# Patient Record
Sex: FEMALE | Race: WHITE | NOT HISPANIC OR LATINO | Employment: UNEMPLOYED | URBAN - METROPOLITAN AREA
[De-identification: names, ages, dates, MRNs, and addresses within clinical notes are randomized per-mention and may not be internally consistent; named-entity substitution may affect disease eponyms.]

---

## 2019-06-05 ENCOUNTER — OFFICE VISIT (OUTPATIENT)
Dept: FAMILY MEDICINE | Facility: CLINIC | Age: 49
End: 2019-06-05
Payer: COMMERCIAL

## 2019-06-05 VITALS
BODY MASS INDEX: 50.02 KG/M2 | DIASTOLIC BLOOD PRESSURE: 86 MMHG | TEMPERATURE: 99.7 F | SYSTOLIC BLOOD PRESSURE: 140 MMHG | HEART RATE: 92 BPM | HEIGHT: 64 IN | WEIGHT: 293 LBS

## 2019-06-05 DIAGNOSIS — F43.22 ADJUSTMENT DISORDER WITH ANXIOUS MOOD: ICD-10-CM

## 2019-06-05 DIAGNOSIS — E78.5 HYPERLIPIDEMIA LDL GOAL <100: ICD-10-CM

## 2019-06-05 DIAGNOSIS — J45.40 MODERATE PERSISTENT ASTHMA WITHOUT COMPLICATION: ICD-10-CM

## 2019-06-05 DIAGNOSIS — I10 BENIGN ESSENTIAL HYPERTENSION: ICD-10-CM

## 2019-06-05 DIAGNOSIS — E11.9 TYPE 2 DIABETES MELLITUS WITHOUT COMPLICATION, WITH LONG-TERM CURRENT USE OF INSULIN (H): Primary | ICD-10-CM

## 2019-06-05 DIAGNOSIS — Z79.4 TYPE 2 DIABETES MELLITUS WITHOUT COMPLICATION, WITH LONG-TERM CURRENT USE OF INSULIN (H): Primary | ICD-10-CM

## 2019-06-05 PROBLEM — E66.01 MORBID OBESITY (H): Status: ACTIVE | Noted: 2019-06-05

## 2019-06-05 LAB
ALBUMIN SERPL-MCNC: 3.6 G/DL (ref 3.4–5)
ALP SERPL-CCNC: 130 U/L (ref 40–150)
ALT SERPL W P-5'-P-CCNC: 20 U/L (ref 0–50)
ANION GAP SERPL CALCULATED.3IONS-SCNC: 5 MMOL/L (ref 3–14)
AST SERPL W P-5'-P-CCNC: 15 U/L (ref 0–45)
BILIRUB SERPL-MCNC: 0.5 MG/DL (ref 0.2–1.3)
BUN SERPL-MCNC: 13 MG/DL (ref 7–30)
CALCIUM SERPL-MCNC: 8.9 MG/DL (ref 8.5–10.1)
CHLORIDE SERPL-SCNC: 102 MMOL/L (ref 94–109)
CO2 SERPL-SCNC: 28 MMOL/L (ref 20–32)
CREAT SERPL-MCNC: 0.51 MG/DL (ref 0.52–1.04)
GFR SERPL CREATININE-BSD FRML MDRD: >90 ML/MIN/{1.73_M2}
GLUCOSE SERPL-MCNC: 373 MG/DL (ref 70–99)
HBA1C MFR BLD: 12.9 % (ref 0–5.6)
POTASSIUM SERPL-SCNC: 4.3 MMOL/L (ref 3.4–5.3)
PROT SERPL-MCNC: 7.5 G/DL (ref 6.8–8.8)
SODIUM SERPL-SCNC: 135 MMOL/L (ref 133–144)

## 2019-06-05 PROCEDURE — 36415 COLL VENOUS BLD VENIPUNCTURE: CPT | Performed by: NURSE PRACTITIONER

## 2019-06-05 PROCEDURE — 80053 COMPREHEN METABOLIC PANEL: CPT | Performed by: NURSE PRACTITIONER

## 2019-06-05 PROCEDURE — 83036 HEMOGLOBIN GLYCOSYLATED A1C: CPT | Performed by: NURSE PRACTITIONER

## 2019-06-05 PROCEDURE — 99214 OFFICE O/P EST MOD 30 MIN: CPT | Performed by: NURSE PRACTITIONER

## 2019-06-05 RX ORDER — FLUTICASONE PROPIONATE 110 UG/1
1 AEROSOL, METERED RESPIRATORY (INHALATION) 2 TIMES DAILY
COMMUNITY
End: 2019-08-12

## 2019-06-05 RX ORDER — ALBUTEROL SULFATE 90 UG/1
2 AEROSOL, METERED RESPIRATORY (INHALATION) 4 TIMES DAILY
COMMUNITY
End: 2019-08-12

## 2019-06-05 RX ORDER — LIRAGLUTIDE 6 MG/ML
1.8 INJECTION SUBCUTANEOUS DAILY
COMMUNITY
End: 2019-06-05

## 2019-06-05 RX ORDER — SERTRALINE HYDROCHLORIDE 100 MG/1
150 TABLET, FILM COATED ORAL DAILY
COMMUNITY
End: 2019-06-05

## 2019-06-05 RX ORDER — SERTRALINE HYDROCHLORIDE 100 MG/1
150 TABLET, FILM COATED ORAL DAILY
Qty: 90 TABLET | Refills: 3 | Status: SHIPPED | OUTPATIENT
Start: 2019-06-05 | End: 2020-09-11

## 2019-06-05 RX ORDER — FLUTICASONE PROPIONATE 110 UG/1
1 AEROSOL, METERED RESPIRATORY (INHALATION) 2 TIMES DAILY
Qty: 12 G | Refills: 3 | Status: SHIPPED | OUTPATIENT
Start: 2019-06-05 | End: 2020-09-11

## 2019-06-05 RX ORDER — AMLODIPINE BESYLATE 10 MG/1
10 TABLET ORAL DAILY
Qty: 90 TABLET | Refills: 3 | Status: SHIPPED | OUTPATIENT
Start: 2019-06-05 | End: 2019-11-15

## 2019-06-05 RX ORDER — ATORVASTATIN CALCIUM 80 MG/1
80 TABLET, FILM COATED ORAL DAILY
COMMUNITY
End: 2019-06-05

## 2019-06-05 RX ORDER — CETIRIZINE HYDROCHLORIDE 10 MG/1
10 TABLET ORAL DAILY
COMMUNITY
End: 2019-06-05

## 2019-06-05 RX ORDER — IRBESARTAN 300 MG/1
300 TABLET ORAL AT BEDTIME
Qty: 90 TABLET | Refills: 0 | Status: SHIPPED | OUTPATIENT
Start: 2019-06-05 | End: 2019-08-12

## 2019-06-05 RX ORDER — ALBUTEROL SULFATE 90 UG/1
2 AEROSOL, METERED RESPIRATORY (INHALATION) EVERY 6 HOURS PRN
Qty: 6.7 G | Refills: 3 | Status: SHIPPED | OUTPATIENT
Start: 2019-06-05 | End: 2022-06-07

## 2019-06-05 RX ORDER — TRIAMCINOLONE ACETONIDE 1 MG/G
CREAM TOPICAL PRN
COMMUNITY
End: 2019-08-12

## 2019-06-05 RX ORDER — AMLODIPINE BESYLATE 10 MG/1
10 TABLET ORAL DAILY
COMMUNITY
End: 2019-06-05

## 2019-06-05 RX ORDER — IRBESARTAN 300 MG/1
300 TABLET ORAL AT BEDTIME
COMMUNITY
End: 2019-06-05

## 2019-06-05 RX ORDER — CETIRIZINE HYDROCHLORIDE 10 MG/1
10 TABLET ORAL DAILY
Qty: 90 TABLET | Refills: 1 | Status: SHIPPED | OUTPATIENT
Start: 2019-06-05 | End: 2019-11-25

## 2019-06-05 RX ORDER — LIRAGLUTIDE 6 MG/ML
1.8 INJECTION SUBCUTANEOUS DAILY
Qty: 3 ML | Refills: 1 | Status: SHIPPED | OUTPATIENT
Start: 2019-06-05 | End: 2019-09-24

## 2019-06-05 RX ORDER — ATORVASTATIN CALCIUM 80 MG/1
80 TABLET, FILM COATED ORAL DAILY
Qty: 90 TABLET | Refills: 3 | Status: SHIPPED | OUTPATIENT
Start: 2019-06-05 | End: 2020-11-25

## 2019-06-05 ASSESSMENT — MIFFLIN-ST. JEOR: SCORE: 2133.62

## 2019-06-05 NOTE — PROGRESS NOTES
Subjective     Brissa Mott is a 48 year old female who presents to clinic today for the following health issues:    HPI     Diabetes Follow-up      How often are you checking your blood sugar? One time daily    What time of day are you checking your blood sugars (select all that apply)?  Before meals    Have you had any blood sugars above 200?  Yes     Have you had any blood sugars below 70?  No    What symptoms do you notice when your blood sugar is low?  Shaky    What concerns do you have today about your diabetes? None     Do you have any of these symptoms? (Select all that apply)  Excessive thirst     Have you had a diabetic eye exam in the last 12 months? No    Diabetes Management Resources    Hyperlipidemia Follow-Up      Are you having any of the following symptoms? (Select all that apply)  No complaints of shortness of breath, chest pain or pressure.  No increased sweating or nausea with activity.  No left-sided neck or arm pain.  No complaints of pain in calves when walking 1-2 blocks.    Are you regularly taking any medication or supplement to lower your cholesterol?   Yes- atorvastatin    Are you having muscle aches or other side effects that you think could be caused by your cholesterol lowering medication?  No    Hypertension Follow-up      Do you check your blood pressure regularly outside of the clinic? No     Are you following a low salt diet? No    Are your blood pressures ever more than 140 on the top number (systolic) OR more   than 90 on the bottom number (diastolic), for example 140/90? No    BP Readings from Last 2 Encounters:   06/05/19 140/86     No results found for: A1C, LDL    Amount of exercise or physical activity: a couple days a week    Problems taking medications regularly: No    Medication side effects: none    Diet: regular (no restrictions)        Patient Active Problem List   Diagnosis     Morbid obesity (H)     History reviewed. No pertinent surgical history.    Social History      Tobacco Use     Smoking status: Former Smoker     Smokeless tobacco: Never Used   Substance Use Topics     Alcohol use: Not Currently     History reviewed. No pertinent family history.      Current Outpatient Medications   Medication Sig Dispense Refill     albuterol (PROAIR HFA/PROVENTIL HFA/VENTOLIN HFA) 108 (90 Base) MCG/ACT inhaler Inhale 2 puffs into the lungs 4 times daily       amLODIPine (NORVASC) 10 MG tablet Take 10 mg by mouth daily       aspirin (ASPIRIN) 81 MG EC tablet Take 81 mg by mouth daily       atorvastatin (LIPITOR) 80 MG tablet Take 80 mg by mouth daily       cetirizine (ZYRTEC ALLERGY) 10 MG tablet Take 10 mg by mouth daily       cholecalciferol (VITAMIN D3) 5000 units (125 mcg) capsule Take by mouth daily       fluticasone (FLOVENT HFA) 110 MCG/ACT inhaler Inhale 1 puff into the lungs 2 times daily       insulin isophane & regular (HUMULIN MIX 70/30 PEN , NOVOLIN MIX 70/30 PEN) susp Inject 60-64 Units Subcutaneous 2 times daily (with meals)       insulin pen needle (BD TWYLA U/F) 32G X 4 MM miscellaneous Use two pen needles daily or as directed.       irbesartan (AVAPRO) 300 MG tablet Take 300 mg by mouth At Bedtime       liraglutide (VICTOZA) 18 MG/3ML solution Inject 1.8 mg Subcutaneous daily       metFORMIN (GLUCOPHAGE) 1000 MG tablet Take 1,000 mg by mouth 2 times daily (with meals)       sertraline (ZOLOFT) 100 MG tablet Take 150 mg by mouth daily       triamcinolone (KENALOG) 0.1 % external cream Apply topically as needed for irritation       Allergies   Allergen Reactions     Penicillins      Sulfa Drugs      No lab results found.   BP Readings from Last 3 Encounters:   06/05/19 140/86    Wt Readings from Last 3 Encounters:   06/05/19 (!) 152.9 kg (337 lb)               Reviewed and updated as needed this visit by Provider         Review of Systems   ROS COMP: Constitutional, HEENT, cardiovascular, pulmonary, GI, , musculoskeletal, neuro, skin, endocrine and psych systems are  "negative, except as otherwise noted.      Objective    /86 (BP Location: Right arm, Cuff Size: Adult Large)   Pulse 92   Temp 99.7  F (37.6  C) (Tympanic)   Ht 1.626 m (5' 4\")   Wt (!) 152.9 kg (337 lb)   BMI 57.85 kg/m    Body mass index is 57.85 kg/m .  Physical Exam   GENERAL: healthy, alert and no distress  EYES: Eyes grossly normal to inspection, PERRL and conjunctivae and sclerae normal  HENT: ear canals and TM's normal, nose and mouth without ulcers or lesions  NECK: no adenopathy, no asymmetry, masses, or scars and thyroid normal to palpation  RESP: lungs clear to auscultation - no rales, rhonchi or wheezes  BREAST: normal without masses, tenderness or nipple discharge and no palpable axillary masses or adenopathy  CV: regular rate and rhythm, normal S1 S2, no S3 or S4, no murmur, click or rub, no peripheral edema and peripheral pulses strong  ABDOMEN: soft, nontender, no hepatosplenomegaly, no masses and bowel sounds normal  MS: no gross musculoskeletal defects noted, no edema  SKIN: no suspicious lesions or rashes  NEURO: Normal strength and tone, mentation intact and speech normal  PSYCH: mentation appears normal, affect normal/bright    Results for orders placed or performed in visit on 06/05/19   Hemoglobin A1c   Result Value Ref Range    Hemoglobin A1C 12.9 (H) 0 - 5.6 %   Comprehensive metabolic panel   Result Value Ref Range    Sodium 135 133 - 144 mmol/L    Potassium 4.3 3.4 - 5.3 mmol/L    Chloride 102 94 - 109 mmol/L    Carbon Dioxide 28 20 - 32 mmol/L    Anion Gap 5 3 - 14 mmol/L    Glucose 373 (H) 70 - 99 mg/dL    Urea Nitrogen 13 7 - 30 mg/dL    Creatinine 0.51 (L) 0.52 - 1.04 mg/dL    GFR Estimate >90 >60 mL/min/[1.73_m2]    GFR Estimate If Black >90 >60 mL/min/[1.73_m2]    Calcium 8.9 8.5 - 10.1 mg/dL    Bilirubin Total 0.5 0.2 - 1.3 mg/dL    Albumin 3.6 3.4 - 5.0 g/dL    Protein Total 7.5 6.8 - 8.8 g/dL    Alkaline Phosphatase 130 40 - 150 U/L    ALT 20 0 - 50 U/L    AST 15 0 - 45 " "U/L             Assessment & Plan     (E11.9,  Z79.4) Type 2 diabetes mellitus without complication, with long-term current use of insulin (H)  (primary encounter diagnosis)  Comment: Patient has heme globin A1c is 12 patient has been off of her insulin.  Patient to restart her insulin and follow-up in 6 weeks  Plan: Hemoglobin A1c, cetirizine (ZYRTEC ALLERGY) 10         MG tablet, insulin isophane & regular (HUMULIN         MIX 70/30 PEN , NOVOLIN MIX 70/30 PEN) susp,         irbesartan (AVAPRO) 300 MG tablet, liraglutide         (VICTOZA) 18 MG/3ML solution, metFORMIN         (GLUCOPHAGE) 1000 MG tablet, Comprehensive         metabolic panel, insulin pen needle (BD TWYLA         U/F) 32G X 4 MM miscellaneous      (E78.5) Hyperlipidemia LDL goal <100  Comment: Renewed today   plan: atorvastatin (LIPITOR) 80 MG tablet      (F43.22) Adjustment disorder with anxious mood  Comment: Controlled with current dose of Zoloft renewed today   plan: sertraline (ZOLOFT) 100 MG tablet            (I10) Benign essential hypertension  Comment: Controlled with Norvasc reordered  Plan: amLODIPine (NORVASC) 10 MG tablet           (J45.40) Moderate persistent asthma without complication  Comment: Controlled with current inhalers renewed today  Plan: albuterol (PROAIR HFA/PROVENTIL HFA/VENTOLIN         HFA) 108 (90 Base) MCG/ACT inhaler, fluticasone        (FLOVENT HFA) 110 MCG/ACT inhaler         BMI:   Estimated body mass index is 57.85 kg/m  as calculated from the following:    Height as of this encounter: 1.626 m (5' 4\").    Weight as of this encounter: 152.9 kg (337 lb).   Weight management plan: Discussed healthy diet and exercise guidelines        See Patient Instructions    Return in about 6 weeks (around 7/17/2019) for Diabetes.    RONNELL Bianchi [facility]      "

## 2019-06-07 NOTE — RESULT ENCOUNTER NOTE
Please Notify Brissa  of test results current hemoglobin A1c is 12.  Restart your diabetic medications as discussed in the clinic we will recheck hemoglobin A1c in 6 weeks follow-up.  Comprehensive panel within normal limits creatinine normal at 0.5.  Follow-up appointment in 6 weeks for diabetes      Juli Ramsey CNP

## 2019-06-26 NOTE — PATIENT INSTRUCTIONS
Need to make a preventative appointment       Patient Education     Adjustment Disorder  Life changes--work, family, parents, children--each can cause a great deal of stress in life. An adjustment disorder means you have trouble dealing with this change and stress. This problem can have serious results. You may feel helpless, depressed, make bad decisions, or even feel like you want to hurt yourself.  Adjustment disorder can cause anxiety or depression. It is triggered by daily stresses such as:    Death of a loved one    Divorce    Marriage    General life changes such as changing or leaving a job    Moving    Illness or other health issue for you or a family member    Sex    Money     Symptoms may include:    Sadness or crying    Anxiety    Insomnia    Poor concentration    Trouble doing simple things    New problems at work or with family or friends    Loss of self-esteem    Sense of hopelessness    Feeling trapped or cut off from others  With this condition, it is common to feel sad, guilty, hopeless, and restless. These feelings may continue for weeks or months. It can be helpful to identify what is causing the additional stress and take steps to get extra support. If new stressful events do not happen, it is likely that you will gradually start feeling better.  Home care    If you have been given a prescription for medicine, take it as directed.    It helps to talk about your feelings and thoughts with family or friends who understand and support you.  Follow-up care  Follow up with your healthcare provider, or therapist as advised. Let them know if this condition does not improve or gets worse.  When to seek medical advice  Call your healthcare provider right away if any of these happen:    Worsening depression or anxiety    Feeling out of control    Thoughts of harming yourself or another    Being unable to care for yourself  Date Last Reviewed: 10/1/2017    [id number] [employer]. [address] [place] [address]  [place] [address]. All rights reserved. This information is not intended as a substitute for professional medical care. Always follow your healthcare professional's instructions.            0 = independent

## 2019-07-22 ENCOUNTER — TELEPHONE (OUTPATIENT)
Dept: FAMILY MEDICINE | Facility: CLINIC | Age: 49
End: 2019-07-22

## 2019-07-22 NOTE — TELEPHONE ENCOUNTER
Panel Management Review      Patient has the following on her problem list:     Diabetes    ASA: Passed    Last A1C  Lab Results   Component Value Date    A1C 12.9 06/05/2019     A1C tested: FAILED    Last LDL:    No results found for: CHOL  No results found for: HDL  No results found for: LDL  No results found for: TRIG  No results found for: CHOLHDLRATIO  No results found for: NHDL    Is the patient on a Statin? YES             Is the patient on Aspirin? YES    Medications     HMG CoA Reductase Inhibitors     atorvastatin (LIPITOR) 80 MG tablet       Salicylates     aspirin (ASPIRIN) 81 MG EC tablet             Last three blood pressure readings:  BP Readings from Last 3 Encounters:   06/05/19 140/86       Date of last diabetes office visit: 6/5/19     Tobacco History:     History   Smoking Status     Former Smoker   Smokeless Tobacco     Never Used           Composite cancer screening  Chart review shows that this patient is due/due soon for the following Pap Smear, mammogram, and Colonoscopy  Summary:    Patient is due/failing the following:   Microalbumin, ACT, AAP, MAMMOGRAM, colonoscopy and PAP    Action needed:   Patient needs office visit for a diabetes follow up and labs.    Type of outreach:    Phone, left message for patient to call back.     Questions for provider review:    None                                                                                                                                    Echo Spangler MA      Chart routed to Care Team .

## 2019-08-12 ENCOUNTER — OFFICE VISIT (OUTPATIENT)
Dept: FAMILY MEDICINE | Facility: CLINIC | Age: 49
End: 2019-08-12
Payer: COMMERCIAL

## 2019-08-12 VITALS
SYSTOLIC BLOOD PRESSURE: 147 MMHG | WEIGHT: 293 LBS | BODY MASS INDEX: 50.02 KG/M2 | DIASTOLIC BLOOD PRESSURE: 87 MMHG | HEART RATE: 90 BPM | HEIGHT: 64 IN | TEMPERATURE: 98 F

## 2019-08-12 DIAGNOSIS — Z12.11 SPECIAL SCREENING FOR MALIGNANT NEOPLASMS, COLON: ICD-10-CM

## 2019-08-12 DIAGNOSIS — I15.9 SECONDARY HYPERTENSION: ICD-10-CM

## 2019-08-12 DIAGNOSIS — E66.01 MORBID OBESITY (H): ICD-10-CM

## 2019-08-12 DIAGNOSIS — Z79.4 TYPE 2 DIABETES MELLITUS WITH HYPERGLYCEMIA, WITH LONG-TERM CURRENT USE OF INSULIN (H): ICD-10-CM

## 2019-08-12 DIAGNOSIS — E11.65 TYPE 2 DIABETES MELLITUS WITH HYPERGLYCEMIA, WITH LONG-TERM CURRENT USE OF INSULIN (H): ICD-10-CM

## 2019-08-12 DIAGNOSIS — Z00.00 ROUTINE GENERAL MEDICAL EXAMINATION AT A HEALTH CARE FACILITY: Primary | ICD-10-CM

## 2019-08-12 DIAGNOSIS — G47.33 OSA (OBSTRUCTIVE SLEEP APNEA): ICD-10-CM

## 2019-08-12 DIAGNOSIS — L30.9 DERMATITIS: ICD-10-CM

## 2019-08-12 DIAGNOSIS — M25.562 ACUTE PAIN OF LEFT KNEE: ICD-10-CM

## 2019-08-12 PROBLEM — E11.9 DIABETES MELLITUS, TYPE 2 (H): Status: ACTIVE | Noted: 2019-08-12

## 2019-08-12 PROCEDURE — 99214 OFFICE O/P EST MOD 30 MIN: CPT | Mod: 25 | Performed by: PHYSICIAN ASSISTANT

## 2019-08-12 PROCEDURE — G0145 SCR C/V CYTO,THINLAYER,RESCR: HCPCS | Performed by: PHYSICIAN ASSISTANT

## 2019-08-12 PROCEDURE — 87624 HPV HI-RISK TYP POOLED RSLT: CPT | Performed by: PHYSICIAN ASSISTANT

## 2019-08-12 PROCEDURE — 99396 PREV VISIT EST AGE 40-64: CPT | Performed by: PHYSICIAN ASSISTANT

## 2019-08-12 RX ORDER — IRBESARTAN 300 MG/1
300 TABLET ORAL AT BEDTIME
Qty: 90 TABLET | Refills: 0 | Status: SHIPPED | OUTPATIENT
Start: 2019-08-12 | End: 2020-03-16

## 2019-08-12 RX ORDER — ALPRAZOLAM 1 MG
TABLET ORAL
Qty: 1 TABLET | Refills: 0 | Status: SHIPPED | OUTPATIENT
Start: 2019-08-12 | End: 2019-11-15

## 2019-08-12 RX ORDER — TRIAMCINOLONE ACETONIDE 1 MG/G
CREAM TOPICAL PRN
Qty: 15 G | Refills: 0 | Status: SHIPPED | OUTPATIENT
Start: 2019-08-12 | End: 2021-04-29

## 2019-08-12 ASSESSMENT — ENCOUNTER SYMPTOMS
NERVOUS/ANXIOUS: 1
EYE PAIN: 0
JOINT SWELLING: 1
FREQUENCY: 1
ARTHRALGIAS: 1
HEMATURIA: 0
CONSTIPATION: 0
COUGH: 1
HEARTBURN: 0
CHILLS: 0
HEMATOCHEZIA: 0
HEADACHES: 0
ABDOMINAL PAIN: 0
FEVER: 0
DIZZINESS: 0
DIARRHEA: 0

## 2019-08-12 ASSESSMENT — MIFFLIN-ST. JEOR: SCORE: 2160.84

## 2019-08-12 NOTE — PATIENT INSTRUCTIONS
Let me know if change mind and want to see sleep doctor for sleep apnea.  Big health risks with untreated sleep apnea  OR consider seeing ENT surgeon to see if they could help - will get you referral    BP -start checking  Goal under 130/80    Diabetes -  Set up with diabetic ed.  Bring sugar readings if able.  [phone]     Knee pain -  Suspect tear of cartilage  Set up MRI.  Call [phone] to set up your imaging testing.  Use xanax tablet before MRI to help claustrophobia.  Need .  Can try OTC lidocaine gels or patches on knee, or diclofenac prescription cream given today    Set up colonoscopy.  Set up mammogram  [facility] Mammo Schedule  [facility] ~ [phone]  One Day Weekly- Alternating Days    [place] ~ [phone]  Every other Monday or Wednesday   & one Saturday morning a month    [place] ~ [phone]  Every Other Monday Afternoon    [place] ~ [phone]  Every Other Monday Morning    [place] ~ [phone]  Every Monday morning  Every Tuesday afternoon  Wed, Thurs, Friday morning & afternoon        Preventive Health Recommendations  Female Ages 50 - 64    Yearly exam: See your health care provider every year in order to  o Review health changes.   o Discuss preventive care.    o Review your medicines if your doctor has prescribed any.      Get a Pap test every three years (unless you have an abnormal result and your provider advises testing more often).    If you get Pap tests with HPV test, you only need to test every 5 years, unless you have an abnormal result.     You do not need a Pap test if your uterus was removed (hysterectomy) and you have not had cancer.    You should be tested each year for STDs (sexually transmitted diseases) if you're at risk.     Have a mammogram every 1 to 2 years.    Have a colonoscopy at age 50, or have a yearly FIT test (stool test). These exams screen for colon cancer.      Have a cholesterol test every 5 years, or more often if  advised.    Have a diabetes test (fasting glucose) every three years. If you are at risk for diabetes, you should have this test more often.     If you are at risk for osteoporosis (brittle bone disease), think about having a bone density scan (DEXA).    Shots: Get a flu shot each year. Get a tetanus shot every 10 years.    Nutrition:     Eat at least 5 servings of fruits and vegetables each day.    Eat whole-grain bread, whole-wheat pasta and brown rice instead of white grains and rice.    Get adequate Calcium and Vitamin D.     Lifestyle    Exercise at least 150 minutes a week (30 minutes a day, 5 days a week). This will help you control your weight and prevent disease.    Limit alcohol to one drink per day.    No smoking.     Wear sunscreen to prevent skin cancer.     See your dentist every six months for an exam and cleaning.    See your eye doctor every 1 to 2 years.

## 2019-08-12 NOTE — NURSING NOTE
"Chief Complaint   Patient presents with     Physical       Initial BP (!) 150/90 (BP Location: Right arm, Patient Position: Chair, Cuff Size: Adult Large)   Pulse 90   Temp 98  F (36.7  C) (Tympanic)   Ht 1.626 m (5' 4\")   Wt (!) 155.6 kg (343 lb)   BMI 58.88 kg/m   Estimated body mass index is 58.88 kg/m  as calculated from the following:    Height as of this encounter: 1.626 m (5' 4\").    Weight as of this encounter: 155.6 kg (343 lb).    Patient presents to the clinic using No DME    Health Maintenance that is potentially due pending provider review:  NONE        Is there anyone who you would like to be able to receive your results? No  If yes have patient fill out YESSENIA      "

## 2019-08-12 NOTE — LETTER
August 18, 2019    Brissa Mott  [address] [facility] [address]    Dear ,  This letter is regarding your recent Pap smear (cervical cancer screening) and Human Papillomavirus (HPV) test.  We are happy to inform you that your Pap smear result is normal. Cervical cancer is closely linked with certain types of HPV. Your results showed no evidence of high-risk HPV.  We recommend you have your next PAP smear and HPV test in 5 years.  You will still need to return to the clinic every year for an annual exam and other preventive tests.  If you have additional questions regarding this result, please call our registered nurse, Jayne at [phone].  Sincerely,    Khloe Strong PA-C/mari

## 2019-08-12 NOTE — LETTER
[facility]  [address] [place] [id number]  Phone: [phone]  Fax: [phone]    08/27/19    Brissa [address] [address]      Khloe Brumfield has placed a referral for ENT if your interested. I've enclosed the information for you. Please call the office if any questions.    Sincerely,      Abby Lobato CMA for Khloe Strong PA-C

## 2019-08-12 NOTE — PROGRESS NOTES
"ref   SUBJECTIVE:   CC: Brissa Mott is an 48 year old woman who presents for preventive health visit.     Healthy Habits:     Getting at least 3 servings of Calcium per day:  Yes    Bi-annual eye exam:  Yes    Dental care twice a year:  Yes    Sleep apnea or symptoms of sleep apnea:  Excessive snoring    Diet:  Diabetic    Frequency of exercise:  None    Taking medications regularly:  Yes    PHQ-2 Total Score: 2    Additional concerns today:  Yes    No pap in many years    * Left knee-  Ongoing.  Years ago she injured it and must have twisted it few weeks ago.  Pain with walking and also \"pops\".  Kicked in knee many years ago.  Family history of \"bad knees\".  Painful if on it much.      HTN - usually good in clinic but does not monitor.    Severe KATHRINE - has bipap but can't sleep with it, does not use, hates it.    DM2 - on metformin 1000 bid, victoza 1.8, novolin mix.  Sugars running 110-210, only tests in morning.     Morbid obesity.  Not interested in surgery.    Refill steroid cream.  Feels helps with healing.     Today's PHQ-2 Score:   PHQ-2 ( 1999 Pfizer) 8/12/2019   Q1: Little interest or pleasure in doing things 1   Q2: Feeling down, depressed or hopeless 1   PHQ-2 Score 2   Q1: Little interest or pleasure in doing things Several days   Q2: Feeling down, depressed or hopeless Several days   PHQ-2 Score 2       Abuse: Current or Past(Physical, Sexual or Emotional)- No  Do you feel safe in your environment? Yes    Social History     Tobacco Use     Smoking status: Former Smoker     Smokeless tobacco: Never Used   Substance Use Topics     Alcohol use: Not Currently         Alcohol Use 8/12/2019   Prescreen: >3 drinks/day or >7 drinks/week? Not Applicable       Reviewed orders with patient.  Reviewed health maintenance and updated orders accordingly - Yes  Labs reviewed in EPIC  BP Readings from Last 3 Encounters:   08/12/19 (!) 147/87   06/05/19 140/86    Wt Readings from Last 3 Encounters:   08/12/19 (!) 155.6 kg " "(343 lb)   06/05/19 (!) 152.9 kg (337 lb)            Mammogram Screening: Patient over age 50, mutual decision to screen reflected in health maintenance.    Pertinent mammograms are reviewed under the imaging tab.  History of abnormal Pap smear: NO - age 30-65 PAP every 5 years with negative HPV co-testing recommended  PAP / HPV Latest Ref Rng & Units 8/12/2019   PAP - NIL   HPV 16 DNA NEG:Negative Negative   HPV 18 DNA NEG:Negative Negative   OTHER HR HPV NEG:Negative Negative     Reviewed and updated as needed this visit by clinical staff  Tobacco  Allergies  Meds  Problems  Med Hx  Surg Hx  Fam Hx  Soc Hx          Reviewed and updated as needed this visit by Provider  Tobacco  Allergies  Meds  Problems  Med Hx  Surg Hx  Fam Hx            Review of Systems   Constitutional: Negative for chills and fever.   HENT: Positive for congestion. Negative for ear pain and hearing loss.    Eyes: Negative for pain.   Respiratory: Positive for cough.    Cardiovascular: Negative for chest pain and peripheral edema.   Gastrointestinal: Negative for abdominal pain, constipation, diarrhea, heartburn and hematochezia.   Genitourinary: Positive for frequency. Negative for genital sores and hematuria.   Musculoskeletal: Positive for arthralgias and joint swelling.   Neurological: Negative for dizziness and headaches.   Psychiatric/Behavioral: Negative for mood changes. The patient is nervous/anxious.    All other systems reviewed and are negative.    OBJECTIVE:   BP (!) 147/87   Pulse 90   Temp 98  F (36.7  C) (Tympanic)   Ht 1.626 m (5' 4\")   Wt (!) 155.6 kg (343 lb)   BMI 58.88 kg/m    Physical Exam  GENERAL: healthy, alert and no distress  EYES: Eyes grossly normal to inspection, PERRL and conjunctivae and sclerae normal  HENT: ear canals and TM's normal, nose and mouth without ulcers or lesions.  Narrow posterior pharynx architecture.  NECK: no adenopathy, no asymmetry, masses, or scars and thyroid normal to " palpation  RESP: lungs clear to auscultation - no rales, rhonchi or wheezes  BREAST: normal without masses, tenderness or nipple discharge and no palpable axillary masses or adenopathy  CV: regular rate and rhythm, normal S1 S2, no S3 or S4, no murmur, click or rub, no peripheral edema   ABDOMEN: soft, nontender, no hepatosplenomegaly, no masses and bowel sounds normal   (female): normal female external genitalia, normal urethral meatus, vaginal mucosa pink, moist, well rugated, and normal cervix/adnexa/uterus without masses or discharge  MS: no gross musculoskeletal defects noted, no edema  Knee has normal ROM.  There is no crepitus but joint space tenderness.  Patella tracks well and has no tenderness with pressure and when displaced from condylar groove has no tenderness underneath.  Anterior and posterior draws negative.  Varus and valgus negative.    SKIN: no suspicious lesions or rashes  NEURO: Normal strength and tone, mentation intact and speech normal  PSYCH: mentation appears normal, affect normal/bright    ASSESSMENT/PLAN:       ICD-10-CM    1. Routine general medical examination at a health care facility Z00.00 Pap imaged thin layer screen with HPV - recommended age 30 - 65 years (select HPV order below)     HPV High Risk Types DNA Cervical   2. Type 2 diabetes mellitus with hyperglycemia, with long-term current use of insulin (H) E11.65 OFFICE/OUTPT VISIT,EST,LEVL IV    Z79.4    3. Morbid obesity (H) E66.01 OFFICE/OUTPT VISIT,EST,LEVL IV   4. KATHRINE (obstructive sleep apnea) G47.33 SLEEP ENT REFERRAL     OFFICE/OUTPT VISIT,EST,LEVL IV   5. Acute pain of left knee M25.562 MR Knee Left w/o Contrast     ALPRAZolam (XANAX) 1 MG tablet     diclofenac (VOLTAREN) 1 % topical gel     OFFICE/OUTPT VISIT,EST,LEVL IV   6. Secondary hypertension I15.9 irbesartan (AVAPRO) 300 MG tablet     SLEEP ENT REFERRAL   7. Dermatitis L30.9 triamcinolone (KENALOG) 0.1 % external cream   8. Special screening for malignant  "neoplasms, colon Z12.11 GASTROENTEROLOGY ADULT REF PROCEDURE ONLY       COUNSELING:  Reviewed preventive health counseling, as reflected in patient instructions       Regular exercise       Healthy diet/nutrition    Estimated body mass index is 58.88 kg/m  as calculated from the following:    Height as of this encounter: 1.626 m (5' 4\").    Weight as of this encounter: 155.6 kg (343 lb).    Weight management plan: Patient referred to endocrine and/or weight management specialty     reports that she has quit smoking. She has never used smokeless tobacco.      Counseling Resources:  ATP IV Guidelines  Pooled Cohorts Equation Calculator  Breast Cancer Risk Calculator  FRAX Risk Assessment  ICSI Preventive Guidelines  Dietary Guidelines for Americans, 2010  [employer]'s MyPlate  ASA Prophylaxis  Lung CA Screening    Khloe Strong PA-C  [facility]      Patient Instructions   Let me know if change mind and want to see sleep doctor for sleep apnea.  Big health risks with untreated sleep apnea  OR consider seeing ENT surgeon to see if they could help - will get you referral    BP -start checking  Goal under 130/80    Diabetes -  Set up with diabetic ed.  Bring sugar readings if able.  [phone]     Knee pain -  Suspect tear of cartilage  Set up MRI.  Call [phone] to set up your imaging testing.  Use xanax tablet before MRI to help claustrophobia.  Need .  Can try OTC lidocaine gels or patches on knee, or diclofenac prescription cream given today    Set up colonoscopy.  Set up mammogram  [facility] Mammo Schedule  [facility] ~ [phone]  One Day Weekly- Alternating Days    [place] ~ [phone]  Every other Monday or Wednesday   & one Saturday morning a month    [place] ~ [phone]  Every Other Monday Afternoon    [place] ~ [phone]  Every Other Monday Morning    [place] ~ [phone]  Every Monday morning  Every Tuesday afternoon  Wed, Thurs, Friday morning & " afternoon        Preventive Health Recommendations  Female Ages 50 - 64    Yearly exam: See your health care provider every year in order to  o Review health changes.   o Discuss preventive care.    o Review your medicines if your doctor has prescribed any.      Get a Pap test every three years (unless you have an abnormal result and your provider advises testing more often).    If you get Pap tests with HPV test, you only need to test every 5 years, unless you have an abnormal result.     You do not need a Pap test if your uterus was removed (hysterectomy) and you have not had cancer.    You should be tested each year for STDs (sexually transmitted diseases) if you're at risk.     Have a mammogram every 1 to 2 years.    Have a colonoscopy at age 50, or have a yearly FIT test (stool test). These exams screen for colon cancer.      Have a cholesterol test every 5 years, or more often if advised.    Have a diabetes test (fasting glucose) every three years. If you are at risk for diabetes, you should have this test more often.     If you are at risk for osteoporosis (brittle bone disease), think about having a bone density scan (DEXA).    Shots: Get a flu shot each year. Get a tetanus shot every 10 years.    Nutrition:     Eat at least 5 servings of fruits and vegetables each day.    Eat whole-grain bread, whole-wheat pasta and brown rice instead of white grains and rice.    Get adequate Calcium and Vitamin D.     Lifestyle    Exercise at least 150 minutes a week (30 minutes a day, 5 days a week). This will help you control your weight and prevent disease.    Limit alcohol to one drink per day.    No smoking.     Wear sunscreen to prevent skin cancer.     See your dentist every six months for an exam and cleaning.    See your eye doctor every 1 to 2 years.

## 2019-08-14 LAB
COPATH REPORT: NORMAL
PAP: NORMAL

## 2019-08-15 LAB
FINAL DIAGNOSIS: NORMAL
HPV HR 12 DNA CVX QL NAA+PROBE: NEGATIVE
HPV16 DNA SPEC QL NAA+PROBE: NEGATIVE
HPV18 DNA SPEC QL NAA+PROBE: NEGATIVE
SPECIMEN DESCRIPTION: NORMAL
SPECIMEN SOURCE CVX/VAG CYTO: NORMAL

## 2019-08-22 PROBLEM — I15.9 SECONDARY HYPERTENSION: Status: ACTIVE | Noted: 2019-08-22

## 2019-08-23 NOTE — PROGRESS NOTES
Attempted to call- message says not excepting calls at this time.  [facility]

## 2019-09-20 ENCOUNTER — TELEPHONE (OUTPATIENT)
Dept: FAMILY MEDICINE | Facility: CLINIC | Age: 49
End: 2019-09-20

## 2019-09-20 DIAGNOSIS — Z79.4 TYPE 2 DIABETES MELLITUS WITH HYPERGLYCEMIA, WITH LONG-TERM CURRENT USE OF INSULIN (H): Primary | ICD-10-CM

## 2019-09-20 DIAGNOSIS — E11.65 TYPE 2 DIABETES MELLITUS WITH HYPERGLYCEMIA, WITH LONG-TERM CURRENT USE OF INSULIN (H): Primary | ICD-10-CM

## 2019-09-20 NOTE — LETTER
[facility]  [address] [place] [id number] [phone]      September 27, 2019    Brissa Mott                                                                                                                     [address] [place] [id number]            Dear Brissa Mott,    At [facility] we care about your health and well-being. A review of your chart has indicated that you are due for a mammogram and a colon cancer screening. Please contact us at [phone] to schedule your appointment.  -Mammogram- All women age 40 to 70 years old, who are in good health, should be screened for breast cancer with mammography every 1 to 2 years. Mammograms help find breast cancer at an early stage. The smaller and the more localized a cancer is at the time of diagnosis and treatment, the greater the chance of a cure. The mammogram allows the detection of some types of breast cancer 1 to 2 years before it could be felt. There is a better chance of curing the cancer if it is found at an early stage. Please call to schedule your mammogram at any of the locations below:    [facility] ~ [phone]  One Day weekly- Alternating Days    [place] ~ [phone]  Every other Monday or Wednesday   & one Saturday morning a month    [place] ~ [phone]  Every Other Monday Afternoon    [place]  Every other Monday Morning    [place] ~ [phone]  Every Monday morning  Every Tuesday afternoon  Wed, Thurs, Friday morning & afternoon    Colon Cancer Screening- Recommended every 5-10 years, depending on your history, in order to prevent and detect colon cancer at its earliest stages.  Colon cancer is now the second leading cause of death in the [place] for both men and women and there are over 130,000 new cases and 50,000 deaths per year from colon cancer.  Colonoscopies can prevent 90-95% of these deaths.  Problem lesions can be removed before they ever become  cancer.  This test is not only looking for cancer, but also getting rid of precancerious lesions.  You are usually given some sedation which makes the test very comfortable for most people.      If you do not wish to do a colonoscopy or cannot afford to do one, at this time, there is another option. It is called a FIT test or Fecal Immunochemical Occult Blood Test (take home stool sample kit).  It does not replace the colonoscopy for colorectal cancer screening, but it can detect hidden bleeding in the lower colon.  It does need to be repeated every year and if a positive result is obtained, you would be referred for a colonoscopy. The FIT test is really easy to do and does not require any  diet or medication restrictions and involves only one collection sample.      If you have completed either one of these tests or had a flexible sigmoidoscopy in the past five years at another facility, please have the records sent to our clinic so that we can best coordinate your care.  Please call us ([phone]) if you have questions or would like arrange either to do a colonoscopy or obtain the necessary test kit for the Fecal Occult Test.     If you have already had one or all of the above screening tests at another facility, please call us to update your chart.     You may contact the clinic at [phone] if you have any questions or concerns about this request.    Sincerely,    [facility] Staff/ ss

## 2019-09-20 NOTE — TELEPHONE ENCOUNTER
Panel Management Review      Patient has the following on her problem list:     Diabetes    ASA: Passed    Last A1C  Lab Results   Component Value Date    A1C 12.9 06/05/2019     A1C tested: FAILED    Last LDL:    No results found for: CHOL  No results found for: HDL  No results found for: LDL  No results found for: TRIG  No results found for: CHOLHDLRATIO  No results found for: NHDL    Is the patient on a Statin? YES             Is the patient on Aspirin? YES    Medications     HMG CoA Reductase Inhibitors     atorvastatin (LIPITOR) 80 MG tablet       Salicylates     aspirin (ASPIRIN) 81 MG EC tablet             Last three blood pressure readings:  BP Readings from Last 3 Encounters:   08/12/19 (!) 147/87   06/05/19 140/86       Date of last diabetes office visit: 8/12/19     Tobacco History:     History   Smoking Status     Former Smoker   Smokeless Tobacco     Never Used           Composite cancer screening  Chart review shows that this patient is due/due soon for the following Mammogram and Colonoscopy  Summary:    Patient is due/failing the following:   AAP, ACT, COLONOSCOPY and MAMMOGRAM    Action needed:   Patient needs a mammogram and a colonoscopy    Type of outreach:    Phone, left message for patient to call back.     Questions for provider review:    None                                                                                                                                    Echo Spangler MA      Chart routed to Care Team .

## 2019-09-20 NOTE — LETTER
My Asthma Action Plan    Name: Brissa Mott   YOB: 1970  Date: 9/23/2019   My doctor: RONNELL Bianchi CNP   My clinic: [facility]        My Rescue Medicine:   Albuterol inhaler (Proair/Ventolin/Proventil HFA)  2-4 puffs EVERY 4 HOURS as needed. Use a spacer if recommended by your provider.   My Asthma Severity:   Intermittent / Exercise Induced  Know your asthma triggers: Patient is unaware of triggers             GREEN ZONE   Good Control    I feel good    No cough or wheeze    Can work, sleep and play without asthma symptoms       Take your asthma control medicine every day.     1. If exercise triggers your asthma, take your rescue medication    15 minutes before exercise or sports, and    During exercise if you have asthma symptoms  2. Spacer to use with inhaler: If you have a spacer, make sure to use it with your inhaler             YELLOW ZONE Getting Worse  I have ANY of these:    I do not feel good    Cough or wheeze    Chest feels tight    Wake up at night   1. Keep taking your Green Zone medications  2. Start taking your rescue medicine:    every 20 minutes for up to 1 hour. Then every 4 hours for 24-48 hours.  3. If you stay in the Yellow Zone for more than 12-24 hours, contact your doctor.  4. If you do not return to the Green Zone in 12-24 hours or you get worse, start taking your oral steroid medicine if prescribed by your provider.           RED ZONE Medical Alert - Get Help  I have ANY of these:    I feel awful    Medicine is not helping    Breathing getting harder    Trouble walking or talking    Nose opens wide to breathe       1. Take your rescue medicine NOW  2. If your provider has prescribed an oral steroid medicine, start taking it NOW  3. Call your doctor NOW  4. If you are still in the Red Zone after 20 minutes and you have not reached your doctor:    Take your rescue medicine again and    Call 911 or go to the emergency room right away    See your  regular doctor within 2 weeks of an Emergency Room or Urgent Care visit for follow-up treatment.          Annual Reminders:  Meet with Asthma Educator,  Flu Shot in the Fall, consider Pneumonia Vaccination for patients with asthma (aged 19 and older).    Pharmacy: [id number] - [place] [address] [facility] AT [facility] & [address]                        Asthma Triggers  How To Control Things That Make Your Asthma Worse    Triggers are things that make your asthma worse.  Look at the list below to help you find your triggers and   what you can do about them. You can help prevent asthma flare-ups by staying away from your triggers.      Trigger                                                          What you can do   Cigarette Smoke  Tobacco smoke can make asthma worse. Do not allow smoking in your home, car or around you.  Be sure no one smokes at a child s day care or school.  If you smoke, ask your health care provider for ways to help you quit.  Ask family members to quit too.  Ask your health care provider for a referral to Quit Plan to help you quit smoking, or call 1-933-104-PLAN.     Colds, Flu, Bronchitis  These are common triggers of asthma. Wash your hands often.  Don t touch your eyes, nose or mouth.  Get a flu shot every year.     Dust Mites  These are tiny bugs that live in cloth or carpet. They are too small to see. Wash sheets and blankets in hot water every week.   Encase pillows and mattress in dust mite proof covers.  Avoid having carpet if you can. If you have carpet, vacuum weekly.   Use a dust mask and HEPA vacuum.   Pollen and Outdoor Mold  Some people are allergic to trees, grass, or weed pollen, or molds. Try to keep your windows closed.  Limit time out doors when pollen count is high.   Ask you health care provider about taking medicine during allergy season.     Animal Dander  Some people are allergic to skin flakes, urine or saliva from pets with fur or feathers.  Keep pets with fur or feathers out of your home.    If you can t keep the pet outdoors, then keep the pet out of your bedroom.  Keep the bedroom door closed.  Keep pets off cloth furniture and away from stuffed toys.     Mice, Rats, and Cockroaches  Some people are allergic to the waste from these pests.   Cover food and garbage.  Clean up spills and food crumbs.  Store grease in the refrigerator.   Keep food out of the bedroom.   Indoor Mold  This can be a trigger if your home has high moisture. Fix leaking faucets, pipes, or other sources of water.   Clean moldy surfaces.  Dehumidify basement if it is damp and smelly.   Smoke, Strong Odors, and Sprays  These can reduce air quality. Stay away from strong odors and sprays, such as perfume, powder, hair spray, paints, smoke incense, paint, cleaning products, candles and new carpet.   Exercise or Sports  Some people with asthma have this trigger. Be active!  Ask your doctor about taking medicine before sports or exercise to prevent symptoms.    Warm up for 5-10 minutes before and after sports or exercise.     Other Triggers of Asthma  Cold air:  Cover your nose and mouth with a scarf.  Sometimes laughing or crying can be a trigger.  Some medicines and food can trigger asthma.

## 2019-09-23 NOTE — TELEPHONE ENCOUNTER
Called and left message for patient to call clinic back. She needs to schedule a mammogram and a colon cancer screening.    Echo Spangler MA

## 2019-09-24 DIAGNOSIS — E11.9 TYPE 2 DIABETES MELLITUS WITHOUT COMPLICATION, WITH LONG-TERM CURRENT USE OF INSULIN (H): ICD-10-CM

## 2019-09-24 DIAGNOSIS — Z79.4 TYPE 2 DIABETES MELLITUS WITHOUT COMPLICATION, WITH LONG-TERM CURRENT USE OF INSULIN (H): ICD-10-CM

## 2019-09-24 RX ORDER — LIRAGLUTIDE 6 MG/ML
1.8 INJECTION SUBCUTANEOUS DAILY
Qty: 9 ML | Refills: 0 | Status: SHIPPED | OUTPATIENT
Start: 2019-09-24 | End: 2020-01-06

## 2019-09-24 NOTE — TELEPHONE ENCOUNTER
"Requested Prescriptions   Pending Prescriptions Disp Refills     liraglutide (VICTOZA) 18 MG/3ML solution 3 mL 1     Sig: Inject 1.8 mg Subcutaneous daily       GLP-1 Agonists Protocol Failed - 9/24/2019 10:17 AM        Failed - Blood pressure less than 140/90 in past 6 months     BP Readings from Last 3 Encounters:   08/12/19 (!) 147/87   06/05/19 140/86                 Failed - LDL on file in past 12 months     No lab results found.          Failed - Microalbumin on file in past 12 months     No lab results found.          Failed - HgbA1C in past 3 or 6 months     If HgbA1C is 8 or greater, it needs to be on file within the past 3 months.  If less than 8, must be on file within the past 6 months.     Recent Labs   Lab Test 06/05/19  1634   A1C 12.9*             Failed - Normal serum creatinine on file in past 12 months     Recent Labs   Lab Test 06/05/19  1634   CR 0.51*             Passed - Medication is active on med list        Passed - Patient is age 18 or older        Passed - No active pregnancy on record        Passed - No positive pregnancy test in past 12 months        Passed - Recent (6 mo) or future (30 days) visit within the authorizing provider's specialty     Patient had office visit in the last 6 months or has a visit in the next 30 days with authorizing provider.  See \"Patient Info\" tab in inbasket, or \"Choose Columns\" in Meds & Orders section of the refill encounter.            Last Written Prescription Date:  6/5/19  Last Fill Quantity: 3 ml,  # refills: 1   Last office visit: 8/12/2019 with prescribing provider:  Tae   Future Office Visit:      "

## 2019-11-07 ENCOUNTER — TRANSFERRED RECORDS (OUTPATIENT)
Dept: HEALTH INFORMATION MANAGEMENT | Facility: CLINIC | Age: 49
End: 2019-11-07

## 2019-11-14 ENCOUNTER — TELEPHONE (OUTPATIENT)
Dept: FAMILY MEDICINE | Facility: CLINIC | Age: 49
End: 2019-11-14

## 2019-11-14 NOTE — TELEPHONE ENCOUNTER
Latricia Johnson is calling to get Home care orders okayed for Brissa beginning Monday November 18.  She was discharged from [facility] November 12.  She is feeling fine.  If Juli okays this she needs Dr. Lockett to co-sign.  Latricia's number is [phone].    [facility]

## 2019-11-15 ENCOUNTER — OFFICE VISIT (OUTPATIENT)
Dept: FAMILY MEDICINE | Facility: CLINIC | Age: 49
End: 2019-11-15
Payer: COMMERCIAL

## 2019-11-15 VITALS
WEIGHT: 293 LBS | BODY MASS INDEX: 55.79 KG/M2 | HEART RATE: 84 BPM | SYSTOLIC BLOOD PRESSURE: 145 MMHG | TEMPERATURE: 98.9 F | DIASTOLIC BLOOD PRESSURE: 93 MMHG | OXYGEN SATURATION: 96 % | RESPIRATION RATE: 30 BRPM

## 2019-11-15 DIAGNOSIS — E78.5 HYPERLIPIDEMIA LDL GOAL <100: ICD-10-CM

## 2019-11-15 DIAGNOSIS — E11.9 TYPE 2 DIABETES MELLITUS WITHOUT COMPLICATION, WITH LONG-TERM CURRENT USE OF INSULIN (H): Primary | ICD-10-CM

## 2019-11-15 DIAGNOSIS — I63.9 CEREBROVASCULAR ACCIDENT (CVA), UNSPECIFIED MECHANISM (H): ICD-10-CM

## 2019-11-15 DIAGNOSIS — Z79.4 TYPE 2 DIABETES MELLITUS WITHOUT COMPLICATION, WITH LONG-TERM CURRENT USE OF INSULIN (H): Primary | ICD-10-CM

## 2019-11-15 DIAGNOSIS — G47.33 OSA (OBSTRUCTIVE SLEEP APNEA): ICD-10-CM

## 2019-11-15 LAB
ALBUMIN SERPL-MCNC: 3.1 G/DL (ref 3.4–5)
ALP SERPL-CCNC: 98 U/L (ref 40–150)
ALT SERPL W P-5'-P-CCNC: 21 U/L (ref 0–50)
ANION GAP SERPL CALCULATED.3IONS-SCNC: 5 MMOL/L (ref 3–14)
AST SERPL W P-5'-P-CCNC: 10 U/L (ref 0–45)
BILIRUB SERPL-MCNC: 0.7 MG/DL (ref 0.2–1.3)
BUN SERPL-MCNC: 5 MG/DL (ref 7–30)
CALCIUM SERPL-MCNC: 8.7 MG/DL (ref 8.5–10.1)
CHLORIDE SERPL-SCNC: 99 MMOL/L (ref 94–109)
CO2 SERPL-SCNC: 33 MMOL/L (ref 20–32)
CREAT SERPL-MCNC: 0.46 MG/DL (ref 0.52–1.04)
ERYTHROCYTE [DISTWIDTH] IN BLOOD BY AUTOMATED COUNT: 14 % (ref 10–15)
GFR SERPL CREATININE-BSD FRML MDRD: >90 ML/MIN/{1.73_M2}
GLUCOSE SERPL-MCNC: 292 MG/DL (ref 70–99)
HBA1C MFR BLD: 11 % (ref 0–5.6)
HCT VFR BLD AUTO: 48.3 % (ref 35–47)
HGB BLD-MCNC: 15 G/DL (ref 11.7–15.7)
MCH RBC QN AUTO: 26.7 PG (ref 26.5–33)
MCHC RBC AUTO-ENTMCNC: 31.1 G/DL (ref 31.5–36.5)
MCV RBC AUTO: 86 FL (ref 78–100)
PLATELET # BLD AUTO: 220 10E9/L (ref 150–450)
POTASSIUM SERPL-SCNC: 3.5 MMOL/L (ref 3.4–5.3)
PROT SERPL-MCNC: 7.2 G/DL (ref 6.8–8.8)
RBC # BLD AUTO: 5.62 10E12/L (ref 3.8–5.2)
SODIUM SERPL-SCNC: 137 MMOL/L (ref 133–144)
WBC # BLD AUTO: 7.9 10E9/L (ref 4–11)

## 2019-11-15 PROCEDURE — 36415 COLL VENOUS BLD VENIPUNCTURE: CPT | Performed by: NURSE PRACTITIONER

## 2019-11-15 PROCEDURE — 80053 COMPREHEN METABOLIC PANEL: CPT | Performed by: NURSE PRACTITIONER

## 2019-11-15 PROCEDURE — 85027 COMPLETE CBC AUTOMATED: CPT | Performed by: NURSE PRACTITIONER

## 2019-11-15 PROCEDURE — 83036 HEMOGLOBIN GLYCOSYLATED A1C: CPT | Performed by: NURSE PRACTITIONER

## 2019-11-15 PROCEDURE — 99214 OFFICE O/P EST MOD 30 MIN: CPT | Performed by: NURSE PRACTITIONER

## 2019-11-15 RX ORDER — CLOPIDOGREL BISULFATE 75 MG/1
75 TABLET ORAL DAILY
COMMUNITY
Start: 2019-11-11

## 2019-11-15 RX ORDER — CALCIUM CARBONATE 160(400)MG
TABLET,CHEWABLE ORAL
COMMUNITY
Start: 2019-11-11

## 2019-11-15 RX ORDER — NYSTATIN 100000 [USP'U]/G
POWDER TOPICAL
COMMUNITY
Start: 2019-11-11 | End: 2021-04-29

## 2019-11-15 RX ORDER — ACETAMINOPHEN 325 MG/1
325-650 TABLET ORAL
COMMUNITY
Start: 2019-11-11 | End: 2019-11-25

## 2019-11-15 RX ORDER — GABAPENTIN 100 MG/1
100 CAPSULE ORAL AT BEDTIME
COMMUNITY
Start: 2019-11-11 | End: 2021-04-29

## 2019-11-15 RX ORDER — PANTOPRAZOLE SODIUM 40 MG/1
40 TABLET, DELAYED RELEASE ORAL DAILY
COMMUNITY
Start: 2019-11-11 | End: 2019-11-18

## 2019-11-15 NOTE — NURSING NOTE
"Chief Complaint   Patient presents with     Hospital F/U     Medication Reconciliation     has not picked up Plavix, Gabapentin and Protonix yet       Initial /86   Pulse 84   Temp 98.9  F (37.2  C)   Resp 30   Wt 147.4 kg (325 lb)   SpO2 96%   Breastfeeding No   BMI 55.79 kg/m   Estimated body mass index is 55.79 kg/m  as calculated from the following:    Height as of 8/12/19: 1.626 m (5' 4\").    Weight as of this encounter: 147.4 kg (325 lb).    Patient presents to the clinic using BIOeCON that is potentially due pending provider review:  Mammogram and Colonoscopy/FIT    Pt will schedule      Is there anyone who you would like to be able to receive your results? not asked  If yes have patient fill out YESSENIA    "

## 2019-11-15 NOTE — PROGRESS NOTES
Subjective     Brissa Mott is a 49 year old female who presents to clinic today for the following health issues:    HPI     Chief Complaint   Patient presents with     Hospital F/U     Medication Reconciliation     has not picked up Plavix, Gabapentin and Protonix yet       Hospital Follow-up Visit:    Hospital/Nursing Home/IP Rehab Facility: [facility]  11/6/2019-11/12/2019  Reason(s) for Admission:  [facility]=Acute Hypercarbic Respiratory Failure  KATHRINE  Hypoventilation syndrome  Reactive airway disease  Hemiplegia and hemiparesis following cerebral infarction affecting left non-dominant side (HC);             Problems taking medications regularly: yes has not made it to the pharmacy yet but will go after visit       Medication changes since discharge: None       Problems adhering to non-medication therapy:  Will start on Monday, coming into home    Summary of hospitalization:  CareEverywhere information obtained and reviewed  Diagnostic Tests/Treatments reviewed.  Follow up needed: will have repeat CT scan in 12/2019  Other Healthcare Providers Involved in Patient s Care:         Homecare, Specialist appointment - 12/19/2019 and Physical Therapy  Update since discharge: stable.     Post Discharge Medication Reconciliation: discharge medications reconciled, continue medications without change.  Plan of care communicated with patient and family     Coding guidelines for this visit:  Type of Medical   Decision Making Face-to-Face Visit       within 7 Days of discharge Face-to-Face Visit        within 14 days of discharge   Moderate Complexity [id number]   High Complexity [id number]          Patient Active Problem List   Diagnosis     Morbid obesity (H)     Diabetes mellitus, type 2 (H)     KATHRINE (obstructive sleep apnea)     Secondary hypertension     History reviewed. No pertinent surgical history.    Social History     Tobacco Use     Smoking status: Former Smoker     Smokeless  "tobacco: Never Used   Substance Use Topics     Alcohol use: Not Currently     History reviewed. No pertinent family history.      Current Outpatient Medications   Medication Sig Dispense Refill     acetaminophen (TYLENOL) 325 MG tablet Take 325-650 mg by mouth       albuterol (PROAIR HFA/PROVENTIL HFA/VENTOLIN HFA) 108 (90 Base) MCG/ACT inhaler Inhale 2 puffs into the lungs every 6 hours as needed for wheezing 6.7 g 3     aspirin (ASA) 81 MG tablet Take 1 tablet (81 mg) by mouth daily 90 tablet 1     atorvastatin (LIPITOR) 80 MG tablet Take 1 tablet (80 mg) by mouth daily 90 tablet 3     cetirizine (ZYRTEC ALLERGY) 10 MG tablet Take 1 tablet (10 mg) by mouth daily 90 tablet 1     cholecalciferol (VITAMIN D3) 5000 units (125 mcg) capsule Take by mouth daily       diclofenac (VOLTAREN) 1 % topical gel Place 4 g onto the skin 4 times daily Knee as needed 100 g 0     fluticasone (FLOVENT HFA) 110 MCG/ACT inhaler Inhale 1 puff into the lungs 2 times daily 12 g 3     insulin isophane & regular (HUMULIN MIX 70/30 KWIKPEN) susp Inject 24 Units Subcutaneous 2 times daily       insulin pen needle (BD TWYLA U/F) 32G X 4 MM miscellaneous 2 times daily Use two pen needles daily or as directed. 100 each 1     irbesartan (AVAPRO) 300 MG tablet Take 1 tablet (300 mg) by mouth At Bedtime 90 tablet 0     liraglutide (VICTOZA) 18 MG/3ML solution Inject 1.8 mg Subcutaneous daily 9 mL 0     metFORMIN (GLUCOPHAGE) 1000 MG tablet Take 1 tablet (1,000 mg) by mouth 2 times daily (with meals) (Patient taking differently: Take 500 mg by mouth 2 times daily (with meals) ) 90 tablet 3     Misc. Devices (ROLLATOR ULTRA-LIGHT) MISC Extra side walker with front wheels for home use.  Purchase, lifetime need. Extra wide rolling walker (\"Walker 4\", item #:  [id number]) needed for safe transfers and ambulation.  Pt weight is 335 lbs.       nystatin (MYCOSTATIN) 312874 UNIT/GM external powder Apply topically twice daily to intact skin in groin/abdominal " skin folds for 14 days.Call your primary care doctor if skin not improving.       pantoprazole (PROTONIX) 40 MG EC tablet Take 40 mg by mouth daily       sertraline (ZOLOFT) 100 MG tablet Take 1.5 tablets (150 mg) by mouth daily 90 tablet 3     triamcinolone (KENALOG) 0.1 % external cream Apply topically as needed for irritation 15 g 0     clopidogrel (PLAVIX) 75 MG tablet daily       gabapentin (NEURONTIN) 100 MG capsule Take 100 mg by mouth At Bedtime       Allergies   Allergen Reactions     Penicillins      Sulfa Drugs      Recent Labs   Lab Test 06/05/19  1634   A1C 12.9*   ALT 20   CR 0.51*   GFRESTIMATED >90   GFRESTBLACK >90   POTASSIUM 4.3      BP Readings from Last 3 Encounters:   11/15/19 138/86   08/12/19 (!) 147/87   06/05/19 140/86    Wt Readings from Last 3 Encounters:   11/15/19 147.4 kg (325 lb)   08/12/19 (!) 155.6 kg (343 lb)   06/05/19 (!) 152.9 kg (337 lb)                   Reviewed and updated as needed this visit by Provider         Review of Systems seemingly  ROS COMP: Constitutional, HEENT, cardiovascular, pulmonary, GI, , musculoskeletal, neuro, skin, endocrine and psych systems are negative, except as otherwise noted.      Objective    /86   Pulse 84   Temp 98.9  F (37.2  C)   Resp 30   Wt 147.4 kg (325 lb)   SpO2 96%   Breastfeeding No   BMI 55.79 kg/m    Body mass index is 55.79 kg/m .  Physical Exam   GENERAL: healthy, alert and no distress  EYES: Eyes grossly normal to inspection, PERRL and conjunctivae and sclerae normal  HENT: ear canals and TM's normal, nose and mouth without ulcers or lesions  NECK: no adenopathy, no asymmetry, masses, or scars and thyroid normal to palpation  RESP: lungs clear to auscultation - no rales, rhonchi or wheezes  CV: regular rate and rhythm, normal S1 S2, no S3 or S4, no murmur, click or rub, no peripheral edema and peripheral pulses strong  ABDOMEN: soft, nontender, no hepatosplenomegaly, no masses and bowel sounds normal  MS: no gross  musculoskeletal defects noted, no edema  SKIN: no suspicious lesions or rashes  NEURO: Normal strength and tone, mentation intact and speech normal  PSYCH: mentation appears normal, affect normal/bright    Results for orders placed or performed in visit on 11/15/19   Hemoglobin A1c     Status: Abnormal   Result Value Ref Range    Hemoglobin A1C 11.0 (H) 0 - 5.6 %   CBC with platelets     Status: Abnormal   Result Value Ref Range    WBC 7.9 4.0 - 11.0 10e9/L    RBC Count 5.62 (H) 3.8 - 5.2 10e12/L    Hemoglobin 15.0 11.7 - 15.7 g/dL    Hematocrit 48.3 (H) 35.0 - 47.0 %    MCV 86 78 - 100 fl    MCH 26.7 26.5 - 33.0 pg    MCHC 31.1 (L) 31.5 - 36.5 g/dL    RDW 14.0 10.0 - 15.0 %    Platelet Count 220 150 - 450 10e9/L   Comprehensive metabolic panel     Status: Abnormal   Result Value Ref Range    Sodium 137 133 - 144 mmol/L    Potassium 3.5 3.4 - 5.3 mmol/L    Chloride 99 94 - 109 mmol/L    Carbon Dioxide 33 (H) 20 - 32 mmol/L    Anion Gap 5 3 - 14 mmol/L    Glucose 292 (H) 70 - 99 mg/dL    Urea Nitrogen 5 (L) 7 - 30 mg/dL    Creatinine 0.46 (L) 0.52 - 1.04 mg/dL    GFR Estimate >90 >60 mL/min/[1.73_m2]    GFR Estimate If Black >90 >60 mL/min/[1.73_m2]    Calcium 8.7 8.5 - 10.1 mg/dL    Bilirubin Total 0.7 0.2 - 1.3 mg/dL    Albumin 3.1 (L) 3.4 - 5.0 g/dL    Protein Total 7.2 6.8 - 8.8 g/dL    Alkaline Phosphatase 98 40 - 150 U/L    ALT 21 0 - 50 U/L    AST 10 0 - 45 U/L             Assessment & Plan     (E11.9,  Z79.4) Type 2 diabetes mellitus without complication, with long-term current use of insulin (H)  (primary encounter diagnosis)  Comment: Uncontrolled we will have patient follow-up with diabetic educator  Plan: aspirin (ASA) 81 MG tablet, Hemoglobin A1c, CBC        with platelets, Comprehensive metabolic panel,         AMBULATORY ADULT DIABETES EDUCATOR REFERRAL,         metFORMIN (GLUCOPHAGE) 1000 MG tablet      (G47.33) KATHRINE (obstructive sleep apnea)  Comment:   Plan: Patient needs new sleep apnea mask  "referral was made    (E78.5) Hyperlipidemia LDL goal <100  Comment: Labs pending  Plan:     (I63.9) Cerebrovascular accident (CVA), unspecified mechanism (H)  Comment: Patient discharged with recent stroke patient will have follow-up with neurology and neuro-ophthalmology stroke and home care  Plan: NEUROLOGY ADULT REFERRAL, HOME CARE NURSING         REFERRAL, NEUROLOGY ADULT REFERRAL       BMI:   Estimated body mass index is 55.79 kg/m  as calculated from the following:    Height as of 8/12/19: 1.626 m (5' 4\").    Weight as of this encounter: 147.4 kg (325 lb).   Weight management plan: Discussed healthy diet and exercise guidelines        See Patient Instructions    Return in about 1 week (around 11/22/2019) for MTF.    RONNELL Bianchi [facility]      "

## 2019-11-15 NOTE — PATIENT INSTRUCTIONS
"Follow-up with your Neurologist at [facility].  Determine if you would like to be follow-ed up with Neurology in the [place] system or [facility].      If you would like to follow-up with [place] below are the number for both Neurology in [place] and Neuro stroke at the [facility].     Patient Education   Diabetes ABCs  Work with Your Health Care Team to Manage Diabetes!     A1c (hemoglobin A1c test):  Check at least every 6 months.  Goal without diabetes: Less than 6%  Goal with diabetes: Less than 7%, but your doctor may have a different goal for you.  My Goal: ___________________   BG (blood glucose):  Goals without diabetes:  Before meals: 60 to 99 mg/dL  After meals (2 hours): under 140 mg/dL  Goals with diabetes:   Before meals: 80 to 130 mg/dL  After meals: (2 hours): under 180 mg/dL  My Goals:  Before meals: __________  After meals: ___________   Blood pressure:  Check at every doctor visit.   Goal: Less than 140/90    Cholesterol:   Check at least 1 time a year.  Goals for LDL (\"bad\" cholesterol):  With heart disease: Less than 70 mg/dL  Without heart disease: Less than 100 mg/dL   Eyes:   Have a dilated eye exam every year.   Teeth:   See your dentist twice a year. People with diabetes have a higher risk of gum disease.  Smoking:   If you smoke, it's important to quit. Make a plan with help from your health care team.  Weight:   Work towards a healthy weight with help from your diabetes educator or dietitian.  Kidneys:     Check your urine protein 1 time each year.    Protect your kidneys by keeping your blood pressure normal.  Feet:    Check your feet every day for signs of injury, dry skin, cracks, cuts, swelling, or blisters.    Ask your doctor to check your feet at every visit.    Wear shoes and socks that fit well.    Don't go barefoot.  Sex:   Talk about erectile dysfunction (ED) and female sexual dysfunction (FSD) with your doctor.  For informational purposes only. Not to replace the advice of your " health care provider. Copyright   2018 [facility]. All rights reserved. Illustration ID [id number]   [id number]  joiz. Clinically reviewed by [facility]. [employer] [id number] - 10/18.       Patient Education     Discharge Instructions for Stroke  You have been diagnosed with or have a high risk for a stroke, or a TIA (transient ischemic attack). During a stroke, blood stops flowing to part of your brain. This can damage areas in the brain that control other parts of the body. Symptoms after a stroke depend on which part of the brain has been affected.  Stroke risk factors  Once you ve had a stroke, you re at greater risk for another one. Listed below are some other factors that can increase your risk for a stroke:    High blood pressure    High cholesterol    Cigarette or cigar smoking    Diabetes    Carotid or other artery disease    Atrial fibrillation, atrial flutter, or other heart disease    Not being physically active    Obesity    Certain blood disorders  such as sickle cell anemia    Drinking too much alcohol    Abusing street drugs    Race    Gender    Family history of stroke    Diet high in salty, fried, or greasy foods  Changes in daily living  Doing your regular tasks may be difficult after you ve had a stroke, but you can learn new ways to manage your daily activities. In fact, doing daily activities may help you to regain muscle strength. This can also help your affected arm or leg work more normally. Be patient, give yourself time to adjust, and appreciate the progress you make.  Daily activities  You may be at risk of falling. Make changes to your home to help you walk more easily. A therapist will decide if you need an assistive device to walk safely.  You may need to see an occupational therapist or physical therapist to learn new ways of doing things. For example, you may need to make adjustments when bathing or dressing:  Tips for showering or bathing    Test the  water temperature with a hand or foot that was not affected by the stroke.    Use grab bars, a shower seat, a hand-held showerhead, and a long-handled brush.  Tips for getting dressed    Dress while sitting, starting with the affected side or limb.    Wear shirts that pull easily over your head. Wear pants or skirts with elastic waistbands.    Use zippers with loops attached to the pull tabs.  Lifestyle changes    Take your medicines exactly as directed. Don t skip doses.    Begin an exercise program. Ask your provider how to get started. Also ask how much activity you should try to get on a daily or weekly basis. You can benefit from simple activities such as walking or gardening.    Limit how much alcohol you drink. Men should have no more than 2 alcoholic drinks a day. Women should limit themselves to 1 alcoholic drink per day.    Know your cholesterol level. Follow your provider s recommendations about how to keep cholesterol under control.    If you are a smoker, quit now. Join a stop-smoking program to improve your chances of success. Ask your provider about medicines or other methods to help you quit.    Learn stress management techniques to help you deal with stress in your home and work life.  Diet  Your healthcare provider will give you information on changes you may need to make to your diet, based on your situation. Your provider may recommend that you see a registered dietitian for help with diet changes. Changes may include:    Reducing the amount of fat and cholesterol you eat    Reducing the amount of salt (sodium) in your diet, especially if you have high blood pressure    Eating more fresh vegetables and fruits    Eating more lean proteins, such as fish, poultry, and beans and peas (legumes)    Eating less red meat and processed meats    Using low-fat dairy products    Limiting vegetable oils and nut oils    Limiting sweets and processed foods such as chips, cookies, and baked goods    Not eating  trans fats. These are often found in processed foods. Don't eat any food that has hydrogenated listed in its ingredients.  Follow-up care    Keep your medical appointments. Close follow-up is important to stroke rehabilitation and recovery.    Some medicines require blood tests to check for progress or problems. Keep follow-up appointments for any blood tests ordered by your providers.  Call 911  Call 911 right away if you have any of the following symptoms of stroke:    Weakness, tingling, or loss of feeling on one side of your face or body    Sudden double vision or trouble seeing in one or both eyes    Sudden trouble talking or slurred speech    Trouble understanding others    Sudden, severe headache    Dizziness, loss of balance, or a sense of falling    Blackouts or seizures     F.A.S.T. is an easy way to remember the signs of stroke. When you see these signs, you know that you need to call 911 fast.  F.A.S.T. stands for:    F is for face drooping. One side of the face is drooping or numb. When the person smiles, the smile is uneven.    A is for arm weakness. One arm is weak or numb. When the person lifts both arms at the same time, one arm may drift downward.    S is for speech difficulty. You may notice slurred speech or trouble speaking. The person can't repeat a simple sentence correctly when asked.    T is for time to call 911. If someone shows any of these symptoms, even if they go away, call 911 right away. Make note of the time the symptoms first appeared.   Date Last Reviewed: 11/1/2017 2000-2018 [employer]. [address] [place] [id number]. All rights reserved. This information is not intended as a substitute for professional medical care. Always follow your healthcare professional's instructions.           Patient Education     For Caregivers: Preventing Another Stroke    Having had a stroke, your loved one is at higher risk of having another. Medicine, exercise, and diet  are keys to reducing this risk. Your loved one needs to be active each day now. Walking is a good way to get daily exercise. You might also ask the healthcare provider to refer you to a dietitian. This specialist in nutrition can help you reduce many common risk factors for stroke. Quitting smoking is also key critical to reduce risk of stroke. Talk with your loved one about quitting smoking. Ask his or her healthcare provider for help.   Taking medicine  Your loved one may take more than 1 type of medicine. Each must be used as directed. If medicines include a blood thinner, your loved one may need regular blood tests.  Tips for safe medicine use    Make sure medicines are taken on schedule. If timing is vital, set an alarm.    Keep pill doses in a divided tray. A pill box can help.     Know which foods or liquids the person should avoid while taking prescribed medicines.  Reducing the risk of stroke  Many factors that increase the risk of stroke can be reduced. Your loved one s healthcare provider and a dietitian can advise ways to:    Lower high blood pressure    Improve cholesterol    Control heart disease    Manage diabetes    Lose excess weight    Reduce risk of blood clots by taking blood thinners as advised by the healthcare provider    Stay active with aerobic and muscle-strengthening exercises  If your loved one suddenly has any of the problems below, call 911 right away for emergency medical help:    Numbness or weakness of the face, arms, or legs, especially on one side    Confusion or trouble speaking or understanding    Trouble seeing in one or both eyes    Trouble walking, dizziness, loss of balance or coordination    Severe headache with no known cause    Loss of consciousness or a seizure   F.A.S.T. is an easy way to remember the signs of a stroke. When you see these signs, you will know that you need to call 911 fast. F.A.S.T. stands for:    F is for face drooping - One side of the face is drooping  or numb. When the person smiles, the smile is uneven.    A is for arm weakness - One arm is weak or numb. When the person lifts both arms at the same time, one arm may drift downward.    S is for speech difficulty -  You may notice slurred speech or trouble speaking. The person can't repeat a simple sentence correctly when asked.    T is for time to call 911 - If someone shows any of these symptoms, even if they go away, call 911 right away. Make note of the time the symptoms first appeared.  Date Last Reviewed: 2/1/2018 2000-2018 [employer]. [address] [place] [address], [place] [address]. All rights reserved. This information is not intended as a substitute for professional medical care. Always follow your healthcare professional's instructions.           Patient Education     Healthy Lifestyle to Prevent Another Stroke  Breaking old habits can be hard. But when your health is at stake, it s never too late to make changes for the better. Some lifestyle changes might be easy for you. Others might be tough. So if you need help, talk with your doctor, family, and friends.  Make healthy changes    Diet. Your healthcare provider will give you information on dietary changes that you may need to make, based on your situation. Your provider may recommend that you see a registered dietitian for help with diet changes.      Weight management. If you are overweight, your healthcare provider will work with you to lose weight and lower your BMI (body mass index) to a normal or near-normal level. Making diet changes and increasing physical activity can help.      Stop smoking. If you smoke, the time to quit is now. There s no more time for excuses. Smoking raises blood pressure and damages arteries--both of which can lead to a stroke. To stop smoking, ask your doctor for help. Join a stop-smoking program. Make a list of reasons to quit, including that you'll lower your risk of lung cancer, and read it  daily.    Limit alcohol. Drinking too much alcohol can raise blood pressure and increase the risk for stroke. Alcohol can also react with certain medicines. Ask your doctor if it s safe for you to drink alcohol.    Get support.  A stroke can leave you feeling frustrated or depressed. Don t ignore your feelings, but don t dwell on them either. Focus on what you can do. Talking to family, friends, your doctor, or clergy can also help.    Reduce stress. Stress can make your heart work harder and raise blood pressure. To reduce stress, try to let go of daily annoyances. Ask yourself if problems will still matter a week from now. Getting proper rest can also help. Finally, don t be embarrassed to ask for help when you need it.    Exercise. Strength and aerobic training improves your ability to function and do activities of daily living. It also reduces your risk for another stroke. Develop a custom plan with your physical therapist to meet activity goals.  If you have high blood pressure    One of the most important things you can do to prevent another stroke is to keep your blood pressure under control. If you have high blood pressure:    Take all your medicines as directed.    Get regular exercise. Try to work up to getting at least 40 minutes of moderate to high intensity physical activity at least 3 to 4 days each week.     Talk with your doctor about limiting fat and salt in your diet. You most likely will be told to limit your salt intake to 2,400 milligrams (mg) or less each day.     Check your blood pressure regularly. Write down your numbers and bring them to checkups with your doctor.  Manage other health problems  Strokes are often closely related to certain health problems. These include high blood pressure, heart disease, and diabetes. If you have any of these conditions, it s more important than ever to keep them under control. Do this by taking any prescribed medicines and having regular checkups. Keep in  mind, too, that the same healthy lifestyle choices that prevent stroke will also help control these health problems.  For family and friends  It s much harder for your loved one to make lifestyle changes if he or she is feeling low. So be on the lookout for sadness, depression, or hopelessness. These feelings are common after a stroke. Talk with the doctor if you have concerns.   Date Last Reviewed: 5/1/2017 2000-2018 [employer]. [address] [place] [address], [place] [address]. All rights reserved. This information is not intended as a substitute for professional medical care. Always follow your healthcare professional's instructions.

## 2019-11-18 ENCOUNTER — DOCUMENTATION ONLY (OUTPATIENT)
Dept: CARE COORDINATION | Facility: CLINIC | Age: 49
End: 2019-11-18

## 2019-11-18 DIAGNOSIS — G47.30 SLEEP APNEA, UNSPECIFIED TYPE: Primary | ICD-10-CM

## 2019-11-18 NOTE — PROGRESS NOTES
Pt states every time since been home from the hospital, she has a little bit of fresh red blood after wiping vagina. She is on ASA and Plavix. She started Plavix Friday. More bleeding over the weekend. Please advise.     Also, patient has a Bipap machine through [facility]. She is requesting a new mask, as the current one is uncomfortable over her nose. She does not want to go through [facility] again. Can you write a script for the Bipap mask through [facility]?     Drug warnings popped up: any changes, let me know:  clopidogrel interacts with Voltaren (diclofenac sodium)      Thank you  Olga Severino RN  [phone]

## 2019-11-19 ENCOUNTER — DOCUMENTATION ONLY (OUTPATIENT)
Dept: CARE COORDINATION | Facility: CLINIC | Age: 49
End: 2019-11-19

## 2019-11-19 NOTE — PROGRESS NOTES
[facility] now requests orders and shares plan of care/discharge summaries for some patients through [employer].  Please REPLY TO THIS MESSAGE OR ROUTE BACK TO THE AUTHOR in order to give authorization for orders when needed.  This is considered a verbal order, you will still receive a faxed copy of orders for signature.  Thank you for your assistance in improving collaboration for our patients.    ORDER  Home care PT 1x1, 2x2 for functional LE strengthening, gait trng, transfer trng, stair trng, balance activity as needed.    Emelina Upton, PT  [phone]

## 2019-11-19 NOTE — PROGRESS NOTES
Patient is to stop the Voltaren.    Will need to be seen for bleeding for in office evaluation.     I have placed an order for her to be seen at  sleep center for bipap fitting.    Juli Ramsey CNP

## 2019-11-20 ENCOUNTER — TELEPHONE (OUTPATIENT)
Dept: FAMILY MEDICINE | Facility: CLINIC | Age: 49
End: 2019-11-20

## 2019-11-20 NOTE — TELEPHONE ENCOUNTER
Debora,  from [facility] called to inform Juli that she has been unable to reach patient and wanted to let her know that she is available to help her coordinate things following her hospitalization.     Kimberly [place]

## 2019-11-25 ENCOUNTER — TELEPHONE (OUTPATIENT)
Dept: FAMILY MEDICINE | Facility: CLINIC | Age: 49
End: 2019-11-25

## 2019-11-25 ENCOUNTER — OFFICE VISIT (OUTPATIENT)
Dept: PHARMACY | Facility: CLINIC | Age: 49
End: 2019-11-25
Payer: COMMERCIAL

## 2019-11-25 ENCOUNTER — OFFICE VISIT (OUTPATIENT)
Dept: FAMILY MEDICINE | Facility: CLINIC | Age: 49
End: 2019-11-25
Payer: COMMERCIAL

## 2019-11-25 VITALS
WEIGHT: 293 LBS | TEMPERATURE: 99.4 F | DIASTOLIC BLOOD PRESSURE: 98 MMHG | HEART RATE: 74 BPM | HEIGHT: 64 IN | OXYGEN SATURATION: 96 % | SYSTOLIC BLOOD PRESSURE: 148 MMHG | BODY MASS INDEX: 50.02 KG/M2 | RESPIRATION RATE: 20 BRPM

## 2019-11-25 DIAGNOSIS — Z12.31 ENCOUNTER FOR SCREENING MAMMOGRAM FOR BREAST CANCER: ICD-10-CM

## 2019-11-25 DIAGNOSIS — Z79.4 TYPE 2 DIABETES MELLITUS WITHOUT COMPLICATION, WITH LONG-TERM CURRENT USE OF INSULIN (H): ICD-10-CM

## 2019-11-25 DIAGNOSIS — E78.5 HYPERLIPIDEMIA LDL GOAL <100: ICD-10-CM

## 2019-11-25 DIAGNOSIS — Z86.73 HISTORY OF STROKE: ICD-10-CM

## 2019-11-25 DIAGNOSIS — G89.29 CHRONIC PAIN OF LEFT KNEE: ICD-10-CM

## 2019-11-25 DIAGNOSIS — E03.9 HYPOTHYROIDISM, UNSPECIFIED TYPE: ICD-10-CM

## 2019-11-25 DIAGNOSIS — F43.21 ADJUSTMENT DISORDER WITH DEPRESSED MOOD: ICD-10-CM

## 2019-11-25 DIAGNOSIS — E11.65 TYPE 2 DIABETES MELLITUS WITH HYPERGLYCEMIA, WITH LONG-TERM CURRENT USE OF INSULIN (H): Primary | ICD-10-CM

## 2019-11-25 DIAGNOSIS — N93.8 DUB (DYSFUNCTIONAL UTERINE BLEEDING): Primary | ICD-10-CM

## 2019-11-25 DIAGNOSIS — J45.20 MILD INTERMITTENT REACTIVE AIRWAY DISEASE: ICD-10-CM

## 2019-11-25 DIAGNOSIS — J30.2 SEASONAL ALLERGIC RHINITIS, UNSPECIFIED TRIGGER: ICD-10-CM

## 2019-11-25 DIAGNOSIS — E11.9 TYPE 2 DIABETES MELLITUS WITHOUT COMPLICATION, WITH LONG-TERM CURRENT USE OF INSULIN (H): ICD-10-CM

## 2019-11-25 DIAGNOSIS — Z79.4 TYPE 2 DIABETES MELLITUS WITH HYPERGLYCEMIA, WITH LONG-TERM CURRENT USE OF INSULIN (H): Primary | ICD-10-CM

## 2019-11-25 DIAGNOSIS — Z23 NEED FOR PROPHYLACTIC VACCINATION AND INOCULATION AGAINST INFLUENZA: ICD-10-CM

## 2019-11-25 DIAGNOSIS — M25.562 CHRONIC PAIN OF LEFT KNEE: ICD-10-CM

## 2019-11-25 DIAGNOSIS — I15.9 SECONDARY HYPERTENSION: ICD-10-CM

## 2019-11-25 LAB
CHOLEST SERPL-MCNC: 127 MG/DL
CREAT UR-MCNC: 216 MG/DL
ERYTHROCYTE [DISTWIDTH] IN BLOOD BY AUTOMATED COUNT: 13.5 % (ref 10–15)
ESTRADIOL SERPL-MCNC: 24 PG/ML
FSH SERPL-ACNC: 38 IU/L
HCT VFR BLD AUTO: 48.2 % (ref 35–47)
HDLC SERPL-MCNC: 44 MG/DL
HGB BLD-MCNC: 15.1 G/DL (ref 11.7–15.7)
LDLC SERPL CALC-MCNC: 59 MG/DL
MCH RBC QN AUTO: 26.6 PG (ref 26.5–33)
MCHC RBC AUTO-ENTMCNC: 31.3 G/DL (ref 31.5–36.5)
MCV RBC AUTO: 85 FL (ref 78–100)
MICROALBUMIN UR-MCNC: 75 MG/L
MICROALBUMIN/CREAT UR: 34.63 MG/G CR (ref 0–25)
NONHDLC SERPL-MCNC: 83 MG/DL
PLATELET # BLD AUTO: 238 10E9/L (ref 150–450)
RBC # BLD AUTO: 5.67 10E12/L (ref 3.8–5.2)
SPECIMEN SOURCE: NORMAL
TRIGL SERPL-MCNC: 120 MG/DL
TSH SERPL DL<=0.005 MIU/L-ACNC: 1.35 MU/L (ref 0.4–4)
WBC # BLD AUTO: 9.1 10E9/L (ref 4–11)
WET PREP SPEC: NORMAL

## 2019-11-25 PROCEDURE — 99605 MTMS BY PHARM NP 15 MIN: CPT | Performed by: PHARMACIST

## 2019-11-25 PROCEDURE — 83001 ASSAY OF GONADOTROPIN (FSH): CPT | Performed by: NURSE PRACTITIONER

## 2019-11-25 PROCEDURE — 82670 ASSAY OF TOTAL ESTRADIOL: CPT | Performed by: NURSE PRACTITIONER

## 2019-11-25 PROCEDURE — 90471 IMMUNIZATION ADMIN: CPT | Performed by: NURSE PRACTITIONER

## 2019-11-25 PROCEDURE — 85027 COMPLETE CBC AUTOMATED: CPT | Performed by: NURSE PRACTITIONER

## 2019-11-25 PROCEDURE — 90682 RIV4 VACC RECOMBINANT DNA IM: CPT | Performed by: NURSE PRACTITIONER

## 2019-11-25 PROCEDURE — 84443 ASSAY THYROID STIM HORMONE: CPT | Performed by: NURSE PRACTITIONER

## 2019-11-25 PROCEDURE — 80061 LIPID PANEL: CPT | Performed by: NURSE PRACTITIONER

## 2019-11-25 PROCEDURE — 99607 MTMS BY PHARM ADDL 15 MIN: CPT | Performed by: PHARMACIST

## 2019-11-25 PROCEDURE — 36415 COLL VENOUS BLD VENIPUNCTURE: CPT | Performed by: NURSE PRACTITIONER

## 2019-11-25 PROCEDURE — 99214 OFFICE O/P EST MOD 30 MIN: CPT | Mod: 25 | Performed by: NURSE PRACTITIONER

## 2019-11-25 PROCEDURE — 82043 UR ALBUMIN QUANTITATIVE: CPT | Performed by: NURSE PRACTITIONER

## 2019-11-25 PROCEDURE — 87210 SMEAR WET MOUNT SALINE/INK: CPT | Performed by: NURSE PRACTITIONER

## 2019-11-25 RX ORDER — LANCETS
EACH MISCELLANEOUS
Qty: 100 EACH | Refills: 11 | Status: SHIPPED | OUTPATIENT
Start: 2019-11-25

## 2019-11-25 RX ORDER — ACETAMINOPHEN 500 MG
1000 TABLET ORAL 3 TIMES DAILY
COMMUNITY

## 2019-11-25 RX ORDER — BLOOD-GLUCOSE METER
EACH MISCELLANEOUS
Qty: 1 KIT | Refills: 0 | Status: SHIPPED | OUTPATIENT
Start: 2019-11-25

## 2019-11-25 RX ORDER — CETIRIZINE HYDROCHLORIDE 10 MG/1
10 TABLET ORAL 2 TIMES DAILY
COMMUNITY
End: 2020-03-04

## 2019-11-25 ASSESSMENT — MIFFLIN-ST. JEOR: SCORE: 2037.9

## 2019-11-25 NOTE — PATIENT INSTRUCTIONS
Recommendations from today's MTM visit:                                                    MTM (medication therapy management) is a service provided by a clinical pharmacist designed to help you get the most of out of your medicines.   Today we reviewed what your medicines are for, how to know if they are working, that your medicines are safe and how to make your medicine regimen as easy as possible.     1. Remember to  the IRBESARTAN from your pharmacy today.  This is for your blood pressure and also can help protect your kidneys.     2. I sent a prescription over to your pharmacy for a new BLOOD SUGAR meter - this may or not be covered but if not the ERROR message was for a low battery.  You can ask the pharmacy to help you find out which is needed.  I will plan on contacting you next week to check in on your blood sugars. TRY TO TAKE YOUR INSULIN BEFORE MEALS.    3. Juli wants you to stop Diclofenac 1% GEL - this can interfere with the beneficial effects of your aspirin (putting you at higher risk of stroke) and can increase your risk of bleeding with aspirin and clopidogrel (Plavix).    4. Start taking ACETAMINOPHEN 500 mg - TWO TABLETS (1,000 MG) TWO TO THREE TIMES DAILY SCHEDULED to get ahead of your knee pain.     5. Diabetes Goals:    Home Monitoring of Blood Sugars:Fasting  mg/dL and 2 hours after a meal less than 180 mg/dL.    Hemoglobin A1C: Less than 7%. Yours is   Lab Results   Component Value Date    A1C 11.0 11/15/2019   .    Blood Pressure: Less than 140/90mmHg.     Cholesterol: You are taking atorvastatin to help decrease the risk of heart disease.    Things you can do to help lower your blood sugars:    Diet: 3 meals and 1-2 snacks per day with 45-60 grams of carbohydrates per meal and 15-30 grams of carbohydrates per snack. Try to fill your plate at least half-full with vegetables, fill one-quarter full with lean meats or protein, and also make sure you get at least some carbohydrate  with every meal.    Exercise: 30 minutes per day of anything that will increase your heart rate and make you break a sweat! Gardening, walking, cleaning the house, changing the oil in your car, etc. If you feel like 30 minutes per day is too much, start small. Even lifting canned foods or working your arms with a resistance band in front of the TV can help.  It was great to speak with you today.  I value your experience and would be very thankful for your time with providing feedback on our clinic survey. You may receive a survey via email or text message in the next few days.     Next MTM visit: I will call you next Monday, December 2nd at 1 PM    To schedule another MTM appointment, please call the clinic directly or you may call the MTM scheduling line at [phone] or toll-free at [phone].     My Clinical Pharmacist's contact information:                                                      It was a pleasure talking with you today!  Please feel free to contact me with any questions or concerns you have.      Vince Andersen, PharmD, BCACP  Medication Therapy Management Pharmacist  Pager: [phone]

## 2019-11-25 NOTE — PATIENT INSTRUCTIONS
Start your irbesartan as prescribed continue to monitor our blood pressure if remains elevated above 140/80 contact me and we will need to make medications adjustments     Will await lab results.  Continue to monitor the spot bleeding if continues over the next month will obtain an ultra sound.      Continue to follow-up with the diabetic educator     Can take Tylenol  For knee pain           Patient Education     Diet: Diabetes  Food is an important tool that you can use to control diabetes and stay healthy. Eating well-balanced meals in the correct amounts will help you control your blood glucose levels and prevent low blood sugar reactions. It will also help you reduce the health risks of diabetes. There is no one specific diet that is right for everyone with diabetes. But there are general guidelines to follow. A registered dietitian (RD) will create a tailored diet approach that s just right for you. He or she will also help you plan healthy meals and snacks. If you have any questions, call your dietitian for advice.     Guidelines for success  Talk with your healthcare provider before starting a diabetes diet or weight loss program. If you haven't talked with a dietitian yet, ask your provider for a referral. The following guidelines can help you succeed:    Select foods from the 6 food groups below. Your dietitian will help you find food choices within each group. He or she will also show you serving sizes and how many servings you can have at each meal.  ? Grains, beans, and starchy vegetables  ? Vegetables  ? Fruit  ? Milk or yogurt  ? Meat, poultry, fish, or tofu  ? Healthy fats    Check your blood sugar levels as directed by your provider. Take any medicine as prescribed by your provider.    Learn to read food labels and pick the right portion sizes.    Eat only the amount of food in your meal plan. Eat about the same amount of food at regular times each day. Don t skip meals. Eat meals 4 to 5 hours  apart, with snacks in between.    Limit alcohol. It raises blood sugar levels. Drink water or calorie-free diet drinks that use safe sweeteners.    Eat less fat to help lower your risk of heart disease. Use nonfat or low-fat dairy products and lean meats. Avoid fried foods. Use cooking oils that are unsaturated, such as olive, canola, or peanut oil.    Talk with your dietitian about safe sugar substitutes.    Avoid added salt. It can contribute to high blood pressure, which can cause heart disease. People with diabetes already have a risk of high blood pressure and heart disease.    Stay at a healthy weight. If you need to lose weight, cut down on your portion sizes. But don t skip meals. Exercise is an important part of any weight management program. Talk with your provider about an exercise program that s right for you.    For more information about the best diet plan for you, talk with a registered dietitian (RD). To find an RD in your area, contact:  ? Academy of Nutrition and Dietetics www.eatright.org  ? The American Diabetes Association 004-253-0547 www.diabetes.org  Date Last Reviewed: 8/1/2016 2000-2018 [employer] [address] [place] [address]. All rights reserved. This information is not intended as a substitute for professional medical care. Always follow your healthcare professional's instructions.           Patient Education     Understanding Type 2 Diabetes  When your body is working normally, the food you eat is digested and used as fuel. This fuel supplies energy to the body s cells. When you have diabetes, the fuel can t enter the cells. Without treatment, diabetes can cause serious long-term health problems.     Your body breaks down the food you eat into glucose.   How the body gets energy  The digestive system breaks down food, resulting in a sugar called glucose. Some of this glucose is stored in the liver. But most of it enters the bloodstream and travels to the  cells to be used as fuel. Glucose needs the help of a hormone called insulin to enter the cells. Insulin is made in the pancreas. It is released into the bloodstream in response to the presence of glucose in the blood. Think of insulin as a key. When insulin reaches a cell, it attaches to the cell wall. This signals the cell to create an opening that allows glucose to enter the cell.      When you have type 2 diabetes  Early in type 2 diabetes, your cells don t respond properly to insulin. Because of this, less glucose than normal moves into cells. This is called insulin resistance. In response, the pancreas makes more insulin. But eventually, the pancreas can t produce enough insulin to overcome insulin resistance. As less and less glucose enters cells, it builds up to a harmful level in the bloodstream. This is known as high blood sugar or hyperglycemia. The result is type 2 diabetes. The cells become starved for energy, which can leave you feeling tired and rundown.  Why high blood sugar is a problem  If high blood sugar is not controlled, blood vessels throughout the body become damaged. Prolonged high blood sugar affects organs, blood vessels, and nerves. As a result, the risks of damage to the heart, kidneys, eyes, and limbs increase. Diabetes also makes other problems, such as high blood pressure and high cholesterol, more dangerous. Over time, people with uncontrolled high blood sugar have an increase in risk of dying of, or being disabled by, heart attack, heart failure,  or stroke.  Date Last Reviewed: 7/1/2016 2000-2018 [employer] [address] [place] [address], [place] [address]. All rights reserved. This information is not intended as a substitute for professional medical care. Always follow your healthcare professional's instructions.

## 2019-11-25 NOTE — TELEPHONE ENCOUNTER
Reason for Call:   prescription    Detailed comments: [facility] in [place] called stating that they received blood glucose monitoring (ACCU-CHEK CINDY PLUS) meter device kit for patient.  There is no prescription for test strips or needles.  Pharmacy is asking if patient needs those supplies also?    Phone Number Patient can be reached at: Other phone number:  [phone]    Best Time: Any    Can we leave a detailed message on this number? YES    Call taken on 11/25/2019 at 2:25 PM by Anna Guerrero

## 2019-11-25 NOTE — PROGRESS NOTES
Subjective     Brissa Mott is a 49 year old female who presents to clinic today for the following health issues:    HPI   Vaginal Symptoms      Duration: Right after Admission in Hospital around 11/12/19     Description  Vaginal bleeding     Intensity:  moderate    Accompanying signs and symptoms (fever/dysuria/abdominal or back pain): None    History  Sexually active: not at present  Possibility of pregnancy: No  Recent antibiotic use: no   Hx of menopause last year     Precipitating or alleviating factors: less now since stopping the knee cream     Therapies tried and outcome: none         Patient Active Problem List   Diagnosis     Morbid obesity (H)     Diabetes mellitus, type 2 (H)     KATHRINE (obstructive sleep apnea)     Secondary hypertension     History reviewed. No pertinent surgical history.    Social History     Tobacco Use     Smoking status: Former Smoker     Smokeless tobacco: Never Used   Substance Use Topics     Alcohol use: Not Currently     History reviewed. No pertinent family history.      Current Outpatient Medications   Medication Sig Dispense Refill     acetaminophen (TYLENOL) 500 MG tablet Take 1,000 mg by mouth 3 times daily       albuterol (PROAIR HFA/PROVENTIL HFA/VENTOLIN HFA) 108 (90 Base) MCG/ACT inhaler Inhale 2 puffs into the lungs every 6 hours as needed for wheezing 6.7 g 3     aspirin (ASA) 81 MG tablet Take 1 tablet (81 mg) by mouth daily 90 tablet 1     atorvastatin (LIPITOR) 80 MG tablet Take 1 tablet (80 mg) by mouth daily 90 tablet 3     blood glucose monitoring (ACCU-CHEK CINDY PLUS) meter device kit Use to test blood sugar 2 times daily or as directed. 1 kit 0     cetirizine (ZYRTEC) 10 MG tablet Take 10 mg by mouth 2 times daily       cholecalciferol (VITAMIN D3) 5000 units (125 mcg) capsule Take 5,000 Units by mouth daily        clopidogrel (PLAVIX) 75 MG tablet Take 75 mg by mouth daily        fluticasone (FLOVENT HFA) 110 MCG/ACT inhaler Inhale 1 puff into the lungs 2 times  "daily 12 g 3     gabapentin (NEURONTIN) 100 MG capsule Take 100 mg by mouth At Bedtime       insulin isophane & regular (HUMULIN MIX 70/30 KWIKPEN) susp Inject 24 Units Subcutaneous 2 times daily       insulin pen needle (BD TWYLA U/F) 32G X 4 MM miscellaneous 2 times daily Use two pen needles daily or as directed. 100 each 1     irbesartan (AVAPRO) 300 MG tablet Take 1 tablet (300 mg) by mouth At Bedtime 90 tablet 0     liraglutide (VICTOZA) 18 MG/3ML solution Inject 1.8 mg Subcutaneous daily 9 mL 0     metFORMIN (GLUCOPHAGE) 1000 MG tablet Take 1 tablet (1,000 mg) by mouth 2 times daily (with meals) 90 tablet 3     Misc. Devices (ROLLATOR ULTRA-LIGHT) MISC Extra side walker with front wheels for home use.  Purchase, lifetime need. Extra wide rolling walker (\"Walker 4\", item #:  [id number]) needed for safe transfers and ambulation.  Pt weight is 335 lbs.       nystatin (MYCOSTATIN) 032946 UNIT/GM external powder Apply topically twice daily to intact skin in groin/abdominal skin folds for 14 days.Call your primary care doctor if skin not improving.       pantoprazole (PROTONIX) 40 MG EC tablet Take 1 tablet (40 mg) by mouth daily 90 tablet 1     sertraline (ZOLOFT) 100 MG tablet Take 1.5 tablets (150 mg) by mouth daily 90 tablet 3     triamcinolone (KENALOG) 0.1 % external cream Apply topically as needed for irritation 15 g 0     Allergies   Allergen Reactions     Penicillins Rash     Sulfa Drugs Rash     Recent Labs   Lab Test 11/15/19  1401 06/05/19  1634   A1C 11.0* 12.9*   ALT 21 20   CR 0.46* 0.51*   GFRESTIMATED >90 >90   GFRESTBLACK >90 >90   POTASSIUM 3.5 4.3      BP Readings from Last 3 Encounters:   11/25/19 (!) 148/98   11/15/19 (!) 145/93   08/12/19 (!) 147/87    Wt Readings from Last 3 Encounters:   11/25/19 143.8 kg (317 lb)   11/15/19 147.4 kg (325 lb)   08/12/19 (!) 155.6 kg (343 lb)                 Reviewed and updated as needed this visit by Provider         Review of Systems   ROS COMP: " "Constitutional, HEENT, cardiovascular, pulmonary, GI, , musculoskeletal, neuro, skin, endocrine and psych systems are negative, except as otherwise noted.      Objective    BP (!) 148/98   Pulse 74   Temp 99.4  F (37.4  C) (Tympanic)   Resp 20   Ht 1.626 m (5' 4\")   Wt 143.8 kg (317 lb)   LMP  (LMP Unknown)   SpO2 96%   BMI 54.41 kg/m    Body mass index is 54.41 kg/m .  Physical Exam   GENERAL: healthy, alert and no distress  EYES: Eyes grossly normal to inspection, PERRL and conjunctivae and sclerae normal  HENT: ear canals and TM's normal, nose and mouth without ulcers or lesions  NECK: no adenopathy, no asymmetry, masses, or scars and thyroid normal to palpation  RESP: lungs clear to auscultation - no rales, rhonchi or wheezes  CV: regular rate and rhythm, normal S1 S2, no S3 or S4, no murmur, click or rub, no peripheral edema and peripheral pulses strong  ABDOMEN: soft, nontender, no hepatosplenomegaly, no masses and bowel sounds normal  MS: no gross musculoskeletal defects noted, no edema  SKIN: no suspicious lesions or rashes  NEURO: Normal strength and tone, mentation intact and speech normal  PSYCH: mentation appears normal, affect normal/bright    Results for orders placed or performed in visit on 11/25/19   CBC with platelets     Status: Abnormal   Result Value Ref Range    WBC 9.1 4.0 - 11.0 10e9/L    RBC Count 5.67 (H) 3.8 - 5.2 10e12/L    Hemoglobin 15.1 11.7 - 15.7 g/dL    Hematocrit 48.2 (H) 35.0 - 47.0 %    MCV 85 78 - 100 fl    MCH 26.6 26.5 - 33.0 pg    MCHC 31.3 (L) 31.5 - 36.5 g/dL    RDW 13.5 10.0 - 15.0 %    Platelet Count 238 150 - 450 10e9/L   Wet prep     Status: None   Result Value Ref Range    Specimen Description Vagina     Wet Prep No Trichomonas seen     Wet Prep No yeast seen     Wet Prep No clue cells seen     Wet Prep Rare  WBC'S seen                Assessment & Plan     (N93.8) DUB (dysfunctional uterine bleeding)  (primary encounter diagnosis)  Comment: Patient recently " "started Plavix did have a large.  And continues to have spotting over the last week States  Is very light is been present for 2 weeks does not require a pad.  Concern it could be from Plavix versus menopausal symptoms.  At this point will obtain labs hemoglobin was stable we will waiting for F[facility] labs we will continue to monitor if spotting continues for the next month we will do ultrasound and have her see OB/GYN if it subsides no further intervention  Plan: CBC with platelets, Follicle stimulating         hormone, Estradiol, Wet prep      (E78.5) Hyperlipidemia LDL goal <100  Comment: Labs pending  Plan: Lipid panel reflex to direct LDL Fasting      (E11.9,  Z79.4) Type 2 diabetes mellitus without complication, with long-term current use of insulin (H)  Comment: *Uncontrolled diabetes patient is working with diabetic educator and MTM pharmacist  Plan: Albumin Random Urine Quantitative with Creat         Ratio      (E03.9) Hypothyroidism, unspecified type  Comment: Labs pending  Plan: TSH WITH FREE T4 REFLEX      (Z23) Need for prophylactic vaccination and inoculation against influenza  Comment: Given today  Plan: INFLUENZA QUAD, RECOMBINANT, P-FREE (RIV4)         (FLUBLOCK) [52835], Vaccine Administration,         Initial [78180]      (Z12.31) Encounter for screening mammogram for breast cancer  Comment: Ordered today  Plan: MA SCREENING DIGITAL BILAT - Future  (s+30)       BMI:   Estimated body mass index is 54.41 kg/m  as calculated from the following:    Height as of this encounter: 1.626 m (5' 4\").    Weight as of this encounter: 143.8 kg (317 lb).   Weight management plan: Discussed healthy diet and exercise guidelines        See Patient Instructions patient noted to have elevated blood pressure patient has not started her blood pressure medication irbestartan has not picked it up since being discharged.  Did review with the importance of starting medications with patient's past history patient will start " medications and monitor her blood pressure closely over the past 1 to 2 weeks if it remains above 140/80 she will contact me and we will review medication adjustment    Return in about 1 week (around 12/2/2019), or if symptoms worsen or fail to improve.    RONNELL Bianchi [facility]

## 2019-11-25 NOTE — PROGRESS NOTES
"SUBJECTIVE/OBJECTIVE:                Brissa Mott is a 49 year old female coming in for a transitions of care visit.  She was discharged from [facility] on 11/12/19 for stroke. Pt was referred by PCP, RONNELL Zepeda, CNP.     Chief Complaint: Hospital Follow-up/Med Review/Diabetes    Allergies/ADRs: Reviewed in Epic  Tobacco:  reports that she has quit smoking. She has never used smokeless tobacco.  Alcohol: none  Caffeine: 1-2 cups/day of coffee  Activity: using a walker - has had therapy at her home twice a week - house is multi-level so has to go slowly  PMH: Reviewed in Epic    Medication Adherence/Access:  Patient uses pill box(es).  Patient takes medications 2 time(s) per day.   Per patient, misses medication 0 times per week. Cannot recall what medication she has not been able to  from pharmacy  Medication barriers: none.   The patient fills medications at [place]: NO, fills medications at [facility] in [place], [place].    Diabetes:  Pt currently taking metformin IR 1000 mg twice daily, Victoza 1.8 mg daily, Humulin 70/30 mix 24 units twice daily. Pt states that this insulin dose is almost half of what she was previously taking. She does not always take her suppertime insulin before supper. Pt is not experiencing side effects.  SMBG: two times daily. Pt states that she has been getting a \"E-9\" code on her meter and has not been able to check the last few days. We determined during the visit this is likely a low-battery code.   Ranges (patient reported): 120s mg/dL - up to 200 mg/dL when she ate too many carbohydrates.  Sometimes checks 1 hour after eating.    Patient is not experiencing hypoglycemia  Recent symptoms of high blood sugar? none  Eye exam: due  Foot exam: due  ACEi/ARB: Yes: Irbesartan - patient has to  from pharmacy..   Urine Albumin: No results found for: UMALCR   Aspirin: Taking 81mg daily and denies side effects  Diet/Exercise: Pt is trying to limit her " carbohydrates but is not quite counting her carbohydrates and does not know how many she can have during the day.  She is scheduled to see the CDE on 12/17/19.     Hx of stroke/Hypertension: Current medications include aspirin 81 mg daily, clopidogrel 75 mg daily, and irbesartan 300 mg at bedtime.  Holding amlodipine. On pantoprazole 40 mg daily for GI prophylaxis. Patient does self-monitor BP. Home BP monitoring in range of 120-130's systolic over 80's diastolic.  Patient reports no current medication side effects.    Hyperlipidemia: Current therapy includes atorvastatin 80 mg once daily.  Pt reports no significant myalgias or other side effects.    Depression:  Current medications include: Sertraline 150 mg once daily and gabapentin 100 mg at bedtime. Feels that the sertraline is helping and her mood is fairly good.     Asthma/Allergic rhinitis: Current medications include cetirizine 10 mg twice daily, Flovent  mcg - twice daily, and Ventolin HFA PRN ( has not lately). Primary symptoms are controlled. Pt feels that current therapy is effective. She does have a machine for BiPAP, but has had some trouble getting mask from hospital attached to hose.  She is trying to figure out when her daughter can take her to [facility].     Left knee pain: Currently taking APAP 500 mg every 8 hours PRN (not finding effective PRN). She was using Voltaren gel, but has stopped this due to potential interactions with antiplatelet medications.  Lidocaine patches were used in the hospital, but she did not find to be effective. Pt is still dealing with a decent amount of pain. Pt reports no known side effects.     Today's Vitals: There were no vitals taken for this visit. - vitals taken after today's visit during PCP visit.    BP Readings from Last 3 Encounters:   11/25/19 (!) 148/98   11/15/19 (!) 145/93   08/12/19 (!) 147/87     Creatinine   Date Value Ref Range Status   11/15/2019 0.46 (L) 0.52 - 1.04 mg/dL Final     ASSESSMENT:                      Medication Adherence: fair, needs improvement - see below    Diabetes: Needs Improvement. Patient is not meeting A1c goal of < 7%.  Pt would benefit from obtaining new glucometer to monitor blood sugars and from taking her insulin before suppertime.     Hx of stroke/Hypertension: Needs improvement. Pt is not meeting BP goal of <140/90 mmHg. Pt has been off her ARB due to transportation issues to pharmacy.  Pt is able to get today and would benefit from restarting.     Hyperlipidemia: Stable. Pt is on high intensity statin which is indicated based on 2019 ACC/AHA guidelines for lipid management.      Depression:  Stable per patient.     Asthma/Allergic rhinitis: Stable per patient.     Left knee pain: Needs Improvement. Pt would likely benefit from using scheduled APAP to see if she can get ahead of her pain.       PLAN:                  Post Discharge Medication Reconciliation Status: discharge medications reconciled, continue medications without change.    1. Pt to restart irbesartan/ from pharmacy.  2. Start acetaminophen 1,000 mg three times daily.  3. Pt to work on taking Humulin 70/30 mixed insulin before suppertime.   4. Reviewed carbohydrate counting guidelines with patient  5. Glucometer ordered for patient.    I spent 40 minutes with this patient today. A copy of the visit note was provided to the patient's primary care provider.    Will follow up in 1 week.    The patient was given a summary of these recommendations as an after visit summary.    Vince Andersen, PharmD, BCACP  Medication Therapy Management Pharmacist  Pager: [phone]

## 2019-11-25 NOTE — NURSING NOTE
"Chief Complaint   Patient presents with     Vaginal Bleeding       Initial BP (!) 148/98   Pulse 74   Temp 99.4  F (37.4  C) (Tympanic)   Resp 20   Ht 1.626 m (5' 4\")   Wt 143.8 kg (317 lb)   LMP  (LMP Unknown)   SpO2 96%   BMI 54.41 kg/m   Estimated body mass index is 54.41 kg/m  as calculated from the following:    Height as of this encounter: 1.626 m (5' 4\").    Weight as of this encounter: 143.8 kg (317 lb).    Patient presents to the clinic using XbyMe that is potentially due pending provider review:  Mammogram, Colonoscopy/FIT, labs, flu shot     Possibly completing today per provider review.    Is there anyone who you would like to be able to receive your results? No  If yes have patient fill out YESSENIA      "

## 2019-11-26 NOTE — RESULT ENCOUNTER NOTE
Please Notify Brissa  of test results lab results do show that you are in menopause.  Irregular bleeding is most likely related to menopausal symptoms if not improving as we discussed over the next month contact me and we will do further work-up.  Mildly elevated microalbumin continue with your blood pressure medications and we will recheck in 1 year your thyroid was within normal limits.  And your cholesterol numbers are within normal limits.  CBC is also within normal limits    Juli Ramsey CNP

## 2019-12-02 ENCOUNTER — ALLIED HEALTH/NURSE VISIT (OUTPATIENT)
Dept: PHARMACY | Facility: CLINIC | Age: 49
End: 2019-12-02
Payer: COMMERCIAL

## 2019-12-02 DIAGNOSIS — E11.65 TYPE 2 DIABETES MELLITUS WITH HYPERGLYCEMIA, WITH LONG-TERM CURRENT USE OF INSULIN (H): Primary | ICD-10-CM

## 2019-12-02 DIAGNOSIS — I15.9 SECONDARY HYPERTENSION: ICD-10-CM

## 2019-12-02 DIAGNOSIS — Z86.73 HISTORY OF STROKE: ICD-10-CM

## 2019-12-02 DIAGNOSIS — Z79.4 TYPE 2 DIABETES MELLITUS WITH HYPERGLYCEMIA, WITH LONG-TERM CURRENT USE OF INSULIN (H): Primary | ICD-10-CM

## 2019-12-02 PROCEDURE — 99606 MTMS BY PHARM EST 15 MIN: CPT | Performed by: PHARMACIST

## 2019-12-02 NOTE — Clinical Note
FYI - just got meter - unable to review blood sugars yet.Vince Andersen, PharmD, BCACPMedication Therapy Management PharmacistPager: [phone] Pt resting comfortably, 02 sat WNL (97%), no obvious signs of respiratory distress, and lungs sound clear

## 2019-12-02 NOTE — PROGRESS NOTES
SUBJECTIVE/OBJECTIVE:                Brissa Mott is a 49 year old female called for a follow-up visit for Medication Therapy Management.  She was referred to me from RONNELL Zepeda CNP.     Chief Complaint: Follow up from MTM visit on 11/25/19.  Diabetes Follow-Up  Tobacco:  reports that she has quit smoking. She has never used smokeless tobacco.  Alcohol: none    Medication Adherence/Access:  Patient uses pill box(es).  Patient takes medications 2 time(s) per day.   Per patient, misses medication 0 times per week. Cannot recall what medication she has not been able to  from pharmacy  Medication barriers: none.   The patient fills medications at [place]: NO, fills medications at [facility] in [place], [place].    Diabetes:  Pt currently taking metformin IR 1000 mg twice daily, Victoza 1.8 mg daily, Humulin 70/30 mix 24 units twice daily. She has been a little better with taking her insulin dose prior to suppertime. Pt is not experiencing side effects.  SMBG: Pt just was finally able to get her blood sugar meter today from the pharmacy  Patient is not experiencing hypoglycemia  Recent symptoms of high blood sugar? none  Eye exam: due  Foot exam: due  ACEi/ARB: Yes: Irbesartan - patient has to  from pharmacy..   Urine Albumin: No results found for: UMALCR   Aspirin: Taking 81mg daily and denies side effects  Diet/Exercise: Pt has continued to watch her carbohydrates. She states that they are going on a trip next week and then out of the country before seeing CDE.     Hx of stroke/Hypertension: Current medications include aspirin 81 mg daily, clopidogrel 75 mg daily, and irbesartan 300 mg at bedtime (just restarting today). On pantoprazole 40 mg daily for GI prophylaxis. Patient does self-monitor BP. Home BP monitoring in range of 130's systolic over 80's diastolic.  Patient reports no current medication side effects.    Today's Vitals: LMP  (LMP Unknown)  - telephone encounter, no vitals    BP Readings  from Last 3 Encounters:   11/25/19 (!) 148/98   11/15/19 (!) 145/93   08/12/19 (!) 147/87       ASSESSMENT:                  Medication Adherence: good, no issues identified    Diabetes: Unimproved. Patient is not meeting A1c goal of < 7%. Encouraged patient to start monitoring blood sugars and to continue to take insulin prior to suppertime.      Hx of stroke/Hypertension: Improved. Pt has all of her medications. Home BP readings are at goal of <140/90 mmHg.     PLAN:                Post Discharge Medication Reconciliation Status: discharge medications reconciled, continue medications without change.    1. Continue current therapy.    I spent 15 minutes with this patient today. A copy of the visit note was provided to the patient's primary care provider.     Will follow up in 2-4 weeks as needed.    The patient was given a summary of these recommendations as an after visit summary.    Vince Andersen, NeginD, BCACP  Medication Therapy Management Pharmacist  Pager: [phone]

## 2019-12-03 ENCOUNTER — MEDICAL CORRESPONDENCE (OUTPATIENT)
Dept: HEALTH INFORMATION MANAGEMENT | Facility: CLINIC | Age: 49
End: 2019-12-03

## 2019-12-03 DIAGNOSIS — Z53.9 DIAGNOSIS NOT YET DEFINED: Primary | ICD-10-CM

## 2019-12-03 PROCEDURE — G0180 MD CERTIFICATION HHA PATIENT: HCPCS | Performed by: FAMILY MEDICINE

## 2019-12-19 ENCOUNTER — TRANSFERRED RECORDS (OUTPATIENT)
Dept: HEALTH INFORMATION MANAGEMENT | Facility: CLINIC | Age: 49
End: 2019-12-19

## 2020-01-06 DIAGNOSIS — E11.9 TYPE 2 DIABETES MELLITUS WITHOUT COMPLICATION, WITH LONG-TERM CURRENT USE OF INSULIN (H): ICD-10-CM

## 2020-01-06 DIAGNOSIS — Z79.4 TYPE 2 DIABETES MELLITUS WITHOUT COMPLICATION, WITH LONG-TERM CURRENT USE OF INSULIN (H): ICD-10-CM

## 2020-01-06 RX ORDER — LIRAGLUTIDE 6 MG/ML
1.8 INJECTION SUBCUTANEOUS DAILY
Qty: 9 ML | Refills: 0 | Status: SHIPPED | OUTPATIENT
Start: 2020-01-06 | End: 2020-03-02

## 2020-01-06 NOTE — TELEPHONE ENCOUNTER
"Requested Prescriptions   Pending Prescriptions Disp Refills     liraglutide (VICTOZA) 18 MG/3ML solution 9 mL 0     Sig: Inject 1.8 mg Subcutaneous daily       GLP-1 Agonists Protocol Failed - 1/6/2020 10:30 AM        Failed - Blood pressure less than 140/90 in past 6 months     BP Readings from Last 3 Encounters:   11/25/19 (!) 148/98   11/15/19 (!) 145/93   08/12/19 (!) 147/87                 Failed - Normal serum creatinine on file in past 12 months     Recent Labs   Lab Test 11/15/19  1401   CR 0.46*             Passed - LDL on file in past 12 months     Recent Labs   Lab Test 11/25/19  1106   LDL 59             Passed - Microalbumin on file in past 12 months     Recent Labs   Lab Test 11/25/19  1112   MICROL 75   UMALCR 34.63*             Passed - HgbA1C in past 3 or 6 months     If HgbA1C is 8 or greater, it needs to be on file within the past 3 months.  If less than 8, must be on file within the past 6 months.     Recent Labs   Lab Test 11/15/19  1401   A1C 11.0*             Passed - Medication is active on med list        Passed - Patient is age 18 or older        Passed - No active pregnancy on record        Passed - No positive pregnancy test in past 12 months        Passed - Recent (6 mo) or future (30 days) visit within the authorizing provider's specialty     Patient had office visit in the last 6 months or has a visit in the next 30 days with authorizing provider.  See \"Patient Info\" tab in inbasket, or \"Choose Columns\" in Meds & Orders section of the refill encounter.            Last Written Prescription Date:  9/24/19  Last Fill Quantity: 9 ml,  # refills: 0   Last office visit: 11/25/2019 with prescribing provider:  Juli Ramsey   Future Office Visit:      "

## 2020-01-06 NOTE — TELEPHONE ENCOUNTER
Prescription approved per [facility] Refill Protocol.

## 2020-02-03 ENCOUNTER — TELEPHONE (OUTPATIENT)
Dept: FAMILY MEDICINE | Facility: CLINIC | Age: 50
End: 2020-02-03

## 2020-02-03 DIAGNOSIS — Z79.4 TYPE 2 DIABETES MELLITUS WITH HYPERGLYCEMIA, WITH LONG-TERM CURRENT USE OF INSULIN (H): Primary | ICD-10-CM

## 2020-02-03 DIAGNOSIS — E11.65 TYPE 2 DIABETES MELLITUS WITH HYPERGLYCEMIA, WITH LONG-TERM CURRENT USE OF INSULIN (H): Primary | ICD-10-CM

## 2020-02-03 NOTE — TELEPHONE ENCOUNTER
Panel Management Review      Patient has the following on her problem list:     Diabetes    ASA: Passed    Last A1C  Lab Results   Component Value Date    A1C 11.0 11/15/2019    A1C 12.9 06/05/2019     A1C tested: FAILED    Last LDL:    Lab Results   Component Value Date    CHOL 127 11/25/2019     Lab Results   Component Value Date    HDL 44 11/25/2019     Lab Results   Component Value Date    LDL 59 11/25/2019     Lab Results   Component Value Date    TRIG 120 11/25/2019     No results found for: CHOLHDLRATIO  Lab Results   Component Value Date    NHDL 83 11/25/2019       Is the patient on a Statin? YES             Is the patient on Aspirin? YES    Medications     HMG CoA Reductase Inhibitors     atorvastatin (LIPITOR) 80 MG tablet       Salicylates     aspirin (ASA) 81 MG tablet             Last three blood pressure readings:  BP Readings from Last 3 Encounters:   11/25/19 (!) 148/98   11/15/19 (!) 145/93   08/12/19 (!) 147/87       Date of last diabetes office visit: 11/15/19     Tobacco History:     History   Smoking Status     Former Smoker   Smokeless Tobacco     Never Used           Composite cancer screening  Chart review shows that this patient is due/due soon for the following Mammogram and Colonoscopy  Summary:    Patient is due/failing the following:   A1C, COLONOSCOPY and MAMMOGRAM    Action needed:       Type of outreach:    Phone, spoke to patient.  She has an appointment with Diamond Lucero tomorrow and will have her A1C checked then.    Questions for provider review:    None                                                                                                                                    Echo Spangler MA      Chart routed to Care Team .

## 2020-02-04 ENCOUNTER — ALLIED HEALTH/NURSE VISIT (OUTPATIENT)
Dept: EDUCATION SERVICES | Facility: CLINIC | Age: 50
End: 2020-02-04
Attending: NURSE PRACTITIONER
Payer: COMMERCIAL

## 2020-02-04 ENCOUNTER — TELEPHONE (OUTPATIENT)
Dept: FAMILY MEDICINE | Facility: CLINIC | Age: 50
End: 2020-02-04

## 2020-02-04 VITALS — DIASTOLIC BLOOD PRESSURE: 88 MMHG | SYSTOLIC BLOOD PRESSURE: 136 MMHG

## 2020-02-04 DIAGNOSIS — Z79.4 TYPE 2 DIABETES MELLITUS WITH HYPERGLYCEMIA, WITH LONG-TERM CURRENT USE OF INSULIN (H): Primary | ICD-10-CM

## 2020-02-04 DIAGNOSIS — E11.65 TYPE 2 DIABETES MELLITUS WITH HYPERGLYCEMIA, WITH LONG-TERM CURRENT USE OF INSULIN (H): ICD-10-CM

## 2020-02-04 DIAGNOSIS — E11.65 TYPE 2 DIABETES MELLITUS WITH HYPERGLYCEMIA, WITH LONG-TERM CURRENT USE OF INSULIN (H): Primary | ICD-10-CM

## 2020-02-04 DIAGNOSIS — Z79.4 TYPE 2 DIABETES MELLITUS WITH HYPERGLYCEMIA, WITH LONG-TERM CURRENT USE OF INSULIN (H): ICD-10-CM

## 2020-02-04 LAB — HBA1C MFR BLD: 11.7 % (ref 0–5.6)

## 2020-02-04 PROCEDURE — 36415 COLL VENOUS BLD VENIPUNCTURE: CPT | Performed by: NURSE PRACTITIONER

## 2020-02-04 PROCEDURE — 83036 HEMOGLOBIN GLYCOSYLATED A1C: CPT | Performed by: NURSE PRACTITIONER

## 2020-02-04 PROCEDURE — G0108 DIAB MANAGE TRN  PER INDIV: HCPCS | Performed by: DIETITIAN, REGISTERED

## 2020-02-04 RX ORDER — FLASH GLUCOSE SENSOR
1 KIT MISCELLANEOUS
Qty: 7 EACH | Refills: 4 | Status: SHIPPED | OUTPATIENT
Start: 2020-02-04 | End: 2021-02-10

## 2020-02-04 NOTE — PROGRESS NOTES
"Diabetes Self-Management Education & Support    Diabetes Education Self Management & Training    SUBJECTIVE/OBJECTIVE:  Presents for: Individual review  Accompanied by: Daughter  Diabetes education in the past 24mo: No  Focus of Visit: Taking Medication, Problem Solving, Healthy Eating  Diabetes type: Type 2  Disease course: Worsening  How confident are you filling out medical forms by yourself:: Not Assessed  Transportation concerns: Yes  Other concerns:: None  Cultural Influences/Ethnic Background:  American      Diabetes Symptoms & Complications          Patient Problem List and Family Medical History reviewed for relevant medical history, current medical status, and diabetes risk factors.    Vitals:  LMP  (LMP Unknown)   Estimated body mass index is 54.41 kg/m  as calculated from the following:    Height as of 11/25/19: 1.626 m (5' 4\").    Weight as of 11/25/19: 143.8 kg (317 lb).   Last 3 BP:   BP Readings from Last 3 Encounters:   11/25/19 (!) 148/98   11/15/19 (!) 145/93   08/12/19 (!) 147/87       History   Smoking Status     Former Smoker   Smokeless Tobacco     Never Used       Labs:  Lab Results   Component Value Date    A1C 11.0 11/15/2019     Lab Results   Component Value Date     11/15/2019     Lab Results   Component Value Date    LDL 59 11/25/2019     HDL Cholesterol   Date Value Ref Range Status   11/25/2019 44 (L) >49 mg/dL Final   ]  GFR Estimate   Date Value Ref Range Status   11/15/2019 >90 >60 mL/min/[1.73_m2] Final     Comment:     Non  GFR Calc  Starting 12/18/2018, serum creatinine based estimated GFR (eGFR) will be   calculated using the Chronic Kidney Disease Epidemiology Collaboration   (CKD-EPI) equation.       GFR Estimate If Black   Date Value Ref Range Status   11/15/2019 >90 >60 mL/min/[1.73_m2] Final     Comment:      GFR Calc  Starting 12/18/2018, serum creatinine based estimated GFR (eGFR) will be   calculated using the Chronic Kidney Disease " Epidemiology Collaboration   (CKD-EPI) equation.       Lab Results   Component Value Date    CR 0.46 11/15/2019     No results found for: MICROALBUMIN    Healthy Eating  Healthy Eating Assessed Today: Yes  Cultural/Baptist diet restrictions?: No  Breakfast: eggs, toast or oatmeal, coffee or milk  Lunch: sandwich or soup or leftovers  Dinner: cabbage soup, water or spaghetti or hot dog/mac and cheese or pizza  Snacks: ice cream 1 cup or so, nuts, fruit (banana or orange), raw veggies  Beverages: Water, Coffee, Milk    Being Active   does what she can, walking as able    Monitoring     BG have been running in the 300's since she got out of hospital.      Taking Medications  Diabetes Medication(s)     Biguanides       metFORMIN (GLUCOPHAGE) 1000 MG tablet    Take 1 tablet (1,000 mg) by mouth 2 times daily (with meals)    Insulin       insulin isophane & regular (HUMULIN MIX 70/30 KWIKPEN) susp    Inject 34 before breakfast and supper.  ( Increase by 2 units every 3 days until readings are under 120)    Incretin Mimetic Agents (GLP-1 Receptor Agonists)       liraglutide (VICTOZA) 18 MG/3ML solution    Inject 1.8 mg Subcutaneous daily               Problem Solving   not assessed              Reducing Risks   not assessed    Healthy Coping     Patient Activation Measure Survey Score:  No flowsheet data found.    ASSESSMENT:  Will work on increasing the insulin dose so we can get some better numbers.  We will work on getting a sensor that will make adjustment easier.  We did discuss switching to a basal/bolus plan but pt stated she used to be on that and her numbers were worse.  We will work on adjustments and view them via the sensor.  PT struggles with portion control which makes it difficult to manage on a set dose of insulin.        Patient's most recent   Lab Results   Component Value Date    A1C 11.0 11/15/2019    is not meeting goal of <7.0    INTERVENTION:   Diabetes knowledge and skills assessment:     Patient  is knowledgeable in diabetes management concepts related to: Healthy Eating, Being Active and Monitoring    Patient needs further education on the following diabetes management concepts: Healthy Eating, Being Active, Monitoring, Taking Medication, Problem Solving, Reducing Risks and Healthy Coping    Based on learning assessment above, most appropriate setting for further diabetes education would be: Individual setting.    Education provided today on:  AADE Self-Care Behaviors:  Healthy Eating: carbohydrate counting, consistency in amount, composition, and timing of food intake and label reading  Being Active: relationship to blood glucose  Monitoring: individual blood glucose targets, frequency of monitoring and sensor discussion  Taking Medication: action of prescribed medication, proper site selection and rotation for injections and when to take medications    Opportunities for ongoing education and support in diabetes-self management were discussed.    Pt verbalized understanding of concepts discussed and recommendations provided today.       Education Materials Provided:  No new materials provided today    PLAN:  See Patient Instructions for co-developed, patient-stated behavior change goals.  AVS printed and provided to patient today. See Follow-Up section for recommended follow-up.      Time Spent: 60 minutes  Encounter Type: Individual    Any diabetes medication dose changes were made via the CDE Protocol and Collaborative Practice Agreement with the patient's primary care provider. A copy of this encounter was shared with the provider.

## 2020-02-04 NOTE — PATIENT INSTRUCTIONS
1. Increase your Humulin 70/30  Mix to 34 units before breakfast and 34 units before supper.  Increase your supper dose every 3 days by 2 units if am readings are running over 120.  Increase your breakfast dose every 3 days by 2 units if your before supper blood sugars are running over 120.    2.  Call or email me if you got the sensor.  Go into email I sent you for [employer] and get hooked up to the cloud    3.  Try to keep your carbohydrates at 3-4 at meals and 1-2 at snacks.

## 2020-02-04 NOTE — TELEPHONE ENCOUNTER
Reason for Call:  Form, our goal is to have forms completed with 72 hours, however, some forms may require a visit or additional information.    Type of letter, form or note:  disability    Who is the form from?: Patient    Where did the form come from: Patient or family brought in       What clinic location was the form placed at?: [place]    Where the form was placed: Given to physician    What number is listed as a contact on the form?: [phone]       Additional comments: Patient would like form mailed to her and to the address on the form.      Call taken on 2/4/2020 at 2:45 PM by Anna Guerrero

## 2020-02-04 NOTE — LETTER
"    2/4/2020         RE: Brissa Mott  [address] [facility] [address]        Dear Colleague,    Thank you for referring your patient, Brissa Mott, to the [facility]. Please see a copy of my visit note below.    Diabetes Self-Management Education & Support    Diabetes Education Self Management & Training    SUBJECTIVE/OBJECTIVE:  Presents for: Individual review  Accompanied by: Daughter  Diabetes education in the past 24mo: No  Focus of Visit: Taking Medication, Problem Solving, Healthy Eating  Diabetes type: Type 2  Disease course: Worsening  How confident are you filling out medical forms by yourself:: Not Assessed  Transportation concerns: Yes  Other concerns:: None  Cultural Influences/Ethnic Background:  American      Diabetes Symptoms & Complications          Patient Problem List and Family Medical History reviewed for relevant medical history, current medical status, and diabetes risk factors.    Vitals:  LMP  (LMP Unknown)   Estimated body mass index is 54.41 kg/m  as calculated from the following:    Height as of 11/25/19: 1.626 m (5' 4\").    Weight as of 11/25/19: 143.8 kg (317 lb).   Last 3 BP:   BP Readings from Last 3 Encounters:   11/25/19 (!) 148/98   11/15/19 (!) 145/93   08/12/19 (!) 147/87       History   Smoking Status     Former Smoker   Smokeless Tobacco     Never Used       Labs:  Lab Results   Component Value Date    A1C 11.0 11/15/2019     Lab Results   Component Value Date     11/15/2019     Lab Results   Component Value Date    LDL 59 11/25/2019     HDL Cholesterol   Date Value Ref Range Status   11/25/2019 44 (L) >49 mg/dL Final   ]  GFR Estimate   Date Value Ref Range Status   11/15/2019 >90 >60 mL/min/[1.73_m2] Final     Comment:     Non  GFR Calc  Starting 12/18/2018, serum creatinine based estimated GFR (eGFR) will be   calculated using the Chronic Kidney Disease Epidemiology Collaboration   (CKD-EPI) equation.       GFR " Estimate If Black   Date Value Ref Range Status   11/15/2019 >90 >60 mL/min/[1.73_m2] Final     Comment:      GFR Calc  Starting 12/18/2018, serum creatinine based estimated GFR (eGFR) will be   calculated using the Chronic Kidney Disease Epidemiology Collaboration   (CKD-EPI) equation.       Lab Results   Component Value Date    CR 0.46 11/15/2019     No results found for: MICROALBUMIN    Healthy Eating  Healthy Eating Assessed Today: Yes  Cultural/Buddhist diet restrictions?: No  Breakfast: eggs, toast or oatmeal, coffee or milk  Lunch: sandwich or soup or leftovers  Dinner: cabbage soup, water or spaghetti or hot dog/mac and cheese or pizza  Snacks: ice cream 1 cup or so, nuts, fruit (banana or orange), raw veggies  Beverages: Water, Coffee, Milk    Being Active   does what she can, walking as able    Monitoring     BG have been running in the 300's since she got out of hospital.      Taking Medications  Diabetes Medication(s)     Biguanides       metFORMIN (GLUCOPHAGE) 1000 MG tablet    Take 1 tablet (1,000 mg) by mouth 2 times daily (with meals)    Insulin       insulin isophane & regular (HUMULIN MIX 70/30 KWIKPEN) susp    Inject 34 before breakfast and supper.  ( Increase by 2 units every 3 days until readings are under 120)    Incretin Mimetic Agents (GLP-1 Receptor Agonists)       liraglutide (VICTOZA) 18 MG/3ML solution    Inject 1.8 mg Subcutaneous daily               Problem Solving   not assessed              Reducing Risks   not assessed    Healthy Coping     Patient Activation Measure Survey Score:  No flowsheet data found.    ASSESSMENT:  Will work on increasing the insulin dose so we can get some better numbers.  We will work on getting a sensor that will make adjustment easier.  We did discuss switching to a basal/bolus plan but pt stated she used to be on that and her numbers were worse.  We will work on adjustments and view them via the sensor.  PT struggles with portion control  which makes it difficult to manage on a set dose of insulin.        Patient's most recent   Lab Results   Component Value Date    A1C 11.0 11/15/2019    is not meeting goal of <7.0    INTERVENTION:   Diabetes knowledge and skills assessment:     Patient is knowledgeable in diabetes management concepts related to: Healthy Eating, Being Active and Monitoring    Patient needs further education on the following diabetes management concepts: Healthy Eating, Being Active, Monitoring, Taking Medication, Problem Solving, Reducing Risks and Healthy Coping    Based on learning assessment above, most appropriate setting for further diabetes education would be: Individual setting.    Education provided today on:  AADE Self-Care Behaviors:  Healthy Eating: carbohydrate counting, consistency in amount, composition, and timing of food intake and label reading  Being Active: relationship to blood glucose  Monitoring: individual blood glucose targets, frequency of monitoring and sensor discussion  Taking Medication: action of prescribed medication, proper site selection and rotation for injections and when to take medications    Opportunities for ongoing education and support in diabetes-self management were discussed.    Pt verbalized understanding of concepts discussed and recommendations provided today.       Education Materials Provided:  No new materials provided today    PLAN:  See Patient Instructions for co-developed, patient-stated behavior change goals.  AVS printed and provided to patient today. See Follow-Up section for recommended follow-up.      Time Spent: 60 minutes  Encounter Type: Individual    Any diabetes medication dose changes were made via the CDE Protocol and Collaborative Practice Agreement with the patient's primary care provider. A copy of this encounter was shared with the provider.

## 2020-02-24 ENCOUNTER — TELEPHONE (OUTPATIENT)
Dept: FAMILY MEDICINE | Facility: CLINIC | Age: 50
End: 2020-02-24

## 2020-02-24 NOTE — TELEPHONE ENCOUNTER
Reason for Call:  Other     Detailed comments: Patient has questions regarding her medication Pantoprazole - should she still be taking this?    Phone Number Patient can be reached at: Home number on file [phone] (home)    Best Time:     Can we leave a detailed message on this number? YES    Call taken on 2/24/2020 at 11:08 AM by Guerita Colvin

## 2020-02-24 NOTE — TELEPHONE ENCOUNTER
States she has has not been taking it. Advised if she is not having issues with reflux she can not take it. Pt also states her left knee is not getting better. Advised needs to be seen for knee pain. Offered to set up an appointment but states she needs to talk to her daughter to set up. She will call back.  Ruby Montero RN

## 2020-03-02 ENCOUNTER — OFFICE VISIT (OUTPATIENT)
Dept: FAMILY MEDICINE | Facility: CLINIC | Age: 50
End: 2020-03-02
Payer: COMMERCIAL

## 2020-03-02 VITALS
DIASTOLIC BLOOD PRESSURE: 80 MMHG | HEART RATE: 80 BPM | TEMPERATURE: 98.2 F | OXYGEN SATURATION: 92 % | HEIGHT: 64 IN | BODY MASS INDEX: 50.02 KG/M2 | WEIGHT: 293 LBS | SYSTOLIC BLOOD PRESSURE: 132 MMHG

## 2020-03-02 DIAGNOSIS — M71.22 POPLITEAL CYST, LEFT: ICD-10-CM

## 2020-03-02 DIAGNOSIS — Z79.4 TYPE 2 DIABETES MELLITUS WITH HYPERGLYCEMIA, WITH LONG-TERM CURRENT USE OF INSULIN (H): Primary | ICD-10-CM

## 2020-03-02 DIAGNOSIS — E11.9 TYPE 2 DIABETES MELLITUS WITHOUT COMPLICATION, WITH LONG-TERM CURRENT USE OF INSULIN (H): ICD-10-CM

## 2020-03-02 DIAGNOSIS — Z79.4 TYPE 2 DIABETES MELLITUS WITHOUT COMPLICATION, WITH LONG-TERM CURRENT USE OF INSULIN (H): ICD-10-CM

## 2020-03-02 DIAGNOSIS — M17.12 PRIMARY OSTEOARTHRITIS OF LEFT KNEE: ICD-10-CM

## 2020-03-02 DIAGNOSIS — E11.65 TYPE 2 DIABETES MELLITUS WITH HYPERGLYCEMIA, WITH LONG-TERM CURRENT USE OF INSULIN (H): Primary | ICD-10-CM

## 2020-03-02 DIAGNOSIS — E66.01 MORBID OBESITY (H): ICD-10-CM

## 2020-03-02 DIAGNOSIS — S86.912D STRAIN OF LEFT KNEE, SUBSEQUENT ENCOUNTER: ICD-10-CM

## 2020-03-02 LAB
ANION GAP SERPL CALCULATED.3IONS-SCNC: 5 MMOL/L (ref 3–14)
BUN SERPL-MCNC: 11 MG/DL (ref 7–30)
CALCIUM SERPL-MCNC: 9.1 MG/DL (ref 8.5–10.1)
CHLORIDE SERPL-SCNC: 101 MMOL/L (ref 94–109)
CO2 SERPL-SCNC: 32 MMOL/L (ref 20–32)
CREAT SERPL-MCNC: 0.54 MG/DL (ref 0.52–1.04)
GFR SERPL CREATININE-BSD FRML MDRD: >90 ML/MIN/{1.73_M2}
GLUCOSE SERPL-MCNC: 273 MG/DL (ref 70–99)
POTASSIUM SERPL-SCNC: 4.3 MMOL/L (ref 3.4–5.3)
SODIUM SERPL-SCNC: 138 MMOL/L (ref 133–144)

## 2020-03-02 PROCEDURE — 99214 OFFICE O/P EST MOD 30 MIN: CPT | Performed by: NURSE PRACTITIONER

## 2020-03-02 PROCEDURE — 36415 COLL VENOUS BLD VENIPUNCTURE: CPT | Performed by: NURSE PRACTITIONER

## 2020-03-02 PROCEDURE — 80048 BASIC METABOLIC PNL TOTAL CA: CPT | Performed by: NURSE PRACTITIONER

## 2020-03-02 RX ORDER — LIRAGLUTIDE 6 MG/ML
1.8 INJECTION SUBCUTANEOUS DAILY
Qty: 9 ML | Refills: 3 | Status: SHIPPED | OUTPATIENT
Start: 2020-03-02 | End: 2020-08-26

## 2020-03-02 ASSESSMENT — MIFFLIN-ST. JEOR: SCORE: 2001.61

## 2020-03-02 NOTE — TELEPHONE ENCOUNTER
"Requested Prescriptions   Pending Prescriptions Disp Refills     liraglutide (VICTOZA) 18 MG/3ML solution 9 mL 0     Sig: Inject 1.8 mg Subcutaneous daily       GLP-1 Agonists Protocol Failed - 3/2/2020  9:57 AM        Failed - Normal serum creatinine on file in past 12 months     Recent Labs   Lab Test 11/15/19  1401   CR 0.46*             Passed - Blood pressure less than 140/90 in past 6 months     BP Readings from Last 3 Encounters:   02/04/20 136/88   11/25/19 (!) 148/98   11/15/19 (!) 145/93                 Passed - LDL on file in past 12 months     Recent Labs   Lab Test 11/25/19  1106   LDL 59             Passed - Microalbumin on file in past 12 months     Recent Labs   Lab Test 11/25/19  1112   MICROL 75   UMALCR 34.63*             Passed - HgbA1C in past 3 or 6 months     If HgbA1C is 8 or greater, it needs to be on file within the past 3 months.  If less than 8, must be on file within the past 6 months.     Recent Labs   Lab Test 02/04/20  1326   A1C 11.7*             Passed - Medication is active on med list        Passed - Patient is age 18 or older        Passed - No active pregnancy on record        Passed - No positive pregnancy test in past 12 months        Passed - Recent (6 mo) or future (30 days) visit within the authorizing provider's specialty     Patient had office visit in the last 6 months or has a visit in the next 30 days with authorizing provider.  See \"Patient Info\" tab in inbasket, or \"Choose Columns\" in Meds & Orders section of the refill encounter.            Last Written Prescription Date:  1/6/20  Last Fill Quantity: 9 ml,  # refills: 0   Last office visit: 11/25/2019 with prescribing provider:  Roxy   Future Office Visit:   Next 5 appointments (look out 90 days)    Mar 02, 2020 11:00 AM CST  SHORT with RONNELL Bianchi CNP  [facility] ([facility]) [address] [place] [id number] [phone]             "

## 2020-03-02 NOTE — NURSING NOTE
"Chief Complaint   Patient presents with     Diabetes     Knee Pain       Initial /80 (BP Location: Right arm, Cuff Size: Adult Large)   Pulse 80   Temp 98.2  F (36.8  C) (Tympanic)   Ht 1.626 m (5' 4\")   Wt 140.2 kg (309 lb)   LMP  (LMP Unknown)   SpO2 92%   BMI 53.04 kg/m   Estimated body mass index is 53.04 kg/m  as calculated from the following:    Height as of this encounter: 1.626 m (5' 4\").    Weight as of this encounter: 140.2 kg (309 lb).    Health Maintenance that is potentially due pending provider review:  Mammogram and Colonoscopy/FIT    Gave patient phone numbers to schedule mammogram and colonoscopy.        "

## 2020-03-02 NOTE — PROGRESS NOTES
Subjective     Brissa Mott is a 49 year old female who presents to clinic today for the following health issues:    HPI     Patient would like to follow up with the arthritis and bakers cyst in her left knee. It has been worse lately.  Diabetes Follow-up    How often are you checking your blood sugar? Continuous glucose monitor  What time of day are you checking your blood sugars (select all that apply)?  Not applicable  Have you had any blood sugars above 200?  Yes   Have you had any blood sugars below 70?  Yes- doesn't happen often    What symptoms do you notice when your blood sugar is low?  None    What concerns do you have today about your diabetes? None     Do you have any of these symptoms? (Select all that apply)  No numbness or tingling in feet.  No redness, sores or blisters on feet.  No complaints of excessive thirst.  No reports of blurry vision.  No significant changes to weight.    Have you had a diabetic eye exam in the last 12 months? No        BP Readings from Last 2 Encounters:   02/04/20 136/88   11/25/19 (!) 148/98     Hemoglobin A1C (%)   Date Value   02/04/2020 11.7 (H)   11/15/2019 11.0 (H)     LDL Cholesterol Calculated (mg/dL)   Date Value   11/25/2019 59           How many servings of fruits and vegetables do you eat daily?  0-1    On average, how many sweetened beverages do you drink each day (Examples: soda, juice, sweet tea, etc.  Do NOT count diet or artificially sweetened beverages)?   2    How many days per week do you exercise enough to make your heart beat faster? 3 or less    How many minutes a day do you exercise enough to make your heart beat faster? 9 or less    How many days per week do you miss taking your medication? 0        Patient Active Problem List   Diagnosis     Morbid obesity (H)     Diabetes mellitus, type 2 (H)     KATHRINE (obstructive sleep apnea)     Secondary hypertension     History reviewed. No pertinent surgical history.    Social History     Tobacco Use      Smoking status: Former Smoker     Smokeless tobacco: Never Used   Substance Use Topics     Alcohol use: Not Currently     History reviewed. No pertinent family history.      Current Outpatient Medications   Medication Sig Dispense Refill     acetaminophen (TYLENOL) 500 MG tablet Take 1,000 mg by mouth 3 times daily       albuterol (PROAIR HFA/PROVENTIL HFA/VENTOLIN HFA) 108 (90 Base) MCG/ACT inhaler Inhale 2 puffs into the lungs every 6 hours as needed for wheezing 6.7 g 3     aspirin (ASA) 81 MG tablet Take 1 tablet (81 mg) by mouth daily 90 tablet 1     atorvastatin (LIPITOR) 80 MG tablet Take 1 tablet (80 mg) by mouth daily 90 tablet 3     blood glucose (ACCU-CHEK CINDY PLUS) test strip Use to test blood sugar two times daily or as directed. 100 strip 11     blood glucose monitoring (ACCU-CHEK CINDY PLUS) meter device kit Use to test blood sugar 2 times daily or as directed. 1 kit 0     blood glucose monitoring (SOFTCLIX) lancets Use to test blood sugar two times daily or as directed. 100 each 11     cetirizine (ZYRTEC) 10 MG tablet Take 10 mg by mouth 2 times daily       cholecalciferol (VITAMIN D3) 5000 units (125 mcg) capsule Take 5,000 Units by mouth daily        clopidogrel (PLAVIX) 75 MG tablet Take 75 mg by mouth daily        Continuous Blood Gluc Sensor (FREESTYLE SANDRA 14 DAY SENSOR) MISC 1 each every 14 days 7 each 4     fluticasone (FLOVENT HFA) 110 MCG/ACT inhaler Inhale 1 puff into the lungs 2 times daily 12 g 3     gabapentin (NEURONTIN) 100 MG capsule Take 100 mg by mouth At Bedtime       insulin isophane & regular (HUMULIN MIX 70/30 KWIKPEN) susp Inject 34 before breakfast and supper.  ( Increase by 2 units every 3 days until readings are under 120) 30 mL 3     insulin pen needle (BD TWYLA U/F) 32G X 4 MM miscellaneous 2 times daily Use two pen needles daily or as directed. 100 each 1     irbesartan (AVAPRO) 300 MG tablet Take 1 tablet (300 mg) by mouth At Bedtime 90 tablet 0     liraglutide  "(VICTOZA) 18 MG/3ML solution Inject 1.8 mg Subcutaneous daily 9 mL 3     metFORMIN (GLUCOPHAGE) 1000 MG tablet Take 1 tablet (1,000 mg) by mouth 2 times daily (with meals) 90 tablet 3     Misc. Devices (ROLLATOR ULTRA-LIGHT) MISC Extra side walker with front wheels for home use.  Purchase, lifetime need. Extra wide rolling walker (\"Walker 4\", item #:  [id number]) needed for safe transfers and ambulation.  Pt weight is 335 lbs.       nystatin (MYCOSTATIN) 709113 UNIT/GM external powder Apply topically twice daily to intact skin in groin/abdominal skin folds for 14 days.Call your primary care doctor if skin not improving.       sertraline (ZOLOFT) 100 MG tablet Take 1.5 tablets (150 mg) by mouth daily 90 tablet 3     triamcinolone (KENALOG) 0.1 % external cream Apply topically as needed for irritation 15 g 0     Allergies   Allergen Reactions     Penicillins Rash     Sulfa Drugs Rash     Recent Labs   Lab Test 02/04/20  1326 11/25/19  1106 11/15/19  1401 06/05/19  1634   A1C 11.7*  --  11.0* 12.9*   LDL  --  59  --   --    HDL  --  44*  --   --    TRIG  --  120  --   --    ALT  --   --  21 20   CR  --   --  0.46* 0.51*   GFRESTIMATED  --   --  >90 >90   GFRESTBLACK  --   --  >90 >90   POTASSIUM  --   --  3.5 4.3   TSH  --  1.35  --   --       BP Readings from Last 3 Encounters:   03/02/20 132/80   02/04/20 136/88   11/25/19 (!) 148/98    Wt Readings from Last 3 Encounters:   03/02/20 140.2 kg (309 lb)   11/25/19 143.8 kg (317 lb)   11/15/19 147.4 kg (325 lb)            Up to 42 units blood sugars in the am 140-160   Reviewed and updated as needed this visit by Provider         Review of Systems   ROS COMP: Constitutional, HEENT, cardiovascular, pulmonary, GI, , musculoskeletal, neuro, skin, endocrine and psych systems are negative, except as otherwise noted.      Objective    LMP  (LMP Unknown)   Body mass index is 53.04 kg/m .   /80 (BP Location: Right arm, Cuff Size: Adult Large)   Pulse 80   Temp 98.2  F " "(36.8  C) (Tympanic)   Ht 1.626 m (5' 4\")   Wt 140.2 kg (309 lb)   LMP  (LMP Unknown)   SpO2 92%   BMI 53.04 kg/m      Physical Exam   GENERAL: healthy, alert and no distress  EYES: Eyes grossly normal to inspection, PERRL and conjunctivae and sclerae normal  HENT: ear canals and TM's normal, nose and mouth without ulcers or lesions  NECK: no adenopathy, no asymmetry, masses, or scars and thyroid normal to palpation  RESP: lungs clear to auscultation - no rales, rhonchi or wheezes  CV: regular rate and rhythm, normal S1 S2, no S3 or S4, no murmur, click or rub, no peripheral edema and peripheral pulses strong  MS: no gross musculoskeletal defects noted, no edema  SKIN: no suspicious lesions or rashes  NEURO: Normal strength and tone, mentation intact and speech normal  PSYCH: mentation appears normal, affect normal/bright  Left Knee  Inspection: No effusion, No quad atrophy  Tender: patella tendon, MCL, lateral joint line, medial joint line, prepatellar bursa  Non-tender: lateral patellar facet, prepatellar bursa, popliteal region, pes anserine bursa  Active Range of Motion: pain with flexion, pain with extension  Strength: quad  4/5, Hamstrings  4/5, Gastroc  4/5, Tibialis anterior  4/5 and Peroneals  4-/5  Special tests: normal Valgus stress test    Also examined: hip full range of motion  Results for orders placed or performed in visit on 03/02/20   Basic metabolic panel     Status: Abnormal   Result Value Ref Range    Sodium 138 133 - 144 mmol/L    Potassium 4.3 3.4 - 5.3 mmol/L    Chloride 101 94 - 109 mmol/L    Carbon Dioxide 32 20 - 32 mmol/L    Anion Gap 5 3 - 14 mmol/L    Glucose 273 (H) 70 - 99 mg/dL    Urea Nitrogen 11 7 - 30 mg/dL    Creatinine 0.54 0.52 - 1.04 mg/dL    GFR Estimate >90 >60 mL/min/[1.73_m2]    GFR Estimate If Black >90 >60 mL/min/[1.73_m2]    Calcium 9.1 8.5 - 10.1 mg/dL           Assessment & Plan     (E11.65,  Z79.4) Type 2 diabetes mellitus with hyperglycemia, with long-term " current use of insulin (H)  (primary encounter diagnosis)  Comment: Uncontrolled last hemoglobin A1c in February was 11.4.  Patient is working with [facility] recently seen at the beginning of the month and has been working on insulin dose increases.  Will have follow-up with [facility] in 1 month  Plan: Basic metabolic panel            (E66.01) Morbid obesity (H)  Comment: Reviewed diet and exercise  Plan: Reviewed diet and exercise    (M17.12) Primary osteoarthritis of left knee  Comment: Patient noted to have osteoarthritis on x-ray and a popliteal cyst cyst via ultrasound.  Patient continues to have chronic knee pain recommend having an MRI done and will have patient follow-up with Ortho consider after MRI  Plan: Orthopedic & Spine  Referral         (M71.22) Popliteal cyst, left  Comment:   Plan: Orthopedic & Spine  Referral    (S86.912D) Strain of left knee, subsequent encounter  Comment: *Patient x-ray shows osteoarthritis patient not improving will have MRI done due to popliteal cyst and concern for ligament involvement  Plan: MR Knee Left w/o Contrast         See Patient Instructions    No follow-ups on file.    RONNELL Bianchi [facility]    Need to review ct follow-up and recommendations     Review if MRI needed

## 2020-03-02 NOTE — PATIENT INSTRUCTIONS
Colonoscopy [phone]    [facility] Mammo Schedule  [facility] ~ [phone]  One Day Weekly- Alternating Days    [place] ~ [phone]  Every other Monday or Wednesday   & one Saturday morning a month    [place] ~ [phone]  Every Other Monday Afternoon    [place]   Every Other Monday Morning    [place] ~ [phone]  Every Monday morning  Every Tuesday afternoon  Wed, Thurs, Friday morning & afternoon

## 2020-03-03 ASSESSMENT — ASTHMA QUESTIONNAIRES: ACT_TOTALSCORE: 21

## 2020-03-03 NOTE — RESULT ENCOUNTER NOTE
Please Notify Brissa  of test results basic metabolic panel does show mildly elevated glucose other than that kidney function is within normal limits.    Juli Ramsey CNP

## 2020-03-10 ENCOUNTER — TELEPHONE (OUTPATIENT)
Dept: FAMILY MEDICINE | Facility: CLINIC | Age: 50
End: 2020-03-10

## 2020-03-10 NOTE — TELEPHONE ENCOUNTER
FMLA forms completed by Juli Roxy for  to be able to take her to appointments completed.   Message left on her voice mail that this will be at  for .  Copy sent to scanning.

## 2020-03-16 ENCOUNTER — TELEPHONE (OUTPATIENT)
Dept: EDUCATION SERVICES | Facility: CLINIC | Age: 50
End: 2020-03-16

## 2020-03-16 ENCOUNTER — ALLIED HEALTH/NURSE VISIT (OUTPATIENT)
Dept: EDUCATION SERVICES | Facility: CLINIC | Age: 50
End: 2020-03-16
Payer: COMMERCIAL

## 2020-03-16 DIAGNOSIS — E11.65 TYPE 2 DIABETES MELLITUS WITH HYPERGLYCEMIA, WITH LONG-TERM CURRENT USE OF INSULIN (H): Primary | ICD-10-CM

## 2020-03-16 DIAGNOSIS — Z79.4 TYPE 2 DIABETES MELLITUS WITH HYPERGLYCEMIA, WITH LONG-TERM CURRENT USE OF INSULIN (H): Primary | ICD-10-CM

## 2020-03-16 DIAGNOSIS — I15.9 SECONDARY HYPERTENSION: ICD-10-CM

## 2020-03-16 PROCEDURE — G0108 DIAB MANAGE TRN  PER INDIV: HCPCS | Performed by: DIETITIAN, REGISTERED

## 2020-03-16 RX ORDER — IRBESARTAN 300 MG/1
TABLET ORAL
Qty: 90 TABLET | Refills: 0 | Status: SHIPPED | OUTPATIENT
Start: 2020-03-16 | End: 2020-10-19

## 2020-03-16 NOTE — PATIENT INSTRUCTIONS
1.  When you start the victoza again then drop the supper dose of Humulin 70/30 mix down to 38 units.    -if you are having lows in the night or in the am then you need to drop the supper dose    -if you are having lows at noon or before supper then you need to drip the breakfast dose.    2.  Work hard on scanning arm before meals and before bedtime.    3.  Email or call in two weeks to go and check your numbers.

## 2020-03-16 NOTE — TELEPHONE ENCOUNTER
Juli Ramsey NP,    I want to start Brissa on Jardiance to help with her blood sugars.  Suggest Jardiance 10 mg daily.  Are you ok with this plan?    Thanks! Dimaond Lucero RD, CDE

## 2020-03-16 NOTE — PROGRESS NOTES
"Diabetes Self-Management Education & Support    Presents for: Individual review    SUBJECTIVE/OBJECTIVE:  Presents for: Individual review  Accompanied by: Daughter  Diabetes education in the past 24mo: No  Focus of Visit: Taking Medication, Problem Solving, Healthy Eating  Diabetes type: Type 2  Disease course: Worsening  How confident are you filling out medical forms by yourself:: Not Assessed  Transportation concerns: Yes  Other concerns:: None  Cultural Influences/Ethnic Background:  American      Diabetes Symptoms & Complications:          Patient Problem List and Family Medical History reviewed for relevant medical history, current medical status, and diabetes risk factors.    Vitals:  LMP  (LMP Unknown)   Estimated body mass index is 53.04 kg/m  as calculated from the following:    Height as of 3/2/20: 1.626 m (5' 4\").    Weight as of 3/2/20: 140.2 kg (309 lb).   Last 3 BP:   BP Readings from Last 3 Encounters:   03/02/20 132/80   02/04/20 136/88   11/25/19 (!) 148/98       History   Smoking Status     Former Smoker   Smokeless Tobacco     Never Used       Labs:  Lab Results   Component Value Date    A1C 11.7 02/04/2020     Lab Results   Component Value Date     03/02/2020     Lab Results   Component Value Date    LDL 59 11/25/2019     HDL Cholesterol   Date Value Ref Range Status   11/25/2019 44 (L) >49 mg/dL Final   ]  GFR Estimate   Date Value Ref Range Status   03/02/2020 >90 >60 mL/min/[1.73_m2] Final     Comment:     Non  GFR Calc  Starting 12/18/2018, serum creatinine based estimated GFR (eGFR) will be   calculated using the Chronic Kidney Disease Epidemiology Collaboration   (CKD-EPI) equation.       GFR Estimate If Black   Date Value Ref Range Status   03/02/2020 >90 >60 mL/min/[1.73_m2] Final     Comment:      GFR Calc  Starting 12/18/2018, serum creatinine based estimated GFR (eGFR) will be   calculated using the Chronic Kidney Disease Epidemiology " Collaboration   (CKD-EPI) equation.       Lab Results   Component Value Date    CR 0.54 03/02/2020     No results found for: MICROALBUMIN    Healthy Eating:  Healthy Eating Assessed Today: Yes  Cultural/Moravian diet restrictions?: No  Meal planning/habits: Carb counting  Breakfast: eggs, toast or oatmeal, coffee or milk,FIT  Lunch: sandwich or soup or leftovers  Dinner: leftover chicken or beff  Snacks: has been trying to stay away from ice cream  Beverages: Water, Coffee, Milk    Being Active:   does what she can    Monitoring:       Taking Medications:  Diabetes Medication(s)     Biguanides       metFORMIN (GLUCOPHAGE) 1000 MG tablet    Take 1 tablet (1,000 mg) by mouth 2 times daily (with meals)    Insulin       insulin isophane & regular (HUMULIN MIX 70/30 KWIKPEN) susp    Inject 34 before breakfast and supper.  ( Increase by 2 units every 3 days until readings are under 120)    Incretin Mimetic Agents (GLP-1 Receptor Agonists)       liraglutide (VICTOZA) 18 MG/3ML solution    Inject 1.8 mg Subcutaneous daily               Problem Solving:   out of victoza last two weeks              Reducing Risks:   not assessed    Healthy Coping:     Patient Activation Measure Survey Score:  No flowsheet data found.    Diabetes knowledge and skills assessment:   Patient is knowledgeable in diabetes management concepts related to: Healthy Eating, Being Active and Monitoring    Patient needs further education on the following diabetes management concepts: Healthy Eating, Being Active, Monitoring, Taking Medication, Problem Solving, Reducing Risks and Healthy Coping    Based on learning assessment above, most appropriate setting for further diabetes education would be: Individual setting.      INTERVENTIONS:    Education provided today on:  AADE Self-Care Behaviors:  Healthy Eating: consistency in amount, composition, and timing of food intake, portion control and plate planning method  Monitoring: individual blood glucose  targets and scanning arm every 8 hours  Taking Medication: action of prescribed medication and when to take medications    Opportunities for ongoing education and support in diabetes-self management were discussed.    Pt verbalized understanding of concepts discussed and recommendations provided today.       Education Materials Provided:  No new materials provided today      ASSESSMENT:  Numbers still in 200's a lot, was out of victoza the last two weeks so that did not help.  Will have her restart victoza. Note sent to pcp regarding starting another oral medication : SGLT2: Jardiance.  Has been losing wt along the last year but refused the scale today.    Patient's most recent   Lab Results   Component Value Date    A1C 11.7 02/04/2020    is not meeting goal of <8.0    PLAN  See Patient Instructions for co-developed, patient-stated behavior change goals.  AVS printed and provided to patient today. See Follow-Up section for recommended follow-up.      Time Spent: 60 minutes  Encounter Type: Individual    Any diabetes medication dose changes were made via the CDE Protocol and Collaborative Practice Agreement with the patient's primary care provider. A copy of this encounter was shared with the provider.

## 2020-03-19 ENCOUNTER — TELEPHONE (OUTPATIENT)
Dept: ORTHOPEDICS | Facility: CLINIC | Age: 50
End: 2020-03-19

## 2020-03-19 NOTE — TELEPHONE ENCOUNTER
LVM for patient to discuss deferring her 3/25/20 appointment with Dr Soriano out past 4/20/20 due to virus pandemic   Deep Garrison, BATSHEVA, LAT

## 2020-03-23 ENCOUNTER — TELEPHONE (OUTPATIENT)
Dept: FAMILY MEDICINE | Facility: CLINIC | Age: 50
End: 2020-03-23

## 2020-03-23 NOTE — TELEPHONE ENCOUNTER
Spoke with patient. She is awaiting a knee MRI. She is unsure when she will be able to schedule the MRI (due to COVID-19, scheduling a ride for appointment, etc.). She will call our office when she has her MRI scheduled for an evaluation.     She was in agreement with this plan.     Appointment for 3/25 cancelled at this time.     Allie Govea M.Ed., LAT, ATC  Clinic Coordinator for Dr. Katrin Ayala

## 2020-03-23 NOTE — TELEPHONE ENCOUNTER
Prior Authorization Approval    Authorization Effective Date: 2/22/2020  Authorization Expiration Date: 3/23/2023  Medication: Continuous Blood Gluc Sensor (FREESTYLE SANDRA 14 DAY SENSOR[place] [facility]   [place]ved Dose/Quantity:   Reference #: [id number]   Insurance Company: [employer] - Phone [phone] Fax [phone]  Expected CoPay:       CoPay Card Available:      Foundation Assistance Needed:    Which Pharmacy is filling the prescription (Not needed for infusion/clinic administered): [id number] - [address] AT [facility] & [address] [facility] [address]  Pharmacy Notified: Yes  Patient Notified: Yes, **Instructed pharmacy to notify patient when script is ready to /ship.**

## 2020-03-23 NOTE — TELEPHONE ENCOUNTER
PA Initiation    Medication: Continuous Blood Gluc Sensor (FREESTYLE SANDRA 14 DAY SENSOR) [facility] [place]   Insurance Company: [employer] - Phone [phone] Fax [phone]  Pharmacy Filling the Rx: [facility][id number] - [place], [place] - [address] [place] [address] AT [facility] & [address]  Filling Pharmacy Phone: [phone]  Filling Pharmacy Fax: [phone]  Start Date: 3/23/2020

## 2020-03-23 NOTE — TELEPHONE ENCOUNTER
Pt says Diamond Lucero ordered  Continuous Blood Gluc Sensor (FREESTYLE SANDRA 14 DAY SENSOR) [facility]  and it was sent to [facility] in [place]. Pharmacy is billing her directly and not [facility]. Id [id number]  She can't afford to buy them and she is now out.   Dx: Type 2 diabetes mellitus with hyperglycemia, with long-term current use of insulin (H) [E11.65, Z79.4]  - Primary   Pt talked to [employer] and they said we need to do a Prior Auth on their Website or a form. (they didn't say where to get the form.

## 2020-03-23 NOTE — TELEPHONE ENCOUNTER
2nd LVM for patient to discuss deferring her 3/25/20 appointment with Dr Soriano out past 4/20/20 due to virus pandemic   Deep Garrison, ATC, LAT

## 2020-04-17 ENCOUNTER — TELEPHONE (OUTPATIENT)
Dept: FAMILY MEDICINE | Facility: CLINIC | Age: 50
End: 2020-04-17

## 2020-04-17 NOTE — TELEPHONE ENCOUNTER
Denise Caban filled out form for patient. I talked to Brissa Mott and she will have to come in and fill out the top part of the form before we can fax in for her. Form will be at the .     She will try to get a ride today to come do this.

## 2020-04-17 NOTE — TELEPHONE ENCOUNTER
Reason for call:  Patient reporting a symptom    Symptom or request: Pt says Juli had filled out the form for DMV for diabetes for her in February and the papers were mailed to her. She says she remembers that she returned it to the DMV by mail but she has gotten a letter from them that says they have not received it and it if is not submitted by the end of this month, she will lose her license. (There is no copy of this form in her chart).  Pt is asking if another provider could fill this out for her since Juli will not be in the office.     Pt will have to come in the office and sign and fill out the top of the form before it can be faxed in for her.     Phone Number patient can be reached at:  Home number on file [phone] (home)    Best Time:  anytime    Can we leave a detailed message on this number:  YES    Call taken on 4/17/2020 at 8:20 AM by Shirley Lozano

## 2020-04-20 NOTE — TELEPHONE ENCOUNTER
Fax number busy so mailed the copy to [employer].    Emelina Borges  [facility]

## 2020-04-20 NOTE — TELEPHONE ENCOUNTER
Pt called and faxed to: [phone] mail pt a copy and sent original to scanning.    Emelina Borges  [facility]

## 2020-04-22 ENCOUNTER — TELEPHONE (OUTPATIENT)
Dept: FAMILY MEDICINE | Facility: CLINIC | Age: 50
End: 2020-04-22

## 2020-04-22 NOTE — TELEPHONE ENCOUNTER
[place] Dept of Public Safety-  and Vehicle services request for a statement expressing an opinion regarding present condition as it pertains to safe operation of a motor vehicle on public streets and highways. (No form, this is request for a letter)    Request put in Juli's folder. She will be back in the office the week of April 27th.

## 2020-04-23 NOTE — TELEPHONE ENCOUNTER
Juli wants to talk to her about this.  We can have her call Juli on her cell phone today [phone]  (per Juli)

## 2020-04-30 ENCOUNTER — TELEPHONE (OUTPATIENT)
Dept: FAMILY MEDICINE | Facility: CLINIC | Age: 50
End: 2020-04-30

## 2020-04-30 NOTE — LETTER
[facility]  [address] [place] [address]  Phone: [phone]  Fax: [phone]    April 30, 2020        Brissa Mott  [address] [facility] [address]          To whom it may concern:    RE: Brissa Mott is under my care and she is competent to operate a motor vehicle.     Please contact me for questions or concerns.      Sincerely,        RONNELL Bianchi CNP

## 2020-05-06 ENCOUNTER — ALLIED HEALTH/NURSE VISIT (OUTPATIENT)
Dept: EDUCATION SERVICES | Facility: CLINIC | Age: 50
End: 2020-05-06
Payer: COMMERCIAL

## 2020-05-06 DIAGNOSIS — Z79.4 TYPE 2 DIABETES MELLITUS WITH HYPERGLYCEMIA, WITH LONG-TERM CURRENT USE OF INSULIN (H): ICD-10-CM

## 2020-05-06 DIAGNOSIS — E11.65 TYPE 2 DIABETES MELLITUS WITH HYPERGLYCEMIA, WITH LONG-TERM CURRENT USE OF INSULIN (H): ICD-10-CM

## 2020-05-06 PROCEDURE — 98968 PH1 ASSMT&MGMT NQHP 21-30: CPT | Mod: TEL | Performed by: DIETITIAN, REGISTERED

## 2020-05-06 NOTE — PROGRESS NOTES
"Diabetes Self-Management Education & Support    Presents for: Individual review    SUBJECTIVE/OBJECTIVE:  Presents for: Individual review  Accompanied by: Daughter  Diabetes education in the past 24mo: No  Focus of Visit: Taking Medication, Problem Solving, Healthy Eating  Diabetes type: Type 2  Disease course: Worsening  How confident are you filling out medical forms by yourself:: Not Assessed  Transportation concerns: Yes  Other concerns:: None  Cultural Influences/Ethnic Background:  American      Diabetes Symptoms & Complications:          Patient Problem List and Family Medical History reviewed for relevant medical history, current medical status, and diabetes risk factors.    Vitals:  LMP  (LMP Unknown)   Estimated body mass index is 53.04 kg/m  as calculated from the following:    Height as of 3/2/20: 1.626 m (5' 4\").    Weight as of 3/2/20: 140.2 kg (309 lb).   Last 3 BP:   BP Readings from Last 3 Encounters:   03/02/20 132/80   02/04/20 136/88   11/25/19 (!) 148/98       History   Smoking Status     Former Smoker   Smokeless Tobacco     Never Used       Labs:  Lab Results   Component Value Date    A1C 11.7 02/04/2020     Lab Results   Component Value Date     03/02/2020     Lab Results   Component Value Date    LDL 59 11/25/2019     HDL Cholesterol   Date Value Ref Range Status   11/25/2019 44 (L) >49 mg/dL Final   ]  GFR Estimate   Date Value Ref Range Status   03/02/2020 >90 >60 mL/min/[1.73_m2] Final     Comment:     Non  GFR Calc  Starting 12/18/2018, serum creatinine based estimated GFR (eGFR) will be   calculated using the Chronic Kidney Disease Epidemiology Collaboration   (CKD-EPI) equation.       GFR Estimate If Black   Date Value Ref Range Status   03/02/2020 >90 >60 mL/min/[1.73_m2] Final     Comment:      GFR Calc  Starting 12/18/2018, serum creatinine based estimated GFR (eGFR) will be   calculated using the Chronic Kidney Disease Epidemiology " Collaboration   (CKD-EPI) equation.       Lab Results   Component Value Date    CR 0.54 03/02/2020     No results found for: MICROALBUMIN    Healthy Eating:  Healthy Eating Assessed Today: Yes  Cultural/Moravian diet restrictions?: No  Meal planning/habits: Carb counting  Breakfast: eggs, toast or oatmeal, coffee or milk,FIT, mini bagel and cream cheese  Lunch: sandwich or soup or leftovers, has not been always eating lunch  Dinner: tacos, potato oles  Snacks: has been trying to stay away from ice cream  Beverages: Water, Coffee, Milk    Being Active:   not much, a little bit of walking    Monitoring:         Taking Medications:  Diabetes Medication(s)     Biguanides       metFORMIN (GLUCOPHAGE) 1000 MG tablet    Take 1 tablet (1,000 mg) by mouth 2 times daily (with meals)    Insulin       insulin isophane & regular (HUMULIN MIX 70/30 KWIKPEN) susp    Inject 34 before breakfast and supper.  ( Increase by 2 units every 3 days until readings are under 120)    Sodium-Glucose Co-Transporter 2 (SGLT2) Inhibitors       empagliflozin (JARDIANCE) 10 MG TABS tablet    Take 1 tablet (10 mg) by mouth daily    Incretin Mimetic Agents (GLP-1 Receptor Agonists)       liraglutide (VICTOZA) 18 MG/3ML solution    Inject 1.8 mg Subcutaneous daily               Problem Solving:   not assessed              Reducing Risks:   not assessed    Healthy Coping:     Patient Activation Measure Survey Score:  No flowsheet data found.    Diabetes knowledge and skills assessment:   Patient is knowledgeable in diabetes management concepts related to: Healthy Eating, Being Active and Monitoring    Patient needs further education on the following diabetes management concepts: Healthy Eating, Being Active, Monitoring, Taking Medication, Problem Solving, Reducing Risks and Healthy Coping    Based on learning assessment above, most appropriate setting for further diabetes education would be: Individual setting.      INTERVENTIONS:    Education  provided today on:  AADE Self-Care Behaviors:  Healthy Eating: consistency in amount, composition, and timing of food intake and portion control  Being Active: relationship to blood glucose and describe appropriate activity program  Monitoring: individual blood glucose targets and frequency of monitoring  Taking Medication: increased insulin and will recheck in one week    Opportunities for ongoing education and support in diabetes-self management were discussed.    Pt verbalized understanding of concepts discussed and recommendations provided today.       Education Materials Provided:  No new materials provided today      ASSESSMENT:  jardiance did not work she had stomach cramps and lethargy and stopped the medication herself.  We will increase the insulin and continue to do that until numbers are in a better range. Average BG is 200 but some days she is only testing once a day.  She is not always taking the evening shot before her supper meal.    Patient's most recent   Lab Results   Component Value Date    A1C 11.7 02/04/2020    is not meeting goal of <8.0    PLAN  See Patient Instructions for co-developed, patient-stated behavior change goals.  AVS printed and provided to patient today. See Follow-Up section for recommended follow-up.  1-she will work on taking insulin before breakfast and supper (not after the meal).  46 in am and 44 in the pm.  2-she will work on scanning her arm every 8 hours so we can see the whole graph    Time Spent: 22 minutes  Encounter Type: Individual    Any diabetes medication dose changes were made via the CDE Protocol and Collaborative Practice Agreement with the patient's primary care provider. A copy of this encounter was shared with the provider.

## 2020-05-14 ENCOUNTER — TELEPHONE (OUTPATIENT)
Dept: EDUCATION SERVICES | Facility: CLINIC | Age: 50
End: 2020-05-14

## 2020-05-14 NOTE — TELEPHONE ENCOUNTER
Diabetes education contact:    Called pt to see how things are going.  Left message to call back. PT called back and left message that she needs to work on taking her insulin before her supper meal instead of after.  I agree with her and I observed that on her odalys.    Diamond Lucero RD, CDE

## 2020-05-18 ENCOUNTER — TELEPHONE (OUTPATIENT)
Dept: ORTHOPEDICS | Facility: CLINIC | Age: 50
End: 2020-05-18

## 2020-05-18 NOTE — TELEPHONE ENCOUNTER
Called and spoke with patient.  She would like to be seen to discuss injection and aspiration of left knee.   She states that her PCP ordered and updated MRI but has been unable to obtain due to the virus.  She denies any new injury or accident since imaging was performed at [facility] 11/2019.  I explained that she can have an evaluation before to discuss MRI.  Patient agreed and was scheduled with Dr. Mejia in [place].    Stephanie Young ATC

## 2020-05-18 NOTE — TELEPHONE ENCOUNTER
Reason for Call:  Other call back    Detailed comments: pt calling stating she was schedule back in March and had to cancel due to COVID. Would like to know when she can r/s her appt. She is being ostearthritis and baker cyst     Phone Number Patient can be reached at: Home number on file [phone] (home)    Best Time: any     Can we leave a detailed message on this number? YES    Call taken on 5/18/2020 at 2:48 PM by Claudia Daley

## 2020-05-26 ENCOUNTER — OFFICE VISIT (OUTPATIENT)
Dept: ORTHOPEDICS | Facility: CLINIC | Age: 50
End: 2020-05-26
Payer: COMMERCIAL

## 2020-05-26 VITALS
SYSTOLIC BLOOD PRESSURE: 122 MMHG | DIASTOLIC BLOOD PRESSURE: 86 MMHG | HEIGHT: 64 IN | WEIGHT: 293 LBS | BODY MASS INDEX: 50.02 KG/M2

## 2020-05-26 DIAGNOSIS — M25.562 ACUTE PAIN OF LEFT KNEE: ICD-10-CM

## 2020-05-26 DIAGNOSIS — Z86.73 HISTORY OF CVA (CEREBROVASCULAR ACCIDENT): ICD-10-CM

## 2020-05-26 DIAGNOSIS — M17.12 OSTEOARTHRITIS OF LEFT KNEE, UNSPECIFIED OSTEOARTHRITIS TYPE: Primary | ICD-10-CM

## 2020-05-26 DIAGNOSIS — R29.898 LEFT LEG WEAKNESS: ICD-10-CM

## 2020-05-26 PROCEDURE — 99203 OFFICE O/P NEW LOW 30 MIN: CPT | Performed by: FAMILY MEDICINE

## 2020-05-26 ASSESSMENT — MIFFLIN-ST. JEOR: SCORE: 2006.15

## 2020-05-26 NOTE — PROGRESS NOTES
"Brissa Mott  :  1970  DOS: 2020  MRN: [id number]    Sports Medicine Clinic Visit    PCP: Juli Ramsey    Brissa Mott is a 49 year old female who is seen in consultation at the request of  Juli Ramsey C.N.P. presenting with chronic left knee pain.    Injury: Chronic left knee pain and weakness over the past ~ 6 months following a stroke in 10/2019, landed on left side getting out bed.  Pain located over left deep anterior, medial and posterior knee, nonradiating.  Additional Features:  Positive: swelling, grinding, using walker, and weakness.  Symptoms are better with Aleve and Rest.  Symptoms are worse with: going from sit to stand, walking, navigating stairs.  Other evaluation and/or treatments so far consists of: Aleve, Rest and walker, physical therapy (following CVA).  Recent imaging completed: X-rays and ultrasound completed 2019 @ [facility].  Prior History of related problems: history of chronic intermittent knee pain over the last one year that she has not treated.    Social History: works from home     Review of Systems  Musculoskeletal: as above  Remainder of review of systems is negative including constitutional, CV, pulmonary, GI, Skin and Neurologic except as noted in HPI or medical history.    No past medical history on file.  No past surgical history on file.  No family history on file.    Objective  /86   Ht 1.626 m (5' 4\")   Wt 140.6 kg (310 lb)   LMP  (LMP Unknown)   BMI 53.21 kg/m        General: healthy, alert and in no distress, obese      HEENT: no scleral icterus or conjunctival erythema     Skin: no suspicious lesions or rash. No jaundice.     CV: regular rhythm by palpation, 2+ distal pulses, no pedal edema      Resp: normal respiratory effort without conversational dyspnea     Psych: normal mood and affect      Gait: antalgic, appropriate coordination and balance     Neuro: normal light touch sensory exam of the extremities. Motor strength as noted " below     Left Knee exam    ROM:        Mildly limited terminal active and passive ROM with flexion and extension    Inspection:       no visible ecchymosis       no visible edema or effusion    Skin:       no visible deformities       well perfused       capillary refill brisk    Patellar Motion:        Normal patellar tracking noted through range of motion    Tender:        lateral patellar border       medial joint line       lateral joint line      Posterior knee    Special Tests:        neg (-) varus at 0 deg and 30 deg       neg (-) valgus at 0 deg and 30 deg    Evaluation of ipsilateral kinetic chain       Baseline decreased left sided strength s/p CVA      Radiology  US VENOUS LOWER EXTREMITY LEFT11/7/2019  [facility]  Result Narrative   INDICATION:  Pain/swelling.    COMPARISON:  None.    TECHNIQUE:  A compression venous ultrasound exam was performed of the left lower extremity using gray-scale imaging, color Doppler and spectral Doppler analysis.    FINDINGS:  Sonographic imaging of the left lower extremity demonstrates normal compressibility and color Doppler venous blood flow within the common femoral vein, deep femoral vein, and the proximal greater saphenous vein. Within the thigh, the femoral vein is patent and compressible. At a lower level, the popliteal, peroneal, and posterior tibial veins also show normal compressibility and color Doppler venous blood flow.    There is a 5.9 x 2.9 x 3.6 cm anechoic popliteal fossa cyst with no internal vascularity. Mild subcutaneous edema at the level of the ankle.     Limited imaging of the contralateral groin demonstrates a normal spectral waveform and color Doppler venous blood flow within the right common femoral vein.      IMPRESSION:  1. No evidence of deep vein thrombosis within the left lower extremity.  2. There is a 5.9 cm left popliteal fossa cyst.   3. Mild subcutaneous edema at the level of the ankle.   XR knee LEFT  3 views TODAY11/1/2019  [facility]  Result Narrative   3 VIEWS left knee.    INDICATION:  Pain.      IMPRESSION:    Osseous structures: No fractures.  Alignments anatomic.    Joint spaces: There is severe degeneration and narrowing in the medial compartment. Osteophytes are present about all 3 compartments. Narrowing of the patellofemoral space. The axial patellar alignment is normal.       Assessment:  1. Osteoarthritis of left knee, unspecified osteoarthritis type    2. Acute pain of left knee    3. Left leg weakness    4. History of CVA (cerebrovascular accident)        Plan:  Discussed the assessment with the patient.  Follow up: 1 week for possible viscosupplementation  Encouraged ongoing wt loss and adherence with HEP  Low impact activity strategies reviewed, she must continue to work on strength  Prior XR report reviewed, suspect TKA is her only surgical option, reviewed in detail  Reviewed wt loss, activity modification and progressive increase in activity as tolerated and guided by pain  Reviewed options for potential steroid vs viscosupplementation injections and the possibility for future orthopedic referral prn  Reviewed safe and appropriate OTC medication choices, try tylenol first  Up to 3000mg daily of tylenol is generally safe, NSAID dosing and duration limitations reviewed  Discussed nature of degenerative arthrosis of the knee.   Discussed symptom treatment with ice or heat, topical treatments, and rest if needed.   Home handouts provided and supportive care reviewed  All questions were answered today  Contact us with additional questions or concerns  Signs and sx of concern reviewed    Thanks very much for sending this nice lady to us, I will keep you updated with her progress      Abelardo Mejia DO, GAYLE  Primary Care Sports Medicine  [facility]               Disclaimer: This note consists of symbols derived from keyboarding, dictation and/or  voice recognition software. As a result, there may be errors in the script that have gone undetected. Please consider this when interpreting information found in this chart.

## 2020-05-26 NOTE — LETTER
"    2020         RE: Brissa Mott  [address] [facility] [address]        Dear Colleague,    Thank you for referring your patient, Brissa Mott, to the [facility]. Please see a copy of my visit note below.    Brissa Mott  :  1970  DOS: 2020  MRN: [id number]    Sports Medicine Clinic Visit    PCP: Juli Ramsey    Brissa Mott is a 49 year old female who is seen in consultation at the request of  Juli Ramsey C.N.P. presenting with chronic left knee pain.    Injury: Chronic left knee pain and weakness over the past ~ 6 months following a stroke in 10/2019, landed on left side getting out bed.  Pain located over left deep anterior, medial and posterior knee, nonradiating.  Additional Features:  Positive: swelling, grinding, using walker, and weakness.  Symptoms are better with Aleve and Rest.  Symptoms are worse with: going from sit to stand, walking, navigating stairs.  Other evaluation and/or treatments so far consists of: Aleve, Rest and walker, physical therapy (following CVA).  Recent imaging completed: X-rays and ultrasound completed 2019 @ [facility].  Prior History of related problems: history of chronic intermittent knee pain over the last one year that she has not treated.    Social History: works from home     Review of Systems  Musculoskeletal: as above  Remainder of review of systems is negative including constitutional, CV, pulmonary, GI, Skin and Neurologic except as noted in HPI or medical history.    No past medical history on file.  No past surgical history on file.  No family history on file.    Objective  /86   Ht 1.626 m (5' 4\")   Wt 140.6 kg (310 lb)   LMP  (LMP Unknown)   BMI 53.21 kg/m        General: healthy, alert and in no distress, obese      HEENT: no scleral icterus or conjunctival erythema     Skin: no suspicious lesions or rash. No jaundice.     CV: regular rhythm by palpation, 2+ distal pulses, no " pedal edema      Resp: normal respiratory effort without conversational dyspnea     Psych: normal mood and affect      Gait: antalgic, appropriate coordination and balance     Neuro: normal light touch sensory exam of the extremities. Motor strength as noted below     Left Knee exam    ROM:        Mildly limited terminal active and passive ROM with flexion and extension    Inspection:       no visible ecchymosis       no visible edema or effusion    Skin:       no visible deformities       well perfused       capillary refill brisk    Patellar Motion:        Normal patellar tracking noted through range of motion    Tender:        lateral patellar border       medial joint line       lateral joint line      Posterior knee    Special Tests:        neg (-) varus at 0 deg and 30 deg       neg (-) valgus at 0 deg and 30 deg    Evaluation of ipsilateral kinetic chain       Baseline decreased left sided strength s/p CVA      Radiology  US VENOUS LOWER EXTREMITY LEFT11/7/2019  [facility]  Result Narrative   INDICATION:  Pain/swelling.    COMPARISON:  None.    TECHNIQUE:  A compression venous ultrasound exam was performed of the left lower extremity using gray-scale imaging, color Doppler and spectral Doppler analysis.    FINDINGS:  Sonographic imaging of the left lower extremity demonstrates normal compressibility and color Doppler venous blood flow within the common femoral vein, deep femoral vein, and the proximal greater saphenous vein. Within the thigh, the femoral vein is patent and compressible. At a lower level, the popliteal, peroneal, and posterior tibial veins also show normal compressibility and color Doppler venous blood flow.    There is a 5.9 x 2.9 x 3.6 cm anechoic popliteal fossa cyst with no internal vascularity. Mild subcutaneous edema at the level of the ankle.     Limited imaging of the contralateral groin demonstrates a normal spectral waveform and color Doppler venous  blood flow within the right common femoral vein.      IMPRESSION:  1. No evidence of deep vein thrombosis within the left lower extremity.  2. There is a 5.9 cm left popliteal fossa cyst.   3. Mild subcutaneous edema at the level of the ankle.   XR knee LEFT 3 views TODAY11/1/2019  [facility]  Result Narrative   3 VIEWS left knee.    INDICATION:  Pain.      IMPRESSION:    Osseous structures: No fractures.  Alignments anatomic.    Joint spaces: There is severe degeneration and narrowing in the medial compartment. Osteophytes are present about all 3 compartments. Narrowing of the patellofemoral space. The axial patellar alignment is normal.       Assessment:  1. Osteoarthritis of left knee, unspecified osteoarthritis type    2. Acute pain of left knee    3. Left leg weakness    4. History of CVA (cerebrovascular accident)        Plan:  Discussed the assessment with the patient.  Follow up: 1 week for possible viscosupplementation  Encouraged ongoing wt loss and adherence with HEP  Low impact activity strategies reviewed, she must continue to work on strength  Prior XR report reviewed, suspect TKA is her only surgical option, reviewed in detail  Reviewed wt loss, activity modification and progressive increase in activity as tolerated and guided by pain  Reviewed options for potential steroid vs viscosupplementation injections and the possibility for future orthopedic referral prn  Reviewed safe and appropriate OTC medication choices, try tylenol first  Up to 3000mg daily of tylenol is generally safe, NSAID dosing and duration limitations reviewed  Discussed nature of degenerative arthrosis of the knee.   Discussed symptom treatment with ice or heat, topical treatments, and rest if needed.   Home handouts provided and supportive care reviewed  All questions were answered today  Contact us with additional questions or concerns  Signs and sx of concern reviewed    Thanks very much for  sending this nice lady to us, I will keep you updated with her progress      Abelardo Mejia DO, CAQ  Primary Care Sports Medicine  [facility]               Disclaimer: This note consists of symbols derived from keyboarding, dictation and/or voice recognition software. As a result, there may be errors in the script that have gone undetected. Please consider this when interpreting information found in this chart.    Again, thank you for allowing me to participate in the care of your patient.        Sincerely,        Abelardo Mejia DO

## 2020-05-27 ENCOUNTER — TELEPHONE (OUTPATIENT)
Dept: FAMILY MEDICINE | Facility: CLINIC | Age: 50
End: 2020-05-27

## 2020-05-27 NOTE — TELEPHONE ENCOUNTER
I was able to find the letter from [employer] that had been mailed to Brissa that was scanned so I have re-faxed the letter from Juli Ramsey and the Diabetic form that Denise Caban had filled out to .[phone]    I left message for Brissa that she didn't need to call back.

## 2020-05-27 NOTE — TELEPHONE ENCOUNTER
Reason for Call:  Other   Detailed comments: Juli Ramsey wrote letter on Brissa's behalf and it was mailed to the [employer] on April 30th. She just got another letter from them saying she would lose her drivers license if they did not hear back.  She asked if we could fax the letter. She was not home and didn't have the letter with her so she will call when she gets home with the fax number.   Letter is on Shirley's desk.     Phone Number Patient can be reached at: Home number on file [phone] (home)    Best Time: anytime    Can we leave a detailed message on this number? Not Applicable    Call taken on 5/27/2020 at 11:26 AM by Shilrey Lozano

## 2020-06-01 ENCOUNTER — TELEPHONE (OUTPATIENT)
Dept: PHARMACY | Facility: CLINIC | Age: 50
End: 2020-06-01

## 2020-06-01 NOTE — TELEPHONE ENCOUNTER
This patient is due for MTM follow-up. I called the patient to schedule an appointment and left a message with the clinic phone number for the patient to call to schedule.    Vince Andersen, NeginD, [facility]  Medication Therapy Management Pharmacist  Pager: [phone]

## 2020-06-02 ENCOUNTER — OFFICE VISIT (OUTPATIENT)
Dept: ORTHOPEDICS | Facility: CLINIC | Age: 50
End: 2020-06-02
Payer: COMMERCIAL

## 2020-06-02 VITALS
HEIGHT: 64 IN | DIASTOLIC BLOOD PRESSURE: 84 MMHG | WEIGHT: 293 LBS | BODY MASS INDEX: 50.02 KG/M2 | SYSTOLIC BLOOD PRESSURE: 126 MMHG

## 2020-06-02 DIAGNOSIS — M17.12 OSTEOARTHRITIS OF LEFT KNEE, UNSPECIFIED OSTEOARTHRITIS TYPE: Primary | ICD-10-CM

## 2020-06-02 DIAGNOSIS — M25.562 ACUTE PAIN OF LEFT KNEE: ICD-10-CM

## 2020-06-02 PROCEDURE — 20611 DRAIN/INJ JOINT/BURSA W/US: CPT | Mod: LT | Performed by: FAMILY MEDICINE

## 2020-06-02 ASSESSMENT — MIFFLIN-ST. JEOR: SCORE: 2006.15

## 2020-06-02 NOTE — LETTER
2020         RE: Brissa Mott  [address] [facility] [address]        Dear Colleague,    Thank you for referring your patient, Brissa Mott, to the [facility]. Please see a copy of my visit note below.    Brissa Mott  :  1970  DOS: 2020  MRN: [id number]    Sports Medicine Clinic Procedure    Ultrasound Guided Left Intra-Articular Knee SynviscOne Injection, +/- Aspiration    Clinical History: Patient presents for left knee SynviscOne injection.  She noted minimal improvement in left knee pain over the past one week.    Diagnosis:   1. Osteoarthritis of left knee, unspecified osteoarthritis type    2. Acute pain of left knee        Large Joint Injection/Arthocentesis: L knee joint    Date/Time: 2020 3:15 PM  Performed by: Abelardo Mejia DO  Authorized by: Abelardo Mejia DO     Indications:  Osteoarthritis  Needle Size:  21 G  Guidance: ultrasound    Approach:  Superolateral  Location:  Knee      Medications:  48 mg hylan 48 MG/6ML  Procedure discussed: discussed risks, benefits, and alternatives    Consent Given by:  Patient  Timeout: timeout called immediately prior to procedure    Prep: patient was prepped and draped in usual sterile fashion          Impression:  Successful left intra-articular knee SynviscOne injection.    Plan:  Follow up prn based on clinical progress  Expectations and limitations of synvisc were reviewed in detail  Often 4-6 weeks before full effect may be noticed  Usually covered up to every 6 months by insurance, but does not need to be repeated unless pain returns, at which point we would re-evaluate  Potential use of CSI in future for flares of pain reviewed in detail  Encouraged modified progressive pain-free activity as tolerated  HEP and Supportive care reviewed  All questions were answered today  Contact us with additional questions or concerns  Signs and sx of concern reviewed    Abelardo Mejia DO,  CA  Primary Care Sports Medicine  [facility]     Again, thank you for allowing me to participate in the care of your patient.        Sincerely,        Abelardo Mejia, DO

## 2020-06-02 NOTE — PROGRESS NOTES
Brissa Mott  :  1970  DOS: 2020  MRN: [id number]    Sports Medicine Clinic Procedure    Ultrasound Guided Left Intra-Articular Knee SynviscOne Injection, +/- Aspiration    Clinical History: Patient presents for left knee SynviscOne injection.  She noted minimal improvement in left knee pain over the past one week.    Diagnosis:   1. Osteoarthritis of left knee, unspecified osteoarthritis type    2. Acute pain of left knee        Large Joint Injection/Arthocentesis: L knee joint    Date/Time: 2020 3:15 PM  Performed by: Abelardo Mejia DO  Authorized by: Abelardo Mejia DO     Indications:  Osteoarthritis  Needle Size:  21 G  Guidance: ultrasound    Approach:  Superolateral  Location:  Knee      Medications:  48 mg hylan 48 MG/6ML  Procedure discussed: discussed risks, benefits, and alternatives    Consent Given by:  Patient  Timeout: timeout called immediately prior to procedure    Prep: patient was prepped and draped in usual sterile fashion          Impression:  Successful left intra-articular knee SynviscOne injection.    Plan:  Follow up prn based on clinical progress  Expectations and limitations of synvisc were reviewed in detail  Often 4-6 weeks before full effect may be noticed  Usually covered up to every 6 months by insurance, but does not need to be repeated unless pain returns, at which point we would re-evaluate  Potential use of CSI in future for flares of pain reviewed in detail  Encouraged modified progressive pain-free activity as tolerated  HEP and Supportive care reviewed  All questions were answered today  Contact us with additional questions or concerns  Signs and sx of concern reviewed    Abelardo Mejia DO, CACECELIA  Primary Care Sports Medicine  [facility]

## 2020-06-29 ENCOUNTER — TELEPHONE (OUTPATIENT)
Dept: FAMILY MEDICINE | Facility: CLINIC | Age: 50
End: 2020-06-29

## 2020-06-30 ENCOUNTER — VIRTUAL VISIT (OUTPATIENT)
Dept: FAMILY MEDICINE | Facility: CLINIC | Age: 50
End: 2020-06-30
Payer: COMMERCIAL

## 2020-06-30 DIAGNOSIS — R26.81 UNSTEADY GAIT: ICD-10-CM

## 2020-06-30 DIAGNOSIS — M17.12 PRIMARY OSTEOARTHRITIS OF LEFT KNEE: ICD-10-CM

## 2020-06-30 DIAGNOSIS — Z79.4 TYPE 2 DIABETES MELLITUS WITH OTHER CIRCULATORY COMPLICATION, WITH LONG-TERM CURRENT USE OF INSULIN (H): ICD-10-CM

## 2020-06-30 DIAGNOSIS — E11.59 TYPE 2 DIABETES MELLITUS WITH OTHER CIRCULATORY COMPLICATION, WITH LONG-TERM CURRENT USE OF INSULIN (H): ICD-10-CM

## 2020-06-30 DIAGNOSIS — R32 URINARY INCONTINENCE, UNSPECIFIED TYPE: Primary | ICD-10-CM

## 2020-06-30 PROCEDURE — 99214 OFFICE O/P EST MOD 30 MIN: CPT | Mod: GT | Performed by: NURSE PRACTITIONER

## 2020-06-30 ASSESSMENT — ANXIETY QUESTIONNAIRES
3. WORRYING TOO MUCH ABOUT DIFFERENT THINGS: NEARLY EVERY DAY
GAD7 TOTAL SCORE: 12
7. FEELING AFRAID AS IF SOMETHING AWFUL MIGHT HAPPEN: SEVERAL DAYS
2. NOT BEING ABLE TO STOP OR CONTROL WORRYING: MORE THAN HALF THE DAYS
6. BECOMING EASILY ANNOYED OR IRRITABLE: NEARLY EVERY DAY
5. BEING SO RESTLESS THAT IT IS HARD TO SIT STILL: NOT AT ALL
1. FEELING NERVOUS, ANXIOUS, OR ON EDGE: SEVERAL DAYS

## 2020-06-30 ASSESSMENT — PATIENT HEALTH QUESTIONNAIRE - PHQ9
SUM OF ALL RESPONSES TO PHQ QUESTIONS 1-9: 16
5. POOR APPETITE OR OVEREATING: MORE THAN HALF THE DAYS

## 2020-06-30 NOTE — PATIENT INSTRUCTIONS
Based on incontinence recommend follow-up with urology please see number below to schedule   our provider has referred you to: [facility]: [facility] [phone]    Due to leg weakness and unsteady gait recommend physical therapy for gait assessment and training and falls assessment.  They will contact you for an appointment.    At your next physical therapy appointment make a lab only appointment so we can recheck your hemoglobin A1c.    Continue to monitor anxiety and depression as things progress if not getting better over the next 2 to 3 weeks please contact me and we will consider adding another medication such as Cymbalta.        Patient Education     Treating Incontinence in Women with Medicine    Urinary incontinence is the leaking of urine from the bladder. In some cases, medicine can reduce or stop the leaking. It is mainly given for urge incontinence. Your healthcare provider will talk with you about your options. Make sure to ask what side effects to expect.  Below are some types of medicines that may help with urge incontinence.  Types of medicine    Anticholinergics. These may increase how much urine the bladder can hold. They may also help relax bladder muscles.    Estrogen. This may help improve muscle tone in the urethra and bladder.    Antibiotics. These are used to treat urinary tract infections.    Botulinum toxin. Injection of botulinum toxin into the bladder muscle is an option when other medicines are not effective.  Tips for taking medicine    Take your medicine on time and as your healthcare provider tell you to.    Tell your healthcare provider if you have any side effects, your dosage may be adjusted if necessary.    Be patient. It may take time to find the right dose for you.    Keep a list of the medicines you take. Show it to your healthcare provider and pharmacist before you buy over-the-counter medicines.   Date Last Reviewed: 1/1/2017 2000-2019 The  [employer]. [address] [place] [address], [place] [address]. All rights reserved. This information is not intended as a substitute for professional medical care. Always follow your healthcare professional's instructions.

## 2020-06-30 NOTE — PROGRESS NOTES
"Brissa Mott is a 49 year old female who is being evaluated via a billable video visit.      The patient has been notified of following:     \"This video visit will be conducted via a call between you and your physician/provider. We have found that certain health care needs can be provided without the need for an in-person physical exam.  This service lets us provide the care you need with a video conversation.  If a prescription is necessary we can send it directly to your pharmacy.  If lab work is needed we can place an order for that and you can then stop by our lab to have the test done at a later time.    Video visits are billed at different rates depending on your insurance coverage.  Please reach out to your insurance provider with any questions.    If during the course of the call the physician/provider feels a video visit is not appropriate, you will not be charged for this service.\"    Patient has given verbal consent for Video visit? Yes  How would you like to obtain your AVS? Mail a copy  Patient would like the video invitation sent by: Text to cell phone: [phone]  Will anyone else be joining your video visit? No    Subjective     Brissa Mott is a 49 year old female who presents today via video visit for the following health issues:    HPI  Depression Followup    How are you doing with your depression since your last visit? Worsened unsure if it is due to health issues or covid - doesn't feel med is working right now    Are you having other symptoms that might be associated with depression? Yes:  sleep is inconsistant    Have you had a significant life event?  Health Concerns Knee issue, incontinence, Covid    Are you feeling anxious or having panic attacks?   No    Do you have any concerns with your use of alcohol or other drugs? No    Social History     Tobacco Use     Smoking status: Former Smoker     Smokeless tobacco: Never Used   Substance Use Topics     Alcohol use: Not Currently     Drug use: " Never     PHQ 6/30/2020   PHQ-9 Total Score 16   Q9: Thoughts of better off dead/self-harm past 2 weeks Several days     ALLEGRA-7 SCORE 6/30/2020   Total Score 12     ALLEGRA-7  6/30/2020   1. Feeling nervous, anxious, or on edge 1   2. Not being able to stop or control worrying 2   3. Worrying too much about different things 3   4. Trouble relaxing 2   5. Being so restless that it is hard to sit still 0   6. Becoming easily annoyed or irritable 3   7. Feeling afraid, as if something awful might happen 1   ALLEGRA-7 Total Score 12     In the past two weeks have you had thoughts of suicide or self-harm?  No.    Do you have concerns about your personal safety or the safety of others?   No    Suicide Assessment Five-step Evaluation and Treatment (SAFE-T)    Musculoskeletal problem/pain      Duration: issues after having stroke    Description  Location: left leg/knee    Intensity:  moderate, severe    Accompanying signs and symptoms: can't get strength back in knee    History  Previous similar problem: YES- after stroke  Previous evaluation:  none    Precipitating or alleviating factors:  Trauma or overuse: YES- stroke - had rehab after stroke  Aggravating factors include: none    Therapies tried and outcome: physical therapy    URINARY TRACT SYMPTOMS      Duration: Last year prior to stroke    Description  incontinence    Intensity:  moderate  Accompanying signs and symptoms:    History  Has tried Kegal exercises before - never been tested for UTI  Doesn't get the sensation to use the bathroom  Has leakage    Precipitating or alleviating factors: None    Therapies tried and outcome: none            Video Start Time: 1:22 PM        Patient Active Problem List   Diagnosis     Morbid obesity (H)     Type 2 diabetes mellitus with hyperglycemia, with long-term current use of insulin (H)     KATHRINE (obstructive sleep apnea)     Secondary hypertension     No past surgical history on file.    Social History     Tobacco Use     Smoking  status: Former Smoker     Smokeless tobacco: Never Used   Substance Use Topics     Alcohol use: Not Currently     No family history on file.      Current Outpatient Medications   Medication Sig Dispense Refill     acetaminophen (TYLENOL) 500 MG tablet Take 1,000 mg by mouth 3 times daily       albuterol (PROAIR HFA/PROVENTIL HFA/VENTOLIN HFA) 108 (90 Base) MCG/ACT inhaler Inhale 2 puffs into the lungs every 6 hours as needed for wheezing 6.7 g 3     aspirin (ASA) 81 MG tablet Take 1 tablet (81 mg) by mouth daily 90 tablet 1     blood glucose (ACCU-CHEK CINDY PLUS) test strip Use to test blood sugar two times daily or as directed. 100 strip 11     blood glucose monitoring (ACCU-CHEK CINDY PLUS) meter device kit Use to test blood sugar 2 times daily or as directed. 1 kit 0     blood glucose monitoring (SOFTCLIX) lancets Use to test blood sugar two times daily or as directed. 100 each 11     cetirizine (ZYRTEC) 10 MG tablet Take 1 tablet (10 mg) by mouth daily 180 tablet 1     cholecalciferol (VITAMIN D3) 5000 units (125 mcg) capsule Take 5,000 Units by mouth daily        Continuous Blood Gluc Sensor (FREESTYLE SANDRA 14 DAY SENSOR) MISC 1 each every 14 days 7 each 4     fluticasone (FLOVENT HFA) 110 MCG/ACT inhaler Inhale 1 puff into the lungs 2 times daily 12 g 3     gabapentin (NEURONTIN) 100 MG capsule Take 100 mg by mouth At Bedtime       insulin NPH-Regular 70/30 (HUMULIN MIX 70/30 KWIKPEN) susp Inject 46 before breakfast and 44 before supper.  ( Increase by 2 units every 3 days until readings are under 120) (Patient taking differently: Inject 42 before breakfast and 42 before supper.  ( Increase by 2 units every 3 days until readings are under 120)) 30 mL 3     insulin pen needle (BD TWYLA U/F) 32G X 4 MM miscellaneous 2 times daily Use two pen needles daily or as directed. 100 each 6     irbesartan (AVAPRO) 300 MG tablet TAKE 1 TABLET(300 MG) BY MOUTH AT BEDTIME 90 tablet 0     liraglutide (VICTOZA) 18 MG/3ML  "solution Inject 1.8 mg Subcutaneous daily 9 mL 3     metFORMIN (GLUCOPHAGE) 1000 MG tablet Take 1 tablet (1,000 mg) by mouth 2 times daily (with meals) 90 tablet 3     Misc. Devices (ROLLATOR ULTRA-LIGHT) MISC Extra side walker with front wheels for home use.  Purchase, lifetime need. Extra wide rolling walker (\"Walker 4\", item #:  [id number]) needed for safe transfers and ambulation.  Pt weight is 335 lbs.       nystatin (MYCOSTATIN) 211561 UNIT/GM external powder Apply topically twice daily to intact skin in groin/abdominal skin folds for 14 days.Call your primary care doctor if skin not improving.       sertraline (ZOLOFT) 100 MG tablet Take 1.5 tablets (150 mg) by mouth daily 90 tablet 3     triamcinolone (KENALOG) 0.1 % external cream Apply topically as needed for irritation 15 g 0     atorvastatin (LIPITOR) 80 MG tablet Take 1 tablet (80 mg) by mouth daily (Patient not taking: Reported on 6/30/2020) 90 tablet 3     clopidogrel (PLAVIX) 75 MG tablet Take 75 mg by mouth daily        Allergies   Allergen Reactions     Penicillins Rash     Sulfa Drugs Rash     Recent Labs   Lab Test 03/02/20  1143 02/04/20  1326 11/25/19  1106 11/15/19  1401 06/05/19  1634   A1C  --  11.7*  --  11.0* 12.9*   LDL  --   --  59  --   --    HDL  --   --  44*  --   --    TRIG  --   --  120  --   --    ALT  --   --   --  21 20   CR 0.54  --   --  0.46* 0.51*   GFRESTIMATED >90  --   --  >90 >90   GFRESTBLACK >90  --   --  >90 >90   POTASSIUM 4.3  --   --  3.5 4.3   TSH  --   --  1.35  --   --       BP Readings from Last 3 Encounters:   06/02/20 126/84   05/26/20 122/86   03/02/20 132/80    Wt Readings from Last 3 Encounters:   06/02/20 140.6 kg (310 lb)   05/26/20 140.6 kg (310 lb)   03/02/20 140.2 kg (309 lb)                    Reviewed and updated as needed this visit by Provider         Review of Systems   Constitutional, HEENT, cardiovascular, pulmonary, GI, , musculoskeletal, neuro, skin, endocrine and psych systems are negative, " "except as otherwise noted.      Objective             Physical Exam     GENERAL: Healthy, alert and no distress  EYES: Eyes grossly normal to inspection.  No discharge or erythema, or obvious scleral/conjunctival abnormalities.  RESP: No audible wheeze, cough, or visible cyanosis.  No visible retractions or increased work of breathing.    SKIN: Visible skin clear. No significant rash, abnormal pigmentation or lesions.  NEURO: Cranial nerves grossly intact.  Mentation and speech appropriate for age.  PSYCH: Mentation appears normal, affect normal/bright, judgement and insight intact, normal speech and appearance well-groomed.            Assessment & Plan     (R32) Urinary incontinence, unspecified type  (primary encounter diagnosis)  Comment: Patient continues to have incontinence issues post CVA we will have her follow-up with urology  Plan: UROLOGY ADULT REFERRAL          (E11.59,  Z79.4) Type 2 diabetes mellitus with other circulatory complication, with long-term current use of insulin (H)  Comment: Patient due for current hemoglobin A1c to make a lab only appointment  Plan: **A1C FUTURE anytime            (R26.81) Unsteady gait  Comment: *Patient is gait remains unsteady will have her start physical therapy for gait training  Plan: PHYSICAL THERAPY REFERRAL           (M17.12) Primary osteoarthritis of left knee  Comment: Patient to continue with physical therapy for knee pain if not improving next test of the orthopedics  Plan: PHYSICAL THERAPY REFERRAL         BMI:   Estimated body mass index is 53.21 kg/m  as calculated from the following:    Height as of 6/2/20: 1.626 m (5' 4\").    Weight as of 6/2/20: 140.6 kg (310 lb).   Weight management plan: Discussed healthy diet and exercise guidelines        See Patient Instructions    Return in about 2 weeks (around 7/14/2020).    RONNELL Bianchi [facility]      Video-Visit Details    Type of service:  Video Visit    Video End Time: 2:05 " pm    Originating Location (pt. Location): Home    Distant Location (provider location):  [facility]     Platform used for Video Visit: Doxpierce    No follow-ups on file.       RONNELL Bianchi CNP

## 2020-07-01 ASSESSMENT — ANXIETY QUESTIONNAIRES: GAD7 TOTAL SCORE: 12

## 2020-08-03 ENCOUNTER — VIRTUAL VISIT (OUTPATIENT)
Dept: UROLOGY | Facility: CLINIC | Age: 50
End: 2020-08-03
Attending: NURSE PRACTITIONER
Payer: COMMERCIAL

## 2020-08-03 DIAGNOSIS — N39.41 URGE INCONTINENCE OF URINE: Primary | ICD-10-CM

## 2020-08-03 PROCEDURE — 99201 ZZC OFFICE/OUTPT VISIT, NEW, LEVEL I: CPT | Mod: TEL | Performed by: UROLOGY

## 2020-08-03 RX ORDER — OXYBUTYNIN CHLORIDE 5 MG/1
5 TABLET, EXTENDED RELEASE ORAL EVERY EVENING
Qty: 60 TABLET | Refills: 3 | Status: SHIPPED | OUTPATIENT
Start: 2020-08-03 | End: 2020-11-24

## 2020-08-03 NOTE — PROGRESS NOTES
Telephone visit    49-year-old female complaining of urinary incontinence.    Suffered a stroke in October 2019.    Recently has had increasing difficulty with urinary incontinence.  She wears a pad day and night.  Generally during the day urine incontinence is minimal but at night she has significant loss of urine.    She describes minimal urgency.    She was enuretic as a child resolution around 4 years of age.    Impression: Neurogenic bladder dysfunction secondary to CVA.    Plan: Oxybutynin 5 mg extended release in an effort to control neurogenic bladder dysfunction secondary to a CVA.  Additionally, her history of enuresis suggests an underlying neurogenic dysfunction of the bladder that may have been exacerbated by the CVA.    Initial therapy will consist of just the anticholinergic medication and I will get back to her in about 3 to 4 weeks for follow-up of her response to the oxybutynin.    Total time spent discussing urinary incontinence 10 minutes

## 2020-08-05 ENCOUNTER — HOSPITAL ENCOUNTER (OUTPATIENT)
Dept: PHYSICAL THERAPY | Facility: CLINIC | Age: 50
Setting detail: THERAPIES SERIES
End: 2020-08-05
Attending: NURSE PRACTITIONER
Payer: COMMERCIAL

## 2020-08-05 PROCEDURE — 97110 THERAPEUTIC EXERCISES: CPT | Mod: GP | Performed by: PHYSICAL THERAPIST

## 2020-08-05 PROCEDURE — 97161 PT EVAL LOW COMPLEX 20 MIN: CPT | Mod: GP | Performed by: PHYSICAL THERAPIST

## 2020-08-06 NOTE — PROGRESS NOTES
08/05/20 1300   General Information   Type of Visit Initial OP Ortho PT Evaluation   Start of Care Date 08/05/20   Referring Physician Juli Ramsey NP   Patient/Family Goals Statement walk without walker   Orders Evaluate and Treat   Date of Order 06/30/20   Medical Diagnosis Primary osteoarthritis of left knee, unsteady gait   Surgical/Medical history reviewed Yes   Precautions/Limitations no known precautions/limitations   Body Part(s)   Body Part(s) Knee   Presentation and Etiology   Pertinent history of current problem (include personal factors and/or comorbidities that impact the POC) Pt notes that had stroke last October and since then has been using a walker. Has been limited by left knee pain. Has seen Dr Mejia for an injection with some relief. But still feels that the strength of the knee limits ability to get around. Had some rehab at [facility] after stroke for a few weeks, then had a few home sessions of PT as well.    Impairments A. Pain;B. Decreased WB tolerance;C. Swelling;D. Decreased ROM;E. Decreased flexibility;F. Decreased strength and endurance;H. Impaired gait;M. Locking or catching   Functional Limitations perform activities of daily living;perform desired leisure / sports activities   Symptom Location Left leg   How/Where did it occur Other  (stroke/OA knee)   Onset date of current episode/exacerbation 10/28/19   Chronicity New   Pain rating (0-10 point scale) Best (/10);Worst (/10)   Best (/10) 0   Worst (/10) 5   Pain quality A. Sharp;C. Aching;F. Stabbing   Frequency of pain/symptoms C. With activity   Pain/symptoms exacerbated by A. Sitting;B. Walking   Pain/symptoms eased by C. Rest;E. Changing positions;G. Heat;H. 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 No   Have you fallen and had an injury in the past year? No   Timed Up and Go score (seconds) 29   Is patient a fall risk? Yes;Department fall risk interventions implemented   Fall screen comments Fell out of bed when had stroke   Abuse Screen (yes response referral indicated)   Feels Unsafe at Home or Work/School no   Feels Threatened by Someone no   Does Anyone Try to Keep You From Having Contact with Others or Doing Things Outside Your Home? no   Physical Signs of Abuse Present no   Knee Objective Findings   Side (if bilateral, select both right and left) Left   Observation Noted edema LLE   Gait/Locomotion utilze 2WW, heavy UE reliance, decreased step length and gait speed   Balance/Proprioception (Single Leg Stance) unable   Knee/Hip Strength Comments Dorsiflexion 4+/5. 30 second sit to stand: 4 reps from standard chair, relies more on R LE   Step Test Height Control Comment Ascend/desecnd stairs with step to pattern, ascends wtih LLE leading, descends with RLE leading, B railings    Knee Special Test Comments mCTSIB 30, 30, 24, 0   Left Knee Extension AROM Lacking 8*   Left Knee Flexion AROM 100   Left Knee Flexion Strength 4+/5   Left Knee Extension Strength 4/5   Planned Therapy Interventions   Planned Therapy Interventions balance training;gait training;motor coordination training;neuromuscular re-education;ROM;strengthening;stretching   Clinical Impression   Criteria for Skilled Therapeutic Interventions Met yes, treatment indicated   PT Diagnosis impaired gait and mobility   Influenced by the following impairments impaired mobility, decreased strength and endurance, L knee pain/arthritis, history of stroke   Functional limitations due to  impairments decreased participation walking, stairs, standing, household and community mobility   Clinical Presentation Stable/Uncomplicated   Clinical Presentation Rationale comorbidities: L knee pain, deconditioning, history of stroke   Clinical Decision Making (Complexity) Low complexity   Therapy Frequency 1 time/week   Predicted Duration of Therapy Intervention (days/wks) 8 weeks   Risk & Benefits of therapy have been explained Yes   Patient, Family & other staff in agreement with plan of care Yes   Education Assessment   Preferred Learning Style Demonstration;Pictures/video   Barriers to Learning No barriers   ORTHO GOALS   PT Ortho Eval Goals 1;2;3   Ortho Goal 1   Goal Identifier 1   Goal Description Pt will demonstrate 8 times sit to  30 seconds for improved LE strength   Target Date 10/01/20   Ortho Goal 2   Goal Identifier 2   Goal Description Pt will demonstrate ambulation with SEC x 100' for household mobility   Target Date 10/01/20   Ortho Goal 3   Goal Identifier 3   Goal Description Pt will be independant with HEP for long term self management   Target Date 10/01/20   Total Evaluation Time   PT Eval, Low Complexity Minutes (25248) 25

## 2020-08-12 ENCOUNTER — HOSPITAL ENCOUNTER (OUTPATIENT)
Dept: PHYSICAL THERAPY | Facility: CLINIC | Age: 50
Setting detail: THERAPIES SERIES
End: 2020-08-12
Attending: NURSE PRACTITIONER
Payer: COMMERCIAL

## 2020-08-12 PROCEDURE — 97110 THERAPEUTIC EXERCISES: CPT | Mod: GP | Performed by: PHYSICAL THERAPIST

## 2020-08-19 DIAGNOSIS — Z79.4 TYPE 2 DIABETES MELLITUS WITHOUT COMPLICATION, WITH LONG-TERM CURRENT USE OF INSULIN (H): ICD-10-CM

## 2020-08-19 DIAGNOSIS — E11.9 TYPE 2 DIABETES MELLITUS WITHOUT COMPLICATION, WITH LONG-TERM CURRENT USE OF INSULIN (H): ICD-10-CM

## 2020-08-19 NOTE — TELEPHONE ENCOUNTER
"Requested Prescriptions   Pending Prescriptions Disp Refills     metFORMIN (GLUCOPHAGE) 1000 MG tablet [Pharmacy Med Name: METFORMIN 1000MG TABLETS] 90 tablet 3     Sig: TAKE 1 TABLET(1000 MG) BY MOUTH TWICE DAILY WITH MEALS       Biguanide Agents Failed - 8/19/2020  2:02 AM        Failed - Patient has documented A1c within the specified period of time.     If HgbA1C is 8 or greater, it needs to be on file within the past 3 months.  If less than 8, must be on file within the past 6 months.     Recent Labs   Lab Test 02/04/20  1326   A1C 11.7*             Passed - Patient is age 10 or older        Passed - Patient's CR is NOT>1.4 OR Patient's EGFR is NOT<45 within past 12 mos.     Recent Labs   Lab Test 03/02/20  1143   GFRESTIMATED >90   GFRESTBLACK >90       Recent Labs   Lab Test 03/02/20  1143   CR 0.54             Passed - Patient does NOT have a diagnosis of CHF.        Passed - Medication is active on med list        Passed - Patient is not pregnant        Passed - Patient has not had a positive pregnancy test within the past 12 mos.         Passed - Recent (6 mo) or future (30 days) visit within the authorizing provider's specialty     Patient had office visit in the last 6 months or has a visit in the next 30 days with authorizing provider or within the authorizing provider's specialty.  See \"Patient Info\" tab in inbasket, or \"Choose Columns\" in Meds & Orders section of the refill encounter.                 "

## 2020-08-25 DIAGNOSIS — I10 BENIGN ESSENTIAL HYPERTENSION: ICD-10-CM

## 2020-08-25 DIAGNOSIS — E11.9 TYPE 2 DIABETES MELLITUS WITHOUT COMPLICATION, WITH LONG-TERM CURRENT USE OF INSULIN (H): ICD-10-CM

## 2020-08-25 DIAGNOSIS — Z79.4 TYPE 2 DIABETES MELLITUS WITHOUT COMPLICATION, WITH LONG-TERM CURRENT USE OF INSULIN (H): ICD-10-CM

## 2020-08-26 RX ORDER — LIRAGLUTIDE 6 MG/ML
INJECTION SUBCUTANEOUS
Qty: 9 ML | Refills: 3 | Status: SHIPPED | OUTPATIENT
Start: 2020-08-26 | End: 2021-04-29

## 2020-08-26 RX ORDER — AMLODIPINE BESYLATE 10 MG/1
TABLET ORAL
Qty: 90 TABLET | Refills: 3 | Status: SHIPPED | OUTPATIENT
Start: 2020-08-26 | End: 2021-09-20

## 2020-08-26 NOTE — TELEPHONE ENCOUNTER
"Requested Prescriptions   Pending Prescriptions Disp Refills     VICTOZA PEN 18 MG/3ML soln [Pharmacy Med Name: VICTOZA 18MG/3ML INJ PEN 3ML(3PACK)] 9 mL 3     Sig: ADMINISTER 1.8 MG UNDER THE SKIN DAILY       GLP-1 Agonists Protocol Failed - 8/25/2020  9:19 PM        Failed - HgbA1C in past 3 or 6 months     If HgbA1C is 8 or greater, it needs to be on file within the past 3 months.  If less than 8, must be on file within the past 6 months.     Recent Labs   Lab Test 02/04/20  1326   A1C 11.7*             Passed - Medication is active on med list        Passed - Patient is age 18 or older        Passed - No active pregnancy on record        Passed - Normal serum creatinine on file in past 12 months     Recent Labs   Lab Test 03/02/20  1143   CR 0.54       Ok to refill medication if creatinine is low          Passed - No positive pregnancy test in past 12 months        Passed - Recent (6 mo) or future (30 days) visit within the authorizing provider's specialty     Patient had office visit in the last 6 months or has a visit in the next 30 days with authorizing provider.  See \"Patient Info\" tab in inbasket, or \"Choose Columns\" in Meds & Orders section of the refill encounter.               amLODIPine (NORVASC) 10 MG tablet [Pharmacy Med Name: AMLODIPINE BESYLATE 10MG TABLETS] 90 tablet 3     Sig: TAKE 1 TABLET(10 MG) BY MOUTH DAILY       Calcium Channel Blockers Protocol  Failed - 8/25/2020  9:19 PM        Failed - Medication is active on med list        Passed - Blood pressure under 140/90 in past 12 months     BP Readings from Last 3 Encounters:   06/02/20 126/84   05/26/20 122/86   03/02/20 132/80                 Passed - Recent (12 mo) or future (30 days) visit within the authorizing provider's specialty     Patient has had an office visit with the authorizing provider or a provider within the authorizing providers department within the previous 12 mos or has a future within next 30 days. See \"Patient Info\" tab " "in inbasket, or \"Choose Columns\" in Meds & Orders section of the refill encounter.              Passed - Patient is age 18 or older        Passed - No active pregnancy on record        Passed - Normal serum creatinine on file in past 12 months     Recent Labs   Lab Test 03/02/20  1143   CR 0.54       Ok to refill medication if creatinine is low          Passed - No positive pregnancy test in past 12 months             "

## 2020-09-09 ENCOUNTER — VIRTUAL VISIT (OUTPATIENT)
Dept: UROLOGY | Facility: CLINIC | Age: 50
End: 2020-09-09
Payer: COMMERCIAL

## 2020-09-09 DIAGNOSIS — N39.41 URGE INCONTINENCE OF URINE: Primary | ICD-10-CM

## 2020-09-09 PROCEDURE — 99212 OFFICE O/P EST SF 10 MIN: CPT | Mod: TEL | Performed by: UROLOGY

## 2020-09-09 RX ORDER — OXYBUTYNIN CHLORIDE 10 MG/1
10 TABLET, EXTENDED RELEASE ORAL DAILY
Qty: 90 TABLET | Refills: 3 | Status: SHIPPED | OUTPATIENT
Start: 2020-09-09 | End: 2021-04-29

## 2020-09-09 NOTE — PROGRESS NOTES
Appointment source: Established Patient  Patient name: Brissa Mott  Urology Staff: Jose Meade MD    Subjective: This is a 50 year old year old female returning for follow up of urinary incontinence.    Since starting the 5 mg extended release oxybutynin she no longer needs to wear pads during the day which previously were necessary due to her urinary urge incontinence.  She still has some incontinence of urine at night and still wears a pad at night but when she increased the dose of her oxybutynin to 10 mg by taking 2 of the 5 mg tablets at bedtime she was completely dry through the night.    Assessment: Promising response to oxybutynin for control of urinary urgency most likely secondary to her cerebral vascular accident.    Plan: Will increase the dose of oxybutynin to 10 mg extended release.    I will call her back in about a month and see how she is doing.    Total time 10 minutes

## 2020-09-10 DIAGNOSIS — J45.40 MODERATE PERSISTENT ASTHMA WITHOUT COMPLICATION: ICD-10-CM

## 2020-09-10 DIAGNOSIS — F43.22 ADJUSTMENT DISORDER WITH ANXIOUS MOOD: ICD-10-CM

## 2020-09-11 RX ORDER — DEXAMETHASONE 4 MG/1
TABLET ORAL
Qty: 12 G | Refills: 3 | Status: SHIPPED | OUTPATIENT
Start: 2020-09-11 | End: 2021-09-23

## 2020-09-11 RX ORDER — SERTRALINE HYDROCHLORIDE 100 MG/1
TABLET, FILM COATED ORAL
Qty: 90 TABLET | Refills: 3 | Status: SHIPPED | OUTPATIENT
Start: 2020-09-11 | End: 2021-04-29

## 2020-09-11 NOTE — TELEPHONE ENCOUNTER
"Routing refill request to provider for review/approval because:  Last PHQ-9 score > 5. Was to follow up around 7/14/2020  ACT just became oudated    Requested Prescriptions   Pending Prescriptions Disp Refills     FLOVENT  MCG/ACT inhaler [Pharmacy Med Name: FLOVENT  MCG ORAL INH 120INH] 12 g 3     Sig: INHALE 1 PUFF INTO THE LUNGS TWICE DAILY       Inhaled Steroids Protocol Failed - 9/10/2020  1:09 PM        Failed - Asthma control assessment score within normal limits in last 6 months     Please review ACT score.           Passed - Patient is age 12 or older        Passed - Medication is active on med list        Passed - Recent (6 mo) or future (30 days) visit within the authorizing provider's specialty     Patient had office visit in the last 6 months or has a visit in the next 30 days with authorizing provider or within the authorizing provider's specialty.  See \"Patient Info\" tab in inbasket, or \"Choose Columns\" in Meds & Orders section of the refill encounter.               sertraline (ZOLOFT) 100 MG tablet [Pharmacy Med Name: SERTRALINE 100MG TABLETS] 90 tablet 3     Sig: TAKE 1 AND 1/2 TABLETS(150 MG) BY MOUTH DAILY       SSRIs Protocol Passed - 9/10/2020  1:09 PM        Passed - Recent (12 mo) or future (30 days) visit within the authorizing provider's specialty     Patient has had an office visit with the authorizing provider or a provider within the authorizing providers department within the previous 12 mos or has a future within next 30 days. See \"Patient Info\" tab in inbasket, or \"Choose Columns\" in Meds & Orders section of the refill encounter.              Passed - Medication is active on med list        Passed - Patient is age 18 or older        Passed - No active pregnancy on record        Passed - No positive pregnancy test in last 12 months           PHQ 6/30/2020   PHQ-9 Total Score 16   Q9: Thoughts of better off dead/self-harm past 2 weeks Several days     ACT Total Scores " 3/2/2020   ACT TOTAL SCORE (Goal Greater than or Equal to 20) 21   In the past 12 months, how many times did you visit the emergency room for your asthma without being admitted to the hospital? 0   In the past 12 months, how many times were you hospitalized overnight because of your asthma? 0     Betty FINNEGAN RN, BSN

## 2020-09-23 ENCOUNTER — TELEPHONE (OUTPATIENT)
Dept: EDUCATION SERVICES | Facility: CLINIC | Age: 50
End: 2020-09-23

## 2020-09-23 NOTE — TELEPHONE ENCOUNTER
Diabetes education contact:    Pt has been having low blood sugars.  She has not taken the last two doses of insulin.    I want her to do a fingerstick to verify the lows on the sensor.  She is feeling low.  Will have her stop the insulin for a day or two and watch her blood sugars.  She has been trying to eat healthier and is working on wt loss some.    IF they go up above goals of  mg/dL I want her to cut back dose to 30 units in am and pm of the 70/30 insulin (was doing 42 units twice daily).  Set up a phone visit for October 7th to reassess.    Diamond Lucero RD, DAVEE    Any diabetes medication dose changes were made via the CDE Protocol and Collaborative Practice Agreement with the patient's primary care provider. A copy of this encounter was shared with the provider.

## 2020-09-24 NOTE — PROGRESS NOTES
Outpatient Physical Therapy Discharge Note     Patient: Brissa Mott  : 1970    Beginning/End Dates of Reporting Period:  20 to 2020    Referring Provider: Juli Ramsey NP    Therapy Diagnosis: impaired gait and mobility     Client Self Report: Sit to stands going well at home, hard to do the ones in bed as the bed is so soft. Using walking stick more instead of walker    Objective Measurements:  Objective Measure: Ambulation  Details: w/ walking stick, decreased toe clearance L       Goals:  Goal Identifier 1   Goal Description Pt will demonstrate 8 times sit to  30 seconds for improved LE strength   Target Date 10/01/20   Date Met      Progress:     Goal Identifier 2   Goal Description Pt will demonstrate ambulation with SEC x 100' for household mobility   Target Date 10/01/20   Date Met      Progress:     Goal Identifier 3   Goal Description Pt will be independant with HEP for long term self management   Target Date 10/01/20   Date Met      Progress:       Progress Toward Goals:   Progress limited due to Pt completed 2 PT sessions, was give treatment recommendations and initiated HEP. Patient did not return for additional follow up treatments as directed.  Goal status and current objective information is therefore unknown.  Discharge from PT services at this time for this episode of treatment. Please see attached documentation under this episode of care for further information including dates of service, start of care date, referring physician, Dx, treatment plan, treatments, etc.    Plan:  Discharge from therapy.    Discharge:    Reason for Discharge: Patient has failed to schedule further appointments.    Equipment Issued: na    Discharge Plan: Patient to continue home program.    Please contact me with any questions or concerns.    Thank you for your referral.    Shwetha Marin, PT, DPT  Physical Therapist   [facility]  [phone]

## 2020-10-05 DIAGNOSIS — J30.2 SEASONAL ALLERGIC RHINITIS, UNSPECIFIED TRIGGER: ICD-10-CM

## 2020-10-06 RX ORDER — CETIRIZINE HYDROCHLORIDE 10 MG/1
TABLET ORAL
Qty: 90 TABLET | Refills: 1 | Status: SHIPPED | OUTPATIENT
Start: 2020-10-06

## 2020-10-07 ENCOUNTER — PATIENT OUTREACH (OUTPATIENT)
Dept: EDUCATION SERVICES | Facility: CLINIC | Age: 50
End: 2020-10-07
Payer: COMMERCIAL

## 2020-10-07 DIAGNOSIS — E11.65 TYPE 2 DIABETES MELLITUS WITH HYPERGLYCEMIA, WITH LONG-TERM CURRENT USE OF INSULIN (H): Primary | ICD-10-CM

## 2020-10-07 DIAGNOSIS — Z79.4 TYPE 2 DIABETES MELLITUS WITH HYPERGLYCEMIA, WITH LONG-TERM CURRENT USE OF INSULIN (H): Primary | ICD-10-CM

## 2020-10-07 PROCEDURE — 99207 PR NO CHARGE LOS: CPT | Performed by: DIETITIAN, REGISTERED

## 2020-10-07 NOTE — PROGRESS NOTES
Attempted to reach pt X2, left message to call me back.    KARLEE Elizondo RD    Pt called back and left message stating numbers were not low anymore and doing better, called her back and left another message.  KARLEE Elizondo RD

## 2020-10-12 ENCOUNTER — TELEPHONE (OUTPATIENT)
Dept: PHARMACY | Facility: CLINIC | Age: 50
End: 2020-10-12

## 2020-10-12 ENCOUNTER — VIRTUAL VISIT (OUTPATIENT)
Dept: UROLOGY | Facility: CLINIC | Age: 50
End: 2020-10-12
Payer: COMMERCIAL

## 2020-10-12 DIAGNOSIS — R39.15 URINARY URGENCY: Primary | ICD-10-CM

## 2020-10-12 PROCEDURE — 99212 OFFICE O/P EST SF 10 MIN: CPT | Mod: TEL | Performed by: UROLOGY

## 2020-10-12 RX ORDER — OXYBUTYNIN CHLORIDE 10 MG/1
10 TABLET, EXTENDED RELEASE ORAL DAILY
Qty: 90 TABLET | Refills: 3 | Status: SHIPPED | OUTPATIENT
Start: 2020-10-12 | End: 2020-11-24

## 2020-10-12 NOTE — TELEPHONE ENCOUNTER
This patient is due for MTM follow-up. I called the patient to schedule an appointment and left a message with the clinic phone number for the patient to call to schedule.    Vince Andersen, NeginD, [facility]  Medication Therapy Management Pharmacist  Pager: [phone]

## 2020-10-12 NOTE — PROGRESS NOTES
Telephone visit    50-year-old female with a history of enuresis.    Has a history of CVA which is presumably the cause of her incontinence of urine.    She has had near complete resolution of her incontinence after starting oxybutynin.  The 10 mg extended release appears to be effective although she is having some headaches at night and some dryness of the mouth.  The dryness of the mouth is an expected complication of this medication and the headaches are not neatly consistent with side effects that this drug produces.    She does have a past history of migraines.    She has been taking 2 of the 5 mg extended release tablets in the evening.    I will now switch her to a 10 mg extended release tablet and suggested she try taking it in the morning rather than the evening as this might reduce the probability of the headaches.    I will call her back in about 6 weeks for follow-up.    Total time 10 minutes

## 2020-10-14 ENCOUNTER — TELEPHONE (OUTPATIENT)
Dept: UROLOGY | Facility: CLINIC | Age: 50
End: 2020-10-14

## 2020-10-14 NOTE — TELEPHONE ENCOUNTER
Left message for the pt requesting a return call to schedule appt, number given.    Valentina RICE CMA

## 2020-10-14 NOTE — TELEPHONE ENCOUNTER
----- Message from KYLEIGH Meade MD sent at 10/12/2020  1:37 PM CDT -----  Regarding: Enuresis  Please schedule a telephone visit in about 6 weeks for follow-up of her enuresis.

## 2020-10-18 DIAGNOSIS — I15.9 SECONDARY HYPERTENSION: ICD-10-CM

## 2020-10-19 RX ORDER — IRBESARTAN 300 MG/1
TABLET ORAL
Qty: 90 TABLET | Refills: 0 | Status: SHIPPED | OUTPATIENT
Start: 2020-10-19 | End: 2021-03-17

## 2020-11-16 ENCOUNTER — TELEPHONE (OUTPATIENT)
Dept: FAMILY MEDICINE | Facility: CLINIC | Age: 50
End: 2020-11-16

## 2020-11-16 NOTE — TELEPHONE ENCOUNTER
Panel Management Review      Patient has the following on her problem list:     Diabetes    ASA: Passed    Last A1C  Lab Results   Component Value Date    A1C 11.7 02/04/2020    A1C 11.0 11/15/2019    A1C 12.9 06/05/2019     A1C tested: FAILED    Last LDL:    Lab Results   Component Value Date    CHOL 127 11/25/2019     Lab Results   Component Value Date    HDL 44 11/25/2019     Lab Results   Component Value Date    LDL 59 11/25/2019     Lab Results   Component Value Date    TRIG 120 11/25/2019     No results found for: CHOLHDLRATIO  Lab Results   Component Value Date    NHDL 83 11/25/2019       Is the patient on a Statin? YES             Is the patient on Aspirin? YES    Medications     HMG CoA Reductase Inhibitors     atorvastatin (LIPITOR) 80 MG tablet       Salicylates     aspirin (ASA) 81 MG tablet             Last three blood pressure readings:  BP Readings from Last 3 Encounters:   06/02/20 126/84   05/26/20 122/86   03/02/20 132/80       Date of last diabetes office visit: 3/2/20     Tobacco History:     History   Smoking Status     Former Smoker   Smokeless Tobacco     Never Used           Composite cancer screening  Chart review shows that this patient is due/due soon for the following None  Summary:    Patient is due/failing the following:   A1C    Action needed:   Patient needs office visit for a diabetes check and labs.    Type of outreach:    Phone, spoke to patient.  Scheduled her an appointment on 11/24/20/    Questions for provider review:    None                                                                                                                                    Echo Spangler MA      Chart routed to Care Team .

## 2020-11-24 ENCOUNTER — OFFICE VISIT (OUTPATIENT)
Dept: FAMILY MEDICINE | Facility: CLINIC | Age: 50
End: 2020-11-24
Payer: COMMERCIAL

## 2020-11-24 VITALS
BODY MASS INDEX: 50.02 KG/M2 | SYSTOLIC BLOOD PRESSURE: 126 MMHG | HEART RATE: 78 BPM | HEIGHT: 64 IN | WEIGHT: 293 LBS | TEMPERATURE: 97.4 F | OXYGEN SATURATION: 94 % | DIASTOLIC BLOOD PRESSURE: 70 MMHG

## 2020-11-24 DIAGNOSIS — E78.5 HYPERLIPIDEMIA LDL GOAL <100: ICD-10-CM

## 2020-11-24 DIAGNOSIS — E11.65 TYPE 2 DIABETES MELLITUS WITH HYPERGLYCEMIA, WITH LONG-TERM CURRENT USE OF INSULIN (H): Primary | ICD-10-CM

## 2020-11-24 DIAGNOSIS — Z79.4 TYPE 2 DIABETES MELLITUS WITH HYPERGLYCEMIA, WITH LONG-TERM CURRENT USE OF INSULIN (H): Primary | ICD-10-CM

## 2020-11-24 DIAGNOSIS — Z23 NEED FOR PROPHYLACTIC VACCINATION AND INOCULATION AGAINST INFLUENZA: ICD-10-CM

## 2020-11-24 LAB
ANION GAP SERPL CALCULATED.3IONS-SCNC: 7 MMOL/L (ref 3–14)
BUN SERPL-MCNC: 14 MG/DL (ref 7–30)
CALCIUM SERPL-MCNC: 9.4 MG/DL (ref 8.5–10.1)
CHLORIDE SERPL-SCNC: 100 MMOL/L (ref 94–109)
CHOLEST SERPL-MCNC: 212 MG/DL
CO2 SERPL-SCNC: 29 MMOL/L (ref 20–32)
CREAT SERPL-MCNC: 0.65 MG/DL (ref 0.52–1.04)
CREAT UR-MCNC: 194 MG/DL
GFR SERPL CREATININE-BSD FRML MDRD: >90 ML/MIN/{1.73_M2}
GLUCOSE SERPL-MCNC: 133 MG/DL (ref 70–99)
HBA1C MFR BLD: 7.3 % (ref 0–5.6)
HDLC SERPL-MCNC: 64 MG/DL
LDLC SERPL CALC-MCNC: 125 MG/DL
MICROALBUMIN UR-MCNC: 35 MG/L
MICROALBUMIN/CREAT UR: 18.25 MG/G CR (ref 0–25)
NONHDLC SERPL-MCNC: 148 MG/DL
POTASSIUM SERPL-SCNC: 4.1 MMOL/L (ref 3.4–5.3)
SODIUM SERPL-SCNC: 136 MMOL/L (ref 133–144)
TRIGL SERPL-MCNC: 114 MG/DL

## 2020-11-24 PROCEDURE — 99214 OFFICE O/P EST MOD 30 MIN: CPT | Mod: 25 | Performed by: NURSE PRACTITIONER

## 2020-11-24 PROCEDURE — 82043 UR ALBUMIN QUANTITATIVE: CPT | Performed by: NURSE PRACTITIONER

## 2020-11-24 PROCEDURE — 90682 RIV4 VACC RECOMBINANT DNA IM: CPT | Performed by: NURSE PRACTITIONER

## 2020-11-24 PROCEDURE — 80061 LIPID PANEL: CPT | Performed by: NURSE PRACTITIONER

## 2020-11-24 PROCEDURE — 36415 COLL VENOUS BLD VENIPUNCTURE: CPT | Performed by: NURSE PRACTITIONER

## 2020-11-24 PROCEDURE — 90471 IMMUNIZATION ADMIN: CPT | Performed by: NURSE PRACTITIONER

## 2020-11-24 PROCEDURE — 80048 BASIC METABOLIC PNL TOTAL CA: CPT | Performed by: NURSE PRACTITIONER

## 2020-11-24 PROCEDURE — 83036 HEMOGLOBIN GLYCOSYLATED A1C: CPT | Performed by: NURSE PRACTITIONER

## 2020-11-24 ASSESSMENT — MIFFLIN-ST. JEOR: SCORE: 2014.76

## 2020-11-24 NOTE — LETTER
November 27, 2020      Brissa Mott  [address] [id number]        Dear ,    We are writing to inform you of your test results.    Your test results fall within the expected range(s) or remain unchanged from previous results.  Please continue with current treatment plan.    Resulted Orders   Albumin Random Urine Quantitative with Creat Ratio   Result Value Ref Range    Creatinine Urine 194 mg/dL    Albumin Urine mg/L 35 mg/L    Albumin Urine mg/g Cr 18.25 0 - 25 mg/g Cr   Hemoglobin A1c   Result Value Ref Range    Hemoglobin A1C 7.3 (H) 0 - 5.6 %      Comment:      Normal <5.7% Prediabetes 5.7-6.4%  Diabetes 6.5% or higher - adopted from ADA   consensus guidelines.     Lipid panel reflex to direct LDL Fasting   Result Value Ref Range    Cholesterol 212 (H) <200 mg/dL      Comment:      Desirable:       <200 mg/dl    Triglycerides 114 <150 mg/dL    HDL Cholesterol 64 >49 mg/dL    LDL Cholesterol Calculated 125 (H) <100 mg/dL      Comment:      Above desirable:  100-129 mg/dl  Borderline High:  130-159 mg/dL  High:             160-189 mg/dL  Very high:       >189 mg/dl      Non HDL Cholesterol 148 (H) <130 mg/dL      Comment:      Above Desirable:  130-159 mg/dl  Borderline high:  160-189 mg/dl  High:             190-219 mg/dl  Very high:       >219 mg/dl     Basic metabolic panel   Result Value Ref Range    Sodium 136 133 - 144 mmol/L    Potassium 4.1 3.4 - 5.3 mmol/L    Chloride 100 94 - 109 mmol/L    Carbon Dioxide 29 20 - 32 mmol/L    Anion Gap 7 3 - 14 mmol/L    Glucose 133 (H) 70 - 99 mg/dL    Urea Nitrogen 14 7 - 30 mg/dL    Creatinine 0.65 0.52 - 1.04 mg/dL    GFR Estimate >90 >60 mL/min/[1.73_m2]      Comment:      Non  GFR Calc  Starting 12/18/2018, serum creatinine based estimated GFR (eGFR) will be   calculated using the Chronic Kidney Disease Epidemiology Collaboration   (CKD-EPI) equation.      GFR Estimate If Black >90 >60 mL/min/[1.73_m2]      Comment:        GFR Calc  Starting 12/18/2018, serum creatinine based estimated GFR (eGFR) will be   calculated using the Chronic Kidney Disease Epidemiology Collaboration   (CKD-EPI) equation.      Calcium 9.4 8.5 - 10.1 mg/dL     basic metabolic panel was within normal limits.  Your microalbumin was within normal limits.  Your cholesterol numbers remain mildly elevated with a total cholesterol at 212 we like to see that below 200.  Continue with your cholesterol medication Lipitor and low-cholesterol diet and will recheck in 1 year.   If you have any questions or concerns, please call the clinic at the number listed above.       Sincerely,        RONNELL Bianchi CNP/

## 2020-11-24 NOTE — PROGRESS NOTES
Subjective     Brissa Mott is a 50 year old female who presents to clinic today for the following health issues:    HPI         Diabetes Follow-up    How often are you checking your blood sugar? Continuous glucose monitor  What time of day are you checking your blood sugars (select all that apply)?  Before and after meals  Have you had any blood sugars above 200?  No  Have you had any blood sugars below 70?  No    What symptoms do you notice when your blood sugar is low?  None    What concerns do you have today about your diabetes? None     Do you have any of these symptoms? (Select all that apply)  No numbness or tingling in feet.  No redness, sores or blisters on feet.  No complaints of excessive thirst.  No reports of blurry vision.  No significant changes to weight.    Have you had a diabetic eye exam in the last 12 months? No    BP Readings from Last 2 Encounters:   06/02/20 126/84   05/26/20 122/86     Hemoglobin A1C (%)   Date Value   02/04/2020 11.7 (H)   11/15/2019 11.0 (H)     LDL Cholesterol Calculated (mg/dL)   Date Value   11/25/2019 59                     Review of Systems   Constitutional, HEENT, cardiovascular, pulmonary, GI, , musculoskeletal, neuro, skin, endocrine and psych systems are negative, except as otherwise noted.      Objective    LMP  (LMP Unknown)   Body mass index is 53.73 kg/m .           Physical Exam   GENERAL: healthy, alert and no distress  EYES: Eyes grossly normal to inspection, PERRL and conjunctivae and sclerae normal  HENT: ear canals and TM's normal, nose and mouth without ulcers or lesions  NECK: no adenopathy, no asymmetry, masses, or scars and thyroid normal to palpation  RESP: lungs clear to auscultation - no rales, rhonchi or wheezes  BREAST: normal without masses, tenderness or nipple discharge and no palpable axillary masses or adenopathy  CV: regular rate and rhythm, normal S1 S2, no S3 or S4, no murmur, click or rub, no peripheral edema and peripheral pulses  strong  ABDOMEN: soft, nontender, no hepatosplenomegaly, no masses and bowel sounds normal  MS: no gross musculoskeletal defects noted, no edema  SKIN: no suspicious lesions or rashes  NEURO: Normal strength and tone, mentation intact and speech normal  PSYCH: mentation appears normal, affect normal/bright    Results for orders placed or performed in visit on 11/24/20   Hemoglobin A1c     Status: Abnormal   Result Value Ref Range    Hemoglobin A1C 7.3 (H) 0 - 5.6 %           Assessment & Plan     Type 2 diabetes mellitus with hyperglycemia, with long-term current use of insulin (H)   Controlled no change in treatment plan    - Albumin Random Urine Quantitative with Creat Ratio  - Hemoglobin A1c    Hyperlipidemia LDL goal <100  We will restart Lipitor patient has been off of it for the last couple months cholesterol numbers have mildly improved we will start back on 40 versus the 80  - Lipid panel reflex to direct LDL Fasting          No follow-ups on file.    RONNELL Bianchi CNP  [facility]

## 2020-11-24 NOTE — LETTER
My Asthma Action Plan    Name: Brissa Mott   YOB: 1970  Date: 11/24/2020   My doctor: RONNELL Bianchi CNP   My clinic: [facility]        My Rescue Medicine:   Albuterol inhaler (Proair/Ventolin/Proventil HFA)  2-4 puffs EVERY 4 HOURS as needed. Use a spacer if recommended by your provider.   My Asthma Severity:   Intermittent / Exercise Induced  Know your asthma triggers: Patient is unaware of triggers             GREEN ZONE   Good Control    I feel good    No cough or wheeze    Can work, sleep and play without asthma symptoms       Take your asthma control medicine every day.     1. If exercise triggers your asthma, take your rescue medication    15 minutes before exercise or sports, and    During exercise if you have asthma symptoms  2. Spacer to use with inhaler: If you have a spacer, make sure to use it with your inhaler             YELLOW ZONE Getting Worse  I have ANY of these:    I do not feel good    Cough or wheeze    Chest feels tight    Wake up at night   1. Keep taking your Green Zone medications  2. Start taking your rescue medicine:    every 20 minutes for up to 1 hour. Then every 4 hours for 24-48 hours.  3. If you stay in the Yellow Zone for more than 12-24 hours, contact your doctor.  4. If you do not return to the Green Zone in 12-24 hours or you get worse, start taking your oral steroid medicine if prescribed by your provider.           RED ZONE Medical Alert - Get Help  I have ANY of these:    I feel awful    Medicine is not helping    Breathing getting harder    Trouble walking or talking    Nose opens wide to breathe       1. Take your rescue medicine NOW  2. If your provider has prescribed an oral steroid medicine, start taking it NOW  3. Call your doctor NOW  4. If you are still in the Red Zone after 20 minutes and you have not reached your doctor:    Take your rescue medicine again and    Call 911 or go to the emergency room right away    See  your regular doctor within 2 weeks of an Emergency Room or Urgent Care visit for follow-up treatment.          Annual Reminders:  Meet with Asthma Educator,  Flu Shot in the Fall, consider Pneumonia Vaccination for patients with asthma (aged 19 and older).    Pharmacy: [id number] - [facility] [address] [facility] AT [facility] [address]    Electronically signed by RONNELL Bianchi CNP   Date: 11/24/20                    Asthma Triggers  How To Control Things That Make Your Asthma Worse    Triggers are things that make your asthma worse.  Look at the list below to help you find your triggers and   what you can do about them. You can help prevent asthma flare-ups by staying away from your triggers.      Trigger                                                          What you can do   Cigarette Smoke  Tobacco smoke can make asthma worse. Do not allow smoking in your home, car or around you.  Be sure no one smokes at a child s day care or school.  If you smoke, ask your health care provider for ways to help you quit.  Ask family members to quit too.  Ask your health care provider for a referral to Quit Plan to help you quit smoking, or call 6-385-557-PLAN.     Colds, Flu, Bronchitis  These are common triggers of asthma. Wash your hands often.  Don t touch your eyes, nose or mouth.  Get a flu shot every year.     Dust Mites  These are tiny bugs that live in cloth or carpet. They are too small to see. Wash sheets and blankets in hot water every week.   Encase pillows and mattress in dust mite proof covers.  Avoid having carpet if you can. If you have carpet, vacuum weekly.   Use a dust mask and HEPA vacuum.   Pollen and Outdoor Mold  Some people are allergic to trees, grass, or weed pollen, or molds. Try to keep your windows closed.  Limit time out doors when pollen count is high.   Ask you health care provider about taking medicine during allergy season.     Animal Dander  Some people are  allergic to skin flakes, urine or saliva from pets with fur or feathers. Keep pets with fur or feathers out of your home.    If you can t keep the pet outdoors, then keep the pet out of your bedroom.  Keep the bedroom door closed.  Keep pets off cloth furniture and away from stuffed toys.     Mice, Rats, and Cockroaches  Some people are allergic to the waste from these pests.   Cover food and garbage.  Clean up spills and food crumbs.  Store grease in the refrigerator.   Keep food out of the bedroom.   Indoor Mold  This can be a trigger if your home has high moisture. Fix leaking faucets, pipes, or other sources of water.   Clean moldy surfaces.  Dehumidify basement if it is damp and smelly.   Smoke, Strong Odors, and Sprays  These can reduce air quality. Stay away from strong odors and sprays, such as perfume, powder, hair spray, paints, smoke incense, paint, cleaning products, candles and new carpet.   Exercise or Sports  Some people with asthma have this trigger. Be active!  Ask your doctor about taking medicine before sports or exercise to prevent symptoms.    Warm up for 5-10 minutes before and after sports or exercise.     Other Triggers of Asthma  Cold air:  Cover your nose and mouth with a scarf.  Sometimes laughing or crying can be a trigger.  Some medicines and food can trigger asthma.

## 2020-11-25 RX ORDER — ATORVASTATIN CALCIUM 80 MG/1
80 TABLET, FILM COATED ORAL DAILY
Qty: 90 TABLET | Refills: 0 | Status: SHIPPED | OUTPATIENT
Start: 2020-11-25 | End: 2020-11-25

## 2020-11-25 RX ORDER — ATORVASTATIN CALCIUM 40 MG/1
40 TABLET, FILM COATED ORAL DAILY
Qty: 90 TABLET | Refills: 0 | Status: SHIPPED | OUTPATIENT
Start: 2020-11-25 | End: 2021-04-29

## 2020-11-25 NOTE — PROGRESS NOTES
Says she is not willing to take Lipitor, she says she stopped it for a reason but she can't remember why. She said she is will ing to try a different medication to see if it works for her

## 2020-11-25 NOTE — PROGRESS NOTES
In review patient is on Lipitor 80 mg looks like she did not refill it the last time.    Patient should be on Lipitor however since she has not been taking the Lipitor probably for the last month or 2 and her cholesterol numbers however stable would most likely decrease that down to 40 mg .  So please inform patient I have sent in a prescription for Lipitor 40 mg daily.    Juli Ramsey CNP

## 2020-11-25 NOTE — RESULT ENCOUNTER NOTE
Please Notify Brissa  of test results basic metabolic panel was within normal limits.  Your microalbumin was within normal limits.  Your cholesterol numbers remain mildly elevated with a total cholesterol at 212 we like to see that below 200.  Continue with your cholesterol medication Lipitor and low-cholesterol diet and will recheck in 1 year.    Have a happy Thanksgiving.    Juli Ramsey CNP

## 2021-01-27 DIAGNOSIS — E11.9 TYPE 2 DIABETES MELLITUS WITHOUT COMPLICATION, WITH LONG-TERM CURRENT USE OF INSULIN (H): ICD-10-CM

## 2021-01-27 DIAGNOSIS — Z79.4 TYPE 2 DIABETES MELLITUS WITHOUT COMPLICATION, WITH LONG-TERM CURRENT USE OF INSULIN (H): ICD-10-CM

## 2021-02-09 DIAGNOSIS — Z79.4 TYPE 2 DIABETES MELLITUS WITH HYPERGLYCEMIA, WITH LONG-TERM CURRENT USE OF INSULIN (H): ICD-10-CM

## 2021-02-09 DIAGNOSIS — E11.65 TYPE 2 DIABETES MELLITUS WITH HYPERGLYCEMIA, WITH LONG-TERM CURRENT USE OF INSULIN (H): ICD-10-CM

## 2021-02-10 RX ORDER — FLASH GLUCOSE SENSOR
1 KIT MISCELLANEOUS
Qty: 2 EACH | Refills: 3 | Status: SHIPPED | OUTPATIENT
Start: 2021-02-10 | End: 2021-06-09

## 2021-03-17 DIAGNOSIS — I15.9 SECONDARY HYPERTENSION: ICD-10-CM

## 2021-03-17 RX ORDER — IRBESARTAN 300 MG/1
TABLET ORAL
Qty: 90 TABLET | Refills: 1 | Status: SHIPPED | OUTPATIENT
Start: 2021-03-17 | End: 2022-01-10

## 2021-03-22 ENCOUNTER — OFFICE VISIT (OUTPATIENT)
Dept: URGENT CARE | Facility: URGENT CARE | Age: 51
End: 2021-03-22
Payer: COMMERCIAL

## 2021-03-22 VITALS
OXYGEN SATURATION: 96 % | HEART RATE: 78 BPM | RESPIRATION RATE: 18 BRPM | TEMPERATURE: 98.6 F | DIASTOLIC BLOOD PRESSURE: 80 MMHG | SYSTOLIC BLOOD PRESSURE: 126 MMHG

## 2021-03-22 DIAGNOSIS — E11.65 TYPE 2 DIABETES MELLITUS WITH HYPERGLYCEMIA, WITH LONG-TERM CURRENT USE OF INSULIN (H): ICD-10-CM

## 2021-03-22 DIAGNOSIS — Z79.4 TYPE 2 DIABETES MELLITUS WITH HYPERGLYCEMIA, WITH LONG-TERM CURRENT USE OF INSULIN (H): ICD-10-CM

## 2021-03-22 DIAGNOSIS — R60.0 PERIPHERAL EDEMA: ICD-10-CM

## 2021-03-22 DIAGNOSIS — L03.115 CELLULITIS OF RIGHT LOWER EXTREMITY: Primary | ICD-10-CM

## 2021-03-22 PROCEDURE — 99214 OFFICE O/P EST MOD 30 MIN: CPT | Performed by: PHYSICIAN ASSISTANT

## 2021-03-22 RX ORDER — IBUPROFEN 200 MG
200 TABLET ORAL EVERY 4 HOURS PRN
COMMUNITY

## 2021-03-22 RX ORDER — DOXYCYCLINE 100 MG/1
100 CAPSULE ORAL 2 TIMES DAILY
Qty: 20 CAPSULE | Refills: 0 | Status: SHIPPED | OUTPATIENT
Start: 2021-03-22 | End: 2021-04-01

## 2021-03-22 RX ORDER — MUPIROCIN 20 MG/G
OINTMENT TOPICAL
Qty: 22 G | Refills: 0 | Status: SHIPPED | OUTPATIENT
Start: 2021-03-22

## 2021-03-22 ASSESSMENT — ENCOUNTER SYMPTOMS
DIARRHEA: 0
EYE PAIN: 0
BACK PAIN: 0
SINUS PRESSURE: 0
HEMATOCHEZIA: 0
SINUS PAIN: 0
RHINORRHEA: 0
PALPITATIONS: 0
EYE DISCHARGE: 0
NAUSEA: 0
HEARTBURN: 0
WHEEZING: 0
ARTHRALGIAS: 0
MYALGIAS: 0
COUGH: 0
EYE REDNESS: 0
ABDOMINAL PAIN: 0
ROS SKIN COMMENTS: SKIN INFECTION
CONSTIPATION: 0
FATIGUE: 0
CHILLS: 0
SORE THROAT: 0
SHORTNESS OF BREATH: 0
FEVER: 0
VOMITING: 0

## 2021-03-22 NOTE — PROGRESS NOTES
"    Assessment & Plan     Cellulitis of right lower extremity  Will treat with doxycyline two times daily x 10 days and bactroban to the dry/cracked areas. Monitor closely. Would recommend follow up with primary care provider in 2 days to check progress. Discussed in detail symptoms that would warrant emergent evaluation in the ED. Patient agrees with plan.      - doxycycline monohydrate (MONODOX) 100 MG capsule; Take 1 capsule (100 mg) by mouth 2 times daily for 10 days  - mupirocin (BACTROBAN) 2 % external ointment; Apply to affected area 2-3 times daily x 7-14 days    Peripheral edema  Patient does not tolerate compression socks. Aim for <2,000mg of sodium/day. Can try wrapping legs with ace bandage as high as tolerated. Encouraged elevating legs. Follow up with primary care provider this week to check progress.     Type 2 diabetes mellitus with hyperglycemia, with long-term current use of insulin (H)  Monitor blood sugar and carbohydrate intake closely with skin infection. Last A1C was 7.3 and patient reports similar control at this time.                BMI:   Estimated body mass index is 53.73 kg/m  as calculated from the following:    Height as of 11/24/20: 1.626 m (5' 4\").    Weight as of 11/24/20: 142 kg (313 lb).           Return in about 2 days (around 3/24/2021) for Follow up.    EFREN Mcconnell [facility]    Subjective   Chief Complaint   Patient presents with     Derm Problem     1.5 weeks has sores on the top of her right foot it was very dry put lotion on then the sores happened, retaining a lot of fluid in both feet. Sores have a puss pockets, also the calf has some sores also.       HPI     Derm problem     Onset of symptoms was 1.5 weeks  Course of illness is worsening.    Severity moderate  Current and Associated symptoms: skin infection on right foot   Treatment measures tried include None tried.  Predisposing factors include has had more peripheral " .              Review of Systems   Constitutional: Negative for chills, fatigue and fever.   HENT: Negative for congestion, ear pain, rhinorrhea, sinus pressure, sinus pain and sore throat.    Eyes: Negative for pain, discharge and redness.   Respiratory: Negative for cough, shortness of breath and wheezing.    Cardiovascular: Negative for palpitations.   Gastrointestinal: Negative for abdominal pain, constipation, diarrhea, heartburn, hematochezia, nausea and vomiting.   Musculoskeletal: Negative for arthralgias, back pain and myalgias.   Skin: Negative for rash.        Skin infection            Objective    /80 (BP Location: Right arm, Patient Position: Sitting, Cuff Size: Adult Large)   Pulse 78   Temp 98.6  F (37  C) (Tympanic)   Resp 18   LMP  (LMP Unknown)   SpO2 96%   There is no height or weight on file to calculate BMI.  Physical Exam  Constitutional:       General: She is not in acute distress.  Musculoskeletal:        Feet:       Comments: There is bilateral pitting edema in lower legs and feet, R > L     No calf tenderness with palpation.    Skin:            Comments: Top of right foot has a few dried up blisters with surrounding erythema and tenderness. Erythema marked with surgical pen.     Distal right leg has some excoriations from itching leg but now drainage/discharge or signs of infection.    Neurological:      Mental Status: She is alert.   Psychiatric:         Speech: Speech normal.         Behavior: Behavior normal.            No results found for this or any previous visit (from the past 24 hour(s)).

## 2021-03-23 ENCOUNTER — TELEPHONE (OUTPATIENT)
Dept: FAMILY MEDICINE | Facility: CLINIC | Age: 51
End: 2021-03-23

## 2021-03-23 NOTE — TELEPHONE ENCOUNTER
Reason for call:  Patient reporting a symptom    Symptom or request: Rt Foot very Painful. Pt was in UC 3/22/21 for this issue. Pt has sores on the top of her foot  Please Advise.    Phone Number patient can be reached at:  Home number on file [phone] (home)    Best Time:  Any Time      Can we leave a detailed message on this number:  YES    Call taken on 3/23/2021 at 3:26 PM by Johana Baker

## 2021-03-23 NOTE — TELEPHONE ENCOUNTER
03-22-21 UC visit reviewed. Dx cellulitis rt LE- top of foot and lower leg per note.  C/O persistent  rt foot pain, swelling after up walking and on feet without ace wrap.   States taking doxycycline and applying bactroban as directed. Has taken ibu for pain which has helped.  Denies increased redness, warmth. No fevers.   Fluid filled areas intact.   Advised F/U with provider tomorrow as noted per UC note. States unable to come in this week due to transportation. Scheduled appt with Juli 03/29.  Advised to elevate L/E as much as possible, ace wrap during day to reduce swelling. May have less pain if less swelling. Continue Rx's as directed, keep areas clean, dry.   Highly advise F/U with provider or UC if any worsening sx, pain sooner than 03/29 F/U appt.  Pt voices understanding/ agreement.   CARMEN Barrientos RN

## 2021-04-01 DIAGNOSIS — Z79.4 TYPE 2 DIABETES MELLITUS WITHOUT COMPLICATION, WITH LONG-TERM CURRENT USE OF INSULIN (H): ICD-10-CM

## 2021-04-01 DIAGNOSIS — E11.9 TYPE 2 DIABETES MELLITUS WITHOUT COMPLICATION, WITH LONG-TERM CURRENT USE OF INSULIN (H): ICD-10-CM

## 2021-04-05 NOTE — TELEPHONE ENCOUNTER
Routing refill request to provider for review/approval because:  Last ordered by EFREN Hammonds RN, BSN

## 2021-04-08 RX ORDER — PEN NEEDLE, DIABETIC 32GX 5/32"
NEEDLE, DISPOSABLE MISCELLANEOUS
Qty: 100 EACH | Refills: 0 | Status: SHIPPED | OUTPATIENT
Start: 2021-04-08 | End: 2021-05-13

## 2021-04-13 DIAGNOSIS — E11.65 TYPE 2 DIABETES MELLITUS WITH HYPERGLYCEMIA, WITH LONG-TERM CURRENT USE OF INSULIN (H): ICD-10-CM

## 2021-04-13 DIAGNOSIS — Z79.4 TYPE 2 DIABETES MELLITUS WITH HYPERGLYCEMIA, WITH LONG-TERM CURRENT USE OF INSULIN (H): ICD-10-CM

## 2021-04-29 ENCOUNTER — OFFICE VISIT (OUTPATIENT)
Dept: FAMILY MEDICINE | Facility: CLINIC | Age: 51
End: 2021-04-29
Payer: COMMERCIAL

## 2021-04-29 VITALS
SYSTOLIC BLOOD PRESSURE: 128 MMHG | HEART RATE: 90 BPM | WEIGHT: 293 LBS | RESPIRATION RATE: 18 BRPM | TEMPERATURE: 98.2 F | BODY MASS INDEX: 50.02 KG/M2 | DIASTOLIC BLOOD PRESSURE: 64 MMHG | OXYGEN SATURATION: 95 % | HEIGHT: 64 IN

## 2021-04-29 DIAGNOSIS — Z79.4 TYPE 2 DIABETES MELLITUS WITHOUT COMPLICATION, WITH LONG-TERM CURRENT USE OF INSULIN (H): ICD-10-CM

## 2021-04-29 DIAGNOSIS — Z12.11 COLON CANCER SCREENING: ICD-10-CM

## 2021-04-29 DIAGNOSIS — N39.41 URGE INCONTINENCE OF URINE: ICD-10-CM

## 2021-04-29 DIAGNOSIS — Z12.31 ENCOUNTER FOR SCREENING MAMMOGRAM FOR BREAST CANCER: ICD-10-CM

## 2021-04-29 DIAGNOSIS — R60.0 BILATERAL LEG EDEMA: ICD-10-CM

## 2021-04-29 DIAGNOSIS — F43.22 ADJUSTMENT DISORDER WITH ANXIOUS MOOD: ICD-10-CM

## 2021-04-29 DIAGNOSIS — Z79.4 TYPE 2 DIABETES MELLITUS WITH HYPERGLYCEMIA, WITH LONG-TERM CURRENT USE OF INSULIN (H): Primary | ICD-10-CM

## 2021-04-29 DIAGNOSIS — L30.9 ECZEMA, UNSPECIFIED TYPE: ICD-10-CM

## 2021-04-29 DIAGNOSIS — E11.9 TYPE 2 DIABETES MELLITUS WITHOUT COMPLICATION, WITH LONG-TERM CURRENT USE OF INSULIN (H): ICD-10-CM

## 2021-04-29 DIAGNOSIS — I10 BENIGN ESSENTIAL HYPERTENSION: ICD-10-CM

## 2021-04-29 DIAGNOSIS — L30.9 DERMATITIS: ICD-10-CM

## 2021-04-29 DIAGNOSIS — E78.5 HYPERLIPIDEMIA LDL GOAL <100: ICD-10-CM

## 2021-04-29 DIAGNOSIS — E11.65 TYPE 2 DIABETES MELLITUS WITH HYPERGLYCEMIA, WITH LONG-TERM CURRENT USE OF INSULIN (H): Primary | ICD-10-CM

## 2021-04-29 LAB
ALBUMIN SERPL-MCNC: 3.6 G/DL (ref 3.4–5)
ALP SERPL-CCNC: 96 U/L (ref 40–150)
ALT SERPL W P-5'-P-CCNC: 19 U/L (ref 0–50)
ANION GAP SERPL CALCULATED.3IONS-SCNC: 4 MMOL/L (ref 3–14)
AST SERPL W P-5'-P-CCNC: 8 U/L (ref 0–45)
BILIRUB SERPL-MCNC: 0.9 MG/DL (ref 0.2–1.3)
BUN SERPL-MCNC: 22 MG/DL (ref 7–30)
CALCIUM SERPL-MCNC: 8.7 MG/DL (ref 8.5–10.1)
CHLORIDE SERPL-SCNC: 104 MMOL/L (ref 94–109)
CO2 SERPL-SCNC: 29 MMOL/L (ref 20–32)
CREAT SERPL-MCNC: 0.75 MG/DL (ref 0.52–1.04)
GFR SERPL CREATININE-BSD FRML MDRD: >90 ML/MIN/{1.73_M2}
GLUCOSE SERPL-MCNC: 71 MG/DL (ref 70–99)
HBA1C MFR BLD: 8.3 % (ref 0–5.6)
POTASSIUM SERPL-SCNC: 3.9 MMOL/L (ref 3.4–5.3)
PROT SERPL-MCNC: 7.7 G/DL (ref 6.8–8.8)
SODIUM SERPL-SCNC: 137 MMOL/L (ref 133–144)

## 2021-04-29 PROCEDURE — 83036 HEMOGLOBIN GLYCOSYLATED A1C: CPT | Performed by: NURSE PRACTITIONER

## 2021-04-29 PROCEDURE — 99214 OFFICE O/P EST MOD 30 MIN: CPT | Performed by: NURSE PRACTITIONER

## 2021-04-29 PROCEDURE — 99207 PR FOOT EXAM NO CHARGE: CPT | Performed by: NURSE PRACTITIONER

## 2021-04-29 PROCEDURE — 36415 COLL VENOUS BLD VENIPUNCTURE: CPT | Performed by: NURSE PRACTITIONER

## 2021-04-29 PROCEDURE — 80053 COMPREHEN METABOLIC PANEL: CPT | Performed by: NURSE PRACTITIONER

## 2021-04-29 RX ORDER — SERTRALINE HYDROCHLORIDE 100 MG/1
TABLET, FILM COATED ORAL
Qty: 90 TABLET | Refills: 3 | Status: SHIPPED | OUTPATIENT
Start: 2021-04-29 | End: 2022-05-24

## 2021-04-29 RX ORDER — TRIAMCINOLONE ACETONIDE 1 MG/G
CREAM TOPICAL 2 TIMES DAILY
Qty: 45 G | Refills: 1 | Status: SHIPPED | OUTPATIENT
Start: 2021-04-29 | End: 2023-06-08

## 2021-04-29 RX ORDER — OXYBUTYNIN CHLORIDE 10 MG/1
10 TABLET, EXTENDED RELEASE ORAL DAILY
Qty: 90 TABLET | Refills: 3 | Status: SHIPPED | OUTPATIENT
Start: 2021-04-29 | End: 2022-05-16

## 2021-04-29 RX ORDER — LIRAGLUTIDE 6 MG/ML
INJECTION SUBCUTANEOUS
Qty: 9 ML | Refills: 3 | Status: SHIPPED | OUTPATIENT
Start: 2021-04-29 | End: 2021-08-31 | Stop reason: ALTCHOICE

## 2021-04-29 ASSESSMENT — ANXIETY QUESTIONNAIRES
6. BECOMING EASILY ANNOYED OR IRRITABLE: SEVERAL DAYS
5. BEING SO RESTLESS THAT IT IS HARD TO SIT STILL: NOT AT ALL
GAD7 TOTAL SCORE: 5
7. FEELING AFRAID AS IF SOMETHING AWFUL MIGHT HAPPEN: NOT AT ALL
GAD7 TOTAL SCORE: 5
2. NOT BEING ABLE TO STOP OR CONTROL WORRYING: SEVERAL DAYS
3. WORRYING TOO MUCH ABOUT DIFFERENT THINGS: SEVERAL DAYS
4. TROUBLE RELAXING: SEVERAL DAYS
7. FEELING AFRAID AS IF SOMETHING AWFUL MIGHT HAPPEN: NOT AT ALL
1. FEELING NERVOUS, ANXIOUS, OR ON EDGE: SEVERAL DAYS
GAD7 TOTAL SCORE: 5

## 2021-04-29 ASSESSMENT — PATIENT HEALTH QUESTIONNAIRE - PHQ9
SUM OF ALL RESPONSES TO PHQ QUESTIONS 1-9: 5
10. IF YOU CHECKED OFF ANY PROBLEMS, HOW DIFFICULT HAVE THESE PROBLEMS MADE IT FOR YOU TO DO YOUR WORK, TAKE CARE OF THINGS AT HOME, OR GET ALONG WITH OTHER PEOPLE: SOMEWHAT DIFFICULT
SUM OF ALL RESPONSES TO PHQ QUESTIONS 1-9: 5

## 2021-04-29 ASSESSMENT — MIFFLIN-ST. JEOR: SCORE: 1992.08

## 2021-04-29 NOTE — PROGRESS NOTES
"    Assessment & Plan     (E11.65,  Z79.4) Type 2 diabetes mellitus with hyperglycemia, with long-term current use of insulin (H)  (primary encounter diagnosis)  Comment: uncontrolled will have patient follow-up with diabetic educator for review of start meal time insulin   Plan: Hemoglobin A1c, FOOT EXAM, insulin NPH-Regular         (HUMULIN MIX 70/30 KWIKPEN) susp         (Z12.31) Encounter for screening mammogram for breast cancer  Comment:   Plan: MA Screening Digital Bilateral          (Z12.11) Colon cancer screening  Comment:   Plan: Fecal colorectal cancer screen FIT            (L30.9) Eczema, unspecified type  Comment:   Plan: triamcinolone (KENALOG) 0.1 % external cream            (E11.9,  Z79.4) Type 2 diabetes mellitus without complication, with long-term current use of insulin (H)  Comment: uncontrolled will have patient follow-up with diabetic educator for review of start meal time insulin   Plan: liraglutide (VICTOZA PEN) 18 MG/3ML solution,         metFORMIN (GLUCOPHAGE) 1000 MG tablet      (L30.9) Dermatitis  Comment:   Plan:     (F43.22) Adjustment disorder with anxious mood  Comment:  Controlled no change in treatment plan  Plan: sertraline (ZOLOFT) 100 MG tablet          (N39.41) Urge incontinence of urine  Comment:  Controlled no change in treatment plan  Plan: oxybutynin ER (DITROPAN-XL) 10 MG 24 hr tablet      (I10) Benign essential hypertension  Comment:  Controlled no change in treatment plan  Plan:     (R60.0) Bilateral leg edema  Comment: recommend use of compression socks and   Plan: Comprehensive metabolic panel        :[id number]}     BMI:   Estimated body mass index is 52.87 kg/m  as calculated from the following:    Height as of this encounter: 1.626 m (5' 4\").    Weight as of this encounter: 139.7 kg (308 lb).   Weight management plan: Discussed healthy diet and exercise guidelines    See Patient Instructions    No follow-ups on file.    RONNELL Bianchi [facility] " "[facility]        Kimberly Brumfield is a 50 year old who presents for the following health issues     HPI     Diabetes Follow-up    How often are you checking your blood sugar? Continuous glucose monitor  What time of day are you checking your blood sugars (select all that apply)?  Before and after meals  Have you had any blood sugars above 200?  Yes after eating  Have you had any blood sugars below 70?  No    What symptoms do you notice when your blood sugar is low?  None    What concerns do you have today about your diabetes? None     Do you have any of these symptoms? (Select all that apply)  No numbness or tingling in feet.  No redness, sores or blisters on feet.  No complaints of excessive thirst.  No reports of blurry vision.  No significant changes to weight.    Have you had a diabetic eye exam in the last 12 months? No    BP Readings from Last 2 Encounters:   03/22/21 126/80   11/24/20 126/70     Hemoglobin A1C (%)   Date Value   04/29/2021 8.3 (H)   11/24/2020 7.3 (H)     LDL Cholesterol Calculated (mg/dL)   Date Value   11/24/2020 125 (H)   11/25/2019 59       Review of Systems   Constitutional, HEENT, cardiovascular, pulmonary, gi and gu systems are negative, except as otherwise noted.      Objective    LMP  (LMP Unknown)   Body mass index is 52.87 kg/m . /64 (BP Location: Right arm, Cuff Size: Adult Large)   Pulse 90   Temp 98.2  F (36.8  C) (Tympanic)   Resp 18   Ht 1.626 m (5' 4\")   Wt 139.7 kg (308 lb)   LMP  (LMP Unknown)   SpO2 95%   BMI 52.87 kg/m      Physical Exam   GENERAL: healthy, alert and no distress  EYES: Eyes grossly normal to inspection, PERRL and conjunctivae and sclerae normal  HENT: ear canals and TM's normal, nose and mouth without ulcers or lesions  NECK: no adenopathy, no asymmetry, masses, or scars and thyroid normal to palpation  RESP: lungs clear to auscultation - no rales, rhonchi or wheezes  CV: regular rate and rhythm, normal S1 S2, no S3 or S4, no murmur, " click or rub, no peripheral edema and peripheral pulses strong  ABDOMEN: soft, nontender, no hepatosplenomegaly, no masses and bowel sounds normal  MS: no gross musculoskeletal defects noted, no edema  SKIN: no suspicious lesions or rashes  SKIN: Examination of the rash to the hand reveals: Eczema: dry, slightly raised, red patches with mild flaking.    NEURO: Normal strength and tone, mentation intact and speech normal  PSYCH: mentation appears normal, affect normal/bright    Results for orders placed or performed in visit on 04/29/21   Hemoglobin A1c     Status: Abnormal   Result Value Ref Range    Hemoglobin A1C 8.3 (H) 0 - 5.6 %

## 2021-04-30 ASSESSMENT — ASTHMA QUESTIONNAIRES: ACT_TOTALSCORE: 24

## 2021-04-30 ASSESSMENT — ANXIETY QUESTIONNAIRES: GAD7 TOTAL SCORE: 5

## 2021-05-06 ENCOUNTER — IMMUNIZATION (OUTPATIENT)
Dept: FAMILY MEDICINE | Facility: CLINIC | Age: 51
End: 2021-05-06
Payer: COMMERCIAL

## 2021-05-06 PROCEDURE — 0011A PR COVID VAC MODERNA 100 MCG/0.5 ML IM: CPT

## 2021-05-06 PROCEDURE — 91301 PR COVID VAC MODERNA 100 MCG/0.5 ML IM: CPT

## 2021-05-06 RX ORDER — ATORVASTATIN CALCIUM 40 MG/1
40 TABLET, FILM COATED ORAL DAILY
Qty: 90 TABLET | Refills: 0 | Status: SHIPPED | OUTPATIENT
Start: 2021-05-06 | End: 2021-08-18

## 2021-05-13 DIAGNOSIS — E11.9 TYPE 2 DIABETES MELLITUS WITHOUT COMPLICATION, WITH LONG-TERM CURRENT USE OF INSULIN (H): ICD-10-CM

## 2021-05-13 DIAGNOSIS — Z79.4 TYPE 2 DIABETES MELLITUS WITHOUT COMPLICATION, WITH LONG-TERM CURRENT USE OF INSULIN (H): ICD-10-CM

## 2021-05-13 RX ORDER — PEN NEEDLE, DIABETIC 32GX 5/32"
NEEDLE, DISPOSABLE MISCELLANEOUS
Qty: 200 EACH | Refills: 0 | Status: SHIPPED | OUTPATIENT
Start: 2021-05-13 | End: 2021-11-03

## 2021-05-24 ENCOUNTER — TELEPHONE (OUTPATIENT)
Dept: EDUCATION SERVICES | Facility: CLINIC | Age: 51
End: 2021-05-24

## 2021-05-24 ENCOUNTER — PATIENT OUTREACH (OUTPATIENT)
Dept: EDUCATION SERVICES | Facility: CLINIC | Age: 51
End: 2021-05-24
Payer: COMMERCIAL

## 2021-05-24 DIAGNOSIS — Z79.4 TYPE 2 DIABETES MELLITUS WITH HYPERGLYCEMIA, WITH LONG-TERM CURRENT USE OF INSULIN (H): Primary | ICD-10-CM

## 2021-05-24 DIAGNOSIS — E11.65 TYPE 2 DIABETES MELLITUS WITH HYPERGLYCEMIA, WITH LONG-TERM CURRENT USE OF INSULIN (H): ICD-10-CM

## 2021-05-24 DIAGNOSIS — Z79.4 TYPE 2 DIABETES MELLITUS WITH HYPERGLYCEMIA, WITH LONG-TERM CURRENT USE OF INSULIN (H): ICD-10-CM

## 2021-05-24 DIAGNOSIS — E11.65 TYPE 2 DIABETES MELLITUS WITH HYPERGLYCEMIA, WITH LONG-TERM CURRENT USE OF INSULIN (H): Primary | ICD-10-CM

## 2021-05-24 PROCEDURE — G0108 DIAB MANAGE TRN  PER INDIV: HCPCS | Performed by: DIETITIAN, REGISTERED

## 2021-05-24 RX ORDER — SEMAGLUTIDE 1.34 MG/ML
INJECTION, SOLUTION SUBCUTANEOUS
Qty: 3 ML | Refills: 2 | Status: SHIPPED | OUTPATIENT
Start: 2021-05-24 | End: 2022-06-07

## 2021-05-24 NOTE — TELEPHONE ENCOUNTER
Diabetes education contact:    Juli Ramsey NP,  I want to switch Brissa from Victoza to Ozempic and see if we can get better blood sugars and assist with wt loss.    Would start on 0.25 mg once a week for two weeks, then go up to 0.5 mg weekly.    Are you ok with this change?    Thanks! Diamond Lucero RD, CDE

## 2021-05-24 NOTE — PROGRESS NOTES
"Diabetes Self-Management Education & Support    Presents for: Individual review, via telephone    SUBJECTIVE/OBJECTIVE:  Presents for: Individual review  Diabetes type: Type 2  Cultural Influences/Ethnic Background:  American      Diabetes Symptoms & Complications:          Patient Problem List and Family Medical History reviewed for relevant medical history, current medical status, and diabetes risk factors.    Vitals:  LMP  (LMP Unknown)   Estimated body mass index is 52.87 kg/m  as calculated from the following:    Height as of 4/29/21: 1.626 m (5' 4\").    Weight as of 4/29/21: 139.7 kg (308 lb).   Last 3 BP:   BP Readings from Last 3 Encounters:   04/29/21 128/64   03/22/21 126/80   11/24/20 126/70       History   Smoking Status     Former Smoker   Smokeless Tobacco     Never Used       Labs:  Lab Results   Component Value Date    A1C 8.3 04/29/2021     Lab Results   Component Value Date    GLC 71 04/29/2021     Lab Results   Component Value Date     11/24/2020     HDL Cholesterol   Date Value Ref Range Status   11/24/2020 64 >49 mg/dL Final   ]  GFR Estimate   Date Value Ref Range Status   04/29/2021 >90 >60 mL/min/[1.73_m2] Final     Comment:     Non  GFR Calc  Starting 12/18/2018, serum creatinine based estimated GFR (eGFR) will be   calculated using the Chronic Kidney Disease Epidemiology Collaboration   (CKD-EPI) equation.       GFR Estimate If Black   Date Value Ref Range Status   04/29/2021 >90 >60 mL/min/[1.73_m2] Final     Comment:      GFR Calc  Starting 12/18/2018, serum creatinine based estimated GFR (eGFR) will be   calculated using the Chronic Kidney Disease Epidemiology Collaboration   (CKD-EPI) equation.       Lab Results   Component Value Date    CR 0.75 04/29/2021     No results found for: MICROALBUMIN    Healthy Eating:  Healthy Eating Assessed Today: Yes  Breakfast- eggs and toast or sometimes cereal  Lunch: leftovers  Supper: tacos or hotdish 1.5 " cups  Snacks: apple or nuts or cheese, fresh veggie/dip  Being Active:   not able to do much, because of her stroke she struggles to walk still    Monitoring:         She tested her BG on fingersticks when it was telling her it was low and it was actually higher: 100 points off: sensor read 45 and meter read 145.  Instructed her to call company when that is occurring.    Taking Medications:  Diabetes Medication(s)     Biguanides       metFORMIN (GLUCOPHAGE) 1000 MG tablet    TAKE 1 TABLET BY MOUTH TWICE DAILY WITH MEALS    Insulin       insulin NPH-Regular (HUMULIN MIX 70/30 KWIKPEN) susp    INJECT 46U UNDER THE SKIN BEFORE BREAKFAST AND 44U BEFORE SUPPEr    Incretin Mimetic Agents (GLP-1 Receptor Agonists)       liraglutide (VICTOZA PEN) 18 MG/3ML solution    ADMINISTER 1.8 MG UNDER THE SKIN DAILY               Problem Solving:   not assessed              Reducing Risks:   not assessed    Healthy Coping:     Patient Activation Measure Survey Score:  No flowsheet data found.    Diabetes knowledge and skills assessment:   Patient is knowledgeable in diabetes management concepts related to: Healthy Eating, Being Active and Monitoring    Patient needs further education on the following diabetes management concepts: Healthy Eating, Being Active, Monitoring, Taking Medication, Problem Solving, Reducing Risks and Healthy Coping    Based on learning assessment above, most appropriate setting for further diabetes education would be: Individual setting.      INTERVENTIONS:    Education provided today on:  AADE Self-Care Behaviors:  Healthy Eating: consistency in amount, composition, and timing of food intake and label reading  Being Active: relationship to blood glucose and describe appropriate activity program  Monitoring: individual blood glucose targets and frequency of monitoring  Taking Medication: action of prescribed medication and when to take medications    Opportunities for ongoing education and support in  diabetes-self management were discussed.    Pt verbalized understanding of concepts discussed and recommendations provided today.       Education Materials Provided:  No new materials provided today      ASSESSMENT:  Her sensor before this last one has been way off, stating low BG levels and when tested with finger stick it was 100 pt diference 45 per sensor and fingerstick was 145.  Discussed compression readings (lying on sensor).  She will be taking ozempic now in place of victoza. She will watch her readings if they are going low she will drop her insulin dose by 4 units.  She will try to be as active as able.  She is losing wt right along..which is why she is still going down on dose of insulin she needs. She is still working on her meal plan and trying to continue to lose wt.        Patient's most recent   Lab Results   Component Value Date    A1C 8.3 04/29/2021    is not meeting goal of <8.0    PLAN  See Patient Instructions for co-developed, patient-stated behavior change goals.  AVS printed and provided to patient today. See Follow-Up section for recommended follow-up.      Time Spent: 30 minutes  Encounter Type: Individual    Any diabetes medication dose changes were made via the CDE Protocol and Collaborative Practice Agreement with the patient's primary care provider. A copy of this encounter was shared with the provider.

## 2021-06-03 ENCOUNTER — IMMUNIZATION (OUTPATIENT)
Dept: FAMILY MEDICINE | Facility: CLINIC | Age: 51
End: 2021-06-03
Attending: FAMILY MEDICINE
Payer: COMMERCIAL

## 2021-06-03 PROCEDURE — 91301 PR COVID VAC MODERNA 100 MCG/0.5 ML IM: CPT

## 2021-06-03 PROCEDURE — 0012A PR COVID VAC MODERNA 100 MCG/0.5 ML IM: CPT

## 2021-06-04 ENCOUNTER — TELEPHONE (OUTPATIENT)
Dept: FAMILY MEDICINE | Facility: CLINIC | Age: 51
End: 2021-06-04

## 2021-06-04 NOTE — TELEPHONE ENCOUNTER
Reason for Call:  Other prescription    Detailed comments: Pt is calling saying her Insurance will not cover Semaglutide. This is new to Pt. Pt is wondering what is the next step?    Phone Number Patient can be reached at: Home number on file [phone] (home)  Best Time: Any Time  Can we leave a detailed message on this number? YES     Spoke with Tyler Browning [phone]  The Insurance does cover the medication. It is ready for pt to  Rx. Pt notified      Call taken on 6/4/2021 at 7:52 AM by Johana Baker

## 2021-06-09 DIAGNOSIS — Z79.4 TYPE 2 DIABETES MELLITUS WITH HYPERGLYCEMIA, WITH LONG-TERM CURRENT USE OF INSULIN (H): ICD-10-CM

## 2021-06-09 DIAGNOSIS — E11.65 TYPE 2 DIABETES MELLITUS WITH HYPERGLYCEMIA, WITH LONG-TERM CURRENT USE OF INSULIN (H): ICD-10-CM

## 2021-06-09 RX ORDER — FLASH GLUCOSE SENSOR
KIT MISCELLANEOUS
Qty: 6 EACH | Refills: 1 | Status: SHIPPED | OUTPATIENT
Start: 2021-06-09 | End: 2021-11-04

## 2021-06-25 DIAGNOSIS — E11.65 TYPE 2 DIABETES MELLITUS WITH HYPERGLYCEMIA, WITH LONG-TERM CURRENT USE OF INSULIN (H): ICD-10-CM

## 2021-06-25 DIAGNOSIS — Z79.4 TYPE 2 DIABETES MELLITUS WITH HYPERGLYCEMIA, WITH LONG-TERM CURRENT USE OF INSULIN (H): ICD-10-CM

## 2021-07-01 ENCOUNTER — PATIENT OUTREACH (OUTPATIENT)
Dept: EDUCATION SERVICES | Facility: CLINIC | Age: 51
End: 2021-07-01
Payer: COMMERCIAL

## 2021-07-01 DIAGNOSIS — E11.65 TYPE 2 DIABETES MELLITUS WITH HYPERGLYCEMIA, WITH LONG-TERM CURRENT USE OF INSULIN (H): Primary | ICD-10-CM

## 2021-07-01 DIAGNOSIS — Z79.4 TYPE 2 DIABETES MELLITUS WITH HYPERGLYCEMIA, WITH LONG-TERM CURRENT USE OF INSULIN (H): Primary | ICD-10-CM

## 2021-07-01 PROCEDURE — G0108 DIAB MANAGE TRN  PER INDIV: HCPCS | Performed by: DIETITIAN, REGISTERED

## 2021-07-01 RX ORDER — SEMAGLUTIDE 1.34 MG/ML
1 INJECTION, SOLUTION SUBCUTANEOUS
Qty: 3 ML | Refills: 5 | Status: SHIPPED | OUTPATIENT
Start: 2021-07-01 | End: 2022-02-21

## 2021-07-01 NOTE — PROGRESS NOTES
"Diabetes Self-Management Education & Support    Presents for: Individual review, via phone    SUBJECTIVE/OBJECTIVE:  Presents for: Individual review  Diabetes type: Type 2  Cultural Influences/Ethnic Background:  American      Diabetes Symptoms & Complications:          Patient Problem List and Family Medical History reviewed for relevant medical history, current medical status, and diabetes risk factors.    Vitals:  LMP  (LMP Unknown)   Estimated body mass index is 52.87 kg/m  as calculated from the following:    Height as of 4/29/21: 1.626 m (5' 4\").    Weight as of 4/29/21: 139.7 kg (308 lb).   Last 3 BP:   BP Readings from Last 3 Encounters:   04/29/21 128/64   03/22/21 126/80   11/24/20 126/70       History   Smoking Status     Former Smoker   Smokeless Tobacco     Never Used       Labs:  Lab Results   Component Value Date    A1C 8.3 04/29/2021     Lab Results   Component Value Date    GLC 71 04/29/2021     Lab Results   Component Value Date     11/24/2020     HDL Cholesterol   Date Value Ref Range Status   11/24/2020 64 >49 mg/dL Final   ]  GFR Estimate   Date Value Ref Range Status   04/29/2021 >90 >60 mL/min/[1.73_m2] Final     Comment:     Non  GFR Calc  Starting 12/18/2018, serum creatinine based estimated GFR (eGFR) will be   calculated using the Chronic Kidney Disease Epidemiology Collaboration   (CKD-EPI) equation.       GFR Estimate If Black   Date Value Ref Range Status   04/29/2021 >90 >60 mL/min/[1.73_m2] Final     Comment:      GFR Calc  Starting 12/18/2018, serum creatinine based estimated GFR (eGFR) will be   calculated using the Chronic Kidney Disease Epidemiology Collaboration   (CKD-EPI) equation.       Lab Results   Component Value Date    CR 0.75 04/29/2021     No results found for: MICROALBUMIN    Healthy Eating:  Healthy Eating Assessed Today: Yes  Breakfast: usually eggs/toast, drinks her fit before breakfast  Lunch: varies: sometimes a sandwich and " fruit or leftovers: meat loaf or chicken  Dinner: party pizza or hotdogs  Snacks: strawberries and cheese  Other: has been on the now on the 0.5 mg dosing this week.  does not notice a difference between the two.  BG since going to 0.5 m-150 mg/dL  Beverages: Water    Being Active:  Being Active Assessed Today: Yes  Exercise:: (still not doing much still hard to get around)    Monitorin-150 per pt        Taking Medications:  Diabetes Medication(s)     Biguanides       metFORMIN (GLUCOPHAGE) 1000 MG tablet    TAKE 1 TABLET BY MOUTH TWICE DAILY WITH MEALS    Insulin       insulin NPH-Regular (HUMULIN MIX 70/30 KWIKPEN) susp    INJECT 46 UNITS UNDER THE SKIN BEFORE BREAKFAST AND 44 UNITS BEFORE SUPPER    Incretin Mimetic Agents (GLP-1 Receptor Agonists)       semaglutide (OZEMPIC, 1 MG/DOSE,) 2 MG/1.5ML pen    Inject 1 mg Subcutaneous every 7 days     liraglutide (VICTOZA PEN) 18 MG/3ML solution    ADMINISTER 1.8 MG UNDER THE SKIN DAILY     Semaglutide,0.25 or 0.5MG/DOS, (OZEMPIC, 0.25 OR 0.5 MG/DOSE,) 2 MG/1.5ML SOPN    Take 0.25 mg weekly for two weeks, then increase to 0.50 mg weekly               Problem Solving:   not assessed              Reducing Risks:       Healthy Coping:     Patient Activation Measure Survey Score:  No flowsheet data found.    Diabetes knowledge and skills assessment:   Patient is knowledgeable in diabetes management concepts related to: Healthy Eating, Being Active and Monitoring    Patient needs further education on the following diabetes management concepts: Healthy Eating, Being Active, Monitoring, Taking Medication, Problem Solving, Reducing Risks and Healthy Coping    Based on learning assessment above, most appropriate setting for further diabetes education would be: Individual setting.      INTERVENTIONS:    Education provided today on:  AADE Self-Care Behaviors:  Healthy Eating: consistency in amount, composition, and timing of food intake  Being Active: describe  appropriate activity program  Monitoring: log and interpret results and individual blood glucose targets  Taking Medication: action of prescribed medication, side effects of prescribed medications and when to take medications    Opportunities for ongoing education and support in diabetes-self management were discussed.    Pt verbalized understanding of concepts discussed and recommendations provided today.       Education Materials Provided:  No new materials provided today      ASSESSMENT:  -she was not able to get ozempic right away so this is her first dose of ozempic 0.5 mg dose this week and she is feeling fine, she will finish the supply she has at home at the 0.50 mg dose.  Then she will start the 1.0 mg dose every week.  -she will work on testing more sugars, did not have meter with her, at her daughters.  -staying as active as she can, cant do much.        Patient's most recent   Lab Results   Component Value Date    A1C 8.3 04/29/2021    is not meeting goal of <7.0    PLAN  See Patient Instructions for co-developed, patient-stated behavior change goals.  AVS printed and provided to patient today. See Follow-Up section for recommended follow-up.      Time Spent: 30 minutes  Encounter Type: Individual    Any diabetes medication dose changes were made via the CDE Protocol and Collaborative Practice Agreement with the patient's primary care provider. A copy of this encounter was shared with the provider.

## 2021-07-26 ENCOUNTER — TELEPHONE (OUTPATIENT)
Dept: FAMILY MEDICINE | Facility: CLINIC | Age: 51
End: 2021-07-26

## 2021-07-27 NOTE — TELEPHONE ENCOUNTER
Form completed and faxed to [place] [employer].  Copy to scan and copy mailed to patient at her request.

## 2021-08-17 DIAGNOSIS — E78.5 HYPERLIPIDEMIA LDL GOAL <100: ICD-10-CM

## 2021-08-18 RX ORDER — ATORVASTATIN CALCIUM 40 MG/1
TABLET, FILM COATED ORAL
Qty: 90 TABLET | Refills: 0 | Status: SHIPPED | OUTPATIENT
Start: 2021-08-18 | End: 2021-11-03

## 2021-08-31 ENCOUNTER — VIRTUAL VISIT (OUTPATIENT)
Dept: EDUCATION SERVICES | Facility: CLINIC | Age: 51
End: 2021-08-31
Payer: COMMERCIAL

## 2021-08-31 DIAGNOSIS — Z79.4 TYPE 2 DIABETES MELLITUS WITH HYPERGLYCEMIA, WITH LONG-TERM CURRENT USE OF INSULIN (H): ICD-10-CM

## 2021-08-31 DIAGNOSIS — E11.65 TYPE 2 DIABETES MELLITUS WITH HYPERGLYCEMIA, WITH LONG-TERM CURRENT USE OF INSULIN (H): ICD-10-CM

## 2021-08-31 PROCEDURE — G0108 DIAB MANAGE TRN  PER INDIV: HCPCS | Performed by: DIETITIAN, REGISTERED

## 2021-08-31 NOTE — PROGRESS NOTES
"Diabetes Self-Management Education & Support    Presents for: Individual review, via phone    SUBJECTIVE/OBJECTIVE:  Presents for: Individual review  Diabetes type: Type 2  Cultural Influences/Ethnic Background:  American      Diabetes Symptoms & Complications:          Patient Problem List and Family Medical History reviewed for relevant medical history, current medical status, and diabetes risk factors.    Vitals:  LMP  (LMP Unknown)   Estimated body mass index is 52.87 kg/m  as calculated from the following:    Height as of 4/29/21: 1.626 m (5' 4\").    Weight as of 4/29/21: 139.7 kg (308 lb).   Last 3 BP:   BP Readings from Last 3 Encounters:   04/29/21 128/64   03/22/21 126/80   11/24/20 126/70       History   Smoking Status     Former Smoker   Smokeless Tobacco     Never Used       Labs:  Lab Results   Component Value Date    A1C 8.3 04/29/2021     Lab Results   Component Value Date    GLC 71 04/29/2021     Lab Results   Component Value Date     11/24/2020     HDL Cholesterol   Date Value Ref Range Status   11/24/2020 64 >49 mg/dL Final   ]  GFR Estimate   Date Value Ref Range Status   04/29/2021 >90 >60 mL/min/[1.73_m2] Final     Comment:     Non  GFR Calc  Starting 12/18/2018, serum creatinine based estimated GFR (eGFR) will be   calculated using the Chronic Kidney Disease Epidemiology Collaboration   (CKD-EPI) equation.       GFR Estimate If Black   Date Value Ref Range Status   04/29/2021 >90 >60 mL/min/[1.73_m2] Final     Comment:      GFR Calc  Starting 12/18/2018, serum creatinine based estimated GFR (eGFR) will be   calculated using the Chronic Kidney Disease Epidemiology Collaboration   (CKD-EPI) equation.       Lab Results   Component Value Date    CR 0.75 04/29/2021     No results found for: MICROALBUMIN    Healthy Eating:  Healthy Eating Assessed Today: Yes  Breakfast: usually scrambled eggs, toast  Lunch: sometimes sandwich or leftovers  Dinner: pork " tenderloin last night.  hotdogs on grill, lasagna one night, burger on grill  Snacks: not much snacking  Other: appetite way down with adding the ozempic.  Beverages: Water    Being Active:  Being Active Assessed Today: Yes (not much for activity, cant get around well)  Exercise::  (leg issue)    Monitoring:             Taking Medications:  Diabetes Medication(s)     Biguanides       metFORMIN (GLUCOPHAGE) 1000 MG tablet    TAKE 1 TABLET BY MOUTH TWICE DAILY WITH MEALS    Insulin       insulin NPH-Regular (HUMULIN MIX 70/30 KWIKPEN) susp    INJECT 46 UNITS UNDER THE SKIN BEFORE BREAKFAST AND 44 UNITS BEFORE SUPPER    Incretin Mimetic Agents (GLP-1 Receptor Agonists)       liraglutide (VICTOZA PEN) 18 MG/3ML solution    ADMINISTER 1.8 MG UNDER THE SKIN DAILY     semaglutide (OZEMPIC, 1 MG/DOSE,) 2 MG/1.5ML pen    Inject 1 mg Subcutaneous every 7 days     Semaglutide,0.25 or 0.5MG/DOS, (OZEMPIC, 0.25 OR 0.5 MG/DOSE,) 2 MG/1.5ML SOPN    Take 0.25 mg weekly for two weeks, then increase to 0.50 mg weekly               Problem Solving:   not assessed              Reducing Risks:   not assessed    Healthy Coping:     Patient Activation Measure Survey Score:  No flowsheet data found.    Diabetes knowledge and skills assessment:   Patient is knowledgeable in diabetes management concepts related to: Healthy Eating, Being Active and Monitoring    Patient needs further education on the following diabetes management concepts: Healthy Eating, Being Active, Monitoring, Taking Medication, Problem Solving, Reducing Risks and Healthy Coping    Based on learning assessment above, most appropriate setting for further diabetes education would be: Individual setting.      INTERVENTIONS:    Education provided today on:  AADE Self-Care Behaviors:  Healthy Eating: consistency in amount, composition, and timing of food intake  Being Active: relationship to blood glucose and describe appropriate activity program  Monitoring: log and interpret  results and individual blood glucose targets  Taking Medication: action of prescribed medication and when to take medications  Problem Solving: low blood glucose - causes, signs/symptoms, treatment and prevention and carrying a carbohydrate source at all times    Opportunities for ongoing education and support in diabetes-self management were discussed.    Pt verbalized understanding of concepts discussed and recommendations provided today.       Education Materials Provided:  No new materials provided today      ASSESSMENT:  -having lots of lows, dropped her insulin to 24 units in the am and the pm.  From 32-34 units, if still having BG under 80 in two days she will go to 20 units and 20 units.  -she will call me if she is still having lows  -her sensor stopped working she will call on it today, she will do the fingerstick and make sure she is not low.  She can  her sensor at [facility].  -the ozempic has really taken the appetite away, she is very pleased with the wt loss.        Patient's most recent   Lab Results   Component Value Date    A1C 8.3 04/29/2021    is not meeting goal of <7.0    PLAN  See Patient Instructions for co-developed, patient-stated behavior change goals.  AVS printed and provided to patient today. See Follow-Up section for recommended follow-up.      Time Spent: 30 minutes  Encounter Type: Individual    Any diabetes medication dose changes were made via the CDE Protocol and Collaborative Practice Agreement with the patient's primary care provider. A copy of this encounter was shared with the provider.

## 2021-08-31 NOTE — PATIENT INSTRUCTIONS
1.  Drop the Humulin 70/30 to 24 units before breakfast and 24 units before supper.    Call me if you are having low blood sugars, Diamond [phone]

## 2021-09-13 ENCOUNTER — TELEPHONE (OUTPATIENT)
Dept: EDUCATION SERVICES | Facility: CLINIC | Age: 51
End: 2021-09-13

## 2021-09-13 DIAGNOSIS — Z79.4 TYPE 2 DIABETES MELLITUS WITH HYPERGLYCEMIA, WITH LONG-TERM CURRENT USE OF INSULIN (H): ICD-10-CM

## 2021-09-13 DIAGNOSIS — E11.65 TYPE 2 DIABETES MELLITUS WITH HYPERGLYCEMIA, WITH LONG-TERM CURRENT USE OF INSULIN (H): ICD-10-CM

## 2021-09-13 NOTE — TELEPHONE ENCOUNTER
Diabetes education contact:        Needs to drop insulin again, will have her go to 18 units before breakfast and supper of the Humulin Mid 70/30 mix.    Diamond Lucero RD, [facility] [place]    Any diabetes medication dose changes were made via the CDE Protocol and Collaborative Practice Agreement with the patient's primary care provider. A copy of this encounter was shared with the provider.

## 2021-09-19 DIAGNOSIS — I10 BENIGN ESSENTIAL HYPERTENSION: ICD-10-CM

## 2021-09-20 RX ORDER — AMLODIPINE BESYLATE 10 MG/1
TABLET ORAL
Qty: 90 TABLET | Refills: 3 | Status: SHIPPED | OUTPATIENT
Start: 2021-09-20 | End: 2022-10-14

## 2021-09-21 DIAGNOSIS — J45.40 MODERATE PERSISTENT ASTHMA WITHOUT COMPLICATION: ICD-10-CM

## 2021-09-23 RX ORDER — DEXAMETHASONE 4 MG/1
TABLET ORAL
Qty: 12 G | Refills: 3 | Status: SHIPPED | OUTPATIENT
Start: 2021-09-23 | End: 2022-06-07

## 2021-09-27 ENCOUNTER — TELEPHONE (OUTPATIENT)
Dept: FAMILY MEDICINE | Facility: CLINIC | Age: 51
End: 2021-09-27

## 2021-09-27 NOTE — TELEPHONE ENCOUNTER
Patient Quality Outreach      Summary:    Patient has the following on her problem list/HM:   Diabetes    Last A1C:  Lab Results   Component Value Date    A1C 8.3 04/29/2021    A1C 7.3 11/24/2020       Last LDL:    Lab Results   Component Value Date     11/24/2020       Is the patient on a Statin? Yes          Is the patient on Aspirin? Yes    Medications     HMG CoA Reductase Inhibitors     atorvastatin (LIPITOR) 40 MG tablet       Salicylates     aspirin (ASA) 81 MG tablet             Last three blood pressure readings:  BP Readings from Last 3 Encounters:   04/29/21 128/64   03/22/21 126/80   11/24/20 126/70            Tobacco Use      Smoking status: Former Smoker      Smokeless tobacco: Never Used          Patient is due/failing the following:   A1C    Type of outreach:    Phone, left message for patient/parent to call back.    Questions for provider review:    None                                                                                                                                     Echo Spangler MA      Chart routed to Care Team.

## 2021-10-30 DIAGNOSIS — E11.9 TYPE 2 DIABETES MELLITUS WITHOUT COMPLICATION, WITH LONG-TERM CURRENT USE OF INSULIN (H): ICD-10-CM

## 2021-10-30 DIAGNOSIS — E78.5 HYPERLIPIDEMIA LDL GOAL <100: ICD-10-CM

## 2021-10-30 DIAGNOSIS — Z79.4 TYPE 2 DIABETES MELLITUS WITHOUT COMPLICATION, WITH LONG-TERM CURRENT USE OF INSULIN (H): ICD-10-CM

## 2021-11-01 DIAGNOSIS — Z79.4 TYPE 2 DIABETES MELLITUS WITH HYPERGLYCEMIA, WITH LONG-TERM CURRENT USE OF INSULIN (H): ICD-10-CM

## 2021-11-01 DIAGNOSIS — E11.65 TYPE 2 DIABETES MELLITUS WITH HYPERGLYCEMIA, WITH LONG-TERM CURRENT USE OF INSULIN (H): ICD-10-CM

## 2021-11-02 ENCOUNTER — VIRTUAL VISIT (OUTPATIENT)
Dept: EDUCATION SERVICES | Facility: CLINIC | Age: 51
End: 2021-11-02
Payer: COMMERCIAL

## 2021-11-02 DIAGNOSIS — E11.65 TYPE 2 DIABETES MELLITUS WITH HYPERGLYCEMIA, WITH LONG-TERM CURRENT USE OF INSULIN (H): Primary | ICD-10-CM

## 2021-11-02 DIAGNOSIS — Z79.4 TYPE 2 DIABETES MELLITUS WITH HYPERGLYCEMIA, WITH LONG-TERM CURRENT USE OF INSULIN (H): Primary | ICD-10-CM

## 2021-11-02 PROCEDURE — G0108 DIAB MANAGE TRN  PER INDIV: HCPCS | Performed by: DIETITIAN, REGISTERED

## 2021-11-02 NOTE — PROGRESS NOTES
"Diabetes Self-Management Education & Support    Presents for: Individual review, via phone    SUBJECTIVE/OBJECTIVE:  Presents for: Individual review  Diabetes type: Type 2  Cultural Influences/Ethnic Background:  American      Diabetes Symptoms & Complications:          Patient Problem List and Family Medical History reviewed for relevant medical history, current medical status, and diabetes risk factors.    Vitals:  LMP  (LMP Unknown)   Estimated body mass index is 52.87 kg/m  as calculated from the following:    Height as of 4/29/21: 1.626 m (5' 4\").    Weight as of 4/29/21: 139.7 kg (308 lb).   Last 3 BP:   BP Readings from Last 3 Encounters:   04/29/21 128/64   03/22/21 126/80   11/24/20 126/70       History   Smoking Status     Former Smoker   Smokeless Tobacco     Never Used       Labs:  Lab Results   Component Value Date    A1C 8.3 04/29/2021     Lab Results   Component Value Date    GLC 71 04/29/2021     Lab Results   Component Value Date     11/24/2020     HDL Cholesterol   Date Value Ref Range Status   11/24/2020 64 >49 mg/dL Final   ]  GFR Estimate   Date Value Ref Range Status   04/29/2021 >90 >60 mL/min/[1.73_m2] Final     Comment:     Non  GFR Calc  Starting 12/18/2018, serum creatinine based estimated GFR (eGFR) will be   calculated using the Chronic Kidney Disease Epidemiology Collaboration   (CKD-EPI) equation.       GFR Estimate If Black   Date Value Ref Range Status   04/29/2021 >90 >60 mL/min/[1.73_m2] Final     Comment:      GFR Calc  Starting 12/18/2018, serum creatinine based estimated GFR (eGFR) will be   calculated using the Chronic Kidney Disease Epidemiology Collaboration   (CKD-EPI) equation.       Lab Results   Component Value Date    CR 0.75 04/29/2021     No results found for: MICROALBUMIN    Healthy Eating:  Healthy Eating Assessed Today: Yes  Breakfast- drank fit  Lunch- leftover tator tot hotdish, keeping lunch time less  Supper- gyros  Snacks- " banana or apple, cheese  Being Active:   still doing what she can.      Monitoring:           Taking Medications:  Diabetes Medication(s)     Biguanides       metFORMIN (GLUCOPHAGE) 1000 MG tablet    TAKE 1 TABLET BY MOUTH TWICE DAILY WITH MEALS    Insulin       insulin NPH-Regular (HUMULIN MIX 70/30 KWIKPEN) susp    INJECT 20 UNITS UNDER THE SKIN BEFORE BREAKFAST AND 20 UNITS BEFORE SUPPER    Incretin Mimetic Agents (GLP-1 Receptor Agonists)       semaglutide (OZEMPIC, 1 MG/DOSE,) 2 MG/1.5ML pen    Inject 1 mg Subcutaneous every 7 days     Semaglutide,0.25 or 0.5MG/DOS, (OZEMPIC, 0.25 OR 0.5 MG/DOSE,) 2 MG/1.5ML SOPN    Take 0.25 mg weekly for two weeks, then increase to 0.50 mg weekly               Problem Solving:     Not assessed            Reducing Risks:   not assessed    Healthy Coping:     Patient Activation Measure Survey Score:  No flowsheet data found.    Diabetes knowledge and skills assessment:   Patient is knowledgeable in diabetes management concepts related to: Healthy Eating, Being Active and Monitoring    Patient needs further education on the following diabetes management concepts: Healthy Eating, Being Active, Monitoring, Taking Medication, Problem Solving, Reducing Risks and Healthy Coping    Based on learning assessment above, most appropriate setting for further diabetes education would be: Individual setting.      INTERVENTIONS:    Education provided today on:  AADE Self-Care Behaviors:  Healthy Eating: consistency in amount, composition, and timing of food intake and portion control  Being Active: relationship to blood glucose and describe appropriate activity program  Monitoring: individual blood glucose targets and frequency of monitoring  Taking Medication: action of prescribed medication and when to take medications    Opportunities for ongoing education and support in diabetes-self management were discussed.    Pt verbalized understanding of concepts discussed and recommendations  provided today.       Education Materials Provided:  No new materials provided today      ASSESSMENT:  wt 299.4# down some  She is having less low blood sugars and still is over 70% in goal, she did go back up to 20 units bid on insulin but that seems to work well  -had a faulty sensor that only read 500, she called company and started a new one. Those are the high light blue spots on the sensor download in note from the last two weeks.        Patient's most recent   Lab Results   Component Value Date    A1C 8.3 04/29/2021    is not meeting goal of <7.0    PLAN  See Patient Instructions for co-developed, patient-stated behavior change goals.  AVS printed and provided to patient today. See Follow-Up section for recommended follow-up.      Time Spent: 30 minutes  Encounter Type: Individual    Any diabetes medication dose changes were made via the CDE Protocol and Collaborative Practice Agreement with the patient's primary care provider. A copy of this encounter was shared with the provider.     English

## 2021-11-03 RX ORDER — PEN NEEDLE, DIABETIC 32GX 5/32"
NEEDLE, DISPOSABLE MISCELLANEOUS
Qty: 200 EACH | Refills: 1 | Status: SHIPPED | OUTPATIENT
Start: 2021-11-03 | End: 2022-06-08

## 2021-11-03 RX ORDER — ATORVASTATIN CALCIUM 40 MG/1
TABLET, FILM COATED ORAL
Qty: 90 TABLET | Refills: 0 | Status: SHIPPED | OUTPATIENT
Start: 2021-11-03 | End: 2022-03-03

## 2021-11-04 RX ORDER — FLASH GLUCOSE SENSOR
KIT MISCELLANEOUS
Qty: 2 EACH | Refills: 5 | Status: SHIPPED | OUTPATIENT
Start: 2021-11-04 | End: 2024-07-19

## 2021-12-30 ENCOUNTER — LAB (OUTPATIENT)
Dept: LAB | Facility: CLINIC | Age: 51
End: 2021-12-30
Payer: COMMERCIAL

## 2021-12-30 DIAGNOSIS — Z79.4 TYPE 2 DIABETES MELLITUS WITH HYPERGLYCEMIA, WITH LONG-TERM CURRENT USE OF INSULIN (H): ICD-10-CM

## 2021-12-30 DIAGNOSIS — E11.65 TYPE 2 DIABETES MELLITUS WITH HYPERGLYCEMIA, WITH LONG-TERM CURRENT USE OF INSULIN (H): ICD-10-CM

## 2021-12-30 LAB — HBA1C MFR BLD: 7 % (ref 0–5.6)

## 2021-12-30 PROCEDURE — 36415 COLL VENOUS BLD VENIPUNCTURE: CPT

## 2021-12-30 PROCEDURE — 83036 HEMOGLOBIN GLYCOSYLATED A1C: CPT

## 2022-01-03 NOTE — RESULT ENCOUNTER NOTE
Please Notify Brissa  of test results A1c is at goal at 7.0 continue with current medication regiment    Juli Ramsey CNP

## 2022-01-10 DIAGNOSIS — I15.9 SECONDARY HYPERTENSION: ICD-10-CM

## 2022-01-10 RX ORDER — IRBESARTAN 300 MG/1
TABLET ORAL
Qty: 90 TABLET | Refills: 1 | Status: SHIPPED | OUTPATIENT
Start: 2022-01-10 | End: 2022-06-08

## 2022-02-01 ENCOUNTER — VIRTUAL VISIT (OUTPATIENT)
Dept: EDUCATION SERVICES | Facility: CLINIC | Age: 52
End: 2022-02-01
Payer: COMMERCIAL

## 2022-02-01 DIAGNOSIS — Z79.4 TYPE 2 DIABETES MELLITUS WITH HYPERGLYCEMIA, WITH LONG-TERM CURRENT USE OF INSULIN (H): Primary | ICD-10-CM

## 2022-02-01 DIAGNOSIS — E11.65 TYPE 2 DIABETES MELLITUS WITH HYPERGLYCEMIA, WITH LONG-TERM CURRENT USE OF INSULIN (H): Primary | ICD-10-CM

## 2022-02-01 PROCEDURE — 98968 PH1 ASSMT&MGMT NQHP 21-30: CPT | Mod: 95 | Performed by: DIETITIAN, REGISTERED

## 2022-02-10 ENCOUNTER — TELEPHONE (OUTPATIENT)
Dept: EDUCATION SERVICES | Facility: CLINIC | Age: 52
End: 2022-02-10
Payer: COMMERCIAL

## 2022-02-10 DIAGNOSIS — Z79.4 TYPE 2 DIABETES MELLITUS WITH HYPERGLYCEMIA, WITH LONG-TERM CURRENT USE OF INSULIN (H): Primary | ICD-10-CM

## 2022-02-10 DIAGNOSIS — E11.65 TYPE 2 DIABETES MELLITUS WITH HYPERGLYCEMIA, WITH LONG-TERM CURRENT USE OF INSULIN (H): Primary | ICD-10-CM

## 2022-02-10 NOTE — TELEPHONE ENCOUNTER
Diabetes education contact:    Lona 2 sensors ordered so she get the alerts for low blood sugars.  She is currently on the lona 14 day sensor.    Diamond Lucero RD, [facility]

## 2022-02-20 DIAGNOSIS — Z79.4 TYPE 2 DIABETES MELLITUS WITH HYPERGLYCEMIA, WITH LONG-TERM CURRENT USE OF INSULIN (H): ICD-10-CM

## 2022-02-20 DIAGNOSIS — E11.65 TYPE 2 DIABETES MELLITUS WITH HYPERGLYCEMIA, WITH LONG-TERM CURRENT USE OF INSULIN (H): ICD-10-CM

## 2022-02-21 RX ORDER — SEMAGLUTIDE 1.34 MG/ML
INJECTION, SOLUTION SUBCUTANEOUS
Qty: 3 ML | Refills: 2 | Status: SHIPPED | OUTPATIENT
Start: 2022-02-21 | End: 2022-05-10

## 2022-02-21 NOTE — TELEPHONE ENCOUNTER
Diabetes education:    Pt called out of ozempic..  Sent to pharmacy. [address], [facility]

## 2022-02-23 ENCOUNTER — TRANSFERRED RECORDS (OUTPATIENT)
Dept: HEALTH INFORMATION MANAGEMENT | Facility: CLINIC | Age: 52
End: 2022-02-23
Payer: COMMERCIAL

## 2022-02-23 LAB
RETINOPATHY: NORMAL
RETINOPATHY: POSITIVE

## 2022-03-01 DIAGNOSIS — E78.5 HYPERLIPIDEMIA LDL GOAL <100: ICD-10-CM

## 2022-03-01 DIAGNOSIS — E11.65 TYPE 2 DIABETES MELLITUS WITH HYPERGLYCEMIA, WITH LONG-TERM CURRENT USE OF INSULIN (H): Primary | ICD-10-CM

## 2022-03-01 DIAGNOSIS — Z79.4 TYPE 2 DIABETES MELLITUS WITH HYPERGLYCEMIA, WITH LONG-TERM CURRENT USE OF INSULIN (H): Primary | ICD-10-CM

## 2022-03-02 NOTE — TELEPHONE ENCOUNTER
"Requested Prescriptions   Pending Prescriptions Disp Refills     atorvastatin (LIPITOR) 40 MG tablet [Pharmacy Med Name: ATORVASTATIN 40MG TABLETS] 90 tablet 0     Sig: TAKE 1 TABLET(40 MG) BY MOUTH DAILY       Statins Protocol Failed - 3/1/2022  3:50 AM        Failed - LDL on file in past 12 months     Recent Labs   Lab Test 11/24/20  1013   *             Passed - No abnormal creatine kinase in past 12 months     No lab results found.             Passed - Recent (12 mo) or future (30 days) visit within the authorizing provider's specialty     Patient has had an office visit with the authorizing provider or a provider within the authorizing providers department within the previous 12 mos or has a future within next 30 days. See \"Patient Info\" tab in inbasket, or \"Choose Columns\" in Meds & Orders section of the refill encounter.              Passed - Medication is active on med list        Passed - Patient is age 18 or older        Passed - No active pregnancy on record        Passed - No positive pregnancy test in past 12 months             "

## 2022-03-03 RX ORDER — ATORVASTATIN CALCIUM 40 MG/1
TABLET, FILM COATED ORAL
Qty: 90 TABLET | Refills: 0 | Status: SHIPPED | OUTPATIENT
Start: 2022-03-03 | End: 2022-05-24

## 2022-04-13 ENCOUNTER — TRANSFERRED RECORDS (OUTPATIENT)
Dept: HEALTH INFORMATION MANAGEMENT | Facility: CLINIC | Age: 52
End: 2022-04-13
Payer: COMMERCIAL

## 2022-04-13 LAB
RETINOPATHY: NEGATIVE
RETINOPATHY: POSITIVE

## 2022-04-18 DIAGNOSIS — Z79.4 TYPE 2 DIABETES MELLITUS WITH HYPERGLYCEMIA, WITH LONG-TERM CURRENT USE OF INSULIN (H): Primary | ICD-10-CM

## 2022-04-18 DIAGNOSIS — E11.65 TYPE 2 DIABETES MELLITUS WITH HYPERGLYCEMIA, WITH LONG-TERM CURRENT USE OF INSULIN (H): Primary | ICD-10-CM

## 2022-05-09 DIAGNOSIS — E11.65 TYPE 2 DIABETES MELLITUS WITH HYPERGLYCEMIA, WITH LONG-TERM CURRENT USE OF INSULIN (H): ICD-10-CM

## 2022-05-09 DIAGNOSIS — Z79.4 TYPE 2 DIABETES MELLITUS WITH HYPERGLYCEMIA, WITH LONG-TERM CURRENT USE OF INSULIN (H): ICD-10-CM

## 2022-05-10 RX ORDER — SEMAGLUTIDE 1.34 MG/ML
INJECTION, SOLUTION SUBCUTANEOUS
Qty: 3 ML | Refills: 2 | Status: SHIPPED | OUTPATIENT
Start: 2022-05-10 | End: 2022-08-15

## 2022-05-13 DIAGNOSIS — E11.9 TYPE 2 DIABETES MELLITUS WITHOUT COMPLICATION, WITH LONG-TERM CURRENT USE OF INSULIN (H): ICD-10-CM

## 2022-05-13 DIAGNOSIS — Z79.4 TYPE 2 DIABETES MELLITUS WITHOUT COMPLICATION, WITH LONG-TERM CURRENT USE OF INSULIN (H): ICD-10-CM

## 2022-05-13 DIAGNOSIS — N39.41 URGE INCONTINENCE OF URINE: ICD-10-CM

## 2022-05-16 RX ORDER — OXYBUTYNIN CHLORIDE 10 MG/1
TABLET, EXTENDED RELEASE ORAL
Qty: 30 TABLET | Refills: 0 | Status: SHIPPED | OUTPATIENT
Start: 2022-05-16 | End: 2022-06-08

## 2022-05-23 DIAGNOSIS — F43.22 ADJUSTMENT DISORDER WITH ANXIOUS MOOD: ICD-10-CM

## 2022-05-23 DIAGNOSIS — E78.5 HYPERLIPIDEMIA LDL GOAL <100: ICD-10-CM

## 2022-05-24 RX ORDER — SERTRALINE HYDROCHLORIDE 100 MG/1
TABLET, FILM COATED ORAL
Qty: 90 TABLET | Refills: 0 | Status: SHIPPED | OUTPATIENT
Start: 2022-05-24 | End: 2022-07-12

## 2022-05-24 RX ORDER — ATORVASTATIN CALCIUM 40 MG/1
TABLET, FILM COATED ORAL
Qty: 90 TABLET | Refills: 0 | Status: SHIPPED | OUTPATIENT
Start: 2022-05-24 | End: 2022-07-12

## 2022-05-24 NOTE — TELEPHONE ENCOUNTER
LM on identified VM to call care team- due for appt.  Vijaya refills given.  CARMEN Barrientos RN

## 2022-06-07 ENCOUNTER — OFFICE VISIT (OUTPATIENT)
Dept: FAMILY MEDICINE | Facility: CLINIC | Age: 52
End: 2022-06-07
Payer: COMMERCIAL

## 2022-06-07 VITALS
OXYGEN SATURATION: 95 % | DIASTOLIC BLOOD PRESSURE: 84 MMHG | HEART RATE: 88 BPM | HEIGHT: 64 IN | RESPIRATION RATE: 18 BRPM | BODY MASS INDEX: 50.02 KG/M2 | WEIGHT: 293 LBS | TEMPERATURE: 98.6 F | SYSTOLIC BLOOD PRESSURE: 134 MMHG

## 2022-06-07 DIAGNOSIS — Z13.220 SCREENING FOR HYPERLIPIDEMIA: ICD-10-CM

## 2022-06-07 DIAGNOSIS — Z79.4 TYPE 2 DIABETES MELLITUS WITH HYPERGLYCEMIA, WITH LONG-TERM CURRENT USE OF INSULIN (H): ICD-10-CM

## 2022-06-07 DIAGNOSIS — E11.65 TYPE 2 DIABETES MELLITUS WITH HYPERGLYCEMIA, WITH LONG-TERM CURRENT USE OF INSULIN (H): ICD-10-CM

## 2022-06-07 DIAGNOSIS — E66.01 MORBID OBESITY (H): ICD-10-CM

## 2022-06-07 DIAGNOSIS — Z11.4 SCREENING FOR HIV (HUMAN IMMUNODEFICIENCY VIRUS): ICD-10-CM

## 2022-06-07 DIAGNOSIS — Z11.59 NEED FOR HEPATITIS C SCREENING TEST: ICD-10-CM

## 2022-06-07 DIAGNOSIS — Z12.31 VISIT FOR SCREENING MAMMOGRAM: ICD-10-CM

## 2022-06-07 DIAGNOSIS — Z12.11 SCREEN FOR COLON CANCER: ICD-10-CM

## 2022-06-07 DIAGNOSIS — Z86.73 HISTORY OF STROKE: ICD-10-CM

## 2022-06-07 DIAGNOSIS — I15.9 SECONDARY HYPERTENSION: ICD-10-CM

## 2022-06-07 DIAGNOSIS — R60.9 EDEMA, UNSPECIFIED TYPE: ICD-10-CM

## 2022-06-07 DIAGNOSIS — Z12.31 ENCOUNTER FOR SCREENING MAMMOGRAM FOR BREAST CANCER: ICD-10-CM

## 2022-06-07 DIAGNOSIS — I69.354 HEMIPLEGIA AND HEMIPARESIS FOLLOWING CEREBRAL INFARCTION AFFECTING LEFT NON-DOMINANT SIDE (H): ICD-10-CM

## 2022-06-07 DIAGNOSIS — J45.40 MODERATE PERSISTENT ASTHMA WITHOUT COMPLICATION: Primary | ICD-10-CM

## 2022-06-07 DIAGNOSIS — I10 BENIGN ESSENTIAL HYPERTENSION: ICD-10-CM

## 2022-06-07 LAB
ANION GAP SERPL CALCULATED.3IONS-SCNC: 7 MMOL/L (ref 3–14)
BUN SERPL-MCNC: 18 MG/DL (ref 7–30)
CALCIUM SERPL-MCNC: 9.2 MG/DL (ref 8.5–10.1)
CHLORIDE BLD-SCNC: 105 MMOL/L (ref 94–109)
CHOLEST SERPL-MCNC: 163 MG/DL
CO2 SERPL-SCNC: 26 MMOL/L (ref 20–32)
CREAT SERPL-MCNC: 0.6 MG/DL (ref 0.52–1.04)
CREAT UR-MCNC: 188 MG/DL
FASTING STATUS PATIENT QL REPORTED: NO
GFR SERPL CREATININE-BSD FRML MDRD: >90 ML/MIN/1.73M2
GLUCOSE BLD-MCNC: 156 MG/DL (ref 70–99)
HBA1C MFR BLD: 7.1 % (ref 0–5.6)
HDLC SERPL-MCNC: 54 MG/DL
LDLC SERPL CALC-MCNC: 86 MG/DL
MICROALBUMIN UR-MCNC: 20 MG/L
MICROALBUMIN/CREAT UR: 10.64 MG/G CR (ref 0–25)
NONHDLC SERPL-MCNC: 109 MG/DL
POTASSIUM BLD-SCNC: 4.1 MMOL/L (ref 3.4–5.3)
SODIUM SERPL-SCNC: 138 MMOL/L (ref 133–144)
TRIGL SERPL-MCNC: 117 MG/DL

## 2022-06-07 PROCEDURE — 80048 BASIC METABOLIC PNL TOTAL CA: CPT | Performed by: NURSE PRACTITIONER

## 2022-06-07 PROCEDURE — 82043 UR ALBUMIN QUANTITATIVE: CPT | Performed by: NURSE PRACTITIONER

## 2022-06-07 PROCEDURE — 36415 COLL VENOUS BLD VENIPUNCTURE: CPT | Performed by: NURSE PRACTITIONER

## 2022-06-07 PROCEDURE — 99214 OFFICE O/P EST MOD 30 MIN: CPT | Performed by: NURSE PRACTITIONER

## 2022-06-07 PROCEDURE — 99207 PR FOOT EXAM NO CHARGE: CPT | Performed by: NURSE PRACTITIONER

## 2022-06-07 PROCEDURE — 80061 LIPID PANEL: CPT | Performed by: NURSE PRACTITIONER

## 2022-06-07 PROCEDURE — 83036 HEMOGLOBIN GLYCOSYLATED A1C: CPT | Performed by: NURSE PRACTITIONER

## 2022-06-07 RX ORDER — MONTELUKAST SODIUM 10 MG/1
10 TABLET ORAL AT BEDTIME
Qty: 90 TABLET | Refills: 3 | Status: SHIPPED | OUTPATIENT
Start: 2022-06-07 | End: 2023-08-03

## 2022-06-07 RX ORDER — ALBUTEROL SULFATE 90 UG/1
2 AEROSOL, METERED RESPIRATORY (INHALATION) EVERY 6 HOURS PRN
Qty: 6.7 G | Refills: 3 | Status: SHIPPED | OUTPATIENT
Start: 2022-06-07 | End: 2024-07-19

## 2022-06-07 RX ORDER — DEXAMETHASONE 4 MG/1
TABLET ORAL
Qty: 12 G | Refills: 3 | Status: SHIPPED | OUTPATIENT
Start: 2022-06-07 | End: 2023-08-03

## 2022-06-07 ASSESSMENT — PAIN SCALES - GENERAL: PAINLEVEL: NO PAIN (0)

## 2022-06-07 ASSESSMENT — ASTHMA QUESTIONNAIRES: ACT_TOTALSCORE: 20

## 2022-06-07 NOTE — PATIENT INSTRUCTIONS
Please schedule an appointment neurology regarding your follow-up form 1/2020  Vishal Rubi MD    [address]    [address]    [place] [address]    [phone] (Fax)      [facility] Schedule  [place] ~ [phone]  Every other Monday or Wednesday   & one Saturday morning a month    [place] ~ [phone]  Every Monday morning  Every Tuesday afternoon  Wed, Thurs, Friday morning & afternoon

## 2022-06-07 NOTE — PROGRESS NOTES
"  Assessment & Plan     (J45.40) Moderate persistent asthma without complication  (primary encounter diagnosis)  Comment:  Controlled no change in treatment plan  Plan: montelukast (SINGULAIR) 10 MG tablet,         fluticasone (FLOVENT HFA) 110 MCG/ACT inhaler,         albuterol (PROAIR HFA/PROVENTIL HFA/VENTOLIN         HFA) 108 (90 Base) MCG/ACT inhaler            (E11.65,  Z79.4) Type 2 diabetes mellitus with hyperglycemia, with long-term current use of insulin (H)  Comment: uncontrolled will increase insulin   Plan: Albumin Random Urine Quantitative with Creat         Ratio, HEMOGLOBIN A1C, BASIC METABOLIC PANEL,         FOOT EXAM      (Z12.31) Visit for screening mammogram  Comment:   Plan: MA SCREENING DIGITAL BILAT - Future  (s+30)            (Z12.31) Encounter for screening mammogram for breast cancer  Comment:   Plan:     (Z12.11) Screen for colon cancer  Comment:   Plan: Adult Gastro Ref - Procedure Only            (Z11.4) Screening for HIV (human immunodeficiency virus)  Comment: Reviewed with patient declined removed from chart  Plan:    (Z11.59) Need for hepatitis C screening test  Comment: Reviewed with patient declined removed from chart  Plan:     (Z13.220) Screening for hyperlipidemia  Comment:   Plan: Lipid panel reflex to direct LDL Fasting            (I15.9) Secondary hypertension  Comment:   Plan:     (I10) Benign essential hypertension  Comment:  Controlled no change in treatment plan  Plan:     (Z86.73) History of stroke  Comment:   Plan:     (R60.9) Edema, unspecified type  Comment:   Plan: Lymphedema Therapy Referral            (I69.354) Hemiplegia and hemiparesis following cerebral infarction affecting left non-dominant side (H)  Comment:   Plan:     (E66.01) Morbid obesity (H)  Comment: Reviewed diet and exercis  Plan:      BMI:   Estimated body mass index is 50.29 kg/m  as calculated from the following:    Height as of this encounter: 1.626 m (5' 4\").    Weight as of this encounter: 132.9 " "kg (293 lb).       See Patient Instructions    No follow-ups on file.    Juli Ramsey, RONNELL CNP  [facility]    Kimberly Brumfield is a 51 year old who presents for the following health issues     History of Present Illness       Diabetes:   She presents for follow up of diabetes.  She is checking home blood glucose two times daily. She checks blood glucose before and after meals.  Blood glucose is sometimes over 200 and sometimes under 70. She is aware of hypoglycemia symptoms including shakiness, dizziness and weakness. She has no concerns regarding her diabetes at this time.  She is having numbness in feet.         She eats 0-1 servings of fruits and vegetables daily.She consumes 0 sweetened beverage(s) daily.She exercises with enough effort to increase her heart rate 9 or less minutes per day.  She exercises with enough effort to increase her heart rate 3 or less days per week.   She is taking medications regularly.             Review of Systems   Constitutional, HEENT, cardiovascular, pulmonary, gi and gu systems are negative, except as otherwise noted.      Objective    LMP  (LMP Unknown)   Body mass index is 50.29 kg/m . /84 (BP Location: Right arm, Cuff Size: Adult Large)   Pulse 88   Temp 98.6  F (37  C) (Tympanic)   Resp 18   Ht 1.626 m (5' 4\")   Wt 132.9 kg (293 lb)   LMP  (LMP Unknown)   SpO2 95%   BMI 50.29 kg/m        Physical Exam   GENERAL: healthy, alert and no distress  EYES: Eyes grossly normal to inspection, PERRL and conjunctivae and sclerae normal  HENT: ear canals and TM's normal, nose and mouth without ulcers or lesions  NECK: no adenopathy, no asymmetry, masses, or scars and thyroid normal to palpation  RESP: lungs clear to auscultation - no rales, rhonchi or wheezes  CV: regular rate and rhythm, normal S1 S2, no S3 or S4, no murmur, click or rub, no peripheral edema and peripheral pulses strong  ABDOMEN: soft, nontender, no hepatosplenomegaly, no " masses and bowel sounds normal  MS: no gross musculoskeletal defects noted, no edema  SKIN: no suspicious lesions or rashes  NEURO: Normal strength and tone, mentation intact and speech normal  PSYCH: mentation appears normal, affect normal/bright    No results found for any visits on 06/07/22.          Follow up Repeat CT angiogram head and neck in 3 months. This can be done remotely and no clinic visit is needed.     Anti-platelet therapy:  Tolerating without adverse effects.  Per Dr. Rubi; to continue clopidogrel for another 3 months at steady state and following review of CT angiogram determine if this medication should be continued or tapered off.

## 2022-06-07 NOTE — LETTER
My Asthma Action Plan    Name: Brissa Mott   YOB: 1970  Date: 6/7/2022   My doctor: RONNELL Bianchi CNP   My clinic: [facility]        My Rescue Medicine:   Albuterol inhaler (Proair/Ventolin/Proventil HFA)  2-4 puffs EVERY 4 HOURS as needed. Use a spacer if recommended by your provider.   My Asthma Severity:   Intermittent / Exercise Induced  Know your asthma triggers: Patient is unaware of triggers             GREEN ZONE   Good Control    I feel good    No cough or wheeze    Can work, sleep and play without asthma symptoms       Take your asthma control medicine every day.     1. If exercise triggers your asthma, take your rescue medication    15 minutes before exercise or sports, and    During exercise if you have asthma symptoms  2. Spacer to use with inhaler: If you have a spacer, make sure to use it with your inhaler             YELLOW ZONE Getting Worse  I have ANY of these:    I do not feel good    Cough or wheeze    Chest feels tight    Wake up at night   1. Keep taking your Green Zone medications  2. Start taking your rescue medicine:    every 20 minutes for up to 1 hour. Then every 4 hours for 24-48 hours.  3. If you stay in the Yellow Zone for more than 12-24 hours, contact your doctor.  4. If you do not return to the Green Zone in 12-24 hours or you get worse, start taking your oral steroid medicine if prescribed by your provider.           RED ZONE Medical Alert - Get Help  I have ANY of these:    I feel awful    Medicine is not helping    Breathing getting harder    Trouble walking or talking    Nose opens wide to breathe       1. Take your rescue medicine NOW  2. If your provider has prescribed an oral steroid medicine, start taking it NOW  3. Call your doctor NOW  4. If you are still in the Red Zone after 20 minutes and you have not reached your doctor:    Take your rescue medicine again and    Call 911 or go to the emergency room right away    See  your regular doctor within 2 weeks of an Emergency Room or Urgent Care visit for follow-up treatment.          Annual Reminders:  Meet with Asthma Educator,  Flu Shot in the Fall, consider Pneumonia Vaccination for patients with asthma (aged 19 and older).    Pharmacy: [id number] - [facility] [address] AT [facility] [address]    Electronically signed by RONNELL Bianchi CNP   Date: 06/07/22                    Asthma Triggers  How To Control Things That Make Your Asthma Worse    Triggers are things that make your asthma worse.  Look at the list below to help you find your triggers and   what you can do about them. You can help prevent asthma flare-ups by staying away from your triggers.      Trigger                                                          What you can do   Cigarette Smoke  Tobacco smoke can make asthma worse. Do not allow smoking in your home, car or around you.  Be sure no one smokes at a child s day care or school.  If you smoke, ask your health care provider for ways to help you quit.  Ask family members to quit too.  Ask your health care provider for a referral to Quit Plan to help you quit smoking, or call 1-220-516-PLAN.     Colds, Flu, Bronchitis  These are common triggers of asthma. Wash your hands often.  Don t touch your eyes, nose or mouth.  Get a flu shot every year.     Dust Mites  These are tiny bugs that live in cloth or carpet. They are too small to see. Wash sheets and blankets in hot water every week.   Encase pillows and mattress in dust mite proof covers.  Avoid having carpet if you can. If you have carpet, vacuum weekly.   Use a dust mask and HEPA vacuum.   Pollen and Outdoor Mold  Some people are allergic to trees, grass, or weed pollen, or molds. Try to keep your windows closed.  Limit time out doors when pollen count is high.   Ask you health care provider about taking medicine during allergy season.     Animal Dander  Some people are  allergic to skin flakes, urine or saliva from pets with fur or feathers. Keep pets with fur or feathers out of your home.    If you can t keep the pet outdoors, then keep the pet out of your bedroom.  Keep the bedroom door closed.  Keep pets off cloth furniture and away from stuffed toys.     Mice, Rats, and Cockroaches  Some people are allergic to the waste from these pests.   Cover food and garbage.  Clean up spills and food crumbs.  Store grease in the refrigerator.   Keep food out of the bedroom.   Indoor Mold  This can be a trigger if your home has high moisture. Fix leaking faucets, pipes, or other sources of water.   Clean moldy surfaces.  Dehumidify basement if it is damp and smelly.   Smoke, Strong Odors, and Sprays  These can reduce air quality. Stay away from strong odors and sprays, such as perfume, powder, hair spray, paints, smoke incense, paint, cleaning products, candles and new carpet.   Exercise or Sports  Some people with asthma have this trigger. Be active!  Ask your doctor about taking medicine before sports or exercise to prevent symptoms.    Warm up for 5-10 minutes before and after sports or exercise.     Other Triggers of Asthma  Cold air:  Cover your nose and mouth with a scarf.  Sometimes laughing or crying can be a trigger.  Some medicines and food can trigger asthma.

## 2022-06-08 DIAGNOSIS — N39.41 URGE INCONTINENCE OF URINE: ICD-10-CM

## 2022-06-08 DIAGNOSIS — E11.9 TYPE 2 DIABETES MELLITUS WITHOUT COMPLICATION, WITH LONG-TERM CURRENT USE OF INSULIN (H): ICD-10-CM

## 2022-06-08 DIAGNOSIS — I15.9 SECONDARY HYPERTENSION: ICD-10-CM

## 2022-06-08 DIAGNOSIS — Z79.4 TYPE 2 DIABETES MELLITUS WITHOUT COMPLICATION, WITH LONG-TERM CURRENT USE OF INSULIN (H): ICD-10-CM

## 2022-06-08 RX ORDER — OXYBUTYNIN CHLORIDE 10 MG/1
TABLET, EXTENDED RELEASE ORAL
Qty: 30 TABLET | Refills: 5 | Status: SHIPPED | OUTPATIENT
Start: 2022-06-08 | End: 2022-11-28

## 2022-06-08 RX ORDER — IRBESARTAN 300 MG/1
TABLET ORAL
Qty: 90 TABLET | Refills: 3 | Status: SHIPPED | OUTPATIENT
Start: 2022-06-08 | End: 2023-08-03

## 2022-06-08 RX ORDER — PEN NEEDLE, DIABETIC 32GX 5/32"
NEEDLE, DISPOSABLE MISCELLANEOUS
Qty: 200 EACH | Refills: 1 | Status: SHIPPED | OUTPATIENT
Start: 2022-06-08 | End: 2023-04-21

## 2022-06-10 NOTE — RESULT ENCOUNTER NOTE
Please Notify Brissa  of test results Hemoglobin A1c is at 7.1 slightly increased would recommend just coming up on 1 unit of insulin.  21 units and continue to check her blood sugars we will recheck a hemoglobin A1c in 3 months.  Cholesterol numbers are within normal limits microalbumin is within normal limits  RONNELL Bianchi CNP

## 2022-07-07 ENCOUNTER — REFERRAL (OUTPATIENT)
Dept: TRANSPLANT | Facility: CLINIC | Age: 52
End: 2022-07-07

## 2022-07-11 DIAGNOSIS — E78.5 HYPERLIPIDEMIA LDL GOAL <100: ICD-10-CM

## 2022-07-11 DIAGNOSIS — F43.22 ADJUSTMENT DISORDER WITH ANXIOUS MOOD: ICD-10-CM

## 2022-07-12 ENCOUNTER — REFERRAL (OUTPATIENT)
Dept: TRANSPLANT | Facility: CLINIC | Age: 52
End: 2022-07-12

## 2022-07-12 VITALS — HEIGHT: 72 IN | BODY MASS INDEX: 39.68 KG/M2 | WEIGHT: 293 LBS

## 2022-07-12 DIAGNOSIS — N18.4 CHRONIC KIDNEY DISEASE, STAGE IV (SEVERE) (H): ICD-10-CM

## 2022-07-12 DIAGNOSIS — N18.4 CHRONIC KIDNEY DISEASE, STAGE 4, SEVERELY DECREASED GFR (H): Primary | ICD-10-CM

## 2022-07-12 DIAGNOSIS — Q61.3 PKD (POLYCYSTIC KIDNEY DISEASE): ICD-10-CM

## 2022-07-12 DIAGNOSIS — I10 ESSENTIAL HYPERTENSION: ICD-10-CM

## 2022-07-12 DIAGNOSIS — Q61.3 POLYCYSTIC KIDNEY DISEASE: ICD-10-CM

## 2022-07-12 DIAGNOSIS — Z01.818 PRE-TRANSPLANT EVALUATION FOR KIDNEY TRANSPLANT: ICD-10-CM

## 2022-07-12 RX ORDER — SERTRALINE HYDROCHLORIDE 100 MG/1
TABLET, FILM COATED ORAL
Qty: 90 TABLET | Refills: 1 | Status: SHIPPED | OUTPATIENT
Start: 2022-07-12 | End: 2023-01-24

## 2022-07-12 RX ORDER — ATORVASTATIN CALCIUM 40 MG/1
TABLET, FILM COATED ORAL
Qty: 90 TABLET | Refills: 3 | Status: SHIPPED | OUTPATIENT
Start: 2022-07-12 | End: 2023-08-03

## 2022-07-12 NOTE — LETTER
July 18, 2022    Matilda Graf  [address]  [place] [address]    Dear Matilda,    Thank you for your interest in the Transplant Center at [facility]. We look forward to being a part of your care team and assisting you through the transplant process.    As we discussed, your transplant coordinator is Jackie Richardson (Kidney).  You may call your coordinator at any time with questions or concerns.  Your first scheduled call will be on July 25, 2022.  If this needs to change, call [phone].    Please complete the following.    1. Fill out and return the enclosed forms    Authorization for Electronic Communication    Authorization to Discuss Protected Health Information    Authorization for Release of Protected Health Information    2. Sign up for:    [employer], access to your electronic medical record (see enclosed pamphlet)    [employer], a transplant education website    You can use these tools to learn more about your transplant, communicate with your care team, and track your medical details      Sincerely,      Solid Organ Transplant  [facility]    cc: PCP,Referring      none

## 2022-07-12 NOTE — TELEPHONE ENCOUNTER
PCP: Debra Kelly   Referring Provider: Olga MILLER  Referring Diagnosis: CKD Stage 4  PKD    Is patient under the age of 65? yes  Is patient diabetic? no  Is patient on insulin? no  Was patient offered a pancreas transplant referral? no    Is patient in a group home/assisted living? no  Does patient have a guardian? no    NOTES: BMI 40    Referral intake process completed.  Patient is aware that after financial approval is received, medical records will be requested.   Patient confirmed for a callback from transplant coordinator on July 25, 2022. (within 2 weeks)  Tentative evaluation date August 10, 2022 (within 4 weeks) if appointment is virtual, does patient have capabilities of setting this up?     Confirmed coordinator will discuss evaluation process in more detail at the time of their call.   Patient is aware of the need to arrange age appropriate cancer screening, vaccinations, and dental care.  Reminded patient to complete questionnaire, complete medical records release, and review packet prior to evaluation visit .  Assessed patient for special needs (ie-wheelchair, assistance, guardian, and ):  none   Patient instructed to call [phone] with questions.     Patient gave verbal consent during intake call to obtain medical records and documents outside of [facility]:  yes

## 2022-07-12 NOTE — Clinical Note
Due to BMI, patient's PKE date should be cancelled (8/10) and rescheduled for mini-eval. 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 Thanks!

## 2022-07-24 ENCOUNTER — HEALTH MAINTENANCE LETTER (OUTPATIENT)
Age: 52
End: 2022-07-24

## 2022-07-25 ENCOUNTER — DOCUMENTATION ONLY (OUTPATIENT)
Dept: TRANSPLANT | Facility: CLINIC | Age: 52
End: 2022-07-25

## 2022-07-25 NOTE — TELEPHONE ENCOUNTER
Reviewed pt's chart for pre-kidney transplant evaluation planning. Pt lives in [place], [place]. Referred to our center by patient's nephrology NP Olga Freeman. Pt has CKD stage 4 due to polycystic kidney disease. GFR 16 on 6/29/22.  Pt is not on dialysis. Pt is not diabetic. No heart, lung, surgical history. Mother was factor V positive but patient is not.  BMI 40 on 6/29/22.  Discussed BMI requirement for transplant with patient: yes, mini-eval discussed. Completed Cologuard in 2020- negative.  Dental: encouraged to keep up to date.  Last Pap: up to date.  Mammogram: 1/14/21.  Pt is not a smoker, does not consume alcohol, and does not use recreational drugs. Pt is independent w/ ADLs.  Pt has support following transplant.    I also introduced [employer] and asked pt to create an account and view pre-kidney transplant videos for review with me following evaluation.

## 2022-08-02 ENCOUNTER — VIRTUAL VISIT (OUTPATIENT)
Dept: EDUCATION SERVICES | Facility: CLINIC | Age: 52
End: 2022-08-02
Payer: COMMERCIAL

## 2022-08-02 DIAGNOSIS — E11.65 TYPE 2 DIABETES MELLITUS WITH HYPERGLYCEMIA, WITH LONG-TERM CURRENT USE OF INSULIN (H): Primary | ICD-10-CM

## 2022-08-02 DIAGNOSIS — Z79.4 TYPE 2 DIABETES MELLITUS WITH HYPERGLYCEMIA, WITH LONG-TERM CURRENT USE OF INSULIN (H): Primary | ICD-10-CM

## 2022-08-02 PROCEDURE — 98968 PH1 ASSMT&MGMT NQHP 21-30: CPT | Mod: 95 | Performed by: DIETITIAN, REGISTERED

## 2022-08-02 RX ORDER — SEMAGLUTIDE 2.68 MG/ML
2 INJECTION, SOLUTION SUBCUTANEOUS
Qty: 3 ML | Refills: 5 | Status: SHIPPED | OUTPATIENT
Start: 2022-08-02 | End: 2022-11-08

## 2022-08-02 RX ORDER — SEMAGLUTIDE 2.68 MG/ML
2 INJECTION, SOLUTION SUBCUTANEOUS
Qty: 3 ML | Refills: 5 | Status: CANCELLED | OUTPATIENT
Start: 2022-08-02

## 2022-08-02 NOTE — PROGRESS NOTES
"Diabetes Self-Management Education & Support    Presents for: Individual review, via phone    SUBJECTIVE/OBJECTIVE:  Presents for: Individual review  Diabetes type: Type 2  Cultural Influences/Ethnic Background:  Not  or       Diabetes Symptoms & Complications:          Patient Problem List and Family Medical History reviewed for relevant medical history, current medical status, and diabetes risk factors.    Vitals:  LMP  (LMP Unknown)   Estimated body mass index is 50.29 kg/m  as calculated from the following:    Height as of 6/7/22: 1.626 m (5' 4\").    Weight as of 6/7/22: 132.9 kg (293 lb).   Last 3 BP:   BP Readings from Last 3 Encounters:   06/07/22 134/84   04/29/21 128/64   03/22/21 126/80       History   Smoking Status     Former Smoker   Smokeless Tobacco     Never Used       Labs:  Lab Results   Component Value Date    A1C 7.1 06/07/2022    A1C 8.3 04/29/2021     Lab Results   Component Value Date     06/07/2022    GLC 71 04/29/2021     Lab Results   Component Value Date    LDL 86 06/07/2022     11/24/2020     HDL Cholesterol   Date Value Ref Range Status   11/24/2020 64 >49 mg/dL Final     Direct Measure HDL   Date Value Ref Range Status   06/07/2022 54 >=50 mg/dL Final   ]  GFR Estimate   Date Value Ref Range Status   06/07/2022 >90 >60 mL/min/1.73m2 Final     Comment:     Effective December 21, 2021 eGFRcr in adults is calculated using the 2021 CKD-EPI creatinine equation which includes age and gender (Sergio sparks al., NEJM, DOI: 10.1056/PGCDun6616909)   04/29/2021 >90 >60 mL/min/[1.73_m2] Final     Comment:     Non  GFR Calc  Starting 12/18/2018, serum creatinine based estimated GFR (eGFR) will be   calculated using the Chronic Kidney Disease Epidemiology Collaboration   (CKD-EPI) equation.       GFR Estimate If Black   Date Value Ref Range Status   04/29/2021 >90 >60 mL/min/[1.73_m2] Final     Comment:      GFR Calc  Starting 12/18/2018, serum " creatinine based estimated GFR (eGFR) will be   calculated using the Chronic Kidney Disease Epidemiology Collaboration   (CKD-EPI) equation.       Lab Results   Component Value Date    CR 0.60 06/07/2022    CR 0.75 04/29/2021     No results found for: MICROALBUMIN    Healthy Eating:  Healthy Eating Assessed Today: Yes  Breakfast: eggs or cereal or toast  Lunch: leftovers or sandwich  Dinner: ribs, mashed potatoes, green beans or burgers, fries  Snacks: fruit or honeyoat bars or nuts    Being Active:  Being Active Assessed Today: Yes (walking some)    Monitoring:           Taking Medications:  Diabetes Medication(s)     Biguanides       metFORMIN (GLUCOPHAGE) 1000 MG tablet    TAKE 1 TABLET BY MOUTH TWICE DAILY WITH MEALS    Insulin       insulin NPH-Regular (HUMULIN MIX 70/30 KWIKPEN) susp    INJECT 20 UNITS UNDER THE SKIN BEFORE BREAKFAST AND 20 UNITS BEFORE SUPPER    Incretin Mimetic Agents (GLP-1 Receptor Agonists)       OZEMPIC, 1 MG/DOSE, 4 MG/3ML SOPN    INJECT 1MG UNDER THE SKIN EVERY 7 DAYS               Problem Solving:                 Reducing Risks:       Healthy Coping:     Patient Activation Measure Survey Score:  No flowsheet data found.    Diabetes knowledge and skills assessment:   Patient is knowledgeable in diabetes management concepts related to: Healthy Eating, Being Active and Monitoring    Patient needs further education on the following diabetes management concepts: Healthy Eating, Being Active, Monitoring, Taking Medication, Problem Solving, Reducing Risks and Healthy Coping    Based on learning assessment above, most appropriate setting for further diabetes education would be: Individual setting.      INTERVENTIONS:    Education provided today on:  AADE Self-Care Behaviors:  Healthy Eating: consistency in amount, composition, and timing of food intake and portion control  Being Active: relationship to blood glucose and describe appropriate activity program  Monitoring: individual blood  glucose targets and frequency of monitoring  Taking Medication: action of prescribed medication, side effects of prescribed medications and when to take medications    Opportunities for ongoing education and support in diabetes-self management were discussed.    Pt verbalized understanding of concepts discussed and recommendations provided today.       Education Materials Provided:  No new materials provided today      ASSESSMENT:  -doing great able to drop insulin to 20 units twice daily  -will increase ozempic to 2 mg dose and drop insulin to 10 twice daily  -BG levels are excellent, she is hoping next a1c is under 7%        Patient's most recent   Lab Results   Component Value Date    A1C 7.1 06/07/2022    A1C 8.3 04/29/2021    is meeting goal of <8.0    PLAN  See Patient Instructions for co-developed, patient-stated behavior change goals.  AVS printed and provided to patient today. See Follow-Up section for recommended follow-up.      Time Spent: 25 minutes  Encounter Type: Individual    Any diabetes medication dose changes were made via the CDE Protocol and Collaborative Practice Agreement with the patient's primary care provider. A copy of this encounter was shared with the provider.

## 2022-08-14 DIAGNOSIS — E11.65 TYPE 2 DIABETES MELLITUS WITH HYPERGLYCEMIA, WITH LONG-TERM CURRENT USE OF INSULIN (H): ICD-10-CM

## 2022-08-14 DIAGNOSIS — Z79.4 TYPE 2 DIABETES MELLITUS WITH HYPERGLYCEMIA, WITH LONG-TERM CURRENT USE OF INSULIN (H): ICD-10-CM

## 2022-08-15 RX ORDER — SEMAGLUTIDE 1.34 MG/ML
INJECTION, SOLUTION SUBCUTANEOUS
Qty: 3 ML | Refills: 2 | Status: SHIPPED | OUTPATIENT
Start: 2022-08-15 | End: 2023-08-03

## 2022-09-26 ENCOUNTER — OFFICE VISIT (OUTPATIENT)
Dept: TRANSPLANT | Facility: CLINIC | Age: 52
End: 2022-09-26
Attending: PHYSICIAN ASSISTANT
Payer: COMMERCIAL

## 2022-09-26 VITALS
OXYGEN SATURATION: 99 % | SYSTOLIC BLOOD PRESSURE: 127 MMHG | DIASTOLIC BLOOD PRESSURE: 83 MMHG | HEART RATE: 96 BPM | WEIGHT: 293 LBS | BODY MASS INDEX: 40.7 KG/M2

## 2022-09-26 VITALS
BODY MASS INDEX: 40.7 KG/M2 | DIASTOLIC BLOOD PRESSURE: 83 MMHG | HEART RATE: 96 BPM | SYSTOLIC BLOOD PRESSURE: 127 MMHG | OXYGEN SATURATION: 99 % | WEIGHT: 293 LBS

## 2022-09-26 DIAGNOSIS — Q61.3 PKD (POLYCYSTIC KIDNEY DISEASE): ICD-10-CM

## 2022-09-26 DIAGNOSIS — Z01.818 PRE-TRANSPLANT EVALUATION FOR KIDNEY TRANSPLANT: ICD-10-CM

## 2022-09-26 DIAGNOSIS — Q61.3 POLYCYSTIC KIDNEY DISEASE: ICD-10-CM

## 2022-09-26 DIAGNOSIS — N18.4 CHRONIC KIDNEY DISEASE, STAGE 4, SEVERELY DECREASED GFR (H): ICD-10-CM

## 2022-09-26 DIAGNOSIS — N18.4 CHRONIC KIDNEY DISEASE, STAGE IV (SEVERE) (H): ICD-10-CM

## 2022-09-26 DIAGNOSIS — I10 ESSENTIAL HYPERTENSION: ICD-10-CM

## 2022-09-26 PROBLEM — M19.90 ARTHRITIS: Status: ACTIVE | Noted: 2022-09-26

## 2022-09-26 PROCEDURE — 99204 OFFICE O/P NEW MOD 45 MIN: CPT | Performed by: SURGERY

## 2022-09-26 PROCEDURE — 99203 OFFICE O/P NEW LOW 30 MIN: CPT | Performed by: PHYSICIAN ASSISTANT

## 2022-09-26 PROCEDURE — 99211 OFF/OP EST MAY X REQ PHY/QHP: CPT | Performed by: SURGERY

## 2022-09-26 RX ORDER — SENNOSIDES 8.6 MG
650 CAPSULE ORAL
COMMUNITY

## 2022-09-26 RX ORDER — IRBESARTAN 300 MG/1
300 TABLET ORAL
COMMUNITY
Start: 2021-03-09 | End: 2023-12-27

## 2022-09-26 RX ORDER — DOXYCYCLINE 100 MG/1
CAPSULE ORAL
COMMUNITY
Start: 2022-09-17 | End: 2023-12-27

## 2022-09-26 RX ORDER — CHLORAL HYDRATE 500 MG
1200 CAPSULE ORAL DAILY
COMMUNITY
End: 2023-12-27

## 2022-09-26 RX ORDER — CHLORTHALIDONE 25 MG/1
25 TABLET ORAL
COMMUNITY
Start: 2022-06-29 | End: 2023-12-27

## 2022-09-26 RX ORDER — ROSUVASTATIN CALCIUM 5 MG/1
5 TABLET, COATED ORAL
COMMUNITY
Start: 2021-07-29

## 2022-09-26 NOTE — PROGRESS NOTES
Transplant Surgery Consult Note     Medical record number: [id number]  YOB: 1970,   Consult requested  for evaluation of kidney transplant candidacy.    Assessment and Recommendations:Ms. Graf appears to be a fair candidate for kidney transplantation and has a good understanding of the risks and benefits of this approach to the management of renal failure. The following issues should be addressed prior to finalizing her transplant candidacy:     BMI: 40.7 with truncal obesity. Needs to lose weight to BMI 37 to proceed with re-evaluation    PKD: needs updated imaging but seems to have space without need for nephrectomy    Graft selection: currently predialysis. Would list to accrue time. Waiting time and live donor options will determine KDPI choice    Transplant order: Ms. Graf has Chronic renal failure due to polycystic kidney disease (PKD) whose condition is not expected to resolve, is expected to progress, and is expected to continue to develop related comorbid conditions.  Recommend he be considered as a candidate for kidney alone.  Cardiology consult for cardiac risk stratification to be ordered: No  CT abdomen and pelvis without contrast to be ordered for assessment of vascular targets: Yes  Transplant listing labs ordered to include HLA, ABOx2, Cr, etc.  Dietician consult ordered: Yes  Social work consult ordered: Yes  Imaging reports reviewed:  n/a  Radiology images reviewed:n/a  Recipient suitable to move forward with work up of living donors:  Yes      The majority of our visit was spent in counselling, discussing the medical and surgical risks of kidney transplantation. We discussed approximate wait time and how that is influenced by issues such as blood type and sensitization (PRA) and access to a living donor. I contrasted potential waiting time for living vs  donor kidneys from  normal (0-85%) or higher (%) kidney donor profile index (KDPI) donors and their associated  outcomes. I would not recommend this individual to consider kidneys from high KDPI donors. The reason for this decision is best summarized as: not on dialysis yet. Potential surgical complications of kidney transplantation include bleeding, superficial or deep wound complications (infection, hernia, lymphocele), ureteral anastomotic failure (leak or stenosis), graft thrombosis, need for reoperation and other issues such as cardiac complications, pneumonia, deep venous thrombosis, pulmonary embolism, post transplant diabetes and death. The potential for recurrent disease or need for retransplantation was also addressed. We discussed the possible need for ureteral stent (and subsequent removal), and the utility of protocol biopsy and laboratory studies to evaluate for rejection or recurrent disease. We discussed the risk of graft rejection, our center's average graft and patient survival rates, immunosuppression protocols, as well as the potential opportunity to participate in clinical trials.  We also discussed the average length of stay, recovery process, and posttransplant lab and monitoring protocol.  I emphasized the need for strict immunosuppression medication adherence and the potential for complications of immunosuppression such as skin cancer or lymphoma, as well as a very low but not zero risk of donor-derived disease transmission risks (infection, cancer). Ms. Graf asked good questions and her candidacy will be reviewed at our Multidisciplinary Selection Committee. Thank you for the opportunity to participate in Ms. Graf's care.      Total time: 30 minutes  Counselling time: 25 minutes      Jean-Paul Mccain MD    ---------------------------------------------------------------------------------------------------    HPI: Ms. Graf has Chronic renal failure due to polycystic kidney disease (PKD). The patient is non-diabetic.       The patient is not on dialysis.    Has potential kidney donors:  Doesn't know  .  Interested in participation in paired exchange if a donor is willing: Doesn't know     The patient has the following pertinent history:       No    Yes  Dialysis:    []      [] via:       Blood Transfusion                  []      []  Number of units:   Most recently:  Pregnancy:    []      [] Number:       Previous Transplant:  []      [] Details:    Cancer    []      [] Comment:   Kidney stones   []      [] Comment:      Recurrent infections  []      []  Type:                  Bladder dysfunction  []      [] Cause:    Claudication   []      [] Distance:    Previous Amputation  []      [] Cause:     Chronic anticoagulation  []      [] Indication:   Muslim  []      []      Past Medical History:   Diagnosis Date     Hypertension      No past surgical history on file.  No family history on file.  Social History     Socioeconomic History     Marital status: Single     Spouse name: Not on file     Number of children: Not on file     Years of education: Not on file     Highest education level: Not on file   Occupational History     Not on file   Tobacco Use     Smoking status: Never Smoker     Smokeless tobacco: Never Used   Substance and Sexual Activity     Alcohol use: Yes     Comment: rare     Drug use: Never     Sexual activity: Not on file   Other Topics Concern     Not on file   Social History Narrative     Not on file     Social Determinants of Health     Financial Resource Strain: Not on file   Food Insecurity: Not on file   Transportation Needs: Not on file   Physical Activity: Not on file   Stress: Not on file   Social Connections: Not on file   Intimate Partner Violence: Not on file   Housing Stability: Not on file       ROS:   CONSTITUTIONAL:  No fevers or chills  EYES: negative for icterus  ENT:  negative for hearing loss, tinnitus and sore throat  RESPIRATORY:  negative for cough, sputum, dyspnea  CARDIOVASCULAR:  negative for chest pain negative for lower extremity  wounds/ulcers  GASTROINTESTINAL:  negative for nausea, vomiting, diarrhea or constipation  GENITOURINARY:  negative for incontinence, dysuria, bladder emptying problems  HEME:  No easy bruising  INTEGUMENT:  negative for rash and pruritus  NEURO:  Negative for headache, seizure disorder  Allergies:   No Known Allergies  Medications:  Prescription Medications as of 9/26/2022       Rx Number Disp Refills Start End Last Dispensed Date Next Fill Date Owning Pharmacy    acetaminophen (TYLENOL) 650 MG CR tablet        [facility] [place] [address]    Sig: Take 650 mg by mouth    Class: Historical    Route: Oral    chlorthalidone (HYGROTON) 25 MG tablet    6/29/2022    [facility] [place] [address]    Sig: Take 25 mg by mouth    Class: Historical    Route: Oral    doxycycline monohydrate (MONODOX) 100 MG capsule    9/17/2022    [facility] [place] [address]    Sig: TAKE 1 CAPSULE (100 MG) BY MOUTH TWICE A DAY FOR 10 DAYS.    Class: Historical    fish oil-omega-3 fatty acids 1000 MG capsule        [facility] [place] [address]    Sig: Take 1,200 mg by mouth daily    Class: Historical    Route: Oral    irbesartan (AVAPRO) 300 MG tablet    3/9/2021    [facility] [place] [address]    Sig: Take 300 mg by mouth    Class: Historical    Route: Oral    rosuvastatin (CRESTOR) 5 MG tablet    7/29/2021    [facility] [place] [address]    Sig: Take 5 mg by mouth    Class: Historical    Route: Oral        Exam:     /83   Pulse 96   Wt 136.1 kg (300 lb 1.6 oz)   SpO2 99%   Breastfeeding No   BMI 40.70 kg/m    Appearance: in no apparent distress.   Skin: normal, warm, dry  Eyes:  no redness or discharge.  Sclera anicteric  Head and Neck: Normal, no rashes or jaundice  Respiratory: easy respirations, no audible wheezing.  Abdomen: soft, rounded,  notable pannus. Nontender. Seems to have space in bilateral iliac fossae without nephrectomy  Extremeties: femoral 2+/2+, Edema, none  Neuro: without deficit, cranial nerves intact   Psychiatric: Normal mood and affect    Diagnostics:   No results found for this or any previous visit (from the past 672 hour(s)).  No results found for: CPRA

## 2022-09-26 NOTE — LETTER
"    2022         RE: Matilda Graf  [address]  [facility] [address]        Dear Colleague,    Thank you for referring your patient, Matilda Graf, to the [facility]. Please see a copy of my visit note below.    PATIENT:  Matilda Graf  :          1970  ALEN:          2022    The patient is seen at the consultation request of Dr. Wilkerson for obesity associated with CKD secondary to PKD.    Patient is pursuing transplant of kidney and needs to lose 43 pounds prior to surgery.    Transplant coordinator note:     \"Reviewed pt's chart for pre-kidney transplant evaluation planning. Pt lives in [place], [place]. Referred to our center by patient's nephrology NP Olga Freeman. Pt has CKD stage 4 due to polycystic kidney disease. GFR 16 on 22.  Pt is not on dialysis. Pt is not diabetic. No heart, lung, surgical history. Mother was factor V positive but patient is not.  BMI 40 on 22.  Discussed BMI requirement for transplant with patient: yes, mini-eval discussed. Completed Cologuard in - negative.  Dental: encouraged to keep up to date.  Last Pap: up to date.  Mammogram: 21.  Pt is not a smoker, does not consume alcohol, and does not use recreational drugs. Pt is independent w/ ADLs.  Pt has support following transplant.\"     Current BMI: 40.7 (HT 72 in,  lbs/136 kg)  BMI is outside recommendation of <35 for kidney transplant  Goal weight for kidney transplant <257 lbs (43 lb loss)    Very active in high school, college.  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    Work/Social History: Matilda employed as a teach. She describes her home life as stable. Resides in an " independent environment. Her marital status is single. She has no overweight/obese child or spouse. She reports that she has never smoked. She has never used smokeless tobacco. She reports current alcohol use. She reports that she does not use drugs.      Diet Recall from RD visit:  Breakfast Reyna pocket BF s/w with sausage and egg (frozen)   Lunch At school/school lunch- chicken nuggets, banana + salad    Dinner Packet of ramen (not full seasoning packet); fried eggs & bagel; homemade soup; pasta w/ spaghetti sauce + mushrooms; chicken breast/tilapia + minute maid rice cups (brown/wild/quinoa mix)    Snacks Occasional protein granola bar    Beverages [place] sparkling tea drink (3-4 days/week, has reduced size), water, a few soda/month, cranberry juice (8-16 oz/day), milk (8 oz/day); smoothie w/ fruit, almond milk (1x/week)   Alcohol None    Dining out 1-2x/week      Physical Activity  Has membership at [employer], but very tired after home from work  Uses pool at her residence when it is open in the summer   4 years ago- tailbone injury after falling on ice; walking up stairs has been impacted      Past Medical History:   Diagnosis Date     Hypertension        Patient Active Problem List   Diagnosis     PKD (polycystic kidney disease)     Essential hypertension     CKD (chronic kidney disease) stage 4, GFR 15-29 ml/min (H)     Arthritis     Obesity, unspecified       Social History     Tobacco Use     Smoking status: Never Smoker     Smokeless tobacco: Never Used   Substance Use Topics     Alcohol use: Yes     Comment: rare       ASSESSMENT/PLAN:    52 y.o. female with BMI 40 and CKD due to PKD who needs BMI <35 for kidney transplant. We discuss obesity as a disease and treatments including medical and surgical options.  Discussed anti obesity medications and sleeve gastrectomy.  She is not currently interested in medications or surgery.  Info was sent to her via [employer] message to watch online seminar and learn more  about weight loss surgery as well as medications if she is interested.  She has been stable at 280-330 lbs for the last 20 years despite attempts at weight loss and with BMI 40 surgical treatment should be considered.    Followup Wt mgmt RD 1 month  Follow up [place] 3 months return AIDEE Charles PA-C    30 minutes spent on the date of the encounter doing chart review, history and exam, documentation and further activities as noted above

## 2022-09-26 NOTE — LETTER
9/26/2022         RE: Matilda Graf  [address]  [facility] [address]        Dear Colleague,    Thank you for referring your patient, Matilda Graf, to the [facility]. Please see a copy of my visit note below.    Transplant Surgery Consult Note     Medical record number: [id number]  YOB: 1970,   Consult requested  for evaluation of kidney transplant candidacy.    Assessment and Recommendations:Ms. Graf appears to be a fair candidate for kidney transplantation and has a good understanding of the risks and benefits of this approach to the management of renal failure. The following issues should be addressed prior to finalizing her transplant candidacy:     BMI: 40.7 with truncal obesity. Needs to lose weight to BMI 37 to proceed with re-evaluation    PKD: needs updated imaging but seems to have space without need for nephrectomy    Graft selection: currently predialysis. Would list to accrue time. Waiting time and live donor options will determine KD choice    Transplant order: Ms. Graf has Chronic renal failure due to polycystic kidney disease (PKD) whose condition is not expected to resolve, is expected to progress, and is expected to continue to develop related comorbid conditions.  Recommend he be considered as a candidate for kidney alone.  Cardiology consult for cardiac risk stratification to be ordered: No  CT abdomen and pelvis without contrast to be ordered for assessment of vascular targets: Yes  Transplant listing labs ordered to include HLA, ABOx2, Cr, etc.  Dietician consult ordered: Yes  Social work consult ordered: Yes  Imaging reports reviewed:  n/a  Radiology images reviewed:n/a  Recipient suitable to move forward with work up of living donors:  Yes      The majority of our visit was spent in counselling, discussing the medical and surgical risks of kidney transplantation. We discussed approximate wait time and how that is influenced by issues  such as blood type and sensitization (PRA) and access to a living donor. I contrasted potential waiting time for living vs  donor kidneys from  normal (0-85%) or higher (%) kidney donor profile index (KDPI) donors and their associated outcomes. I would not recommend this individual to consider kidneys from high KDPI donors. The reason for this decision is best summarized as: not on dialysis yet. Potential surgical complications of kidney transplantation include bleeding, superficial or deep wound complications (infection, hernia, lymphocele), ureteral anastomotic failure (leak or stenosis), graft thrombosis, need for reoperation and other issues such as cardiac complications, pneumonia, deep venous thrombosis, pulmonary embolism, post transplant diabetes and death. The potential for recurrent disease or need for retransplantation was also addressed. We discussed the possible need for ureteral stent (and subsequent removal), and the utility of protocol biopsy and laboratory studies to evaluate for rejection or recurrent disease. We discussed the risk of graft rejection, our center's average graft and patient survival rates, immunosuppression protocols, as well as the potential opportunity to participate in clinical trials.  We also discussed the average length of stay, recovery process, and posttransplant lab and monitoring protocol.  I emphasized the need for strict immunosuppression medication adherence and the potential for complications of immunosuppression such as skin cancer or lymphoma, as well as a very low but not zero risk of donor-derived disease transmission risks (infection, cancer). Ms. Graf asked good questions and her candidacy will be reviewed at our Multidisciplinary Selection Committee. Thank you for the opportunity to participate in Ms. Graf's care.      Total time: 30 minutes  Counselling time: 25 minutes      Jean-Paul Mccain  MD    ---------------------------------------------------------------------------------------------------    HPI: Ms. Graf has Chronic renal failure due to polycystic kidney disease (PKD). The patient is non-diabetic.       The patient is not on dialysis.    Has potential kidney donors:  Doesn't know .  Interested in participation in paired exchange if a donor is willing: Doesn't know     The patient has the following pertinent history:       No    Yes  Dialysis:    []      [] via:       Blood Transfusion                  []      []  Number of units:   Most recently:  Pregnancy:    []      [] Number:       Previous Transplant:  []      [] Details:    Cancer    []      [] Comment:   Kidney stones   []      [] Comment:      Recurrent infections  []      []  Type:                  Bladder dysfunction  []      [] Cause:    Claudication   []      [] Distance:    Previous Amputation  []      [] Cause:     Chronic anticoagulation  []      [] Indication:   Taoist  []      []      Past Medical History:   Diagnosis Date    Hypertension      No past surgical history on file.  No family history on file.  Social History     Socioeconomic History    Marital status: Single     Spouse name: Not on file    Number of children: Not on file    Years of education: Not on file    Highest education level: Not on file   Occupational History    Not on file   Tobacco Use    Smoking status: Never Smoker    Smokeless tobacco: Never Used   Substance and Sexual Activity    Alcohol use: Yes     Comment: rare    Drug use: Never    Sexual activity: Not on file   Other Topics Concern    Not on file   Social History Narrative    Not on file     Social Determinants of Health     Financial Resource Strain: Not on file   Food Insecurity: Not on file   Transportation Needs: Not on file   Physical Activity: Not on file   Stress: Not on file   Social Connections: Not on file   Intimate Partner Violence: Not on file   Housing Stability: Not on  file       ROS:   CONSTITUTIONAL:  No fevers or chills  EYES: negative for icterus  ENT:  negative for hearing loss, tinnitus and sore throat  RESPIRATORY:  negative for cough, sputum, dyspnea  CARDIOVASCULAR:  negative for chest pain negative for lower extremity wounds/ulcers  GASTROINTESTINAL:  negative for nausea, vomiting, diarrhea or constipation  GENITOURINARY:  negative for incontinence, dysuria, bladder emptying problems  HEME:  No easy bruising  INTEGUMENT:  negative for rash and pruritus  NEURO:  Negative for headache, seizure disorder  Allergies:   No Known Allergies  Medications:  Prescription Medications as of 9/26/2022         Rx Number Disp Refills Start End Last Dispensed Date Next Fill Date Owning Pharmacy    acetaminophen (TYLENOL) 650 MG CR tablet        [facility] [place] [address]    Sig: Take 650 mg by mouth    Class: Historical    Route: Oral    chlorthalidone (HYGROTON) 25 MG tablet    6/29/2022    [facility] [place] [address]    Sig: Take 25 mg by mouth    Class: Historical    Route: Oral    doxycycline monohydrate (MONODOX) 100 MG capsule    9/17/2022    [facility] [place] [address]    Sig: TAKE 1 CAPSULE (100 MG) BY MOUTH TWICE A DAY FOR 10 DAYS.    Class: Historical    fish oil-omega-3 fatty acids 1000 MG capsule        [facility] [place] [address]    Sig: Take 1,200 mg by mouth daily    Class: Historical    Route: Oral    irbesartan (AVAPRO) 300 MG tablet    3/9/2021    [facility] [place] [address]    Sig: Take 300 mg by mouth    Class: Historical    Route: Oral    rosuvastatin (CRESTOR) 5 MG tablet    7/29/2021    [facility] [place] [address]    Sig: Take 5 mg by mouth    Class: Historical    Route: Oral          Exam:     /83   Pulse 96   Wt 136.1 kg (300 lb 1.6 oz)   SpO2 99%   Breastfeeding  No   BMI 40.70 kg/m    Appearance: in no apparent distress.   Skin: normal, warm, dry  Eyes:  no redness or discharge.  Sclera anicteric  Head and Neck: Normal, no rashes or jaundice  Respiratory: easy respirations, no audible wheezing.  Abdomen: soft, rounded, notable pannus. Nontender. Seems to have space in bilateral iliac fossae without nephrectomy  Extremeties: femoral 2+/2+, Edema, none  Neuro: without deficit, cranial nerves intact   Psychiatric: Normal mood and affect    Diagnostics:   No results found for this or any previous visit (from the past 672 hour(s)).  No results found for: CPRA      Again, thank you for allowing me to participate in the care of your patient.        Sincerely,        Jean-Paul Mccain MD

## 2022-09-26 NOTE — PROGRESS NOTES
"PATIENT:  Matilda Graf  :          1970  ALEN:          2022    The patient is seen at the consultation request of Dr. Wilkerson for obesity associated with CKD secondary to PKD.    Patient is pursuing transplant of kidney and needs to lose 43 pounds prior to surgery.    Transplant coordinator note:     \"Reviewed pt's chart for pre-kidney transplant evaluation planning. Pt lives in [place], [place]. Referred to our center by patient's nephrology NP Olga Freeman. Pt has CKD stage 4 due to polycystic kidney disease. GFR 16 on 22.  Pt is not on dialysis. Pt is not diabetic. No heart, lung, surgical history. Mother was factor V positive but patient is not.  BMI 40 on 22.  Discussed BMI requirement for transplant with patient: yes, mini-eval discussed. Completed Cologuard in - negative.  Dental: encouraged to keep up to date.  Last Pap: up to date.  Mammogram: 21.  Pt is not a smoker, does not consume alcohol, and does not use recreational drugs. Pt is independent w/ ADLs.  Pt has support following transplant.\"     Current BMI: 40.7 (HT 72 in,  lbs/136 kg)  BMI is outside recommendation of <35 for kidney transplant  Goal weight for kidney transplant <257 lbs (43 lb loss)    Very active in high school, college.  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    Work/Social History: Matilda employed as a teach. She describes her home life as stable. Resides in an independent environment. Her marital status is single. She has no overweight/obese child or spouse. She reports that she has never smoked. She has never used smokeless tobacco. She reports current alcohol use. She reports that she does not use " drugs.      Diet Recall from RD visit:  Breakfast Reyna pocket BF s/w with sausage and egg (frozen)   Lunch At school/school lunch- chicken nuggets, banana + salad    Dinner Packet of ramen (not full seasoning packet); fried eggs & bagel; homemade soup; pasta w/ spaghetti sauce + mushrooms; chicken breast/tilapia + minute maid rice cups (brown/wild/quinoa mix)    Snacks Occasional protein granola bar    Beverages [place] sparkling tea drink (3-4 days/week, has reduced size), water, a few soda/month, cranberry juice (8-16 oz/day), milk (8 oz/day); smoothie w/ fruit, almond milk (1x/week)   Alcohol None    Dining out 1-2x/week      Physical Activity  Has membership at [employer], but very tired after home from work  Uses pool at her residence when it is open in the summer   4 years ago- tailbone injury after falling on ice; walking up stairs has been impacted      Past Medical History:   Diagnosis Date     Hypertension        Patient Active Problem List   Diagnosis     PKD (polycystic kidney disease)     Essential hypertension     CKD (chronic kidney disease) stage 4, GFR 15-29 ml/min (H)     Arthritis     Obesity, unspecified       Social History     Tobacco Use     Smoking status: Never Smoker     Smokeless tobacco: Never Used   Substance Use Topics     Alcohol use: Yes     Comment: rare       ASSESSMENT/PLAN:    52 y.o. female with BMI 40 and CKD due to PKD who needs BMI <35 for kidney transplant. We discuss obesity as a disease and treatments including medical and surgical options.  Discussed anti obesity medications and sleeve gastrectomy.  She is not currently interested in medications or surgery.  Info was sent to her via [email or web address] message to watch online seminar and learn more about weight loss surgery as well as medications if she is interested.  She has been stable at 280-330 lbs for the last 20 years despite attempts at weight loss and with BMI 40 surgical treatment should be considered.    Followup Wt mgmt RD 1  month  Follow up [place] 3 months return AIDEE Charles PA-C    30 minutes spent on the date of the encounter doing chart review, history and exam, documentation and further activities as noted above

## 2022-09-26 NOTE — PATIENT INSTRUCTIONS
Please view our Weight Loss Surgery Seminar is at the following website.   [email or web address]    Handouts for you to read are at the following links:    Making Your Decision, Understanding Weight Loss Surgery  [email or web address]    A Roadmap to Your Weight Loss Surgery  [email or web address]    Get Well Loop Information  [email or web address]    SAXENDA (liralutide)    We are considering starting a GLP-1 (Glucagon-like Peptide-1) medication called Saxenda. One of the ways it works is by slowing down the rate that food leaves your stomach. You feel mccarthy and will eat less. It also helps regulate hormones that can help improve your blood sugars.    If you are a patient that checks blood sugars, continue to check as instructed by your doctor. Low blood sugars are rare but can happen if patients are on insulin or other oral agents. If you notice consistent low sugars or high sugars, your medication may need to be adjusted after your appointment. If this is the case, please call RN and provide her your blood sugar record from the last 3-4 days. The RN will get in touch with the doctor and call you back/[employer] message with recommendations. We tolerate high sugars for a bit, so if sugars are running 180-200, this is ok. As weight starts dropping the blood sugars should too. If readings are consistently over 200 for 1-2 weeks, then you should call the doctor/nurse.    Dosing for this medication:   Week 1- Inject 0.6 mg daily  Week 2- Inject 1.2 mg daily  Week 3- Inject 1.8 mg daily  Week 4- Inject 2.4 mg daily  Week 5 and thereafter- Inject 3.0 mg daily    Side effects of GLP- Medications include: The most common side effects are all GI related and consist of: nausea, constipation, diarrhea, burping, or gassiness. Patients are advised to eat slowly and less, and nausea typically passes if people can stick it out.     The risk of pancreatitis (inflammation of the  pancreas) has been associated with this type of medication, but is very rare.  If you have had pancreatitis in the past, this medication may not be for you. Please let us know about any past history of pancreas problems.    Symptoms of pancreatitis include: Pain in your upper stomach area which may travel to your back and be worse after eating. Your stomach area may be tender to the touch.  You may have vomiting or nausea and/or have a fever. If you should develop any of these symptoms, stop the medication and contact your primary care doctor. They will do a blood test to check for pancreatitis.         There is a small chance you may have some low blood sugar after taking the medication.   The signs of low blood sugar are:  Weakness  Shaky   Hungry  Sweating  Confusion      See below for ways to treat low blood sugar without adding in lots of extra calories.      Treating Low Blood Sugar    If you have symptoms of low blood sugar (sweating, shaking, dizzy, confused) eat 15 grams of carbs and wait 15 minutes:    Glucose Tabs are best for sugars under 70 -  Dex4 or BD Glucose tablets are good, you will need to take 3-4 of these to equal 15 grams.     One small box of raisins  4 oz fruit juice box or   cup fruit juice  1 small apple  1 small banana    cup canned fruit in water    English muffin or a slice of bread with jelly   1 low fat frozen waffle with sugar-free syrup    cup cottage cheese with   cup frozen or fresh blueberries  1 cup skim or low-fat milk    cup whole grain cereal  4-6 crackers such as Triscuits      This medication is usually not covered by insurance and can be quite expensive. Sometimes a prior authorization is required, which may take up to 1-2 weeks for an insurance company to make a decision if they will cover the medication. Please be patient, you will be notified after a decision has been made.    For any questions or concerns please send a [employer] message to our team or call our weight  management call center at [phone] during regular business hours. For questions during evenings or weekends your messages will be addressed during the next business day.  For emergencies please call 911 or seek immediate medical care.      (Do not stop taking it if you don't think it's working. For some people it works without them knowing it.)     In order to get refills of this or any medication we prescribe you must be seen in the medical weight mgmt clinic every 2-4 months. Please have your pharmacy fax a refill request to [phone].    WEGOVY (semaglutide)    What is Wegovy?    Wegovy (semaglutide) injection 2.4 mg is an injectable prescription medicine used for adults with obesity (BMI ?30) or overweight (excess weight) (BMI ?27) who also have weight-related medical problems to help them lose weight and keep the weight off.    1.  Start Wegovy (semaglutide) 0.25 mg once weekly for 4 weeks, then if tolerating increase to 0.5 mg weekly for 4 weeks, then if tolerating increase to 1 mg weekly for 4 weeks, then if tolerating increase to 1.7 mg weekly for 4 weeks, then if tolerating increase to 2.4 mg weekly thereafter.    -Each Wegovy pen is a once weekly single-dose prefilled pen with a pen injector already built within the pen. Discard the Wegovy pen after use in sharps container.     2. Storage: make sure that when you get the prescription that you store the prescription in the refrigerator until it is time to use the Wegovy pen.  Once it is time to use the Wegovy pen, you can keep the pen at room temperature and it is good for up to 28 days at room temperature.     3.  Potential common side effects: nausea, headache, diarrhea, stomach upset.  If these become unmanageable or concerning symptoms, please make sure to call or mychart.      Go to site: Wegovy video to learn more and watch instruction videos.      For any questions or concerns please send a [employer] message to our team or call our weight  "management call center at [phone] during regular business hours. For questions during evenings or weekends your messages will be addressed during the next business day.  For emergencies please call 911 or seek immediate medical care.       OZEMPIC (semaglutide)    We are starting a GLP-1 (Glucagon-like Peptide-1) medication called semaglutide (aka Ozempic). One of the ways it works is by slowing down the rate that food leaves your stomach. You feel mccarthy and will eat less. It also helps regulate hormones that can help improve your blood sugars and weight.     Ozempic has been studied for safety and effect on weight loss in adults without diabetes. It is currently FDA approved for diabetes and is considered \"off label\" use for weight loss.    Dosing for this medication:   Month 1- Inject 0.25 mg weekly  Month 2- Inject 0.5mg weekly    Side effects of GLP- Medications include: The most common side effects are all GI related and consist of: nausea, constipation, diarrhea, burping, or gassiness. Patients are advised to eat slowly and less, and nausea typically passes if people can stick it out.     The risk of pancreatitis (inflammation of the pancreas) has been associated with this type of medication, but is very rare.  If you have had pancreatitis in the past, this medication may not be for you. Please let us know about any past history of pancreas problems.    Symptoms of pancreatitis include: Pain in your upper stomach area which may travel to your back and be worse after eating. Your stomach area may be tender to the touch.  You may have vomiting or nausea and/or have a fever. If you should develop any of these symptoms, stop the medication and contact your primary care doctor. They will do a blood test to check for pancreatitis.         There is a small chance you may have some low blood sugar after taking the medication.   The signs of low blood sugar are:  Weakness  Shaky   Hungry  Sweating  Confusion      " See below for ways to treat low blood sugar without adding in lots of extra calories.      Treating Low Blood Sugar    If you have symptoms of low blood sugar (sweating, shaking, dizzy, confused) eat 15 grams of carbs and wait 15 minutes:    Glucose Tabs are best for sugars under 70 -  Dex4 or BD Glucose tablets are good, you will need to take 3-4 of these to equal 15 grams.     One small box of raisins  4 oz fruit juice box or   cup fruit juice  1 small apple  1 small banana    cup canned fruit in water    English muffin or a slice of bread with jelly   1 low fat frozen waffle with sugar-free syrup    cup cottage cheese with   cup frozen or fresh blueberries  1 cup skim or low-fat milk    cup whole grain cereal  4-6 crackers such as Triscuits      This medication is usually not covered by insurance and can be quite expensive. Sometimes a prior authorization is required, which may take up to 1-2 weeks for an insurance company to make a decision if they will cover the medication. Please be patient, you will be notified after a decision has been made.    For any questions or concerns please send a [employer] message to our team or call our weight management call center at [phone] during regular business hours. For questions during evenings or weekends your messages will be addressed during the next business day.  For emergencies please call 911 or seek immediate medical care.      (Do not stop taking it if you don't think it's working. For some people it works without them knowing it.)     In order to get refills of this or any medication we prescribe you must be seen in the medical weight mgmt clinic every 2-4 months. Please have your pharmacy fax a refill request to [phone].

## 2022-10-03 ENCOUNTER — HEALTH MAINTENANCE LETTER (OUTPATIENT)
Age: 52
End: 2022-10-03

## 2022-10-04 ENCOUNTER — VIRTUAL VISIT (OUTPATIENT)
Dept: EDUCATION SERVICES | Facility: CLINIC | Age: 52
End: 2022-10-04
Payer: COMMERCIAL

## 2022-10-04 DIAGNOSIS — E11.65 TYPE 2 DIABETES MELLITUS WITH HYPERGLYCEMIA, WITH LONG-TERM CURRENT USE OF INSULIN (H): ICD-10-CM

## 2022-10-04 DIAGNOSIS — Z79.4 TYPE 2 DIABETES MELLITUS WITH HYPERGLYCEMIA, WITH LONG-TERM CURRENT USE OF INSULIN (H): ICD-10-CM

## 2022-10-04 PROCEDURE — G0108 DIAB MANAGE TRN  PER INDIV: HCPCS | Performed by: DIETITIAN, REGISTERED

## 2022-10-04 RX ORDER — INSULIN HUMAN 100 [IU]/ML
INJECTION, SUSPENSION SUBCUTANEOUS
Qty: 30 ML | Refills: 3 | Status: SHIPPED | OUTPATIENT
Start: 2022-10-04 | End: 2023-08-03

## 2022-10-04 NOTE — PROGRESS NOTES
Diabetes Self-Management Education & Support    Presents for: Individual review    Type of Service: Telephone Visit    Originating Location (Patient Location): Home  Distant Location (Provider Location): [facility]  Mode of Communication:  Telephone      How would patient like to obtain AVS? Sheila    Assessment Type:   ASSESSMENT:  -still having lower BG levels, drop dose to 16 units twice daily of the Humulin 70/30.  -she is watching her food choices.  -hopes to be able to start the ozempic 2.0, looks like pharmacy has it now.  Will have her drop the dose of insulin Humulin 70/30 to 10 units twice daily.    Patient's most recent   Lab Results   Component Value Date    A1C 7.1 06/07/2022    A1C 8.3 04/29/2021     is meeting goal of <8.0    Diabetes knowledge and skills assessment:   Patient is knowledgeable in diabetes management concepts related to: Healthy Eating, Being Active and Monitoring    Continue education with the following diabetes management concepts: Healthy Eating, Being Active, Monitoring, Taking Medication, Problem Solving, Reducing Risks and Healthy Coping    Based on learning assessment above, most appropriate setting for further diabetes education would be: Individual setting.      PLAN      Topics to cover at upcoming visits: Taking Medication, Problem Solving, Reducing Risks and Healthy Coping    Follow-up: as needed    See Care Plan for co-developed, patient-state behavior change goals.  AVS provided for patient today.    Education Materials Provided:  No new materials provided today      SUBJECTIVE/OBJECTIVE:  Presents for: Individual review  Diabetes type: Type 2  Cultural Influences/Ethnic Background:  Not  or       Diabetes Symptoms & Complications:          Patient Problem List and Family Medical History reviewed for relevant medical history, current medical status, and diabetes risk factors.    Vitals:  LMP  (LMP Unknown)   Estimated body mass  "index is 50.29 kg/m  as calculated from the following:    Height as of 6/7/22: 1.626 m (5' 4\").    Weight as of 6/7/22: 132.9 kg (293 lb).   Last 3 BP:   BP Readings from Last 3 Encounters:   06/07/22 134/84   04/29/21 128/64   03/22/21 126/80       History   Smoking Status     Former Smoker   Smokeless Tobacco     Never Used       Labs:  Lab Results   Component Value Date    A1C 7.1 06/07/2022    A1C 8.3 04/29/2021     Lab Results   Component Value Date     06/07/2022    GLC 71 04/29/2021     Lab Results   Component Value Date    LDL 86 06/07/2022     11/24/2020     HDL Cholesterol   Date Value Ref Range Status   11/24/2020 64 >49 mg/dL Final     Direct Measure HDL   Date Value Ref Range Status   06/07/2022 54 >=50 mg/dL Final   ]  GFR Estimate   Date Value Ref Range Status   06/07/2022 >90 >60 mL/min/1.73m2 Final     Comment:     Effective December 21, 2021 eGFRcr in adults is calculated using the 2021 CKD-EPI creatinine equation which includes age and gender (Sergio et al., NEJ, DOI: 10.1056/QLVSwk9043720)   04/29/2021 >90 >60 mL/min/[1.73_m2] Final     Comment:     Non  GFR Calc  Starting 12/18/2018, serum creatinine based estimated GFR (eGFR) will be   calculated using the Chronic Kidney Disease Epidemiology Collaboration   (CKD-EPI) equation.       GFR Estimate If Black   Date Value Ref Range Status   04/29/2021 >90 >60 mL/min/[1.73_m2] Final     Comment:      GFR Calc  Starting 12/18/2018, serum creatinine based estimated GFR (eGFR) will be   calculated using the Chronic Kidney Disease Epidemiology Collaboration   (CKD-EPI) equation.       Lab Results   Component Value Date    CR 0.60 06/07/2022    CR 0.75 04/29/2021     No results found for: MICROALBUMIN    Healthy Eating:  Healthy Eating Assessed Today: Yes  Breakfast: eggs  Lunch: usually leftover or sandwich  Dinner: spaghetti  Snacks: no snacking, wt staying stable pt feels  Other: will be picking up the " ozempic 2 mg dose, will have have her do Humulin 70/30 at 10 units in am and 10 units in pm    Being Active:  Being Active Assessed Today: Yes (watching the grandbaby, not a lot)  Exercise::  (needs to get back to sports doctor about her knees)    Monitoring:           Taking Medications:  Diabetes Medication(s)     Biguanides       metFORMIN (GLUCOPHAGE) 1000 MG tablet    TAKE 1 TABLET BY MOUTH TWICE DAILY WITH MEALS    Insulin       insulin NPH-Regular (HUMULIN MIX 70/30 KWIKPEN) susp    INJECT 16 UNITS UNDER THE SKIN BEFORE BREAKFAST AND 16 UNITS BEFORE SUPPER    Incretin Mimetic Agents (GLP-1 Receptor Agonists)       OZEMPIC, 1 MG/DOSE, 4 MG/3ML SOPN    INJECT 1MG UNDER THE SKIN EVERY 7 DAYS     Semaglutide, 2 MG/DOSE, (OZEMPIC, 2 MG/DOSE,) 8 MG/3ML SOPN    Inject 2 mg Subcutaneous every 7 days               Problem Solving:                 Reducing Risks:       Healthy Coping:     Patient Activation Measure Survey Score:  No flowsheet data found.      Care Plan and Education Provided:  There are no care plans that you recently modified to display for this patient.          Time Spent: 30 minutes  Encounter Type: Individual    Any diabetes medication dose changes were made via the CDE Protocol per the patient's primary care provider. A copy of this encounter was shared with the provider.

## 2022-10-04 NOTE — PATIENT INSTRUCTIONS
Drop your Humulin 70/30 to 16 units 30 minutes before breakfast and supper.  Once you go on the Ozempic 2.0 drop to 10 units twice daily.      2 . Keep up the good work, you are doing great!

## 2022-10-14 DIAGNOSIS — I10 BENIGN ESSENTIAL HYPERTENSION: ICD-10-CM

## 2022-10-14 RX ORDER — AMLODIPINE BESYLATE 10 MG/1
TABLET ORAL
Qty: 90 TABLET | Refills: 0 | Status: SHIPPED | OUTPATIENT
Start: 2022-10-14 | End: 2023-01-24

## 2022-11-08 ENCOUNTER — VIRTUAL VISIT (OUTPATIENT)
Dept: EDUCATION SERVICES | Facility: CLINIC | Age: 52
End: 2022-11-08
Payer: COMMERCIAL

## 2022-11-08 DIAGNOSIS — Z79.4 TYPE 2 DIABETES MELLITUS WITH HYPERGLYCEMIA, WITH LONG-TERM CURRENT USE OF INSULIN (H): ICD-10-CM

## 2022-11-08 DIAGNOSIS — E11.65 TYPE 2 DIABETES MELLITUS WITH HYPERGLYCEMIA, WITH LONG-TERM CURRENT USE OF INSULIN (H): ICD-10-CM

## 2022-11-08 RX ORDER — SEMAGLUTIDE 2.68 MG/ML
2 INJECTION, SOLUTION SUBCUTANEOUS
Qty: 6 ML | Refills: 3 | Status: SHIPPED | OUTPATIENT
Start: 2022-11-08 | End: 2023-07-13

## 2022-11-08 NOTE — PROGRESS NOTES
Diabetes education contact:      Have been on phone yesterday and today with pt trying to find her some ozempic.    She is unable to do 4 pm phone call today, left me a message.    Diamond Lucero RD on 11/8/2022 at 3:45 PM

## 2022-11-26 DIAGNOSIS — N39.41 URGE INCONTINENCE OF URINE: ICD-10-CM

## 2022-11-26 DIAGNOSIS — Z79.4 TYPE 2 DIABETES MELLITUS WITHOUT COMPLICATION, WITH LONG-TERM CURRENT USE OF INSULIN (H): ICD-10-CM

## 2022-11-26 DIAGNOSIS — E11.9 TYPE 2 DIABETES MELLITUS WITHOUT COMPLICATION, WITH LONG-TERM CURRENT USE OF INSULIN (H): ICD-10-CM

## 2022-11-28 DIAGNOSIS — E11.9 TYPE 2 DIABETES MELLITUS WITHOUT COMPLICATION, WITH LONG-TERM CURRENT USE OF INSULIN (H): ICD-10-CM

## 2022-11-28 DIAGNOSIS — Z79.4 TYPE 2 DIABETES MELLITUS WITHOUT COMPLICATION, WITH LONG-TERM CURRENT USE OF INSULIN (H): ICD-10-CM

## 2022-11-28 RX ORDER — OXYBUTYNIN CHLORIDE 10 MG/1
TABLET, EXTENDED RELEASE ORAL
Qty: 30 TABLET | Refills: 5 | Status: SHIPPED | OUTPATIENT
Start: 2022-11-28 | End: 2023-06-06

## 2023-01-20 DIAGNOSIS — I10 BENIGN ESSENTIAL HYPERTENSION: ICD-10-CM

## 2023-01-20 DIAGNOSIS — E11.9 TYPE 2 DIABETES MELLITUS WITHOUT COMPLICATION, WITH LONG-TERM CURRENT USE OF INSULIN (H): ICD-10-CM

## 2023-01-20 DIAGNOSIS — Z79.4 TYPE 2 DIABETES MELLITUS WITHOUT COMPLICATION, WITH LONG-TERM CURRENT USE OF INSULIN (H): ICD-10-CM

## 2023-01-20 DIAGNOSIS — E11.65 TYPE 2 DIABETES MELLITUS WITH HYPERGLYCEMIA, WITH LONG-TERM CURRENT USE OF INSULIN (H): ICD-10-CM

## 2023-01-20 DIAGNOSIS — F43.22 ADJUSTMENT DISORDER WITH ANXIOUS MOOD: ICD-10-CM

## 2023-01-20 DIAGNOSIS — Z79.4 TYPE 2 DIABETES MELLITUS WITH HYPERGLYCEMIA, WITH LONG-TERM CURRENT USE OF INSULIN (H): ICD-10-CM

## 2023-01-24 RX ORDER — SERTRALINE HYDROCHLORIDE 100 MG/1
TABLET, FILM COATED ORAL
Qty: 90 TABLET | Refills: 1 | Status: SHIPPED | OUTPATIENT
Start: 2023-01-24 | End: 2023-08-03

## 2023-01-24 RX ORDER — AMLODIPINE BESYLATE 10 MG/1
TABLET ORAL
Qty: 90 TABLET | Refills: 1 | Status: SHIPPED | OUTPATIENT
Start: 2023-01-24 | End: 2023-08-03

## 2023-02-12 ENCOUNTER — HEALTH MAINTENANCE LETTER (OUTPATIENT)
Age: 53
End: 2023-02-12

## 2023-04-15 DIAGNOSIS — Z79.4 TYPE 2 DIABETES MELLITUS WITH HYPERGLYCEMIA, WITH LONG-TERM CURRENT USE OF INSULIN (H): ICD-10-CM

## 2023-04-15 DIAGNOSIS — E11.65 TYPE 2 DIABETES MELLITUS WITH HYPERGLYCEMIA, WITH LONG-TERM CURRENT USE OF INSULIN (H): ICD-10-CM

## 2023-04-17 NOTE — TELEPHONE ENCOUNTER
Routing to ordering provider for consideration, not on refill protocol.           Blanca Ko     RN MSN

## 2023-04-21 DIAGNOSIS — E11.9 TYPE 2 DIABETES MELLITUS WITHOUT COMPLICATION, WITH LONG-TERM CURRENT USE OF INSULIN (H): ICD-10-CM

## 2023-04-21 DIAGNOSIS — Z79.4 TYPE 2 DIABETES MELLITUS WITHOUT COMPLICATION, WITH LONG-TERM CURRENT USE OF INSULIN (H): ICD-10-CM

## 2023-04-21 RX ORDER — PEN NEEDLE, DIABETIC 32GX 5/32"
NEEDLE, DISPOSABLE MISCELLANEOUS
Qty: 200 EACH | Refills: 0 | Status: SHIPPED | OUTPATIENT
Start: 2023-04-21 | End: 2023-08-03

## 2023-04-24 ENCOUNTER — TELEPHONE (OUTPATIENT)
Dept: FAMILY MEDICINE | Facility: CLINIC | Age: 53
End: 2023-04-24
Payer: COMMERCIAL

## 2023-04-24 DIAGNOSIS — Z79.4 TYPE 2 DIABETES MELLITUS WITH HYPERGLYCEMIA, WITH LONG-TERM CURRENT USE OF INSULIN (H): ICD-10-CM

## 2023-04-24 DIAGNOSIS — E11.65 TYPE 2 DIABETES MELLITUS WITH HYPERGLYCEMIA, WITH LONG-TERM CURRENT USE OF INSULIN (H): ICD-10-CM

## 2023-04-24 NOTE — TELEPHONE ENCOUNTER
Fax received from pharmacy.     Freestyle Lona 2 sensor- Please resend order for qty 6 for a 3 month supply       Preferred Pharmacy:  [id number] - [place], [place] - [address] [place] [address] AT [facility] & [facility] [address]  [address] [place] [id number]  Phone: [phone] Fax: [phone]

## 2023-06-05 DIAGNOSIS — N39.41 URGE INCONTINENCE OF URINE: ICD-10-CM

## 2023-06-06 DIAGNOSIS — N39.41 URGE INCONTINENCE OF URINE: ICD-10-CM

## 2023-06-06 RX ORDER — OXYBUTYNIN CHLORIDE 10 MG/1
TABLET, EXTENDED RELEASE ORAL
Qty: 30 TABLET | Refills: 0 | Status: SHIPPED | OUTPATIENT
Start: 2023-06-06 | End: 2023-08-03

## 2023-06-06 RX ORDER — OXYBUTYNIN CHLORIDE 10 MG/1
TABLET, EXTENDED RELEASE ORAL
Qty: 30 TABLET | Refills: 0 | Status: SHIPPED | OUTPATIENT
Start: 2023-06-06 | End: 2023-06-06

## 2023-06-07 RX ORDER — OXYBUTYNIN CHLORIDE 10 MG/1
TABLET, EXTENDED RELEASE ORAL
Qty: 90 TABLET | OUTPATIENT
Start: 2023-06-07

## 2023-06-08 DIAGNOSIS — L30.9 ECZEMA, UNSPECIFIED TYPE: ICD-10-CM

## 2023-06-08 RX ORDER — TRIAMCINOLONE ACETONIDE 1 MG/G
CREAM TOPICAL 2 TIMES DAILY
Qty: 15 G | Refills: 0 | Status: SHIPPED | OUTPATIENT
Start: 2023-06-08 | End: 2024-07-19

## 2023-06-08 NOTE — TELEPHONE ENCOUNTER
Pt would like a refill and last seen with Juli on 6/7//22 does she need a appt first?    Ronny is [place] in [place].    Emelina Borges  [facility]

## 2023-07-06 DIAGNOSIS — E11.65 TYPE 2 DIABETES MELLITUS WITH HYPERGLYCEMIA, WITH LONG-TERM CURRENT USE OF INSULIN (H): ICD-10-CM

## 2023-07-06 DIAGNOSIS — Z79.4 TYPE 2 DIABETES MELLITUS WITH HYPERGLYCEMIA, WITH LONG-TERM CURRENT USE OF INSULIN (H): ICD-10-CM

## 2023-07-06 RX ORDER — SEMAGLUTIDE 2.68 MG/ML
2 INJECTION, SOLUTION SUBCUTANEOUS
Qty: 3 ML | OUTPATIENT
Start: 2023-07-06

## 2023-07-06 NOTE — TELEPHONE ENCOUNTER
Vijaya refills with reminders given, date of last OV with Juli Ramsey NP 6/7/22. Patient needs in clinic appointment for further refills.     Julie Behrendt RN

## 2023-07-12 ENCOUNTER — TRANSFERRED RECORDS (OUTPATIENT)
Dept: MULTI SPECIALTY CLINIC | Facility: CLINIC | Age: 53
End: 2023-07-12
Payer: COMMERCIAL

## 2023-07-12 LAB — RETINOPATHY: NORMAL

## 2023-07-13 ENCOUNTER — TELEPHONE (OUTPATIENT)
Dept: EDUCATION SERVICES | Facility: CLINIC | Age: 53
End: 2023-07-13
Payer: COMMERCIAL

## 2023-07-13 DIAGNOSIS — Z79.4 TYPE 2 DIABETES MELLITUS WITH HYPERGLYCEMIA, WITH LONG-TERM CURRENT USE OF INSULIN (H): ICD-10-CM

## 2023-07-13 DIAGNOSIS — E11.65 TYPE 2 DIABETES MELLITUS WITH HYPERGLYCEMIA, WITH LONG-TERM CURRENT USE OF INSULIN (H): ICD-10-CM

## 2023-07-13 NOTE — TELEPHONE ENCOUNTER
Juil Roxy REYNAGA, CNP,    Pt needs a refill on her ozempic, she is due for an office visit.  She has a visit set up for august 3rd with you.  She is due to take her next shot on Monday.    I cued up her ozempic if you are ok can you sign off on it so she is able to get it.    Thanks! Diamond Lucero RD, [facility]

## 2023-07-14 RX ORDER — SEMAGLUTIDE 2.68 MG/ML
2 INJECTION, SOLUTION SUBCUTANEOUS
Qty: 3 ML | Refills: 0 | Status: SHIPPED | OUTPATIENT
Start: 2023-07-14 | End: 2023-07-17

## 2023-07-17 RX ORDER — SEMAGLUTIDE 2.68 MG/ML
2 INJECTION, SOLUTION SUBCUTANEOUS
Qty: 3 ML | Refills: 0 | Status: SHIPPED | OUTPATIENT
Start: 2023-07-17 | End: 2023-08-03

## 2023-07-17 NOTE — TELEPHONE ENCOUNTER
Diabetes education contact:    Pt wants the Ozempic 2 mg weekly dose sent to [facility] in [place].    Resent and alerted [facility] pharmacy.    Diamond Lucero RD, [facility]

## 2023-08-03 ENCOUNTER — LAB (OUTPATIENT)
Dept: FAMILY MEDICINE | Facility: CLINIC | Age: 53
End: 2023-08-03

## 2023-08-03 ENCOUNTER — OFFICE VISIT (OUTPATIENT)
Dept: FAMILY MEDICINE | Facility: CLINIC | Age: 53
End: 2023-08-03
Payer: COMMERCIAL

## 2023-08-03 VITALS
SYSTOLIC BLOOD PRESSURE: 138 MMHG | HEART RATE: 89 BPM | RESPIRATION RATE: 20 BRPM | WEIGHT: 289 LBS | OXYGEN SATURATION: 95 % | BODY MASS INDEX: 49.34 KG/M2 | HEIGHT: 64 IN | DIASTOLIC BLOOD PRESSURE: 76 MMHG | TEMPERATURE: 98.7 F

## 2023-08-03 DIAGNOSIS — N39.41 URGE INCONTINENCE OF URINE: ICD-10-CM

## 2023-08-03 DIAGNOSIS — Z12.31 VISIT FOR SCREENING MAMMOGRAM: Primary | ICD-10-CM

## 2023-08-03 DIAGNOSIS — E11.65 TYPE 2 DIABETES MELLITUS WITH HYPERGLYCEMIA, WITH LONG-TERM CURRENT USE OF INSULIN (H): ICD-10-CM

## 2023-08-03 DIAGNOSIS — Z12.11 SCREEN FOR COLON CANCER: ICD-10-CM

## 2023-08-03 DIAGNOSIS — J45.40 MODERATE PERSISTENT ASTHMA WITHOUT COMPLICATION: ICD-10-CM

## 2023-08-03 DIAGNOSIS — I69.354 HEMIPLEGIA AND HEMIPARESIS FOLLOWING CEREBRAL INFARCTION AFFECTING LEFT NON-DOMINANT SIDE (H): ICD-10-CM

## 2023-08-03 DIAGNOSIS — I10 BENIGN ESSENTIAL HYPERTENSION: ICD-10-CM

## 2023-08-03 DIAGNOSIS — F43.22 ADJUSTMENT DISORDER WITH ANXIOUS MOOD: ICD-10-CM

## 2023-08-03 DIAGNOSIS — E78.5 HYPERLIPIDEMIA LDL GOAL <100: ICD-10-CM

## 2023-08-03 DIAGNOSIS — E11.9 TYPE 2 DIABETES MELLITUS WITHOUT COMPLICATION, WITH LONG-TERM CURRENT USE OF INSULIN (H): ICD-10-CM

## 2023-08-03 DIAGNOSIS — Z79.4 TYPE 2 DIABETES MELLITUS WITH HYPERGLYCEMIA, WITH LONG-TERM CURRENT USE OF INSULIN (H): ICD-10-CM

## 2023-08-03 DIAGNOSIS — Z79.4 TYPE 2 DIABETES MELLITUS WITHOUT COMPLICATION, WITH LONG-TERM CURRENT USE OF INSULIN (H): ICD-10-CM

## 2023-08-03 DIAGNOSIS — I15.9 SECONDARY HYPERTENSION: ICD-10-CM

## 2023-08-03 DIAGNOSIS — E66.01 MORBID OBESITY (H): ICD-10-CM

## 2023-08-03 LAB
ANION GAP SERPL CALCULATED.3IONS-SCNC: 15 MMOL/L (ref 7–15)
BUN SERPL-MCNC: 11.3 MG/DL (ref 6–20)
CALCIUM SERPL-MCNC: 9.3 MG/DL (ref 8.6–10)
CHLORIDE SERPL-SCNC: 101 MMOL/L (ref 98–107)
CHOLEST SERPL-MCNC: 151 MG/DL
CREAT SERPL-MCNC: 0.58 MG/DL (ref 0.51–0.95)
CREAT UR-MCNC: 141 MG/DL
DEPRECATED HCO3 PLAS-SCNC: 25 MMOL/L (ref 22–29)
ERYTHROCYTE [DISTWIDTH] IN BLOOD BY AUTOMATED COUNT: 13.5 % (ref 10–15)
GFR SERPL CREATININE-BSD FRML MDRD: >90 ML/MIN/1.73M2
GLUCOSE SERPL-MCNC: 111 MG/DL (ref 70–99)
HBA1C MFR BLD: 6.7 % (ref 0–5.6)
HCT VFR BLD AUTO: 41.8 % (ref 35–47)
HDLC SERPL-MCNC: 55 MG/DL
HGB BLD-MCNC: 13.3 G/DL (ref 11.7–15.7)
LDLC SERPL CALC-MCNC: 78 MG/DL
MCH RBC QN AUTO: 26.9 PG (ref 26.5–33)
MCHC RBC AUTO-ENTMCNC: 31.8 G/DL (ref 31.5–36.5)
MCV RBC AUTO: 85 FL (ref 78–100)
MICROALBUMIN UR-MCNC: <12 MG/L
MICROALBUMIN/CREAT UR: NORMAL MG/G{CREAT}
NONHDLC SERPL-MCNC: 96 MG/DL
PLATELET # BLD AUTO: 258 10E3/UL (ref 150–450)
POTASSIUM SERPL-SCNC: 4 MMOL/L (ref 3.4–5.3)
RBC # BLD AUTO: 4.94 10E6/UL (ref 3.8–5.2)
SODIUM SERPL-SCNC: 141 MMOL/L (ref 136–145)
TRIGL SERPL-MCNC: 90 MG/DL
WBC # BLD AUTO: 7.3 10E3/UL (ref 4–11)

## 2023-08-03 PROCEDURE — 36415 COLL VENOUS BLD VENIPUNCTURE: CPT | Performed by: NURSE PRACTITIONER

## 2023-08-03 PROCEDURE — 82043 UR ALBUMIN QUANTITATIVE: CPT | Performed by: NURSE PRACTITIONER

## 2023-08-03 PROCEDURE — 99214 OFFICE O/P EST MOD 30 MIN: CPT | Performed by: NURSE PRACTITIONER

## 2023-08-03 PROCEDURE — 80048 BASIC METABOLIC PNL TOTAL CA: CPT | Performed by: NURSE PRACTITIONER

## 2023-08-03 PROCEDURE — 85027 COMPLETE CBC AUTOMATED: CPT | Performed by: NURSE PRACTITIONER

## 2023-08-03 PROCEDURE — 80061 LIPID PANEL: CPT | Performed by: NURSE PRACTITIONER

## 2023-08-03 PROCEDURE — 82570 ASSAY OF URINE CREATININE: CPT | Performed by: NURSE PRACTITIONER

## 2023-08-03 PROCEDURE — 83036 HEMOGLOBIN GLYCOSYLATED A1C: CPT | Performed by: NURSE PRACTITIONER

## 2023-08-03 RX ORDER — PEN NEEDLE, DIABETIC 32GX 5/32"
NEEDLE, DISPOSABLE MISCELLANEOUS
Qty: 200 EACH | Refills: 0 | Status: SHIPPED | OUTPATIENT
Start: 2023-08-03 | End: 2024-01-29

## 2023-08-03 RX ORDER — ATORVASTATIN CALCIUM 40 MG/1
40 TABLET, FILM COATED ORAL DAILY
Qty: 90 TABLET | Refills: 3 | Status: SHIPPED | OUTPATIENT
Start: 2023-08-03 | End: 2024-07-19

## 2023-08-03 RX ORDER — MONTELUKAST SODIUM 10 MG/1
10 TABLET ORAL AT BEDTIME
Qty: 90 TABLET | Refills: 3 | Status: SHIPPED | OUTPATIENT
Start: 2023-08-03 | End: 2024-07-19

## 2023-08-03 RX ORDER — FLUTICASONE PROPIONATE 110 UG/1
AEROSOL, METERED RESPIRATORY (INHALATION)
Qty: 12 G | Refills: 3 | Status: SHIPPED | OUTPATIENT
Start: 2023-08-03 | End: 2024-07-19

## 2023-08-03 RX ORDER — AMLODIPINE BESYLATE 10 MG/1
10 TABLET ORAL DAILY
Qty: 90 TABLET | Refills: 3 | Status: SHIPPED | OUTPATIENT
Start: 2023-08-03 | End: 2024-07-19

## 2023-08-03 RX ORDER — INSULIN HUMAN 100 [IU]/ML
INJECTION, SUSPENSION SUBCUTANEOUS
Qty: 30 ML | Refills: 3 | Status: SHIPPED | OUTPATIENT
Start: 2023-08-03 | End: 2023-11-17

## 2023-08-03 RX ORDER — SEMAGLUTIDE 2.68 MG/ML
2 INJECTION, SOLUTION SUBCUTANEOUS
Qty: 3 ML | Refills: 0 | Status: SHIPPED | OUTPATIENT
Start: 2023-08-03 | End: 2024-01-18

## 2023-08-03 RX ORDER — SERTRALINE HYDROCHLORIDE 100 MG/1
150 TABLET, FILM COATED ORAL DAILY
Qty: 90 TABLET | Refills: 1 | Status: SHIPPED | OUTPATIENT
Start: 2023-08-03 | End: 2024-02-19

## 2023-08-03 RX ORDER — OXYBUTYNIN CHLORIDE 10 MG/1
10 TABLET, EXTENDED RELEASE ORAL DAILY
Qty: 90 TABLET | Refills: 3 | Status: SHIPPED | OUTPATIENT
Start: 2023-08-03 | End: 2024-07-19

## 2023-08-03 RX ORDER — IRBESARTAN 300 MG/1
300 TABLET ORAL AT BEDTIME
Qty: 90 TABLET | Refills: 3 | Status: SHIPPED | OUTPATIENT
Start: 2023-08-03

## 2023-08-03 ASSESSMENT — ASTHMA QUESTIONNAIRES: ACT_TOTALSCORE: 25

## 2023-08-03 ASSESSMENT — PAIN SCALES - GENERAL: PAINLEVEL: NO PAIN (0)

## 2023-08-03 NOTE — PROGRESS NOTES
Assessment & Plan     Type 2 diabetes mellitus with hyperglycemia, with long-term current use of insulin (H)   Controlled no change in treatment plan   - HEMOGLOBIN A1C  - Lipid panel reflex to direct LDL Non-fasting  - Albumin Random Urine Quantitative with Creat Ratio  - REVIEW OF HEALTH MAINTENANCE PROTOCOL ORDERS  - Albumin Random Urine Quantitative with Creat Ratio  - Lipid panel reflex to direct LDL Non-fasting  - HEMOGLOBIN A1C  - insulin NPH-Regular (HUMULIN MIX 70/30 KWIKPEN) susp  Dispense: 30 mL; Refill: 3  - Semaglutide, 2 MG/DOSE, (OZEMPIC, 2 MG/DOSE,) 8 MG/3ML pen  Dispense: 3 mL; Refill: 0  - CBC with platelets  - CBC with platelets    Secondary hypertension   Controlled no change in treatment plan   - BASIC METABOLIC PANEL  - BASIC METABOLIC PANEL  - irbesartan (AVAPRO) 300 MG tablet  Dispense: 90 tablet; Refill: 3    Benign essential hypertension   Controlled no change in treatment plan   - amLODIPine (NORVASC) 10 MG tablet  Dispense: 90 tablet; Refill: 3    Hyperlipidemia LDL goal <100   Controlled no change in treatment plan   - atorvastatin (LIPITOR) 40 MG tablet  Dispense: 90 tablet; Refill: 3    Moderate persistent asthma without complication   Controlled no change in treatment plan   - fluticasone (FLOVENT HFA) 110 MCG/ACT inhaler  Dispense: 12 g; Refill: 3  - montelukast (SINGULAIR) 10 MG tablet  Dispense: 90 tablet; Refill: 3    Type 2 diabetes mellitus without complication, with long-term current use of insulin (H)   Controlled no change in treatment plan   - metFORMIN (GLUCOPHAGE) 1000 MG tablet  Dispense: 180 tablet; Refill: 3  - insulin pen needle (BD PEN NEEDLE TWYLA 2ND GEN) 32G X 4 MM miscellaneous  Dispense: 200 each; Refill: 0    Screen for colon cancer  - GLENYS(EXACT SCIENCES)    Visit for screening mammogram  - *MA Screening Digital Bilateral    Urge incontinence of urine   Controlled no change in treatment plan   - oxybutynin ER (DITROPAN XL) 10 MG 24 hr tablet  Dispense: 90  "tablet; Refill: 3    Adjustment disorder with anxious mood  - sertraline (ZOLOFT) 100 MG tablet  Dispense: 90 tablet; Refill: 1    Hemiplegia and hemiparesis following cerebral infarction affecting left non-dominant side (H)      Morbid obesity (H)  Reviewed diet and exercise     BMI:   Estimated body mass index is 49.58 kg/m  as calculated from the following:    Height as of this encounter: 1.626 m (5' 4.02\").    Weight as of this encounter: 131.1 kg (289 lb).   Weight management plan: Discussed healthy diet and exercise guidelines    See Patient Instructions    RONNELL Bianchi CNP  [facility]    Kimberly Brumfield is a 52 year old, presenting for the following health issues:  Diabetes      8/3/2023    10:11 AM   Additional Questions   Roomed by Echo MAURICE       History of Present Illness       Diabetes:   She presents for follow up of diabetes.   She is checking home blood glucose with a continuous glucose monitor.   She checks blood glucose before meals.  Blood glucose is never over 200 and never under 70. She is aware of hypoglycemia symptoms including weakness and lethargy.    She has no concerns regarding her diabetes at this time.   She is not experiencing numbness or burning in feet, excessive thirst, blurry vision, weight changes or redness, sores or blisters on feet. The patient has had a diabetic eye exam in the last 12 months. Eye exam performed on july 12. Location of last eye exam [facility].        She eats 2-3 servings of fruits and vegetables daily.She consumes 0 sweetened beverage(s) daily.She exercises with enough effort to increase her heart rate 9 or less minutes per day.  She exercises with enough effort to increase her heart rate 3 or less days per week.   She is taking medications regularly.               Review of Systems   Constitutional, HEENT, cardiovascular, pulmonary, gi and gu systems are negative, except as otherwise noted.      Objective    LMP  (LMP Unknown) "   There is no height or weight on file to calculate BMI.  Physical Exam   GENERAL: healthy, alert and no distress  EYES: Eyes grossly normal to inspection, PERRL and conjunctivae and sclerae normal  HENT: ear canals and TM's normal, nose and mouth without ulcers or lesions  NECK: no adenopathy, no asymmetry, masses, or scars and thyroid normal to palpation  RESP: lungs clear to auscultation - no rales, rhonchi or wheezes  BREAST: normal without masses, tenderness or nipple discharge and no palpable axillary masses or adenopathy  CV: regular rate and rhythm, normal S1 S2, no S3 or S4, no murmur, click or rub, no peripheral edema and peripheral pulses strong  ABDOMEN: soft, nontender, no hepatosplenomegaly, no masses and bowel sounds normal  MS: no gross musculoskeletal defects noted, no edema  SKIN: no suspicious lesions or rashes  NEURO: Normal strength and tone, mentation intact and speech normal  PSYCH: mentation appears normal, affect normal/bright    Results for orders placed or performed in visit on 08/03/23   HEMOGLOBIN A1C     Status: Abnormal   Result Value Ref Range    Hemoglobin A1C 6.7 (H) 0.0 - 5.6 %

## 2023-08-03 NOTE — LETTER
August 24, 2023      Brissa Mott  [address] [address]        Dear ,    We are writing to inform you of your test results.    Cologuard test was negative.     Resulted Orders   COLOGUARD(EXACT SCIENCES)   Result Value Ref Range    COLOGUARD-ABSTRACT Negative Negative      Comment:        NEGATIVE TEST RESULT. A negative Cologuard result indicates a low likelihood that a colorectal cancer (CRC) or advanced adenoma (adenomatous polyps with more advanced pre-malignant features)  is present. The chance that a person with a negative Cologuard test has a colorectal cancer is less than 1 in 1500 (negative predictive value >99.9%) or has an  advanced adenoma is less than  5.3% (negative predictive value 94.7%). These data are based on a prospective cross-sectional study of 10,000 individuals at average risk for colorectal cancer who were screened with both Cologuard and colonoscopy. (Marian Emery al, N Engl J Med 2014;370(14):5470-6371) The normal value (reference range) for this assay is negative.    COLOGUARD RE-SCREENING RECOMMENDATION: Periodic colorectal cancer screening is an important part of preventive healthcare for asymptomatic individuals at average risk for colorectal cancer.  Following a negative Cologuard result, the American Cancer Society and U.S.  Multi-Society Task Force screening guidelines recommend a Cologuard re-screening interval of 3 years.   References: American Cancer Society Guideline for Colorectal Cancer Screening: https://www.cancer.org/cancer/colon-rectal-cancer/syvevdpyl-brjqxhgai-tceqhxz/acs-recommendations.html.; Ruben DK, Leonides NAIDU, Maryanne LEACHK, Colorectal Cancer Screening: Recommendations for Physicians and Patients from the U.S. Multi-Society Task Force on Colorectal Cancer Screening , Am J Gastroenterology 2017; 112:4318-5705.    TEST DESCRIPTION: Composite algorithmic analysis of stool DNA-biomarkers with hemoglobin immunoassay.   Quantitative values of  individual biomarkers are not reportable and are not associated with individual biomarker result reference ranges. Cologuard is intended for colorectal cancer screening of adults of either sex, 45 years or older, who are at average-risk for colorectal cancer (CRC). Cologuard has been approved for use by the U.S. FDA. The performance of Cologuard was  established in a cross sectional study of average-risk adults aged 50-84. Cologuard performance in patients ages 45 to 49 years was estimated by sub-group analysis of near-age groups. Colonoscopies performed for a positive result may find as the most clinically significant lesion: colorectal cancer [4.0%], advanced adenoma (including sessile serrated polyps greater than or equal to 1cm diameter) [20%] or non- advanced adenoma [31%]; or no colorectal neoplasia [45%]. These estimates are derived from a prospective cross-sectional screening study of 10,000 individuals at average risk for colorectal cancer who were screened with both Cologuard and colonoscopy. (Marian JONES. et al, N Engl J Med 2014;370(14):5544-2301.) Cologuard may produce a false negative or false positive result (no colorectal cancer or precancerous polyp present at colonoscopy follow up). A negative Cologuard test result does not guarantee the absence of CRC or advanced adenoma (pre-cancer). The current Cologuard  screening interval is every 3 years. (American Cancer Society and U.S. Multi-Society Task Force). Cologuard performance data in a 10,000 patient pivotal study using colonoscopy as the reference method can be accessed at the following location: [email or web address] [email or web address]/results. Additional description of the Cologuard test process, warnings and precautions can be found at www.cologuard.com.         If you have any questions or concerns, please call the clinic at the number listed above.       Sincerely,      RONNELL Bianchi CNP/rosalia

## 2023-08-03 NOTE — LETTER
My Asthma Action Plan    Name: Brissa Mott   YOB: 1970  Date: 8/3/2023   My doctor: RONNELL Bianchi CNP   My clinic: [facility]        My Rescue Medicine:   Albuterol inhaler (Proair/Ventolin/Proventil HFA)  2-4 puffs EVERY 4 HOURS as needed. Use a spacer if recommended by your provider.   My Asthma Severity:   Intermittent / Exercise Induced  Know your asthma triggers: Patient is unaware of triggers             GREEN ZONE   Good Control  I feel good  No cough or wheeze  Can work, sleep and play without asthma symptoms       Take your asthma control medicine every day.     If exercise triggers your asthma, take your rescue medication  15 minutes before exercise or sports, and  During exercise if you have asthma symptoms  Spacer to use with inhaler: If you have a spacer, make sure to use it with your inhaler             YELLOW ZONE Getting Worse  I have ANY of these:  I do not feel good  Cough or wheeze  Chest feels tight  Wake up at night   Keep taking your Green Zone medications  Start taking your rescue medicine:  every 20 minutes for up to 1 hour. Then every 4 hours for 24-48 hours.  If you stay in the Yellow Zone for more than 12-24 hours, contact your doctor.  If you do not return to the Green Zone in 12-24 hours or you get worse, start taking your oral steroid medicine if prescribed by your provider.           RED ZONE Medical Alert - Get Help  I have ANY of these:  I feel awful  Medicine is not helping  Breathing getting harder  Trouble walking or talking  Nose opens wide to breathe       Take your rescue medicine NOW  If your provider has prescribed an oral steroid medicine, start taking it NOW  Call your doctor NOW  If you are still in the Red Zone after 20 minutes and you have not reached your doctor:  Take your rescue medicine again and  Call 911 or go to the emergency room right away    See your regular doctor within 2 weeks of an Emergency Room or Urgent  Care visit for follow-up treatment.          Annual Reminders:  Meet with Asthma Educator,  Flu Shot in the Fall, consider Pneumonia Vaccination for patients with asthma (aged 19 and older).    Pharmacy: [id number] [place] [address] [address] [facility] [address] [facility] [address]    Electronically signed by RONNELL Bianchi CNP   Date: 08/03/23                    Asthma Triggers  How To Control Things That Make Your Asthma Worse    Triggers are things that make your asthma worse.  Look at the list below to help you find your triggers and   what you can do about them. You can help prevent asthma flare-ups by staying away from your triggers.      Trigger                                                          What you can do   Cigarette Smoke  Tobacco smoke can make asthma worse. Do not allow smoking in your home, car or around you.  Be sure no one smokes at a child s day care or school.  If you smoke, ask your health care provider for ways to help you quit.  Ask family members to quit too.  Ask your health care provider for a referral to Quit Plan to help you quit smoking, or call 2-695-466-PLAN.     Colds, Flu, Bronchitis  These are common triggers of asthma. Wash your hands often.  Don t touch your eyes, nose or mouth.  Get a flu shot every year.     Dust Mites  These are tiny bugs that live in cloth or carpet. They are too small to see. Wash sheets and blankets in hot water every week.   Encase pillows and mattress in dust mite proof covers.  Avoid having carpet if you can. If you have carpet, vacuum weekly.   Use a dust mask and HEPA vacuum.   Pollen and Outdoor Mold  Some people are allergic to trees, grass, or weed pollen, or molds. Try to keep your windows closed.  Limit time out doors when pollen count is high.   Ask you health care provider about taking medicine during allergy season.     Animal Dander  Some people are allergic to skin flakes, urine or saliva from pets with fur or  feathers. Keep pets with fur or feathers out of your home.    If you can t keep the pet outdoors, then keep the pet out of your bedroom.  Keep the bedroom door closed.  Keep pets off cloth furniture and away from stuffed toys.     Mice, Rats, and Cockroaches  Some people are allergic to the waste from these pests.   Cover food and garbage.  Clean up spills and food crumbs.  Store grease in the refrigerator.   Keep food out of the bedroom.   Indoor Mold  This can be a trigger if your home has high moisture. Fix leaking faucets, pipes, or other sources of water.   Clean moldy surfaces.  Dehumidify basement if it is damp and smelly.   Smoke, Strong Odors, and Sprays  These can reduce air quality. Stay away from strong odors and sprays, such as perfume, powder, hair spray, paints, smoke incense, paint, cleaning products, candles and new carpet.   Exercise or Sports  Some people with asthma have this trigger. Be active!  Ask your doctor about taking medicine before sports or exercise to prevent symptoms.    Warm up for 5-10 minutes before and after sports or exercise.     Other Triggers of Asthma  Cold air:  Cover your nose and mouth with a scarf.  Sometimes laughing or crying can be a trigger.  Some medicines and food can trigger asthma.

## 2023-08-03 NOTE — LETTER
August 7, 2023      Brissajuanito Mott  [address] [facility] [facility] [address]        Dear ,    We are writing to inform you of your test results.    Cbc was within normal limits.  Lipids were with in normal limits.  Basic metabolic was within normal limits with a mildly elevated glucose at 111 but hemoglobin A1c is at goal at 6.7.  Microalbumin was within normal limits continue with current medication regiment and follow-up in 6 months.     Resulted Orders   Albumin Random Urine Quantitative with Creat Ratio   Result Value Ref Range    Creatinine Urine mg/dL 141.0 mg/dL      Comment:      The reference ranges have not been established in urine creatinine. The results should be integrated into the clinical context for interpretation.    Albumin Urine mg/L <12.0 mg/L      Comment:      The reference ranges have not been established in urine albumin. The results should be integrated into the clinical context for interpretation.    Albumin Urine mg/g Cr        Comment:      Unable to calculate, urine albumin and/or urine creatinine is outside detectable limits.  Microalbuminuria is defined as an albumin:creatinine ratio of 17 to 299 for males and 25 to 299 for females. A ratio of albumin:creatinine of 300 or higher is indicative of overt proteinuria.  Due to biologic variability, positive results should be confirmed by a second, first-morning random or 24-hour timed urine specimen. If there is discrepancy, a third specimen is recommended. When 2 out of 3 results are in the microalbuminuria range, this is evidence for incipient nephropathy and warrants increased efforts at glucose control, blood pressure control, and institution of therapy with an angiotensin-converting-enzyme (ACE) inhibitor (if the patient can tolerate it).     Lipid panel reflex to direct LDL Non-fasting   Result Value Ref Range    Cholesterol 151 <200 mg/dL    Triglycerides 90 <150 mg/dL    Direct Measure HDL 55 >=50 mg/dL    LDL Cholesterol  Calculated 78 <=100 mg/dL    Non HDL Cholesterol 96 <130 mg/dL    Narrative    Cholesterol  Desirable:  <200 mg/dL    Triglycerides  Normal:  Less than 150 mg/dL  Borderline High:  150-199 mg/dL  High:  200-499 mg/dL  Very High:  Greater than or equal to 500 mg/dL    Direct Measure HDL  Female:  Greater than or equal to 50 mg/dL   Male:  Greater than or equal to 40 mg/dL    LDL Cholesterol  Desirable:  <100mg/dL  Above Desirable:  100-129 mg/dL   Borderline High:  130-159 mg/dL   High:  160-189 mg/dL   Very High:  >= 190 mg/dL    Non HDL Cholesterol  Desirable:  130 mg/dL  Above Desirable:  130-159 mg/dL  Borderline High:  160-189 mg/dL  High:  190-219 mg/dL  Very High:  Greater than or equal to 220 mg/dL   BASIC METABOLIC PANEL   Result Value Ref Range    Sodium 141 136 - 145 mmol/L    Potassium 4.0 3.4 - 5.3 mmol/L    Chloride 101 98 - 107 mmol/L    Carbon Dioxide (CO2) 25 22 - 29 mmol/L    Anion Gap 15 7 - 15 mmol/L    Urea Nitrogen 11.3 6.0 - 20.0 mg/dL    Creatinine 0.58 0.51 - 0.95 mg/dL    Calcium 9.3 8.6 - 10.0 mg/dL    Glucose 111 (H) 70 - 99 mg/dL    GFR Estimate >90 >60 mL/min/1.73m2   HEMOGLOBIN A1C   Result Value Ref Range    Hemoglobin A1C 6.7 (H) 0.0 - 5.6 %      Comment:      Normal <5.7%   Prediabetes 5.7-6.4%    Diabetes 6.5% or higher     Note: Adopted from ADA consensus guidelines.   CBC with platelets   Result Value Ref Range    WBC Count 7.3 4.0 - 11.0 10e3/uL    RBC Count 4.94 3.80 - 5.20 10e6/uL    Hemoglobin 13.3 11.7 - 15.7 g/dL    Hematocrit 41.8 35.0 - 47.0 %    MCV 85 78 - 100 fL    MCH 26.9 26.5 - 33.0 pg    MCHC 31.8 31.5 - 36.5 g/dL    RDW 13.5 10.0 - 15.0 %    Platelet Count 258 150 - 450 10e3/uL       If you have any questions or concerns, please call the clinic at the number listed above.       Sincerely,      RONNELL Bianchi CNP

## 2023-08-13 ENCOUNTER — HEALTH MAINTENANCE LETTER (OUTPATIENT)
Age: 53
End: 2023-08-13

## 2023-08-22 LAB — NONINV COLON CA DNA+OCC BLD SCRN STL QL: NEGATIVE

## 2023-10-05 DIAGNOSIS — E11.9 TYPE 2 DIABETES MELLITUS WITHOUT COMPLICATION, WITH LONG-TERM CURRENT USE OF INSULIN (H): ICD-10-CM

## 2023-10-05 DIAGNOSIS — Z79.4 TYPE 2 DIABETES MELLITUS WITHOUT COMPLICATION, WITH LONG-TERM CURRENT USE OF INSULIN (H): ICD-10-CM

## 2023-10-08 DIAGNOSIS — E11.9 TYPE 2 DIABETES MELLITUS WITHOUT COMPLICATION, WITH LONG-TERM CURRENT USE OF INSULIN (H): ICD-10-CM

## 2023-10-08 DIAGNOSIS — Z79.4 TYPE 2 DIABETES MELLITUS WITHOUT COMPLICATION, WITH LONG-TERM CURRENT USE OF INSULIN (H): ICD-10-CM

## 2023-10-08 DIAGNOSIS — Z79.4 TYPE 2 DIABETES MELLITUS WITH HYPERGLYCEMIA, WITH LONG-TERM CURRENT USE OF INSULIN (H): ICD-10-CM

## 2023-10-08 DIAGNOSIS — E11.65 TYPE 2 DIABETES MELLITUS WITH HYPERGLYCEMIA, WITH LONG-TERM CURRENT USE OF INSULIN (H): ICD-10-CM

## 2023-10-09 ENCOUNTER — TELEPHONE (OUTPATIENT)
Dept: FAMILY MEDICINE | Facility: CLINIC | Age: 53
End: 2023-10-09
Payer: COMMERCIAL

## 2023-10-09 RX ORDER — SEMAGLUTIDE 1.34 MG/ML
INJECTION, SOLUTION SUBCUTANEOUS
Qty: 3 ML | Refills: 2 | Status: SHIPPED | OUTPATIENT
Start: 2023-10-09 | End: 2024-01-09

## 2023-10-11 ENCOUNTER — COMMITTEE REVIEW (OUTPATIENT)
Dept: TRANSPLANT | Facility: CLINIC | Age: 53
End: 2023-10-11
Payer: COMMERCIAL

## 2023-10-11 NOTE — COMMITTEE REVIEW
Abdominal Patient Discussion Note Transplant Coordinator: Alaina Ballard  Transplant Surgeon:       Referring Physician: Olga Freeman    Committee Review Members:  Nephrology Rick Diane MD, Marlene nEg PA-C, Broderick Carvalho, APRN CNP   Pharmacist Abena Abrams, [facility], Kylie Chapin, [facility]   Pharmacy Omid Lofton, [facility]    - Clinical Sarah Tawana Garces, [facility], Lucy Berman, [facility]   Transplant GINNA ORTIZ, RN, Jami Coffey, RN, Allison Easton, RN, Lucy Olivares, RN, Leona Louie, NP, Leona Crook, RN, Ingrid Ortiz, RN, Nay Corral, ESMER, Paulo Dugan MD   Transplant Surgery Paulo Dugan MD       Additional Discussion Notes and Findings:     Brought pt's evaluation status to committee to approval to put the patient on the inactive kidney transplant wait list to accrue wait time since she is not on dialysis.     Pt was seen by Dr. Mcacin who requested pt be put on the inactive list. She is following with weight management and now qualifies for full eval.    Committee approved for pt to be put on the kidney transplant wait list as inactive to accrue time.     Patient will need immunology listing labs and will be scheduled for a full evaluation for pre-kidney transplant.

## 2023-10-12 DIAGNOSIS — Q61.3 POLYCYSTIC KIDNEY DISEASE: ICD-10-CM

## 2023-10-12 DIAGNOSIS — N18.5 CHRONIC KIDNEY DISEASE, STAGE V (H): ICD-10-CM

## 2023-10-12 DIAGNOSIS — N18.4 CHRONIC KIDNEY DISEASE, STAGE IV (SEVERE) (H): Primary | ICD-10-CM

## 2023-10-12 DIAGNOSIS — Z76.82 ORGAN TRANSPLANT CANDIDATE: ICD-10-CM

## 2023-10-12 DIAGNOSIS — Z01.818 PRE-TRANSPLANT EVALUATION FOR KIDNEY TRANSPLANT: ICD-10-CM

## 2023-11-06 ENCOUNTER — VIRTUAL VISIT (OUTPATIENT)
Dept: FAMILY MEDICINE | Facility: CLINIC | Age: 53
End: 2023-11-06
Payer: COMMERCIAL

## 2023-11-06 DIAGNOSIS — Z79.4 TYPE 2 DIABETES MELLITUS WITH HYPERGLYCEMIA, WITH LONG-TERM CURRENT USE OF INSULIN (H): Primary | ICD-10-CM

## 2023-11-06 DIAGNOSIS — E11.65 TYPE 2 DIABETES MELLITUS WITH HYPERGLYCEMIA, WITH LONG-TERM CURRENT USE OF INSULIN (H): Primary | ICD-10-CM

## 2023-11-06 PROCEDURE — 99213 OFFICE O/P EST LOW 20 MIN: CPT | Mod: 93 | Performed by: NURSE PRACTITIONER

## 2023-11-06 NOTE — PROGRESS NOTES
Brissa is a 53 year old who is being evaluated via a billable telephone visit.      What phone number would you like to be contacted at? [phone]  How would you like to obtain your AVS? Mail a copy    Distant Location (provider location):  On-site    Assessment & Plan     Type 2 diabetes mellitus with hyperglycemia, with long-term current use of insulin (H) Stable Form completed and sent to [employer]     RONNELL Bianchi CNP  [facility]    Subjective   Brissa is a 53 year old, presenting for the following health issues:  Forms ([facility])      11/6/2023     4:18 PM   Additional Questions   Roomed by Echo MAURICE       [facility]     Patient needs to review and complete a form for the [employer] with Juli.          Review of Systems   Constitutional, HEENT, cardiovascular, pulmonary, gi and gu systems are negative, except as otherwise noted.      Objective           Vitals:  No vitals were obtained today due to virtual visit.    Physical Exam   healthy, alert, and no distress  PSYCH: Alert and oriented times 3; coherent speech, normal   rate and volume, able to articulate logical thoughts, able   to abstract reason, no tangential thoughts, no hallucinations   or delusions  Her affect is normal  RESP: No cough, no audible wheezing, able to talk in full sentences  Remainder of exam unable to be completed due to telephone visits    .    Phone call duration: 15 minutes

## 2023-11-16 DIAGNOSIS — Z79.4 TYPE 2 DIABETES MELLITUS WITH HYPERGLYCEMIA, WITH LONG-TERM CURRENT USE OF INSULIN (H): ICD-10-CM

## 2023-11-16 DIAGNOSIS — E11.65 TYPE 2 DIABETES MELLITUS WITH HYPERGLYCEMIA, WITH LONG-TERM CURRENT USE OF INSULIN (H): ICD-10-CM

## 2023-11-17 RX ORDER — INSULIN HUMAN 100 [IU]/ML
INJECTION, SUSPENSION SUBCUTANEOUS
Qty: 30 ML | Refills: 3 | Status: SHIPPED | OUTPATIENT
Start: 2023-11-17

## 2023-11-17 NOTE — TELEPHONE ENCOUNTER
"Requested Prescriptions   Pending Prescriptions Disp Refills    insulin NPH-Regular (HUMULIN MIX 70/30 KWIKPEN) susp [Pharmacy Med Name: HUMULIN 70/30 U-100 KWIKPEN INJ 3ML] 30 mL 3     Sig: ADMINISTER 16 UNITS UNDER THE SKIN BEFORE BREAKFAST AND BEFORE SUPPER       Short Acting Insulin Protocol Failed - 11/16/2023  5:38 PM        Failed - Recent (6 mo) or future (30 days) visit within the authorizing provider's specialty     Patient had office visit in the last 6 months or has a visit in the next 30 days with authorizing provider or within the authorizing provider's specialty.  See \"Patient Info\" tab in inbasket, or \"Choose Columns\" in Meds & Orders section of the refill encounter.            Passed - Serum creatinine on file in past 12 months     Recent Labs   Lab Test 08/03/23  0955   CR 0.58       Ok to refill medication if creatinine is low          Passed - HgbA1C in past 3 or 6 months     If HgbA1C is 8 or greater, it needs to be on file within the past 3 months.  If less than 8, must be on file within the past 6 months.     Recent Labs   Lab Test 08/03/23  0955   A1C 6.7*             Passed - Medication is active on med list        Passed - Patient is age 18 or older             "

## 2023-12-18 ENCOUNTER — TELEPHONE (OUTPATIENT)
Dept: TRANSPLANT | Facility: CLINIC | Age: 53
End: 2023-12-18
Payer: COMMERCIAL

## 2023-12-18 NOTE — TELEPHONE ENCOUNTER
Patient returned my call and confirmed that ok to schedule virtual PKE on Tuesday 1/2, and she will keep all other in person visits for next week.  She also shared she plans to start dialysis in the next week but has not confirmed location yet.

## 2023-12-18 NOTE — TELEPHONE ENCOUNTER
Left message requesting a call back. Transplant Nephrology unable to see patient in PKE next week, would like to see if she can schedule appt with Transplant Neph Tues 1/2 for virtual eval (and keep in person visits next week)

## 2023-12-26 LAB
ABO/RH(D): NORMAL
ANTIBODY SCREEN: NEGATIVE
SPECIMEN EXPIRATION DATE: NORMAL

## 2023-12-26 NOTE — PROGRESS NOTES
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 She had a fistula surgery in Oct and feels more fatigued since then, sometimes fatigued more easily w/ ADLs than in the past. She is trying to watch dietary sodium intake, avoiding canned/salty foods. She reports 'sugary drinks' are her downfall.     I met with pt 9/2022 and we discussed strategies for weight loss. She did WW for ~8-9 months with 30 lb weight loss. She is unsure is she should resume WW.     Appetite has been up/down the past month with bad cold, however this is slowly improving     Vitamins, Supplements, Pertinent Meds: magnesium   Herbal Medicines/Supplements: none   Protein Supplement: none     Edema: + AYSHA, on diuretic     Weight hx:   - down to 270 lbs over the summer  - 9/2022- 300 lbs/BMI 40.7    PHYSICAL ACTIVITY   Does ADLs self; lives alone  Was going to the gym prior to fistula and was working with a  on the treadmill, bike, light weights 1x/week for 60 min  Walks outside at school (weather dependent)- 1x/week- 20 min     DIET RECALL  Breakfast Hot pockets or eggs; cereal and milk if home from work/on break; sometimes will get grace/egg/cheese biscuit from [employer] (frequency varies)   Lunch School lunch- pasta + protein + fruit/veggie   Dinner Sliced ham, mashed potatoes, corn, green bean casserole; may grab salad from [employer] and eat 1/2 portion (no  cheese); pack of ramen with mixed veggies and half the seasoning + can of cooked chicken; or skips if not hungry   Snacks Some ritz crackers if stomach upset    Beverages 8 oz milk/day, water, iced tea (sweet)- 3-4x/week (20 oz), pop (1-2x/week- Coke or Sprite), juice (cranberry, orange, mixed fruit)- a few cups/day now that she is home on break    Alcohol None    Dining out 0-3x/week      LABS  12/8 K 4.2 Phos 4.6 (prev more highs)    NUTRITION DIAGNOSIS   No nutrition diagnosis identified at this time     NUTRITION INTERVENTION   Nutrition education provided:  Discussed sodium intake (low sodium foods and drinks, seasoning food without salt and tips for low sodium diet).  Reviewed wnl K/Phos levels, which do not currently warrant further diet restriction. Reviewed that this may change over the next month pending dialysis lab trends. Reviewed some renal friendly meals (in AVS).   Reviewed continuing to watch dietary Na along with monitoring sugary drinks, etc. Would consider revisiting WW to help with weight loss. Weight loss/goals TBD by surgeon.     Reviewed post txp diet guidelines in brief (will review in further detail post txp):  (1) Review of proper food safety measures d/t immunosuppressant therapy post-op and increased risk for food-borne illness    (2) Avoid the following post txp d/t risk for rejection, unknown effects on the organs, and/or potential interactions with immunosuppressants:  - Herbal, Chinese, holistic, chiropractic, natural, alternative medicines and supplements  - Detoxes and cleanses  - Weight loss pills  - Protein powders or other products with extracts or herbs (ie green tea extract)    (3) Med regimen and possible side effects    Patient Understanding: Pt verbalized understanding of education provided.  Expected Engagement: Good  Follow-Up Plans: PRN     NUTRITION GOALS   No nutrition goals identified at this time     Ruma Sarmiento, RD, LD, CCTD

## 2023-12-27 ENCOUNTER — ALLIED HEALTH/NURSE VISIT (OUTPATIENT)
Dept: TRANSPLANT | Facility: CLINIC | Age: 53
End: 2023-12-27
Attending: NURSE PRACTITIONER
Payer: COMMERCIAL

## 2023-12-27 ENCOUNTER — ANCILLARY PROCEDURE (OUTPATIENT)
Dept: GENERAL RADIOLOGY | Facility: CLINIC | Age: 53
End: 2023-12-27
Attending: NURSE PRACTITIONER
Payer: COMMERCIAL

## 2023-12-27 ENCOUNTER — LAB (OUTPATIENT)
Dept: LAB | Facility: CLINIC | Age: 53
End: 2023-12-27
Attending: NURSE PRACTITIONER
Payer: COMMERCIAL

## 2023-12-27 VITALS
BODY MASS INDEX: 37.61 KG/M2 | HEIGHT: 72 IN | OXYGEN SATURATION: 96 % | SYSTOLIC BLOOD PRESSURE: 161 MMHG | TEMPERATURE: 98.2 F | DIASTOLIC BLOOD PRESSURE: 104 MMHG | WEIGHT: 277.7 LBS | HEART RATE: 104 BPM

## 2023-12-27 DIAGNOSIS — Z76.82 ORGAN TRANSPLANT CANDIDATE: ICD-10-CM

## 2023-12-27 DIAGNOSIS — N18.5 CHRONIC KIDNEY DISEASE, STAGE V (H): ICD-10-CM

## 2023-12-27 DIAGNOSIS — N18.4 CHRONIC KIDNEY DISEASE, STAGE IV (SEVERE) (H): ICD-10-CM

## 2023-12-27 DIAGNOSIS — Z76.82 ORGAN TRANSPLANT CANDIDATE: Primary | ICD-10-CM

## 2023-12-27 DIAGNOSIS — Q61.3 POLYCYSTIC KIDNEY DISEASE: ICD-10-CM

## 2023-12-27 DIAGNOSIS — Z01.818 PRE-TRANSPLANT EVALUATION FOR KIDNEY TRANSPLANT: ICD-10-CM

## 2023-12-27 DIAGNOSIS — N18.4 CHRONIC KIDNEY DISEASE, STAGE 4, SEVERELY DECREASED GFR (H): ICD-10-CM

## 2023-12-27 DIAGNOSIS — I10 ESSENTIAL HYPERTENSION: ICD-10-CM

## 2023-12-27 DIAGNOSIS — Q61.3 PKD (POLYCYSTIC KIDNEY DISEASE): Primary | ICD-10-CM

## 2023-12-27 DIAGNOSIS — N18.6 ESRD (END STAGE RENAL DISEASE) (H): ICD-10-CM

## 2023-12-27 LAB
A1 AB TITR SERPL: 4 {TITER}
A1 AB TITR SERPL: 4 {TITER}
A2 AB TITR SERPL: 1 {TITER}
A2 AB TITR SERPL: 2 {TITER}
ANTIBODY TITER IGM SCREEN: NEGATIVE
SPECIMEN EXPIRATION DATE: NORMAL

## 2023-12-27 PROCEDURE — 86832 HLA CLASS I HIGH DEFIN QUAL: CPT | Performed by: NURSE PRACTITIONER

## 2023-12-27 PROCEDURE — 86886 COOMBS TEST INDIRECT TITER: CPT | Performed by: NURSE PRACTITIONER

## 2023-12-27 PROCEDURE — 36415 COLL VENOUS BLD VENIPUNCTURE: CPT | Performed by: PATHOLOGY

## 2023-12-27 PROCEDURE — 99213 OFFICE O/P EST LOW 20 MIN: CPT | Performed by: TRANSPLANT SURGERY

## 2023-12-27 PROCEDURE — 86900 BLOOD TYPING SEROLOGIC ABO: CPT | Performed by: NURSE PRACTITIONER

## 2023-12-27 PROCEDURE — 71046 X-RAY EXAM CHEST 2 VIEWS: CPT | Mod: GC | Performed by: RADIOLOGY

## 2023-12-27 PROCEDURE — 99214 OFFICE O/P EST MOD 30 MIN: CPT | Performed by: TRANSPLANT SURGERY

## 2023-12-27 PROCEDURE — 81378 HLA I & II TYPING HR: CPT | Performed by: NURSE PRACTITIONER

## 2023-12-27 PROCEDURE — 99000 SPECIMEN HANDLING OFFICE-LAB: CPT | Performed by: PATHOLOGY

## 2023-12-27 PROCEDURE — 86833 HLA CLASS II HIGH DEFIN QUAL: CPT | Performed by: NURSE PRACTITIONER

## 2023-12-27 PROCEDURE — 93000 ELECTROCARDIOGRAM COMPLETE: CPT | Performed by: INTERNAL MEDICINE

## 2023-12-27 RX ORDER — FUROSEMIDE 20 MG
40 TABLET ORAL DAILY
COMMUNITY

## 2023-12-27 ASSESSMENT — PAIN SCALES - GENERAL: PAINLEVEL: MILD PAIN (3)

## 2023-12-27 NOTE — LETTER
12/27/2023         RE: Matilda Graf  [address]   [address]  [facility] [address]        Dear Colleague,    Thank you for referring your patient, Matilda Graf, to the [facility] TRANSPLANT [facility]. Please see a copy of my visit note below.    Psychosocial Assessment  Patient Name/ Age: Matilda Graf 53 year old   Medical Record #: [id number]  Duration of Interview:     45  min  Process:   Face-to-Face Interview                (counseling < 50%)   Present at Appointment: Matilda        :KAIN Palacios, [facility] Date:  December 27, 2023        Type of transplant: Kidney    Donor type:   Matilda indicated she does not know of any potential donors at this time.   Cadaver   Prior Transplants:    No Status of Transplant:       Current Living Situation    Location:   [address]   [address]  [facility] [address]  With Whom: lives alone       Family/ Social Support:    Matilda indicated she has three sisters ([place], [place], [place], [place] and [place], [place]).   Available, helpful   Committed relationship:   Single   Other supports:   Friends Available, helpful       Activities/ Functional Ability    Current level:  Matilda wears corrective lenses. Ambulatory, visually impaired, and independent with ADL's     Transportation Drives self       Vocational/Employment/Financial     Employment  [employer]   Full time   Job Description  Teacher      Income   Salary/wages   Insurance  BCBS through employer.    At this time, patient can afford medication costs:  Yes  Private Insurance       Medical Status    Current mode of treatment for ESRD Dialysis   Complications - Non diabetic        Behavioral    Tobacco Use No Chemical Dependency No       Psychiatric Impairment No    Reading ability Good  Education Level: Masters Degree Recent Legal History No    Coping Style/Strategies: Matilda indicated when under stress she will sleep, go to the gym or talk to her sister or friends.   Ability to Adhere to  Complex Medical Regime: Yes     Adherence History:  Matilda indicated she will usually follow her physician's recommendations.        Education  _X_ Medicare  _X_ Rehabilitation  _X_ Donor issues  _X_ Community resources  _X_ Post discharge housing  _X_ Financial resources  _X_ Medical insurance options  _X_ Psych adjustment  _X_ Family adjustment  _X_ Health Care Directive - Yes, Will Provide Psychosocial Risks of Transplant Reviewed and Discussed:  _X_ Increased stress related to emotional,            family, social, employment or financial           situation  _X_ Affect on work and/or disability benefits  _X_ Affect on future life insurance  _X_ Transplant outcome expectations may           not be met  _X_ Mental Health Risks: anxiety,           depression, PTSD, guilt, grief and           chronic fatigue     Notable Items:   Matilda indicated her younger sister (if during the summer months) or her friends (in the winter months) will be able to assist her post transplant.      Final Evaluation/Assessment   Patient seemed to process information well. Appeared well informed, motivated and able to follow post transplant requirements. Behavior was appropriate during interview. Has adequate income and insurance coverage. Adequate social support. No major contraindications noted for transplant.  At this time patient appears to understand the risks and benefits of transplant.      Recommendation  Acceptable    Selection Criteria Met:  Plan for support Yes   Chemical Dependence Yes   Smoking Yes   Mental Health Yes   Adequate Finances Yes    Signature: KAIN Palacios, LICSW   Title: Clinical          Again, thank you for allowing me to participate in the care of your patient.        Sincerely,        WALESKA Mayfield

## 2023-12-27 NOTE — LETTER
2023         RE: Matilda Graf  [address]   [address]  [facility] [address]        Dear Colleague,    Thank you for referring your patient, Matilda Graf, to the [facility] TRANSPLANT CLINIC. Please see a copy of my visit note below.    Transplant Surgery Consult Note     Medical record number: [id number]  YOB: 1970,   Consult requested  for evaluation of kidney transplant candidacy.    Assessment and Recommendations:Ms. Graf appears to be a good candidate for kidney transplantation and has a good understanding of the risks and benefits of this approach to the management of renal failure. The following issues should be addressed prior to finalizing her transplant candidacy:     Transplant order: Ms. Graf has End stage renal failure due to polycystic kidney disease (PKD) whose condition is not expected to resolve, is expected to progress, and is expected to continue to develop related comorbid conditions.  Recommend he be considered as a candidate for kidney transplant.  Cardiology consult for cardiac risk stratification to be ordered: Yes  CT abdomen and pelvis without contrast to be ordered for assessment of vascular targets: Yes  Transplant listing labs ordered to include HLA, ABOx2, Cr, etc.  Dietician consult ordered: Yes  Social work consult ordered: Yes  Imaging reports reviewed:  no imaging available  Radiology images reviewed:no images available to review  Recipient suitable to move forward with work up of living donors:  Yes  Has people from work she will talk to, no one specifically identified    The majority of our visit was spent in counselling, discussing the medical and surgical risks of kidney transplantation. We discussed approximate wait time and how that is influenced by issues such as blood type and sensitization (PRA) and access to a living donor. I contrasted potential waiting time for living vs  donor kidneys from  normal (0-85%) or higher (%)  kidney donor profile index (KDPI) donors and their associated outcomes. I would recommend this individual to consider kidneys from high KDPI donors. The reason for this decision is best summarized as: decreased dialysis related morbidity/mortality, accepting lower kidney graft survival rates. Potential surgical complications of kidney transplantation include bleeding, superficial or deep wound complications (infection, hernia, lymphocele), ureteral anastomotic failure (leak or stenosis), graft thrombosis, need for reoperation and other issues such as cardiac complications, pneumonia, deep venous thrombosis, pulmonary embolism, post transplant diabetes and death. The potential for recurrent disease or need for retransplantation was also addressed. We discussed the possible need for ureteral stent (and subsequent removal), and the utility of protocol biopsy and laboratory studies to evaluate for rejection or recurrent disease. We discussed the risk of graft rejection, our center's average graft and patient survival rates, immunosuppression protocols, as well as the potential opportunity to participate in clinical trials.  We also discussed the average length of stay, recovery process, and posttransplant lab and monitoring protocol.  I emphasized the need for strict immunosuppression medication adherence and the potential for complications of immunosuppression such as skin cancer or lymphoma, as well as a very low but not zero risk of donor-derived disease transmission risks (infection, cancer). Ms. Graf asked good questions and her candidacy will be reviewed at our Multidisciplinary Selection Committee. Thank you for the opportunity to participate in Ms. Graf's care.        Needs non-contrast CT abd/pel to evaluate native kidney size and vessels    Needs cardiac work up as well as other health maintenance and PCKD specific testing completed    Still has BMI 38, had made some progress with weight until recent health  issues with initiating dialysis, still in relatively good shape, carries most of her obesity centrally, I would favor moving ahead to list and start work-up of any living donors as well as continuing to encourage her to work on losing weight          Otf Whitley MD/PhD    ---------------------------------------------------------------------------------------------------    HPI: Ms. Graf has End stage renal failure due to polycystic kidney disease (PKD). The patient is non-diabetic.       The patient is on dialysis.    Has potential kidney donors:  maybe, has some people from work.  Interested in participation in paired exchange if a donor is willing: Yes     54 yo female with ESRD secondary to PCKD- long family history.  She was ssen for kidney transplant evaluation 9/2022 where she was encouraged to lose weight.  She had been making progress towards that goal, down to 270 lbs but more recently over the last few month has had some challenges relating to complications from her fistula surgery and initiating dialysis in the last week that has prevented her from being as active.  She is still working as a  and is trying to arrange her schedule with dialysis near to her work.  She is scheduled to dialysis [facility] but only recently started and has had some trouble with her fistula and they have not drawn fluid off yet.  She continues to make urine daily and her only lingering health issue is a non-healing ulcer on her right foot that she recently has seen a podiatrist for and had new orthotic inserts made that are helping      PMH  ESRD- PCKD, just initiating dialysis with LUE fistula  Arthritis in ankles, receives periodic steroid injections  R foot, non-healing ulcer that is slowly improving with a new orthotic insert    PSH  L radial fistula, required angioplasty post procedure  No abd surgeries    Soc  Works as a [employer]   Has a younger sister available to help     Fam  Mother- PCKD, was on  HD,  at age 56  Father- recently passed  Aunt- had PCKD was on HD  2/3 Sisters have PCKD, one had kidney transplant last year  Grandmother- PCKD on HD,         The patient has the following pertinent history:       No    Yes  Dialysis:    []      [x] via:       Blood Transfusion                  []      []  Number of units:   Most recently:  Pregnancy:    []      [] Number:       Previous Transplant:  [x]      [] Details:    Cancer    []      [] Comment:   Kidney stones   []      [] Comment:      Recurrent infections  []      []  Type:                  Bladder dysfunction  []      [] Cause:    Claudication   []      [] Distance:    Previous Amputation  []      [] Cause:     Chronic anticoagulation  []      [] Indication:   Yazidi  []      []      Past Medical History:   Diagnosis Date     Hypertension      No past surgical history on file.  No family history on file.  Social History     Socioeconomic History     Marital status: Single     Spouse name: Not on file     Number of children: Not on file     Years of education: Not on file     Highest education level: Not on file   Occupational History     Not on file   Tobacco Use     Smoking status: Never     Smokeless tobacco: Never   Substance and Sexual Activity     Alcohol use: Not Currently     Comment: rare     Drug use: Never     Sexual activity: Not on file   Other Topics Concern     Not on file   Social History Narrative     Not on file     Social Determinants of Health     Financial Resource Strain: Not on file   Food Insecurity: Not on file   Transportation Needs: Not on file   Physical Activity: Not on file   Stress: Not on file   Social Connections: Not on file   Interpersonal Safety: Not on file   Housing Stability: Not on file       ROS:   CONSTITUTIONAL:  No fevers or chills  EYES: negative for icterus  ENT:  negative for hearing loss, tinnitus and sore throat  RESPIRATORY:  negative for cough, sputum, dyspnea  CARDIOVASCULAR:   "negative for chest pain Fatigue  Non-healing Wounds  GASTROINTESTINAL:  negative for nausea, vomiting, diarrhea or constipation  GENITOURINARY:  negative for incontinence, dysuria, bladder emptying problems  HEME:  No easy bruising  INTEGUMENT:  negative for rash and pruritus  NEURO:  Negative for headache, seizure disorder  Allergies:   No Known Allergies  Medications:  Prescription Medications as of 12/27/2023         Rx Number Disp Refills Start End Last Dispensed Date Next Fill Date Owning Pharmacy    acetaminophen (TYLENOL) 650 MG CR tablet  -- --  --   [facility] [address] [place] [address]    Sig: Take 650 mg by mouth    Class: Historical    Route: Oral    furosemide (LASIX) 20 MG tablet  -- --  --       Sig: Take 40 mg by mouth daily    Class: Historical    Route: Oral    rosuvastatin (CRESTOR) 5 MG tablet  -- -- 7/29/2021 --   [facility] [address] [place] [address]    Sig: Take 5 mg by mouth    Class: Historical    Route: Oral          Exam:     BP (!) 161/104   Pulse 104   Temp 98.2  F (36.8  C) (Oral)   Ht 1.82 m (5' 11.65\")   Wt 126 kg (277 lb 11.2 oz)   SpO2 96%   BMI 38.03 kg/m    Appearance: in no apparent distress.   Skin: normal  Eyes:  no redness or discharge.  Sclera anicteric  Head and Neck: Normal, no rashes or jaundice  Respiratory: easy respirations, no audible wheezing.  Abdomen: obese, No distention   Extremities: femoral 1+/1+, Edema, 2+  Neuro: without deficit   Psychiatric: Normal mood and affect    Diagnostics:   Recent Results (from the past 672 hour(s))   EKG 12-lead, tracing only [EKG1]    Collection Time: 12/27/23 10:21 AM   Result Value Ref Range    Systolic Blood Pressure  mmHg    Diastolic Blood Pressure  mmHg    Ventricular Rate 82 BPM    Atrial Rate 82 BPM    UT Interval 174 ms    QRS Duration 98 ms     ms    QTc 408 ms    P Axis 69 degrees    R AXIS 37 degrees    T Axis 51 degrees    Interpretation ECG       Sinus rhythm " "with occasional Premature ventricular complexes  Cannot rule out Anterior infarct , age undetermined  Abnormal ECG  No previous ECGs available     Adult Type and Screen    Collection Time: 12/27/23 10:49 AM   Result Value Ref Range    SPECIMEN EXPIRATION DATE [id number]      No results found for: \"CPRA\"       Again, thank you for allowing me to participate in the care of your patient.        Sincerely,        Otf Whitley MD  "

## 2023-12-27 NOTE — PATIENT INSTRUCTIONS
Renal Diet Resources  https://kitchen.kidneyfund.org/    National Kidney Foundation   https://www.kidney.org/nutrition    [facility]   https://www.davita.com/diet-nutrition    Renal Meal Plans  https://www.usenourish.com/blog/7-day-meal-plan-for-kidney-disease    EXAMPLE 1  Day 1  Breakfast - Oatmeal with cinnamon, milk, raspberries, and a tablespoon of sliced almonds.  Lunch - Tortilla wrap with tinned salmon (rinse fish thoroughly and mix with fresh lemon juice, black pepper, fresh dill, and green onion.)   Dinner - BBQ shrimp served over rice and a side of frozen mixed vegetables. Dress with olive oil for additional healthy fats.   Snacks - Fresh vegetable sticks with hummus; Apple slices with plain peanut butter.     Day 2  Breakfast - Billy seed pudding made with almond or soy milk, berries, cinnamon, and vanilla extract.    Lunch - Egg salad sandwich with a fresh green salad on the side. Dress with a simple olive oil and red wine vinegar mixture, and add fresh mint.   Dinner - Small portion of grilled steak served with white rice and baked vegetables (cauliflower, corn, carrots, and garlic.)   Snacks - Fresh vegetable sticks with hummus; 5 ounces of Greek yogurt with fresh berries and cinnamon.      Day 3  Breakfast - Oatmeal with milk, berries, and a drizzle of maple syrup. Add a boiled egg on the side for a rich protein.   Lunch - Leftover steak served in a wrap with lettuce, tomato, and one tablespoon of shredded cheese. Serve with fresh honeydew melon on the side.   Dinner - Kidney bean burgers served with a fresh green salad. Try to be conservative with burger condiments as they can be high in salt and potassium.  Snacks - Fresh vegetable sticks with hummus; Fresh plums with a handful of cashews.     Day 4  Breakfast - Egg omelet with bell peppers, broccoli, one tablespoon of shredded mozzarella, and parsley. Serve with a slice of rye roast.   Lunch -  Chickpea soup with celery, red cabbage, carrot, onion,  garlic, a drizzle of olive oil, and rosemary.   Dinner -  Pasta with extra lean ground beef, fresh tomatoes, garlic, and onion. Garnish with fresh parsley.   Snacks - Fresh vegetable sticks with hummus; Clementines with a handful of cashews.     Day 5  Breakfast - Sourdough toast,   avocado mashed. Lemon juice, boiled and sliced egg, fresh tomato, and basil leaves.   Lunch - Shredded hearty salad with kale, [place] sprouts, almonds, raspberries, and chicken breast. Dress with fresh mint, olive oil, and balsamic vinegar.   Dinner - Air-fryer salmon with a side of couscous. Serve with a fresh green salad, and make your dressing with olive oil, white wine vinegar, honey, mustard, and fresh garlic.   Snacks - Fresh vegetable sticks with hummus; Apple slices with plain peanut butter.     Day 6  Breakfast - Egg omelet with asparagus, bell peppers, and parsley. Serve with a slice of rye roast.   Lunch - Tuna salad with celery, olive oil, and onions. Serve with a whole wheat English muffin.   Dinner - Lentil stew with carrots, shredded cabbage, onion, fresh dill, and a handful of cherry tomatoes. Serve over rice if desired.   Snacks - Grapes and walnuts; 5 ounce container of plain Greek yogurt with blueberries.     Day 7  Breakfast - Smoothie with kale, plain almond butter (no sugar or salt added), strawberries, and soy milk.   Lunch - Curried chicken lettuce wraps with fresh cilantro and lemon. Serve with a side of fruit.   Dinner - Stir-fried shrimp with carrots, peas, and cauliflower. Serve over white rice and dress with sesame oil, grated johnathan and garlic, and chili.   Snacks - Fresh vegetable sticks with hummus; Fresh pear slices with plain peanut butter.   ____________________________________________________________________________________________________________________________________________  EXAMPLE 2  https://www.MyDeals.com.com/perspective/7-day-meal-plan-for-kidney-disease/    Breakfast Ideas  Day 1:  Low-Potassium Smoothie  Berries (strawberries, raspberries, blackberries, blueberries), a few fresh/raw spinach leaves, almond milk and a scoop of protein powder.     Day 2: Nutty Oatmeal  Oatmeal topped with sliced apples, a few walnut pieces and add cinnamon to flavor it up.    Day 3: Protein-Packed Greek Yogurt    cup Greek yogurt or cottage cheese paired with a handful of almonds and a drizzle of honey. Try adding vanilla extract or lemon zest for different flavors.      Day 4: Scrambled Eggs   Scrambled eggs with sautéed onions and/or peppers and white toast with butter. Sprinkle eggs with chopped up scallions and/or chives.    Day 5: Breakfast Burrito   Make with scrambled egg whites, low-sodium turkey sausage and bell peppers, wrapped in a white tortilla. You can add more flavor with jalepenos, cilantro, mushrooms, green peppers, or scallions.     Day 6: Avocado Toast  White toast topped with 1/4 avocado; add a sprinkle of onion powder, garlic powder, or Mrs. Dash, green onions.    Day 7: Fresh Fruit Bowl  Fruit salad featuring watermelon, grapes and berries (blackberries, blueberries, strawberries, seedless cherries, or raspberries). Drizzle with honey if desired or add non-dairy whipped topping. Pair with a scoop of Greek yogurt, a handful of nuts, or a boiled egg for protein.    Lunch Ideas  Day 1: Grilled Chicken Salad  Grilled chicken salad with lettuce leaves, cucumbers and a light vinaigrette dressing - a tasty, low-sodium choice.     Day 2: Turkey Wrap  Turkey wrap with lettuce, 1 tomato slice, and mayonnaise in a white tortilla.     Day 3: Whole Grain Pasta Salad  Pasta salad mixed with chopped veggies and a light Italian dressing.    Day 4: Black Bean Salad  Green bean and corn salad featuring diced onions, chili peppers, oregano, hard boiled eggs, and tangy lime dressing.     Day 5: Tuna Salad  Tuna salad with chopped celery, mixed greens, and a balsamic vinaigrette.     Dinner Ideas  Day 1: Baked  [place]  [place] fillet baked with herbs and accompanied by steamed broccoli and white rice. Season with garlic powder or onion powder & fresh dill.     Day 2: Black Bean Tacos  Fish tacos featuring cod filets, shredded cabbage or carlos slaw on white flour tortillas, salsa and a dollop of Greek yogurt (you ll never know it wasn t sour cream). Season Greek yogurt with chili powder & cumin.    Day 3: Protein-Packed Pork  Pork tenderloin with roasted spaghetti squash and green beans    Day 4: Baked Chicken  Baked chicken breast with asparagus and white rice    Day 5: Broiled White Fish  Broil white fish with roasted [place] sprouts and cauliflower mash.    Day 6: Chicken Stir-Spring  Chicken stir-spring with snow peas, carrots and white rice. Use coconut or liquid aminos in place of soy sauce for a lower sodium alternative     Day 7: Chili Con Carne  Chili loaded with onions, celery, bell peppers, lean ground beef, a few low-Sodium stewed tomatoes, seasoned with chili powder and a side of white rice    Day 6: Chicken & Veggie Soup  Homemade chicken & veggie soup with shredded chicken, rice, frozen mixed vegetables, & low-sodium chicken or vegetable broth (season with garlic powder and/or black pepper), paired with a side of no-salt tops Saltine crackers -    Day 7: Spinach And Grilled Chicken Wrap  Grilled chicken wrap with 1 tomato slice, spinach & mayonnaise, served with a side of carrot sticks with cream cheese.  __________________________________________________________________________________________________________________________________________

## 2023-12-27 NOTE — LETTER
12/27/2023         RE: Matilda Graf  [address]   [address]  [facility] [address]        Dear Colleague,    Thank you for referring your patient, Matilda Graf, to the [facility]. Please see a copy of my visit note below.    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 She had a fistula surgery in Oct and feels more fatigued since then, sometimes fatigued more easily w/ ADLs than in the past. She is trying to watch dietary sodium intake, avoiding canned/salty foods. She reports 'sugary drinks' are her downfall.     I met with pt 9/2022 and we discussed strategies for weight loss. She did WW for ~8-9 months with 30 lb weight loss. She is unsure is she should resume WW.     Appetite has been up/down the past month with bad cold, however this is slowly improving     Vitamins, Supplements, Pertinent Meds: magnesium   Herbal Medicines/Supplements: none   Protein Supplement: none     Edema: + AYSHA, on diuretic     Weight hx:   - down to 270 lbs over the summer  - 9/2022- 300 lbs/BMI 40.7    PHYSICAL ACTIVITY   Does ADLs self; lives alone  Was going to the gym prior to fistula and was working with a  on the treadmill, bike, light weights 1x/week for 60 min  Walks outside at school (weather dependent)- 1x/week- 20 min     DIET RECALL  Breakfast Hot pockets or eggs; cereal and milk if home from work/on break;  sometimes will get grace/egg/cheese biscuit from [employer] (frequency varies)   Lunch School lunch- pasta + protein + fruit/veggie   Dinner Sliced ham, mashed potatoes, corn, green bean casserole; may grab salad from [employer] and eat 1/2 portion (no cheese); pack of ramen with mixed veggies and half the seasoning + can of cooked chicken; or skips if not hungry   Snacks Some ritz crackers if stomach upset    Beverages 8 oz milk/day, water, iced tea (sweet)- 3-4x/week (20 oz), pop (1-2x/week- Coke or Sprite), juice (cranberry, orange, mixed fruit)- a few cups/day now that she is home on break    Alcohol None    Dining out 0-3x/week      LABS  12/8 K 4.2 Phos 4.6 (prev more highs)    NUTRITION DIAGNOSIS   No nutrition diagnosis identified at this time     NUTRITION INTERVENTION   Nutrition education provided:  Discussed sodium intake (low sodium foods and drinks, seasoning food without salt and tips for low sodium diet).  Reviewed wnl K/Phos levels, which do not currently warrant further diet restriction. Reviewed that this may change over the next month pending dialysis lab trends. Reviewed some renal friendly meals (in AVS).   Reviewed continuing to watch dietary Na along with monitoring sugary drinks, etc. Would consider revisiting WW to help with weight loss. Weight loss/goals TBD by surgeon.     Reviewed post txp diet guidelines in brief (will review in further detail post txp):  (1) Review of proper food safety measures d/t immunosuppressant therapy post-op and increased risk for food-borne illness    (2) Avoid the following post txp d/t risk for rejection, unknown effects on the organs, and/or potential interactions with immunosuppressants:  - Herbal, Chinese, holistic, chiropractic, natural, alternative medicines and supplements  - Detoxes and cleanses  - Weight loss pills  - Protein powders or other products with extracts or herbs (ie green tea extract)    (3) Med regimen and possible side effects    Patient  Understanding: Pt verbalized understanding of education provided.  Expected Engagement: Good  Follow-Up Plans: PRN     NUTRITION GOALS   No nutrition goals identified at this time     Ruma Sarmiento RD, LD, CCTD                                      Again, thank you for allowing me to participate in the care of your patient.        Sincerely,        Ruma Sarmiento RD

## 2023-12-27 NOTE — PROGRESS NOTES
Transplant Surgery Consult Note     Medical record number: [id number]  YOB: 1970,   Consult requested  for evaluation of kidney transplant candidacy.    Assessment and Recommendations:Ms. Graf appears to be a good candidate for kidney transplantation and has a good understanding of the risks and benefits of this approach to the management of renal failure. The following issues should be addressed prior to finalizing her transplant candidacy:     Transplant order: Ms. Graf has End stage renal failure due to polycystic kidney disease (PKD) whose condition is not expected to resolve, is expected to progress, and is expected to continue to develop related comorbid conditions.  Recommend he be considered as a candidate for kidney transplant.  Cardiology consult for cardiac risk stratification to be ordered: Yes  CT abdomen and pelvis without contrast to be ordered for assessment of vascular targets: Yes  Transplant listing labs ordered to include HLA, ABOx2, Cr, etc.  Dietician consult ordered: Yes  Social work consult ordered: Yes  Imaging reports reviewed:  no imaging available  Radiology images reviewed:no images available to review  Recipient suitable to move forward with work up of living donors:  Yes  Has people from work she will talk to, no one specifically identified    The majority of our visit was spent in counselling, discussing the medical and surgical risks of kidney transplantation. We discussed approximate wait time and how that is influenced by issues such as blood type and sensitization (PRA) and access to a living donor. I contrasted potential waiting time for living vs  donor kidneys from  normal (0-85%) or higher (%) kidney donor profile index (KDPI) donors and their associated outcomes. I would recommend this individual to consider kidneys from high KDPI donors. The reason for this decision is best summarized as: decreased dialysis related morbidity/mortality, accepting  lower kidney graft survival rates. Potential surgical complications of kidney transplantation include bleeding, superficial or deep wound complications (infection, hernia, lymphocele), ureteral anastomotic failure (leak or stenosis), graft thrombosis, need for reoperation and other issues such as cardiac complications, pneumonia, deep venous thrombosis, pulmonary embolism, post transplant diabetes and death. The potential for recurrent disease or need for retransplantation was also addressed. We discussed the possible need for ureteral stent (and subsequent removal), and the utility of protocol biopsy and laboratory studies to evaluate for rejection or recurrent disease. We discussed the risk of graft rejection, our center's average graft and patient survival rates, immunosuppression protocols, as well as the potential opportunity to participate in clinical trials.  We also discussed the average length of stay, recovery process, and posttransplant lab and monitoring protocol.  I emphasized the need for strict immunosuppression medication adherence and the potential for complications of immunosuppression such as skin cancer or lymphoma, as well as a very low but not zero risk of donor-derived disease transmission risks (infection, cancer). Ms. Graf asked good questions and her candidacy will be reviewed at our Multidisciplinary Selection Committee. Thank you for the opportunity to participate in Ms. Graf's care.        Needs non-contrast CT abd/pel to evaluate native kidney size and vessels    Needs cardiac work up as well as other health maintenance and PCKD specific testing completed    Still has BMI 38, had made some progress with weight until recent health issues with initiating dialysis, still in relatively good shape, carries most of her obesity centrally, I would favor moving ahead to list and start work-up of any living donors as well as continuing to encourage her to work on losing weight          Otf MOREAU  MD Gutierrez/PhD    ---------------------------------------------------------------------------------------------------    HPI: Ms. Graf has End stage renal failure due to polycystic kidney disease (PKD). The patient is non-diabetic.       The patient is on dialysis.    Has potential kidney donors:  maybe, has some people from work.  Interested in participation in paired exchange if a donor is willing: Yes     54 yo female with ESRD secondary to PCKD- long family history.  She was ssen for kidney transplant evaluation 2022 where she was encouraged to lose weight.  She had been making progress towards that goal, down to 270 lbs but more recently over the last few month has had some challenges relating to complications from her fistula surgery and initiating dialysis in the last week that has prevented her from being as active.  She is still working as a  and is trying to arrange her schedule with dialysis near to her work.  She is scheduled to dialysis [facility] but only recently started and has had some trouble with her fistula and they have not drawn fluid off yet.  She continues to make urine daily and her only lingering health issue is a non-healing ulcer on her right foot that she recently has seen a podiatrist for and had new orthotic inserts made that are helping      PMH  ESRD- PCKD, just initiating dialysis with LUE fistula  Arthritis in ankles, receives periodic steroid injections  R foot, non-healing ulcer that is slowly improving with a new orthotic insert    PSH  L radial fistula, required angioplasty post procedure  No abd surgeries    Soc  Works as a [employer]   Has a younger sister available to help     Fam  Mother- PCKD, was on HD,  at age 56  Father- recently passed  Aunt- had PCKD was on HD  2/3 Sisters have PCKD, one had kidney transplant last year  Grandmother- PCKD on HD,         The patient has the following pertinent history:       No    Yes  Dialysis:    []       [x] via:       Blood Transfusion                  []      []  Number of units:   Most recently:  Pregnancy:    []      [] Number:       Previous Transplant:  [x]      [] Details:    Cancer    []      [] Comment:   Kidney stones   []      [] Comment:      Recurrent infections  []      []  Type:                  Bladder dysfunction  []      [] Cause:    Claudication   []      [] Distance:    Previous Amputation  []      [] Cause:     Chronic anticoagulation  []      [] Indication:   Amish  []      []      Past Medical History:   Diagnosis Date    Hypertension      No past surgical history on file.  No family history on file.  Social History     Socioeconomic History    Marital status: Single     Spouse name: Not on file    Number of children: Not on file    Years of education: Not on file    Highest education level: Not on file   Occupational History    Not on file   Tobacco Use    Smoking status: Never    Smokeless tobacco: Never   Substance and Sexual Activity    Alcohol use: Not Currently     Comment: rare    Drug use: Never    Sexual activity: Not on file   Other Topics Concern    Not on file   Social History Narrative    Not on file     Social Determinants of Health     Financial Resource Strain: Not on file   Food Insecurity: Not on file   Transportation Needs: Not on file   Physical Activity: Not on file   Stress: Not on file   Social Connections: Not on file   Interpersonal Safety: Not on file   Housing Stability: Not on file       ROS:   CONSTITUTIONAL:  No fevers or chills  EYES: negative for icterus  ENT:  negative for hearing loss, tinnitus and sore throat  RESPIRATORY:  negative for cough, sputum, dyspnea  CARDIOVASCULAR:  negative for chest pain Fatigue  Non-healing Wounds  GASTROINTESTINAL:  negative for nausea, vomiting, diarrhea or constipation  GENITOURINARY:  negative for incontinence, dysuria, bladder emptying problems  HEME:  No easy bruising  INTEGUMENT:  negative for rash and  "pruritus  NEURO:  Negative for headache, seizure disorder  Allergies:   No Known Allergies  Medications:  Prescription Medications as of 12/27/2023         Rx Number Disp Refills Start End Last Dispensed Date Next Fill Date Owning Pharmacy    acetaminophen (TYLENOL) 650 MG CR tablet  -- --  --   [facility] [place] [address]    Sig: Take 650 mg by mouth    Class: Historical    Route: Oral    furosemide (LASIX) 20 MG tablet  -- --  --       Sig: Take 40 mg by mouth daily    Class: Historical    Route: Oral    rosuvastatin (CRESTOR) 5 MG tablet  -- -- 7/29/2021 --   [facility] [place] [address]    Sig: Take 5 mg by mouth    Class: Historical    Route: Oral          Exam:     BP (!) 161/104   Pulse 104   Temp 98.2  F (36.8  C) (Oral)   Ht 1.82 m (5' 11.65\")   Wt 126 kg (277 lb 11.2 oz)   SpO2 96%   BMI 38.03 kg/m    Appearance: in no apparent distress.   Skin: normal  Eyes:  no redness or discharge.  Sclera anicteric  Head and Neck: Normal, no rashes or jaundice  Respiratory: easy respirations, no audible wheezing.  Abdomen: obese, No distention   Extremities: femoral 1+/1+, Edema, 2+  Neuro: without deficit   Psychiatric: Normal mood and affect    Diagnostics:   Recent Results (from the past 672 hour(s))   EKG 12-lead, tracing only [EKG1]    Collection Time: 12/27/23 10:21 AM   Result Value Ref Range    Systolic Blood Pressure  mmHg    Diastolic Blood Pressure  mmHg    Ventricular Rate 82 BPM    Atrial Rate 82 BPM    NM Interval 174 ms    QRS Duration 98 ms     ms    QTc 408 ms    P Axis 69 degrees    R AXIS 37 degrees    T Axis 51 degrees    Interpretation ECG       Sinus rhythm with occasional Premature ventricular complexes  Cannot rule out Anterior infarct , age undetermined  Abnormal ECG  No previous ECGs available     Adult Type and Screen    Collection Time: 12/27/23 10:49 AM   Result Value Ref Range    SPECIMEN EXPIRATION DATE " "[id number]      No results found for: \"CPRA\"   "

## 2023-12-27 NOTE — PROGRESS NOTES
Psychosocial Assessment  Patient Name/ Age: Matilda Graf 53 year old   Medical Record #: [id number]  Duration of Interview:     45  min  Process:   Face-to-Face Interview                (counseling < 50%)   Present at Appointment: Matilda        :KAIN Palacios, [facility] Date:  December 27, 2023        Type of transplant: Kidney    Donor type:   Matilda indicated she does not know of any potential donors at this time.   Cadaver   Prior Transplants:    No Status of Transplant:       Current Living Situation    Location:   [address] [place] [address]   [address]  [facility] [address]  With Whom: lives alone       Family/ Social Support:    Matilda indicated she has three sisters ([place], [place], [place], [place] and [place]ambi [place], [place]).   Available, helpful   Committed relationship:   Single   Other supports:   Friends Available, helpful       Activities/ Functional Ability    Current level:  Matilda wears corrective lenses. Ambulatory, visually impaired, and independent with ADL's     Transportation Drives self       Vocational/Employment/Financial     Employment  [employer]   Full time   Job Description  Teacher      Income   Salary/wages   Insurance  BCBS through employer.    At this time, patient can afford medication costs:  Yes  Private Insurance       Medical Status    Current mode of treatment for ESRD Dialysis   Complications - Non diabetic        Behavioral    Tobacco Use No Chemical Dependency No       Psychiatric Impairment No    Reading ability Good  Education Level: Masters Degree Recent Legal History No    Coping Style/Strategies: Matilda indicated when under stress she will sleep, go to the gym or talk to her sister or friends.   Ability to Adhere to Complex Medical Regime: Yes     Adherence History:  Matilda indicated she will usually follow her physician's recommendations.        Education  _X_ Medicare  _X_ Rehabilitation  _X_ Donor issues  _X_ Community resources  _X_ Post discharge housing  _X_  Financial resources  _X_ Medical insurance options  _X_ Psych adjustment  _X_ Family adjustment  _X_ Health Care Directive - Yes, Will Provide Psychosocial Risks of Transplant Reviewed and Discussed:  _X_ Increased stress related to emotional,            family, social, employment or financial           situation  _X_ Affect on work and/or disability benefits  _X_ Affect on future life insurance  _X_ Transplant outcome expectations may           not be met  _X_ Mental Health Risks: anxiety,           depression, PTSD, guilt, grief and           chronic fatigue     Notable Items:   Matilda indicated her younger sister (if during the summer months) or her friends (in the winter months) will be able to assist her post transplant.      Final Evaluation/Assessment   Patient seemed to process information well. Appeared well informed, motivated and able to follow post transplant requirements. Behavior was appropriate during interview. Has adequate income and insurance coverage. Adequate social support. No major contraindications noted for transplant.  At this time patient appears to understand the risks and benefits of transplant.      Recommendation  Acceptable    Selection Criteria Met:  Plan for support Yes   Chemical Dependence Yes   Smoking Yes   Mental Health Yes   Adequate Finances Yes    Signature: KAIN Palacios, [facility]   Title: Clinical

## 2023-12-28 LAB
ATRIAL RATE - MUSE: 82 BPM
DIASTOLIC BLOOD PRESSURE - MUSE: NORMAL MMHG
INTERPRETATION ECG - MUSE: NORMAL
P AXIS - MUSE: 69 DEGREES
PR INTERVAL - MUSE: 174 MS
QRS DURATION - MUSE: 98 MS
QT - MUSE: 350 MS
QTC - MUSE: 408 MS
R AXIS - MUSE: 37 DEGREES
SYSTOLIC BLOOD PRESSURE - MUSE: NORMAL MMHG
T AXIS - MUSE: 51 DEGREES
VENTRICULAR RATE- MUSE: 82 BPM

## 2024-01-01 NOTE — PROGRESS NOTES
TRANSPLANT NEPHROLOGY RECIPIENT EVALUATION NOTE    Assessment and Plan:  # Kidney Transplant Evaluation: Patient is a good candidate overall. Benefits of a living donor transplant were discussed.    # ESKD from polycystic kidney disease (PKD): although doing OK on hemodialysis since 12/20/2023, she would likely benefit from a kidney transplant.     # PKD: denies pain and h/o cyst rupture/stones/UTI. Needs brain MRA with primary neph and  CT a/p to assess kidney size.     # Cardiac Risk: no known history of cardiac disease or events. Need cardiac risk assessment given long standing risk factors.     # BMI 38: followed by weight loss management.     # Health Maintenance: Colonoscopy: Not up to date, Mammogram: Not up to date, PAP: Not up to date, and Dental: Up to date    - Discussed the risks and benefits of a transplant, including the risk of surgery and immunosuppression medications.  Patient's overall evaluation will be discussed in the Transplant Program's regular meeting with a final recommendation on the patients suitability for transplant to be made at that time.    Pending completion of the full evaluation, patient presently appears to be enough of an acceptable kidney transplant recipient candidate to have any potential kidney donors start the evaluation process.      Evaluation:  Matilda Graf was seen in consultation at the request of Dr. Michael Hardwick for evaluation as a potential kidney transplant recipient.    Reason for Visit:  Matilda Graf is a 53 year old female with ESKD from polycystic kidney disease (PKD), who presents for kidney transplant evaluation.    History of Present Illness:  Matilda Graf is a 53-year-old female with history of ESKD secondary to polycystic kidney disease.  She was diagnosed with her kidney disease 20 years ago, which has been progressive.  Recently started hemodialysis on 12/20/2023.  Family history includes her mother, sisters, maternal grandmother/aunt with PKD.  No known  personal or family history of brain aneurysms.  She denies pain, cyst rupture, kidney stones, recurrent UTIs.  She has been working with weight loss management with a current BMI of 38.           Kidney Disease Hx:               Kidney Disease Dx: Polycystic kidney disease (PKD)       Biopsy Proven: No         On Dialysis: Yes, Date initiated: 12/20223 and Dialysis Type: [facility] HD;       Primary Nephrologist: Olga Freeman CNP       H/o Kidney Stones: No       H/o Recurrent/Frequent UTI: No         Diabetic Hx: None           Cardiac/Vascular Disease Risk Factors:        Cardiac Risk Factors: Hypertension and CKD       Known CAD: No       Known PAD/Caludication Symptoms: No       Known Heart Failure: No       Arrhythmia: No       Pulmonary Hypertension: No       Valvular Disease: No       Other: None         Viral Serology Status       CMV IgG Antibody: Unknown       EBV IgG Antibody: Unknown         Volume Status/Weight:        Volume status: Not assessed       Weight:  BMI within guidelines, but truncal obesity       BMI: There is no height or weight on file to calculate BMI.         Functional Capacity/Frailty:        Working as an . Has been exercising with a  at the gym once weekly for one hour without chest pains and shortness of breath.     Fatigue/Decreased Energy: [x] No [] Yes    Chest Pain or SOB with Exertion: [x] No [] Yes    Significant Weight Change: [x] No [] Yes    Nausea, Vomiting or Diarrhea: [] No [x] Yes New diarrhea since starting dialysis.    Fever, Sweats or Chills:  [x] No [] Yes    Leg Swelling [x] No [] Yes        History of Cancer: None        Allergy Testing Questions:   Medication that caused a reaction None   Antibiotics used that didn't give an allergic reaction?  None    COVID Vaccination Up To Date: Yes    Potential Living Kidney Donors: No    Review of Systems:  A comprehensive review of systems was obtained and negative, except as noted in the HPI  or PMH.    Past Medical History:   Medical record was reviewed and PMH was discussed with patient and noted below.  Past Medical History:   Diagnosis Date    Hypertension        Past Social History:   No past surgical history on file.  Personal history of bleeding or anesthesia problems: No    Family History:  No family history on file.    Personal History:   Social History     Socioeconomic History    Marital status: Single     Spouse name: Not on file    Number of children: Not on file    Years of education: Not on file    Highest education level: Not on file   Occupational History    Not on file   Tobacco Use    Smoking status: Never    Smokeless tobacco: Never   Substance and Sexual Activity    Alcohol use: Not Currently     Comment: rare    Drug use: Never    Sexual activity: Not on file   Other Topics Concern    Not on file   Social History Narrative    Not on file     Social Determinants of Health     Financial Resource Strain: Not on file   Food Insecurity: Not on file   Transportation Needs: Not on file   Physical Activity: Not on file   Stress: Not on file   Social Connections: Not on file   Interpersonal Safety: Not on file   Housing Stability: Not on file       Allergies:  No Known Allergies    Medications:  Current Outpatient Medications   Medication Sig    acetaminophen (TYLENOL) 650 MG CR tablet Take 650 mg by mouth    furosemide (LASIX) 20 MG tablet Take 40 mg by mouth daily    rosuvastatin (CRESTOR) 5 MG tablet Take 5 mg by mouth     No current facility-administered medications for this visit.       Vitals:  There were no vitals taken for this visit.    Exam:  GENERAL APPEARANCE: alert and no distress  HENT: mouth without ulcers or lesions  RESP: lungs clear to auscultation - no rales, rhonchi or wheezes  CV: regular rhythm, normal rate, no rub, no murmur  EDEMA: no LE edema bilaterally  ABDOMEN: soft, nondistended, nontender, bowel sounds normal  MS: extremities normal - no gross deformities noted,  no evidence of inflammation in joints, no muscle tenderness  SKIN: no rash    Results:   Recent Results (from the past 336 hour(s))   EKG 12-lead, tracing only [EKG1]    Collection Time: 12/27/23 10:21 AM   Result Value Ref Range    Systolic Blood Pressure  mmHg    Diastolic Blood Pressure  mmHg    Ventricular Rate 82 BPM    Atrial Rate 82 BPM    SD Interval 174 ms    QRS Duration 98 ms     ms    QTc 408 ms    P Axis 69 degrees    R AXIS 37 degrees    T Axis 51 degrees    Interpretation ECG       Sinus rhythm with occasional Premature ventricular complexes  Cannot rule out Anterior infarct , age undetermined  Abnormal ECG  No previous ECGs available  Confirmed by MD SALVADOR DAVID ([id number]) on 12/28/2023 11:19:54 AM     Antibody titer red cell [[id number]]    Collection Time: 12/27/23 10:49 AM   Result Value Ref Range    Anti-A1 IgG Titer 4     Anti-A1 IgM Titer 4     Anti-A2 IgG Titer 1     Anti-A2 IgM Titer 2     SPECIMEN EXPIRATION DATE [id number]     ANTIBODY TITER IGM SCREEN Negative    Adult Type and Screen    Collection Time: 12/27/23 10:49 AM   Result Value Ref Range    ABO/RH(D) B POS     Antibody Screen Negative Negative    SPECIMEN EXPIRATION DATE [id number]

## 2024-01-02 ENCOUNTER — TELEPHONE (OUTPATIENT)
Dept: TRANSPLANT | Facility: CLINIC | Age: 54
End: 2024-01-02

## 2024-01-02 ENCOUNTER — VIRTUAL VISIT (OUTPATIENT)
Dept: TRANSPLANT | Facility: CLINIC | Age: 54
End: 2024-01-02
Attending: NURSE PRACTITIONER
Payer: COMMERCIAL

## 2024-01-02 DIAGNOSIS — Q61.2 POLYCYSTIC KIDNEY DISEASE, AUTOSOMAL DOMINANT: ICD-10-CM

## 2024-01-02 DIAGNOSIS — Z76.82 ORGAN TRANSPLANT CANDIDATE: ICD-10-CM

## 2024-01-02 DIAGNOSIS — E78.2 MIXED HYPERLIPIDEMIA: ICD-10-CM

## 2024-01-02 DIAGNOSIS — N18.6 ESRD (END STAGE RENAL DISEASE) (H): Primary | ICD-10-CM

## 2024-01-02 LAB
A*: NORMAL
A*LOCUS NMDP: NORMAL
A*LOCUS SEROLOGIC EQUIVALENT: 23
A*LOCUS: NORMAL
A*NMDP: NORMAL
A*SEROLOGIC EQUIVALENT: 24
ABTEST METHOD: NORMAL
B*LOCUS SEROLOGIC EQUIVALENT: 44
B*LOCUS: NORMAL
BW-1: NORMAL
C*: NORMAL
C*LOCUS SEROLOGIC EQUIVALENT: 4
C*LOCUS: NORMAL
C*SEROLOGIC EQUIVALENT: 16
DPA1*: NORMAL
DPA1*LOCUS: NORMAL
DPB1*: NORMAL
DPB1*LOCUS NMDP: NORMAL
DPB1*LOCUS: NORMAL
DPB1*NMDP: NORMAL
DQA1*LOCUS: NORMAL
DQB1*LOCUS SEROLOGIC EQUIVALENT: 2
DQB1*LOCUS: NORMAL
DRB1*LOCUS SEROLOGIC EQUIVALENT: 7
DRB1*LOCUS: NORMAL
DRB4*LOCUS SEROLOGIC EQUIVALENT: 53
DRB4*LOCUS: NORMAL
DRSSO TEST METHOD: NORMAL
Lab: NORMAL

## 2024-01-02 PROCEDURE — 99205 OFFICE O/P NEW HI 60 MIN: CPT | Mod: 95 | Performed by: NURSE PRACTITIONER

## 2024-01-02 NOTE — TELEPHONE ENCOUNTER
Health Call Center    Phone Message    May a detailed message be left on voicemail: yes     Reason for Call: Other: patient was contacted as the echocardiogram was missed last week. Per patient she went to lab and was told by staff that she was done and had no more appointments. Patient stated she is very disappointed that she was given this information as she has a very limited schedule not that school is going to be back in session. Patient is wanting to know if she can get this done closer to home of if she needs to come all the way back down to the  for this exam. Please call patient back to discuss how to proceed with this order.     Action Taken: Message routed to:  Other: RNCC    Travel Screening: Not Applicable

## 2024-01-02 NOTE — LETTER
1/2/2024         RE: Matilda Graf  [address]   [address]  [facility] [address]        Dear Colleague,    Thank you for referring your patient, Matilda Graf, to the [facility]ANSPL[facility]. Please see a copy of my visit note below.    TRANSPLANT NEPHROLOGY RECIPIENT EVALUATION NOTE    Assessment and Plan:  # Kidney Transplant Evaluation: Patient is a good candidate overall. Benefits of a living donor transplant were discussed.    # ESKD from polycystic kidney disease (PKD): although doing OK on hemodialysis since 12/20/2023, she would likely benefit from a kidney transplant.     # PKD: denies pain and h/o cyst rupture/stones/UTI. Needs brain MRA with primary neph and  CT a/p to assess kidney size.     # Cardiac Risk: no known history of cardiac disease or events. Need cardiac risk assessment given long standing risk factors.     # BMI 38: followed by weight loss management.     # Health Maintenance: Colonoscopy: Not up to date, Mammogram: Not up to date, PAP: Not up to date, and Dental: Up to date    - Discussed the risks and benefits of a transplant, including the risk of surgery and immunosuppression medications.  Patient's overall evaluation will be discussed in the Transplant Program's regular meeting with a final recommendation on the patients suitability for transplant to be made at that time.    Pending completion of the full evaluation, patient presently appears to be enough of an acceptable kidney transplant recipient candidate to have any potential kidney donors start the evaluation process.      Evaluation:  Matilda Graf was seen in consultation at the request of Dr. Michael Hardwick for evaluation as a potential kidney transplant recipient.    Reason for Visit:  Matilda Graf is a 53 year old female with ESKD from polycystic kidney disease (PKD), who presents for kidney transplant evaluation.    History of Present Illness:  Matilda Graf is a 53-year-old female with history of ESKD secondary  to polycystic kidney disease.  She was diagnosed with her kidney disease 20 years ago, which has been progressive.  Recently started hemodialysis on 12/20/2023.  Family history includes her mother, sisters, maternal grandmother/aunt with PKD.  No known personal or family history of brain aneurysms.  She denies pain, cyst rupture, kidney stones, recurrent UTIs.  She has been working with weight loss management with a current BMI of 38.           Kidney Disease Hx:               Kidney Disease Dx: Polycystic kidney disease (PKD)       Biopsy Proven: No         On Dialysis: Yes, Date initiated: 12/20223 and Dialysis Type: [facility];       Primary Nephrologist: Olga Freeman CNP       H/o Kidney Stones: No       H/o Recurrent/Frequent UTI: No         Diabetic Hx: None           Cardiac/Vascular Disease Risk Factors:        Cardiac Risk Factors: Hypertension and CKD       Known CAD: No       Known PAD/Caludication Symptoms: No       Known Heart Failure: No       Arrhythmia: No       Pulmonary Hypertension: No       Valvular Disease: No       Other: None         Viral Serology Status       CMV IgG Antibody: Unknown       EBV IgG Antibody: Unknown         Volume Status/Weight:        Volume status: Not assessed       Weight:  BMI within guidelines, but truncal obesity       BMI: There is no height or weight on file to calculate BMI.         Functional Capacity/Frailty:        Working as an . Has been exercising with a  at the gym once weekly for one hour without chest pains and shortness of breath.     Fatigue/Decreased Energy: [x] No [] Yes    Chest Pain or SOB with Exertion: [x] No [] Yes    Significant Weight Change: [x] No [] Yes    Nausea, Vomiting or Diarrhea: [] No [x] Yes New diarrhea since starting dialysis.    Fever, Sweats or Chills:  [x] No [] Yes    Leg Swelling [x] No [] Yes        History of Cancer: None        Allergy Testing Questions:   Medication that caused a reaction  None   Antibiotics used that didn't give an allergic reaction?  None    COVID Vaccination Up To Date: Yes    Potential Living Kidney Donors: No    Review of Systems:  A comprehensive review of systems was obtained and negative, except as noted in the HPI or PMH.    Past Medical History:   Medical record was reviewed and PMH was discussed with patient and noted below.  Past Medical History:   Diagnosis Date     Hypertension        Past Social History:   No past surgical history on file.  Personal history of bleeding or anesthesia problems: No    Family History:  No family history on file.    Personal History:   Social History     Socioeconomic History     Marital status: Single     Spouse name: Not on file     Number of children: Not on file     Years of education: Not on file     Highest education level: Not on file   Occupational History     Not on file   Tobacco Use     Smoking status: Never     Smokeless tobacco: Never   Substance and Sexual Activity     Alcohol use: Not Currently     Comment: rare     Drug use: Never     Sexual activity: Not on file   Other Topics Concern     Not on file   Social History Narrative     Not on file     Social Determinants of Health     Financial Resource Strain: Not on file   Food Insecurity: Not on file   Transportation Needs: Not on file   Physical Activity: Not on file   Stress: Not on file   Social Connections: Not on file   Interpersonal Safety: Not on file   Housing Stability: Not on file       Allergies:  No Known Allergies    Medications:  Current Outpatient Medications   Medication Sig     acetaminophen (TYLENOL) 650 MG CR tablet Take 650 mg by mouth     furosemide (LASIX) 20 MG tablet Take 40 mg by mouth daily     rosuvastatin (CRESTOR) 5 MG tablet Take 5 mg by mouth     No current facility-administered medications for this visit.       Vitals:  There were no vitals taken for this visit.    Exam:  GENERAL APPEARANCE: alert and no distress  HENT: mouth without ulcers or  lesions  RESP: lungs clear to auscultation - no rales, rhonchi or wheezes  CV: regular rhythm, normal rate, no rub, no murmur  EDEMA: no LE edema bilaterally  ABDOMEN: soft, nondistended, nontender, bowel sounds normal  MS: extremities normal - no gross deformities noted, no evidence of inflammation in joints, no muscle tenderness  SKIN: no rash    Results:   Recent Results (from the past 336 hour(s))   EKG 12-lead, tracing only [EKG1]    Collection Time: 12/27/23 10:21 AM   Result Value Ref Range    Systolic Blood Pressure  mmHg    Diastolic Blood Pressure  mmHg    Ventricular Rate 82 BPM    Atrial Rate 82 BPM    SC Interval 174 ms    QRS Duration 98 ms     ms    QTc 408 ms    P Axis 69 degrees    R AXIS 37 degrees    T Axis 51 degrees    Interpretation ECG       Sinus rhythm with occasional Premature ventricular complexes  Cannot rule out Anterior infarct , age undetermined  Abnormal ECG  No previous ECGs available  Confirmed by MD MODESTO, GUSTAVO ([id number]) on 12/28/2023 11:19:54 AM     Antibody titer red cell [[id number]]    Collection Time: 12/27/23 10:49 AM   Result Value Ref Range    Anti-A1 IgG Titer 4     Anti-A1 IgM Titer 4     Anti-A2 IgG Titer 1     Anti-A2 IgM Titer 2     SPECIMEN EXPIRATION DATE 20231230235900     ANTIBODY TITER IGM SCREEN Negative    Adult Type and Screen    Collection Time: 12/27/23 10:49 AM   Result Value Ref Range    ABO/RH(D) B POS     Antibody Screen Negative Negative    SPECIMEN EXPIRATION DATE [id number]            Again, thank you for allowing me to participate in the care of your patient.        Sincerely,        KUMAR Goodrich CNP

## 2024-01-03 ENCOUNTER — COMMITTEE REVIEW (OUTPATIENT)
Dept: TRANSPLANT | Facility: CLINIC | Age: 54
End: 2024-01-03
Payer: COMMERCIAL

## 2024-01-03 PROBLEM — N18.6 END STAGE KIDNEY DISEASE (H): Status: ACTIVE | Noted: 2020-08-14

## 2024-01-03 PROBLEM — E78.2 MIXED HYPERLIPIDEMIA: Status: ACTIVE | Noted: 2024-01-03

## 2024-01-03 NOTE — COMMITTEE REVIEW
Abdominal Committee Review Note     Evaluation Date: 2022  Committee Review Date: 1/3/2024    Organ being evaluated for: Kidney    Transplant Phase: Evaluation  Transplant Status: Medical Management    Transplant Coordinator: Alaina Ballard  Transplant Surgeon:       Referring Physician: Olga Freeman    Primary Diagnosis:   Secondary Diagnosis:     Committee Review Members:  Nephrology Broderick Carvalho, KUMAR CNP, Bird Monaco MD   Nurse FANG ENAMORADO, ESMER   Nutrition Ruma Sarmiento, OLIVIA   Pharmacist Azael King, [facility]   Pharmacy Omid Lofton, [facility]    - Clinical Sarah Garces, [facility], Lucy Berman, [facility]   Transplant GINNA ORTIZ, RN, Elise Mckeon, RN, Jami Coffey, RN, Michael Hardwick MD, Janine Estrada, KUMAR MCKENZIE, Allison Easton, RN, Lucy Olivares, RN, Leona Louie, NP, Leona Crook, RN, Jailyn Alonso, RN, Ingrid Ortiz, ESMER, Nay Corral RN       Transplant Eligibility: Irreversible chronic kidney disease treated w/dialysis or expected need for dialysis    Committee Review Decision: Needs Re-presentation    Relative Contraindications:     Absolute Contraindications:     Committee Chair Michael Hardwick MD verbally attested to the committee's decision.    Committee Discussion Details: Reviewed patient's medical status and evaluation results to date with multidisciplinary committee. Committee determined that patient is a good candidate for kidney transplant, living or . Patient should have live donors register now to initiate donor evaluation.    The committee has recommended the following evaluation items be completed prior to being listed as active status on the kidney transplant wait list:    Follow up lab testing from transplant evaluation - will need second ABO titers since she has qualifying titers for A2B.  Additional testing was missed on original evaluation due to lack of nephrology provider.  Cardiology consult and risk assessment for kidney  transplant. An echo can be completed at that time.  Imaging - CT abd/pelvis and iliac ultrasound to assess kidney size and vascular targets for kidney transplant.  Pt needs to work with her primary nephrologist to have a brain MRI scheduled due to her PCKD.  Update health maintenance items - currently due are colonoscopy and mammogram.    Patient is a potential candidate for A2B. ABO B, will need a second set of titers    The plan is to list the patient inactive on the kidney wait list to accrue time since they were originally evaluated in 2022 prior to starting dialysis. The patient will be called and updated on the committee's decision, orders will be placed (if applicable), and summary letter will be sent.

## 2024-01-08 DIAGNOSIS — Z79.4 TYPE 2 DIABETES MELLITUS WITH HYPERGLYCEMIA, WITH LONG-TERM CURRENT USE OF INSULIN (H): ICD-10-CM

## 2024-01-08 DIAGNOSIS — E11.65 TYPE 2 DIABETES MELLITUS WITH HYPERGLYCEMIA, WITH LONG-TERM CURRENT USE OF INSULIN (H): ICD-10-CM

## 2024-01-09 RX ORDER — SEMAGLUTIDE 1.34 MG/ML
INJECTION, SOLUTION SUBCUTANEOUS
Qty: 3 ML | Refills: 0 | Status: SHIPPED | OUTPATIENT
Start: 2024-01-09 | End: 2024-07-19

## 2024-01-12 ENCOUNTER — TRANSFERRED RECORDS (OUTPATIENT)
Dept: HEALTH INFORMATION MANAGEMENT | Facility: CLINIC | Age: 54
End: 2024-01-12
Payer: COMMERCIAL

## 2024-01-12 DIAGNOSIS — Z79.4 TYPE 2 DIABETES MELLITUS WITH HYPERGLYCEMIA, WITH LONG-TERM CURRENT USE OF INSULIN (H): ICD-10-CM

## 2024-01-12 DIAGNOSIS — E11.65 TYPE 2 DIABETES MELLITUS WITH HYPERGLYCEMIA, WITH LONG-TERM CURRENT USE OF INSULIN (H): ICD-10-CM

## 2024-01-12 LAB — RETINOPATHY: POSITIVE

## 2024-01-12 RX ORDER — SEMAGLUTIDE 2.68 MG/ML
INJECTION, SOLUTION SUBCUTANEOUS
Qty: 3 ML | Refills: 0 | OUTPATIENT
Start: 2024-01-12

## 2024-01-12 NOTE — TELEPHONE ENCOUNTER
She is currently on 1mg   Once a week  Next dose is due Monday.     She wants this at [facility] and not [facility]

## 2024-01-12 NOTE — TELEPHONE ENCOUNTER
Left message on identified VM requesting she call care team back re: verify what current dose of Ozempic she is currently taking?    Julie Behrendt RN

## 2024-01-14 LAB
PROTOCOL CUTOFF: NORMAL
SA 1 CELL: NORMAL
SA 1 TEST METHOD: NORMAL
SA 2 CELL: NORMAL
SA 2 TEST METHOD: NORMAL
SA1 HI RISK ABY: NORMAL
SA1 MOD RISK ABY: NORMAL
SA2 HI RISK ABY: NORMAL
SA2 MOD RISK ABY: NORMAL
UNACCEPTABLE ANTIGENS: NORMAL
UNOS CPRA: 0
ZZZSA 1  COMMENTS: NORMAL
ZZZSA 2 COMMENTS: NORMAL

## 2024-01-18 ENCOUNTER — TELEPHONE (OUTPATIENT)
Dept: EDUCATION SERVICES | Facility: CLINIC | Age: 54
End: 2024-01-18
Payer: COMMERCIAL

## 2024-01-18 ENCOUNTER — TELEPHONE (OUTPATIENT)
Dept: TRANSPLANT | Facility: CLINIC | Age: 54
End: 2024-01-18
Payer: COMMERCIAL

## 2024-01-18 DIAGNOSIS — Z79.4 TYPE 2 DIABETES MELLITUS WITH HYPERGLYCEMIA, WITH LONG-TERM CURRENT USE OF INSULIN (H): ICD-10-CM

## 2024-01-18 DIAGNOSIS — Z76.82 ORGAN TRANSPLANT CANDIDATE: ICD-10-CM

## 2024-01-18 DIAGNOSIS — Z01.818 PRE-TRANSPLANT EVALUATION FOR KIDNEY TRANSPLANT: ICD-10-CM

## 2024-01-18 DIAGNOSIS — E11.65 TYPE 2 DIABETES MELLITUS WITH HYPERGLYCEMIA, WITH LONG-TERM CURRENT USE OF INSULIN (H): ICD-10-CM

## 2024-01-18 DIAGNOSIS — N18.6 END STAGE RENAL DISEASE (H): Primary | ICD-10-CM

## 2024-01-18 RX ORDER — SEMAGLUTIDE 2.68 MG/ML
2 INJECTION, SOLUTION SUBCUTANEOUS
Qty: 3 ML | Refills: 5 | Status: SHIPPED | OUTPATIENT
Start: 2024-01-18 | End: 2024-07-19

## 2024-01-18 NOTE — TELEPHONE ENCOUNTER
Diabetes education contact:    Brissa needs to increase back to the Ozempic 2 mg dose weekly.  She had needed to go down to the 1 due to shortage of med.  New prescription sent in for 2 mg dose.    Diamond Lucero RD, [facility]

## 2024-01-18 NOTE — TELEPHONE ENCOUNTER
Attempted to leave message for patient but something went wrong while recording. Will send a MyChart message about committee review.

## 2024-01-27 DIAGNOSIS — Z79.4 TYPE 2 DIABETES MELLITUS WITHOUT COMPLICATION, WITH LONG-TERM CURRENT USE OF INSULIN (H): ICD-10-CM

## 2024-01-27 DIAGNOSIS — E11.9 TYPE 2 DIABETES MELLITUS WITHOUT COMPLICATION, WITH LONG-TERM CURRENT USE OF INSULIN (H): ICD-10-CM

## 2024-01-29 RX ORDER — PEN NEEDLE, DIABETIC 32GX 5/32"
NEEDLE, DISPOSABLE MISCELLANEOUS
Qty: 200 EACH | Refills: 0 | Status: SHIPPED | OUTPATIENT
Start: 2024-01-29 | End: 2024-07-07

## 2024-02-13 ENCOUNTER — TELEPHONE (OUTPATIENT)
Dept: FAMILY MEDICINE | Facility: CLINIC | Age: 54
End: 2024-02-13
Payer: COMMERCIAL

## 2024-02-13 NOTE — TELEPHONE ENCOUNTER
Patient Quality Outreach    Patient is due for the following:   Breast Cancer Screening - Mammogram  Cervical Cancer Screening - PAP Needed    Next Steps:   Schedule a office visit for a physical and a mammogram    Type of outreach:    Phone, left message for patient/parent to call back.      Questions for provider review:    None           Echo Spangler, [facility]

## 2024-02-18 DIAGNOSIS — F43.22 ADJUSTMENT DISORDER WITH ANXIOUS MOOD: ICD-10-CM

## 2024-02-19 RX ORDER — SERTRALINE HYDROCHLORIDE 100 MG/1
150 TABLET, FILM COATED ORAL DAILY
Qty: 90 TABLET | Refills: 1 | Status: SHIPPED | OUTPATIENT
Start: 2024-02-19 | End: 2024-07-19

## 2024-02-28 NOTE — TELEPHONE ENCOUNTER
RECORDS RECEIVED FROM: Organ transplant candidate [Z76.82], ordering prov: Broderick Carvalho CNP, recs/referral in Epic, scheduled per patient    DATE RECEIVED: 4/1/24   NOTES STATUS DETAILS   OFFICE NOTE from referring provider  Internal 1/3/24 Broderick HEATH CNP    OFFICE NOTE from other cardiologists  Care Everywhere 11/24/23 hPoenix Lilly MD [facility]    RECORDS from hospital/ED N/A    MEDICATION LIST Internal    GENERAL CARDIO RECORDS   (ALL APPOINTMENT TYPES)     LABS (CBC,BMP,CMP, TSH) N/A    EKG (STRIPS & REPORTS) Internal 12/27/23    MONITORS (STRIPS & REPORTS) N/A    ECHOS (IMAGES AND REPORTS) In process 4/1/24 scheduled prior    STRESS TESTS (IMAGES AND REPORTS) N/A    cMRI (IMAGES AND REPORTS) N/A    Cardiac cath (IMAGES AND REPORTS) N/A    CT/CTA (IMAGES AND REPORTS) N/A

## 2024-03-10 ENCOUNTER — HEALTH MAINTENANCE LETTER (OUTPATIENT)
Age: 54
End: 2024-03-10

## 2024-03-25 ENCOUNTER — TELEPHONE (OUTPATIENT)
Dept: TRANSPLANT | Facility: CLINIC | Age: 54
End: 2024-03-25
Payer: COMMERCIAL

## 2024-03-25 DIAGNOSIS — E78.2 MIXED HYPERLIPIDEMIA: Primary | ICD-10-CM

## 2024-03-25 NOTE — TELEPHONE ENCOUNTER
Left VM letting patient know I will reach out to scheduling to cancel the upcoming appointments and let them know it should be scheduled at a later date since patient is still in the hospital and will be dc'ing to TCU. Will also send the patient a MyChart message since voicemail cut off at the end of the call.

## 2024-03-25 NOTE — TELEPHONE ENCOUNTER
Patient Call: General  Route to LPN    Reason for call: patient stated she's in the hospital been there since March 6th and will be going to Rehab soon, and has appointments set up on 4/1/24 and would like to talk with you about if she should cancel or what Coordinator, thinks.    Call back needed? Yes    Return Call Needed  Same as documented in contacts section  When to return call?: Same day: Route High Priority

## 2024-03-25 NOTE — TELEPHONE ENCOUNTER
Spoke with patient, she is discharging to TCU today and will have a ride for the appointments on 4/01/2024. Will plan on keeping appointments.    Patient will have to reschedule her colonoscopy.    Patient dialyzes at [facility] - will request [phone] form

## 2024-04-01 ENCOUNTER — PRE VISIT (OUTPATIENT)
Dept: CARDIOLOGY | Facility: CLINIC | Age: 54
End: 2024-04-01

## 2024-04-01 ENCOUNTER — OFFICE VISIT (OUTPATIENT)
Dept: CARDIOLOGY | Facility: CLINIC | Age: 54
End: 2024-04-01
Attending: NURSE PRACTITIONER
Payer: COMMERCIAL

## 2024-04-01 ENCOUNTER — LAB (OUTPATIENT)
Dept: LAB | Facility: CLINIC | Age: 54
End: 2024-04-01
Attending: NURSE PRACTITIONER
Payer: COMMERCIAL

## 2024-04-01 ENCOUNTER — ANCILLARY PROCEDURE (OUTPATIENT)
Dept: CT IMAGING | Facility: CLINIC | Age: 54
End: 2024-04-01
Attending: NURSE PRACTITIONER
Payer: COMMERCIAL

## 2024-04-01 VITALS
OXYGEN SATURATION: 97 % | SYSTOLIC BLOOD PRESSURE: 132 MMHG | DIASTOLIC BLOOD PRESSURE: 82 MMHG | BODY MASS INDEX: 36.17 KG/M2 | HEART RATE: 89 BPM | WEIGHT: 264.1 LBS

## 2024-04-01 DIAGNOSIS — N18.5 CHRONIC KIDNEY DISEASE, STAGE V (H): ICD-10-CM

## 2024-04-01 DIAGNOSIS — N18.4 CHRONIC KIDNEY DISEASE, STAGE 4, SEVERELY DECREASED GFR (H): ICD-10-CM

## 2024-04-01 DIAGNOSIS — N18.4 CHRONIC KIDNEY DISEASE, STAGE IV (SEVERE) (H): ICD-10-CM

## 2024-04-01 DIAGNOSIS — Z01.810 PRE-OPERATIVE CARDIOVASCULAR EXAMINATION: Primary | ICD-10-CM

## 2024-04-01 DIAGNOSIS — N18.6 ESRD (END STAGE RENAL DISEASE) (H): ICD-10-CM

## 2024-04-01 DIAGNOSIS — Z01.818 PRE-TRANSPLANT EVALUATION FOR KIDNEY TRANSPLANT: ICD-10-CM

## 2024-04-01 DIAGNOSIS — Q61.3 POLYCYSTIC KIDNEY DISEASE: ICD-10-CM

## 2024-04-01 DIAGNOSIS — N18.6 END STAGE RENAL DISEASE (H): ICD-10-CM

## 2024-04-01 DIAGNOSIS — E78.2 MIXED HYPERLIPIDEMIA: ICD-10-CM

## 2024-04-01 DIAGNOSIS — Z76.82 ORGAN TRANSPLANT CANDIDATE: ICD-10-CM

## 2024-04-01 DIAGNOSIS — Q61.2 POLYCYSTIC KIDNEY DISEASE, AUTOSOMAL DOMINANT: ICD-10-CM

## 2024-04-01 PROBLEM — E66.812 CLASS 2 SEVERE OBESITY DUE TO EXCESS CALORIES WITH SERIOUS COMORBIDITY IN ADULT (H): Status: ACTIVE | Noted: 2024-04-01

## 2024-04-01 PROBLEM — E66.01 CLASS 2 SEVERE OBESITY DUE TO EXCESS CALORIES WITH SERIOUS COMORBIDITY IN ADULT (H): Status: ACTIVE | Noted: 2024-04-01

## 2024-04-01 LAB
A1 AB TITR SERPL: 8 {TITER}
A1 AB TITR SERPL: 8 {TITER}
A2 AB TITR SERPL: 2 {TITER}
A2 AB TITR SERPL: 2 {TITER}
ABO/RH(D): NORMAL
ALBUMIN SERPL BCG-MCNC: 3.3 G/DL (ref 3.5–5.2)
ALBUMIN UR-MCNC: 100 MG/DL
ALP SERPL-CCNC: 137 U/L (ref 40–150)
ALT SERPL W P-5'-P-CCNC: 12 U/L (ref 0–50)
ANION GAP SERPL CALCULATED.3IONS-SCNC: 19 MMOL/L (ref 7–15)
ANTIBODY SCREEN: NEGATIVE
ANTIBODY TITER IGM SCREEN: NEGATIVE
APPEARANCE UR: CLEAR
APTT PPP: 43 SECONDS (ref 22–38)
AST SERPL W P-5'-P-CCNC: 12 U/L (ref 0–45)
BASOPHILS # BLD AUTO: 0 10E3/UL (ref 0–0.2)
BASOPHILS NFR BLD AUTO: 0 %
BILIRUB SERPL-MCNC: 0.2 MG/DL
BILIRUB UR QL STRIP: NEGATIVE
BUN SERPL-MCNC: 58.6 MG/DL (ref 6–20)
CALCIUM SERPL-MCNC: 10.5 MG/DL (ref 8.6–10)
CHLORIDE SERPL-SCNC: 101 MMOL/L (ref 98–107)
CHOLEST SERPL-MCNC: 185 MG/DL
CMV IGG SERPL IA-ACNC: 0.37 U/ML
CMV IGG SERPL IA-ACNC: NORMAL
COLOR UR AUTO: ABNORMAL
CREAT SERPL-MCNC: 10.4 MG/DL (ref 0.51–0.95)
DEPRECATED HCO3 PLAS-SCNC: 18 MMOL/L (ref 22–29)
EGFRCR SERPLBLD CKD-EPI 2021: 4 ML/MIN/1.73M2
EOSINOPHIL # BLD AUTO: 0.2 10E3/UL (ref 0–0.7)
EOSINOPHIL NFR BLD AUTO: 2 %
ERYTHROCYTE [DISTWIDTH] IN BLOOD BY AUTOMATED COUNT: 15.1 % (ref 10–15)
FACTOR 2 INTERPRETATION: NORMAL
FACTOR V INTERPRETATION: NORMAL
FASTING STATUS PATIENT QL REPORTED: NO
GLUCOSE SERPL-MCNC: 104 MG/DL (ref 70–99)
GLUCOSE UR STRIP-MCNC: 100 MG/DL
HBV CORE AB SERPL QL IA: NONREACTIVE
HBV SURFACE AB SERPL IA-ACNC: <3.5 M[IU]/ML
HBV SURFACE AB SERPL IA-ACNC: NONREACTIVE M[IU]/ML
HBV SURFACE AG SERPL QL IA: NONREACTIVE
HCT VFR BLD AUTO: 28.7 % (ref 35–47)
HCV AB SERPL QL IA: NONREACTIVE
HDLC SERPL-MCNC: 36 MG/DL
HGB BLD-MCNC: 8.8 G/DL (ref 11.7–15.7)
HGB UR QL STRIP: ABNORMAL
IMM GRANULOCYTES # BLD: 0 10E3/UL
IMM GRANULOCYTES NFR BLD: 0 %
INR PPP: 1.33 (ref 0.85–1.15)
KETONES UR STRIP-MCNC: NEGATIVE MG/DL
LAB DIRECTOR COMMENTS: NORMAL
LAB DIRECTOR DISCLAIMER: NORMAL
LAB DIRECTOR INTERPRETATION: NORMAL
LAB DIRECTOR METHODOLOGY: NORMAL
LAB DIRECTOR RESULTS: NORMAL
LDLC SERPL CALC-MCNC: 115 MG/DL
LEUKOCYTE ESTERASE UR QL STRIP: NEGATIVE
LVEF ECHO: NORMAL
LYMPHOCYTES # BLD AUTO: 1.1 10E3/UL (ref 0.8–5.3)
LYMPHOCYTES NFR BLD AUTO: 13 %
MCH RBC QN AUTO: 29.6 PG (ref 26.5–33)
MCHC RBC AUTO-ENTMCNC: 30.7 G/DL (ref 31.5–36.5)
MCV RBC AUTO: 97 FL (ref 78–100)
MONOCYTES # BLD AUTO: 0.6 10E3/UL (ref 0–1.3)
MONOCYTES NFR BLD AUTO: 6 %
MUCOUS THREADS #/AREA URNS LPF: PRESENT /LPF
NEUTROPHILS # BLD AUTO: 6.7 10E3/UL (ref 1.6–8.3)
NEUTROPHILS NFR BLD AUTO: 79 %
NITRATE UR QL: NEGATIVE
NONHDLC SERPL-MCNC: 149 MG/DL
NRBC # BLD AUTO: 0 10E3/UL
NRBC BLD AUTO-RTO: 0 /100
PH UR STRIP: 7 [PH] (ref 5–7)
PLATELET # BLD AUTO: 277 10E3/UL (ref 150–450)
POTASSIUM SERPL-SCNC: 4.6 MMOL/L (ref 3.4–5.3)
PROT SERPL-MCNC: 7.1 G/DL (ref 6.4–8.3)
RBC # BLD AUTO: 2.97 10E6/UL (ref 3.8–5.2)
RBC URINE: 2 /HPF
SODIUM SERPL-SCNC: 138 MMOL/L (ref 135–145)
SP GR UR STRIP: 1.01 (ref 1–1.03)
SPECIMEN DESCRIPTION: NORMAL
SPECIMEN EXPIRATION DATE: NORMAL
SQUAMOUS EPITHELIAL: 2 /HPF
T PALLIDUM AB SER QL: NONREACTIVE
TRIGL SERPL-MCNC: 171 MG/DL
UROBILINOGEN UR STRIP-MCNC: NORMAL MG/DL
VZV IGG SER QL IA: 1916 INDEX
VZV IGG SER QL IA: POSITIVE
WBC # BLD AUTO: 8.6 10E3/UL (ref 4–11)
WBC URINE: 8 /HPF

## 2024-04-01 PROCEDURE — 85613 RUSSELL VIPER VENOM DILUTED: CPT | Mod: 59 | Performed by: NURSE PRACTITIONER

## 2024-04-01 PROCEDURE — 93010 ELECTROCARDIOGRAM REPORT: CPT | Performed by: INTERNAL MEDICINE

## 2024-04-01 PROCEDURE — 86787 VARICELLA-ZOSTER ANTIBODY: CPT | Performed by: NURSE PRACTITIONER

## 2024-04-01 PROCEDURE — 80061 LIPID PANEL: CPT | Performed by: PATHOLOGY

## 2024-04-01 PROCEDURE — 93005 ELECTROCARDIOGRAM TRACING: CPT

## 2024-04-01 PROCEDURE — 85610 PROTHROMBIN TIME: CPT | Performed by: PATHOLOGY

## 2024-04-01 PROCEDURE — 99000 SPECIMEN HANDLING OFFICE-LAB: CPT | Performed by: PATHOLOGY

## 2024-04-01 PROCEDURE — 86780 TREPONEMA PALLIDUM: CPT | Performed by: NURSE PRACTITIONER

## 2024-04-01 PROCEDURE — 80053 COMPREHEN METABOLIC PANEL: CPT | Performed by: PATHOLOGY

## 2024-04-01 PROCEDURE — 87340 HEPATITIS B SURFACE AG IA: CPT | Performed by: NURSE PRACTITIONER

## 2024-04-01 PROCEDURE — 85390 FIBRINOLYSINS SCREEN I&R: CPT | Mod: 26 | Performed by: PATHOLOGY

## 2024-04-01 PROCEDURE — 86886 COOMBS TEST INDIRECT TITER: CPT | Performed by: NURSE PRACTITIONER

## 2024-04-01 PROCEDURE — 86706 HEP B SURFACE ANTIBODY: CPT | Performed by: NURSE PRACTITIONER

## 2024-04-01 PROCEDURE — 99204 OFFICE O/P NEW MOD 45 MIN: CPT | Mod: 25 | Performed by: INTERNAL MEDICINE

## 2024-04-01 PROCEDURE — 86644 CMV ANTIBODY: CPT | Performed by: NURSE PRACTITIONER

## 2024-04-01 PROCEDURE — 86850 RBC ANTIBODY SCREEN: CPT | Performed by: NURSE PRACTITIONER

## 2024-04-01 PROCEDURE — 93306 TTE W/DOPPLER COMPLETE: CPT | Performed by: STUDENT IN AN ORGANIZED HEALTH CARE EDUCATION/TRAINING PROGRAM

## 2024-04-01 PROCEDURE — 36415 COLL VENOUS BLD VENIPUNCTURE: CPT | Performed by: PATHOLOGY

## 2024-04-01 PROCEDURE — 86665 EPSTEIN-BARR CAPSID VCA: CPT | Performed by: NURSE PRACTITIONER

## 2024-04-01 PROCEDURE — 86803 HEPATITIS C AB TEST: CPT | Performed by: NURSE PRACTITIONER

## 2024-04-01 PROCEDURE — 81241 F5 GENE: CPT | Performed by: NURSE PRACTITIONER

## 2024-04-01 PROCEDURE — 74176 CT ABD & PELVIS W/O CONTRAST: CPT | Mod: GC | Performed by: STUDENT IN AN ORGANIZED HEALTH CARE EDUCATION/TRAINING PROGRAM

## 2024-04-01 PROCEDURE — 86900 BLOOD TYPING SEROLOGIC ABO: CPT | Performed by: NURSE PRACTITIONER

## 2024-04-01 PROCEDURE — 86147 CARDIOLIPIN ANTIBODY EA IG: CPT | Performed by: NURSE PRACTITIONER

## 2024-04-01 PROCEDURE — 99213 OFFICE O/P EST LOW 20 MIN: CPT | Performed by: INTERNAL MEDICINE

## 2024-04-01 PROCEDURE — 85025 COMPLETE CBC W/AUTO DIFF WBC: CPT | Performed by: PATHOLOGY

## 2024-04-01 PROCEDURE — 81001 URINALYSIS AUTO W/SCOPE: CPT | Performed by: PATHOLOGY

## 2024-04-01 PROCEDURE — 86481 TB AG RESPONSE T-CELL SUSP: CPT | Performed by: NURSE PRACTITIONER

## 2024-04-01 PROCEDURE — 85730 THROMBOPLASTIN TIME PARTIAL: CPT | Performed by: PATHOLOGY

## 2024-04-01 PROCEDURE — 85670 THROMBIN TIME PLASMA: CPT | Performed by: NURSE PRACTITIONER

## 2024-04-01 PROCEDURE — 86704 HEP B CORE ANTIBODY TOTAL: CPT | Performed by: NURSE PRACTITIONER

## 2024-04-01 PROCEDURE — G0452 MOLECULAR PATHOLOGY INTERPR: HCPCS | Mod: 26 | Performed by: PATHOLOGY

## 2024-04-01 RX ORDER — IRBESARTAN 150 MG/1
150 TABLET ORAL DAILY
COMMUNITY
Start: 2023-12-28 | End: 2024-12-27

## 2024-04-01 RX ORDER — HYDROMORPHONE HYDROCHLORIDE 2 MG/1
4 TABLET ORAL
COMMUNITY
Start: 2024-03-25

## 2024-04-01 RX ORDER — AMOXICILLIN 500 MG/1
500 CAPSULE ORAL EVERY 12 HOURS
COMMUNITY
Start: 2024-03-25 | End: 2024-04-04

## 2024-04-01 RX ORDER — HYDROXYZINE PAMOATE 25 MG/1
25 CAPSULE ORAL
COMMUNITY
Start: 2024-03-25

## 2024-04-01 ASSESSMENT — PAIN SCALES - GENERAL: PAINLEVEL: NO PAIN (0)

## 2024-04-01 NOTE — PROGRESS NOTES
"Optimal Vascular Metrics    Blood Pressure   {BP < 140/90:6000614::\"BP < 140/90 Yes\"}    On Aspirin  {On Aspirin Yes/No:8020306::\"Yes\"}    On Statin  {On Statin Yes/No:2490466::\"Yes\"}    Tobacco use  {Tobacco use Yes/No:0005167::\"No\"}    "

## 2024-04-01 NOTE — PROGRESS NOTES
History:    Matilda Graf is a very pleasant 53 year old woman with ESRD who is referred for evaluation of cardiac evaluation since she is being considered for a renal transplantation in the future.    Her medical history is notable for    No known CAD or CHF  ESRD for polycytic renal disease on the maternal side with many family members affected.   HD Dec 2023 - 3 x weekly   Hypertension  Hyperlipidemia    She was recently hospitalized for  CAP + R sided Empyema s/p R thoracotomy w/ decortication of R lower and middle lobes, debridement of empyema cavity (3/12/24)   Sepsis 2/2 Empyema  S/p TPA x3 and pulmozyme with ongoing evidence of effusion by CXR.   Chest tubes removed 3/17. Pleural fluid growing strep intermedius, strep constellatus and fusobacerium. Repeat CT stable/improved.  Gout R knee    Patient reports overall weakness and less energy from recent hospitalization. She has balance issues that is being addressed at the therapy sessions. Denies chest pain,  orthopnea, paroxysmal nocturnal dyspnea, palpitations or syncope. She needs a walker to ambulate.      Current Outpatient Medications   Medication Sig Dispense Refill    acetaminophen (TYLENOL) 650 MG CR tablet Take 650 mg by mouth      amoxicillin (AMOXIL) 500 MG capsule Take 500 mg by mouth every 12 hours      furosemide (LASIX) 20 MG tablet Take 40 mg by mouth daily      HYDROmorphone (DILAUDID) 2 MG tablet Take 4 mg by mouth      hydrOXYzine emily (VISTARIL) 25 MG capsule Take 25 mg by mouth      irbesartan (AVAPRO) 150 MG tablet Take 150 mg by mouth daily      rosuvastatin (CRESTOR) 5 MG tablet Take 5 mg by mouth         Allergies - reviewed     Allergies   Allergen Reactions    Animal Dander Other (See Comments)    Seasonal Allergies Cough, Headache and Itching       Past history -reviewed  Past Medical History:   Diagnosis Date    End stage renal disease (H)     Hypertension     Mixed hyperlipidemia     Obesity     Polycystic kidney disease          Social history - reviewed  Social History     Tobacco Use    Smoking status: Never    Smokeless tobacco: Never   Substance Use Topics    Alcohol use: Not Currently     Comment: rare    Drug use: Never       Family history -reviewed  Family History   Problem Relation Age of Onset    Polycystic Kidney Diease Mother     Heart Surgery Father 60    Polycystic Kidney Diease Sister     Polycystic Kidney Diease Sister     Polycystic Kidney Diease Maternal Grandmother     Polycystic Kidney Diease Maternal Aunt     Cancer No family hx of        ROS: non contributory on the 10-point review of system    Exam:     /82 (BP Location: Right arm, Patient Position: Sitting, Cuff Size: Adult Regular)   Pulse 89   Wt 119.8 kg (264 lb 1.6 oz)   SpO2 97%   BMI 36.17 kg/m    In general, the patient is in no apparent distress.      HEENT: Sclerae white, not injected.    Neck: No JVD. No thyromegaly  Heart: RRR. Normal S1, S2. No murmur, rub, click, or gallop.    Lungs: Clear on the L side and reduced BS at the R base.  No wheezes, rales.   Extremities: No edema.  LUE fistula      I have independently reviewed this patient's relevant laboratory and cardiac data :    Recent Labs   Lab Test 04/01/24 0939   CHOL 185   HDL 36*   *   TRIG 171*      Recent Labs   Lab Test 04/01/24 0939   AST 12     Recent Labs   Lab Test 04/01/24 0939   ALT 12     Recent Labs   Lab Test 04/01/24 0939      POTASSIUM 4.6   CHLORIDE 101   CO2 18*   ANIONGAP 19*   BUN 58.6*   CR 10.40*     Recent Labs   Lab Test 04/01/24 0939   WBC 8.6   RBC 2.97*   HGB 8.8*   MCV 97          ECG today- sinus rhythm with non specific T wave inversions (present before)      Echo 4.1.24- normal biventricular function, no significant valve dysfunction, mean RA pressure normal, no pericardial effusion    Assessment and Plan:  53 year old patient with    Essential hypertension  Dyslipidemia  Normal biventricular function   Chronic renal  disease  Recent hospitalization for bacterial pneumonia, sepsis and empyema    Matilda is recuperating from her recent hospitalization for pneumonia and empyema requiring thoracotomy w/ decortication of R lower and middle lobes, debridement of empyema cavity. Fortunately she did not have any cardiac issues during all this. She has no known CAD or CHF.     She is without symptoms concerning for angina or heart failure. Patient is euvolemic on exam today with a normal heart rate and blood pressure. Patient is in sinus rhythm today. Patient has normal biventricular function with no significant valvular abnormalities. Her lipids are controlled,  and trig 171.     I would recommend continuing her current medical therapy for hypertension and her lipids. She is on hemodialysis for fluid management.  I would recommend an outpatient and elective cardiac stress test to assess for ischemia after she is discharged from the rehab facility.     Recommendations:   Continue current medications.  Dobutamine stress echo after recovery from recent hospitalization.   Follow up in 2 years if active on the list.       Isaura Partida MD, MS  Professor of Medicine  Cardiovascular Medicine

## 2024-04-01 NOTE — LETTER
4/1/2024      RE: Matilda Graf  [address]   [address]  [facility] [address]       Dear Colleague,    Thank you for the opportunity to participate in the care of your patient, Matilda Graf, at the [facility] [place] at [facility]. Please see a copy of my visit note below.    History:    Matilda Graf is a very pleasant 53 year old woman with ESRD who is referred for evaluation of cardiac evaluation since she is being considered for a renal transplantation in the future.    Her medical history is notable for    No known CAD or CHF  ESRD for polycytic renal disease on the maternal side with many family members affected.   HD Dec 2023 - 3 x weekly   Hypertension  Hyperlipidemia    She was recently hospitalized for  CAP + R sided Empyema s/p R thoracotomy w/ decortication of R lower and middle lobes, debridement of empyema cavity (3/12/24)   Sepsis 2/2 Empyema  S/p TPA x3 and pulmozyme with ongoing evidence of effusion by CXR.   Chest tubes removed 3/17. Pleural fluid growing strep intermedius, strep constellatus and fusobacerium. Repeat CT stable/improved.  Gout R knee    Patient reports overall weakness and less energy from recent hospitalization. She has balance issues that is being addressed at the therapy sessions. Denies chest pain,  orthopnea, paroxysmal nocturnal dyspnea, palpitations or syncope. She needs a walker to ambulate.      Current Outpatient Medications   Medication Sig Dispense Refill     acetaminophen (TYLENOL) 650 MG CR tablet Take 650 mg by mouth       amoxicillin (AMOXIL) 500 MG capsule Take 500 mg by mouth every 12 hours       furosemide (LASIX) 20 MG tablet Take 40 mg by mouth daily       HYDROmorphone (DILAUDID) 2 MG tablet Take 4 mg by mouth       hydrOXYzine emily (VISTARIL) 25 MG capsule Take 25 mg by mouth       irbesartan (AVAPRO) 150 MG tablet Take 150 mg by mouth daily       rosuvastatin (CRESTOR) 5 MG tablet Take 5 mg by  mouth         Allergies - reviewed     Allergies   Allergen Reactions     Animal Dander Other (See Comments)     Seasonal Allergies Cough, Headache and Itching       Past history -reviewed  Past Medical History:   Diagnosis Date     End stage renal disease (H)      Hypertension      Mixed hyperlipidemia      Obesity      Polycystic kidney disease         Social history - reviewed  Social History     Tobacco Use     Smoking status: Never     Smokeless tobacco: Never   Substance Use Topics     Alcohol use: Not Currently     Comment: rare     Drug use: Never       Family history -reviewed  Family History   Problem Relation Age of Onset     Polycystic Kidney Diease Mother      Heart Surgery Father 60     Polycystic Kidney Diease Sister      Polycystic Kidney Diease Sister      Polycystic Kidney Diease Maternal Grandmother      Polycystic Kidney Diease Maternal Aunt      Cancer No family hx of        ROS: non contributory on the 10-point review of system    Exam:     /82 (BP Location: Right arm, Patient Position: Sitting, Cuff Size: Adult Regular)   Pulse 89   Wt 119.8 kg (264 lb 1.6 oz)   SpO2 97%   BMI 36.17 kg/m    In general, the patient is in no apparent distress.      HEENT: Sclerae white, not injected.    Neck: No JVD. No thyromegaly  Heart: RRR. Normal S1, S2. No murmur, rub, click, or gallop.    Lungs: Clear on the L side and reduced BS at the R base.  No wheezes, rales.   Extremities: No edema.  LUE fistula      I have independently reviewed this patient's relevant laboratory and cardiac data :    Recent Labs   Lab Test 04/01/24 0939   CHOL 185   HDL 36*   *   TRIG 171*      Recent Labs   Lab Test 04/01/24 0939   AST 12     Recent Labs   Lab Test 04/01/24 0939   ALT 12     Recent Labs   Lab Test 04/01/24 0939      POTASSIUM 4.6   CHLORIDE 101   CO2 18*   ANIONGAP 19*   BUN 58.6*   CR 10.40*     Recent Labs   Lab Test 04/01/24 0939   WBC 8.6   RBC 2.97*   HGB 8.8*   MCV 97           ECG today- sinus rhythm with non specific T wave inversions (present before)      Echo 4.1.24- normal biventricular function, no significant valve dysfunction, mean RA pressure normal, no pericardial effusion    Assessment and Plan:  53 year old patient with    Essential hypertension  Dyslipidemia  Normal biventricular function   Chronic renal disease  Recent hospitalization for bacterial pneumonia, sepsis and empyema    Matilda is recuperating from her recent hospitalization for pneumonia and empyema requiring thoracotomy w/ decortication of R lower and middle lobes, debridement of empyema cavity. Fortunately she did not have any cardiac issues during all this. She has no known CAD or CHF.     She is without symptoms concerning for angina or heart failure. Patient is euvolemic on exam today with a normal heart rate and blood pressure. Patient is in sinus rhythm today. Patient has normal biventricular function with no significant valvular abnormalities. Her lipids are controlled,  and trig 171.     I would recommend continuing her current medical therapy for hypertension and her lipids. She is on hemodialysis for fluid management.  I would recommend an outpatient and elective cardiac stress test to assess for ischemia after she is discharged from the rehab facility.     Recommendations:   Continue current medications.  Dobutamine stress echo after recovery from recent hospitalization.   Follow up in 2 years if active on the list.       Isaura Partida MD, MS  Professor of Medicine  Cardiovascular Medicine    Please do not hesitate to contact me if you have any questions/concerns.     Sincerely,     Isaura Partida MD

## 2024-04-01 NOTE — NURSING NOTE
Chief Complaint   Patient presents with    New Patient     NEW pre kidney transplant eval     Vitals were taken, medications reconciled, and EKG was performed.    Vickie Villegas CNA  11:13 AM

## 2024-04-02 ENCOUNTER — TELEPHONE (OUTPATIENT)
Dept: CARDIOLOGY | Facility: CLINIC | Age: 54
End: 2024-04-02
Payer: COMMERCIAL

## 2024-04-02 LAB
ATRIAL RATE - MUSE: 92 BPM
DIASTOLIC BLOOD PRESSURE - MUSE: NORMAL MMHG
DRVVT CONFIRM NORMALIZED RATIO: 0.95
DRVVT SCREEN MIX RATIO: 1.06
DRVVT SCREEN RATIO: 1.19
EBV VCA IGG SER IA-ACNC: 24.7 U/ML
EBV VCA IGG SER IA-ACNC: POSITIVE
GAMMA INTERFERON BACKGROUND BLD IA-ACNC: 0.02 IU/ML
HIV 1+2 AB+HIV1 P24 AG SERPL QL IA: NONREACTIVE
INTERPRETATION ECG - MUSE: NORMAL
LA PPP-IMP: NEGATIVE
LUPUS INTERPRETATION: ABNORMAL
M TB IFN-G BLD-IMP: NEGATIVE
M TB IFN-G CD4+ BCKGRND COR BLD-ACNC: 1.27 IU/ML
MITOGEN IGNF BCKGRD COR BLD-ACNC: 0.01 IU/ML
MITOGEN IGNF BCKGRD COR BLD-ACNC: 0.02 IU/ML
P AXIS - MUSE: 17 DEGREES
PLATELET NEUTRALIZATION: -1 SECONDS
PR INTERVAL - MUSE: 166 MS
PTT 1:2 MIX: 45 SECONDS (ref 31–45)
PTT RATIO: 1.32
QRS DURATION - MUSE: 88 MS
QT - MUSE: 328 MS
QTC - MUSE: 405 MS
QUANTIFERON MITOGEN: 1.29 IU/ML
QUANTIFERON NIL TUBE: 0.02 IU/ML
QUANTIFERON TB1 TUBE: 0.04 IU/ML
QUANTIFERON TB2 TUBE: 0.03
R AXIS - MUSE: 9 DEGREES
SYSTOLIC BLOOD PRESSURE - MUSE: NORMAL MMHG
T AXIS - MUSE: 24 DEGREES
THROMBIN TIME: 18.1 SECONDS (ref 13–19)
VENTRICULAR RATE- MUSE: 92 BPM

## 2024-04-02 NOTE — TELEPHONE ENCOUNTER
[facility] Call Center    Phone Message    May a detailed message be left on voicemail: yes     Reason for Call: Other: Shaista from [facility] most recent progress notes from cardiology, please call back for further discussion [phone]  fx [phone]     Action Taken: Other: cardiology    Travel Screening: Not Applicable    Thank you!  [facility]

## 2024-04-04 LAB
CARDIOLIPIN IGG SER IA-ACNC: <2 GPL-U/ML
CARDIOLIPIN IGG SER IA-ACNC: NEGATIVE
CARDIOLIPIN IGM SER IA-ACNC: <2 MPL-U/ML
CARDIOLIPIN IGM SER IA-ACNC: NEGATIVE

## 2024-04-28 DIAGNOSIS — E11.65 TYPE 2 DIABETES MELLITUS WITH HYPERGLYCEMIA, WITH LONG-TERM CURRENT USE OF INSULIN (H): ICD-10-CM

## 2024-04-28 DIAGNOSIS — Z79.4 TYPE 2 DIABETES MELLITUS WITH HYPERGLYCEMIA, WITH LONG-TERM CURRENT USE OF INSULIN (H): ICD-10-CM

## 2024-06-10 ENCOUNTER — TELEPHONE (OUTPATIENT)
Dept: TRANSPLANT | Facility: CLINIC | Age: 54
End: 2024-06-10
Payer: COMMERCIAL

## 2024-06-10 NOTE — TELEPHONE ENCOUNTER
Pt called to discuss what next steps are for transplant. Pt has been dealing with illness since the last time she spoke with RNCC. Pt needed to re-schedule appointments. Pt has mammogram scheduled for 6/11/24.  Needs to scheduled a stress test for clearance. RNCC gave cards scheduling line - per pt will schedule.  Pt needs to re-schedule colonoscopy and will get that done since she was sick when she was supposed to have it completed.

## 2024-06-13 ENCOUNTER — HOSPITAL ENCOUNTER (OUTPATIENT)
Dept: CARDIOLOGY | Facility: CLINIC | Age: 54
Discharge: HOME | End: 2024-06-13
Attending: INTERNAL MEDICINE | Admitting: INTERNAL MEDICINE
Payer: COMMERCIAL

## 2024-06-13 DIAGNOSIS — Z76.82 ORGAN TRANSPLANT CANDIDATE: ICD-10-CM

## 2024-06-13 DIAGNOSIS — E78.2 MIXED HYPERLIPIDEMIA: ICD-10-CM

## 2024-06-13 DIAGNOSIS — Q61.2 POLYCYSTIC KIDNEY DISEASE, AUTOSOMAL DOMINANT: ICD-10-CM

## 2024-06-13 DIAGNOSIS — N18.6 ESRD (END STAGE RENAL DISEASE) (H): ICD-10-CM

## 2024-06-13 PROCEDURE — 93016 CV STRESS TEST SUPVJ ONLY: CPT | Performed by: INTERNAL MEDICINE

## 2024-06-13 PROCEDURE — 93325 DOPPLER ECHO COLOR FLOW MAPG: CPT | Mod: 26 | Performed by: INTERNAL MEDICINE

## 2024-06-13 PROCEDURE — 250N000011 HC RX IP 250 OP 636: Mod: JZ | Performed by: INTERNAL MEDICINE

## 2024-06-13 PROCEDURE — 93018 CV STRESS TEST I&R ONLY: CPT | Performed by: INTERNAL MEDICINE

## 2024-06-13 PROCEDURE — 93350 STRESS TTE ONLY: CPT | Mod: 26 | Performed by: INTERNAL MEDICINE

## 2024-06-13 PROCEDURE — 93321 DOPPLER ECHO F-UP/LMTD STD: CPT | Mod: 26 | Performed by: INTERNAL MEDICINE

## 2024-06-13 PROCEDURE — 255N000002 HC RX 255 OP 636: Performed by: INTERNAL MEDICINE

## 2024-06-13 PROCEDURE — 250N000009 HC RX 250: Performed by: INTERNAL MEDICINE

## 2024-06-13 PROCEDURE — 258N000003 HC RX IP 258 OP 636: Mod: JZ | Performed by: INTERNAL MEDICINE

## 2024-06-13 RX ORDER — METOPROLOL TARTRATE 1 MG/ML
1-20 INJECTION, SOLUTION INTRAVENOUS
Status: ACTIVE | OUTPATIENT
Start: 2024-06-13 | End: 2024-06-13

## 2024-06-13 RX ORDER — ATROPINE SULFATE 0.4 MG/ML
.2-2 AMPUL (ML) INJECTION
Status: DISCONTINUED | OUTPATIENT
Start: 2024-06-13 | End: 2024-06-14 | Stop reason: HOSPADM

## 2024-06-13 RX ORDER — SODIUM CHLORIDE 9 MG/ML
INJECTION, SOLUTION INTRAVENOUS CONTINUOUS
Status: ACTIVE | OUTPATIENT
Start: 2024-06-13 | End: 2024-06-13

## 2024-06-13 RX ORDER — DOBUTAMINE HYDROCHLORIDE 200 MG/100ML
10-50 INJECTION INTRAVENOUS CONTINUOUS
Status: ACTIVE | OUTPATIENT
Start: 2024-06-13 | End: 2024-06-13

## 2024-06-13 RX ADMIN — METOPROLOL TARTRATE 2 MG: 5 INJECTION INTRAVENOUS at 10:30

## 2024-06-13 RX ADMIN — PERFLUTREN 5 ML: 6.52 INJECTION, SUSPENSION INTRAVENOUS at 10:28

## 2024-06-13 RX ADMIN — DEXTROSE MONOHYDRATE 10 MCG/KG/MIN: 50 INJECTION, SOLUTION INTRAVENOUS at 10:12

## 2024-06-13 NOTE — PROGRESS NOTES
Pt here for dobutamine stress test. Test, meds and side effects reviewed with patient. Achieved target HR at 50 mcg Dobutamine and a total of 0 mg IV atropine. Gave a total of 2 mg IV Metoprolol to bring HR back to baseline. Post monitoring complete and VSS. Pt escorted out to the gold waiting room.

## 2024-07-06 DIAGNOSIS — Z79.4 TYPE 2 DIABETES MELLITUS WITHOUT COMPLICATION, WITH LONG-TERM CURRENT USE OF INSULIN (H): ICD-10-CM

## 2024-07-06 DIAGNOSIS — E11.9 TYPE 2 DIABETES MELLITUS WITHOUT COMPLICATION, WITH LONG-TERM CURRENT USE OF INSULIN (H): ICD-10-CM

## 2024-07-07 RX ORDER — PEN NEEDLE, DIABETIC 32GX 5/32"
NEEDLE, DISPOSABLE MISCELLANEOUS
Qty: 200 EACH | Refills: 2 | Status: SHIPPED | OUTPATIENT
Start: 2024-07-07

## 2024-07-19 ENCOUNTER — OFFICE VISIT (OUTPATIENT)
Dept: FAMILY MEDICINE | Facility: CLINIC | Age: 54
End: 2024-07-19
Payer: COMMERCIAL

## 2024-07-19 VITALS
HEART RATE: 88 BPM | OXYGEN SATURATION: 95 % | DIASTOLIC BLOOD PRESSURE: 89 MMHG | HEIGHT: 64 IN | BODY MASS INDEX: 49.85 KG/M2 | SYSTOLIC BLOOD PRESSURE: 132 MMHG | TEMPERATURE: 98.1 F | WEIGHT: 292 LBS | RESPIRATION RATE: 20 BRPM

## 2024-07-19 DIAGNOSIS — E78.5 HYPERLIPIDEMIA LDL GOAL <100: Primary | ICD-10-CM

## 2024-07-19 DIAGNOSIS — Z79.4 TYPE 2 DIABETES MELLITUS WITH HYPERGLYCEMIA, WITH LONG-TERM CURRENT USE OF INSULIN (H): ICD-10-CM

## 2024-07-19 DIAGNOSIS — F43.22 ADJUSTMENT DISORDER WITH ANXIOUS MOOD: ICD-10-CM

## 2024-07-19 DIAGNOSIS — I15.9 SECONDARY HYPERTENSION: ICD-10-CM

## 2024-07-19 DIAGNOSIS — B37.2 YEAST INFECTION OF THE SKIN: ICD-10-CM

## 2024-07-19 DIAGNOSIS — J45.40 MODERATE PERSISTENT ASTHMA WITHOUT COMPLICATION: ICD-10-CM

## 2024-07-19 DIAGNOSIS — E11.65 TYPE 2 DIABETES MELLITUS WITH HYPERGLYCEMIA, WITH LONG-TERM CURRENT USE OF INSULIN (H): ICD-10-CM

## 2024-07-19 DIAGNOSIS — I10 BENIGN ESSENTIAL HYPERTENSION: ICD-10-CM

## 2024-07-19 DIAGNOSIS — L30.9 ECZEMA, UNSPECIFIED TYPE: ICD-10-CM

## 2024-07-19 DIAGNOSIS — N39.41 URGE INCONTINENCE OF URINE: ICD-10-CM

## 2024-07-19 DIAGNOSIS — Z12.4 CERVICAL CANCER SCREENING: ICD-10-CM

## 2024-07-19 LAB
ANION GAP SERPL CALCULATED.3IONS-SCNC: 12 MMOL/L (ref 7–15)
BUN SERPL-MCNC: 12.4 MG/DL (ref 6–20)
CALCIUM SERPL-MCNC: 9.1 MG/DL (ref 8.8–10.4)
CHLORIDE SERPL-SCNC: 98 MMOL/L (ref 98–107)
CHOLEST SERPL-MCNC: 164 MG/DL
CREAT SERPL-MCNC: 0.6 MG/DL (ref 0.51–0.95)
CREAT UR-MCNC: 143.2 MG/DL
EGFRCR SERPLBLD CKD-EPI 2021: >90 ML/MIN/1.73M2
FASTING STATUS PATIENT QL REPORTED: NO
FASTING STATUS PATIENT QL REPORTED: NO
GLUCOSE SERPL-MCNC: 124 MG/DL (ref 70–99)
HBA1C MFR BLD: 6.8 % (ref 0–5.6)
HCO3 SERPL-SCNC: 27 MMOL/L (ref 22–29)
HDLC SERPL-MCNC: 55 MG/DL
HOLD SPECIMEN: NORMAL
LDLC SERPL CALC-MCNC: 95 MG/DL
MICROALBUMIN UR-MCNC: <12 MG/L
MICROALBUMIN/CREAT UR: NORMAL MG/G{CREAT}
NONHDLC SERPL-MCNC: 109 MG/DL
POTASSIUM SERPL-SCNC: 4.1 MMOL/L (ref 3.4–5.3)
SODIUM SERPL-SCNC: 137 MMOL/L (ref 135–145)
TRIGL SERPL-MCNC: 71 MG/DL

## 2024-07-19 PROCEDURE — 82570 ASSAY OF URINE CREATININE: CPT | Performed by: NURSE PRACTITIONER

## 2024-07-19 PROCEDURE — 80061 LIPID PANEL: CPT | Performed by: NURSE PRACTITIONER

## 2024-07-19 PROCEDURE — 83036 HEMOGLOBIN GLYCOSYLATED A1C: CPT | Performed by: NURSE PRACTITIONER

## 2024-07-19 PROCEDURE — 36415 COLL VENOUS BLD VENIPUNCTURE: CPT | Performed by: NURSE PRACTITIONER

## 2024-07-19 PROCEDURE — 99214 OFFICE O/P EST MOD 30 MIN: CPT | Mod: 25 | Performed by: NURSE PRACTITIONER

## 2024-07-19 PROCEDURE — 90471 IMMUNIZATION ADMIN: CPT | Performed by: NURSE PRACTITIONER

## 2024-07-19 PROCEDURE — 96127 BRIEF EMOTIONAL/BEHAV ASSMT: CPT | Performed by: NURSE PRACTITIONER

## 2024-07-19 PROCEDURE — 80048 BASIC METABOLIC PNL TOTAL CA: CPT | Performed by: NURSE PRACTITIONER

## 2024-07-19 PROCEDURE — 82043 UR ALBUMIN QUANTITATIVE: CPT | Performed by: NURSE PRACTITIONER

## 2024-07-19 PROCEDURE — 90677 PCV20 VACCINE IM: CPT | Performed by: NURSE PRACTITIONER

## 2024-07-19 RX ORDER — AMLODIPINE BESYLATE 10 MG/1
10 TABLET ORAL DAILY
Qty: 90 TABLET | Refills: 3 | Status: SHIPPED | OUTPATIENT
Start: 2024-07-19

## 2024-07-19 RX ORDER — SERTRALINE HYDROCHLORIDE 100 MG/1
150 TABLET, FILM COATED ORAL DAILY
Qty: 135 TABLET | Refills: 3 | Status: SHIPPED | OUTPATIENT
Start: 2024-07-19

## 2024-07-19 RX ORDER — TRIAMCINOLONE ACETONIDE 1 MG/G
CREAM TOPICAL 2 TIMES DAILY
Qty: 15 G | Refills: 0 | Status: SHIPPED | OUTPATIENT
Start: 2024-07-19

## 2024-07-19 RX ORDER — BLOOD-GLUCOSE SENSOR
1 EACH MISCELLANEOUS
Qty: 6 EACH | Refills: 5 | Status: SHIPPED | OUTPATIENT
Start: 2024-07-19

## 2024-07-19 RX ORDER — ATORVASTATIN CALCIUM 40 MG/1
40 TABLET, FILM COATED ORAL DAILY
Qty: 90 TABLET | Refills: 3 | Status: SHIPPED | OUTPATIENT
Start: 2024-07-19

## 2024-07-19 RX ORDER — FLUTICASONE PROPIONATE 110 UG/1
AEROSOL, METERED RESPIRATORY (INHALATION)
Qty: 12 G | Refills: 3 | Status: SHIPPED | OUTPATIENT
Start: 2024-07-19

## 2024-07-19 RX ORDER — MONTELUKAST SODIUM 10 MG/1
10 TABLET ORAL AT BEDTIME
Qty: 90 TABLET | Refills: 3 | Status: SHIPPED | OUTPATIENT
Start: 2024-07-19

## 2024-07-19 RX ORDER — OXYBUTYNIN CHLORIDE 10 MG/1
10 TABLET, EXTENDED RELEASE ORAL DAILY
Qty: 90 TABLET | Refills: 3 | Status: SHIPPED | OUTPATIENT
Start: 2024-07-19

## 2024-07-19 RX ORDER — ALBUTEROL SULFATE 90 UG/1
2 AEROSOL, METERED RESPIRATORY (INHALATION) EVERY 6 HOURS PRN
Qty: 6.7 G | Refills: 3 | Status: SHIPPED | OUTPATIENT
Start: 2024-07-19

## 2024-07-19 RX ORDER — FLASH GLUCOSE SENSOR
KIT MISCELLANEOUS
Qty: 2 EACH | Refills: 5 | Status: SHIPPED | OUTPATIENT
Start: 2024-07-19

## 2024-07-19 RX ORDER — SEMAGLUTIDE 2.68 MG/ML
2 INJECTION, SOLUTION SUBCUTANEOUS
Qty: 9 ML | Refills: 2 | Status: SHIPPED | OUTPATIENT
Start: 2024-07-19

## 2024-07-19 RX ORDER — NYSTATIN 100000 [USP'U]/G
POWDER TOPICAL 2 TIMES DAILY
Qty: 60 G | Refills: 0 | Status: SHIPPED | OUTPATIENT
Start: 2024-07-19

## 2024-07-19 ASSESSMENT — ANXIETY QUESTIONNAIRES
GAD7 TOTAL SCORE: 4
5. BEING SO RESTLESS THAT IT IS HARD TO SIT STILL: NOT AT ALL
7. FEELING AFRAID AS IF SOMETHING AWFUL MIGHT HAPPEN: NOT AT ALL
6. BECOMING EASILY ANNOYED OR IRRITABLE: NOT AT ALL
4. TROUBLE RELAXING: SEVERAL DAYS
3. WORRYING TOO MUCH ABOUT DIFFERENT THINGS: SEVERAL DAYS
2. NOT BEING ABLE TO STOP OR CONTROL WORRYING: SEVERAL DAYS
8. IF YOU CHECKED OFF ANY PROBLEMS, HOW DIFFICULT HAVE THESE MADE IT FOR YOU TO DO YOUR WORK, TAKE CARE OF THINGS AT HOME, OR GET ALONG WITH OTHER PEOPLE?: SOMEWHAT DIFFICULT
GAD7 TOTAL SCORE: 4
1. FEELING NERVOUS, ANXIOUS, OR ON EDGE: SEVERAL DAYS
7. FEELING AFRAID AS IF SOMETHING AWFUL MIGHT HAPPEN: NOT AT ALL
GAD7 TOTAL SCORE: 4
IF YOU CHECKED OFF ANY PROBLEMS ON THIS QUESTIONNAIRE, HOW DIFFICULT HAVE THESE PROBLEMS MADE IT FOR YOU TO DO YOUR WORK, TAKE CARE OF THINGS AT HOME, OR GET ALONG WITH OTHER PEOPLE: SOMEWHAT DIFFICULT

## 2024-07-19 ASSESSMENT — PAIN SCALES - GENERAL: PAINLEVEL: NO PAIN (0)

## 2024-07-19 ASSESSMENT — ASTHMA QUESTIONNAIRES
ACT_TOTALSCORE: 22
QUESTION_2 LAST FOUR WEEKS HOW OFTEN HAVE YOU HAD SHORTNESS OF BREATH: ONCE OR TWICE A WEEK
QUESTION_4 LAST FOUR WEEKS HOW OFTEN HAVE YOU USED YOUR RESCUE INHALER OR NEBULIZER MEDICATION (SUCH AS ALBUTEROL): NOT AT ALL
ACT_TOTALSCORE: 22
QUESTION_3 LAST FOUR WEEKS HOW OFTEN DID YOUR ASTHMA SYMPTOMS (WHEEZING, COUGHING, SHORTNESS OF BREATH, CHEST TIGHTNESS OR PAIN) WAKE YOU UP AT NIGHT OR EARLIER THAN USUAL IN THE MORNING: ONCE OR TWICE
QUESTION_5 LAST FOUR WEEKS HOW WOULD YOU RATE YOUR ASTHMA CONTROL: WELL CONTROLLED
QUESTION_1 LAST FOUR WEEKS HOW MUCH OF THE TIME DID YOUR ASTHMA KEEP YOU FROM GETTING AS MUCH DONE AT WORK, SCHOOL OR AT HOME: NONE OF THE TIME

## 2024-07-19 ASSESSMENT — PATIENT HEALTH QUESTIONNAIRE - PHQ9
10. IF YOU CHECKED OFF ANY PROBLEMS, HOW DIFFICULT HAVE THESE PROBLEMS MADE IT FOR YOU TO DO YOUR WORK, TAKE CARE OF THINGS AT HOME, OR GET ALONG WITH OTHER PEOPLE: SOMEWHAT DIFFICULT
SUM OF ALL RESPONSES TO PHQ QUESTIONS 1-9: 10
SUM OF ALL RESPONSES TO PHQ QUESTIONS 1-9: 10

## 2024-07-19 NOTE — PROGRESS NOTES
Assessment & Plan     Type 2 diabetes mellitus with hyperglycemia, with long-term current use of insulin (H)   Controlled no change in treatment plan   - HEMOGLOBIN A1C; Future  - Lipid panel reflex to direct LDL Non-fasting; Future  - Albumin Random Urine Quantitative with Creat Ratio; Future  - HEMOGLOBIN A1C  - Lipid panel reflex to direct LDL Non-fasting  - Albumin Random Urine Quantitative with Creat Ratio  - Semaglutide, 2 MG/DOSE, (OZEMPIC, 2 MG/DOSE,) 8 MG/3ML pen; Inject 2 mg subcutaneously every 7 days  - Continuous Glucose Sensor (FREESTYLE SANDRA 3 SENSOR) MISC; 1 each every 14 days Use 1 sensor every 14 days. Use to read blood sugars per 's instructions.  - Continuous Glucose Sensor (FREESTYLE SANDRA 14 DAY SENSOR) MISC; Change every 14 days.    Hyperlipidemia LDL goal <100   Controlled no change in treatment plan   - atorvastatin (LIPITOR) 40 MG tablet; Take 1 tablet (40 mg) by mouth daily    Urge incontinence of urine   Controlled no change in treatment plan   - oxyBUTYnin ER (DITROPAN XL) 10 MG 24 hr tablet; Take 1 tablet (10 mg) by mouth daily    Secondary hypertension   Controlled no change in treatment plan   - BASIC METABOLIC PANEL; Future  - BASIC METABOLIC PANEL    Cervical cancer screening  Reviewed with patient declined exam today will make a follow-up appointment.    Eczema, unspecified type  tramcinolone (KENALOG) 0.1 % external cream; Apply topically 2 times daily Needs appointment for further refills    Adjustment disorder with anxious mood   Controlled no change in treatment plan   - sertraline (ZOLOFT) 100 MG tablet; Take 1.5 tablets (150 mg) by mouth daily    Yeast infection of the skin  - nystatin (MYCOSTATIN) 937546 UNIT/GM external powder; Apply topically 2 times daily    Moderate persistent asthma without complication  - albuterol (PROAIR HFA/PROVENTIL HFA/VENTOLIN HFA) 108 (90 Base) MCG/ACT inhaler; Inhale 2 puffs into the lungs every 6 hours as needed for wheezing  -  "fluticasone (FLOVENT HFA) 110 MCG/ACT inhaler; INHALE 1 PUFF INTO THE LUNGS TWICE DAILY  - montelukast (SINGULAIR) 10 MG tablet; Take 1 tablet (10 mg) by mouth at bedtime    Benign essential hypertension   Controlled no change in treatment plan   - amLODIPine (NORVASC) 10 MG tablet; Take 1 tablet (10 mg) by mouth daily    The longitudinal plan of care for the diagnosis(es)/condition(s) as documented were addressed during this visit. Due to the added complexity in care, I will continue to support Brissa in the subsequent management and with ongoing continuity of care.  However the wounds      BMI  Estimated body mass index is 50.1 kg/m  as calculated from the following:    Height as of this encounter: 1.626 m (5' 4.02\").    Weight as of this encounter: 132.5 kg (292 lb).   Weight management plan: Discussed healthy diet and exercise guidelines    Depression Screening Follow Up        7/19/2024    12:27 PM   PHQ   PHQ-9 Total Score 10   Q9: Thoughts of better off dead/self-harm past 2 weeks Not at all         7/19/2024    12:27 PM   Last PHQ-9   1.  Little interest or pleasure in doing things 1   2.  Feeling down, depressed, or hopeless 1   3.  Trouble falling or staying asleep, or sleeping too much 3   4.  Feeling tired or having little energy 1   5.  Poor appetite or overeating 2   6.  Feeling bad about yourself 0   7.  Trouble concentrating 1   8.  Moving slowly or restless 1   Q9: Thoughts of better off dead/self-harm past 2 weeks 0   PHQ-9 Total Score 10         Follow Up Actions Taken  Patient counseled, no additional follow up at this time.       See Patient Instructions    Subjective   Brissa is a 53 year old, presenting for the following health issues:  Diabetes        7/19/2024     1:17 PM   Additional Questions   Roomed by filiberto mojica cma     History of Present Illness       Mental Health Follow-up:  Patient presents to follow-up on Depression & Anxiety.Patient's depression since last visit has been:  Medium  The " "patient is not having other symptoms associated with depression.  Patient's anxiety since last visit has been:  Medium  The patient is not having other symptoms associated with anxiety.  Any significant life events: grief or loss  Patient is not feeling anxious or having panic attacks.  Patient has no concerns about alcohol or drug use.    Diabetes:   She presents for follow up of diabetes.  She is checking home blood glucose one time daily.   She checks blood glucose before meals.  Blood glucose is never over 200 and sometimes under 70. She is aware of hypoglycemia symptoms including weakness.    She has no concerns regarding her diabetes at this time.   She is not experiencing numbness or burning in feet, excessive thirst, blurry vision, weight changes or redness, sores or blisters on feet.           She eats 2-3 servings of fruits and vegetables daily.She consumes 2 sweetened beverage(s) daily.She exercises with enough effort to increase her heart rate 9 or less minutes per day.  She exercises with enough effort to increase her heart rate 3 or less days per week.   She is taking medications regularly.         Pt would like to discuss night time incontinence. She was put on a medication that is not working.            Review of Systems  So you doConstitutional, HEENT, cardiovascular, pulmonary, gi and gu systems are negative, except as otherwise noted.      Objective    LMP  (LMP Unknown)   Body mass index is 50.1 kg/m . Vital signs:  Temp: 98.1  F (36.7  C)   BP: 132/89 Pulse: 88   Resp: 20 SpO2: 95 %     Height: 162.6 cm (5' 4.02\") Weight: 132.5 kg (292 lb)  Estimated body mass index is 50.1 kg/m  as calculated from the following:    Height as of this encounter: 1.626 m (5' 4.02\").    Weight as of this encounter: 132.5 kg (292 lb).        Physical Exam   GENERAL: alert and no distress  EYES: Eyes grossly normal to inspection, PERRL and conjunctivae and sclerae normal  HENT: ear canals and TM's normal, nose and " mouth without ulcers or lesions  NECK: no adenopathy, no asymmetry, masses, or scars  RESP: lungs clear to auscultation - no rales, rhonchi or wheezes  CV: regular rate and rhythm, normal S1 S2, no S3 or S4, no murmur, click or rub, no peripheral edema  ABDOMEN: soft, nontender, no hepatosplenomegaly, no masses and bowel sounds normal  MS: no gross musculoskeletal defects noted, no edema  SKIN: no suspicious lesions or rashes  NEURO: Normal strength and tone, mentation intact and speech normal  PSYCH: mentation appears normal, affect normal/bright    Results for orders placed or performed in visit on 07/19/24   HEMOGLOBIN A1C     Status: Abnormal   Result Value Ref Range    Hemoglobin A1C 6.8 (H) 0.0 - 5.6 %   BASIC METABOLIC PANEL     Status: Abnormal   Result Value Ref Range    Sodium 137 135 - 145 mmol/L    Potassium 4.1 3.4 - 5.3 mmol/L    Chloride 98 98 - 107 mmol/L    Carbon Dioxide (CO2) 27 22 - 29 mmol/L    Anion Gap 12 7 - 15 mmol/L    Urea Nitrogen 12.4 6.0 - 20.0 mg/dL    Creatinine 0.60 0.51 - 0.95 mg/dL    GFR Estimate >90 >60 mL/min/1.73m2    Calcium 9.1 8.8 - 10.4 mg/dL    Glucose 124 (H) 70 - 99 mg/dL    Patient Fasting > 8hrs? No    Lipid panel reflex to direct LDL Non-fasting     Status: None   Result Value Ref Range    Cholesterol 164 <200 mg/dL    Triglycerides 71 <150 mg/dL    Direct Measure HDL 55 >=50 mg/dL    LDL Cholesterol Calculated 95 <=100 mg/dL    Non HDL Cholesterol 109 <130 mg/dL    Patient Fasting > 8hrs? No     Narrative    Cholesterol  Desirable:  <200 mg/dL    Triglycerides  Normal:  Less than 150 mg/dL  Borderline High:  150-199 mg/dL  High:  200-499 mg/dL  Very High:  Greater than or equal to 500 mg/dL    Direct Measure HDL  Female:  Greater than or equal to 50 mg/dL   Male:  Greater than or equal to 40 mg/dL    LDL Cholesterol  Desirable:  <100mg/dL  Above Desirable:  100-129 mg/dL   Borderline High:  130-159 mg/dL   High:  160-189 mg/dL   Very High:  >= 190 mg/dL    Non HDL  Cholesterol  Desirable:  130 mg/dL  Above Desirable:  130-159 mg/dL  Borderline High:  160-189 mg/dL  High:  190-219 mg/dL  Very High:  Greater than or equal to 220 mg/dL   Albumin Random Urine Quantitative with Creat Ratio     Status: None   Result Value Ref Range    Creatinine Urine mg/dL 143.2 mg/dL    Albumin Urine mg/L <12.0 mg/L    Albumin Urine mg/g Cr     Extra Tube     Status: None    Narrative    The following orders were created for panel order Extra Tube.  Procedure                               Abnormality         Status                     ---------                               -----------         ------                     Extra Red Top Tube[[id number]]                               Final result                 Please view results for these tests on the individual orders.   Extra Red Top Tube     Status: None   Result Value Ref Range    Hold Specimen JIC            Signed Electronically by: RONNELL Bianchi CNP

## 2024-07-19 NOTE — NURSING NOTE
"Chief Complaint   Patient presents with    Diabetes    Depression    Anxiety    Incontinence       Initial /89   Pulse 88   Resp 20   Ht 1.626 m (5' 4.02\")   Wt 132.5 kg (292 lb)   LMP  (LMP Unknown)   SpO2 95%   BMI 50.10 kg/m   Estimated body mass index is 50.1 kg/m  as calculated from the following:    Height as of this encounter: 1.626 m (5' 4.02\").    Weight as of this encounter: 132.5 kg (292 lb).    Patient presents to the clinic using No DME    Is there anyone who you would like to be able to receive your results? No  If yes have patient fill out YESSENIA      "

## 2024-08-02 ENCOUNTER — TELEPHONE (OUTPATIENT)
Dept: TRANSPLANT | Facility: CLINIC | Age: 54
End: 2024-08-02
Payer: COMMERCIAL

## 2024-08-02 NOTE — TELEPHONE ENCOUNTER
Pt just had colonoscopy, yesterday. They had 1 biopsy removed, will know next week. Pt has completed everything at this point. Plan to send educational videos, again, RACHNA, Kit and PRA order. Placed her on the agenda for committee review.

## 2024-08-02 NOTE — TELEPHONE ENCOUNTER
Patient Call: General  Route to LPN    Reason for call: Patient called to touch base and follow up with coordinator.     Call back needed? Yes    Return Call Needed  Same as documented in contacts section  When to return call?: Same day: Route High Priority

## 2024-08-06 ENCOUNTER — DOCUMENTATION ONLY (OUTPATIENT)
Dept: TRANSPLANT | Facility: CLINIC | Age: 54
End: 2024-08-06
Payer: COMMERCIAL

## 2024-08-07 ENCOUNTER — DOCUMENTATION ONLY (OUTPATIENT)
Dept: TRANSPLANT | Facility: CLINIC | Age: 54
End: 2024-08-07
Payer: COMMERCIAL

## 2024-08-07 ENCOUNTER — COMMITTEE REVIEW (OUTPATIENT)
Dept: TRANSPLANT | Facility: CLINIC | Age: 54
End: 2024-08-07
Payer: COMMERCIAL

## 2024-08-07 NOTE — COMMITTEE REVIEW
Kidney/Pancreas Committee Review Note     Evaluation Date: 11/4/2022  Committee Review Date: 8/7/2024    Organ being evaluated for: Kidney    Transplant Phase: Evaluation  Transplant Status: Active    Transplant Coordinator: Alaina Ballard  Transplant Surgeon:        Referring Physician: Olga Freeman    Primary Diagnosis:   Secondary Diagnosis:     Committee Review Members:  Cardiology Steffanie Boland, APRN CNP   Nephrology Lizet Livingston MD   Nutrition Ruma Sarmiento, OLIVIA   Pharmacist Kylie Chapin, [facility]    - Clinical Radha Quiñonez, [facility], Tremayne Varela, MSW, Ange Cruz, MSW   Transplant GINNA ORTIZ, RN, Suellen Ndiaye, RN, Shoshana Gonzalez, NP, Elise Mckeon, RN, Jami Coffey, ESMER, Janine Estrada, KUMAR CNP, Allison Easton, RN, Leona Crook, RN, Nay Corral, RN, Sindi Lawson, ESMER, Krys Godinez, RN   Transplant Surgery Jean-Paul Mccain MD       Transplant Eligibility: Irreversible chronic kidney disease treated w/dialysis or expected need for dialysis    Committee Review Decision: Needs Re-presentation    Relative Contraindications:     Absolute Contraindications:      Committee Chair Jean-Paul Mccain MD verbally attested to the committee's decision.    Committee Discussion Details:     Reviewed patient's medical status and evaluation results to date with multidisciplinary committee.     ESRD d/t PCKD on evaluation transplant committee asked for cardiology, imaging, brain MRA, and HMI for clearance for transplant. Since her initial evaluation she has suffered multiple hospitalizations. Patient was hospitalized for empeyma with thoracotomy and chest tube placement from March 6 - March 25, 2024. Discharged to TCU and was admitted for COVID 19 infection from April 12-15th, 2024. Admitted on May 23rd for sepsis/C. Diff colitis until 5/26/2024. Then in July was admitted for leg cellulitis, was discharged 7/16/2024.    Due to all of the recent hospitalizations, the  committee has recommended the following evaluation items be completed prior to being listed as active status on the kidney transplant wait list:    Follow up transplant surgeon visit  Follow up transplant nephrology visit  Repeat frailty exam    Patient is a candidate for A2B    The patient will be called and updated on the committee's decision, orders will be placed (if applicable), and summary letter will be sent.

## 2024-08-08 ENCOUNTER — TELEPHONE (OUTPATIENT)
Dept: TRANSPLANT | Facility: CLINIC | Age: 54
End: 2024-08-08
Payer: COMMERCIAL

## 2024-08-08 DIAGNOSIS — Z76.82 ORGAN TRANSPLANT CANDIDATE: ICD-10-CM

## 2024-08-08 DIAGNOSIS — N18.6 ESRD (END STAGE RENAL DISEASE) (H): ICD-10-CM

## 2024-08-08 DIAGNOSIS — N18.9 CHRONIC KIDNEY DISEASE (CKD): Primary | ICD-10-CM

## 2024-08-08 NOTE — TELEPHONE ENCOUNTER
Left VM - informing pt of the decision of the transplant committee - they would like her to complete some refresher visits with the transplant surgeon, transplant nephrologist, and frailty exam due to her hospitalizations after evaluation. Will place the orders for visits and a  will reach out to her.

## 2024-08-08 NOTE — TELEPHONE ENCOUNTER
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 Informed her that with everything that has happened to her since her initial eval we would like a refresher visit. She has also lost weight and want to determine if she is at an acceptable BMI.  Pt verbalized understanding will wait for scheduling to contact her to set up appointments.

## 2024-09-25 DIAGNOSIS — Z79.4 TYPE 2 DIABETES MELLITUS WITHOUT COMPLICATION, WITH LONG-TERM CURRENT USE OF INSULIN (H): ICD-10-CM

## 2024-09-25 DIAGNOSIS — E11.9 TYPE 2 DIABETES MELLITUS WITHOUT COMPLICATION, WITH LONG-TERM CURRENT USE OF INSULIN (H): ICD-10-CM

## 2024-09-25 NOTE — TELEPHONE ENCOUNTER
Patient was seen in clinic by Juli Ramsey NP on 7/19/24.  Prescription approved per [facility] Refill Protocol.  Julie Behrendt RN

## 2024-10-07 ENCOUNTER — TELEPHONE (OUTPATIENT)
Dept: TRANSPLANT | Facility: CLINIC | Age: 54
End: 2024-10-07
Payer: COMMERCIAL

## 2024-10-07 NOTE — TELEPHONE ENCOUNTER
Left Voicemail (1st Attempt) and Sent Mychart (1st Attempt) for the patient to call back and reschedule the following:    Appointment type: K/P Waitlist Surg  Provider: Dr. Mccain  Return date: 10/17  Specialty phone number: [phone]  Additional appointment(s) needed: Nurse Only & K/P Waitlist Neph  Additonal Notes: okay to schedule with any available surgeon, Dr. Hardwick has availability on 10/14

## 2024-10-14 ENCOUNTER — TELEPHONE (OUTPATIENT)
Dept: TRANSPLANT | Facility: CLINIC | Age: 54
End: 2024-10-14
Payer: COMMERCIAL

## 2024-10-14 NOTE — TELEPHONE ENCOUNTER
LVM asking to see if pt is still available for appointments on 10/17 - asked for her to give [facility] a call. Also sent [facility] message

## 2024-10-17 ENCOUNTER — OFFICE VISIT (OUTPATIENT)
Dept: TRANSPLANT | Facility: CLINIC | Age: 54
End: 2024-10-17
Attending: NURSE PRACTITIONER
Payer: COMMERCIAL

## 2024-10-17 VITALS
WEIGHT: 261.2 LBS | OXYGEN SATURATION: 96 % | HEART RATE: 95 BPM | BODY MASS INDEX: 35.38 KG/M2 | DIASTOLIC BLOOD PRESSURE: 74 MMHG | RESPIRATION RATE: 16 BRPM | SYSTOLIC BLOOD PRESSURE: 110 MMHG | HEIGHT: 72 IN

## 2024-10-17 VITALS — BODY MASS INDEX: 35.38 KG/M2 | WEIGHT: 261.2 LBS | HEIGHT: 72 IN

## 2024-10-17 DIAGNOSIS — L97.519 ULCER OF RIGHT FOOT, UNSPECIFIED ULCER STAGE (H): ICD-10-CM

## 2024-10-17 DIAGNOSIS — Z76.82 ORGAN TRANSPLANT CANDIDATE: ICD-10-CM

## 2024-10-17 DIAGNOSIS — N18.6 ESRD (END STAGE RENAL DISEASE) (H): ICD-10-CM

## 2024-10-17 DIAGNOSIS — N18.9 CHRONIC KIDNEY DISEASE (CKD): ICD-10-CM

## 2024-10-17 DIAGNOSIS — Q61.3 PKD (POLYCYSTIC KIDNEY DISEASE): Primary | ICD-10-CM

## 2024-10-17 DIAGNOSIS — N18.5 STAGE 5 CHRONIC KIDNEY DISEASE NOT ON CHRONIC DIALYSIS (H): ICD-10-CM

## 2024-10-17 PROCEDURE — 99214 OFFICE O/P EST MOD 30 MIN: CPT | Performed by: NURSE PRACTITIONER

## 2024-10-17 PROCEDURE — 99213 OFFICE O/P EST LOW 20 MIN: CPT | Performed by: NURSE PRACTITIONER

## 2024-10-17 RX ORDER — CALCIUM CARBONATE/VITAMIN D3 500-10/5ML
400 LIQUID (ML) ORAL DAILY
COMMUNITY

## 2024-10-17 RX ORDER — SEVELAMER CARBONATE 800 MG/1
800 TABLET, FILM COATED ORAL
COMMUNITY
Start: 2024-04-10 | End: 2025-04-10

## 2024-10-17 NOTE — LETTER
10/17/2024      Matilda Graf  [address]  [place] [address]      Dear Colleague,    Thank you for referring your patient, Matilda Graf, to the [facility] TRANSPL[facility]. Please see a copy of my visit note below.    TRANSPLANT NEPHROLOGY WAITLIST VISIT    Assessment and Plan:  # Kidney Transplant Wait List Evaluation: Patient is a good candidate overall. Patient should remain active on the eval list.    Recommendations:  -continue Wound Care for right plantar ulcer  -Orthopedics ([facility]) to discuss alternative treatment options for ankle pain in the setting of recurrent cellulitis   -MRA of the brain with history of PCKD    # ESKD from polycystic kidney disease (PKD): doing ok on dialysis since 12/23. Occasional abdominal pain. No cyst rupture/stones/UTI. Needs a brain MRA to evaluate for aneurysm.    # Cardiac Risk: No Known CAD, Last risk assessment 4/24. Follow up in 4/26. 6/2024: stress test no ischemia.     #HTN: Occasionally monitors at home, -150 at home.      # Obesity:  BMI 35.4. Followed with weight management in the past. Weight acceptable per surgery.    # Hx of Empyema with chest tube placement x3  with thoracotomy and due to Pneumonia: likely due to aspiration from emesis in March 2024 :updated CT chest 10/24: clear,  PFT 10/7/24, normal spirometry.  Last pulm visit 10/8/24. Follow up PRN.    # Nausea/vomiting: reports related to dialysis. Last episode in September.    # Hx of Cdiff 5/2024   Resolved.     #Recurrent infections in the right lower extremity, due to ulcer from significant deformity to the right foot. Follows with orthopedics. Receives Rayus injections Q3 months standing order to the subtalar joint due to pain. She follows with Dr. Hayes of [facility] and surgery has been discussed but delayed due to her renal function.    # PAD screening: CT A/P 4/2024-for surgery to review    # Health Maintenance: Colonoscopy: Up to date 8/2024 , Mammogram: Up to date 6/2024, PAP:  Up to date 2026, and Dental: Up to date     Discussed the risks and benefits of a transplant, including the risk of surgery and immunosuppression medications.    KDPI: We discussed approximate remaining wait time and how that is influenced by issues such as blood type and sensitization (PRA) and access to a living donor. I contrasted potential waiting time for living vs  donor kidneys from  normal (0-85%) or higher (%) kidney donor profile index (KDPI) donors and their associated outcomes. I would recommend Ms. Graf to consider kidneys from high KDPI donors. The reason for this decision is best summarized as: decreased dialysis related morbidity/mortality, accepting lower kidney graft survival rates.    Patient presently appears to be enough of an acceptable kidney transplant recipient candidate to have any potential kidney donors start the evaluation process.  Patient s overall re-evaluation may require further discussion in the Transplant Program s multidisciplinary selection committee for a final recommendation on the patient s suitability for transplant.     Reason for Visit:  Matilda Graf is a 54 year old female with ESKD from polycystic kidney disease (PKD), who presents for kidney transplant wait list evaluation.     Date of Initial Transplant Evaluation:  22        Current Transplant Phase: Evaluation: Active  Official UNOS Listing Date:    Blood Type: B+        cPRA: 0       Date of cPRA: 23  Transplant Coordinator: Alaina Ballard Transplant Office phone number [phone]     Previous Medical Issues:  None     History of Present Illness:    Matilda Graf is a 54-year-old female with history of ESKD secondary to polycystic kidney disease.  She was diagnosed with her kidney disease 20 years ago, which has been progressive.  On hemodialysis since 2023.  Family history includes her mother, sisters, maternal grandmother/aunt with PKD.  No known personal or family history of  brain aneurysms.  She reports occassional pain but denies cyst rupture, kidney stones, recurrent UTIs.           Interim Events:    Patient was hospitalized for empeyma with thoracotomy and chest tube placement from March 6 - March 25, 2024. Discharged to TCU and was admitted for COVID 19 infection from April 12-15th, 2024. Admitted on May 23rd for sepsis/C. Diff colitis until 5/26/2024. Then in July was admitted for leg cellulitis, was discharged 7/16/2024.          Kidney Disease:        Kidney Disease Dx: Polycystic kidney disease (PKD)        On Dialysis: Yes, Date initiated: 12/23 and Dialysis Type: [facility] HD;       Primary Nephrologist: Elisabeth         Cardiac/Vascular Disease History:       Known CAD: No       Known PAD/Claudication Symptoms: No       Known Heart Failure: No       Arrhythmia:  No       Pulmonary Hypertension: No        Valvular Disease: No       Other: None       New Cardiac/Vascular Events: No         Functional Capacity/Frailty:         Matilda employed as a teach. She describes her home life as stable. Resides in an independent environment. Her marital status is single. She has no overweight/obese child or spouse. She reports that she has never smoked. She has never used smokeless tobacco. She reports current alcohol use. She reports that she does not use drugs.     # COVID Vaccination Up To Date: Not sure    Other Pertinent Transplant Surgical Issues:  Recent Blood Transfusion: Yes; 3/2024  Previous Abdominal Transplant: No  Bladder Dysfunction: No  Chronic/Recurrent Infections: Yes; cellulitis. Developed R leg cellulitis with right plantar wound. Usually treated successfully with oral antibiotics except for July 2024 hospitalization requiring IV antibiotics.   Chronic Anticoagulation: No  Jehovah s Witness: No       Active Problem List:   Patient Active Problem List   Diagnosis     PKD (polycystic kidney disease)     Hypertension     End stage renal disease (H)     Arthritis      Obesity, unspecified     Mixed hyperlipidemia     Class 2 severe obesity due to excess calories with serious comorbidity in adult (H)       Personal History:  Never smoker     Allergies:  Allergies   Allergen Reactions     Animal Dander Other (See Comments)     Seasonal Allergies Cough, Headache and Itching        Medications:  Current Outpatient Medications   Medication Sig Dispense Refill     acetaminophen (TYLENOL) 650 MG CR tablet Take 650 mg by mouth       furosemide (LASIX) 20 MG tablet Take 40 mg by mouth daily       irbesartan (AVAPRO) 150 MG tablet Take 150 mg by mouth daily       magnesium oxide 400 MG CAPS Take 400 mg by mouth daily.       rosuvastatin (CRESTOR) 5 MG tablet Take 5 mg by mouth       sevelamer carbonate (RENVELA) 800 MG tablet Take 800 mg by mouth 3 times daily (with meals).       No current facility-administered medications for this visit.        Vitals:    Last reading 10/17/24  1:25 PM 10/17/24  1:17 PM   BP -- 110/74   Pulse -- 95   Resp -- 16   SpO2 -- 96 %   Weight 118.5 kg (261 lb 3.2 oz) 118.5 kg (261 lb 3.2 oz)   Height 1.829 m (6') 1.829 m (6')   Pain Score -- --   BMI (Calculated) 35.42            Exam:  GENERAL APPEARANCE: alert and no distress  HENT: mouth without ulcers or lesions  LYMPHATICS: no cervical or supraclavicular nodes  RESP: lungs clear to auscultation - no rales, rhonchi or wheezes  CV: regular rhythm, normal rate, no rub, no murmur  EDEMA: no LE edema bilaterally  ABDOMEN: soft, nondistended, nontender, bowel sounds normal  MS: extremities normal - no gross deformities noted, no evidence of inflammation in joints, no muscle tenderness  No effusion to right knee. Left knee in ACE wrap. Right foot with Dressing. [facility] note image 10/15 reviewed with patient.  SKIN: no rash  DIALYSIS ACCESS:  LUE AV fistula +bruit/thrill          Again, thank you for allowing me to participate in the care of your patient.        Sincerely,        Shoshana Gonzalez NP

## 2024-10-17 NOTE — PROGRESS NOTES
Transplant Surgery Consult Note     Medical record number: [id number]  YOB: 1970,   Evaluation of kidney transplant candidacy.    Assessment and Recommendations:Ms. Graf appears to be a good candidate for kidney transplantation and has a good understanding of the risks and benefits of this approach to the management of renal failure. The following issues should be addressed prior to finalizing her transplant candidacy:     BMI 35, down from 40. Continue to work on weight loss and increase activity but not required for surgery.   Agree with wound care for non-healing ulcer.     Ms. Graf has End stage renal failure due to PKD  whose condition is not expected to resolve, is expected to progress, and is expected to continue to develop related comorbid conditions.  Cardiology consult for cardiac risk stratification to be ordered: Yes- done 24. Dobutamine stress echo wnl 2024.   CT abdomen and pelvis without contrast to be ordered for assessment of vascular targets: Yes 2024.   Dietician consult ordered: Yes  Social work consult ordered: Yes  Transplant listing labs ordered to include HLA, ABOx2, Cr, etc.  Imaging reports reviewed:  CT abd/pelvis from 2024 previously reviewed   Recipient suitable to move forward with work up of living donors:  Yes      The majority of our visit was spent in counselling, discussing the medical and surgical risks of kidney transplantation. We discussed approximate wait time and how that is influenced by issues such as blood type and sensitization (PRA) and access to a living donor. I contrasted potential waiting time for living vs  donor kidneys from  normal (0-85%) or higher (%) kidney donor profile index (KDPI) donors and their associated outcomes. I would recommend this individual to consider kidneys from high KDPI donors. The reason for this decision is best summarized as: decreased dialysis related morbidity/mortality, accepting lower kidney  graft survival rates. Potential surgical complications of kidney transplantation include bleeding, superficial or deep wound complications (infection, hernia, lymphocele), ureteral anastomotic failure (leak or stenosis), graft thrombosis, need for reoperation and other issues such as cardiac complications, pneumonia, deep venous thrombosis, pulmonary embolism, post transplant diabetes and death. The potential for recurrent disease or need for retransplantation was also addressed. We discussed the possible need for ureteral stent (and subsequent removal), and the utility of protocol biopsy and laboratory studies to evaluate for rejection or recurrent disease. We discussed the risk of graft rejection, our center's average graft and patient survival rates, immunosuppression protocols, as well as the potential opportunity to participate in clinical trials.  We also discussed the average length of stay, recovery process, and posttransplant lab and monitoring protocol.  I emphasized the need for strict immunosuppression medication adherence and the potential for complications of immunosuppression such as skin cancer or lymphoma, as well as a very low but not zero risk of donor-derived disease transmission risks (infection, cancer). Ms. Graf asked good questions and her candidacy will be reviewed at our Multidisciplinary Selection Committee. Thank you for the opportunity to participate in Ms. Graf's care.      Total time: 45 minutes      Janine Estrada NP   ---------------------------------------------------------------------------------------------------    HPI: Ms. Graf has End stage renal failure due to polycystic kidney disease (PKD). The patient is non-diabetic.       The patient is on dialysis.    Has potential kidney donors:  Doesn't know .  Interested in participation in paired exchange if a donor is willing: Yes     The patient has the following pertinent history:   54 yo female with ESRD secondary to PCKD- long  "family history.  She was seen for kidney transplant evaluation 9/2022 where she was encouraged to lose weight.    Since her initial evaluation she has suffered multiple hospitalizations:  Hospitalized for empeyma with thoracotomy and chest tube placement from March 6 - March 25, 2024. Discharged to TCU and was admitted for COVID 19 infection from April 12-15th, 2024.     Admitted on 05/23/24 for sepsis/C. Diff colitis until 5/26/2024.   Then in July was admitted for leg cellulitis, was discharged 7/16/2024.       Saw pulm this month 10/824 in [facility]:   \"At this point she has no pulmonary diagnosis. Airflow is normal, no evidence of asthma. No indication for inhalers. CT is negative, no persistent or concerning findings, and complete resolution of her prior pneumonia and empyema. She has no limitations to transplant from a pulmonary point of view.\"     Hx of recurrent R leg cellulitis: Every year since 2021, has received 4 cortisone shots a year. Developed R leg cellulitis each time. Usually treated successfully with oral antibiotics except for July 2024 hospitalization requiring IV antibiotics.     Had cortisone shots in both ankles last month. No cellulitis symptoms since July.   Has PRN course of Keflex to use.     Non healing ulcer on plantar R foot for years. Described as shallow ulcer - refused to removed dressing today. Current picture from wound care in [facility] 10/15/24 physical therapy note. Wears orthotic shoe. Sees wound care weekly.               No    Yes  Dialysis:    []      [x] via:     L lower arm fistula. Started 12/20/23.  Blood Transfusion                  []      [x]  Number of units: 1  Most recently:March 2024  Pregnancy:    [x]      [] Number:       Previous Transplant:  [x]      [] Details:    Cancer    [x]      [] Comment:   Kidney stones   [x]      [] Comment:      Recurrent infections  []      [x]  Type:   cellulitis in R leg about 4 times per year post cortisone injections in ankles.           "   Bladder dysfunction  [x]      [] Cause:    Claudication   [x]      [] Distance:    Previous Amputation  [x]      [] Cause:     Chronic anticoagulation [x]      [] Indication:   [facility]  [x]      []        Has been losing weight each visit: today BMI 35 and at 261lbs.   Dry weight: 116kg   Previous visit: weight 270 BMI 38 last visit.     No DM hx.   Non smoker.   No abdominal surgeries.   Health maintenance UTD.     Hard to exercise with orthotic on R foot and walking with walker for balance.      Social:  Works as a [employer]   Has a younger sister and nephew available to help      Family Hx:   Mother- PCKD, was on HD,  at age 56  Father- recently passed of MI at 78.   Aunt- had PCKD was on HD  2/3 Sisters have PCKD, one had kidney transplant last year  Grandmother- PCKD on HD,         Past Medical History:   Diagnosis Date    End stage renal disease (H)     Hypertension     Mixed hyperlipidemia     Obesity     Polycystic kidney disease      Past Surgical History:   Procedure Laterality Date    IR DIALYSIS FISTULOGRAM RIGHT  2023    IR THORACENTESIS  3/6/2024   L radial fistula with angioplasty         Family History   Problem Relation Age of Onset    Polycystic Kidney Diease Mother     Heart Surgery Father 60    Polycystic Kidney Diease Sister     Polycystic Kidney Diease Sister     Polycystic Kidney Diease Maternal Grandmother     Polycystic Kidney Diease Maternal Aunt     Cancer No family hx of      Social History     Socioeconomic History    Marital status: Single     Spouse name: Not on file    Number of children: Not on file    Years of education: Not on file    Highest education level: Not on file   Occupational History    Not on file   Tobacco Use    Smoking status: Never    Smokeless tobacco: Never   Substance and Sexual Activity    Alcohol use: Not Currently     Comment: rare    Drug use: Never    Sexual activity: Not on file   Other Topics Concern    Not on file    Social History Narrative    Not on file     Social Determinants of Health     Financial Resource Strain: Not on file   Food Insecurity: No Food Insecurity (5/23/2024)    Received from [employer]    Hunger Vital Sign     Worried About Running Out of Food in the Last Year: Never true     Ran Out of Food in the Last Year: Never true   Transportation Needs: No Transportation Needs (5/23/2024)    Received from [employer]    PRAPARE - Transportation     Lack of Transportation (Medical): No     Lack of Transportation (Non-Medical): No   Physical Activity: Not on file   Stress: Not on file   Social Connections: Not on file   Interpersonal Safety: Unknown (7/13/2024)    Received from [employer]    Humiliation, Afraid, Rape, and Kick questionnaire     Fear of Current or Ex-Partner: Not on file     Emotionally Abused: No     Physically Abused: No     Sexually Abused: No   Housing Stability: Unknown (5/23/2024)    Received from [employer]    Housing Stability Vital Sign     Unable to Pay for Housing in the Last Year: No     Number of Places Lived in the Last Year: Not on file     Unstable Housing in the Last Year: No       ROS:   CONSTITUTIONAL:  No fevers or chills  EYES: negative for icterus  ENT:  negative for hearing loss, tinnitus and sore throat  RESPIRATORY:  negative for cough, dyspnea, +sputum from seasonal allergies.   CARDIOVASCULAR:  negative for chest pain None  GASTROINTESTINAL:  negative for nausea, vomiting, diarrhea or constipation  GENITOURINARY:  negative for incontinence, dysuria, bladder emptying problems  HEME:  No easy bruising  INTEGUMENT:  negative for rash and pruritus  NEURO:  Negative for headache, seizure disorder  Allergies:   Allergies   Allergen Reactions    Animal Dander Other (See Comments)    Seasonal Allergies Cough, Headache and Itching     Medications:  Prescription Medications as of 10/17/2024         Rx Number Disp Refills Start End Last Dispensed Date Next Fill Date  Owning Pharmacy    acetaminophen (TYLENOL) 650 MG CR tablet  -- --  --   [facility] [id number] [place] [address]    Sig: Take 650 mg by mouth    Class: Historical    Route: Oral    furosemide (LASIX) 20 MG tablet  -- --  --       Sig: Take 40 mg by mouth daily    Class: Historical    Route: Oral    HYDROmorphone (DILAUDID) 2 MG tablet  -- -- 3/25/2024 --       Sig: Take 4 mg by mouth    Class: Historical    Route: Oral    hydrOXYzine emily (VISTARIL) 25 MG capsule  -- -- 3/25/2024 --       Sig: Take 25 mg by mouth    Class: Historical    Route: Oral    irbesartan (AVAPRO) 150 MG tablet  -- -- 12/28/2023 12/27/2024       Sig: Take 150 mg by mouth daily    Class: Historical    Route: Oral    magnesium oxide 400 MG CAPS  -- --  --       Sig: Take 400 mg by mouth daily.    Class: Historical    Route: Oral    rosuvastatin (CRESTOR) 5 MG tablet  -- -- 7/29/2021 --   [facility] [id number] [place] [address]    Sig: Take 5 mg by mouth    Class: Historical    Route: Oral    sevelamer carbonate (RENVELA) 800 MG tablet  -- -- 4/10/2024 4/10/2025       Sig: Take 800 mg by mouth 3 times daily (with meals).    Class: Historical    Route: Oral          Exam:   Constitutional - A&O in NAD.   Eyes - no redness or discharge.  Sclera anicteric  Respiratory - no cough, no labored breathing, CTA throughout  Cardio: RRR, no murmur.   Abd: central obesity but minimal pannus/protuberance when supine.  Soft, non-tender.   Musculoskeletal - range of motion normal. R foot in orthotic.   Skin - no discoloration, no jaundice. Bilat lower legs with normal appearance and no edema.   R plantar foot with foam dressing. Healed R lateral chest scars.   Neurological - no tremors.  No facial droop or dysarthria  Psychiatric - normal mood and affect      Diagnostics:   No results found for this or any previous visit (from the past 672 hour(s)).  UNOS CPRA   Date Value Ref Range Status   12/27/2023 0  Final

## 2024-10-17 NOTE — PROGRESS NOTES
Frailty Assessment Note: Not frail    Overall Score 0/5    Weight:   Wt Readings from Last 3 Encounters:   04/01/24 119.8 kg (264 lb 1.6 oz)   12/27/23 126 kg (277 lb 11.2 oz)   09/26/22 136.1 kg (300 lb 1.6 oz)     Height: 182.9 cm    Weight lost over 10lbs in last year: No    Reported Exhaustion/Energy Levels: Mild    Slowness: 5.7 seconds / 15 feet walking (average of 2 attempts)    Low Activity Level Score: 0      Strength: 24 (average of 3 trials on dominant hand)    **patient wears a walking boot due to a foot injury that impairs her ability to walk. Uses a walker for stability as needed.     FANG ENAMORADO RN on 10/17/2024 at 1:22 PM

## 2024-10-17 NOTE — NURSING NOTE
Chief Complaint   Patient presents with    Transplant Waitlist Maintenance     Kidney eval waitlist       /74 (BP Location: Right arm, Patient Position: Sitting, Cuff Size: Adult Large)   Pulse 95   Resp 16   Ht 1.829 m (6')   Wt 118.5 kg (261 lb 3.2 oz)   SpO2 96%   BMI 35.43 kg/m      FANG EANMORADO RN on 10/17/2024 at 1:19 PM

## 2024-10-17 NOTE — PROGRESS NOTES
TRANSPLANT NEPHROLOGY WAITLIST VISIT    Assessment and Plan:  # Kidney Transplant Wait List Evaluation: Patient is a good candidate overall. Patient should remain active on the eval list.    Recommendations:  -continue Wound Care for right plantar ulcer  -Orthopedics ([facility]) to discuss alternative treatment options for ankle pain in the setting of recurrent cellulitis   -MRA of the brain with history of PCKD    # ESKD from polycystic kidney disease (PKD): doing ok on dialysis since 12/23. Occasional abdominal pain. No cyst rupture/stones/UTI. Needs a brain MRA to evaluate for aneurysm.    # Cardiac Risk: No Known CAD, Last risk assessment 4/24. Follow up in 4/26. 6/2024: stress test no ischemia.     #HTN: Occasionally monitors at home, -150 at home.      # Obesity:  BMI 35.4. Followed with weight management in the past. Weight acceptable per surgery.    # Hx of Empyema with chest tube placement x3  with thoracotomy and due to Pneumonia: likely due to aspiration from emesis in March 2024 :updated CT chest 10/24: clear,  PFT 10/7/24, normal spirometry.  Last pulm visit 10/8/24. Follow up PRN.    # Nausea/vomiting: reports related to dialysis. Last episode in September.    # Hx of Cdiff 5/2024   Resolved.     #Recurrent infections in the right lower extremity, due to ulcer from significant deformity to the right foot. Follows with orthopedics. Receives Rayus injections Q3 months standing order to the subtalar joint due to pain. She follows with Dr. Hayes of [facility] and surgery has been discussed but delayed due to her renal function.    # PAD screening: CT A/P 4/2024-for surgery to review    # Health Maintenance: Colonoscopy: Up to date 8/2024 , Mammogram: Up to date 6/2024, PAP: Up to date 7/29/2026, and Dental: Up to date     Discussed the risks and benefits of a transplant, including the risk of surgery and immunosuppression medications.    KDPI: We discussed approximate remaining wait time and how that is  influenced by issues such as blood type and sensitization (PRA) and access to a living donor. I contrasted potential waiting time for living vs  donor kidneys from  normal (0-85%) or higher (%) kidney donor profile index (KDPI) donors and their associated outcomes. I would recommend Ms. Graf to consider kidneys from high KDPI donors. The reason for this decision is best summarized as: decreased dialysis related morbidity/mortality, accepting lower kidney graft survival rates.    Patient presently appears to be enough of an acceptable kidney transplant recipient candidate to have any potential kidney donors start the evaluation process.  Patient s overall re-evaluation may require further discussion in the Transplant Program s multidisciplinary selection committee for a final recommendation on the patient s suitability for transplant.     Reason for Visit:  Matilda Graf is a 54 year old female with ESKD from polycystic kidney disease (PKD), who presents for kidney transplant wait list evaluation.     Date of Initial Transplant Evaluation:  22        Current Transplant Phase: Evaluation: Active  Official UNOS Listing Date:    Blood Type: B+        cPRA: 0       Date of cPRA: 23  Transplant Coordinator: Alaina Ballard Transplant Office phone number [phone]     Previous Medical Issues:  None     History of Present Illness:    Matilda Graf is a 54-year-old female with history of ESKD secondary to polycystic kidney disease.  She was diagnosed with her kidney disease 20 years ago, which has been progressive.  On hemodialysis since 2023.  Family history includes her mother, sisters, maternal grandmother/aunt with PKD.  No known personal or family history of brain aneurysms.  She reports occassional pain but denies cyst rupture, kidney stones, recurrent UTIs.           Interim Events:    Patient was hospitalized for empeyma with thoracotomy and chest tube placement from  - ,  2024. Discharged to TCU and was admitted for COVID 19 infection from April 12-15th, 2024. Admitted on May 23rd for sepsis/C. Diff colitis until 5/26/2024. Then in July was admitted for leg cellulitis, was discharged 7/16/2024.          Kidney Disease:        Kidney Disease Dx: Polycystic kidney disease (PKD)        On Dialysis: Yes, Date initiated: 12/23 and Dialysis Type: [facility];       Primary Nephrologist: Elisabeth         Cardiac/Vascular Disease History:       Known CAD: No       Known PAD/Claudication Symptoms: No       Known Heart Failure: No       Arrhythmia:  No       Pulmonary Hypertension: No        Valvular Disease: No       Other: None       New Cardiac/Vascular Events: No         Functional Capacity/Frailty:         Matilda employed as a teach. She describes her home life as stable. Resides in an independent environment. Her marital status is single. She has no overweight/obese child or spouse. She reports that she has never smoked. She has never used smokeless tobacco. She reports current alcohol use. She reports that she does not use drugs.     # COVID Vaccination Up To Date: Not sure    Other Pertinent Transplant Surgical Issues:  Recent Blood Transfusion: Yes; 3/2024  Previous Abdominal Transplant: No  Bladder Dysfunction: No  Chronic/Recurrent Infections: Yes; cellulitis. Developed R leg cellulitis with right plantar wound. Usually treated successfully with oral antibiotics except for July 2024 hospitalization requiring IV antibiotics.   Chronic Anticoagulation: No  Jehovah s Witness: No       Active Problem List:   Patient Active Problem List   Diagnosis    PKD (polycystic kidney disease)    Hypertension    End stage renal disease (H)    Arthritis    Obesity, unspecified    Mixed hyperlipidemia    Class 2 severe obesity due to excess calories with serious comorbidity in adult (H)       Personal History:  Never smoker     Allergies:  Allergies   Allergen Reactions    Animal Dander Other  (See Comments)    Seasonal Allergies Cough, Headache and Itching        Medications:  Current Outpatient Medications   Medication Sig Dispense Refill    acetaminophen (TYLENOL) 650 MG CR tablet Take 650 mg by mouth      furosemide (LASIX) 20 MG tablet Take 40 mg by mouth daily      irbesartan (AVAPRO) 150 MG tablet Take 150 mg by mouth daily      magnesium oxide 400 MG CAPS Take 400 mg by mouth daily.      rosuvastatin (CRESTOR) 5 MG tablet Take 5 mg by mouth      sevelamer carbonate (RENVELA) 800 MG tablet Take 800 mg by mouth 3 times daily (with meals).       No current facility-administered medications for this visit.        Vitals:    Last reading 10/17/24  1:25 PM 10/17/24  1:17 PM   BP -- 110/74   Pulse -- 95   Resp -- 16   SpO2 -- 96 %   Weight 118.5 kg (261 lb 3.2 oz) 118.5 kg (261 lb 3.2 oz)   Height 1.829 m (6') 1.829 m (6')   Pain Score -- --   BMI (Calculated) 35.42            Exam:  GENERAL APPEARANCE: alert and no distress  HENT: mouth without ulcers or lesions  LYMPHATICS: no cervical or supraclavicular nodes  RESP: lungs clear to auscultation - no rales, rhonchi or wheezes  CV: regular rhythm, normal rate, no rub, no murmur  EDEMA: no LE edema bilaterally  ABDOMEN: soft, nondistended, nontender, bowel sounds normal  MS: extremities normal - no gross deformities noted, no evidence of inflammation in joints, no muscle tenderness  No effusion to right knee. Left knee in ACE wrap. Right foot with Dressing. [facility] note image 10/15 reviewed with patient.  SKIN: no rash  DIALYSIS ACCESS:  LUE AV fistula +bruit/thrill

## 2024-10-17 NOTE — Clinical Note
10/17/2024      Matilda Graf  [address]  [place] [place] [address]      Dear Colleague,    Thank you for referring your patient, Matilda Graf, to the [facility]. Please see a copy of my visit note below.    Transplant Surgery Consult Note     Medical record number: [id number]  YOB: 1970,   Evaluation of kidney transplant candidacy.    Assessment and Recommendations:Ms. Graf appears to be a good candidate for kidney transplantation and has a good understanding of the risks and benefits of this approach to the management of renal failure. The following issues should be addressed prior to finalizing her transplant candidacy:     Ms. Graf has End stage renal failure due to PKD  whose condition is not expected to resolve, is expected to progress, and is expected to continue to develop related comorbid conditions.  Cardiology consult for cardiac risk stratification to be ordered: Yes- done 24. Dobutamine stress echo wnl 2024.   CT abdomen and pelvis without contrast to be ordered for assessment of vascular targets: Yes 2024.   Dietician consult ordered: Yes  Social work consult ordered: Yes  Transplant listing labs ordered to include HLA, ABOx2, Cr, etc.  Imaging reports reviewed:  CT abd/pelvis from 2024 previously reviewed   Recipient suitable to move forward with work up of living donors:  Yes    BMI 35, down from 40. Continue to work on weight loss and increase activity but abdomen flattens out significantly when supine, doable at this weight.     The majority of our visit was spent in counselling, discussing the medical and surgical risks of kidney transplantation. We discussed approximate wait time and how that is influenced by issues such as blood type and sensitization (PRA) and access to a living donor. I contrasted potential waiting time for living vs  donor kidneys from  normal (0-85%) or higher (%) kidney donor profile index (KDPI)  donors and their associated outcomes. I would recommend this individual to consider kidneys from high KDPI donors. The reason for this decision is best summarized as: decreased dialysis related morbidity/mortality, accepting lower kidney graft survival rates. Potential surgical complications of kidney transplantation include bleeding, superficial or deep wound complications (infection, hernia, lymphocele), ureteral anastomotic failure (leak or stenosis), graft thrombosis, need for reoperation and other issues such as cardiac complications, pneumonia, deep venous thrombosis, pulmonary embolism, post transplant diabetes and death. The potential for recurrent disease or need for retransplantation was also addressed. We discussed the possible need for ureteral stent (and subsequent removal), and the utility of protocol biopsy and laboratory studies to evaluate for rejection or recurrent disease. We discussed the risk of graft rejection, our center's average graft and patient survival rates, immunosuppression protocols, as well as the potential opportunity to participate in clinical trials.  We also discussed the average length of stay, recovery process, and posttransplant lab and monitoring protocol.  I emphasized the need for strict immunosuppression medication adherence and the potential for complications of immunosuppression such as skin cancer or lymphoma, as well as a very low but not zero risk of donor-derived disease transmission risks (infection, cancer). Ms. Graf asked good questions and her candidacy will be reviewed at our Multidisciplinary Selection Committee. Thank you for the opportunity to participate in Ms. Graf's care.      Total time: 45 minutes      Janine Estrada NP   ---------------------------------------------------------------------------------------------------    HPI: Ms. Graf has End stage renal failure due to polycystic kidney disease (PKD). The patient is non-diabetic.       The patient is  "on dialysis.    Has potential kidney donors:  Doesn't know .  Interested in participation in paired exchange if a donor is willing: Yes     The patient has the following pertinent history:   52 yo female with ESRD secondary to PCKD- long family history.  She was seen for kidney transplant evaluation 9/2022 where she was encouraged to lose weight.    Since her initial evaluation she has suffered multiple hospitalizations:  Hospitalized for empeyma with thoracotomy and chest tube placement from March 6 - March 25, 2024. Discharged to TCU and was admitted for COVID 19 infection from April 12-15th, 2024.     Admitted on 05/23/24 for sepsis/C. Diff colitis until 5/26/2024.   Then in July was admitted for leg cellulitis, was discharged 7/16/2024.       Saw pulm this month 10/824 in [facility]:   \"At this point she has no pulmonary diagnosis. Airflow is normal, no evidence of asthma. No indication for inhalers. CT is negative, no persistent or concerning findings, and complete resolution of her prior pneumonia and empyema. She has no limitations to transplant from a pulmonary point of view.\"     Hx of recurrent R leg cellulitis: Every year since 2021, has received 4 cortisone shots a year. Developed R leg cellulitis each time. Usually treated successfully with oral antibiotics except for July 2024 hospitalization requiring IV antibiotics.     Had cortisone shots in both ankles last month. No cellulitis symptoms since July.   Has PRN course of Keflex to use.     Non healing ulcer on plantar R foot for years. Described as shallow ulcer - refused to removed dressing today. Current picture from wound care in [facility] 10/15/24 physical therapy note. Wears orthotic shoe. Sees wound care weekly.               No    Yes  Dialysis:    []      [x] via:     L lower arm fistula. Started 12/20/23.  Blood Transfusion                  []      [x]  Number of units: 1  Most recently:March 2024  Pregnancy:    [x]      [] Number:       Previous " Transplant:  [x]      [] Details:    Cancer    [x]      [] Comment:   Kidney stones   [x]      [] Comment:      Recurrent infections  []      [x]  Type:   cellulitis in R leg about 4 times per year post cortisone ankle shots.               Bladder dysfunction  [x]      [] Cause:    Claudication   [x]      [] Distance:    Previous Amputation  [x]      [] Cause:     Chronic anticoagulation [x]      [] Indication:   Confucianist  [x]      []        Has been losing weight each visit: today BMI 35 and at 261lbs.   Dry weight: 116kg   Previous visit: weight 270 BMI 38 last visit,     No DM hx.   Non smoker.   No abdominal surgeries.   Health maintenance UTD.     Hard to exercise with orthotic on R foot and walking with walker for balance. Eats well.      Social:  Works as a [employer]   Has a younger sister and nephew available to help      Family Hx:   Mother- PCKD, was on HD,  at age 56  Father- recently passed of MI at 78.   Aunt- had PCKD was on HD  2/3 Sisters have PCKD, one had kidney transplant last year  Grandmother- PCKD on HD,         Past Medical History:   Diagnosis Date    End stage renal disease (H)     Hypertension     Mixed hyperlipidemia     Obesity     Polycystic kidney disease      Past Surgical History:   Procedure Laterality Date    IR DIALYSIS FISTULOGRAM RIGHT  2023    IR THORACENTESIS  3/6/2024   L radial fistula with angioplasty         Family History   Problem Relation Age of Onset    Polycystic Kidney Diease Mother     Heart Surgery Father 60    Polycystic Kidney Diease Sister     Polycystic Kidney Diease Sister     Polycystic Kidney Diease Maternal Grandmother     Polycystic Kidney Diease Maternal Aunt     Cancer No family hx of      Social History     Socioeconomic History    Marital status: Single     Spouse name: Not on file    Number of children: Not on file    Years of education: Not on file    Highest education level: Not on file   Occupational History     Not on file   Tobacco Use    Smoking status: Never    Smokeless tobacco: Never   Substance and Sexual Activity    Alcohol use: Not Currently     Comment: rare    Drug use: Never    Sexual activity: Not on file   Other Topics Concern    Not on file   Social History Narrative    Not on file     Social Determinants of Health     Financial Resource Strain: Not on file   Food Insecurity: No Food Insecurity (5/23/2024)    Received from [facility]    Hunger Vital Sign     Worried About Running Out of Food in the Last Year: Never true     Ran Out of Food in the Last Year: Never true   Transportation Needs: No Transportation Needs (5/23/2024)    Received from [facility]    PRAPARE - Transportation     Lack of Transportation (Medical): No     Lack of Transportation (Non-Medical): No   Physical Activity: Not on file   Stress: Not on file   Social Connections: Not on file   Interpersonal Safety: Unknown (7/13/2024)    Received from [facility]    Humiliation, Afraid, Rape, and Kick questionnaire     Fear of Current or Ex-Partner: Not on file     Emotionally Abused: No     Physically Abused: No     Sexually Abused: No   Housing Stability: Unknown (5/23/2024)    Received from [facility]    Housing Stability Vital Sign     Unable to Pay for Housing in the Last Year: No     Number of Places Lived in the Last Year: Not on file     Unstable Housing in the Last Year: No       ROS:   CONSTITUTIONAL:  No fevers or chills  EYES: negative for icterus  ENT:  negative for hearing loss, tinnitus and sore throat  RESPIRATORY:  negative for cough, dyspnea, +sputum from seasonal allergies.   CARDIOVASCULAR:  negative for chest pain None  GASTROINTESTINAL:  negative for nausea, vomiting, diarrhea or constipation  GENITOURINARY:  negative for incontinence, dysuria, bladder emptying problems  HEME:  No easy bruising  INTEGUMENT:  negative for rash and pruritus  NEURO:  Negative for headache, seizure disorder  Allergies:   Allergies    Allergen Reactions    Animal Dander Other (See Comments)    Seasonal Allergies Cough, Headache and Itching     Medications:  Prescription Medications as of 10/17/2024         Rx Number Disp Refills Start End Last Dispensed Date Next Fill Date Owning Pharmacy    acetaminophen (TYLENOL) 650 MG CR tablet  -- --  --   [facility] [address] [place] [address]    Sig: Take 650 mg by mouth    Class: Historical    Route: Oral    furosemide (LASIX) 20 MG tablet  -- --  --       Sig: Take 40 mg by mouth daily    Class: Historical    Route: Oral    HYDROmorphone (DILAUDID) 2 MG tablet  -- -- 3/25/2024 --       Sig: Take 4 mg by mouth    Class: Historical    Route: Oral    hydrOXYzine emily (VISTARIL) 25 MG capsule  -- -- 3/25/2024 --       Sig: Take 25 mg by mouth    Class: Historical    Route: Oral    irbesartan (AVAPRO) 150 MG tablet  -- -- 12/28/2023 12/27/2024       Sig: Take 150 mg by mouth daily    Class: Historical    Route: Oral    magnesium oxide 400 MG CAPS  -- --  --       Sig: Take 400 mg by mouth daily.    Class: Historical    Route: Oral    rosuvastatin (CRESTOR) 5 MG tablet  -- -- 7/29/2021 --   [facility] [id number] [address] [place] [address]    Sig: Take 5 mg by mouth    Class: Historical    Route: Oral    sevelamer carbonate (RENVELA) 800 MG tablet  -- -- 4/10/2024 4/10/2025       Sig: Take 800 mg by mouth 3 times daily (with meals).    Class: Historical    Route: Oral          Exam:   Constitutional - A&O in NAD.   Eyes - no redness or discharge.  Sclera anicteric  Respiratory - no cough, no labored breathing, CTA throughout  Cardio: RRR, no murmur.   Abd: central obesity but minimal pannus/protuberance when supine.  Soft, non-tender.   Musculoskeletal - range of motion normal. R foot in orthotic.   Skin - no discoloration, no jaundice. Bilat lower legs with normal appearance and no edema.   R plantar foot with foam dressing. Healed R lateral chest scars.   Neurological - no  tremors.  No facial droop or dysarthria  Psychiatric - normal mood and affect      Diagnostics:   No results found for this or any previous visit (from the past 672 hour(s)).  UNOS CPRA   Date Value Ref Range Status   12/27/2023 0  Final          Again, thank you for allowing me to participate in the care of your patient.        Sincerely,        KUMAR Hill CNP

## 2024-10-29 PROBLEM — Z76.82 ORGAN TRANSPLANT CANDIDATE: Status: ACTIVE | Noted: 2024-10-29

## 2024-11-04 DIAGNOSIS — I15.9 SECONDARY HYPERTENSION: ICD-10-CM

## 2024-11-04 RX ORDER — IRBESARTAN 300 MG/1
300 TABLET ORAL AT BEDTIME
Qty: 90 TABLET | Refills: 2 | Status: SHIPPED | OUTPATIENT
Start: 2024-11-04

## 2024-11-04 NOTE — TELEPHONE ENCOUNTER
Requested Prescriptions   Pending Prescriptions Disp Refills    irbesartan (AVAPRO) 300 MG tablet [Pharmacy Med Name: IRBESARTAN 300MG TABLETS] 90 tablet 2     Sig: TAKE 1 TABLET(300 MG) BY MOUTH AT BEDTIME       Angiotensin-II Receptors Passed - 11/4/2024  1:36 PM        Passed - Most recent blood pressure under 140/90 in past 12 months     BP Readings from Last 3 Encounters:   07/19/24 132/89   08/03/23 138/76   06/07/22 134/84       No data recorded            Passed - Medication is active on med list        Passed - Has GFR on file in past 12 months and most recent value is normal        Passed - Medication indicated for associated diagnosis     The medication is prescribed for one or more of the following conditions:    Chronic Kidney Disease (CDK)    Heart Failure (HF)    Diabetes, Nephropathy   Hypertension    Coronary Artery Disease (CAD)   Raynaud's Disease   Ischemic cardiomyopathy   Cardiomyopathy   Pulmonary Hypertension          Passed - Recent (12 mo) or future (90days) visit within the authorizing provider's specialty     The patient must have completed an in-person or virtual visit within the past 12 months or has a future visit scheduled within the next 90 days with the authorizing provider s specialty.  Urgent care and e-visits do not quality as an office visit for this protocol.          Passed - Patient is age 18 or older        Passed - No active pregnancy on record        Passed - Normal serum potassium on file in past 12 months     Recent Labs   Lab Test 07/19/24  1316   POTASSIUM 4.1                    Passed - No positive pregnancy test in past 12 months                 Brian Fink RN 11/04/24 1:36 PM

## 2024-11-19 ENCOUNTER — TELEPHONE (OUTPATIENT)
Dept: TRANSPLANT | Facility: CLINIC | Age: 54
End: 2024-11-19
Payer: COMMERCIAL

## 2024-11-19 NOTE — TELEPHONE ENCOUNTER
Left VM stating [facility] will send her the living donor web page link. [facility] explained a little about the voucher process but stated was going to ask living donor team to get some more information.    Plan on sending a [employer] message to patient with some information.

## 2024-11-19 NOTE — TELEPHONE ENCOUNTER
Pt has questions  Is starting on trying to get potential donors and want to give them correct answers  She wanted to review  the question re Standard voucher program would like a call back

## 2024-11-21 ENCOUNTER — DOCUMENTATION ONLY (OUTPATIENT)
Dept: TRANSPLANT | Facility: CLINIC | Age: 54
End: 2024-11-21
Payer: COMMERCIAL

## 2024-11-23 DIAGNOSIS — E11.65 TYPE 2 DIABETES MELLITUS WITH HYPERGLYCEMIA, WITH LONG-TERM CURRENT USE OF INSULIN (H): ICD-10-CM

## 2024-11-23 DIAGNOSIS — Z79.4 TYPE 2 DIABETES MELLITUS WITH HYPERGLYCEMIA, WITH LONG-TERM CURRENT USE OF INSULIN (H): ICD-10-CM

## 2024-11-25 RX ORDER — INSULIN HUMAN 100 [IU]/ML
INJECTION, SUSPENSION SUBCUTANEOUS
Qty: 30 ML | Refills: 3 | Status: SHIPPED | OUTPATIENT
Start: 2024-11-25

## 2024-12-15 ENCOUNTER — HEALTH MAINTENANCE LETTER (OUTPATIENT)
Age: 54
End: 2024-12-15

## 2024-12-16 ENCOUNTER — TELEPHONE (OUTPATIENT)
Dept: TRANSPLANT | Facility: CLINIC | Age: 54
End: 2024-12-16
Payer: COMMERCIAL

## 2024-12-16 ENCOUNTER — LAB (OUTPATIENT)
Dept: LAB | Facility: CLINIC | Age: 54
End: 2024-12-16
Payer: COMMERCIAL

## 2024-12-16 DIAGNOSIS — Z76.82 ORGAN TRANSPLANT CANDIDATE: Primary | ICD-10-CM

## 2024-12-16 DIAGNOSIS — Z01.818 PRE-TRANSPLANT EVALUATION FOR KIDNEY TRANSPLANT: ICD-10-CM

## 2024-12-16 DIAGNOSIS — Z76.82 ORGAN TRANSPLANT CANDIDATE: ICD-10-CM

## 2024-12-16 NOTE — TELEPHONE ENCOUNTER
Called HD center to confirm they received PRA order. Dialysis center received the order, will send kit to center. Asked for them to draw the lab as soon as they can.    HD center in agreement with the plan.

## 2025-01-01 ENCOUNTER — MEDICAL CORRESPONDENCE (OUTPATIENT)
Dept: HEALTH INFORMATION MANAGEMENT | Facility: CLINIC | Age: 55
End: 2025-01-01

## 2025-01-18 DIAGNOSIS — L30.9 ECZEMA, UNSPECIFIED TYPE: ICD-10-CM

## 2025-01-18 LAB
PROTOCOL CUTOFF: NORMAL
SA 1  COMMENTS: NORMAL
SA 1 CELL: NORMAL
SA 1 TEST METHOD: NORMAL
SA 2 CELL: NORMAL
SA 2 COMMENTS: NORMAL
SA 2 TEST METHOD: NORMAL
SA1 HI RISK ABY: NORMAL
SA1 MOD RISK ABY: NORMAL
SA2 HI RISK ABY: NORMAL
SA2 MOD RISK ABY: NORMAL
UNACCEPTABLE ANTIGENS: NORMAL
UNOS CPRA: 0

## 2025-01-20 RX ORDER — TRIAMCINOLONE ACETONIDE 1 MG/G
CREAM TOPICAL 2 TIMES DAILY
Qty: 15 G | Refills: 0 | Status: SHIPPED | OUTPATIENT
Start: 2025-01-20

## 2025-02-04 ENCOUNTER — TELEPHONE (OUTPATIENT)
Dept: EDUCATION SERVICES | Facility: CLINIC | Age: 55
End: 2025-02-04
Payer: COMMERCIAL

## 2025-02-04 NOTE — TELEPHONE ENCOUNTER
Diabetes education contact:    In [facility] since Thursday night, tripped and fell on good knee.  Ambulance came and took her in.  MRI on Saturday.  She will be going to [place] for rehab.  They do not give her the Ozempic but  is bringing that for her to take.  She wanted me to let her provider Juli Ramsey NP know as well.  She stated she would let her provider know when she is back home.    Diamond Lucero RD,[facility]

## 2025-02-12 ENCOUNTER — LAB REQUISITION (OUTPATIENT)
Dept: LAB | Facility: CLINIC | Age: 55
End: 2025-02-12

## 2025-02-12 DIAGNOSIS — E11.9 TYPE 2 DIABETES MELLITUS WITHOUT COMPLICATIONS (H): ICD-10-CM

## 2025-02-12 DIAGNOSIS — R94.4 ABNORMAL RESULTS OF KIDNEY FUNCTION STUDIES: ICD-10-CM

## 2025-02-12 DIAGNOSIS — E66.9 OBESITY, UNSPECIFIED: ICD-10-CM

## 2025-02-13 LAB
ALBUMIN SERPL BCG-MCNC: 3.8 G/DL (ref 3.5–5.2)
ANION GAP SERPL CALCULATED.3IONS-SCNC: 10 MMOL/L (ref 7–15)
BUN SERPL-MCNC: 10.9 MG/DL (ref 6–20)
CALCIUM SERPL-MCNC: 12.1 MG/DL (ref 8.8–10.4)
CHLORIDE SERPL-SCNC: 99 MMOL/L (ref 98–107)
CHOLEST SERPL-MCNC: 187 MG/DL
CREAT SERPL-MCNC: 0.65 MG/DL (ref 0.51–0.95)
EGFRCR SERPLBLD CKD-EPI 2021: >90 ML/MIN/1.73M2
ERYTHROCYTE [DISTWIDTH] IN BLOOD BY AUTOMATED COUNT: 13.7 % (ref 10–15)
EST. AVERAGE GLUCOSE BLD GHB EST-MCNC: 151 MG/DL
FASTING STATUS PATIENT QL REPORTED: ABNORMAL
GLUCOSE SERPL-MCNC: 106 MG/DL (ref 70–99)
HBA1C MFR BLD: 6.9 %
HCO3 SERPL-SCNC: 30 MMOL/L (ref 22–29)
HCT VFR BLD AUTO: 40.7 % (ref 35–47)
HDLC SERPL-MCNC: 34 MG/DL
HGB BLD-MCNC: 12.4 G/DL (ref 11.7–15.7)
LDLC SERPL CALC-MCNC: 137 MG/DL
MCH RBC QN AUTO: 26 PG (ref 26.5–33)
MCHC RBC AUTO-ENTMCNC: 30.5 G/DL (ref 31.5–36.5)
MCV RBC AUTO: 85 FL (ref 78–100)
NONHDLC SERPL-MCNC: 153 MG/DL
PLATELET # BLD AUTO: 302 10E3/UL (ref 150–450)
POTASSIUM SERPL-SCNC: 5.2 MMOL/L (ref 3.4–5.3)
RBC # BLD AUTO: 4.77 10E6/UL (ref 3.8–5.2)
SODIUM SERPL-SCNC: 139 MMOL/L (ref 135–145)
TRIGL SERPL-MCNC: 81 MG/DL
TSH SERPL DL<=0.005 MIU/L-ACNC: 1.03 UIU/ML (ref 0.3–4.2)
WBC # BLD AUTO: 10.3 10E3/UL (ref 4–11)

## 2025-02-13 PROCEDURE — 82040 ASSAY OF SERUM ALBUMIN: CPT | Performed by: FAMILY MEDICINE

## 2025-02-13 PROCEDURE — 82374 ASSAY BLOOD CARBON DIOXIDE: CPT | Performed by: FAMILY MEDICINE

## 2025-02-13 PROCEDURE — 83036 HEMOGLOBIN GLYCOSYLATED A1C: CPT | Performed by: FAMILY MEDICINE

## 2025-02-13 PROCEDURE — 82465 ASSAY BLD/SERUM CHOLESTEROL: CPT | Performed by: FAMILY MEDICINE

## 2025-02-13 PROCEDURE — 84443 ASSAY THYROID STIM HORMONE: CPT | Performed by: FAMILY MEDICINE

## 2025-02-13 PROCEDURE — 80048 BASIC METABOLIC PNL TOTAL CA: CPT | Performed by: FAMILY MEDICINE

## 2025-02-13 PROCEDURE — 85049 AUTOMATED PLATELET COUNT: CPT | Performed by: FAMILY MEDICINE

## 2025-02-13 PROCEDURE — 85014 HEMATOCRIT: CPT | Performed by: FAMILY MEDICINE

## 2025-02-13 PROCEDURE — P9603 ONE-WAY ALLOW PRORATED MILES: HCPCS | Performed by: FAMILY MEDICINE

## 2025-02-17 ENCOUNTER — TELEPHONE (OUTPATIENT)
Dept: FAMILY MEDICINE | Facility: CLINIC | Age: 55
End: 2025-02-17
Payer: COMMERCIAL

## 2025-02-17 NOTE — TELEPHONE ENCOUNTER
General Call    Contacts       Contact Date/Time Type Contact Phone/Fax    02/17/2025 11:48 AM CST Phone (Incoming) Brissa Mott (Self) [phone] ()          Reason for Call:  Patient would like a call back     What are your questions or concerns:  Regarding her knees, I asked if she could provide more detail and she declined and stated that she wanted to only speak with PCP about it.     Okay to leave a detailed message?: Yes at Home number on file [phone] (home)    Judy Barton/ Patient

## 2025-02-18 ENCOUNTER — APPOINTMENT (OUTPATIENT)
Dept: GENERAL RADIOLOGY | Facility: CLINIC | Age: 55
End: 2025-02-18
Payer: COMMERCIAL

## 2025-02-18 ENCOUNTER — APPOINTMENT (OUTPATIENT)
Dept: CT IMAGING | Facility: CLINIC | Age: 55
End: 2025-02-18
Payer: COMMERCIAL

## 2025-02-18 ENCOUNTER — HOSPITAL ENCOUNTER (EMERGENCY)
Facility: CLINIC | Age: 55
Discharge: ANOTHER HOSPITAL | End: 2025-02-19
Payer: COMMERCIAL

## 2025-02-18 DIAGNOSIS — M89.9 BONE LESION: ICD-10-CM

## 2025-02-18 DIAGNOSIS — M25.561 ACUTE PAIN OF BOTH KNEES: ICD-10-CM

## 2025-02-18 DIAGNOSIS — R53.1 GENERALIZED WEAKNESS: ICD-10-CM

## 2025-02-18 DIAGNOSIS — M25.562 ACUTE PAIN OF BOTH KNEES: ICD-10-CM

## 2025-02-18 PROBLEM — R13.10 DYSPHAGIA: Status: ACTIVE | Noted: 2019-10-31

## 2025-02-18 PROBLEM — E66.2 OBESITY HYPOVENTILATION SYNDROME (H): Status: ACTIVE | Noted: 2017-01-01

## 2025-02-18 PROBLEM — F43.21 ADJUSTMENT DISORDER WITH DEPRESSED MOOD: Status: ACTIVE | Noted: 2025-02-18

## 2025-02-18 PROBLEM — I63.9 ACUTE ISCHEMIC STROKE (H): Status: ACTIVE | Noted: 2025-02-18

## 2025-02-18 PROBLEM — E78.5 HYPERLIPIDEMIA: Status: ACTIVE | Noted: 2017-07-17

## 2025-02-18 LAB
ALBUMIN SERPL BCG-MCNC: 3.8 G/DL (ref 3.5–5.2)
ALP SERPL-CCNC: 101 U/L (ref 40–150)
ALT SERPL W P-5'-P-CCNC: 18 U/L (ref 0–50)
ANION GAP SERPL CALCULATED.3IONS-SCNC: 9 MMOL/L (ref 7–15)
AST SERPL W P-5'-P-CCNC: 46 U/L (ref 0–45)
BILIRUB DIRECT SERPL-MCNC: 0.16 MG/DL (ref 0–0.3)
BILIRUB SERPL-MCNC: 0.4 MG/DL
BUN SERPL-MCNC: 29.8 MG/DL (ref 6–20)
CALCIUM SERPL-MCNC: 14.7 MG/DL (ref 8.8–10.4)
CHLORIDE SERPL-SCNC: 102 MMOL/L (ref 98–107)
CREAT SERPL-MCNC: 0.71 MG/DL (ref 0.51–0.95)
EGFRCR SERPLBLD CKD-EPI 2021: >90 ML/MIN/1.73M2
ERYTHROCYTE [DISTWIDTH] IN BLOOD BY AUTOMATED COUNT: 14 % (ref 10–15)
GLUCOSE SERPL-MCNC: 129 MG/DL (ref 70–99)
HCO3 SERPL-SCNC: 27 MMOL/L (ref 22–29)
HCT VFR BLD AUTO: 38.7 % (ref 35–47)
HGB BLD-MCNC: 12 G/DL (ref 11.7–15.7)
HOLD SPECIMEN: NORMAL
MCH RBC QN AUTO: 25.9 PG (ref 26.5–33)
MCHC RBC AUTO-ENTMCNC: 31 G/DL (ref 31.5–36.5)
MCV RBC AUTO: 84 FL (ref 78–100)
PLATELET # BLD AUTO: 277 10E3/UL (ref 150–450)
POTASSIUM SERPL-SCNC: 4.3 MMOL/L (ref 3.4–5.3)
PROT SERPL-MCNC: 6.8 G/DL (ref 6.4–8.3)
RBC # BLD AUTO: 4.63 10E6/UL (ref 3.8–5.2)
SODIUM SERPL-SCNC: 138 MMOL/L (ref 135–145)
WBC # BLD AUTO: 10.9 10E3/UL (ref 4–11)

## 2025-02-18 PROCEDURE — 250N000011 HC RX IP 250 OP 636: Mod: JZ

## 2025-02-18 PROCEDURE — 96374 THER/PROPH/DIAG INJ IV PUSH: CPT

## 2025-02-18 PROCEDURE — 96361 HYDRATE IV INFUSION ADD-ON: CPT

## 2025-02-18 PROCEDURE — 99285 EMERGENCY DEPT VISIT HI MDM: CPT

## 2025-02-18 PROCEDURE — 36415 COLL VENOUS BLD VENIPUNCTURE: CPT

## 2025-02-18 PROCEDURE — 82040 ASSAY OF SERUM ALBUMIN: CPT

## 2025-02-18 PROCEDURE — 73700 CT LOWER EXTREMITY W/O DYE: CPT | Mod: RT

## 2025-02-18 PROCEDURE — 85041 AUTOMATED RBC COUNT: CPT

## 2025-02-18 PROCEDURE — 250N000013 HC RX MED GY IP 250 OP 250 PS 637

## 2025-02-18 PROCEDURE — 73502 X-RAY EXAM HIP UNI 2-3 VIEWS: CPT

## 2025-02-18 PROCEDURE — 73562 X-RAY EXAM OF KNEE 3: CPT | Mod: LT

## 2025-02-18 PROCEDURE — 258N000003 HC RX IP 258 OP 636

## 2025-02-18 PROCEDURE — 99285 EMERGENCY DEPT VISIT HI MDM: CPT | Mod: 25

## 2025-02-18 RX ORDER — OXYCODONE HYDROCHLORIDE 5 MG/1
5 TABLET ORAL ONCE
Status: COMPLETED | OUTPATIENT
Start: 2025-02-18 | End: 2025-02-18

## 2025-02-18 RX ORDER — HYDROMORPHONE HYDROCHLORIDE 1 MG/ML
0.5 INJECTION, SOLUTION INTRAMUSCULAR; INTRAVENOUS; SUBCUTANEOUS ONCE
Status: COMPLETED | OUTPATIENT
Start: 2025-02-18 | End: 2025-02-18

## 2025-02-18 RX ORDER — OXYCODONE HYDROCHLORIDE 10 MG/1
10 TABLET ORAL EVERY 4 HOURS PRN
Status: ON HOLD | COMMUNITY
Start: 2025-02-04

## 2025-02-18 RX ORDER — ACETAMINOPHEN 500 MG
1000 TABLET ORAL ONCE
Status: COMPLETED | OUTPATIENT
Start: 2025-02-18 | End: 2025-02-18

## 2025-02-18 RX ORDER — DIAZEPAM 5 MG/1
5 TABLET ORAL EVERY 6 HOURS PRN
Status: ON HOLD | COMMUNITY
Start: 2025-02-04

## 2025-02-18 RX ORDER — LIDOCAINE 4 G/G
1 PATCH TOPICAL EVERY 24 HOURS
Status: ON HOLD | COMMUNITY

## 2025-02-18 RX ADMIN — ACETAMINOPHEN 1000 MG: 500 TABLET, FILM COATED ORAL at 14:44

## 2025-02-18 RX ADMIN — SODIUM CHLORIDE, SODIUM LACTATE, POTASSIUM CHLORIDE, AND CALCIUM CHLORIDE 1000 ML: .6; .31; .03; .02 INJECTION, SOLUTION INTRAVENOUS at 21:54

## 2025-02-18 RX ADMIN — OXYCODONE 5 MG: 5 TABLET ORAL at 19:58

## 2025-02-18 RX ADMIN — HYDROMORPHONE HYDROCHLORIDE 0.5 MG: 1 INJECTION, SOLUTION INTRAMUSCULAR; INTRAVENOUS; SUBCUTANEOUS at 14:44

## 2025-02-18 ASSESSMENT — ACTIVITIES OF DAILY LIVING (ADL)
ADLS_ACUITY_SCORE: 41

## 2025-02-18 ASSESSMENT — COLUMBIA-SUICIDE SEVERITY RATING SCALE - C-SSRS
2. HAVE YOU ACTUALLY HAD ANY THOUGHTS OF KILLING YOURSELF IN THE PAST MONTH?: NO
6. HAVE YOU EVER DONE ANYTHING, STARTED TO DO ANYTHING, OR PREPARED TO DO ANYTHING TO END YOUR LIFE?: NO
1. IN THE PAST MONTH, HAVE YOU WISHED YOU WERE DEAD OR WISHED YOU COULD GO TO SLEEP AND NOT WAKE UP?: NO

## 2025-02-18 NOTE — ED PROVIDER NOTES
"[facility]  Emergency Department Visit Note    PATIENT:  Brissa Mott     54 year old     female      [id number]    Chief complaint:  Chief Complaint   Patient presents with    Fall    Leg Pain        History of present illness:  Patient is a 54 year old female with elevated BMI, type 2 diabetes, KATHRINE, HTN, ischemic stroke with residual left-sided motor deficits presenting for evaluation of fall.    Challenging history.  Fall occurred today.  Patient reports she had been hospitalized recently after an injury to her right knee during which she reports she sustained meniscus injury.  Went to rehab and she states she was doing well at rehab and was able to climb stairs, though has not been doing well at home for the past couple of weeks.  Getting around the home with great difficulty.    Today she was transferring from a recliner to her motorized chair, it is unclear exactly what happened but she somehow fell.  Unclear exactly how she fell, but perhaps fell on her right side.  No antecedent lightheadedness, dizziness, chest pain, shortness of breath, and none of the symptoms currently.  She did not hit her head.  No head or neck pain.  No vomiting.    Has reported significant pain over her right hip, thigh, knee, as well as left knee.  She is unsure whether she hit her left knee or whether there is new trauma to this but states that she has previously been told the left knee is \"bone-on-bone\".    I reviewed discharge summary 2//25.  Seen at that time for acute pain of right knee.  CT demonstrating severe osteoarthritis.  MRI demonstrating small right medial meniscus and small popliteal cyst with significant osteoarthritis.  Underwent bedside steroid injection during admission and discharged to TCU.  Slow to improve throughout hospital course.      Review of Systems:  As in HPI above    BP (!) 154/86   Pulse 97   Temp 98.1  F (36.7  C) (Oral)   Resp 18   Ht 1.626 m (5' 4\")   Wt 127.5 kg (281 lb) "   LMP  (LMP Unknown)   SpO2 97%   BMI 48.23 kg/m        Physical Exam:  Constitutional: laying in hospital bed, alert, oriented, appears uncomfortable, answering questions appropriately, and grimacing throughout history and exam  HEENT: normocephalic, atraumatic and sclerae anicteric  Neck: no stridor  Cardiovascular: regular rate and rhythm  Pulmonary: breathing comfortably on room air  Abdominal: soft, non-tender, non-distended  Extremities/MSK: Severe pain, patient yelling in pain with any passive range of motion of the left lower extremity at the hip or knee (patient reports pain primarily in the left knee).  Similar yelling in pain with any passive range of motion of the right lower extremity at the hip or knee.  Does have left lower extremity edema compared to right (patient reports is chronic).  There is no shortening or external rotation of either lower extremity.  Skin: warm, dry  Neurologic: Able to make effort at flexing bilateral hips up off of the bed though unable to successfully lift heels off of the bed bilaterally.  Limited dorsiflexion and plantarflexion strength due to pain.  Sensation intact to light touch bilateral feet.  Psychiatric: appears anxious      MDM:  Patient is a 54 year old female with above history presenting for evaluation of traumatic lower extremity pain.    Vitals overall reassuring, she is afebrile. Exam with significant pain with passive range of motion of either lower extremity.    Difficult to know exactly what happened during this trauma.  The mechanism seems quite low, though she is at high risk of orthopedic injury given obesity and other underlying comorbidities.  Not sure what to make of the left knee pain, she initially reported no new trauma to this area, but again the details surrounding exactly what happened during the fall are unclear.  It seems she perhaps landed on her walker/wheelchair onto the right side which may explain her right hip and knee pain but  again hard to know for sure.    Plan for plain films of pelvis, right hip, right knee, and left knee.  No indication for labs or head/other chest/abdominal imaging currently.  Hydromorphone for pain.    Disposition likely admission given her degree of discomfort and very likely inability to participate in ambulation trial though will still attempt this pending imaging. Remainder of ED course below.    ED COURSE:  ED Course as of 02/18/25 2233   Tue Feb 18, 2025   1549 Pelvis XR w/ unilateral hip right  Mild degenerative arthritis, no acute process   1608 XR Knee Right 3 Views  Finding potentially concerning for pathologic distal femoral fracture.  I did speak with reading radiologist who felt that follow-up CT would be of benefit to further characterize.   1608 XR Knee Left 3 Views  No acute finding   1836 CT Knee Right w/o Contrast  Femoral infiltrative process concerning for metastasis or underlying primary malignancy.  Possible associated nondisplaced fracture.   1949 Attempted ambulation, patient was unable to even bring her legs over the side of the bed due to degree of discomfort.  Given her limitations in mobility at home with significant pain remaining here, planning for hospital bed.   2025 Spoke with medicine team, would like case run by orthopedics and oncology for discussion regarding whether this is an appropriate facility for patient given concern for pathologic fracture related to infiltrative malignant process.   2040 Spoke with oncology team, from their perspective would be medically stable for workup here, but would defer to any orthopedic recommendations.    Spoke with Dr. Daley with [facility], who agreed that this is a complicated case and given the complexity of her findings and likely need for ongoing cares given her underlying poor functional status felt that she would be better served at higher level of care.  Will place transfer referral to get patient transferred likely to [facility] versus [facility].    2232 No beds available through [facility].  Will keep patient in the emergency department here overnight and continue bed search tomorrow morning.       Encounter Diagnoses:  Final diagnoses:   Generalized weakness   Acute pain of both knees   Bone lesion       Final disposition: pending further workup, patient signed out to oncoming physician, Dr. Woodson.    Janak Finch MD  2/18/2025  2:08 PM   Emergency Medicine  [facility]      Janak Finch MD  02/18/25 2230

## 2025-02-18 NOTE — ED TRIAGE NOTES
Patient arrived via EMS frome home after having a mechanical fall while trransferring from a chair to her scooter. Patient is complaining of right outer thigh pain and left knee and foot pain.  Patient did not hit her head and does not take anhy blood thinners.      Triage Assessment (Adult)       Row Name 02/18/25 1359          Triage Assessment    Airway WDL WDL        Respiratory WDL    Respiratory WDL WDL        Skin Circulation/Temperature WDL    Skin Circulation/Temperature WDL WDL        Cardiac WDL    Cardiac WDL WDL        Peripheral/Neurovascular WDL    Peripheral Neurovascular WDL WDL        Cognitive/Neuro/Behavioral WDL    Cognitive/Neuro/Behavioral WDL WDL

## 2025-02-18 NOTE — MEDICATION SCRIBE - ADMISSION MEDICATION HISTORY
Medication Scribe Admission Medication History    Admission medication history is complete. The information provided in this note is only as accurate as the sources available at the time of the update.    Information Source(s): Patient and CareEverywhere/SureScripts via with patient in room and finished at desk.    Pertinent Information: She has an Albuterol inhaler at home, has not had to use in over a month.  She states that she has lost some weight and that has helped with her breathing.  Has not had Freestyle on since it was removed at her last hospital admission on 1/30/25 - 2/4/25.  Insurance won't cover extra usage.  She also does not have any manual blood glucose checking supplies at home, so she has not checked her blood glucose at home.  Is using 20 units of Humulin 70/30 now.  Taking Semaglutide on Mondays.    Changes made to PTA medication list:  Added:   Diazepam 5 mg(she took the last one that she had available today)  Lidocaine patch 4 %  Oxycodone 5 mg    Deleted:   Cetirizine 10 mg  Clopidogrel 75 mg  Flovent Inhaler  Mupirocin Ointment  Oxybutynin XL 10 mg(states that it didn't work for her)    Changed:   Tylenol from scheduled to prn.  Humulin 70/30 from 16 units bid to 20 units bid.      Allergies reviewed with patient and updates made in EHR: yes, added Bydureon.  Has not had any trouble with Semaglutide.    Medication History Completed By: Eulalia Carson 2/18/2025 5:09 PM    PTA Med List   Medication Sig Note Last Dose/Taking    acetaminophen (TYLENOL) 500 MG tablet Take 1,000 mg by mouth 3 times daily as needed for fever or pain.  2/18/2025 Morning    albuterol (PROAIR HFA/PROVENTIL HFA/VENTOLIN HFA) 108 (90 Base) MCG/ACT inhaler Inhale 2 puffs into the lungs every 6 hours as needed for wheezing  More than a month    amLODIPine (NORVASC) 10 MG tablet Take 1 tablet (10 mg) by mouth daily  2/17/2025 Morning    aspirin (ASA) 81 MG tablet Take 1 tablet (81 mg) by mouth daily  2/17/2025 Morning     "atorvastatin (LIPITOR) 40 MG tablet Take 1 tablet (40 mg) by mouth daily  2/17/2025 Morning    Continuous Glucose Sensor (FREESTYLE SANDRA 14 DAY SENSOR) MISC Change every 14 days. 2/18/2025: Has not had one on since it was removed at her last hospital admission on 1/30/25 - 2/4/25.  Insurance won't cover extra usage.  She also does not have any manual blood glucose checking supplies at home, so she has not checked her blood glucose at home. Past Month    Continuous Glucose Sensor (FREESTYLE ASNDRA 3 SENSOR) MISC 1 each every 14 days Use 1 sensor every 14 days. Use to read blood sugars per 's instructions.  Past Month    diazepam (VALIUM) 5 MG tablet Take 5 mg by mouth every 6 hours as needed for muscle spasms.  2/18/2025 Noon    ibuprofen (ADVIL/MOTRIN) 200 MG tablet Take 200-400 mg by mouth every 4 hours as needed for mild pain.  2/18/2025 Morning    insulin NPH-Regular (HUMULIN MIX 70/30 KWIKPEN) susp ADMINISTER 16 UNITS UNDER THE SKIN BEFORE BREAKFAST AND SUPPER (Patient taking differently: Inject 20 Units subcutaneously 2 times daily (before meals). ADMINISTER 16 UNITS UNDER THE SKIN BEFORE BREAKFAST AND SUPPER)  2/17/2025 Evening    insulin pen needle (BD PEN NEEDLE TWYLA 2ND GEN) 32G X 4 MM miscellaneous USE TWICE DAILY OR AS DIRECTED  2/17/2025 Evening    irbesartan (AVAPRO) 300 MG tablet TAKE 1 TABLET(300 MG) BY MOUTH AT BEDTIME  2/17/2025 Bedtime    Lidocaine (LIDOCARE) 4 % Patch Place 1 patch onto the skin every 24 hours. On for 12 hours, off for 12 hours.  2/14/2025    metFORMIN (GLUCOPHAGE) 1000 MG tablet TAKE 1 TABLET BY MOUTH TWICE DAILY WITH MEALS  2/17/2025 Evening    Misc. Devices (ROLLATOR ULTRA-LIGHT) MISC Extra side walker with front wheels for home use.  Purchase, lifetime need. Extra wide rolling walker (\"Walker 4\", item #:  [id number]) needed for safe transfers and ambulation.  Pt weight is 335 lbs.  2/18/2025    montelukast (SINGULAIR) 10 MG tablet Take 1 tablet (10 mg) by mouth at " bedtime  2/17/2025 Bedtime    nystatin (MYCOSTATIN) 791950 UNIT/GM external powder Apply topically 2 times daily 2/18/2025: Using in summertime More than a month    oxyCODONE IR (ROXICODONE) 10 MG tablet Take 10 mg by mouth every 4 hours as needed for moderate to severe pain.  2/18/2025 Noon    Semaglutide, 2 MG/DOSE, (OZEMPIC, 2 MG/DOSE,) 8 MG/3ML pen Inject 2 mg subcutaneously every 7 days 2/18/2025: Takes on Mondays 2/17/2025    sertraline (ZOLOFT) 100 MG tablet Take 1.5 tablets (150 mg) by mouth daily  2/17/2025 Evening    triamcinolone (KENALOG) 0.1 % external cream APPLY TOPICALLY TO THE AFFECTED AREA TWICE DAILY  Past Month

## 2025-02-18 NOTE — ED NOTES
Staff placed a periwhick device for patient to help manage output of urine as patient is unable to get out of bed.

## 2025-02-19 VITALS
OXYGEN SATURATION: 95 % | HEIGHT: 64 IN | HEART RATE: 89 BPM | SYSTOLIC BLOOD PRESSURE: 161 MMHG | WEIGHT: 281 LBS | TEMPERATURE: 98.1 F | BODY MASS INDEX: 47.97 KG/M2 | DIASTOLIC BLOOD PRESSURE: 102 MMHG | RESPIRATION RATE: 18 BRPM

## 2025-02-19 LAB
ALBUMIN UR-MCNC: 20 MG/DL
APPEARANCE UR: ABNORMAL
BACTERIA #/AREA URNS HPF: ABNORMAL /HPF
BILIRUB UR QL STRIP: NEGATIVE
COLOR UR AUTO: YELLOW
GLUCOSE BLDC GLUCOMTR-MCNC: 109 MG/DL (ref 70–99)
GLUCOSE BLDC GLUCOMTR-MCNC: 115 MG/DL (ref 70–99)
GLUCOSE BLDC GLUCOMTR-MCNC: 134 MG/DL (ref 70–99)
GLUCOSE BLDC GLUCOMTR-MCNC: 96 MG/DL (ref 70–99)
GLUCOSE UR STRIP-MCNC: NEGATIVE MG/DL
HGB UR QL STRIP: ABNORMAL
KETONES UR STRIP-MCNC: ABNORMAL MG/DL
LEUKOCYTE ESTERASE UR QL STRIP: ABNORMAL
MUCOUS THREADS #/AREA URNS LPF: PRESENT /LPF
NITRATE UR QL: POSITIVE
PH UR STRIP: 5.5 [PH] (ref 5–7)
RBC URINE: 34 /HPF
SP GR UR STRIP: 1.02 (ref 1–1.03)
SQUAMOUS EPITHELIAL: <1 /HPF
UROBILINOGEN UR STRIP-MCNC: NORMAL MG/DL
WBC URINE: >182 /HPF

## 2025-02-19 PROCEDURE — 96376 TX/PRO/DX INJ SAME DRUG ADON: CPT

## 2025-02-19 PROCEDURE — 250N000013 HC RX MED GY IP 250 OP 250 PS 637: Performed by: EMERGENCY MEDICINE

## 2025-02-19 PROCEDURE — 81001 URINALYSIS AUTO W/SCOPE: CPT

## 2025-02-19 PROCEDURE — 250N000011 HC RX IP 250 OP 636: Mod: JZ | Performed by: EMERGENCY MEDICINE

## 2025-02-19 PROCEDURE — 250N000012 HC RX MED GY IP 250 OP 636 PS 637: Performed by: EMERGENCY MEDICINE

## 2025-02-19 PROCEDURE — 82962 GLUCOSE BLOOD TEST: CPT

## 2025-02-19 PROCEDURE — 250N000013 HC RX MED GY IP 250 OP 250 PS 637: Performed by: FAMILY MEDICINE

## 2025-02-19 PROCEDURE — 87086 URINE CULTURE/COLONY COUNT: CPT

## 2025-02-19 RX ORDER — NICOTINE POLACRILEX 4 MG
15-30 LOZENGE BUCCAL
Status: DISCONTINUED | OUTPATIENT
Start: 2025-02-19 | End: 2025-02-20 | Stop reason: HOSPADM

## 2025-02-19 RX ORDER — HYDROMORPHONE HYDROCHLORIDE 1 MG/ML
0.5 INJECTION, SOLUTION INTRAMUSCULAR; INTRAVENOUS; SUBCUTANEOUS
Status: DISCONTINUED | OUTPATIENT
Start: 2025-02-19 | End: 2025-02-20 | Stop reason: HOSPADM

## 2025-02-19 RX ORDER — MONTELUKAST SODIUM 10 MG/1
10 TABLET ORAL AT BEDTIME
Status: DISCONTINUED | OUTPATIENT
Start: 2025-02-19 | End: 2025-02-20 | Stop reason: HOSPADM

## 2025-02-19 RX ORDER — DEXTROSE MONOHYDRATE 25 G/50ML
25-50 INJECTION, SOLUTION INTRAVENOUS
Status: DISCONTINUED | OUTPATIENT
Start: 2025-02-19 | End: 2025-02-20 | Stop reason: HOSPADM

## 2025-02-19 RX ORDER — OXYCODONE HYDROCHLORIDE 5 MG/1
5-10 TABLET ORAL
Status: DISCONTINUED | OUTPATIENT
Start: 2025-02-19 | End: 2025-02-20 | Stop reason: HOSPADM

## 2025-02-19 RX ORDER — ACETAMINOPHEN 500 MG
1000 TABLET ORAL 3 TIMES DAILY PRN
Status: DISCONTINUED | OUTPATIENT
Start: 2025-02-19 | End: 2025-02-20 | Stop reason: HOSPADM

## 2025-02-19 RX ORDER — AMLODIPINE BESYLATE 5 MG/1
10 TABLET ORAL DAILY
Status: DISCONTINUED | OUTPATIENT
Start: 2025-02-19 | End: 2025-02-20 | Stop reason: HOSPADM

## 2025-02-19 RX ORDER — ATORVASTATIN CALCIUM 20 MG/1
40 TABLET, FILM COATED ORAL DAILY
Status: DISCONTINUED | OUTPATIENT
Start: 2025-02-19 | End: 2025-02-20 | Stop reason: HOSPADM

## 2025-02-19 RX ORDER — IRBESARTAN 75 MG/1
300 TABLET ORAL AT BEDTIME
Status: DISCONTINUED | OUTPATIENT
Start: 2025-02-19 | End: 2025-02-20 | Stop reason: HOSPADM

## 2025-02-19 RX ADMIN — ATORVASTATIN CALCIUM 40 MG: 20 TABLET, FILM COATED ORAL at 09:56

## 2025-02-19 RX ADMIN — HYDROMORPHONE HYDROCHLORIDE 0.5 MG: 1 INJECTION, SOLUTION INTRAMUSCULAR; INTRAVENOUS; SUBCUTANEOUS at 19:37

## 2025-02-19 RX ADMIN — HYDROMORPHONE HYDROCHLORIDE 0.5 MG: 1 INJECTION, SOLUTION INTRAMUSCULAR; INTRAVENOUS; SUBCUTANEOUS at 11:17

## 2025-02-19 RX ADMIN — INSULIN HUMAN 20 UNITS: 100 INJECTION, SUSPENSION SUBCUTANEOUS at 17:22

## 2025-02-19 RX ADMIN — SERTRALINE HYDROCHLORIDE 150 MG: 100 TABLET ORAL at 21:25

## 2025-02-19 RX ADMIN — IRBESARTAN 300 MG: 75 TABLET ORAL at 21:24

## 2025-02-19 RX ADMIN — METFORMIN HYDROCHLORIDE 1000 MG: 500 TABLET ORAL at 17:22

## 2025-02-19 RX ADMIN — AMLODIPINE BESYLATE 10 MG: 5 TABLET ORAL at 09:55

## 2025-02-19 RX ADMIN — OXYCODONE 10 MG: 5 TABLET ORAL at 20:43

## 2025-02-19 RX ADMIN — MONTELUKAST 10 MG: 10 TABLET, FILM COATED ORAL at 21:24

## 2025-02-19 RX ADMIN — INSULIN HUMAN 20 UNITS: 100 INJECTION, SUSPENSION SUBCUTANEOUS at 09:56

## 2025-02-19 RX ADMIN — ACETAMINOPHEN 1000 MG: 500 TABLET, FILM COATED ORAL at 16:11

## 2025-02-19 RX ADMIN — HYDROMORPHONE HYDROCHLORIDE 0.5 MG: 1 INJECTION, SOLUTION INTRAMUSCULAR; INTRAVENOUS; SUBCUTANEOUS at 22:53

## 2025-02-19 RX ADMIN — METFORMIN HYDROCHLORIDE 1000 MG: 500 TABLET ORAL at 09:56

## 2025-02-19 ASSESSMENT — ACTIVITIES OF DAILY LIVING (ADL)
ADLS_ACUITY_SCORE: 41

## 2025-02-19 NOTE — CONSULTS
I have reviewed the chart and x-rays.  The trauma service will accept the transfer.  The patient will need oncologic workup.  Presuming this is a metastatic lesion, it may be beneficial to resect the lesion and do a distal femoral replacement with a hinged total knee arthroplasty.  Internal fixation would be another option.  A biopsy could be obtained intraoperatively.  I reviewed this with Dr. Meredith who will take care of the patient when he begins call on Friday.

## 2025-02-19 NOTE — ED NOTES
"[facility]   Admission Handoff    The patient is Brissa Mott, 54 year old who arrived in the ED by AMBULANCE from home with a complaint of Fall and Leg Pain  . The patient's current symptoms are new and during this time the symptoms have remained the same. In the ED, patient was diagnosed with   Final diagnoses:   Generalized weakness   Acute pain of both knees         Needed?: No    Allergies:    Allergies   Allergen Reactions    Bydureon [Exenatide] Diarrhea    Penicillins Rash    Sulfa Antibiotics Rash       Past Medical Hx: History reviewed. No pertinent past medical history.    Initial vitals were: BP: (!) 154/86  Pulse: 97  Temp: 98.1  F (36.7  C)  Resp: 18  Height: 162.6 cm (5' 4\")  Weight: 127.5 kg (281 lb)  SpO2: 97 %   Recent vital Signs: BP (!) 167/96 (BP Location: Right arm, Patient Position: Supine)   Pulse 99   Temp 98.1  F (36.7  C) (Oral)   Resp 18   Ht 1.626 m (5' 4\")   Wt 127.5 kg (281 lb)   LMP  (LMP Unknown)   SpO2 95%   BMI 48.23 kg/m      Elimination Status: Continent: Yes     Activity Level: Total assist/lift    Fall Status: Reason for falls risk:  Mobility  patient and family education    Baseline Mental status: WDL  Current Mental Status changes: at basesline    Infection present or suspected this encounter: no  Sepsis suspected: No    Isolation type: NA    Bariatric equipment needed?: No    In the ED these meds were given:   Medications   HYDROmorphone (PF) (DILAUDID) injection 0.5 mg (0.5 mg Intravenous $Given 2/18/25 1444)   acetaminophen (TYLENOL) tablet 1,000 mg (1,000 mg Oral $Given 2/18/25 1444)   oxyCODONE (ROXICODONE) tablet 5 mg (5 mg Oral $Given 2/18/25 1958)       Drips running?  No    Home pump  No    Current LDAs: Peripheral IV: Site RAC; Gauge 18  none     Results:   Labs/Imaging  Ordered and Resulted from Time of ED Arrival Up to the Time of Departure from the ED  Results for orders placed or performed during the hospital " encounter of 02/18/25 (from the past 24 hours)   [place] Draw    Narrative    The following orders were created for panel order [place] Draw.  Procedure                               Abnormality         Status                     ---------                               -----------         ------                     Extra Blue Top Tube[[id number]]                              Final result               Extra Red Top Tube[[id number]]                               Final result               Extra Green Top (Lithium...[[id number]]                      Final result               Extra Purple Top Tube[[id number]]                            Final result                 Please view results for these tests on the individual orders.   Extra Blue Top Tube   Result Value Ref Range    Hold Specimen JIC    Extra Red Top Tube   Result Value Ref Range    Hold Specimen     Extra Green Top (Lithium Heparin) Tube   Result Value Ref Range    Hold Specimen     Extra Purple Top Tube   Result Value Ref Range    Hold Specimen     Pelvis XR w/ unilateral hip right    Narrative    EXAM: XR PELVIS AND HIP RIGHT 1 VIEW  LOCATION: [facility]  DATE: 2/18/2025    INDICATION: 54F, fall, poorly localized right hip and knee pain  COMPARISON: None.      Impression    IMPRESSION: No acute fracture or malalignment. Mild degenerative arthritis both hips. Lower lumbar spondylosis. Calcified uterine fibroid.   XR Knee Left 3 Views    Narrative    EXAM: XR KNEE LEFT 3 VIEWS  LOCATION: [facility]  DATE: 2/18/2025    INDICATION: 54F, fall, left knee pain (?acute   difficult history)  COMPARISON: None.      Impression    IMPRESSION: No acute fracture or malalignment of the left knee. Tricompartmental degenerative arthritis with marked medial compartment narrowing. Small knee joint effusion.   XR Knee Right 3 Views    Addendum: 2/18/2025    COMMUNICATION ADDENDUM:  Findings were discussed with Dr. Finch  on 2/18/2025 4:21 PM CST.    END ADDENDUM      Narrative    EXAM: XR KNEE RIGHT 3 VIEWS  LOCATION: [facility]  DATE: 2/18/2025    INDICATION: 54F, fall, poorly localized right hip and knee pain  COMPARISON: CT right knee 1/30/2025; MRI right knee 1/30/2025      Impression    IMPRESSION: Ill-defined, infiltrative-appearing lucency and cortical thinning/irregularity at the lateral margin of the right distal femoral diaphysis with nondisplaced oblique linear lucency extending medially. Findings are compatible with infiltrative   marrow lesion/process and suspected pathologic fracture. The fracture line appears new from CT 1/30/2025 and MRI 2/1/2025.    Tricompartmental degenerative arthritis right knee with marked medial compartment narrowing.     NOTE: ABNORMAL REPORT    THE DICTATION ABOVE DESCRIBES AN ABNORMALITY FOR WHICH FOLLOW-UP IS NEEDED.    CT Knee Right w/o Contrast    Narrative    EXAM: CT KNEE RIGHT W/O CONTRAST  LOCATION: [facility]  DATE: 2/18/2025    INDICATION: 54F, followup possible fracture, see on XR.  COMPARISON: Right knee radiographs 02/18/2025  TECHNIQUE: Noncontrast. Axial, sagittal and coronal thin-section reconstruction. Dose reduction techniques were used.     FINDINGS:     BONES:  -Intramedullary marrow replacing lesion involving the distal femoral diaphysis measuring approximately 7.5 cm craniocaudal. There is associated permeative cortical destruction along the lateral and posterior aspects of the distal femoral diaphysis with   adjacent periosteal reaction, as well as a small extraosseous soft tissue component measuring approximately 2.1 x 1.1 x 3.8 cm (series 5 image 19 and series 6 image 39). Subtle linear lucency through the posterior cortex of the distal femoral femoral   diaphysis (series 44-47), which may represent a nondisplaced pathologic fracture.    Tricompartmental hypertrophic degenerative arthritis of the knee, advanced  in the medial compartment. No sizable knee joint effusion. Normal bulk of the visualized musculature. No lymphadenopathy.      Impression    IMPRESSION:  1.  Intramedullary marrow replacing lesion in the distal femur measuring approximately 7.5 cm in craniocaudal dimension with associated permeative cortical destruction along the posterolateral margin of the distal femoral diaphysis and small extraosseous   soft tissue component. Constellation of findings are concerning for malignancy, including metastasis or myeloma.  2.  Possible nondisplaced pathologic fracture through the posterior cortex of the distal femoral diaphysis.    NOTE: ABNORMAL REPORT    THE DICTATION ABOVE DESCRIBES AN ABNORMALITY FOR WHICH FOLLOW-UP IS NEEDED.    CBC with platelets   Result Value Ref Range    WBC Count 10.9 4.0 - 11.0 10e3/uL    RBC Count 4.63 3.80 - 5.20 10e6/uL    Hemoglobin 12.0 11.7 - 15.7 g/dL    Hematocrit 38.7 35.0 - 47.0 %    MCV 84 78 - 100 fL    MCH 25.9 (L) 26.5 - 33.0 pg    MCHC 31.0 (L) 31.5 - 36.5 g/dL    RDW 14.0 10.0 - 15.0 %    Platelet Count 277 150 - 450 10e3/uL   Hepatic panel   Result Value Ref Range    Protein Total 6.8 6.4 - 8.3 g/dL    Albumin 3.8 3.5 - 5.2 g/dL    Bilirubin Total 0.4 <=1.2 mg/dL    Alkaline Phosphatase 101 40 - 150 U/L    AST 46 (H) 0 - 45 U/L    ALT 18 0 - 50 U/L    Bilirubin Direct 0.16 0.00 - 0.30 mg/dL       For the majority of the shift this patient's behavior was Green     Cardiac Rhythm: Normal Sinus  Pt needs tele? No  Skin/wound Issues: None    Code Status: ,[facility]      Pain control: fair    Nausea control: good    Abnormal labs/tests/findings requiring intervention: NA    Patient tested for COVID 19 prior to admission: NO     OBS brochure/video discussed/provided to patient/family: Yes     Family present during ED course? Yes     Family Comments/Social Situation comments:  is at the bedside.    Tasks needing completion: None    Jacobo Soto RN

## 2025-02-19 NOTE — ED NOTES
Patient was unsuccessful at getting out of bed to ambulate, complained of severe pain in right leg and knee. Patient was unable to bear weight and ambulate.

## 2025-02-19 NOTE — ED NOTES
Pt states she is comfortable in bed. Was offered to get up I a recliner and offered a hospital bed. Eating breakfast. Home meds ordered.

## 2025-02-19 NOTE — PROGRESS NOTES
"Transfer Type: [facility]  Transfer Triage Note    Date of call: 02/19/25  Time of call: 9:42 AM    Current Patient Location:  [facility]   Current Level of Care: ED    Vitals: Temp: 98.1  F (36.7  C) Temp src: Oral BP: 137/80 Pulse: 77   Resp: 18 SpO2: 95 % Height: 162.6 cm (5' 4\") Weight: 127.5 kg (281 lb)  O2 Device: None (Room air) at    Diagnosis: S/p fall, knee pain concern for malignancy, pathologic fx   Reason for requested transfer: Procedure can be done here and not at referring hospital   Isolation Needs: None    Care everywhere has been updated and reviewed: Yes  Necessary images have been sent through PACS: N/A    If patient is transferring for specialty care or specific procedure, the specialist required has participated in the transfer call and agreed with need for transfer and anticipated timeline: No    Transfer accepted: Yes   Stability of Patient: Patient is vitally stable, with no critical labs, and will likely remain stable throughout the transfer process  Is the patient appropriate for [facility]? NA   Level of Care Needed: Med Surg  Telemetry Needed:  None  Expected Time of Arrival for Transfer: 0-8 hours  Arrival Location:  [facility]     Recommendations for Management and Stabilization: Not needed    Additional Comments:   Patient is a 54 year old female with elevated BMI, type 2 diabetes, KATHRINE, HTN, ischemic stroke with residual left-sided motor deficits presenting for evaluation of fall.  Challenging history.  Fall occurred today.  Patient reports she had been hospitalized recently after an injury to her right knee during which she reports she sustained meniscus injury.  Went to rehab and she states she was doing well at rehab and was able to climb stairs, though has not been doing well at home for the past couple of weeks.  Getting around the home with great difficulty.     Labs reveals hypercalcemia and imaging shows concern for pathologic fracture.   Will " need further support with Ortho and Oncology team which is not available at [facility].   D/w Dr. Venancio Kauffman at [facility] who reviewed the images, pt will need complex knee surgery and currently will work thru appropriate ortho coverage at either [facility] or [facility].   Await response from Dr. Kauffman's direction as to where would be the most appropriate location for this patient.       D/w Dr. Kauffman, ok to send to [facility] and [facility] will see pt when she arrives.   D/w Sara Lao   Admit under Dr. Chilo Adkins     For [facility]: use [employer] to contact Admitting Hospitalist [facility]      Sofie Todd PA-C

## 2025-02-20 ENCOUNTER — TELEPHONE (OUTPATIENT)
Dept: TRANSPLANT | Facility: CLINIC | Age: 55
End: 2025-02-20
Payer: COMMERCIAL

## 2025-02-20 ENCOUNTER — HOSPITAL ENCOUNTER (INPATIENT)
Facility: CLINIC | Age: 55
End: 2025-02-20
Attending: STUDENT IN AN ORGANIZED HEALTH CARE EDUCATION/TRAINING PROGRAM | Admitting: INTERNAL MEDICINE
Payer: COMMERCIAL

## 2025-02-20 ENCOUNTER — APPOINTMENT (OUTPATIENT)
Dept: CT IMAGING | Facility: CLINIC | Age: 55
End: 2025-02-20
Attending: INTERNAL MEDICINE
Payer: COMMERCIAL

## 2025-02-20 DIAGNOSIS — M25.561 ACUTE PAIN OF RIGHT KNEE: ICD-10-CM

## 2025-02-20 DIAGNOSIS — G89.29 CHRONIC PAIN OF LEFT KNEE: ICD-10-CM

## 2025-02-20 DIAGNOSIS — L89.152 PRESSURE INJURY OF SACRAL REGION, STAGE 2 (H): ICD-10-CM

## 2025-02-20 DIAGNOSIS — E11.65 TYPE 2 DIABETES MELLITUS WITH HYPERGLYCEMIA, WITH LONG-TERM CURRENT USE OF INSULIN (H): ICD-10-CM

## 2025-02-20 DIAGNOSIS — Z79.4 TYPE 2 DIABETES MELLITUS WITH HYPERGLYCEMIA, WITH LONG-TERM CURRENT USE OF INSULIN (H): ICD-10-CM

## 2025-02-20 DIAGNOSIS — C7A.8 NEUROENDOCRINE CARCINOMA (H): ICD-10-CM

## 2025-02-20 DIAGNOSIS — M25.562 CHRONIC PAIN OF LEFT KNEE: ICD-10-CM

## 2025-02-20 DIAGNOSIS — M84.551A PATHOLOGICAL FRACTURE OF RIGHT FEMUR DUE TO NEOPLASTIC DISEASE, INITIAL ENCOUNTER (H): Primary | ICD-10-CM

## 2025-02-20 PROBLEM — M25.569 KNEE PAIN: Status: ACTIVE | Noted: 2025-02-20

## 2025-02-20 PROBLEM — E83.52 HYPERCALCEMIA: Status: ACTIVE | Noted: 2025-02-20

## 2025-02-20 LAB
ALBUMIN SERPL BCG-MCNC: 3.4 G/DL (ref 3.5–5.2)
ALP SERPL-CCNC: 91 U/L (ref 40–150)
ALT SERPL W P-5'-P-CCNC: 27 U/L (ref 0–50)
ANION GAP SERPL CALCULATED.3IONS-SCNC: 9 MMOL/L (ref 7–15)
AST SERPL W P-5'-P-CCNC: 46 U/L (ref 0–45)
BACTERIA UR CULT: NORMAL
BASOPHILS # BLD AUTO: 0 10E3/UL (ref 0–0.2)
BASOPHILS NFR BLD AUTO: 0 %
BILIRUB SERPL-MCNC: 0.5 MG/DL
BUN SERPL-MCNC: 15.7 MG/DL (ref 6–20)
CALCIUM SERPL-MCNC: 12.9 MG/DL (ref 8.8–10.4)
CHLORIDE SERPL-SCNC: 103 MMOL/L (ref 98–107)
CREAT SERPL-MCNC: 0.46 MG/DL (ref 0.51–0.95)
EGFRCR SERPLBLD CKD-EPI 2021: >90 ML/MIN/1.73M2
EOSINOPHIL # BLD AUTO: 0.2 10E3/UL (ref 0–0.7)
EOSINOPHIL NFR BLD AUTO: 2 %
ERYTHROCYTE [DISTWIDTH] IN BLOOD BY AUTOMATED COUNT: 14.1 % (ref 10–15)
GLUCOSE BLDC GLUCOMTR-MCNC: 100 MG/DL (ref 70–99)
GLUCOSE BLDC GLUCOMTR-MCNC: 124 MG/DL (ref 70–99)
GLUCOSE BLDC GLUCOMTR-MCNC: 136 MG/DL (ref 70–99)
GLUCOSE BLDC GLUCOMTR-MCNC: 171 MG/DL (ref 70–99)
GLUCOSE BLDC GLUCOMTR-MCNC: 181 MG/DL (ref 70–99)
GLUCOSE SERPL-MCNC: 105 MG/DL (ref 70–99)
HCO3 SERPL-SCNC: 26 MMOL/L (ref 22–29)
HCT VFR BLD AUTO: 36.7 % (ref 35–47)
HGB BLD-MCNC: 11.4 G/DL (ref 11.7–15.7)
IMM GRANULOCYTES # BLD: 0.1 10E3/UL
IMM GRANULOCYTES NFR BLD: 1 %
LYMPHOCYTES # BLD AUTO: 1.4 10E3/UL (ref 0.8–5.3)
LYMPHOCYTES NFR BLD AUTO: 15 %
MCH RBC QN AUTO: 25.6 PG (ref 26.5–33)
MCHC RBC AUTO-ENTMCNC: 31.1 G/DL (ref 31.5–36.5)
MCV RBC AUTO: 82 FL (ref 78–100)
MONOCYTES # BLD AUTO: 1 10E3/UL (ref 0–1.3)
MONOCYTES NFR BLD AUTO: 11 %
NEUTROPHILS # BLD AUTO: 6.6 10E3/UL (ref 1.6–8.3)
NEUTROPHILS NFR BLD AUTO: 71 %
NRBC # BLD AUTO: 0 10E3/UL
NRBC BLD AUTO-RTO: 0 /100
PLATELET # BLD AUTO: 231 10E3/UL (ref 150–450)
POTASSIUM SERPL-SCNC: 4 MMOL/L (ref 3.4–5.3)
PROT SERPL-MCNC: 6.3 G/DL (ref 6.4–8.3)
RBC # BLD AUTO: 4.46 10E6/UL (ref 3.8–5.2)
SODIUM SERPL-SCNC: 138 MMOL/L (ref 135–145)
TOTAL PROTEIN SERUM FOR ELP: 5.8 G/DL (ref 6.4–8.3)
WBC # BLD AUTO: 9.3 10E3/UL (ref 4–11)

## 2025-02-20 PROCEDURE — 84165 PROTEIN E-PHORESIS SERUM: CPT | Mod: TC | Performed by: STUDENT IN AN ORGANIZED HEALTH CARE EDUCATION/TRAINING PROGRAM

## 2025-02-20 PROCEDURE — 99223 1ST HOSP IP/OBS HIGH 75: CPT | Performed by: INTERNAL MEDICINE

## 2025-02-20 PROCEDURE — 74177 CT ABD & PELVIS W/CONTRAST: CPT

## 2025-02-20 PROCEDURE — 82310 ASSAY OF CALCIUM: CPT | Performed by: INTERNAL MEDICINE

## 2025-02-20 PROCEDURE — 999N000157 HC STATISTIC RCP TIME EA 10 MIN

## 2025-02-20 PROCEDURE — 86334 IMMUNOFIX E-PHORESIS SERUM: CPT | Performed by: STUDENT IN AN ORGANIZED HEALTH CARE EDUCATION/TRAINING PROGRAM

## 2025-02-20 PROCEDURE — 85004 AUTOMATED DIFF WBC COUNT: CPT | Performed by: INTERNAL MEDICINE

## 2025-02-20 PROCEDURE — 250N000013 HC RX MED GY IP 250 OP 250 PS 637: Performed by: INTERNAL MEDICINE

## 2025-02-20 PROCEDURE — 84165 PROTEIN E-PHORESIS SERUM: CPT | Mod: 26 | Performed by: STUDENT IN AN ORGANIZED HEALTH CARE EDUCATION/TRAINING PROGRAM

## 2025-02-20 PROCEDURE — 86334 IMMUNOFIX E-PHORESIS SERUM: CPT | Mod: 26 | Performed by: STUDENT IN AN ORGANIZED HEALTH CARE EDUCATION/TRAINING PROGRAM

## 2025-02-20 PROCEDURE — 250N000011 HC RX IP 250 OP 636: Performed by: INTERNAL MEDICINE

## 2025-02-20 PROCEDURE — 36415 COLL VENOUS BLD VENIPUNCTURE: CPT | Performed by: INTERNAL MEDICINE

## 2025-02-20 PROCEDURE — 71260 CT THORAX DX C+: CPT

## 2025-02-20 PROCEDURE — 258N000003 HC RX IP 258 OP 636: Performed by: INTERNAL MEDICINE

## 2025-02-20 PROCEDURE — 99418 PROLNG IP/OBS E/M EA 15 MIN: CPT | Performed by: INTERNAL MEDICINE

## 2025-02-20 PROCEDURE — 120N000001 HC R&B MED SURG/OB

## 2025-02-20 PROCEDURE — 250N000011 HC RX IP 250 OP 636: Mod: JZ | Performed by: INTERNAL MEDICINE

## 2025-02-20 PROCEDURE — 84155 ASSAY OF PROTEIN SERUM: CPT | Performed by: INTERNAL MEDICINE

## 2025-02-20 PROCEDURE — 84075 ASSAY ALKALINE PHOSPHATASE: CPT | Performed by: INTERNAL MEDICINE

## 2025-02-20 PROCEDURE — 99207 PR NO BILLABLE SERVICE THIS VISIT: CPT | Performed by: INTERNAL MEDICINE

## 2025-02-20 PROCEDURE — 82247 BILIRUBIN TOTAL: CPT | Performed by: INTERNAL MEDICINE

## 2025-02-20 PROCEDURE — 83521 IG LIGHT CHAINS FREE EACH: CPT | Performed by: INTERNAL MEDICINE

## 2025-02-20 PROCEDURE — 250N000009 HC RX 250: Performed by: INTERNAL MEDICINE

## 2025-02-20 PROCEDURE — 85014 HEMATOCRIT: CPT | Performed by: INTERNAL MEDICINE

## 2025-02-20 RX ORDER — AMOXICILLIN 250 MG
1 CAPSULE ORAL 2 TIMES DAILY PRN
Status: DISCONTINUED | OUTPATIENT
Start: 2025-02-20 | End: 2025-03-05 | Stop reason: HOSPADM

## 2025-02-20 RX ORDER — CYCLOBENZAPRINE HCL 5 MG
10 TABLET ORAL 3 TIMES DAILY
Status: DISCONTINUED | OUTPATIENT
Start: 2025-02-20 | End: 2025-02-27

## 2025-02-20 RX ORDER — HYDROMORPHONE HYDROCHLORIDE 1 MG/ML
1 INJECTION, SOLUTION INTRAMUSCULAR; INTRAVENOUS; SUBCUTANEOUS ONCE
Status: COMPLETED | OUTPATIENT
Start: 2025-02-20 | End: 2025-02-20

## 2025-02-20 RX ORDER — ALBUTEROL SULFATE 90 UG/1
2 INHALANT RESPIRATORY (INHALATION) EVERY 6 HOURS PRN
Status: DISCONTINUED | OUTPATIENT
Start: 2025-02-20 | End: 2025-03-05 | Stop reason: HOSPADM

## 2025-02-20 RX ORDER — OXYCODONE HYDROCHLORIDE 5 MG/1
5 TABLET ORAL EVERY 4 HOURS PRN
Status: DISCONTINUED | OUTPATIENT
Start: 2025-02-20 | End: 2025-02-22

## 2025-02-20 RX ORDER — LIDOCAINE 40 MG/G
CREAM TOPICAL
Status: DISCONTINUED | OUTPATIENT
Start: 2025-02-20 | End: 2025-03-05 | Stop reason: HOSPADM

## 2025-02-20 RX ORDER — ATORVASTATIN CALCIUM 40 MG/1
40 TABLET, FILM COATED ORAL DAILY
Status: DISCONTINUED | OUTPATIENT
Start: 2025-02-20 | End: 2025-03-05 | Stop reason: HOSPADM

## 2025-02-20 RX ORDER — ONDANSETRON 4 MG/1
4 TABLET, ORALLY DISINTEGRATING ORAL EVERY 6 HOURS PRN
Status: DISCONTINUED | OUTPATIENT
Start: 2025-02-20 | End: 2025-02-25

## 2025-02-20 RX ORDER — IOPAMIDOL 755 MG/ML
500 INJECTION, SOLUTION INTRAVASCULAR ONCE
Status: COMPLETED | OUTPATIENT
Start: 2025-02-20 | End: 2025-02-20

## 2025-02-20 RX ORDER — AMOXICILLIN 250 MG
2 CAPSULE ORAL 2 TIMES DAILY PRN
Status: DISCONTINUED | OUTPATIENT
Start: 2025-02-20 | End: 2025-03-05 | Stop reason: HOSPADM

## 2025-02-20 RX ORDER — NALOXONE HYDROCHLORIDE 0.4 MG/ML
0.4 INJECTION, SOLUTION INTRAMUSCULAR; INTRAVENOUS; SUBCUTANEOUS
Status: DISCONTINUED | OUTPATIENT
Start: 2025-02-20 | End: 2025-03-05 | Stop reason: HOSPADM

## 2025-02-20 RX ORDER — PROCHLORPERAZINE MALEATE 5 MG/1
10 TABLET ORAL EVERY 6 HOURS PRN
Status: DISCONTINUED | OUTPATIENT
Start: 2025-02-20 | End: 2025-02-25

## 2025-02-20 RX ORDER — SODIUM CHLORIDE 9 MG/ML
INJECTION, SOLUTION INTRAVENOUS CONTINUOUS
Status: DISCONTINUED | OUTPATIENT
Start: 2025-02-20 | End: 2025-02-26

## 2025-02-20 RX ORDER — NALOXONE HYDROCHLORIDE 0.4 MG/ML
0.2 INJECTION, SOLUTION INTRAMUSCULAR; INTRAVENOUS; SUBCUTANEOUS
Status: DISCONTINUED | OUTPATIENT
Start: 2025-02-20 | End: 2025-03-05 | Stop reason: HOSPADM

## 2025-02-20 RX ORDER — ACETAMINOPHEN 325 MG/1
650 TABLET ORAL EVERY 4 HOURS PRN
Status: DISCONTINUED | OUTPATIENT
Start: 2025-02-20 | End: 2025-03-05 | Stop reason: HOSPADM

## 2025-02-20 RX ORDER — MONTELUKAST SODIUM 10 MG/1
10 TABLET ORAL AT BEDTIME
Status: DISCONTINUED | OUTPATIENT
Start: 2025-02-20 | End: 2025-03-05 | Stop reason: HOSPADM

## 2025-02-20 RX ORDER — ENOXAPARIN SODIUM 100 MG/ML
40 INJECTION SUBCUTANEOUS EVERY 12 HOURS
Status: DISCONTINUED | OUTPATIENT
Start: 2025-02-20 | End: 2025-03-05 | Stop reason: HOSPADM

## 2025-02-20 RX ORDER — DEXTROSE MONOHYDRATE 25 G/50ML
25-50 INJECTION, SOLUTION INTRAVENOUS
Status: DISCONTINUED | OUTPATIENT
Start: 2025-02-20 | End: 2025-03-05 | Stop reason: HOSPADM

## 2025-02-20 RX ORDER — ACETAMINOPHEN 650 MG/1
650 SUPPOSITORY RECTAL EVERY 4 HOURS PRN
Status: DISCONTINUED | OUTPATIENT
Start: 2025-02-20 | End: 2025-03-05 | Stop reason: HOSPADM

## 2025-02-20 RX ORDER — ONDANSETRON 2 MG/ML
4 INJECTION INTRAMUSCULAR; INTRAVENOUS EVERY 6 HOURS PRN
Status: DISCONTINUED | OUTPATIENT
Start: 2025-02-20 | End: 2025-02-25

## 2025-02-20 RX ORDER — HYDROMORPHONE HYDROCHLORIDE 1 MG/ML
0.5 INJECTION, SOLUTION INTRAMUSCULAR; INTRAVENOUS; SUBCUTANEOUS
Status: DISCONTINUED | OUTPATIENT
Start: 2025-02-20 | End: 2025-02-25

## 2025-02-20 RX ORDER — CALCIUM CARBONATE 500 MG/1
1000 TABLET, CHEWABLE ORAL 4 TIMES DAILY PRN
Status: DISCONTINUED | OUTPATIENT
Start: 2025-02-20 | End: 2025-03-05 | Stop reason: HOSPADM

## 2025-02-20 RX ORDER — OXYCODONE HYDROCHLORIDE 10 MG/1
10 TABLET ORAL EVERY 4 HOURS PRN
Status: DISCONTINUED | OUTPATIENT
Start: 2025-02-20 | End: 2025-02-25

## 2025-02-20 RX ORDER — IRBESARTAN 150 MG/1
300 TABLET ORAL AT BEDTIME
Status: DISCONTINUED | OUTPATIENT
Start: 2025-02-20 | End: 2025-03-05 | Stop reason: HOSPADM

## 2025-02-20 RX ORDER — HYDROMORPHONE HYDROCHLORIDE 1 MG/ML
1 INJECTION, SOLUTION INTRAMUSCULAR; INTRAVENOUS; SUBCUTANEOUS
Status: DISCONTINUED | OUTPATIENT
Start: 2025-02-20 | End: 2025-02-25

## 2025-02-20 RX ORDER — NICOTINE POLACRILEX 4 MG
15-30 LOZENGE BUCCAL
Status: DISCONTINUED | OUTPATIENT
Start: 2025-02-20 | End: 2025-03-05 | Stop reason: HOSPADM

## 2025-02-20 RX ORDER — POLYETHYLENE GLYCOL 3350 17 G/17G
17 POWDER, FOR SOLUTION ORAL DAILY
Status: DISCONTINUED | OUTPATIENT
Start: 2025-02-20 | End: 2025-02-25

## 2025-02-20 RX ORDER — AMLODIPINE BESYLATE 10 MG/1
10 TABLET ORAL DAILY
Status: DISCONTINUED | OUTPATIENT
Start: 2025-02-20 | End: 2025-03-05 | Stop reason: HOSPADM

## 2025-02-20 RX ADMIN — IRBESARTAN 300 MG: 150 TABLET ORAL at 22:56

## 2025-02-20 RX ADMIN — SODIUM CHLORIDE: 9 INJECTION, SOLUTION INTRAVENOUS at 01:23

## 2025-02-20 RX ADMIN — HYDROMORPHONE HYDROCHLORIDE 0.5 MG: 1 INJECTION, SOLUTION INTRAMUSCULAR; INTRAVENOUS; SUBCUTANEOUS at 06:42

## 2025-02-20 RX ADMIN — HYDROMORPHONE HYDROCHLORIDE 1 MG: 1 INJECTION, SOLUTION INTRAMUSCULAR; INTRAVENOUS; SUBCUTANEOUS at 00:20

## 2025-02-20 RX ADMIN — CYCLOBENZAPRINE HYDROCHLORIDE 10 MG: 5 TABLET, FILM COATED ORAL at 13:18

## 2025-02-20 RX ADMIN — HYDROMORPHONE HYDROCHLORIDE 0.5 MG: 1 INJECTION, SOLUTION INTRAMUSCULAR; INTRAVENOUS; SUBCUTANEOUS at 03:52

## 2025-02-20 RX ADMIN — OXYCODONE HYDROCHLORIDE 10 MG: 10 TABLET ORAL at 16:44

## 2025-02-20 RX ADMIN — ENOXAPARIN SODIUM 40 MG: 40 INJECTION SUBCUTANEOUS at 20:33

## 2025-02-20 RX ADMIN — METFORMIN HYDROCHLORIDE 1000 MG: 500 TABLET ORAL at 17:44

## 2025-02-20 RX ADMIN — IOPAMIDOL 100 ML: 755 INJECTION, SOLUTION INTRAVENOUS at 17:17

## 2025-02-20 RX ADMIN — POLYETHYLENE GLYCOL 3350 17 G: 17 POWDER, FOR SOLUTION ORAL at 07:53

## 2025-02-20 RX ADMIN — SODIUM CHLORIDE: 9 INJECTION, SOLUTION INTRAVENOUS at 17:45

## 2025-02-20 RX ADMIN — OXYCODONE HYDROCHLORIDE 10 MG: 10 TABLET ORAL at 10:08

## 2025-02-20 RX ADMIN — CYCLOBENZAPRINE HYDROCHLORIDE 10 MG: 5 TABLET, FILM COATED ORAL at 07:53

## 2025-02-20 RX ADMIN — AMLODIPINE BESYLATE 10 MG: 10 TABLET ORAL at 13:19

## 2025-02-20 RX ADMIN — HYDROMORPHONE HYDROCHLORIDE 0.5 MG: 1 INJECTION, SOLUTION INTRAMUSCULAR; INTRAVENOUS; SUBCUTANEOUS at 12:20

## 2025-02-20 RX ADMIN — CYCLOBENZAPRINE HYDROCHLORIDE 10 MG: 5 TABLET, FILM COATED ORAL at 01:36

## 2025-02-20 RX ADMIN — ATORVASTATIN CALCIUM 40 MG: 40 TABLET, FILM COATED ORAL at 13:19

## 2025-02-20 RX ADMIN — ZOLEDRONIC ACID 4 MG: 4 INJECTION, SOLUTION, CONCENTRATE INTRAVENOUS at 14:52

## 2025-02-20 RX ADMIN — CYCLOBENZAPRINE HYDROCHLORIDE 10 MG: 5 TABLET, FILM COATED ORAL at 20:33

## 2025-02-20 RX ADMIN — SODIUM CHLORIDE 65 ML: 9 INJECTION, SOLUTION INTRAVENOUS at 17:17

## 2025-02-20 RX ADMIN — HYDROMORPHONE HYDROCHLORIDE 0.5 MG: 1 INJECTION, SOLUTION INTRAMUSCULAR; INTRAVENOUS; SUBCUTANEOUS at 07:53

## 2025-02-20 RX ADMIN — MONTELUKAST 10 MG: 10 TABLET, FILM COATED ORAL at 22:56

## 2025-02-20 RX ADMIN — ENOXAPARIN SODIUM 40 MG: 40 INJECTION SUBCUTANEOUS at 09:16

## 2025-02-20 RX ADMIN — HYDROMORPHONE HYDROCHLORIDE 0.5 MG: 1 INJECTION, SOLUTION INTRAMUSCULAR; INTRAVENOUS; SUBCUTANEOUS at 09:15

## 2025-02-20 RX ADMIN — HYDROMORPHONE HYDROCHLORIDE 1 MG: 1 INJECTION, SOLUTION INTRAMUSCULAR; INTRAVENOUS; SUBCUTANEOUS at 22:56

## 2025-02-20 RX ADMIN — ACETAMINOPHEN 650 MG: 325 TABLET, FILM COATED ORAL at 09:16

## 2025-02-20 RX ADMIN — HYDROMORPHONE HYDROCHLORIDE 0.5 MG: 1 INJECTION, SOLUTION INTRAMUSCULAR; INTRAVENOUS; SUBCUTANEOUS at 01:39

## 2025-02-20 RX ADMIN — SODIUM CHLORIDE: 9 INJECTION, SOLUTION INTRAVENOUS at 09:04

## 2025-02-20 RX ADMIN — SERTRALINE HYDROCHLORIDE 150 MG: 100 TABLET ORAL at 13:18

## 2025-02-20 ASSESSMENT — ACTIVITIES OF DAILY LIVING (ADL)
ADLS_ACUITY_SCORE: 28
ADLS_ACUITY_SCORE: 42
ADLS_ACUITY_SCORE: 28
ADLS_ACUITY_SCORE: 42
ADLS_ACUITY_SCORE: 28

## 2025-02-20 NOTE — PROGRESS NOTES
[facility]    Medicine Progress Note - Hospitalist Service    Date of Admission:  2/20/2025    Assessment & Plan      Brissa Mott is a 54 year old female admitted on 2/20/2025. She has a complicated past medical history which includes morbid obesity on Ozempic, left-sided CVA 6 years ago with minimal residual weakness, advanced osteoarthritis, advanced meniscal injuries and tears of left knee, hypertension, dyslipidemia, recent hospitalization at [facility] for inability to bear weight and right knee pain.    Patient was transferred to this facility with concerns of complicated right posterior femur fracture, hypercalcemia as well as concerns for intramedullary.    Problem list:  # Severe hypercalcemia  #concerns of metastatic bone lesions  # Myeloma?    - check PTHintact, check TSH, if normal PTHintact then check PTHrP  - oncology evaluation  On IVF  - zometa as per oncology   -Previously had a CT and MRI on her last hospitalization and no reports of underlying mass or lesions during that time    #Posterior femur fracture complicated with presence of possible intramedullary lesion   - optimized pain control  - orthopedics evaluation  Mechanical DVT prophylaxis for now given with plans for surgical intervention    #DM type 2  - on insulin slide scale.  Hold  ozempic      # super morbid obesity  - complicates care            Diet: Combination Diet Regular Diet Adult    DVT Prophylaxis: Pneumatic Compression Devices and Ambulate every shift  Nielson Catheter: Not present  Lines: None     Cardiac Monitoring: None  Code Status: Full Code      Clinically Significant Risk Factors Present on Admission            # Hypercalcemia: Highest Ca = 14.7 mg/dL in last 2 days, will monitor as appropriate    # Hypoalbuminemia: Lowest albumin = 3.4 g/dL at 2/20/2025  6:54 AM, will monitor as appropriate   # Drug Induced Platelet Defect: home medication list includes an antiplatelet medication   #  "Hypertension: Noted on problem list           # Severe Obesity: Estimated body mass index is 48.23 kg/m  as calculated from the following:    Height as of 2/18/25: 1.626 m (5' 4\").    Weight as of 2/18/25: 127.5 kg (281 lb).       # Asthma: noted on problem list        Social Drivers of Health    Depression: At risk (7/19/2024)    PHQ-2     PHQ-2 Score: 3   Tobacco Use: Medium Risk (2/18/2025)    Patient History     Smoking Tobacco Use: Former     Smokeless Tobacco Use: Never          Disposition Plan     Medically Ready for Discharge: Anticipated in 2-4 Days             Eren Montenegro MD, MD  Hospitalist Service  [facility]  Securely message with [employer] (more info)  Text page via [employer] Paging/Directory   ______________________________________________________________________    Interval History   I assumed service care today. Seen and examined   Earlier she was still complaining of pain but had some improvement after oral pain regimen administration  Afebrile. No reported mental status changes. No vomiting. Had some mild nausea. No coughing spells.  Not requiring O2 support.      Physical Exam   Vital Signs: Temp: 97.3  F (36.3  C) Temp src: Temporal BP: (!) 150/89 Pulse: 92   Resp: 18 SpO2: 93 % O2 Device: None (Room air)    Weight: 0 lbs 0 oz    Morbidly obese  HEENT; Atraumatic, normocephalic, pinkish conjuctiva, pupils bilateral reactive   Skin: warm and moist, no rashes  Lungs: equal chest expansion, clear to auscultation, no wheezes, no stridor, no crackles,   Heart: normal rate, normal rhythm, no rubs or gallops.   Abdomen: normal bowel sounds, no tenderness, no peritoneal signs, no guarding  Extremities: no deformities, no edema   Neuro; follow commands, alert and oriented x3, spontaneous speech, coherent, moves all extremities spontaneously  Psych; no hallucination, euthymic mood, not agitated      Medical Decision Making       45 MINUTES SPENT BY ME on the date of service doing " chart review, history, exam, documentation & further activities per the note.  MANAGEMENT DISCUSSED with the following over the past 24 hours: yes   NOTE(S)/MEDICAL RECORDS REVIEWED over the past 24 hours: yes       Data     I have personally reviewed the following data over the past 24 hrs:    9.3  \   11.4 (L)   / 231     138 103 15.7 /  136 (H)   4.0 26 0.46 (L) \     ALT: 27 AST: 46 (H) AP: 91 TBILI: 0.5   ALB: 3.4 (L) TOT PROTEIN: 6.3 (L) LIPASE: N/A       Imaging results reviewed over the past 24 hrs:   No results found for this or any previous visit (from the past 24 hours).

## 2025-02-20 NOTE — PLAN OF CARE
"Pt arrived via direct admission just after midnight with 10/10 pain. Transferred to bed with slide board. IV dilaudid x3, PO Flexeril given with relief. CMS intact x pain to RLE. Ok for regular diet, anticipate surgery on Friday. Bedrest, external catheter for decreased mobility combined with baseline nocturnal incontinence. A/Ox4, calls appropriately.  at 0400. NS @125. VSS- slightly HTN.     Shift events:   Arrival from [facility] after midnight. Pain medications x3. Admit completed, Hospitalist rounded overnight to complete. External purewick placed.     Treatment plan:   Strict bedrest. External cath. OK for regular diet, anticipate surgery on Friday. Ortho and Oncology consults. Pain management. Emotional support. Pt requested advanced directive consult.                           Problem: Adult Inpatient Plan of Care  Goal: Plan of Care Review  Description: The Plan of Care Review/Shift note should be completed every shift.  The Outcome Evaluation is a brief statement about your assessment that the patient is improving, declining, or no change.  This information will be displayed automatically on your shift  note.  Outcome: Not Progressing  Flowsheets (Taken 2/20/2025 0718)  Plan of Care Reviewed With: patient  Overall Patient Progress: no change  Goal: Patient-Specific Goal (Individualized)  Description: You can add care plan individualizations to a care plan. Examples of Individualization might be:  \"Parent requests to be called daily at 9am for status\", \"I have a hard time hearing out of my right ear\", or \"Do not touch me to wake me up as it startles  me\".  Outcome: Not Progressing  Goal: Absence of Hospital-Acquired Illness or Injury  Outcome: Not Progressing  Intervention: Identify and Manage Fall Risk  Recent Flowsheet Documentation  Taken 2/20/2025 0035 by Stephanie Saenz, RN  Safety Promotion/Fall Prevention: safety round/check completed  Intervention: Prevent Skin Injury  Recent Flowsheet " "Documentation  Taken 2/20/2025 0035 by Stephanie Saenz, RN  Body Position: weight shifting  Intervention: Prevent Infection  Recent Flowsheet Documentation  Taken 2/20/2025 0035 by Stephanie Saenz, RN  Infection Prevention: rest/sleep promoted  Goal: Optimal Comfort and Wellbeing  Outcome: Not Progressing  Goal: Readiness for Transition of Care  Outcome: Not Progressing  Intervention: Mutually Develop Transition Plan  Recent Flowsheet Documentation  Taken 2/20/2025 0033 by Stephanie Saenz, RN  Equipment Currently Used at Home: (motorized \"scooter\") walker, standard   Goal Outcome Evaluation:      Plan of Care Reviewed With: patient    Overall Patient Progress: no changeOverall Patient Progress: no change           "

## 2025-02-20 NOTE — TELEPHONE ENCOUNTER
Returned call to [facility] and she just wanted to update her coordinator's contact information and get the transplant office phone number. She also had questions about a potential donor on life support in [place]. Family is interested in direct donation to [facility]. I am not sure how to handle this so will seek advice from my supervisor and the donor team.

## 2025-02-20 NOTE — H&P
[facility]    History and Physical - Hospitalist Service       Date of Admission:  2/20/2025    Assessment & Plan      Brissa Mott is a 54 year old female admitted on 2/20/2025. She has a complicated past medical history which includes morbid obesity on Ozempic, left-sided CVA 6 years ago with minimal residual weakness, advanced osteoarthritis, advanced meniscal injuries and tears of left knee, hypertension, dyslipidemia, recent hospitalization at [facility] for inability to bear weight and right knee pain.    Patient was transferred to this facility with concerns of complicated right posterior femur fracture, hypercalcemia as well as concerns for intramedullary.  1/.  Hypercalcemia this is a new finding and the patient her blood work was fairly unremarkable when she was recently hospitalized at [facility].  Continue hydration with IV fluids and remeasure calcium levels in the morning   2/.  Posterior femur fracture complicated with presence of possible intramedullary lesion.  Orthopedic consult placed.  Based upon their acceptance note it seems that the surgery will not be till Friday which will give us adequate time to optimize her condition and have oncology evaluation as well.  Notable, patient had a completely unremarkable CT scan and MRI less than 3 weeks ago.  3/.  Type 2 diabetes mellitus continue with sliding scale insulin and Accu-Cheks, Ozempic will be placed on hold in the postoperative phase.  /.  Super morbid obesity: BMI is close to 50 which will definitely make her hospital care harder and more complicated.  5/.  CODE STATUS full code  6/.  Supportive cares: Bedrest, VTE prophylaxis, fall precautions and pure wick        Diet:  Regular diabetic  DVT Prophylaxis: Enoxaparin (Lovenox) SQ  Nielson Catheter: Not present  Lines: None     Cardiac Monitoring: None  Code Status:  Full code    Clinically Significant Risk Factors Present on Admission            #  "Hypercalcemia: Highest Ca = 14.7 mg/dL in last 2 days, will monitor as appropriate      # Drug Induced Platelet Defect: home medication list includes an antiplatelet medication   # Hypertension: Noted on problem list           # Severe Obesity: Estimated body mass index is 48.23 kg/m  as calculated from the following:    Height as of 2/18/25: 1.626 m (5' 4\").    Weight as of 2/18/25: 127.5 kg (281 lb).       # Asthma: noted on problem list        Disposition Plan     Medically Ready for Discharge: Anticipated in 2-4 Days           Lilly Barrett MD  Hospitalist Service  [facility]  Securely message with [employer] (more info)  Text page via [facility] Paging/Directory     ______________________________________________________________________    Chief Complaint   Right knee pain inability to bear weight    History is obtained from the patient, electronic health record, and emergency department physician    History of Present Illness   Brissa Mott is a 54 year old female who has a complicated past medical history which includes morbid obesity on Ozempic, left-sided CVA 6 years ago with minimal residual weakness, advanced osteoarthritis, advanced meniscal injuries and tears of left knee, hypertension, dyslipidemia, recent hospitalization at [facility] for inability to bear weight and right knee pain.  Patient is being admitted after she presented originally to [facility] with the above complaints.  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  Because of the requirement of subspecialty services patient was transferred to this hospital.  Patient is a resident of [facility] and usually gets care in the [facility] system.  According to her approximately 2-1/2 weeks ago she had significant pain in her right leg which is her \"stronger " "leg \"and hence because of her inability to bear weight she was admitted to [facility] underwent a CT and MRI of the knee was evaluated by orthopedics and subsequently discharged for ongoing cares patient however sustained a mechanical fall earlier today while she was being transferred.  She states that the fall was because she lost her balance and excruciating pain developed.  Hence her TCU sent her to [facility].  At the time of my evaluation patient has been accepted and is inpatient at [facility] she does complain of a significant amount of pain other than that she states she feels terrible because she feels she just cannot catch a break.  Patient's last dose of semaglutide was this past Monday.  She states she has been doing her best to participate in therapy.  She is brought employed because of disability issues, she lives at home with her  does not smoke and rarely ever drinks alcohol.  Typically uses a cane when walking any distance because of her knee instability related issues.      Past Medical History    No past medical history on file.    Past Surgical History   No past surgical history on file.    Prior to Admission Medications   Prior to Admission Medications   Prescriptions Last Dose Informant Patient Reported? Taking?   Continuous Glucose Sensor (FREESTYLE SANDRA 14 DAY SENSOR) MISC  Self No No   Sig: Change every 14 days.   Continuous Glucose Sensor (FREESTYLE SANDRA 3 SENSOR) MISC  Self No No   Si each every 14 days Use 1 sensor every 14 days. Use to read blood sugars per 's instructions.   Lidocaine (LIDOCARE) 4 % Patch  Self Yes No   Sig: Place 1 patch onto the skin every 24 hours. On for 12 hours, off for 12 hours.   Misc. Devices (ROLLATOR ULTRA-LIGHT) MISC  Self Yes No   Sig: Extra side walker with front wheels for home use.  Purchase, lifetime need. Extra wide rolling walker (\"Walker 4\", item #:  [id number]) needed for safe transfers and " ambulation.  Pt weight is 335 lbs.   Semaglutide, 2 MG/DOSE, (OZEMPIC, 2 MG/DOSE,) 8 MG/3ML pen  Self No No   Sig: Inject 2 mg subcutaneously every 7 days   acetaminophen (TYLENOL) 500 MG tablet  Self Yes No   Sig: Take 1,000 mg by mouth 3 times daily as needed for fever or pain.   albuterol (PROAIR HFA/PROVENTIL HFA/VENTOLIN HFA) 108 (90 Base) MCG/ACT inhaler  Self No No   Sig: Inhale 2 puffs into the lungs every 6 hours as needed for wheezing   amLODIPine (NORVASC) 10 MG tablet  Self No No   Sig: Take 1 tablet (10 mg) by mouth daily   aspirin (ASA) 81 MG tablet  Self No No   Sig: Take 1 tablet (81 mg) by mouth daily   atorvastatin (LIPITOR) 40 MG tablet  Self No No   Sig: Take 1 tablet (40 mg) by mouth daily   diazepam (VALIUM) 5 MG tablet  Self Yes No   Sig: Take 5 mg by mouth every 6 hours as needed for muscle spasms.   ibuprofen (ADVIL/MOTRIN) 200 MG tablet  Self Yes No   Sig: Take 200-400 mg by mouth every 4 hours as needed for mild pain.   insulin NPH-Regular (HUMULIN MIX 70/30 KWIKPEN) susp  Self No No   Sig: ADMINISTER 16 UNITS UNDER THE SKIN BEFORE BREAKFAST AND SUPPER   Patient taking differently: Inject 20 Units subcutaneously 2 times daily (before meals). ADMINISTER 16 UNITS UNDER THE SKIN BEFORE BREAKFAST AND SUPPER   insulin pen needle (BD PEN NEEDLE TWYLA 2ND GEN) 32G X 4 MM miscellaneous  Self No No   Sig: USE TWICE DAILY OR AS DIRECTED   irbesartan (AVAPRO) 300 MG tablet  Self No No   Sig: TAKE 1 TABLET(300 MG) BY MOUTH AT BEDTIME   metFORMIN (GLUCOPHAGE) 1000 MG tablet  Self No No   Sig: TAKE 1 TABLET BY MOUTH TWICE DAILY WITH MEALS   montelukast (SINGULAIR) 10 MG tablet  Self No No   Sig: Take 1 tablet (10 mg) by mouth at bedtime   nystatin (MYCOSTATIN) 693750 UNIT/GM external powder  Self No No   Sig: Apply topically 2 times daily   oxyCODONE IR (ROXICODONE) 10 MG tablet  Self Yes No   Sig: Take 10 mg by mouth every 4 hours as needed for moderate to severe pain.   sertraline (ZOLOFT) 100 MG  tablet  Self No No   Sig: Take 1.5 tablets (150 mg) by mouth daily   triamcinolone (KENALOG) 0.1 % external cream  Self No No   Sig: APPLY TOPICALLY TO THE AFFECTED AREA TWICE DAILY      Facility-Administered Medications: None        Review of Systems    The 10 point Review of Systems is negative other than noted in the HPI or here.     Social History   I have reviewed this patient's social history and updated it with pertinent information if needed.  Social History     Tobacco Use    Smoking status: Former    Smokeless tobacco: Never   Vaping Use    Vaping status: Never Used   Substance Use Topics    Alcohol use: Not Currently    Drug use: Never         Family History     Not relevant      Allergies   Allergies   Allergen Reactions    Bydureon [Exenatide] Diarrhea    Penicillins Rash    Sulfa Antibiotics Rash        Physical Exam   Vital Signs: Temp: 97.7  F (36.5  C) Temp src: Temporal BP: (!) 149/83 Pulse: 91   Resp: 18 SpO2: 97 % O2 Device: None (Room air)    Weight: 0 lbs 0 oz    General Appearance: Alert awake oriented x 3 morbidly obese appears to be uncomfortable but in no distress  Respiratory: Diminished basal breath sounds secondary to poor inspiratory effort  Cardiovascular: S1-S2 distant heart sounds  GI: Obese nontender bowel sounds are present  Skin: No rash or lesions  Other: 2+ lower extremity edema, tenderness on palpation of the right knee with minimal movement    Medical Decision Making       75 MINUTES SPENT BY ME on the date of service doing chart review, history, exam, documentation & further activities per the note.      Data   ------------------------- PAST 24 HR DATA REVIEWED -----------------------------------------------        Imaging results reviewed over the past 24 hrs:   PRESSION:  1.  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 Constellation of findings are concerning for malignancy, including metastasis or myeloma.  2.  Possible nondisplaced pathologic fracture through the posterior cortex of the distal femoral diaphysis.

## 2025-02-20 NOTE — RESULT ENCOUNTER NOTE
Approving, but needs appt for additional refills.    Please Notify Brissa  of test results thyroid was within normal limits.  Your cholesterol numbers are within normal limits and your hemoglobin is within normal limits with awaiting all other lab tests we will let you know with those are once the result    Juli Ramsey CNP

## 2025-02-20 NOTE — PHARMACY-ADMISSION MEDICATION HISTORY
Pharmacist Admission Medication History    Admission medication history is complete. The information provided in this note is only as accurate as the sources available at the time of the update.    Information Source(s): Medication history completed by medication scribe TT (see note from 2/18/2025) while patient was at [facility]. Patient had reported being unable to get extra supply of Freestyle after last hospital admission (1/30/25-2/4/25) due to insurance and has not checked blood glucose at home since then.    Medication History Reviewed By: Ivon Shi RP 2/20/2025 10:02 AM

## 2025-02-20 NOTE — CONSULTS
IR consult request for a Biopsy of a soft tissue/lesion over the femor (above right knee).     Primary team reviewed with IR Dr West who recommended ortho reviewing path of biopsy and/or there was mention of doing a biopsy during surgery first.     IR is awaiting primary team to discuss with orthopedic team and then have a plan in place.     Please respond to IR after further planning with orthopedics.     Total time: 20 minutes      Thanks, Milagros [facility] Interventional Radiology CNP ([phone]) (phone [phone])

## 2025-02-20 NOTE — TELEPHONE ENCOUNTER
Patient Call: General  Route to LPN    Reason for call: Patient called to touch base with coordinator about a recent call on a potential donor and they were needing some additional information. Patient is looking to get correct and up to date information on reaching SOT coordinator and team. They are needing a call back today. More details with call back.     Call back needed? Yes    Return Call Needed  Same as documented in contacts section  When to return call?: Same day: Route High Priority

## 2025-02-20 NOTE — CONSULTS
[facility]    Orthopedic Consultation    Brissa Mott MRN# [id number]   Age: 54 year old YOB: 1970     Date of Admission:  2/20/2025    Reason for consult: Right distal femur pain with appearance of lytic lesion on CT         Requesting provider: JOSAFAT Barrett        Level of consult: Consult, follow and place orders           Assessment and Plan:   Assessment:   Right distal femur pain with appearance of lytic lesion on CT    Morbid obesity  Advanced arthritis left knee with residual left LE weakness from previous CVA       Plan:   Orthopedics will await any surgical intervention until IR biopsy has been completed along with oncology recommendations on best timing to proceed with surgery.    Contrast CT chest/abdomen/pelvis is pending  IR consult placed for biopsy of femoral lesion   Non-WB right LE   Pain medication prn    Imaging was reviewed by Dr Meredith.             Chief Complaint:   Right leg pain          History of Present Illness:   HPI per patient and medical records    Brissa Mott is a 54 year old female who has a complicated past medical history which includes morbid obesity on Ozempic, CVA 6 years ago with Left LE residual weakness, advanced osteoarthritis, advanced arthritis left knee, hypertension, dyslipidemia with recent hospitalization at [facility] for inability to bear weight and right knee pain.  Patient reports she fell on 01/29/2025 causing her initial right knee pain and difficulty weightbearing.  However, she also sustained a mechanical fall yesterday while she was being transferred.  She states that the fall was because she lost her balance and excruciating pain developed.  Hence her TCU sent her to [facility].  Patient was transferred to [facility] from [facility] for requirement of subspecialty services with concerns of a lytic lesion within the distal femur seen on CT and hypercalcemia.   She states she ambulates with a  cane at baseline.            Past Medical History:   No past medical history on file.          Past Surgical History:   No past surgical history on file.          Social History:     Social History     Tobacco Use    Smoking status: Former    Smokeless tobacco: Never   Substance Use Topics    Alcohol use: Not Currently             Family History:   No family history on file.          Immunizations:     VACCINE/DOSE   Diptheria   DPT   DTAP   HBIG   Hepatitis A   Hepatitis B   HIB   Influenza   Measles   Meningococcal   MMR   Mumps   Pneumococcal   Polio   Rubella   Small Pox   TDAP   Varicella   Zoster             Allergies:     Allergies   Allergen Reactions    Bydureon [Exenatide] Diarrhea    Penicillins Rash    Sulfa Antibiotics Rash             Medications:     Current Facility-Administered Medications   Medication Dose Route Frequency Provider Last Rate Last Admin    acetaminophen (TYLENOL) tablet 650 mg  650 mg Oral Q4H PRN Lilly Barrett MD   650 mg at 02/20/25 0916    Or    acetaminophen (TYLENOL) Suppository 650 mg  650 mg Rectal Q4H PRN Lilly Barrett MD        benzocaine-menthol (CHLORASEPTIC) 6-10 MG lozenge 1 lozenge  1 lozenge Buccal Q1H PRN Lilly Barrett MD        calcium carbonate (TUMS) chewable tablet 1,000 mg  1,000 mg Oral 4x Daily PRN Lilly Barrett MD        cyclobenzaprine (FLEXERIL) tablet 10 mg  10 mg Oral TID Lilly Barrett MD   10 mg at 02/20/25 0753    glucose gel 15-30 g  15-30 g Oral Q15 Min PRN Lilly Barrett MD        Or    dextrose 50 % injection 25-50 mL  25-50 mL Intravenous Q15 Min PRN Lilly Barrett MD        Or    glucagon injection 1 mg  1 mg Subcutaneous Q15 Min PRN Lilly Barrett MD        enoxaparin ANTICOAGULANT (LOVENOX) injection 40 mg  40 mg Subcutaneous Q12H Lilly Barrett MD   40 mg at 02/20/25 0916    HYDROmorphone (PF) (DILAUDID) injection 0.5 mg  0.5 mg Intravenous Q2H PRN Lilly Barrett MD   0.5 mg at 02/20/25 0915    HYDROmorphone (PF) (DILAUDID)  injection 1 mg  1 mg Intravenous Q3H PRN Lilly Barrett MD        insulin aspart (NovoLOG) injection (RAPID ACTING)  1-7 Units Subcutaneous TID AC Lilly Barrett MD        insulin aspart (NovoLOG) injection (RAPID ACTING)  1-5 Units Subcutaneous At Bedtime Lilly Barrett MD        lidocaine (LMX4) cream   Topical Q1H PRN Lilly Barrett MD        lidocaine 1 % 0.1-1 mL  0.1-1 mL Other Q1H PRN Lilly Barrett MD        melatonin tablet 5 mg  5 mg Oral At Bedtime PRN Lilly Barrett MD        miconazole (MICATIN) 2 % powder   Topical BID PRN Lilly Barrett MD        naloxone (NARCAN) injection 0.2 mg  0.2 mg Intravenous Q2 Min PRN Chilo Adkins MD        Or    naloxone (NARCAN) injection 0.4 mg  0.4 mg Intravenous Q2 Min PRN Chilo Adkins MD        Or    naloxone (NARCAN) injection 0.2 mg  0.2 mg Intramuscular Q2 Min PRN Chilo Adkins MD        Or    naloxone (NARCAN) injection 0.4 mg  0.4 mg Intramuscular Q2 Min PRN Chilo Adkins MD        ondansetron (ZOFRAN ODT) ODT tab 4 mg  4 mg Oral Q6H PRN Lilly Barrett MD        Or    ondansetron (ZOFRAN) injection 4 mg  4 mg Intravenous Q6H PRN Lilly Barrett MD        oxyCODONE (ROXICODONE) tablet 5 mg  5 mg Oral Q4H PRN Eren Montenegro MD        oxyCODONE IR (ROXICODONE) tablet 10 mg  10 mg Oral Q4H PRN Eren Montenegro MD        polyethylene glycol (MIRALAX) Packet 17 g  17 g Oral Daily Lilly Barrett MD   17 g at 02/20/25 0753    prochlorperazine (COMPAZINE) injection 10 mg  10 mg Intravenous Q6H PRN Lilly Barrett MD        Or    prochlorperazine (COMPAZINE) tablet 10 mg  10 mg Oral Q6H PRN Lilly Barrett MD        senna-docusate (SENOKOT-S/PERICOLACE) 8.6-50 MG per tablet 1 tablet  1 tablet Oral BID PRN Lilly Barrett MD        Or    senna-docusate (SENOKOT-S/PERICOLACE) 8.6-50 MG per tablet 2 tablet  2 tablet Oral BID PRN Lilly Barrett MD        sodium chloride (PF) 0.9% PF flush 3 mL  3 mL Intracatheter Q8H Lilly Barrett MD        sodium  chloride (PF) 0.9% PF flush 3 mL  3 mL Intracatheter q1 min prn Lilly Barrett MD        sodium chloride 0.9 % infusion   Intravenous Continuous Lilly Barrett  mL/hr at 02/20/25 0904 New Bag at 02/20/25 0904             Review of Systems:   ROS:  10 point ROS neg other than the symptoms noted above in the HPI.            Physical Exam:   All vitals have been reviewed  Patient Vitals for the past 24 hrs:   BP Temp Temp src Pulse Resp SpO2   02/20/25 0842 (!) 150/89 97.3  F (36.3  C) Temporal 92 18 93 %   02/20/25 0012 (!) 149/83 97.7  F (36.5  C) Temporal 91 18 97 %       Intake/Output Summary (Last 24 hours) at 2/20/2025 1006  Last data filed at 2/20/2025 0800  Gross per 24 hour   Intake 120 ml   Output 250 ml   Net -130 ml         Physical Exam   Temp: 97.3  F (36.3  C) Temp src: Temporal BP: (!) 150/89 Pulse: 92   Resp: 18 SpO2: 93 % O2 Device: None (Room air)    Vital Signs with Ranges  Temp:  [97.3  F (36.3  C)-97.7  F (36.5  C)] 97.3  F (36.3  C)  Pulse:  [89-96] 92  Resp:  [18] 18  BP: (123-161)/() 150/89  SpO2:  [90 %-97 %] 93 %  0 lbs 0 oz    Constitutional: Pleasant, alert, appropriate, following commands.  HEENT: Head atraumatic normocephalic. Pupils equal round and reactive to light.  Respiratory: Unlabored breathing no audible wheeze  Cardiovascular: Regular rate and rhythm per pulses  GI: Abdomen non-distended.  Lymph/Hematologic: No lymphadenopathy in areas examined  Genitourinary:  No staples  Skin: No rashes, no cyanosis, edema within the left foot and low leg.  Musculoskeletal: On physical exam of the right LE:   Skin: intact, no erythema or ecchymosis   Swelling: no visible swelling, difficult to assess effusion given patient is unable to fully extend her knee due to pain.     Alignment: right knee in flexion   Tenderness to palpation along the medial and lateral distal femur   ROM: increased pain with minimal passive motion of the knee.  Flexed at 15 degrees, unable to extend due to  pain.    Dorsi/Plantar flexion:  5/5 on the right, able to hold left ankle in dorsi flexion.   Calves:  Soft and non-tender  Distal pulses are intact and equal bilaterally  Sensation to light touch intact and equal bilaterally.   Neurologic: normal without focal findings, mental status, speech normal, alert and oriented x iii            Data:   All laboratory data reviewed  Results for orders placed or performed during the hospital encounter of 02/20/25   Comprehensive metabolic panel     Status: Abnormal   Result Value Ref Range    Sodium 138 135 - 145 mmol/L    Potassium 4.0 3.4 - 5.3 mmol/L    Carbon Dioxide (CO2) 26 22 - 29 mmol/L    Anion Gap 9 7 - 15 mmol/L    Urea Nitrogen 15.7 6.0 - 20.0 mg/dL    Creatinine 0.46 (L) 0.51 - 0.95 mg/dL    GFR Estimate >90 >60 mL/min/1.73m2    Calcium 12.9 (H) 8.8 - 10.4 mg/dL    Chloride 103 98 - 107 mmol/L    Glucose 105 (H) 70 - 99 mg/dL    Alkaline Phosphatase 91 40 - 150 U/L    AST 46 (H) 0 - 45 U/L    ALT 27 0 - 50 U/L    Protein Total 6.3 (L) 6.4 - 8.3 g/dL    Albumin 3.4 (L) 3.5 - 5.2 g/dL    Bilirubin Total 0.5 <=1.2 mg/dL   Glucose by meter     Status: Abnormal   Result Value Ref Range    GLUCOSE BY METER POCT 100 (H) 70 - 99 mg/dL   CBC with platelets and differential     Status: Abnormal   Result Value Ref Range    WBC Count 9.3 4.0 - 11.0 10e3/uL    RBC Count 4.46 3.80 - 5.20 10e6/uL    Hemoglobin 11.4 (L) 11.7 - 15.7 g/dL    Hematocrit 36.7 35.0 - 47.0 %    MCV 82 78 - 100 fL    MCH 25.6 (L) 26.5 - 33.0 pg    MCHC 31.1 (L) 31.5 - 36.5 g/dL    RDW 14.1 10.0 - 15.0 %    Platelet Count 231 150 - 450 10e3/uL    % Neutrophils 71 %    % Lymphocytes 15 %    % Monocytes 11 %    % Eosinophils 2 %    % Basophils 0 %    % Immature Granulocytes 1 %    NRBCs per 100 WBC 0 <1 /100    Absolute Neutrophils 6.6 1.6 - 8.3 10e3/uL    Absolute Lymphocytes 1.4 0.8 - 5.3 10e3/uL    Absolute Monocytes 1.0 0.0 - 1.3 10e3/uL    Absolute Eosinophils 0.2 0.0 - 0.7 10e3/uL    Absolute  Basophils 0.0 0.0 - 0.2 10e3/uL    Absolute Immature Granulocytes 0.1 <=0.4 10e3/uL    Absolute NRBCs 0.0 10e3/uL   Glucose by meter     Status: Abnormal   Result Value Ref Range    GLUCOSE BY METER POCT 124 (H) 70 - 99 mg/dL   CBC with platelets differential     Status: Abnormal    Narrative    The following orders were created for panel order CBC with platelets differential.  Procedure                               Abnormality         Status                     ---------                               -----------         ------                     CBC with platelets and d...[[id number]]  Abnormal            Final result                 Please view results for these tests on the individual orders.          Attestation:  I have reviewed today's vital signs, notes, medications, labs and imaging with Dr. Meredith.  Amount of time performed on this consult: 60 minutes.    Stephanie Wong PA-C

## 2025-02-20 NOTE — TELEPHONE ENCOUNTER
Called Matilda back to recap. She is not on active status because she has open wounds on her feet. She understands the rationale why these must be healed before we could safely move forward with a transplant. She is insisting her wounds are less than a cm. Here is the note from 2/6/2025 wound clinic. She will be getting [place] boots the beginning of March. She cannot drive in those boots so she will still have to wear her tennis shoes at times.     Findings:   Pt has wounds on both of her feet. The right foot, is the size of about a quarter in her mid arch toward the plantarfoot. On the left foot, wound is about the size of a nickel on the mid arch.     We also discussed the potential donor in [place]. First issue is she is not ready for transplant due to the foot wounds. Second, If the family wishes to donate directly to Matilda the [place] [facility] would need to initiate contact with our [facility] to start the process. The problem is Matilda is not listed yet due to her wounds. She was quite emotional about her situation.

## 2025-02-20 NOTE — CONSULTS
[facility]    Hematology/Oncology Consult Note      Date of Admission: 2/18/2025  Date of Consult:  02/20/25   Reason for Consult: femur mass    ASSESSMENT AND RECOMMENDATIONS:      Brissa Mott is a 54 year old patient with hypercalcemia and a distal femur lytic mass with extraosseous soft tissue component.    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  Complete staging with Ct C/A/P to evaluate primary. Unusual rapid presence of intramedullary mass- if malignant would be more likely a metastatic disease or high grade hematologic malignancy over a primary sarcoma of the bone or joint.  Myeloma labs with SPEP, SARAH and [facility]  Discussed case with IR and Ortho- will request a biopsy of the osseus mass (or most accessible lesion if CT uncovers other disease).  Zometa x1 for hypercalcemia. Continue with hydration.     History of present illness: Brissa Mott is a 54 year old patient admitted on 2/20/2025 for elevated ca++ and a right above the knee lytic lesion.     Per hospitalist and confirmed by patient, she has apast medical history which includes morbid obesity on Ozempic, left-sided CVA 6 years ago with minimal residual weakness, advanced osteoarthritis, advanced meniscal injuries and tears of left knee, hypertension, dyslipidemia.    She was hospitalized 1/30/2025 at [facility] for inability to bear weight and right knee pain. Challenging history but presented again for a fall that occurred 2/18. Patient reports she had been hospitalized recently after an injury to her right knee during which she reports she sustained meniscus injury. Went to rehab and she states she was doing well at rehab and was able to climb stairs, though has not  been doing well at home for the past couple of weeks.      MRI 2/1/2025:  1. Advanced degenerative arthrosis of the right knee with full-thickness   grade 4 cartilage wear.   2. Degenerative tearing of the medial greater than lateral menisci.   3. Chronic ACL tear.   4. PCL degeneration.   5. Degeneration and chronic partial tearing of the proximal MCL.   6. Small effusion with synovitis.   7. Small popliteal cyst     CT knee 1/30/2025- Impression:   Mild soft tissue edema and severe tricompartmental osteoarthritis with no acute fracture or malalignment appreciated.     CT knee 2/18/2025: .  Intramedullary marrow replacing lesion in the distal femur measuring approximately 7.5 cm in craniocaudal dimension with associated permeative cortical destruction along the posterolateral margin of the distal femoral diaphysis and small extraosseous  soft tissue component. Constellation of findings are concerning for malignancy, including metastasis or myeloma. Possible nondisplaced pathologic fracture through the posterior cortex of the distal femoral diaphysis.    Interim history it is unclear exactly what happened but she somehow fell 2/18.  Unclear exactly how she fell, but perhaps fell on her right side.  No antecedent lightheadedness, dizziness, chest pain, shortness of breath, and none of the symptoms currently.  She did not hit her head.  No head or neck pain.  No vomiting.  Has reported significant pain over her right hip, thigh, knee, as well as left knee.          Medications:  No current outpatient medications on file.       Allergies   Allergen Reactions    Bydureon [Exenatide] Diarrhea    Penicillins Rash    Sulfa Antibiotics Rash         EXAM:    BP (!) 150/89 (BP Location: Right arm)   Pulse 92   Temp 97.3  F (36.3  C) (Temporal)   Resp 18   LMP  (LMP Unknown)   SpO2 93%     GENERAL: Female, in no acute distress.  Alert and oriented x3.   HEENT:  Normocephalic, atraumatic.  PERRL, oropharynx clear with no  sores or thrush.   LYMPH NODES:  No palpable pre/post-auricular, cervical, axillary lymphadenopathy appreciated.  ABDOMEN:  Soft, nontender and nondistended.   EXTREMITIES:  No clubbing, cyanosis, ++ edema.   SKIN: No rash  PSYCH: Calm and cooperative       Labs: CBC RESULTS:   Recent Labs   Lab Test 02/20/25  0654   WBC 9.3   RBC 4.46   HGB 11.4*   HCT 36.7   MCV 82   MCH 25.6*   MCHC 31.1*   RDW 14.1         80 minutes spent on the date of the encounter doing chart review, review of test results, interpretation of tests, patient visit, documentation, and discussion with other provider(s) - Cari Hatch  Hematology/Oncology  [facility]

## 2025-02-21 VITALS
OXYGEN SATURATION: 93 % | HEART RATE: 80 BPM | TEMPERATURE: 98 F | RESPIRATION RATE: 18 BRPM | SYSTOLIC BLOOD PRESSURE: 137 MMHG | DIASTOLIC BLOOD PRESSURE: 78 MMHG

## 2025-02-21 LAB
ALBUMIN SERPL BCG-MCNC: 3.1 G/DL (ref 3.5–5.2)
ALBUMIN SERPL ELPH-MCNC: 2.9 G/DL (ref 3.7–5.1)
ALP SERPL-CCNC: 90 U/L (ref 40–150)
ALPHA1 GLOB SERPL ELPH-MCNC: 0.5 G/DL (ref 0.2–0.4)
ALPHA2 GLOB SERPL ELPH-MCNC: 0.8 G/DL (ref 0.5–0.9)
ALT SERPL W P-5'-P-CCNC: 19 U/L (ref 0–50)
ANION GAP SERPL CALCULATED.3IONS-SCNC: 9 MMOL/L (ref 7–15)
AST SERPL W P-5'-P-CCNC: 37 U/L (ref 0–45)
B-GLOBULIN SERPL ELPH-MCNC: 0.8 G/DL (ref 0.6–1)
BILIRUB SERPL-MCNC: 0.5 MG/DL
BUN SERPL-MCNC: 8.3 MG/DL (ref 6–20)
CALCIUM SERPL-MCNC: 11.4 MG/DL (ref 8.8–10.4)
CANCER AG125 SERPL-ACNC: 68 U/ML
CHLORIDE SERPL-SCNC: 103 MMOL/L (ref 98–107)
CREAT SERPL-MCNC: 0.41 MG/DL (ref 0.51–0.95)
EGFRCR SERPLBLD CKD-EPI 2021: >90 ML/MIN/1.73M2
GAMMA GLOB SERPL ELPH-MCNC: 0.8 G/DL (ref 0.7–1.6)
GLUCOSE BLDC GLUCOMTR-MCNC: 116 MG/DL (ref 70–99)
GLUCOSE BLDC GLUCOMTR-MCNC: 127 MG/DL (ref 70–99)
GLUCOSE BLDC GLUCOMTR-MCNC: 132 MG/DL (ref 70–99)
GLUCOSE BLDC GLUCOMTR-MCNC: 137 MG/DL (ref 70–99)
GLUCOSE BLDC GLUCOMTR-MCNC: 155 MG/DL (ref 70–99)
GLUCOSE SERPL-MCNC: 155 MG/DL (ref 70–99)
HCO3 SERPL-SCNC: 25 MMOL/L (ref 22–29)
KAPPA LC FREE SER-MCNC: 1.1 MG/DL (ref 0.33–1.94)
KAPPA LC FREE/LAMBDA FREE SER NEPH: 0.45 {RATIO} (ref 0.26–1.65)
LAMBDA LC FREE SERPL-MCNC: 2.47 MG/DL (ref 0.57–2.63)
M PROTEIN SERPL ELPH-MCNC: 0 G/DL
POTASSIUM SERPL-SCNC: 4.1 MMOL/L (ref 3.4–5.3)
PROT PATTERN SERPL ELPH-IMP: ABNORMAL
PROT PATTERN SERPL IFE-IMP: NORMAL
PROT SERPL-MCNC: 6.2 G/DL (ref 6.4–8.3)
PTH-INTACT SERPL-MCNC: 6 PG/ML (ref 15–65)
SODIUM SERPL-SCNC: 137 MMOL/L (ref 135–145)
TSH SERPL DL<=0.005 MIU/L-ACNC: 1.16 UIU/ML (ref 0.3–4.2)

## 2025-02-21 PROCEDURE — 36415 COLL VENOUS BLD VENIPUNCTURE: CPT | Performed by: INTERNAL MEDICINE

## 2025-02-21 PROCEDURE — 250N000013 HC RX MED GY IP 250 OP 250 PS 637: Performed by: INTERNAL MEDICINE

## 2025-02-21 PROCEDURE — 99233 SBSQ HOSP IP/OBS HIGH 50: CPT | Performed by: INTERNAL MEDICINE

## 2025-02-21 PROCEDURE — 120N000001 HC R&B MED SURG/OB

## 2025-02-21 PROCEDURE — 80053 COMPREHEN METABOLIC PANEL: CPT | Performed by: INTERNAL MEDICINE

## 2025-02-21 PROCEDURE — 86304 IMMUNOASSAY TUMOR CA 125: CPT | Performed by: PHYSICIAN ASSISTANT

## 2025-02-21 PROCEDURE — 258N000003 HC RX IP 258 OP 636: Performed by: INTERNAL MEDICINE

## 2025-02-21 PROCEDURE — 250N000011 HC RX IP 250 OP 636: Performed by: INTERNAL MEDICINE

## 2025-02-21 PROCEDURE — 84443 ASSAY THYROID STIM HORMONE: CPT | Performed by: INTERNAL MEDICINE

## 2025-02-21 PROCEDURE — 99233 SBSQ HOSP IP/OBS HIGH 50: CPT | Performed by: PHYSICIAN ASSISTANT

## 2025-02-21 PROCEDURE — 83970 ASSAY OF PARATHORMONE: CPT | Performed by: INTERNAL MEDICINE

## 2025-02-21 RX ADMIN — ATORVASTATIN CALCIUM 40 MG: 40 TABLET, FILM COATED ORAL at 09:40

## 2025-02-21 RX ADMIN — MONTELUKAST 10 MG: 10 TABLET, FILM COATED ORAL at 22:10

## 2025-02-21 RX ADMIN — HYDROMORPHONE HYDROCHLORIDE 1 MG: 1 INJECTION, SOLUTION INTRAMUSCULAR; INTRAVENOUS; SUBCUTANEOUS at 13:04

## 2025-02-21 RX ADMIN — METFORMIN HYDROCHLORIDE 1000 MG: 500 TABLET ORAL at 18:09

## 2025-02-21 RX ADMIN — OXYCODONE HYDROCHLORIDE 10 MG: 10 TABLET ORAL at 03:23

## 2025-02-21 RX ADMIN — AMLODIPINE BESYLATE 10 MG: 10 TABLET ORAL at 09:40

## 2025-02-21 RX ADMIN — CYCLOBENZAPRINE HYDROCHLORIDE 10 MG: 5 TABLET, FILM COATED ORAL at 15:54

## 2025-02-21 RX ADMIN — METFORMIN HYDROCHLORIDE 1000 MG: 500 TABLET ORAL at 09:41

## 2025-02-21 RX ADMIN — SODIUM CHLORIDE: 9 INJECTION, SOLUTION INTRAVENOUS at 10:58

## 2025-02-21 RX ADMIN — ENOXAPARIN SODIUM 40 MG: 40 INJECTION SUBCUTANEOUS at 20:39

## 2025-02-21 RX ADMIN — CYCLOBENZAPRINE HYDROCHLORIDE 10 MG: 5 TABLET, FILM COATED ORAL at 09:34

## 2025-02-21 RX ADMIN — IRBESARTAN 300 MG: 150 TABLET ORAL at 22:10

## 2025-02-21 RX ADMIN — SODIUM CHLORIDE: 9 INJECTION, SOLUTION INTRAVENOUS at 20:10

## 2025-02-21 RX ADMIN — OXYCODONE HYDROCHLORIDE 5 MG: 5 TABLET ORAL at 20:39

## 2025-02-21 RX ADMIN — HYDROMORPHONE HYDROCHLORIDE 1 MG: 1 INJECTION, SOLUTION INTRAMUSCULAR; INTRAVENOUS; SUBCUTANEOUS at 09:30

## 2025-02-21 RX ADMIN — CYCLOBENZAPRINE HYDROCHLORIDE 10 MG: 5 TABLET, FILM COATED ORAL at 22:10

## 2025-02-21 RX ADMIN — SERTRALINE HYDROCHLORIDE 150 MG: 100 TABLET ORAL at 09:40

## 2025-02-21 RX ADMIN — OXYCODONE HYDROCHLORIDE 10 MG: 10 TABLET ORAL at 11:05

## 2025-02-21 ASSESSMENT — ACTIVITIES OF DAILY LIVING (ADL)
ADLS_ACUITY_SCORE: 28
ADLS_ACUITY_SCORE: 29
ADLS_ACUITY_SCORE: 28
ADLS_ACUITY_SCORE: 29

## 2025-02-21 NOTE — PROGRESS NOTES
[facility]    Hematology/Oncology Follow-up Note      Today's Date: 02/21/25  Date of Admission:  2/20/2025  Reason for Consult: Hypercalcemia, lytics lesions      ASSESSMENT/PLAN:  Brissa Mott is a 54 year old female with:     Hypercalcemia: Calcium on admission 14.7, likely secondary to presumed malignancy, s/p Zometa 4 mg x 1 on 2/20/2025.  Calcium has improved to 11.4 today.  SPEP, light chains and immunofixation still pending.  CT CAP with multiple lytic lesions.  Need biopsy, see below.  -Continue IVF  -Recommend CMP daily    Lytic lesions: Likely malignancy, SPEP, light chains and immunofixation pending, but CT CAP with right adnexal mass, concerning for possible ovarian cancer.   -Added on , this is pending, if elevated, will need GYN ONC involved  -Ortho vs IR for bone bx  -We will follow peripherally over the weekend, please call with any questions    Femur fracture: Likely pathological fx,   -Ortho may consider surgery, treatment and surgical plan is TBD pending bx.       Discussed with Dr Morris and Dr Montenegro.        INTERIM HISTORY: Brissa was seen in her room, resting in bed. Family at bedside. She is unable to bear weight on the right leg. She is concerned about the CT findings. She believes she has cancer in the family, but unsure type and which family members. She is NPO for possible bx today and really hungry.        MEDICATIONS:  Current Facility-Administered Medications   Medication Dose Route Frequency Provider Last Rate Last Admin    acetaminophen (TYLENOL) tablet 650 mg  650 mg Oral Q4H PRN Lilly Barrett MD   650 mg at 02/20/25 0916    Or    acetaminophen (TYLENOL) Suppository 650 mg  650 mg Rectal Q4H PRN Lilly Barrett MD        albuterol (PROVENTIL HFA/VENTOLIN HFA) inhaler  2 puff Inhalation Q6H PRN Eren Montenegro MD        amLODIPine (NORVASC) tablet 10 mg  10 mg Oral Daily Eren Montenegro MD   10 mg at 02/21/25 0940    atorvastatin  (LIPITOR) tablet 40 mg  40 mg Oral Daily Eren Montenegro MD   40 mg at 02/21/25 0940    benzocaine-menthol (CHLORASEPTIC) 6-10 MG lozenge 1 lozenge  1 lozenge Buccal Q1H PRN Lilly Barrett MD        calcium carbonate (TUMS) chewable tablet 1,000 mg  1,000 mg Oral 4x Daily PRN Lilly Barrett MD        cyclobenzaprine (FLEXERIL) tablet 10 mg  10 mg Oral TID Lilly Barrett MD   10 mg at 02/21/25 0934    glucose gel 15-30 g  15-30 g Oral Q15 Min PRN Lilly Barrett MD        Or    dextrose 50 % injection 25-50 mL  25-50 mL Intravenous Q15 Min PRN Lilly Barrett MD        Or    glucagon injection 1 mg  1 mg Subcutaneous Q15 Min PRN Lilly Barrett MD        enoxaparin ANTICOAGULANT (LOVENOX) injection 40 mg  40 mg Subcutaneous Q12H Lilly Barrett MD   40 mg at 02/20/25 2033    HYDROmorphone (PF) (DILAUDID) injection 0.5 mg  0.5 mg Intravenous Q2H PRN Lilly Barrett MD   0.5 mg at 02/20/25 1220    HYDROmorphone (PF) (DILAUDID) injection 1 mg  1 mg Intravenous Q3H PRN Lilly Barrett MD   1 mg at 02/21/25 0930    insulin aspart (NovoLOG) injection (RAPID ACTING)  1-7 Units Subcutaneous TID AC Lilly Barrett MD        insulin aspart (NovoLOG) injection (RAPID ACTING)  1-5 Units Subcutaneous At Bedtime Lilly Barrett MD        irbesartan (AVAPRO) tablet 300 mg  300 mg Oral At Bedtime Eren Montenegro MD   300 mg at 02/20/25 2256    lidocaine (LMX4) cream   Topical Q1H PRN Lilly Barrett MD        lidocaine 1 % 0.1-1 mL  0.1-1 mL Other Q1H PRN Lilly Barrett MD        melatonin tablet 5 mg  5 mg Oral At Bedtime PRN Lilly Barrett MD        metFORMIN (GLUCOPHAGE) tablet 1,000 mg  1,000 mg Oral BID w/meals Lan, Al Gilbert, MD   1,000 mg at 02/21/25 0941    miconazole (MICATIN) 2 % powder   Topical BID PRN Lilly Barrett MD        montelukast (SINGULAIR) tablet 10 mg  10 mg Oral At Bedtime Eren Montenegro MD   10 mg at 02/20/25 6926    naloxone (NARCAN) injection 0.2 mg  0.2 mg Intravenous Q2  Min PRN Chilo Adkins MD        Or    naloxone (NARCAN) injection 0.4 mg  0.4 mg Intravenous Q2 Min PRN Chilo Adkins MD        Or    naloxone (NARCAN) injection 0.2 mg  0.2 mg Intramuscular Q2 Min PRN Chilo Adknis MD        Or    naloxone (NARCAN) injection 0.4 mg  0.4 mg Intramuscular Q2 Min PRN Chilo Adkins MD        ondansetron (ZOFRAN ODT) ODT tab 4 mg  4 mg Oral Q6H PRN Lilly Barrett MD        Or    ondansetron (ZOFRAN) injection 4 mg  4 mg Intravenous Q6H PRN Lilly Barrett MD        oxyCODONE (ROXICODONE) tablet 5 mg  5 mg Oral Q4H PRN Eren Montenegro MD        oxyCODONE IR (ROXICODONE) tablet 10 mg  10 mg Oral Q4H PRN Eren Montenegro MD   10 mg at 02/21/25 1105    polyethylene glycol (MIRALAX) Packet 17 g  17 g Oral Daily Lilly Barrett MD   17 g at 02/20/25 0753    prochlorperazine (COMPAZINE) injection 10 mg  10 mg Intravenous Q6H PRN Lilly Barrett MD        Or    prochlorperazine (COMPAZINE) tablet 10 mg  10 mg Oral Q6H PRN Lilly Barrett MD        senna-docusate (SENOKOT-S/PERICOLACE) 8.6-50 MG per tablet 1 tablet  1 tablet Oral BID PRN Lilly Barrett MD        Or    senna-docusate (SENOKOT-S/PERICOLACE) 8.6-50 MG per tablet 2 tablet  2 tablet Oral BID PRN Lilly Barrett MD        sertraline (ZOLOFT) tablet 150 mg  150 mg Oral Daily Eren Montenegro MD   150 mg at 02/21/25 0940    sodium chloride (PF) 0.9% PF flush 3 mL  3 mL Intracatheter Q8H Lilly Barrett MD        sodium chloride (PF) 0.9% PF flush 3 mL  3 mL Intracatheter q1 min prn Lilly Barrett MD        sodium chloride 0.9 % infusion   Intravenous Continuous Eren Montenegro  mL/hr at 02/21/25 1058 New Bag at 02/21/25 1058         ALLERGIES:  Allergies   Allergen Reactions    Bydureon [Exenatide] Diarrhea    Penicillins Rash    Sulfa Antibiotics Rash         PHYSICAL EXAM:  Vital signs:  Temp: 98.6  F (37  C) Temp src: Temporal BP: (!) 141/81 Pulse: 82   Resp: 20 SpO2: 93 % O2 Device: Nasal cannula  "Oxygen Delivery: 2 LPM      Estimated body mass index is 48.23 kg/m  as calculated from the following:    Height as of 2/18/25: 1.626 m (5' 4\").    Weight as of 2/18/25: 127.5 kg (281 lb).    GENERAL:  Female, in no acute distress.  Alert and oriented x3. Well groomed.   HEENT:  Normocephalic, atraumatic. No conjunctival injection or eye swelling.   LUNGS:  Nonlabored breathing, no cough or audible wheezing, able to speak full sentences.  MSK: Full ROM UE.    SKIN: No rash on exposed skin.   NEURO: CN grossly intact, speech normal  PSYCH: Mentation appears normal, tearful      LABS:  CBC RESULTS:   Recent Labs   Lab Test 02/20/25  0654   WBC 9.3   RBC 4.46   HGB 11.4*   HCT 36.7   MCV 82   MCH 25.6*   MCHC 31.1*   RDW 14.1          Recent Labs   Lab Test 02/21/25  0932 02/21/25  0726 02/20/25  0850 02/20/25  0654   NA  --  137  --  138   POTASSIUM  --  4.1  --  4.0   CHLORIDE  --  103  --  103   CO2  --  25  --  26   ANIONGAP  --  9  --  9   * 155*   < > 105*   BUN  --  8.3  --  15.7   CR  --  0.41*  --  0.46*   SHIRLEY  --  11.4*  --  12.9*    < > = values in this interval not displayed.       IMAGING:  EXAM: CT CHEST/ABDOMEN/PELVIS W CONTRAST  LOCATION: [facility]  DATE: 2/20/2025     INDICATION: Right distal femoral lytic lesion, suspicious for metastatic disease.  COMPARISON: None  TECHNIQUE: CT scan of the chest, abdomen, and pelvis was performed following injection of IV contrast. Multiplanar reformats were obtained. Dose reduction techniques were used.   CONTRAST: 100mL Isovue 370     FINDINGS:   LUNGS AND PLEURA: Several solid pulmonary nodules bilaterally, with a few representative nodules described as follows. Right middle lobe 1.0 cm nodule (series 4, image 122). Left upper lobe 0.8 cm nodule (image 79). Right lower lobe 0.7 cm nodule (image   190). No focal airspace disease. No pleural effusion or pneumothorax.     MEDIASTINUM/AXILLAE: Enlarged left axillary lymph node " measuring 4.7 x 3.2 cm, with adjacent borderline enlarged lymph nodes. No mediastinal or hilar lymphadenopathy. Mild cardiomegaly. No pericardial effusion.     CORONARY ARTERY CALCIFICATION: Severe.     HEPATOBILIARY: Several ill-defined low-attenuation lesion in the liver, with a few representative lesions described as follows. A hepatic segment 8 lesion measuring 2.9 cm (series 3, image 114). Hepatic segment IVb lesion measuring 2.2 cm (image 136).   Hepatic segment 7 lesion measuring 1.1 cm (series 3, image 163).     PANCREAS: Normal.     SPLEEN: Normal.     ADRENAL GLANDS: Normal.     KIDNEYS/BLADDER: Normal.     BOWEL: No obstruction or inflammatory change. Normal appendix. No evidence of acute appendicitis.     PERITONEUM/RETROPERITONEUM: Right lower retroperitoneal nodule measuring 1.7 x 1.4 cm (series 3, image 211).     LYMPH NODES: Right pelvic sidewall lymph node 2.4 x 1.5 cm. Right common iliac lymph nodes measuring up to 1.4 cm in short axis. Few prominent but nonenlarged retroperitoneal lymph nodes.     VASCULATURE: Mild atherosclerotic calcifications.     PELVIC ORGANS: 4.4 cm calcification at the uterine fundus, likely calcified fibroid. Complex cystic mass in the right adnexa, measuring 9.3 x 7.4 x 9.9 cm.     MUSCULOSKELETAL: Right humeral head lytic lesion measuring 1.8 cm. Right sacral lytic lesion with extraosseous soft tissue extension, measuring 5.2 x 3.2 cm. Lytic lesion in the left posterior iliac bone measuring 1.6 cm (series 3, image 208). Left upper   anterior chest wall 0.9 cm subcutaneous nodule (series 3, image 24). Right gluteal 0.7 cm subcutaneous nodule (series 3, image 258).                                                                      IMPRESSION:  1.  Findings suspicious for metastatic disease. Enlarged left axillary lymph nodes, measuring up to 4.7 cm. Bilateral pulmonary nodules measuring up to 1.0 cm. Ill-defined hepatic lesions, measuring up to 2.9 cm. Right lower  retroperitoneal 1.7 cm   nodule. Right pelvic lymphadenopathy, measuring up to 2.4 cm. Lytic lesions in the right humeral head, right sacrum, and left posterior iliac bone.     2.  Right adnexal 9.9 cm complex cystic mass, suspicious for malignancy, either metastatic or primary ovarian neoplasm.     3.  Left upper anterior chest wall 0.9 cm subcutaneous nodule and right gluteal 0.7 cm subcutaneous nodule, indeterminate and may be metastatic.        Elida Whitley PA-C  Hematology/Oncology  [facility]

## 2025-02-21 NOTE — PROGRESS NOTES
[facility]    Medicine Progress Note - Hospitalist Service    Date of Admission:  2/20/2025    Assessment & Plan      Brissa Mott is a 54 year old female admitted on 2/20/2025. She has a complicated past medical history which includes morbid obesity on Ozempic, left-sided CVA 6 years ago with minimal residual weakness, advanced osteoarthritis, advanced meniscal injuries and tears of left knee, hypertension, dyslipidemia, recent hospitalization at [facility] for inability to bear weight and right knee pain.    Patient was transferred to this facility with concerns of complicated right posterior femur fracture, hypercalcemia as well as concerns for intramedullary.    Problem list:  # Severe hypercalcemia  #concerns of metastatic bone lesions  # Myeloma?  #CT of the chest and abdomen pelvis showing suspicion for metastatic disease  -Numerous lymphadenopathy  -Concerns for complex adnexal mass    -PTH intact is low likely has no underlying primary hyperparathyroidism  -Check PTH RP, check vitamin D metabolites  -Still suspected hypercalcemia from underlying possible malignancy with presence of lytic bone mets  -Multiple myeloma workup was sent by oncology service  -Subsequent hypercalcemia levels improving  -Will continue IV fluid support  -Continue to monitor for any hypervolemic symptoms  -Received bisphosphonates earlier with Zometa  - zometa as per oncology   -Previously had a CT and MRI on her last hospitalization and no reports of underlying mass or lesions during that time  -Spoke with oncology service, further lab works being pursued  -Potential referral to GYN oncology if Ca1 2 5 is elevated      #Posterior femur fracture complicated with presence of possible intramedullary lesion   - optimized pain control  - orthopedics evaluation  Mechanical DVT prophylaxis for now given with plans for surgical intervention  -Highly appreciate input from IR service and orthopedic  "service  -Orthopedics service hoping for to pursue biopsy first and coordinated with IR service to assess and help with surgical planning    #DM type 2  - on insulin slide scale.  Hold  ozempic      # super morbid obesity  - complicates care            Diet: Combination Diet Regular Diet Adult    DVT Prophylaxis: Pneumatic Compression Devices and Ambulate every shift  Nielson Catheter: Not present  Lines: None     Cardiac Monitoring: None  Code Status: Full Code      Clinically Significant Risk Factors            # Hypercalcemia: Highest Ca = 12.9 mg/dL in last 2 days, will monitor as appropriate    # Hypoalbuminemia: Lowest albumin = 3.1 g/dL at 2/21/2025  7:26 AM, will monitor as appropriate     # Hypertension: Noted on problem list            # Severe Obesity: Estimated body mass index is 48.23 kg/m  as calculated from the following:    Height as of 2/18/25: 1.626 m (5' 4\").    Weight as of 2/18/25: 127.5 kg (281 lb)., PRESENT ON ADMISSION     # Asthma: noted on problem list        Social Drivers of Health    Depression: At risk (7/19/2024)    PHQ-2     PHQ-2 Score: 3   Tobacco Use: Medium Risk (2/18/2025)    Patient History     Smoking Tobacco Use: Former     Smokeless Tobacco Use: Never          Disposition Plan     Medically Ready for Discharge: Anticipated in 2-4 Days             Eren Montenegro MD, MD  Hospitalist Service  [facility]  Securely message with [employer] (more info)  Text page via [facility] Paging/Directory   ______________________________________________________________________    Interval History   Continuing service care today. Seen and examined Case discussed with charge nurse, case discussed with orthopedic service.  Family updated the several family members present at bedside during my encounter namely her , mother-in-law and uncle in law  Fortunately she is doing better this morning in terms of overall pain control.  She is endorsing no ongoing chest pain or " shortness of breath.  Still having intermittent anticipated pain on lower extremities.  Able to tolerate oral diet with no nausea or vomiting  No reports of any confusion or agitation.  Remained afebrile.      Physical Exam   Vital Signs: Temp: 98.6  F (37  C) Temp src: Temporal BP: (!) 141/81 Pulse: 82   Resp: 20 SpO2: 93 % O2 Device: Nasal cannula Oxygen Delivery: 2 LPM  Weight: 0 lbs 0 oz    Morbidly obese  HEENT; Atraumatic, normocephalic, pinkish conjuctiva, pupils bilateral reactive   Skin: warm and moist, no rashes  Lungs: equal chest expansion, clear to auscultation, no wheezes, no stridor, no crackles,   Heart: normal rate, normal rhythm, no rubs or gallops.   Abdomen: normal bowel sounds, no tenderness, no peritoneal signs, no guarding  Extremities: no deformities, no edema   Neuro; follow commands, alert and oriented x3, spontaneous speech, coherent, moves all extremities spontaneously  Psych; no hallucination, euthymic mood, not agitated      Medical Decision Making       50 MINUTES SPENT BY ME on the date of service doing chart review, history, exam, documentation & further activities per the note.  MANAGEMENT DISCUSSED with the following over the past 24 hours: Yes   NOTE(S)/MEDICAL RECORDS REVIEWED over the past 24 hours: Yes       Data     I have personally reviewed the following data over the past 24 hrs:    N/A  \   N/A   / N/A     137 103 8.3 /  127 (H)   4.1 25 0.41 (L) \     ALT: 19 AST: 37 AP: 90 TBILI: 0.5   ALB: 3.1 (L) TOT PROTEIN: 6.2 (L) LIPASE: N/A     TSH: 1.16 T4: N/A A1C: N/A       Imaging results reviewed over the past 24 hrs:   Recent Results (from the past 24 hours)   CT Chest/Abdomen/Pelvis w Contrast    Narrative    EXAM: CT CHEST/ABDOMEN/PELVIS W CONTRAST  LOCATION: [facility]  DATE: 2/20/2025    INDICATION: Right distal femoral lytic lesion, suspicious for metastatic disease.  COMPARISON: None  TECHNIQUE: CT scan of the chest, abdomen, and pelvis was  performed following injection of IV contrast. Multiplanar reformats were obtained. Dose reduction techniques were used.   CONTRAST: 100mL Isovue 370    FINDINGS:   LUNGS AND PLEURA: Several solid pulmonary nodules bilaterally, with a few representative nodules described as follows. Right middle lobe 1.0 cm nodule (series 4, image 122). Left upper lobe 0.8 cm nodule (image 79). Right lower lobe 0.7 cm nodule (image   190). No focal airspace disease. No pleural effusion or pneumothorax.    MEDIASTINUM/AXILLAE: Enlarged left axillary lymph node measuring 4.7 x 3.2 cm, with adjacent borderline enlarged lymph nodes. No mediastinal or hilar lymphadenopathy. Mild cardiomegaly. No pericardial effusion.    CORONARY ARTERY CALCIFICATION: Severe.    HEPATOBILIARY: Several ill-defined low-attenuation lesion in the liver, with a few representative lesions described as follows. A hepatic segment 8 lesion measuring 2.9 cm (series 3, image 114). Hepatic segment IVb lesion measuring 2.2 cm (image 136).   Hepatic segment 7 lesion measuring 1.1 cm (series 3, image 163).    PANCREAS: Normal.    SPLEEN: Normal.    ADRENAL GLANDS: Normal.    KIDNEYS/BLADDER: Normal.    BOWEL: No obstruction or inflammatory change. Normal appendix. No evidence of acute appendicitis.    PERITONEUM/RETROPERITONEUM: Right lower retroperitoneal nodule measuring 1.7 x 1.4 cm (series 3, image 211).    LYMPH NODES: Right pelvic sidewall lymph node 2.4 x 1.5 cm. Right common iliac lymph nodes measuring up to 1.4 cm in short axis. Few prominent but nonenlarged retroperitoneal lymph nodes.    VASCULATURE: Mild atherosclerotic calcifications.    PELVIC ORGANS: 4.4 cm calcification at the uterine fundus, likely calcified fibroid. Complex cystic mass in the right adnexa, measuring 9.3 x 7.4 x 9.9 cm.    MUSCULOSKELETAL: Right humeral head lytic lesion measuring 1.8 cm. Right sacral lytic lesion with extraosseous soft tissue extension, measuring 5.2 x 3.2 cm. Lytic  lesion in the left posterior iliac bone measuring 1.6 cm (series 3, image 208). Left upper   anterior chest wall 0.9 cm subcutaneous nodule (series 3, image 24). Right gluteal 0.7 cm subcutaneous nodule (series 3, image 258).      Impression    IMPRESSION:  1.  Findings suspicious for metastatic disease. Enlarged left axillary lymph nodes, measuring up to 4.7 cm. Bilateral pulmonary nodules measuring up to 1.0 cm. Ill-defined hepatic lesions, measuring up to 2.9 cm. Right lower retroperitoneal 1.7 cm   nodule. Right pelvic lymphadenopathy, measuring up to 2.4 cm. Lytic lesions in the right humeral head, right sacrum, and left posterior iliac bone.    2.  Right adnexal 9.9 cm complex cystic mass, suspicious for malignancy, either metastatic or primary ovarian neoplasm.    3.  Left upper anterior chest wall 0.9 cm subcutaneous nodule and right gluteal 0.7 cm subcutaneous nodule, indeterminate and may be metastatic.

## 2025-02-21 NOTE — PLAN OF CARE
"Goal Outcome Evaluation:      Plan of Care Reviewed With: patient    Overall Patient Progress: no changeOverall Patient Progress: no change    Outcome Evaluation: pt is alert and oriented x4. bed rest, in pain, PRN given ,      Problem: Adult Inpatient Plan of Care  Goal: Plan of Care Review  Description: The Plan of Care Review/Shift note should be completed every shift.  The Outcome Evaluation is a brief statement about your assessment that the patient is improving, declining, or no change.  This information will be displayed automatically on your shift  note.  Outcome: Not Progressing  Flowsheets (Taken 2/21/2025 0036)  Outcome Evaluation: pt is alert and oriented x4. bed rest, in pain, PRN given ,  Plan of Care Reviewed With: patient  Overall Patient Progress: no change  Goal: Patient-Specific Goal (Individualized)  Description: You can add care plan individualizations to a care plan. Examples of Individualization might be:  \"Parent requests to be called daily at 9am for status\", \"I have a hard time hearing out of my right ear\", or \"Do not touch me to wake me up as it startles  me\".  Outcome: Not Progressing  Goal: Absence of Hospital-Acquired Illness or Injury  Outcome: Not Progressing  Goal: Optimal Comfort and Wellbeing  Outcome: Not Progressing  Goal: Readiness for Transition of Care  Outcome: Not Progressing     Problem: Hip Fracture Medical Management  Goal: Optimal Coping with Change in Health Status  Outcome: Not Progressing  Goal: Absence of Bleeding  Outcome: Not Progressing  Goal: Effective Bowel Elimination  Outcome: Not Progressing  Goal: Baseline Cognitive Function Maintained  Outcome: Not Progressing  Goal: Absence of Embolism  Outcome: Not Progressing  Goal: Fracture Stability  Outcome: Not Progressing  Goal: Optimal Functional Performance  Outcome: Not Progressing  Goal: Pain Control and Function  Outcome: Not Progressing  Goal: Effective Urinary Elimination  Outcome: Not Progressing   "   Problem: Skin Injury Risk Increased  Goal: Skin Health and Integrity  Outcome: Not Progressing

## 2025-02-21 NOTE — PROGRESS NOTES
Orthopedic Surgery  Brissa PEREZ Pantera  02/21/2025     Admit Date:  2/20/2025  Assessment:   Distal femur pain with appearance of lytic lesion on CT scan, right  Morbid obesity  Advanced arthritis left knee, with lower extremity weakness from previous CVA       Patient resting in bed, appears concerned    Questions about treatment plan    Temp:  [97.1  F (36.2  C)-98.6  F (37  C)] 98.6  F (37  C)  Pulse:  [77-92] 82  Resp:  [18-20] 20  BP: (137-153)/(73-89) 153/73  SpO2:  [93 %-95 %] 95 %    Alert and oriented  Concerned, asks appropriate questions      Labs:  Recent Labs   Lab Test 02/20/25  0654 02/18/25 2024 08/03/23  0955   WBC 9.3 10.9 7.3   HGB 11.4* 12.0 13.3    277 258           Plan:   Ortho would like to have biopsy done prior to surgery, to assist   with surgical planning, lateral approach for the biopsy would be   appreciated  Need for biopsy prior to surgery discussed with medicine team  Chest/abdomen/pelvis CT is completed  Non WB RLE   Continue current pain regimen   Anticipate surgery would likely not take place until next week          Disposition:    Anticipate d/c to be determined when medically cleared and treatment   plan is in place.    Elsie Coughlin PA-C  [facility]

## 2025-02-22 LAB
ANION GAP SERPL CALCULATED.3IONS-SCNC: 9 MMOL/L (ref 7–15)
BASOPHILS # BLD AUTO: 0 10E3/UL (ref 0–0.2)
BASOPHILS NFR BLD AUTO: 0 %
BUN SERPL-MCNC: 6.7 MG/DL (ref 6–20)
CALCIUM SERPL-MCNC: 10.3 MG/DL (ref 8.8–10.4)
CHLORIDE SERPL-SCNC: 103 MMOL/L (ref 98–107)
CREAT SERPL-MCNC: 0.4 MG/DL (ref 0.51–0.95)
EGFRCR SERPLBLD CKD-EPI 2021: >90 ML/MIN/1.73M2
EOSINOPHIL # BLD AUTO: 0.3 10E3/UL (ref 0–0.7)
EOSINOPHIL NFR BLD AUTO: 3 %
ERYTHROCYTE [DISTWIDTH] IN BLOOD BY AUTOMATED COUNT: 14 % (ref 10–15)
GLUCOSE BLDC GLUCOMTR-MCNC: 107 MG/DL (ref 70–99)
GLUCOSE BLDC GLUCOMTR-MCNC: 113 MG/DL (ref 70–99)
GLUCOSE BLDC GLUCOMTR-MCNC: 159 MG/DL (ref 70–99)
GLUCOSE BLDC GLUCOMTR-MCNC: 174 MG/DL (ref 70–99)
GLUCOSE SERPL-MCNC: 109 MG/DL (ref 70–99)
HCO3 SERPL-SCNC: 24 MMOL/L (ref 22–29)
HCT VFR BLD AUTO: 32.8 % (ref 35–47)
HGB BLD-MCNC: 10.4 G/DL (ref 11.7–15.7)
IMM GRANULOCYTES # BLD: 0.1 10E3/UL
IMM GRANULOCYTES NFR BLD: 1 %
LYMPHOCYTES # BLD AUTO: 1.2 10E3/UL (ref 0.8–5.3)
LYMPHOCYTES NFR BLD AUTO: 13 %
MCH RBC QN AUTO: 25.9 PG (ref 26.5–33)
MCHC RBC AUTO-ENTMCNC: 31.7 G/DL (ref 31.5–36.5)
MCV RBC AUTO: 82 FL (ref 78–100)
MONOCYTES # BLD AUTO: 0.9 10E3/UL (ref 0–1.3)
MONOCYTES NFR BLD AUTO: 11 %
NEUTROPHILS # BLD AUTO: 6.3 10E3/UL (ref 1.6–8.3)
NEUTROPHILS NFR BLD AUTO: 71 %
NRBC # BLD AUTO: 0 10E3/UL
NRBC BLD AUTO-RTO: 0 /100
PLATELET # BLD AUTO: 214 10E3/UL (ref 150–450)
POTASSIUM SERPL-SCNC: 4 MMOL/L (ref 3.4–5.3)
RBC # BLD AUTO: 4.02 10E6/UL (ref 3.8–5.2)
SODIUM SERPL-SCNC: 136 MMOL/L (ref 135–145)
WBC # BLD AUTO: 8.8 10E3/UL (ref 4–11)

## 2025-02-22 PROCEDURE — 250N000013 HC RX MED GY IP 250 OP 250 PS 637: Performed by: INTERNAL MEDICINE

## 2025-02-22 PROCEDURE — 99207 PR NOT IN PERSON INPATIENT CONSULT STATISTICAL MARKER: CPT | Performed by: STUDENT IN AN ORGANIZED HEALTH CARE EDUCATION/TRAINING PROGRAM

## 2025-02-22 PROCEDURE — 82306 VITAMIN D 25 HYDROXY: CPT | Performed by: INTERNAL MEDICINE

## 2025-02-22 PROCEDURE — 250N000011 HC RX IP 250 OP 636: Mod: JZ | Performed by: INTERNAL MEDICINE

## 2025-02-22 PROCEDURE — 82652 VIT D 1 25-DIHYDROXY: CPT | Performed by: INTERNAL MEDICINE

## 2025-02-22 PROCEDURE — 258N000003 HC RX IP 258 OP 636: Performed by: INTERNAL MEDICINE

## 2025-02-22 PROCEDURE — 80048 BASIC METABOLIC PNL TOTAL CA: CPT | Performed by: INTERNAL MEDICINE

## 2025-02-22 PROCEDURE — 120N000001 HC R&B MED SURG/OB

## 2025-02-22 PROCEDURE — 85014 HEMATOCRIT: CPT | Performed by: INTERNAL MEDICINE

## 2025-02-22 PROCEDURE — 36415 COLL VENOUS BLD VENIPUNCTURE: CPT | Performed by: INTERNAL MEDICINE

## 2025-02-22 PROCEDURE — 250N000013 HC RX MED GY IP 250 OP 250 PS 637: Performed by: ORTHOPAEDIC SURGERY

## 2025-02-22 PROCEDURE — 99233 SBSQ HOSP IP/OBS HIGH 50: CPT | Performed by: INTERNAL MEDICINE

## 2025-02-22 PROCEDURE — 82397 CHEMILUMINESCENT ASSAY: CPT | Performed by: INTERNAL MEDICINE

## 2025-02-22 PROCEDURE — 85004 AUTOMATED DIFF WBC COUNT: CPT | Performed by: INTERNAL MEDICINE

## 2025-02-22 PROCEDURE — 80051 ELECTROLYTE PANEL: CPT | Performed by: INTERNAL MEDICINE

## 2025-02-22 PROCEDURE — 250N000012 HC RX MED GY IP 250 OP 636 PS 637: Performed by: INTERNAL MEDICINE

## 2025-02-22 RX ORDER — OXYCODONE HYDROCHLORIDE 5 MG/1
5 TABLET ORAL EVERY 4 HOURS
Status: DISCONTINUED | OUTPATIENT
Start: 2025-02-22 | End: 2025-02-25

## 2025-02-22 RX ADMIN — MONTELUKAST 10 MG: 10 TABLET, FILM COATED ORAL at 22:17

## 2025-02-22 RX ADMIN — HYDROMORPHONE HYDROCHLORIDE 1 MG: 1 INJECTION, SOLUTION INTRAMUSCULAR; INTRAVENOUS; SUBCUTANEOUS at 11:37

## 2025-02-22 RX ADMIN — CYCLOBENZAPRINE HYDROCHLORIDE 10 MG: 5 TABLET, FILM COATED ORAL at 10:12

## 2025-02-22 RX ADMIN — CYCLOBENZAPRINE HYDROCHLORIDE 10 MG: 5 TABLET, FILM COATED ORAL at 13:53

## 2025-02-22 RX ADMIN — ACETAMINOPHEN 650 MG: 325 TABLET, FILM COATED ORAL at 05:20

## 2025-02-22 RX ADMIN — HYDROMORPHONE HYDROCHLORIDE 1 MG: 1 INJECTION, SOLUTION INTRAMUSCULAR; INTRAVENOUS; SUBCUTANEOUS at 13:58

## 2025-02-22 RX ADMIN — SODIUM CHLORIDE: 9 INJECTION, SOLUTION INTRAVENOUS at 05:20

## 2025-02-22 RX ADMIN — SERTRALINE HYDROCHLORIDE 150 MG: 100 TABLET ORAL at 10:12

## 2025-02-22 RX ADMIN — METFORMIN HYDROCHLORIDE 1000 MG: 500 TABLET ORAL at 10:12

## 2025-02-22 RX ADMIN — POLYETHYLENE GLYCOL 3350 17 G: 17 POWDER, FOR SOLUTION ORAL at 10:12

## 2025-02-22 RX ADMIN — CYCLOBENZAPRINE HYDROCHLORIDE 10 MG: 5 TABLET, FILM COATED ORAL at 22:17

## 2025-02-22 RX ADMIN — OXYCODONE HYDROCHLORIDE 5 MG: 5 TABLET ORAL at 23:17

## 2025-02-22 RX ADMIN — SODIUM CHLORIDE: 9 INJECTION, SOLUTION INTRAVENOUS at 14:02

## 2025-02-22 RX ADMIN — INSULIN ASPART 1 UNITS: 100 INJECTION, SOLUTION INTRAVENOUS; SUBCUTANEOUS at 17:13

## 2025-02-22 RX ADMIN — SODIUM CHLORIDE: 9 INJECTION, SOLUTION INTRAVENOUS at 23:17

## 2025-02-22 RX ADMIN — METFORMIN HYDROCHLORIDE 1000 MG: 500 TABLET ORAL at 17:15

## 2025-02-22 RX ADMIN — HYDROMORPHONE HYDROCHLORIDE 1 MG: 1 INJECTION, SOLUTION INTRAMUSCULAR; INTRAVENOUS; SUBCUTANEOUS at 19:27

## 2025-02-22 RX ADMIN — IRBESARTAN 300 MG: 150 TABLET ORAL at 22:17

## 2025-02-22 RX ADMIN — AMLODIPINE BESYLATE 10 MG: 10 TABLET ORAL at 10:12

## 2025-02-22 RX ADMIN — ATORVASTATIN CALCIUM 40 MG: 40 TABLET, FILM COATED ORAL at 10:12

## 2025-02-22 RX ADMIN — ENOXAPARIN SODIUM 40 MG: 40 INJECTION SUBCUTANEOUS at 22:17

## 2025-02-22 RX ADMIN — ACETAMINOPHEN 650 MG: 325 TABLET, FILM COATED ORAL at 11:49

## 2025-02-22 RX ADMIN — ACETAMINOPHEN 650 MG: 325 TABLET, FILM COATED ORAL at 00:27

## 2025-02-22 RX ADMIN — ENOXAPARIN SODIUM 40 MG: 40 INJECTION SUBCUTANEOUS at 10:12

## 2025-02-22 RX ADMIN — OXYCODONE HYDROCHLORIDE 5 MG: 5 TABLET ORAL at 16:20

## 2025-02-22 ASSESSMENT — ACTIVITIES OF DAILY LIVING (ADL)
ADLS_ACUITY_SCORE: 30
ADLS_ACUITY_SCORE: 30
ADLS_ACUITY_SCORE: 40
ADLS_ACUITY_SCORE: 40
ADLS_ACUITY_SCORE: 30
ADLS_ACUITY_SCORE: 40
ADLS_ACUITY_SCORE: 30
ADLS_ACUITY_SCORE: 40
ADLS_ACUITY_SCORE: 40
ADLS_ACUITY_SCORE: 30
ADLS_ACUITY_SCORE: 28
ADLS_ACUITY_SCORE: 30
ADLS_ACUITY_SCORE: 40
ADLS_ACUITY_SCORE: 40
ADLS_ACUITY_SCORE: 30

## 2025-02-22 NOTE — CONSULTS
Gynecologic Oncology Consult Note (Virtual)    Name: Brissa Mott    MRN: [id number]   YOB: 1970         We were asked to see Brissa Mott at the request of Dr. Barboza for evaluation and treatment of adnexal mass for possible underlying gynecologic malignancy.          Chief Complaint:   Right adnexal mass   Elevated     History is obtained from the patient via telephone virtual consult          History of Present Illness:   Brissa Mott is a 54 year old with morbid obesity (BMI 48) on ozempic, history of left sided CVA, OA, KATHRINE, T2DM, HTN, HLD recently admitted to [facility] from 1/30 to 2/4 for right knee pain and inability to bear weight, and was [facility], she sustained a fall and was subsequently readmitted and transferred to [facility] with hypercalcemia and a distal femur lytic mass with extraosseous soft tissue component.  A CT C/A/P was obtained to evaluate for underlying malignancy. Heme onc following for possible metastatic disease, high grade hematologic malignancy vs primary sarcoma of the bone/joint.  Multiple myeloma workup was sent by oncology service, which returned normal. Gyn-oncology was consulted due to slightly elevated  with a 9.3 x 7.4 x 9.9 cm complex cystic mass on the right ovary. Patient states her main concern right now is leg pain that's not well controlled. She has been having significant pain in her right leg about 2.5 weeks ago that let to her hospitalization. She otherwise denies abdominal/pelvic pain. Denies nausea, vomiting, fevers, chills, chest pain, SOB, diarrhea, dysuria. She does not have any vaginal bleeding.  She has been having poor appetite ever since her fall, uncertain about recent weight loss/gain since she is on Ozempic.     Gyn Hx:   Menses: Menopause about 5 years ago,  has not seen an ob/gyn for a while (per patient)  Pap: per chart review last pap from 8/12/19 with negative HPV and cytology was NIL.    Mammogram from  [facility] 2  Cologuard from 8/15/2023 was negative  Hormone replacement therapy: none    OB Hx:    (2 prior C/S)           Past Medical History:     Past Medical History:   Diagnosis Date    Asthma     Cerebral infarction (H)     Diabetes (H)     Hypertension     Mixed hyperlipidemia     Morbid obesity (H)     KATHRINE (obstructive sleep apnea)     Osteoarthritis             Past Surgical History:     Past Surgical History:   Procedure Laterality Date     SECTION      x2            Social History:     Social History     Tobacco Use    Smoking status: Former    Smokeless tobacco: Never   Substance Use Topics    Alcohol use: Not Currently            Family History:     No family history of  cancers (ovarian, cervical, uterine, breast or colon). No family h/o VTE.   Grandfather (don't know which side): bone cancer          Allergies:     Allergies   Allergen Reactions    Bydureon [Exenatide] Diarrhea    Penicillins Rash    Sulfa Antibiotics Rash            Medications:     Current Facility-Administered Medications   Medication Dose Route Frequency Provider Last Rate Last Admin    acetaminophen (TYLENOL) tablet 650 mg  650 mg Oral Q4H PRN Lilly Barrett MD   650 mg at 25 0520    Or    acetaminophen (TYLENOL) Suppository 650 mg  650 mg Rectal Q4H PRN Lilly Barrett MD        albuterol (PROVENTIL HFA/VENTOLIN HFA) inhaler  2 puff Inhalation Q6H PRN Eren Montenegro MD        amLODIPine (NORVASC) tablet 10 mg  10 mg Oral Daily Eren Montenegro MD   10 mg at 25 0940    atorvastatin (LIPITOR) tablet 40 mg  40 mg Oral Daily Eren Montenegro MD   40 mg at 25 0940    benzocaine-menthol (CHLORASEPTIC) 6-10 MG lozenge 1 lozenge  1 lozenge Buccal Q1H PRN Lilly Barrett MD        calcium carbonate (TUMS) chewable tablet 1,000 mg  1,000 mg Oral 4x Daily PRN Lilly Barrett MD        cyclobenzaprine (FLEXERIL) tablet 10 mg  10 mg Oral TID Lilly Barrett MD   10 mg at  02/21/25 2210    glucose gel 15-30 g  15-30 g Oral Q15 Min PRN Lilly Barrett MD        Or    dextrose 50 % injection 25-50 mL  25-50 mL Intravenous Q15 Min PRN Lilly Barrett MD        Or    glucagon injection 1 mg  1 mg Subcutaneous Q15 Min PRN Lilly Barrett MD        enoxaparin ANTICOAGULANT (LOVENOX) injection 40 mg  40 mg Subcutaneous Q12H Lilly Barrett MD   40 mg at 02/21/25 2039    HYDROmorphone (PF) (DILAUDID) injection 0.5 mg  0.5 mg Intravenous Q2H PRN Lilly Barrett MD   0.5 mg at 02/20/25 1220    HYDROmorphone (PF) (DILAUDID) injection 1 mg  1 mg Intravenous Q3H PRN Lilly Barrett MD   1 mg at 02/21/25 1304    insulin aspart (NovoLOG) injection (RAPID ACTING)  1-7 Units Subcutaneous TID AC Lilly Barrett MD        insulin aspart (NovoLOG) injection (RAPID ACTING)  1-5 Units Subcutaneous At Bedtime Lilly Barrett MD        irbesartan (AVAPRO) tablet 300 mg  300 mg Oral At Bedtime Eren Montenegro MD   300 mg at 02/21/25 2210    lidocaine (LMX4) cream   Topical Q1H PRN Lilly Barrett MD        lidocaine 1 % 0.1-1 mL  0.1-1 mL Other Q1H PRN Lilly Barrett MD        melatonin tablet 5 mg  5 mg Oral At Bedtime PRN Lilly Barrett MD        metFORMIN (GLUCOPHAGE) tablet 1,000 mg  1,000 mg Oral BID w/meals Eren Montenegro MD   1,000 mg at 02/21/25 1809    miconazole (MICATIN) 2 % powder   Topical BID PRN Lilly Barrett MD        montelukast (SINGULAIR) tablet 10 mg  10 mg Oral At Bedtime Eren Montenegro MD   10 mg at 02/21/25 2210    naloxone (NARCAN) injection 0.2 mg  0.2 mg Intravenous Q2 Min PRN Chilo Adkins MD        Or    naloxone (NARCAN) injection 0.4 mg  0.4 mg Intravenous Q2 Min PRN Chilo Adkins MD        Or    naloxone (NARCAN) injection 0.2 mg  0.2 mg Intramuscular Q2 Min PRN Chilo Adkins MD        Or    naloxone (NARCAN) injection 0.4 mg  0.4 mg Intramuscular Q2 Min PRN Chilo Adkins MD        ondansetron (ZOFRAN ODT) ODT tab 4 mg  4 mg Oral Q6H PRN Nena  MD Lilly        Or    ondansetron (ZOFRAN) injection 4 mg  4 mg Intravenous Q6H PRN Lilly Barrett MD        oxyCODONE (ROXICODONE) tablet 5 mg  5 mg Oral Q4H PRN Eren Montenegro MD   5 mg at 02/21/25 2039    oxyCODONE IR (ROXICODONE) tablet 10 mg  10 mg Oral Q4H PRN Eren Montenegro MD   10 mg at 02/21/25 1105    polyethylene glycol (MIRALAX) Packet 17 g  17 g Oral Daily Lilly Barrett MD   17 g at 02/20/25 0753    prochlorperazine (COMPAZINE) injection 10 mg  10 mg Intravenous Q6H PRN Lilly Barrett MD        Or    prochlorperazine (COMPAZINE) tablet 10 mg  10 mg Oral Q6H PRN Lilly Barrett MD        senna-docusate (SENOKOT-S/PERICOLACE) 8.6-50 MG per tablet 1 tablet  1 tablet Oral BID PRN Lilly Barrett MD        Or    senna-docusate (SENOKOT-S/PERICOLACE) 8.6-50 MG per tablet 2 tablet  2 tablet Oral BID PRN Lilly Barrett MD        sertraline (ZOLOFT) tablet 150 mg  150 mg Oral Daily Eren Montenegro MD   150 mg at 02/21/25 0940    sodium chloride (PF) 0.9% PF flush 3 mL  3 mL Intracatheter Q8H Lilly Barrett MD   3 mL at 02/22/25 0028    sodium chloride (PF) 0.9% PF flush 3 mL  3 mL Intracatheter q1 min prn Lilly Barrett MD        sodium chloride 0.9 % infusion   Intravenous Continuous Eren Montenegro  mL/hr at 02/22/25 0835 Rate Verify at 02/22/25 0835             Review of Systems:    ROS: 10 point ROS negative other than those noted above in the HPI.       Physical Exam:     Vitals:    02/21/25 0940 02/21/25 1523 02/22/25 0038 02/22/25 0835   BP: (!) 141/81 (!) 157/81 (!) 154/69 (!) 150/80   BP Location:  Left arm Left arm Left arm   Pulse:   91 86   Resp:  18 16 16   Temp:  97.2  F (36.2  C) 97.7  F (36.5  C) 97.6  F (36.4  C)   TempSrc:  Temporal Temporal Temporal   SpO2: 93% 93% 96% (!) 9%     Physical exam limited by virtual consult  General: appears uncomfortable due to pain        Data     Results for orders placed or performed during the hospital encounter of  02/20/25 (from the past 24 hours)   Glucose by meter   Result Value Ref Range    GLUCOSE BY METER POCT 116 (H) 70 - 99 mg/dL   Glucose by meter   Result Value Ref Range    GLUCOSE BY METER POCT 132 (H) 70 - 99 mg/dL   Glucose by meter   Result Value Ref Range    GLUCOSE BY METER POCT 137 (H) 70 - 99 mg/dL   CBC with Platelets & Differential    Narrative    The following orders were created for panel order CBC with Platelets & Differential.  Procedure                               Abnormality         Status                     ---------                               -----------         ------                     CBC with platelets and d...[[id number]]  Abnormal            Final result                 Please view results for these tests on the individual orders.   Basic metabolic panel   Result Value Ref Range    Sodium 136 135 - 145 mmol/L    Potassium 4.0 3.4 - 5.3 mmol/L    Chloride 103 98 - 107 mmol/L    Carbon Dioxide (CO2) 24 22 - 29 mmol/L    Anion Gap 9 7 - 15 mmol/L    Urea Nitrogen 6.7 6.0 - 20.0 mg/dL    Creatinine 0.40 (L) 0.51 - 0.95 mg/dL    GFR Estimate >90 >60 mL/min/1.73m2    Calcium 10.3 8.8 - 10.4 mg/dL    Glucose 109 (H) 70 - 99 mg/dL   CBC with platelets and differential   Result Value Ref Range    WBC Count 8.8 4.0 - 11.0 10e3/uL    RBC Count 4.02 3.80 - 5.20 10e6/uL    Hemoglobin 10.4 (L) 11.7 - 15.7 g/dL    Hematocrit 32.8 (L) 35.0 - 47.0 %    MCV 82 78 - 100 fL    MCH 25.9 (L) 26.5 - 33.0 pg    MCHC 31.7 31.5 - 36.5 g/dL    RDW 14.0 10.0 - 15.0 %    Platelet Count 214 150 - 450 10e3/uL    % Neutrophils 71 %    % Lymphocytes 13 %    % Monocytes 11 %    % Eosinophils 3 %    % Basophils 0 %    % Immature Granulocytes 1 %    NRBCs per 100 WBC 0 <1 /100    Absolute Neutrophils 6.3 1.6 - 8.3 10e3/uL    Absolute Lymphocytes 1.2 0.8 - 5.3 10e3/uL    Absolute Monocytes 0.9 0.0 - 1.3 10e3/uL    Absolute Eosinophils 0.3 0.0 - 0.7 10e3/uL    Absolute Basophils 0.0 0.0 - 0.2 10e3/uL    Absolute Immature  Granulocytes 0.1 <=0.4 10e3/uL    Absolute NRBCs 0.0 10e3/uL   Glucose by meter   Result Value Ref Range    GLUCOSE BY METER POCT 107 (H) 70 - 99 mg/dL       IMAGING:  EXAM: CT CHEST/ABDOMEN/PELVIS W CONTRAST  LOCATION: [facility]  DATE: 2/20/2025     INDICATION: Right distal femoral lytic lesion, suspicious for metastatic disease.  COMPARISON: None  TECHNIQUE: CT scan of the chest, abdomen, and pelvis was performed following injection of IV contrast. Multiplanar reformats were obtained. Dose reduction techniques were used.   CONTRAST: 100mL Isovue 370     FINDINGS:   LUNGS AND PLEURA: Several solid pulmonary nodules bilaterally, with a few representative nodules described as follows. Right middle lobe 1.0 cm nodule (series 4, image 122). Left upper lobe 0.8 cm nodule (image 79). Right lower lobe 0.7 cm nodule (image   190). No focal airspace disease. No pleural effusion or pneumothorax.     MEDIASTINUM/AXILLAE: Enlarged left axillary lymph node measuring 4.7 x 3.2 cm, with adjacent borderline enlarged lymph nodes. No mediastinal or hilar lymphadenopathy. Mild cardiomegaly. No pericardial effusion.     CORONARY ARTERY CALCIFICATION: Severe.     HEPATOBILIARY: Several ill-defined low-attenuation lesion in the liver, with a few representative lesions described as follows. A hepatic segment 8 lesion measuring 2.9 cm (series 3, image 114). Hepatic segment IVb lesion measuring 2.2 cm (image 136).   Hepatic segment 7 lesion measuring 1.1 cm (series 3, image 163).     PANCREAS: Normal.     SPLEEN: Normal.     ADRENAL GLANDS: Normal.     KIDNEYS/BLADDER: Normal.     BOWEL: No obstruction or inflammatory change. Normal appendix. No evidence of acute appendicitis.     PERITONEUM/RETROPERITONEUM: Right lower retroperitoneal nodule measuring 1.7 x 1.4 cm (series 3, image 211).     LYMPH NODES: Right pelvic sidewall lymph node 2.4 x 1.5 cm. Right common iliac lymph nodes measuring up to 1.4 cm in short  axis. Few prominent but nonenlarged retroperitoneal lymph nodes.     VASCULATURE: Mild atherosclerotic calcifications.     PELVIC ORGANS: 4.4 cm calcification at the uterine fundus, likely calcified fibroid. Complex cystic mass in the right adnexa, measuring 9.3 x 7.4 x 9.9 cm.     MUSCULOSKELETAL: Right humeral head lytic lesion measuring 1.8 cm. Right sacral lytic lesion with extraosseous soft tissue extension, measuring 5.2 x 3.2 cm. Lytic lesion in the left posterior iliac bone measuring 1.6 cm (series 3, image 208). Left upper   anterior chest wall 0.9 cm subcutaneous nodule (series 3, image 24). Right gluteal 0.7 cm subcutaneous nodule (series 3, image 258).                                                                      IMPRESSION:  1.  Findings suspicious for metastatic disease. Enlarged left axillary lymph nodes, measuring up to 4.7 cm. Bilateral pulmonary nodules measuring up to 1.0 cm. Ill-defined hepatic lesions, measuring up to 2.9 cm. Right lower retroperitoneal 1.7 cm   nodule. Right pelvic lymphadenopathy, measuring up to 2.4 cm. Lytic lesions in the right humeral head, right sacrum, and left posterior iliac bone.     2.  Right adnexal 9.9 cm complex cystic mass, suspicious for malignancy, either metastatic or primary ovarian neoplasm.     3.  Left upper anterior chest wall 0.9 cm subcutaneous nodule and right gluteal 0.7 cm subcutaneous nodule, indeterminate and may be metastatic.       EXAM: CT KNEE RIGHT W/O CONTRAST  LOCATION: [facility]  DATE: 2/18/2025     INDICATION: 54F, followup possible fracture, see on XR.  COMPARISON: Right knee radiographs 02/18/2025  TECHNIQUE: Noncontrast. Axial, sagittal and coronal thin-section reconstruction. Dose reduction techniques were used.      FINDINGS:      BONES:  -Intramedullary marrow replacing lesion involving the distal femoral diaphysis measuring approximately 7.5 cm craniocaudal. There is associated permeative cortical  destruction along the lateral and posterior aspects of the distal femoral diaphysis with   adjacent periosteal reaction, as well as a small extraosseous soft tissue component measuring approximately 2.1 x 1.1 x 3.8 cm (series 5 image 19 and series 6 image 39). Subtle linear lucency through the posterior cortex of the distal femoral femoral   diaphysis (series 44-47), which may represent a nondisplaced pathologic fracture.     Tricompartmental hypertrophic degenerative arthritis of the knee, advanced in the medial compartment. No sizable knee joint effusion. Normal bulk of the visualized musculature. No lymphadenopathy.                                                                      IMPRESSION:  1.  Intramedullary marrow replacing lesion in the distal femur measuring approximately 7.5 cm in craniocaudal dimension with associated permeative cortical destruction along the posterolateral margin of the distal femoral diaphysis and small extraosseous   soft tissue component. Constellation of findings are concerning for malignancy, including metastasis or myeloma.  2.  Possible nondisplaced pathologic fracture through the posterior cortex of the distal femoral diaphysis.     NOTE: ABNORMAL REPORT     THE DICTATION ABOVE DESCRIBES AN ABNORMALITY FOR WHICH FOLLOW-UP IS NEEDED.       Assessment and Recommendations   Impression:   Brissa Mott is a 54 year old female with metastatic disease on CT with pulmonary nodules, hepatic lesions, lymphadenopathy, multiple lytic bone lesions and a right ovarian mass with malignant hypercalcemia of unknown origin, currently admitted for pain control and work up for underlying malignancy.      Recommendations:      # Right adnexal mass  # Elevated CA-125  # Metastatic disease with pulmonary nodules, hepatic lesions, and lymphadenopathy  - Reviewed CT imaging with 9.9cm complex cystic mass. Although ovarian cancer is on the differential, her multiple bone lesions and pattern of disease  on scan is concerning for other underlying malignancy, as it's unusual to find multiple bone involvement with ovarian malignancy without more abdominal pathology. CA-125 is slightly elevated to 68, which is not a specific lab test, and can be elevated in the setting of other conditions beyond ovarian cancer.   - Per heme/onc, multiple myeloma workup negative so far.  - Discussed with primary team that tissue diagnosis is necessary to make further treatment recommendations. She has an enlarged left axillary lymph node that's 4.7cm, which I believe to be easily accessible by IR team for biopsy.   - Currently, no urgent intervention per gyn-onc. We will follow along for biopsy results. Patient can be seen by gyn onc outpatient at [facility] for adnexal mass pending final pathology. We can help coordinate follow up.    # Additional lytic lesions in humeral head, sacrum, iliac bone  # Right femur fracture with intramedullary marrow replacing lesion  - Patient with multiple lytic lesions on CT (2/18), which was not seen on CT from OSH on 2/1, lesions grew quiet rapidly with fracture likely pathological. Defer treatment/surgical plan to ortho team.  May benefit from palliative RT for pain management pending diagnosis.  - Continue pain management per primary team    # Hypercalcemia (resolved)  - Hypercalcemia likely due to underlying malignancy, with normal hyperparathyroidism workup per primary team. Ca has improved to 10.3 today from 14.7 on admission s/p Zometa. Agree with continuing IVF and continue to trend with daily labs       Thank you for allowing us to be involved in the care of your patient. Please do not hesitate to give us a call with further questions.     Gyn Onc Team Pager: [phone]     Patient discussed under supervision of Dr. Aguila.    Claudia Gatica MD  Gynecology Oncology Fellow  2/22/2025 9:47 AM      Physician Attestation   I discussed this patient with Dr Gatica and agree with the findings and plan  of care as documented in the note. I personally reviewed vital signs, medications, and labs. The above note has been edited by me    Comments: recommend tissue biopsy, no urgent gyn onc intervention recommended. Will help coordinate follow up with gyn onc as necessary.     Gia Aguila MD  Gynecology Oncology   February 22, 2025 , 2:09 PM

## 2025-02-22 NOTE — PLAN OF CARE
"Goal Outcome Evaluation:      Plan of Care Reviewed With: patient    Overall Patient Progress: no changeOverall Patient Progress: no change    Outcome Evaluation: Discharge TBD. plan is biopsy on Monday.    Bedrest. Voiding via purewick large amounts. No BM. Prn Oxycodone change to schedules. Also had prn IV Dilaudid and Tylenol. Pain CMS. Intact. MOD CHO diet. Tolerated BG monitoring.      Problem: Adult Inpatient Plan of Care  Goal: Plan of Care Review  Description: The Plan of Care Review/Shift note should be completed every shift.  The Outcome Evaluation is a brief statement about your assessment that the patient is improving, declining, or no change.  This information will be displayed automatically on your shift  note.  Outcome: Not Progressing  Flowsheets (Taken 2/22/2025 1722)  Outcome Evaluation: Discharge TBD. plan is biopsy on Monday.  Plan of Care Reviewed With: patient  Overall Patient Progress: no change  Goal: Patient-Specific Goal (Individualized)  Description: You can add care plan individualizations to a care plan. Examples of Individualization might be:  \"Parent requests to be called daily at 9am for status\", \"I have a hard time hearing out of my right ear\", or \"Do not touch me to wake me up as it startles  me\".  Outcome: Not Progressing  Goal: Absence of Hospital-Acquired Illness or Injury  Outcome: Not Progressing  Intervention: Identify and Manage Fall Risk  Recent Flowsheet Documentation  Taken 2/22/2025 0835 by Adele Goncalves, RN  Safety Promotion/Fall Prevention:   activity supervised   assistive device/personal items within reach   clutter free environment maintained   lighting adjusted   mobility aid in reach   nonskid shoes/slippers when out of bed   patient and family education   safety round/check completed  Intervention: Prevent Skin Injury  Recent Flowsheet Documentation  Taken 2/22/2025 0835 by Adele Goncalves, RN  Body Position: supine, head elevated  Intervention: Prevent " and Manage VTE (Venous Thromboembolism) Risk  Recent Flowsheet Documentation  Taken 2/22/2025 0835 by Adele Goncalves RN  VTE Prevention/Management: SCDs off (sequential compression devices)  Intervention: Prevent Infection  Recent Flowsheet Documentation  Taken 2/22/2025 0835 by Adele Goncalves RN  Infection Prevention:   rest/sleep promoted   hand hygiene promoted   environmental surveillance performed  Goal: Optimal Comfort and Wellbeing  Outcome: Not Progressing  Intervention: Monitor Pain and Promote Comfort  Recent Flowsheet Documentation  Taken 2/22/2025 1620 by Adele Goncalves RN  Pain Management Interventions: medication (see MAR)  Taken 2/22/2025 1358 by Adele Goncalves RN  Pain Management Interventions: medication (see MAR)  Taken 2/22/2025 1149 by Adele Goncalves RN  Pain Management Interventions: medication (see MAR)  Taken 2/22/2025 1137 by Adele Goncalves RN  Pain Management Interventions: medication (see MAR)  Taken 2/22/2025 1129 by Adele Goncalves RN  Pain Management Interventions: medication (see MAR)  Taken 2/22/2025 0835 by Adele Goncalves RN  Pain Management Interventions: pain management plan reviewed with patient/caregiver  Goal: Readiness for Transition of Care  Outcome: Not Progressing     Problem: Hip Fracture Medical Management  Goal: Optimal Coping with Change in Health Status  Outcome: Not Progressing  Intervention: Support Psychosocial Response to Injury  Recent Flowsheet Documentation  Taken 2/22/2025 0835 by Adele Goncalves RN  Supportive Measures:   active listening utilized   decision-making supported   positive reinforcement provided  Goal: Absence of Bleeding  Outcome: Not Progressing  Goal: Effective Bowel Elimination  Outcome: Not Progressing  Intervention: Promote Effective Bowel Elimination  Recent Flowsheet Documentation  Taken 2/22/2025 0835 by Adele Goncalves RN  Bowel Elimination Promotion: adequate fluid intake promoted  Goal:  Baseline Cognitive Function Maintained  Outcome: Not Progressing  Intervention: Maximize Cognitive Function  Recent Flowsheet Documentation  Taken 2/22/2025 0835 by Adele Goncalves RN  Reorientation Measures: clock in view  Goal: Absence of Embolism  Outcome: Not Progressing  Intervention: Prevent or Manage Embolism Risk  Recent Flowsheet Documentation  Taken 2/22/2025 0835 by Adele Goncalves RN  VTE Prevention/Management: SCDs off (sequential compression devices)  Goal: Fracture Stability  Outcome: Not Progressing  Goal: Optimal Functional Performance  Outcome: Not Progressing  Intervention: Promote Optimal Functional Status  Recent Flowsheet Documentation  Taken 2/22/2025 0835 by Adele Goncalves RN  Self-Care Promotion:   independence encouraged   BADL personal objects within reach   meal set-up provided  Activity Management: bedrest  Goal: Pain Control and Function  Outcome: Not Progressing  Intervention: Manage Acute Orthopaedic-Related Pain  Recent Flowsheet Documentation  Taken 2/22/2025 1620 by Adele Goncalves RN  Pain Management Interventions: medication (see MAR)  Taken 2/22/2025 1358 by Adele Goncalves RN  Pain Management Interventions: medication (see MAR)  Taken 2/22/2025 1149 by Adele Goncalves RN  Pain Management Interventions: medication (see MAR)  Taken 2/22/2025 1137 by Adele Goncalves RN  Pain Management Interventions: medication (see MAR)  Taken 2/22/2025 1129 by Adele Goncalves RN  Pain Management Interventions: medication (see MAR)  Taken 2/22/2025 0835 by Adele Goncalves RN  Pain Management Interventions: pain management plan reviewed with patient/caregiver  Goal: Effective Urinary Elimination  Outcome: Not Progressing     Problem: Skin Injury Risk Increased  Goal: Skin Health and Integrity  Outcome: Not Progressing  Intervention: Plan: Nurse Driven Intervention: Moisture Management  Recent Flowsheet Documentation  Taken 2/22/2025 0835 by Adele Goncalves  RN  Moisture Interventions:   Incontinence pad   Urinary collection device  Intervention: Optimize Skin Protection  Recent Flowsheet Documentation  Taken 2/22/2025 0835 by Adele Goncalves RN  Pressure Reduction Techniques: heels elevated off bed  Activity Management: bedrest  Head of Bed (HOB) Positioning: HOB at 30 degrees     Problem: Pain Acute  Goal: Optimal Pain Control and Function  Outcome: Not Progressing  Intervention: Optimize Psychosocial Wellbeing  Recent Flowsheet Documentation  Taken 2/22/2025 0835 by Adele Goncalves RN  Supportive Measures:   active listening utilized   decision-making supported   positive reinforcement provided  Diversional Activities: television  Intervention: Develop Pain Management Plan  Recent Flowsheet Documentation  Taken 2/22/2025 1620 by Adele Goncalves RN  Pain Management Interventions: medication (see MAR)  Taken 2/22/2025 1358 by Adele Goncalves RN  Pain Management Interventions: medication (see MAR)  Taken 2/22/2025 1149 by Adele Goncalves RN  Pain Management Interventions: medication (see MAR)  Taken 2/22/2025 1137 by Adele Goncalves RN  Pain Management Interventions: medication (see MAR)  Taken 2/22/2025 1129 by Adele Goncalves RN  Pain Management Interventions: medication (see MAR)  Taken 2/22/2025 0835 by Adele Goncalves RN  Pain Management Interventions: pain management plan reviewed with patient/caregiver  Intervention: Prevent or Manage Pain  Recent Flowsheet Documentation  Taken 2/22/2025 0835 by Adele Goncalves RN  Bowel Elimination Promotion: adequate fluid intake promoted  Medication Review/Management: medications reviewed

## 2025-02-22 NOTE — PROGRESS NOTES
Hem Onc Chart Check    Calcium normal; status post zometa.  Awaiting biopsy.   elevated; gyn-onc consult.  Myeloma markers normal.  No new recs.    Myron Morris MD.

## 2025-02-22 NOTE — PROGRESS NOTES
Hem Onc Chart Check    Calcium normal; status post zometa.  Awaiting biopsy.   elevated; gyn-onc consult placed.  Myeloma markers normal.  No new recs.    Myron Morris MD.

## 2025-02-22 NOTE — PLAN OF CARE
"Goal Outcome Evaluation:      Plan of Care Reviewed With: patient    Overall Patient Progress: no changeOverall Patient Progress: no change    Outcome Evaluation: Pt. NPO since 0000, biopsy today      VSS on 2L NC, bedrest, NPO since 0000, a/ox4, blood sugars monitored, NS @100ml/hr, voiding via external cath - bladder scanned 75 this am, interdry applied to abdominal folds, +3 edema to L foot, pain managed with hortensia flexeril, PRN oxy and tylenol. Plan for biopsy today.     Problem: Adult Inpatient Plan of Care  Goal: Plan of Care Review  Description: The Plan of Care Review/Shift note should be completed every shift.  The Outcome Evaluation is a brief statement about your assessment that the patient is improving, declining, or no change.  This information will be displayed automatically on your shift  note.  Outcome: Progressing  Flowsheets (Taken 2/22/2025 0529)  Outcome Evaluation: Pt. NPO since 0000, biopsy today  Plan of Care Reviewed With: patient  Overall Patient Progress: no change  Goal: Patient-Specific Goal (Individualized)  Description: You can add care plan individualizations to a care plan. Examples of Individualization might be:  \"Parent requests to be called daily at 9am for status\", \"I have a hard time hearing out of my right ear\", or \"Do not touch me to wake me up as it startles  me\".  Outcome: Progressing  Goal: Absence of Hospital-Acquired Illness or Injury  Outcome: Progressing  Intervention: Identify and Manage Fall Risk  Recent Flowsheet Documentation  Taken 2/21/2025 2043 by Valentina Stewart RN  Safety Promotion/Fall Prevention: safety round/check completed  Intervention: Prevent Skin Injury  Recent Flowsheet Documentation  Taken 2/21/2025 2043 by Valentina Stewart RN  Body Position: weight shifting  Intervention: Prevent and Manage VTE (Venous Thromboembolism) Risk  Recent Flowsheet Documentation  Taken 2/21/2025 2043 by Valentina Stewart RN  VTE Prevention/Management: SCDs off (sequential " compression devices)  Intervention: Prevent Infection  Recent Flowsheet Documentation  Taken 2/21/2025 2043 by Valentina Stewart RN  Infection Prevention: rest/sleep promoted  Goal: Optimal Comfort and Wellbeing  Outcome: Progressing  Intervention: Monitor Pain and Promote Comfort  Recent Flowsheet Documentation  Taken 2/21/2025 2039 by Valentina Stewart RN  Pain Management Interventions: medication (see MAR)  Goal: Readiness for Transition of Care  Outcome: Progressing     Problem: Hip Fracture Medical Management  Goal: Optimal Coping with Change in Health Status  Outcome: Progressing  Intervention: Support Psychosocial Response to Injury  Recent Flowsheet Documentation  Taken 2/21/2025 2043 by Valentina Stewart RN  Supportive Measures:   active listening utilized   goal-setting facilitated  Goal: Absence of Bleeding  Outcome: Progressing  Goal: Effective Bowel Elimination  Outcome: Progressing  Intervention: Promote Effective Bowel Elimination  Recent Flowsheet Documentation  Taken 2/21/2025 2043 by Valentina Stewart RN  Bowel Elimination Promotion: adequate fluid intake promoted  Goal: Baseline Cognitive Function Maintained  Outcome: Progressing  Goal: Absence of Embolism  Outcome: Progressing  Intervention: Prevent or Manage Embolism Risk  Recent Flowsheet Documentation  Taken 2/21/2025 2043 by Valentina Stewart RN  VTE Prevention/Management: SCDs off (sequential compression devices)  Goal: Fracture Stability  Outcome: Progressing  Goal: Optimal Functional Performance  Outcome: Progressing  Intervention: Promote Optimal Functional Status  Recent Flowsheet Documentation  Taken 2/21/2025 2043 by Valentina Stewart RN  Activity Management: bedrest  Goal: Pain Control and Function  Outcome: Progressing  Intervention: Manage Acute Orthopaedic-Related Pain  Recent Flowsheet Documentation  Taken 2/21/2025 2039 by Valentina Stewart RN  Pain Management Interventions: medication (see MAR)  Goal: Effective Urinary Elimination  Outcome:  Progressing     Problem: Skin Injury Risk Increased  Goal: Skin Health and Integrity  Outcome: Progressing  Intervention: Plan: Nurse Driven Intervention: Moisture Management  Recent Flowsheet Documentation  Taken 2/21/2025 2043 by Valentina Stewart RN  Moisture Interventions:   Incontinence pad   Urinary collection device  Taken 2/21/2025 2000 by Valentina Stewart RN  Moisture Interventions:   No brief in bed   Urinary collection device   [place] textile in skin fold (InterDry)  Bathing/Skin Care: patient refused  Intervention: Optimize Skin Protection  Recent Flowsheet Documentation  Taken 2/21/2025 2043 by Valentina Stewart RN  Pressure Reduction Techniques: heels elevated off bed  Activity Management: bedrest  Head of Bed (HOB) Positioning: HOB at 30-45 degrees     Problem: Pain Acute  Goal: Optimal Pain Control and Function  Outcome: Progressing  Intervention: Optimize Psychosocial Wellbeing  Recent Flowsheet Documentation  Taken 2/21/2025 2043 by Valentina Stewart RN  Supportive Measures:   active listening utilized   goal-setting facilitated  Intervention: Develop Pain Management Plan  Recent Flowsheet Documentation  Taken 2/21/2025 2039 by Valentina Stewart RN  Pain Management Interventions: medication (see MAR)  Intervention: Prevent or Manage Pain  Recent Flowsheet Documentation  Taken 2/21/2025 2043 by Valentina Stewart RN  Bowel Elimination Promotion: adequate fluid intake promoted  Medication Review/Management: medications reviewed

## 2025-02-22 NOTE — PROGRESS NOTES
[facility]    Medicine Progress Note - Hospitalist Service    Date of Admission:  2/20/2025    Assessment & Plan     Patient is a 54-year-old female with medical history significant for left-sided CVA, type 2 diabetes mellitus, hypertension, hyperlipidemia, morbid obesity on Ozempic, osteoarthritis, KATHRINE, and recently from [facility] on 2/4/2025 to TCU for rehab.  Patient sustained a fall there and subsequently admitted and transferred to [facility] with hypercalcemia and distal femur lytic mass with osseous soft tissue component.    Orthopedic surgery was consulted.  Recommended biopsy.  Oncology was consulted.  Recommended CT of the chest, abdomen, and pelvis.  Also recommended getting a consult for slightly elevated  and 9.3 x 7.4 x 9.9 cm complex right adnexal cyst.    # Severe hypercalcemia  # Metastatic cancer with bone metastasis: Unknown primary.  Patient presented with femur fracture.  Imaging with findings concerning for metastatic disease with unknown primary.  Calcium is improving.    As needed pain regimen  Oncology consulted, appreciate input  Heme-onc IR consult for biopsy  Consulted, appreciate input    # Femur fracture: Patient with possible nondisplaced pathologic fracture through the posterior cortex of the distal femoral diaphysis.  CT showing intramedullary marrow replacing lesion in the distal femur measuring approximately five 7.5 centimeter in cranial caudal dimension with associated permeative cortical destruction along the posterolateral margin of the distal femoral diaphysis and small extraosseous soft tissue component.  Continue as needed pain regimen  Orthopedic surgery consulted, appreciate input    # Type 2 diabetes mellitus:  Continue correctional scale insulin  Hypoglycemia protocol  Hold Ozempic    # Hypertension: Blood pressures mildly elevated.  Likely due to pain.  Continue as needed pain regimen  Continue amlodipine and  "losartan    # Mood disorder  Continue Zoloft    # Hyperlipidemia  Continue Lipitor            Diet: NPO per Anesthesia Guidelines for Procedure/Surgery Except for: Meds    DVT Prophylaxis: Enoxaparin (Lovenox) SQ  Nielson Catheter: Not present  Lines: None     Cardiac Monitoring: None  Code Status: Full Code      Clinically Significant Risk Factors            # Hypercalcemia: Highest Ca = 11.4 mg/dL in last 2 days, will monitor as appropriate    # Hypoalbuminemia: Lowest albumin = 3.1 g/dL at 2/21/2025  7:26 AM, will monitor as appropriate     # Hypertension: Noted on problem list            # Severe Obesity: Estimated body mass index is 48.23 kg/m  as calculated from the following:    Height as of 2/18/25: 1.626 m (5' 4\").    Weight as of 2/18/25: 127.5 kg (281 lb)., PRESENT ON ADMISSION     # Asthma: noted on problem list        Social Drivers of Health    Depression: At risk (7/19/2024)    PHQ-2     PHQ-2 Score: 3   Tobacco Use: Medium Risk (2/18/2025)    Patient History     Smoking Tobacco Use: Former     Smokeless Tobacco Use: Never          Disposition Plan     Medically Ready for Discharge: Anticipated in 2-4 Days             Luiz Barboza MD  Hospitalist Service  [facility]  Securely message with [employer] (more info)  Text page via [employer] Paging/Directory   ______________________________________________________________________    Interval History   Patient continues to have pain.  Reports her pain is 8/10, constant, and worsens with minimal movement in the bed.  Denies any nausea or vomiting.  Denies diarrhea, constipation, dysuria, hematuria, melena, temperature, needed pain symptoms.    Physical Exam   Vital Signs: Temp: 97.6  F (36.4  C) Temp src: Temporal BP: (!) 157/81 Pulse: 86   Resp: 16 SpO2: (!) 9 % O2 Device: Nasal cannula Oxygen Delivery: 2 LPM  Weight: 0 lbs 0 oz    General: AAOx3. Moderate to severe distress.  HEENT: NCAT, pupils are equal and round, anicteric, oral mucosa " is moist.  Neck: Supple  Cardiac: RRR.  No murmur. No rub or gallop.  Respiratory: CTAB, no crackles, wheezes, rales, or rhonchi  Abdomen: Soft, NT, ND, + bowel sounds  Extremity: No LE edema, DP pulses palpable.   Neuro: AAO x3,   Psych: Calm and cooperative.       Medical Decision Making       >50 MINUTES SPENT BY ME on the date of service doing chart review, history, exam, documentation & further activities per the note.      Data     I have personally reviewed the following data over the past 24 hrs:    8.8  \   10.4 (L)   / 214     136 103 6.7 /  113 (H)   4.0 24 0.40 (L) \       Imaging results reviewed over the past 24 hrs:   No results found for this or any previous visit (from the past 24 hours).

## 2025-02-22 NOTE — PLAN OF CARE
"Goal Outcome Evaluation:      Plan of Care Reviewed With: patient, family    Overall Patient Progress: no changeOverall Patient Progress: no change    Outcome Evaluation: pt was NPO most of day pending Biopsy. Pt dfid have dinner. Biopsy will be tomorrow.  Pt is bedrest. Cms intact. Edema baseline to left foot +3. Voiding via purewick. Pain managed with prn Oxycodone and IV Dilaudid.     Oncology saw pt and talked about their finding. Pt was emotional and said it was so much to take in. Family supportive at bedside.      Problem: Adult Inpatient Plan of Care  Goal: Plan of Care Review  Description: The Plan of Care Review/Shift note should be completed every shift.  The Outcome Evaluation is a brief statement about your assessment that the patient is improving, declining, or no change.  This information will be displayed automatically on your shift  note.  Outcome: Not Progressing  Flowsheets (Taken 2/21/2025 5170)  Outcome Evaluation: pt was NPO most of day pending Biopsy. Pt dfid have dinner. Biopsy will be tomorrow.  Plan of Care Reviewed With:   patient   family  Overall Patient Progress: no change  Goal: Patient-Specific Goal (Individualized)  Description: You can add care plan individualizations to a care plan. Examples of Individualization might be:  \"Parent requests to be called daily at 9am for status\", \"I have a hard time hearing out of my right ear\", or \"Do not touch me to wake me up as it startles  me\".  Outcome: Not Progressing  Goal: Absence of Hospital-Acquired Illness or Injury  Outcome: Not Progressing  Intervention: Identify and Manage Fall Risk  Recent Flowsheet Documentation  Taken 2/21/2025 1000 by Adele Goncalves, RN  Safety Promotion/Fall Prevention:   activity supervised   assistive device/personal items within reach   clutter free environment maintained   lighting adjusted   mobility aid in reach   nonskid shoes/slippers when out of bed   patient and family education   safety round/check " completed  Intervention: Prevent Skin Injury  Recent Flowsheet Documentation  Taken 2/21/2025 1101 by Adele Goncalves RN  Body Position: supine, head elevated  Intervention: Prevent and Manage VTE (Venous Thromboembolism) Risk  Recent Flowsheet Documentation  Taken 2/21/2025 1000 by Adele Goncalves RN  VTE Prevention/Management: SCDs off (sequential compression devices)  Intervention: Prevent Infection  Recent Flowsheet Documentation  Taken 2/21/2025 1000 by Adele Goncalves RN  Infection Prevention:   hand hygiene promoted   environmental surveillance performed   single patient room provided  Goal: Optimal Comfort and Wellbeing  Outcome: Not Progressing  Intervention: Monitor Pain and Promote Comfort  Recent Flowsheet Documentation  Taken 2/21/2025 1815 by Adele Goncalves RN  Pain Management Interventions:   pain management plan reviewed with patient/caregiver   cold applied  Taken 2/21/2025 1304 by Adele Goncalves RN  Pain Management Interventions:   medication (see MAR)   pain management plan reviewed with patient/caregiver   cold applied  Taken 2/21/2025 1101 by Adele Goncalves RN  Pain Management Interventions:   medication (see MAR)   cold applied   pain management plan reviewed with patient/caregiver  Goal: Readiness for Transition of Care  Outcome: Not Progressing     Problem: Hip Fracture Medical Management  Goal: Optimal Coping with Change in Health Status  Outcome: Not Progressing  Intervention: Support Psychosocial Response to Injury  Recent Flowsheet Documentation  Taken 2/21/2025 1000 by Adele Goncalves RN  Supportive Measures:   active listening utilized   goal-setting facilitated  Goal: Absence of Bleeding  Outcome: Not Progressing  Goal: Effective Bowel Elimination  Outcome: Not Progressing  Goal: Baseline Cognitive Function Maintained  Outcome: Not Progressing  Intervention: Maximize Cognitive Function  Recent Flowsheet Documentation  Taken 2/21/2025 1000 by Sacha  Adele DOWNS RN  Reorientation Measures: clock in view  Goal: Absence of Embolism  Outcome: Not Progressing  Intervention: Prevent or Manage Embolism Risk  Recent Flowsheet Documentation  Taken 2/21/2025 1000 by Adele Goncalves RN  VTE Prevention/Management: SCDs off (sequential compression devices)  Goal: Fracture Stability  Outcome: Not Progressing  Goal: Optimal Functional Performance  Outcome: Not Progressing  Intervention: Promote Optimal Functional Status  Recent Flowsheet Documentation  Taken 2/21/2025 1101 by Adele Goncalves RN  Activity Management: bedrest  Goal: Pain Control and Function  Outcome: Not Progressing  Intervention: Manage Acute Orthopaedic-Related Pain  Recent Flowsheet Documentation  Taken 2/21/2025 1815 by Adele Goncalves RN  Pain Management Interventions:   pain management plan reviewed with patient/caregiver   cold applied  Taken 2/21/2025 1304 by Adele Goncalves RN  Pain Management Interventions:   medication (see MAR)   pain management plan reviewed with patient/caregiver   cold applied  Taken 2/21/2025 1101 by Adele Goncalves RN  Pain Management Interventions:   medication (see MAR)   cold applied   pain management plan reviewed with patient/caregiver  Goal: Effective Urinary Elimination  Outcome: Not Progressing     Problem: Skin Injury Risk Increased  Goal: Skin Health and Integrity  Outcome: Not Progressing  Intervention: Plan: Nurse Driven Intervention: Moisture Management  Recent Flowsheet Documentation  Taken 2/21/2025 1000 by Adele Goncalves RN  Moisture Interventions:   Incontinence pad   Urinary collection device  Intervention: Optimize Skin Protection  Recent Flowsheet Documentation  Taken 2/21/2025 1101 by Adele Goncalves RN  Activity Management: bedrest  Head of Bed (HOB) Positioning: HOB at 30-45 degrees  Taken 2/21/2025 1000 by Adele Goncalves RN  Pressure Reduction Techniques: heels elevated off bed     Problem: Pain Acute  Goal: Optimal  Pain Control and Function  Outcome: Not Progressing  Intervention: Optimize Psychosocial Wellbeing  Recent Flowsheet Documentation  Taken 2/21/2025 1000 by Adele Goncalves RN  Supportive Measures:   active listening utilized   goal-setting facilitated  Diversional Activities: television  Intervention: Develop Pain Management Plan  Recent Flowsheet Documentation  Taken 2/21/2025 1815 by Adele Goncalves RN  Pain Management Interventions:   pain management plan reviewed with patient/caregiver   cold applied  Taken 2/21/2025 1304 by Adele Goncalves RN  Pain Management Interventions:   medication (see MAR)   pain management plan reviewed with patient/caregiver   cold applied  Taken 2/21/2025 1101 by Adele Goncalves RN  Pain Management Interventions:   medication (see MAR)   cold applied   pain management plan reviewed with patient/caregiver  Intervention: Prevent or Manage Pain  Recent Flowsheet Documentation  Taken 2/21/2025 1000 by Adele Goncalves RN  Medication Review/Management: medications reviewed

## 2025-02-23 LAB
CREAT SERPL-MCNC: 0.44 MG/DL (ref 0.51–0.95)
EGFRCR SERPLBLD CKD-EPI 2021: >90 ML/MIN/1.73M2
GLUCOSE BLDC GLUCOMTR-MCNC: 132 MG/DL (ref 70–99)
GLUCOSE BLDC GLUCOMTR-MCNC: 136 MG/DL (ref 70–99)
GLUCOSE BLDC GLUCOMTR-MCNC: 151 MG/DL (ref 70–99)
GLUCOSE BLDC GLUCOMTR-MCNC: 166 MG/DL (ref 70–99)
GLUCOSE BLDC GLUCOMTR-MCNC: 178 MG/DL (ref 70–99)
PLATELET # BLD AUTO: 238 10E3/UL (ref 150–450)

## 2025-02-23 PROCEDURE — 120N000001 HC R&B MED SURG/OB

## 2025-02-23 PROCEDURE — 86850 RBC ANTIBODY SCREEN: CPT | Performed by: PHYSICIAN ASSISTANT

## 2025-02-23 PROCEDURE — 250N000011 HC RX IP 250 OP 636: Performed by: INTERNAL MEDICINE

## 2025-02-23 PROCEDURE — 999N000128 HC STATISTIC PERIPHERAL IV START W/O US GUIDANCE

## 2025-02-23 PROCEDURE — 36415 COLL VENOUS BLD VENIPUNCTURE: CPT | Performed by: STUDENT IN AN ORGANIZED HEALTH CARE EDUCATION/TRAINING PROGRAM

## 2025-02-23 PROCEDURE — 86900 BLOOD TYPING SEROLOGIC ABO: CPT | Performed by: PHYSICIAN ASSISTANT

## 2025-02-23 PROCEDURE — 250N000013 HC RX MED GY IP 250 OP 250 PS 637: Performed by: INTERNAL MEDICINE

## 2025-02-23 PROCEDURE — 99232 SBSQ HOSP IP/OBS MODERATE 35: CPT | Performed by: INTERNAL MEDICINE

## 2025-02-23 PROCEDURE — 82565 ASSAY OF CREATININE: CPT | Performed by: STUDENT IN AN ORGANIZED HEALTH CARE EDUCATION/TRAINING PROGRAM

## 2025-02-23 PROCEDURE — 250N000013 HC RX MED GY IP 250 OP 250 PS 637: Performed by: ORTHOPAEDIC SURGERY

## 2025-02-23 PROCEDURE — 258N000003 HC RX IP 258 OP 636: Performed by: INTERNAL MEDICINE

## 2025-02-23 PROCEDURE — 85049 AUTOMATED PLATELET COUNT: CPT | Performed by: STUDENT IN AN ORGANIZED HEALTH CARE EDUCATION/TRAINING PROGRAM

## 2025-02-23 RX ORDER — SALIVA STIMULANT COMB. NO.3
1 SPRAY, NON-AEROSOL (ML) MUCOUS MEMBRANE 4 TIMES DAILY PRN
Status: DISCONTINUED | OUTPATIENT
Start: 2025-02-23 | End: 2025-03-05 | Stop reason: HOSPADM

## 2025-02-23 RX ADMIN — CYCLOBENZAPRINE HYDROCHLORIDE 10 MG: 5 TABLET, FILM COATED ORAL at 22:15

## 2025-02-23 RX ADMIN — SODIUM CHLORIDE: 9 INJECTION, SOLUTION INTRAVENOUS at 22:17

## 2025-02-23 RX ADMIN — SERTRALINE HYDROCHLORIDE 150 MG: 100 TABLET ORAL at 08:17

## 2025-02-23 RX ADMIN — ENOXAPARIN SODIUM 40 MG: 40 INJECTION SUBCUTANEOUS at 08:17

## 2025-02-23 RX ADMIN — OXYCODONE HYDROCHLORIDE 10 MG: 10 TABLET ORAL at 22:45

## 2025-02-23 RX ADMIN — OXYCODONE HYDROCHLORIDE 10 MG: 10 TABLET ORAL at 00:27

## 2025-02-23 RX ADMIN — CYCLOBENZAPRINE HYDROCHLORIDE 10 MG: 5 TABLET, FILM COATED ORAL at 08:59

## 2025-02-23 RX ADMIN — AMLODIPINE BESYLATE 10 MG: 10 TABLET ORAL at 08:18

## 2025-02-23 RX ADMIN — OXYCODONE HYDROCHLORIDE 5 MG: 5 TABLET ORAL at 03:31

## 2025-02-23 RX ADMIN — ATORVASTATIN CALCIUM 40 MG: 40 TABLET, FILM COATED ORAL at 08:17

## 2025-02-23 RX ADMIN — POLYETHYLENE GLYCOL 3350 17 G: 17 POWDER, FOR SOLUTION ORAL at 08:17

## 2025-02-23 RX ADMIN — MONTELUKAST 10 MG: 10 TABLET, FILM COATED ORAL at 22:15

## 2025-02-23 RX ADMIN — OXYCODONE HYDROCHLORIDE 5 MG: 5 TABLET ORAL at 12:04

## 2025-02-23 RX ADMIN — SODIUM CHLORIDE: 9 INJECTION, SOLUTION INTRAVENOUS at 08:15

## 2025-02-23 RX ADMIN — HYDROMORPHONE HYDROCHLORIDE 1 MG: 1 INJECTION, SOLUTION INTRAMUSCULAR; INTRAVENOUS; SUBCUTANEOUS at 10:49

## 2025-02-23 RX ADMIN — HYDROMORPHONE HYDROCHLORIDE 1 MG: 1 INJECTION, SOLUTION INTRAMUSCULAR; INTRAVENOUS; SUBCUTANEOUS at 01:27

## 2025-02-23 RX ADMIN — HYDROMORPHONE HYDROCHLORIDE 1 MG: 1 INJECTION, SOLUTION INTRAMUSCULAR; INTRAVENOUS; SUBCUTANEOUS at 18:54

## 2025-02-23 RX ADMIN — IRBESARTAN 300 MG: 150 TABLET ORAL at 22:15

## 2025-02-23 RX ADMIN — ACETAMINOPHEN 650 MG: 325 TABLET, FILM COATED ORAL at 00:26

## 2025-02-23 RX ADMIN — OXYCODONE HYDROCHLORIDE 5 MG: 5 TABLET ORAL at 08:19

## 2025-02-23 RX ADMIN — ACETAMINOPHEN 650 MG: 325 TABLET, FILM COATED ORAL at 13:53

## 2025-02-23 RX ADMIN — ENOXAPARIN SODIUM 40 MG: 40 INJECTION SUBCUTANEOUS at 20:34

## 2025-02-23 RX ADMIN — OXYCODONE HYDROCHLORIDE 5 MG: 5 TABLET ORAL at 20:34

## 2025-02-23 RX ADMIN — OXYCODONE HYDROCHLORIDE 5 MG: 5 TABLET ORAL at 15:56

## 2025-02-23 RX ADMIN — CYCLOBENZAPRINE HYDROCHLORIDE 10 MG: 5 TABLET, FILM COATED ORAL at 13:53

## 2025-02-23 RX ADMIN — METFORMIN HYDROCHLORIDE 1000 MG: 500 TABLET ORAL at 08:17

## 2025-02-23 RX ADMIN — METFORMIN HYDROCHLORIDE 1000 MG: 500 TABLET ORAL at 18:23

## 2025-02-23 ASSESSMENT — ACTIVITIES OF DAILY LIVING (ADL)
ADLS_ACUITY_SCORE: 40
ADLS_ACUITY_SCORE: 40
ADLS_ACUITY_SCORE: 52
ADLS_ACUITY_SCORE: 40
ADLS_ACUITY_SCORE: 52
ADLS_ACUITY_SCORE: 40

## 2025-02-23 NOTE — PLAN OF CARE
"Goal Outcome Evaluation:      Plan of Care Reviewed With: patient    Overall Patient Progress: no changeOverall Patient Progress: no change    Outcome Evaluation: Discharge plans TBD. Plan for biopsy on Monday.      VSS on RA, bedrest with lift, a/ox4, tolerating diet, blood sugars monitored, voiding via external cath - bladder scanned 110 this am, pain severe - managed with scheduled meds, PRN IV dilaudid, tylenol, and oxy. hem/onc and ortho following. discharge plans TBD. Plan is for biopsy on Monday.     Problem: Adult Inpatient Plan of Care  Goal: Plan of Care Review  Description: The Plan of Care Review/Shift note should be completed every shift.  The Outcome Evaluation is a brief statement about your assessment that the patient is improving, declining, or no change.  This information will be displayed automatically on your shift  note.  Outcome: Progressing  Flowsheets (Taken 2/23/2025 0240)  Outcome Evaluation: Discharge plans TBD. Plan for biopsy on Monday.  Plan of Care Reviewed With: patient  Overall Patient Progress: no change  Goal: Patient-Specific Goal (Individualized)  Description: You can add care plan individualizations to a care plan. Examples of Individualization might be:  \"Parent requests to be called daily at 9am for status\", \"I have a hard time hearing out of my right ear\", or \"Do not touch me to wake me up as it startles  me\".  Outcome: Progressing  Goal: Absence of Hospital-Acquired Illness or Injury  Outcome: Progressing  Intervention: Identify and Manage Fall Risk  Recent Flowsheet Documentation  Taken 2/22/2025 2224 by Valentina Stewart, RN  Safety Promotion/Fall Prevention:   activity supervised   assistive device/personal items within reach   clutter free environment maintained   lighting adjusted   mobility aid in reach   nonskid shoes/slippers when out of bed   patient and family education   safety round/check completed  Intervention: Prevent Skin Injury  Recent Flowsheet " Documentation  Taken 2/23/2025 0110 by Valentina Stewart RN  Body Position:   supine, head elevated   turned   left  Taken 2/22/2025 2224 by Valentina Stewart RN  Body Position: supine, head elevated  Intervention: Prevent and Manage VTE (Venous Thromboembolism) Risk  Recent Flowsheet Documentation  Taken 2/22/2025 2224 by Valentina Stewart RN  VTE Prevention/Management: SCDs off (sequential compression devices)  Intervention: Prevent Infection  Recent Flowsheet Documentation  Taken 2/22/2025 2224 by Valentina Stewart RN  Infection Prevention:   rest/sleep promoted   hand hygiene promoted   environmental surveillance performed  Goal: Optimal Comfort and Wellbeing  Outcome: Progressing  Goal: Readiness for Transition of Care  Outcome: Progressing     Problem: Hip Fracture Medical Management  Goal: Optimal Coping with Change in Health Status  Outcome: Progressing  Intervention: Support Psychosocial Response to Injury  Recent Flowsheet Documentation  Taken 2/22/2025 2224 by Valentina Stewart RN  Supportive Measures:   active listening utilized   decision-making supported   positive reinforcement provided  Goal: Absence of Bleeding  Outcome: Progressing  Goal: Effective Bowel Elimination  Outcome: Progressing  Intervention: Promote Effective Bowel Elimination  Recent Flowsheet Documentation  Taken 2/22/2025 2224 by Valentina Stewart RN  Bowel Elimination Promotion: adequate fluid intake promoted  Goal: Baseline Cognitive Function Maintained  Outcome: Progressing  Goal: Absence of Embolism  Outcome: Progressing  Intervention: Prevent or Manage Embolism Risk  Recent Flowsheet Documentation  Taken 2/22/2025 2224 by Valentina Stewart RN  VTE Prevention/Management: SCDs off (sequential compression devices)  Goal: Fracture Stability  Outcome: Progressing  Goal: Optimal Functional Performance  Outcome: Progressing  Intervention: Promote Optimal Functional Status  Recent Flowsheet Documentation  Taken 2/23/2025 0110 by Valentina Stewart  RN  Activity Management: bedrest  Taken 2/22/2025 2224 by Valentina Stewart RN  Self-Care Promotion:   independence encouraged   BADL personal objects within reach   meal set-up provided  Activity Management: bedrest  Goal: Pain Control and Function  Outcome: Progressing  Goal: Effective Urinary Elimination  Outcome: Progressing     Problem: Skin Injury Risk Increased  Goal: Skin Health and Integrity  Outcome: Progressing  Intervention: Plan: Nurse Driven Intervention: Moisture Management  Recent Flowsheet Documentation  Taken 2/22/2025 2224 by Valentina Stewart RN  Moisture Interventions:   Incontinence pad   Urinary collection device  Taken 2/22/2025 2000 by Valentina Stewart RN  Moisture Interventions:   No brief in bed   Incontinence pad   [place] textile in skin fold (InterDry)   Urinary collection device  Bathing/Skin Care: incontinence care  Intervention: Optimize Skin Protection  Recent Flowsheet Documentation  Taken 2/23/2025 0110 by Valentina Stewart RN  Activity Management: bedrest  Head of Bed (HOB) Positioning: HOB at 20-30 degrees  Taken 2/22/2025 2224 by Valentina Stewart RN  Pressure Reduction Techniques: heels elevated off bed  Activity Management: bedrest  Head of Bed (HOB) Positioning: HOB at 30 degrees     Problem: Pain Acute  Goal: Optimal Pain Control and Function  Outcome: Progressing  Intervention: Optimize Psychosocial Wellbeing  Recent Flowsheet Documentation  Taken 2/22/2025 2224 by Valentina Stewart RN  Supportive Measures:   active listening utilized   decision-making supported   positive reinforcement provided  Diversional Activities: television  Intervention: Prevent or Manage Pain  Recent Flowsheet Documentation  Taken 2/22/2025 2224 by Valentina Stewart RN  Bowel Elimination Promotion: adequate fluid intake promoted  Medication Review/Management: medications reviewed

## 2025-02-23 NOTE — PROGRESS NOTES
[facility]    Medicine Progress Note - Hospitalist Service    Date of Admission:  2/20/2025    Assessment & Plan     Patient is a 54-year-old female with medical history significant for left-sided CVA, type 2 diabetes mellitus, hypertension, hyperlipidemia, morbid obesity on Ozempic, osteoarthritis, KATHRINE, and recently from [facility] on 2/4/2025 to TCU for rehab.  Patient sustained a fall there and subsequently admitted and transferred to [facility] with hypercalcemia and distal femur lytic mass with osseous soft tissue component.  Orthopedic surgery was consulted.  They recommended biopsy before deciding on surgical management.Oncology was consulted.  Recommended CT of the chest, abdomen, and pelvis.  Also recommended gynecology oncology consult for slightly elevated  and 9.3 x 7.4 x 9.9 cm complex right adnexal cyst.  IR was consulted for biopsy, likely to occur on 2/24/2025.    # Severe hypercalcemia: Resolved.  Calcium has come down from 14.7-10.3 with IV fluids.  # Metastatic cancer with bone metastasis: Unknown primary.  Patient presented with femur fracture.  Imaging with findings concerning for metastatic disease with unknown primary.    Follow-up on vitamin D level.  Also follow-up on PTH related peptide level.  As needed pain regimen  Oncology consulted, appreciate input  Heme-onc IR consult for biopsy  Consulted, appreciate input    # Femur fracture: Patient with possible nondisplaced pathologic fracture through the posterior cortex of the distal femoral diaphysis.  CT showing intramedullary marrow replacing lesion in the distal femur measuring approximately five 7.5 centimeter in cranial caudal dimension with associated permeative cortical destruction along the posterolateral margin of the distal femoral diaphysis and small extraosseous soft tissue component.  Continue as needed pain regimen  Orthopedic surgery consulted, appreciate input    # Type 2  "diabetes mellitus:  Continue correctional scale insulin  Hypoglycemia protocol  Hold Ozempic    # Hypertension: Blood pressures mildly elevated.  Likely due to pain.  Continue as needed pain regimen  Continue amlodipine and losartan    # Mood disorder  Continue Zoloft    # Hyperlipidemia  Continue Lipitor            Diet: NPO per Anesthesia Guidelines for Procedure/Surgery Except for: Meds  Moderate Consistent Carb (60 g CHO per Meal) Diet    DVT Prophylaxis: Enoxaparin (Lovenox) SQ  Nielson Catheter: Not present  Lines: None     Cardiac Monitoring: None  Code Status: Full Code      Clinically Significant Risk Factors               # Hypoalbuminemia: Lowest albumin = 3.1 g/dL at 2/21/2025  7:26 AM, will monitor as appropriate     # Hypertension: Noted on problem list            # Severe Obesity: Estimated body mass index is 48.23 kg/m  as calculated from the following:    Height as of 2/18/25: 1.626 m (5' 4\").    Weight as of 2/18/25: 127.5 kg (281 lb)., PRESENT ON ADMISSION     # Asthma: noted on problem list        Social Drivers of Health    Depression: At risk (7/19/2024)    PHQ-2     PHQ-2 Score: 3   Tobacco Use: Medium Risk (2/18/2025)    Patient History     Smoking Tobacco Use: Former     Smokeless Tobacco Use: Never          Disposition Plan     Medically Ready for Discharge: Anticipated in 2-4 Days             Luiz Barboza MD  Hospitalist Service  [facility]  Securely message with [employer] (more info)  Text page via [facility] Paging/Directory   ______________________________________________________________________    Interval History   Patient continues to have pain.  However, she reported pain is better controlled compared to yesterday.  States that her pain comes out 3/10 with pain medications.  Still not having bowel movements.  Passing flatus.  Denies any fevers, chills, chest pain, shortness of breath, nausea, vomiting, dysuria, or any other new symptoms.    Physical Exam   Vital " Signs: Temp: 96.9  F (36.1  C) Temp src: Temporal BP: (!) 154/87 Pulse: 84   Resp: 18 SpO2: 92 % O2 Device: None (Room air)    Weight: 0 lbs 0 oz    General: AAOx3. Mild distress.   HEENT: NCAT, pupils are equal and round, anicteric, oral mucosa is moist.  Neck: Supple  Cardiac: RRR.  No murmur. No rub or gallop.  Respiratory: CTAB, no crackles, wheezes, rales, or rhonchi  Abdomen: Soft, NT, ND, + bowel sounds  Extremity: No LE edema, DP pulses palpable.   Neuro: AAO x3,   Psych: Calm and cooperative.       Medical Decision Making       >35 MINUTES SPENT BY ME on the date of service doing chart review, history, exam, documentation & further activities per the note.      Data     I have personally reviewed the following data over the past 24 hrs:    N/A  \   N/A   / 238     N/A N/A N/A /  136 (H)   N/A N/A 0.44 (L) \       Imaging results reviewed over the past 24 hrs:   No results found for this or any previous visit (from the past 24 hours).

## 2025-02-23 NOTE — PROGRESS NOTES
ORTHOPAEDIC SURGERY STAFF PROGRESS NOTE    Resting comfortably with pain medication. No overnight events. Denies chest pain, shortness of breath, lightheadedness.    BP (!) 154/87   Pulse 84   Temp 96.9  F (36.1  C) (Temporal)   Resp 18   LMP  (LMP Unknown)   SpO2 92%     RLE-  Dressing clean/dry/intact  Compartments soft  +extensor hallucis longus, flexor hallucis longus, tibialis anterior, gastroc-soleus complex, Quadriceps Sensation intact to light touch superficial peroneal, deep peroneal, sural, saphenous, tibial nerve distributions  Palpable DP pulse     Pathologic right femur fracture     Discussed surgical plan with patient today.  Would recommend surgical fixation versus resection with distal femoral replacement based on pathology results.  Scheduled for biopsy tomorrow.  Tentative plan for IM nail versus distal femoral placement on Tuesday    Lazarus Meredith MD   [facility] [place]  [phone]

## 2025-02-24 ENCOUNTER — APPOINTMENT (OUTPATIENT)
Dept: CT IMAGING | Facility: CLINIC | Age: 55
End: 2025-02-24
Attending: RADIOLOGY
Payer: COMMERCIAL

## 2025-02-24 LAB
ABO + RH BLD: NORMAL
BLD GP AB SCN SERPL QL: NEGATIVE
GLUCOSE BLDC GLUCOMTR-MCNC: 113 MG/DL (ref 70–99)
GLUCOSE BLDC GLUCOMTR-MCNC: 119 MG/DL (ref 70–99)
GLUCOSE BLDC GLUCOMTR-MCNC: 135 MG/DL (ref 70–99)
GLUCOSE BLDC GLUCOMTR-MCNC: 150 MG/DL (ref 70–99)
GLUCOSE BLDC GLUCOMTR-MCNC: 152 MG/DL (ref 70–99)
GLUCOSE BLDC GLUCOMTR-MCNC: 161 MG/DL (ref 70–99)
SPECIMEN EXP DATE BLD: NORMAL

## 2025-02-24 PROCEDURE — 99233 SBSQ HOSP IP/OBS HIGH 50: CPT | Performed by: INTERNAL MEDICINE

## 2025-02-24 PROCEDURE — 88307 TISSUE EXAM BY PATHOLOGIST: CPT | Mod: 26 | Performed by: PATHOLOGY

## 2025-02-24 PROCEDURE — 250N000009 HC RX 250: Performed by: NURSE PRACTITIONER

## 2025-02-24 PROCEDURE — 250N000013 HC RX MED GY IP 250 OP 250 PS 637: Performed by: HOSPITALIST

## 2025-02-24 PROCEDURE — 258N000003 HC RX IP 258 OP 636: Performed by: INTERNAL MEDICINE

## 2025-02-24 PROCEDURE — 272N000431 CT BONE BIOPSY SUPERFICIAL

## 2025-02-24 PROCEDURE — 88341 IMHCHEM/IMCYTCHM EA ADD ANTB: CPT | Mod: 26 | Performed by: PATHOLOGY

## 2025-02-24 PROCEDURE — 272N000045 CT BONE BIOPSY SUPERFICIAL

## 2025-02-24 PROCEDURE — 250N000013 HC RX MED GY IP 250 OP 250 PS 637: Performed by: INTERNAL MEDICINE

## 2025-02-24 PROCEDURE — 88341 IMHCHEM/IMCYTCHM EA ADD ANTB: CPT | Mod: TC | Performed by: PHYSICIAN ASSISTANT

## 2025-02-24 PROCEDURE — 250N000011 HC RX IP 250 OP 636: Mod: JZ | Performed by: INTERNAL MEDICINE

## 2025-02-24 PROCEDURE — 250N000011 HC RX IP 250 OP 636: Performed by: NURSE PRACTITIONER

## 2025-02-24 PROCEDURE — 120N000001 HC R&B MED SURG/OB

## 2025-02-24 PROCEDURE — 88342 IMHCHEM/IMCYTCHM 1ST ANTB: CPT | Mod: 26 | Performed by: PATHOLOGY

## 2025-02-24 PROCEDURE — 0QBB3ZX EXCISION OF RIGHT LOWER FEMUR, PERCUTANEOUS APPROACH, DIAGNOSTIC: ICD-10-PCS | Performed by: RADIOLOGY

## 2025-02-24 PROCEDURE — 250N000013 HC RX MED GY IP 250 OP 250 PS 637: Performed by: ORTHOPAEDIC SURGERY

## 2025-02-24 PROCEDURE — 20220 BONE BIOPSY TROCAR/NDL SUPFC: CPT

## 2025-02-24 RX ORDER — NALOXONE HYDROCHLORIDE 0.4 MG/ML
0.2 INJECTION, SOLUTION INTRAMUSCULAR; INTRAVENOUS; SUBCUTANEOUS
Status: DISCONTINUED | OUTPATIENT
Start: 2025-02-24 | End: 2025-02-25 | Stop reason: HOSPADM

## 2025-02-24 RX ORDER — FENTANYL CITRATE 50 UG/ML
25-50 INJECTION, SOLUTION INTRAMUSCULAR; INTRAVENOUS EVERY 5 MIN PRN
Status: DISCONTINUED | OUTPATIENT
Start: 2025-02-24 | End: 2025-02-25 | Stop reason: HOSPADM

## 2025-02-24 RX ORDER — NALOXONE HYDROCHLORIDE 0.4 MG/ML
0.4 INJECTION, SOLUTION INTRAMUSCULAR; INTRAVENOUS; SUBCUTANEOUS
Status: DISCONTINUED | OUTPATIENT
Start: 2025-02-24 | End: 2025-02-25 | Stop reason: HOSPADM

## 2025-02-24 RX ORDER — FLUMAZENIL 0.1 MG/ML
0.2 INJECTION, SOLUTION INTRAVENOUS
Status: DISCONTINUED | OUTPATIENT
Start: 2025-02-24 | End: 2025-02-25 | Stop reason: HOSPADM

## 2025-02-24 RX ADMIN — LIDOCAINE HYDROCHLORIDE 10 ML: 10 INJECTION, SOLUTION EPIDURAL; INFILTRATION; INTRACAUDAL; PERINEURAL at 12:34

## 2025-02-24 RX ADMIN — ACETAMINOPHEN 650 MG: 325 TABLET, FILM COATED ORAL at 02:01

## 2025-02-24 RX ADMIN — FENTANYL CITRATE 50 MCG: 50 INJECTION, SOLUTION INTRAMUSCULAR; INTRAVENOUS at 12:17

## 2025-02-24 RX ADMIN — OXYCODONE HYDROCHLORIDE 5 MG: 5 TABLET ORAL at 04:25

## 2025-02-24 RX ADMIN — ENOXAPARIN SODIUM 40 MG: 40 INJECTION SUBCUTANEOUS at 13:18

## 2025-02-24 RX ADMIN — CYCLOBENZAPRINE HYDROCHLORIDE 10 MG: 5 TABLET, FILM COATED ORAL at 13:04

## 2025-02-24 RX ADMIN — FENTANYL CITRATE 50 MCG: 50 INJECTION, SOLUTION INTRAMUSCULAR; INTRAVENOUS at 12:11

## 2025-02-24 RX ADMIN — SODIUM CHLORIDE: 9 INJECTION, SOLUTION INTRAVENOUS at 07:53

## 2025-02-24 RX ADMIN — CYCLOBENZAPRINE HYDROCHLORIDE 10 MG: 5 TABLET, FILM COATED ORAL at 21:33

## 2025-02-24 RX ADMIN — ACETAMINOPHEN 650 MG: 325 TABLET, FILM COATED ORAL at 17:46

## 2025-02-24 RX ADMIN — MONTELUKAST 10 MG: 10 TABLET, FILM COATED ORAL at 21:33

## 2025-02-24 RX ADMIN — IRBESARTAN 300 MG: 150 TABLET ORAL at 21:33

## 2025-02-24 RX ADMIN — OXYCODONE HYDROCHLORIDE 5 MG: 5 TABLET ORAL at 07:44

## 2025-02-24 RX ADMIN — ATORVASTATIN CALCIUM 40 MG: 40 TABLET, FILM COATED ORAL at 07:45

## 2025-02-24 RX ADMIN — MIDAZOLAM 1 MG: 1 INJECTION INTRAMUSCULAR; INTRAVENOUS at 12:31

## 2025-02-24 RX ADMIN — OXYCODONE HYDROCHLORIDE 5 MG: 5 TABLET ORAL at 17:46

## 2025-02-24 RX ADMIN — OXYCODONE HYDROCHLORIDE 5 MG: 5 TABLET ORAL at 21:33

## 2025-02-24 RX ADMIN — SENNOSIDES AND DOCUSATE SODIUM 1 TABLET: 50; 8.6 TABLET ORAL at 01:53

## 2025-02-24 RX ADMIN — ACETAMINOPHEN 650 MG: 325 TABLET, FILM COATED ORAL at 07:44

## 2025-02-24 RX ADMIN — ACETAMINOPHEN 650 MG: 325 TABLET, FILM COATED ORAL at 13:04

## 2025-02-24 RX ADMIN — HYDROMORPHONE HYDROCHLORIDE 1 MG: 1 INJECTION, SOLUTION INTRAMUSCULAR; INTRAVENOUS; SUBCUTANEOUS at 22:50

## 2025-02-24 RX ADMIN — CYCLOBENZAPRINE HYDROCHLORIDE 10 MG: 5 TABLET, FILM COATED ORAL at 07:44

## 2025-02-24 RX ADMIN — INSULIN ASPART 1 UNITS: 100 INJECTION, SOLUTION INTRAVENOUS; SUBCUTANEOUS at 17:45

## 2025-02-24 RX ADMIN — SERTRALINE HYDROCHLORIDE 150 MG: 100 TABLET ORAL at 07:44

## 2025-02-24 RX ADMIN — SODIUM CHLORIDE: 9 INJECTION, SOLUTION INTRAVENOUS at 18:29

## 2025-02-24 RX ADMIN — FENTANYL CITRATE 50 MCG: 50 INJECTION, SOLUTION INTRAMUSCULAR; INTRAVENOUS at 12:31

## 2025-02-24 RX ADMIN — OXYCODONE HYDROCHLORIDE 10 MG: 10 TABLET ORAL at 10:27

## 2025-02-24 RX ADMIN — AMLODIPINE BESYLATE 10 MG: 10 TABLET ORAL at 07:44

## 2025-02-24 RX ADMIN — MIDAZOLAM 2 MG: 1 INJECTION INTRAMUSCULAR; INTRAVENOUS at 12:11

## 2025-02-24 RX ADMIN — Medication 1 SPRAY: at 01:53

## 2025-02-24 RX ADMIN — OXYCODONE HYDROCHLORIDE 5 MG: 5 TABLET ORAL at 13:04

## 2025-02-24 RX ADMIN — OXYCODONE HYDROCHLORIDE 5 MG: 5 TABLET ORAL at 00:29

## 2025-02-24 ASSESSMENT — ACTIVITIES OF DAILY LIVING (ADL)
ADLS_ACUITY_SCORE: 45
ADLS_ACUITY_SCORE: 52
ADLS_ACUITY_SCORE: 45
ADLS_ACUITY_SCORE: 52
ADLS_ACUITY_SCORE: 52
ADLS_ACUITY_SCORE: 45
ADLS_ACUITY_SCORE: 45
ADLS_ACUITY_SCORE: 52
ADLS_ACUITY_SCORE: 52
ADLS_ACUITY_SCORE: 45
ADLS_ACUITY_SCORE: 52
ADLS_ACUITY_SCORE: 52
ADLS_ACUITY_SCORE: 45
ADLS_ACUITY_SCORE: 52
ADLS_ACUITY_SCORE: 45

## 2025-02-24 NOTE — PLAN OF CARE
"Goal Outcome Evaluation:      Plan of Care Reviewed With: patient    Overall Patient Progress: no changeOverall Patient Progress: no change    Outcome Evaluation: Discharge plans TBD. Plan for Biopsy on Monday. Ortho will decide if surgery will be done after biopsy    Bedrest. VSS RA. Voiding via purewick large amounts. No BM. Pain managed with scheduled Oxycodone and Flexeril along with prn IV Dilaudid and Tylenol. Main pain in left knee today. CMS Intact. MOD CHO diet. BG monitoring.  consult placed.       Problem: Adult Inpatient Plan of Care  Goal: Plan of Care Review  Description: The Plan of Care Review/Shift note should be completed every shift.  The Outcome Evaluation is a brief statement about your assessment that the patient is improving, declining, or no change.  This information will be displayed automatically on your shift  note.  2/23/2025 1949 by Adele Goncalves RN  Outcome: Progressing  Flowsheets (Taken 2/23/2025 1949)  Plan of Care Reviewed With: patient  Overall Patient Progress: no change  2/23/2025 1451 by Adele Goncalves RN  Outcome: Not Progressing  Flowsheets (Taken 2/23/2025 1451)  Outcome Evaluation: Discharge plans TBD. Plan for Biopsy on Monday. Ortho will decide if surgery will be done after biopsy  Plan of Care Reviewed With:   patient   spouse   family  Overall Patient Progress: no change  Goal: Patient-Specific Goal (Individualized)  Description: You can add care plan individualizations to a care plan. Examples of Individualization might be:  \"Parent requests to be called daily at 9am for status\", \"I have a hard time hearing out of my right ear\", or \"Do not touch me to wake me up as it startles  me\".  2/23/2025 1949 by Adele Goncalves RN  Outcome: Progressing  2/23/2025 1451 by Adele Goncalves RN  Outcome: Not Progressing  Goal: Absence of Hospital-Acquired Illness or Injury  2/23/2025 1949 by Adele Goncalves RN  Outcome: Progressing  2/23/2025 1451 by " Adele Goncalves RN  Outcome: Not Progressing  Intervention: Identify and Manage Fall Risk  Recent Flowsheet Documentation  Taken 2/23/2025 0819 by Adele Goncalves RN  Safety Promotion/Fall Prevention:   activity supervised   assistive device/personal items within reach   clutter free environment maintained   lighting adjusted   mobility aid in reach   nonskid shoes/slippers when out of bed   patient and family education   safety round/check completed  Intervention: Prevent Skin Injury  Recent Flowsheet Documentation  Taken 2/23/2025 0819 by Adele Goncalves RN  Body Position: supine, head elevated  Intervention: Prevent and Manage VTE (Venous Thromboembolism) Risk  Recent Flowsheet Documentation  Taken 2/23/2025 0819 by Adele Goncalves RN  VTE Prevention/Management: SCDs off (sequential compression devices)  Intervention: Prevent Infection  Recent Flowsheet Documentation  Taken 2/23/2025 0819 by Adele Goncalves RN  Infection Prevention: rest/sleep promoted  Goal: Optimal Comfort and Wellbeing  2/23/2025 1949 by Adele Goncalves RN  Outcome: Progressing  2/23/2025 1451 by Adele Goncalves RN  Outcome: Not Progressing  Intervention: Monitor Pain and Promote Comfort  Recent Flowsheet Documentation  Taken 2/23/2025 1854 by Adele Goncalves RN  Pain Management Interventions:   medication (see MAR)   repositioned  Taken 2/23/2025 1556 by Adele Goncalves RN  Pain Management Interventions: medication (see MAR)  Taken 2/23/2025 1353 by Adele Goncalves RN  Pain Management Interventions: medication (see MAR)  Taken 2/23/2025 1204 by Adele Goncalves RN  Pain Management Interventions: medication (see MAR)  Taken 2/23/2025 1049 by Adele Goncalves RN  Pain Management Interventions: medication (see MAR)  Taken 2/23/2025 0819 by Adele Goncalves RN  Pain Management Interventions:   medication (see MAR)   cold applied   pain management plan reviewed with patient/caregiver  Goal:  Readiness for Transition of Care  2/23/2025 1949 by Adele Goncalves RN  Outcome: Progressing  2/23/2025 1451 by Adele Goncalves RN  Outcome: Not Progressing     Problem: Hip Fracture Medical Management  Goal: Optimal Coping with Change in Health Status  2/23/2025 1949 by Adele Goncalves RN  Outcome: Progressing  2/23/2025 1451 by Adele Goncalves RN  Outcome: Not Progressing  Intervention: Support Psychosocial Response to Injury  Recent Flowsheet Documentation  Taken 2/23/2025 0819 by Adele Goncalves RN  Supportive Measures:   active listening utilized   goal-setting facilitated   positive reinforcement provided   relaxation techniques promoted  Goal: Absence of Bleeding  2/23/2025 1949 by Adele Goncalves RN  Outcome: Progressing  2/23/2025 1451 by Adele Goncalves RN  Outcome: Not Progressing  Goal: Effective Bowel Elimination  2/23/2025 1949 by Adele Goncalves RN  Outcome: Progressing  2/23/2025 1451 by Adele Goncalves RN  Outcome: Not Progressing  Intervention: Promote Effective Bowel Elimination  Recent Flowsheet Documentation  Taken 2/23/2025 0819 by Adele Goncalves RN  Bowel Elimination Promotion: adequate fluid intake promoted  Goal: Baseline Cognitive Function Maintained  2/23/2025 1949 by Adele Goncalves RN  Outcome: Progressing  2/23/2025 1451 by Adele Goncalves RN  Outcome: Not Progressing  Goal: Absence of Embolism  2/23/2025 1949 by Adele Goncalves RN  Outcome: Progressing  2/23/2025 1451 by Adele Goncalves RN  Outcome: Not Progressing  Intervention: Prevent or Manage Embolism Risk  Recent Flowsheet Documentation  Taken 2/23/2025 0819 by Adele Goncalves RN  VTE Prevention/Management: SCDs off (sequential compression devices)  Goal: Fracture Stability  2/23/2025 1949 by Adele Goncalves RN  Outcome: Progressing  2/23/2025 1451 by Adele Goncalves RN  Outcome: Not Progressing  Goal: Optimal Functional Performance  2/23/2025 1949 by Sacha  Adele DOWNS RN  Outcome: Progressing  2/23/2025 1451 by Adele Goncalves RN  Outcome: Not Progressing  Intervention: Promote Optimal Functional Status  Recent Flowsheet Documentation  Taken 2/23/2025 0819 by Adele Goncalves RN  Activity Management: bedrest  Goal: Pain Control and Function  2/23/2025 1949 by Adele Goncalves RN  Outcome: Progressing  2/23/2025 1451 by Adele Goncalves RN  Outcome: Not Progressing  Intervention: Manage Acute Orthopaedic-Related Pain  Recent Flowsheet Documentation  Taken 2/23/2025 1854 by Adele Goncalves RN  Pain Management Interventions:   medication (see MAR)   repositioned  Taken 2/23/2025 1556 by Adele Goncalves RN  Pain Management Interventions: medication (see MAR)  Taken 2/23/2025 1353 by Adele Goncalves RN  Pain Management Interventions: medication (see MAR)  Taken 2/23/2025 1204 by Adele Goncalves RN  Pain Management Interventions: medication (see MAR)  Taken 2/23/2025 1049 by Adele Goncalves RN  Pain Management Interventions: medication (see MAR)  Taken 2/23/2025 0819 by Adele Goncalves RN  Pain Management Interventions:   medication (see MAR)   cold applied   pain management plan reviewed with patient/caregiver  Goal: Effective Urinary Elimination  2/23/2025 1949 by Adele Goncalves RN  Outcome: Progressing  2/23/2025 1451 by Adele Goncalves RN  Outcome: Not Progressing     Problem: Skin Injury Risk Increased  Goal: Skin Health and Integrity  2/23/2025 1949 by Adele Goncalves RN  Outcome: Progressing  2/23/2025 1451 by Adele Goncalves RN  Outcome: Not Progressing  Intervention: Plan: Nurse Driven Intervention: Moisture Management  Recent Flowsheet Documentation  Taken 2/23/2025 0819 by Adele Goncalves RN  Moisture Interventions:   Incontinence pad   Urinary collection device  Intervention: Optimize Skin Protection  Recent Flowsheet Documentation  Taken 2/23/2025 0819 by Adele Goncalves RN  Pressure Reduction  Techniques: heels elevated off bed  Activity Management: bedrest  Head of Bed (HOB) Positioning: HOB at 20-30 degrees     Problem: Pain Acute  Goal: Optimal Pain Control and Function  2/23/2025 1949 by Adele Goncalves RN  Outcome: Progressing  2/23/2025 1451 by Adele Goncalves RN  Outcome: Not Progressing  Intervention: Optimize Psychosocial Wellbeing  Recent Flowsheet Documentation  Taken 2/23/2025 0819 by Adele Goncalves RN  Supportive Measures:   active listening utilized   goal-setting facilitated   positive reinforcement provided   relaxation techniques promoted  Intervention: Develop Pain Management Plan  Recent Flowsheet Documentation  Taken 2/23/2025 1854 by Adele Goncalves RN  Pain Management Interventions:   medication (see MAR)   repositioned  Taken 2/23/2025 1556 by Adele Goncalves RN  Pain Management Interventions: medication (see MAR)  Taken 2/23/2025 1353 by Adele Goncalves RN  Pain Management Interventions: medication (see MAR)  Taken 2/23/2025 1204 by Adele Goncalves RN  Pain Management Interventions: medication (see MAR)  Taken 2/23/2025 1049 by Adele Goncalves RN  Pain Management Interventions: medication (see MAR)  Taken 2/23/2025 0819 by Adele Goncalves RN  Pain Management Interventions:   medication (see MAR)   cold applied   pain management plan reviewed with patient/caregiver  Intervention: Prevent or Manage Pain  Recent Flowsheet Documentation  Taken 2/23/2025 0819 by Adele Goncalves RN  Bowel Elimination Promotion: adequate fluid intake promoted  Medication Review/Management: medications reviewed

## 2025-02-24 NOTE — PROGRESS NOTES
Right distal femur bone biopsy complete. Pt tolerated. Pt did have much pain. Pt received 3mg versed, 150mcg fentanyl IV. Samples obtained and place in saline and sent to lab. Site at right knee covered with bandage. Pt transferred back to floor via cart. Report called to RN.

## 2025-02-24 NOTE — PLAN OF CARE
"Pain is either severe or sleeping with eyes closed.  I have medicated based on patient voicing pain number and rubs her right knee when patient is elevating.   Had CT Biopsy today and increased pain after the biopsy. Bandaide right knee is clean and dry.   Plan is surgery tomorrow 0730. NPO at midnight and Arian JACOBS noted verbally to give one dose of lovenox today and then hold the med.   ADA diet after the biopsy.   Bedrest. Declined to be turned for me to view the posterior skin. Able to turn on the left side a little but not enough to see th posterior skin. Declined the surgical bath now and wants to complete \"later this evening.\" No BM - unknown last BM date- \"don't feel constipated\" and declined all bowel medications as planning surgery tomorrow.   Voiding per danile.   Had family here today.   Pleasant. Oriented x4.   VSS/ oxygen on 2 L NC after the biopsy.   Edema +4 to bilateral feet and mild edema to legs.   Inter dry placed to breast and abd folds and skin care completed.   IV fluids.     Problem: Adult Inpatient Plan of Care  Goal: Plan of Care Review  Description: The Plan of Care Review/Shift note should be completed every shift.  The Outcome Evaluation is a brief statement about your assessment that the patient is improving, declining, or no change.  This information will be displayed automatically on your shift  note.  Outcome: Not Progressing  Flowsheets (Taken 2/24/2025 1748)  Plan of Care Reviewed With: patient  Overall Patient Progress: no change  Goal: Patient-Specific Goal (Individualized)  Description: You can add care plan individualizations to a care plan. Examples of Individualization might be:  \"Parent requests to be called daily at 9am for status\", \"I have a hard time hearing out of my right ear\", or \"Do not touch me to wake me up as it startles  me\".  Outcome: Not Progressing  Goal: Absence of Hospital-Acquired Illness or Injury  Outcome: Not Progressing  Intervention: Identify and " Manage Fall Risk  Recent Flowsheet Documentation  Taken 2/24/2025 1129 by Tasha Hall, RN  Safety Promotion/Fall Prevention:   activity supervised   assistive device/personal items within reach   clutter free environment maintained   lighting adjusted   mobility aid in reach   nonskid shoes/slippers when out of bed   patient and family education   safety round/check completed  Intervention: Prevent Skin Injury  Recent Flowsheet Documentation  Taken 2/24/2025 1453 by Tasha Hall, RN  Body Position:   left   turned  Taken 2/24/2025 1129 by Tasha Hall, RN  Body Position: supine, head elevated  Skin Protection:   adhesive use limited   incontinence pads utilized  Intervention: Prevent and Manage VTE (Venous Thromboembolism) Risk  Recent Flowsheet Documentation  Taken 2/24/2025 1129 by Tasha Hall RN  VTE Prevention/Management: SCDs off (sequential compression devices)  Intervention: Prevent Infection  Recent Flowsheet Documentation  Taken 2/24/2025 1129 by Tasha Hall RN  Infection Prevention: rest/sleep promoted  Goal: Optimal Comfort and Wellbeing  Outcome: Not Progressing  Intervention: Monitor Pain and Promote Comfort  Recent Flowsheet Documentation  Taken 2/24/2025 1747 by Tasha Hall RN  Pain Management Interventions: medication (see MAR)  Taken 2/24/2025 1027 by Tasha Hall RN  Pain Management Interventions: medication (see MAR)  Taken 2/24/2025 0745 by Tasha Hall RN  Pain Management Interventions: medication (see MAR)  Goal: Readiness for Transition of Care  Outcome: Not Progressing     Problem: Hip Fracture Medical Management  Goal: Optimal Coping with Change in Health Status  Outcome: Not Progressing  Intervention: Support Psychosocial Response to Injury  Recent Flowsheet Documentation  Taken 2/24/2025 1129 by Tasha Hall, RN  Supportive Measures:   active listening utilized   goal-setting facilitated    positive reinforcement provided   relaxation techniques promoted  Goal: Absence of Bleeding  Outcome: Not Progressing  Goal: Effective Bowel Elimination  Outcome: Not Progressing  Intervention: Promote Effective Bowel Elimination  Recent Flowsheet Documentation  Taken 2/24/2025 1129 by Tasha Hall RN  Bowel Elimination Promotion: adequate fluid intake promoted  Goal: Baseline Cognitive Function Maintained  Outcome: Not Progressing  Goal: Absence of Embolism  Outcome: Not Progressing  Intervention: Prevent or Manage Embolism Risk  Recent Flowsheet Documentation  Taken 2/24/2025 1129 by Tasha Hall, RN  VTE Prevention/Management: SCDs off (sequential compression devices)  Goal: Fracture Stability  Outcome: Not Progressing  Goal: Optimal Functional Performance  Outcome: Not Progressing  Intervention: Promote Optimal Functional Status  Recent Flowsheet Documentation  Taken 2/24/2025 1129 by Tasha Hall RN  Self-Care Promotion: independence encouraged  Activity Management: bedrest  Goal: Pain Control and Function  Outcome: Not Progressing  Intervention: Manage Acute Orthopaedic-Related Pain  Recent Flowsheet Documentation  Taken 2/24/2025 1747 by Tasha Hall RN  Pain Management Interventions: medication (see MAR)  Taken 2/24/2025 1027 by Tasha Hall RN  Pain Management Interventions: medication (see MAR)  Taken 2/24/2025 0745 by Tasha Hall RN  Pain Management Interventions: medication (see MAR)  Goal: Effective Urinary Elimination  Outcome: Not Progressing     Problem: Skin Injury Risk Increased  Goal: Skin Health and Integrity  Outcome: Not Progressing  Intervention: Plan: Nurse Driven Intervention: Moisture Management  Recent Flowsheet Documentation  Taken 2/24/2025 1129 by Tasha Hall RN  Moisture Interventions:   No brief in bed   Urinary collection device   Perineal cleanser  Intervention: Optimize Skin Protection  Recent Flowsheet  Documentation  Taken 2/24/2025 1129 by Tasha Hall, RN  Pressure Reduction Techniques: heels elevated off bed  Skin Protection:   adhesive use limited   incontinence pads utilized  Activity Management: bedrest  Head of Bed (HOB) Positioning: HOB at 20-30 degrees     Problem: Pain Acute  Goal: Optimal Pain Control and Function  Outcome: Not Progressing  Intervention: Optimize Psychosocial Wellbeing  Recent Flowsheet Documentation  Taken 2/24/2025 1129 by Tasha Hall RN  Supportive Measures:   active listening utilized   goal-setting facilitated   positive reinforcement provided   relaxation techniques promoted  Intervention: Develop Pain Management Plan  Recent Flowsheet Documentation  Taken 2/24/2025 1747 by Tasha Hall, RN  Pain Management Interventions: medication (see MAR)  Taken 2/24/2025 1027 by Tasha Hall RN  Pain Management Interventions: medication (see MAR)  Taken 2/24/2025 0745 by Tasha Hall, RN  Pain Management Interventions: medication (see MAR)  Intervention: Prevent or Manage Pain  Recent Flowsheet Documentation  Taken 2/24/2025 1129 by Tasha Hall RN  Bowel Elimination Promotion: adequate fluid intake promoted  Medication Review/Management: medications reviewed     Problem: Comorbidity Management  Goal: Blood Glucose Levels Within Targeted Range  Outcome: Not Progressing  Intervention: Monitor and Manage Glycemia  Recent Flowsheet Documentation  Taken 2/24/2025 1129 by Tasha Hall, RN  Medication Review/Management: medications reviewed  Goal: Blood Pressure in Desired Range  Outcome: Not Progressing  Intervention: Maintain Blood Pressure Management  Recent Flowsheet Documentation  Taken 2/24/2025 1129 by Tasha Hall RN  Medication Review/Management: medications reviewed   Goal Outcome Evaluation:      Plan of Care Reviewed With: patient    Overall Patient Progress: no changeOverall Patient Progress: no  change

## 2025-02-24 NOTE — CONSULTS
"SPIRITUAL HEALTH SERVICES - Consult Note  [facility]  Referral Source/Reason for Visit: [facility] consult.    Summary and Recommendations -  Pt Brissa reported that she will have a biopsy today to determine if she has a malignancy.  She named her spouse, two children, and extended family as being core to her support network.  Brissa is Adventism but not affiliated with a bala community.  She welcomed prayer.    Plan: Informed pt how she can request further  support.  This author and other chaplains remain available per pt/family request.     Angel Navarro M.Div., [facility]  Staff     SHS available 24/7 for emergent requests/referrals, either by paging the on-call  or by entering an ASAP/STAT consult in [employer], which will also page the on-call .    Assessment    Saw pt Brissajuanito Mott per [facility] consult; pt requested  support.    Patient/Family Understanding of Illness and Goals of Care - Brissa reported that she is being treated for a leg fracture and has been found to have two tumors.  She will have a biopsy today to determine if she has a malignancy.    Distress and Loss - Brissa shared that she is \"doing a lot of praying\" and occasionally \"lets [her]self go\" emotionally    Strengths, Coping, and Resources - She named her spouse, two children, and extended family as being core to her support network.    Meaning, Beliefs, and Spirituality -   Brissa is Adventism but not currently affiliated with a Oriental orthodox. Her spiritual practices include prayer, meditation, and reading the Bible. Brissa welcomed prayer.  She reflected that sometimes God feels distant lately but when she prays and meditates then God feels close.  "

## 2025-02-24 NOTE — PROGRESS NOTES
Hematology/Oncology:      CHART CHECK:     Today's Date: 02/24/25  Date of Admission: 2/20/25  Reason for Consult: Hypercalcemia, lytic lesions    Admitted for hypercalcemia, femur fracture, concern for pathological fracture.  Received Zometa 4 mg x 1 on 2/20/2025.  Calcium improved to 10.3 on 2/22/2025.  CT CAP with adnexal mass with multiple lytic lesions, concerning for metastatic disease.  Myeloma labs normal.     was elevated, Gyn Onc saw patient, requesting  biopsy as well.  Per chart review today, plan for IR biopsy to help with surgical planning and per Ortho, plan for surgery tomorrow.    We will follow peripherally until we have biopsy results.  Please call us with any questions.    Elida Whitley PA-C  Hematology/Oncology  [facility]

## 2025-02-24 NOTE — PROGRESS NOTES
Orthopedic Surgery  Brissa Mott  02/24/2025     Admit Date:  2/20/2025  Assessment:   Pathologic femur fracture, right     Patient resting comfortably in bed    Pain controlled  Tolerating oral intake, currently NPO awaiting biopsy  Denies nausea or vomiting  Denies chest pain or shortness of breath    Temp:  [97  F (36.1  C)-98.8  F (37.1  C)] 97.6  F (36.4  C)  Pulse:  [81-89] 82  Resp:  [16-18] 18  BP: (136-154)/(74-96) 150/83  SpO2:  [91 %-94 %] 94 %    Alert and oriented, appropriately concerned  Right calf is soft, non-tender  Right lower extremity is NVI  Sensation intact right lower extremity  Patient able to resist dorsi and plantar flexion on the right  +Dp pulse    Labs:  Recent Labs   Lab Test 02/23/25  0705 02/22/25  0718 02/20/25  0654 02/18/25 2024   WBC  --  8.8 9.3 10.9   HGB  --  10.4* 11.4* 12.0    214 231 277           Plan:   Patient obtaining a biopsy today to help with surgical planning  At this time, the plan is for an IM nail tomorrow  NPO at midnight  Patient on Lovenox for DVT prophylaxis, can have 1 dose after biopsy   today, then hold until after surgery tomorrow   Continue current pain regimen   Follow-up: 2 weeks post-op with [facility]/Dr Meredith's team    Disposition:    Anticipate d/c to be determined when medically cleared and progressing in PT    Elsie Coughlin PA-C  [facility]

## 2025-02-24 NOTE — CONSULTS
IR consult request made for an Axillary LN biopsy from Dr Barboza.     Plan in place for a R pathologic femur lesion biopsy to be done by IR today per plan on Friday 2/21 (see notes) and a pinning of the fracture tomorrow by Ortho. Will await the pathology results.     Axillary LN biopsies are done by the breast radiologists per radiology protocol. There was no note in EPIC to support this new plan.     Reviewed with Dr West and also messaged Dr Brumfield of the above plan who agreed.     Total time: 20 minutes     Thanks, Milagros [facility] Interventional Radiology CNP ([phone]) (phone [phone])

## 2025-02-24 NOTE — PROGRESS NOTES
[facility]  Hospitalist Progress Note  Mike Brumfield MD 02/24/2025    Reason for Stay (Diagnosis): Pathologic fracture         Assessment and Plan:      Summary of Stay: Brissa Mott is a 54 year old female with past medical history including previous left-sided CVA, type 2 diabetes, hypertension, morbid obesity on Ozempic, KATHRINE, admitted on 2/20/2025 from transitional care unit with severe hypercalcemia and a distal right femur fracture found to have a lytic mass.    Orthopedic surgery was consulted and we expanded workup for suspected metastatic malignancy with CT chest abdomen and pelvis which showed a 9.9 cm complex right adnexal cystic mass.  Oncology was consulted as well.    Hypercalcemia normalized from 14.7 with IV fluids.  Plans are for an IR biopsy of her distal right femur pathologic fracture to help guide further management.    Problem List/Assessment and Plan:   Severe hypercalcemia of malignancy with apparent pathologic distal right femur fracture, also noted to have 9.9 cm complex right adnexal cystic mass: Highly concerning for metastatic malignancy.  Oncology, orthopedic surgery both following.  --IR biopsy of pathologic fracture  --Plan will depend upon biopsy results.  --Oncology following, await pathology results  -- Pain control    2.   Type 2 diabetes mellitus: Continue SSI.  Ozempic on hold.  Hypoglycemia protocol.    3.   History of hypertension: Currently on amlodipine 10 mg, irbesartan 300 mg    4.   Depression: Continue sertraline 150 mg daily      DVT Prophylaxis: Pneumatic Compression Devices pending operative plans  Code Status: Full Code  Medically Ready for Discharge: Anticipated in 5+ Days      Clinically Significant Risk Factors               # Hypoalbuminemia: Lowest albumin = 3.1 g/dL at 2/21/2025  7:26 AM, will monitor as appropriate     # Hypertension: Noted on problem list            # Severe Obesity: Estimated body mass index is 48.23 kg/m  as  "calculated from the following:    Height as of 2/18/25: 1.626 m (5' 4\").    Weight as of 2/18/25: 127.5 kg (281 lb).      # Asthma: noted on problem list              Interval History (Subjective):      Feels okay, pain overall controlled  Await fever pathologic fracture biopsy today  Denies any numbness or tingling of her affected leg/foot                    Physical Exam:      Last Vital Signs:  BP (!) 159/98 (BP Location: Left arm)   Pulse 90   Temp 97.6  F (36.4  C) (Temporal)   Resp 18   LMP  (LMP Unknown)   SpO2 95%     I/O last 3 completed shifts:  In: 1000 [P.O.:1000]  Out: 1800 [Urine:1800]    General: Alert, awake, no acute distress.  HEENT: NC/AT, eyes anicteric, external occular movements intact, face symmetric.  Cardiac: RRR, S1, S2.  No murmurs appreciated.  Pulmonary: Normal chest rise, normal work of breathing.  Lungs CTA BL  Abdomen: soft, non-tender, non-distended.  Bowel Sounds Present.  No guarding.  Extremities: no deformities.  Warm, well perfused.  Skin: no rashes or lesions noted.  Warm and Dry.  Neuro: No focal deficits noted.  Speech clear.  Coordination and strength grossly normal.  Psych: Appropriate affect.         Medications:      All current medications were reviewed with changes reflected in problem list.         Data:      All new lab and imaging data was reviewed.   Labs:  Recent Labs   Lab 02/24/25  0750 02/23/25  0742 02/23/25  0705 02/22/25  0833 02/22/25  0718   NA  --   --   --   --  136   POTASSIUM  --   --   --   --  4.0   CHLORIDE  --   --   --   --  103   CO2  --   --   --   --  24   ANIONGAP  --   --   --   --  9   *   < >  --    < > 109*   BUN  --   --   --   --  6.7   CR  --   --  0.44*  --  0.40*   GFRESTIMATED  --   --  >90  --  >90   SHIRLEY  --   --   --   --  10.3    < > = values in this interval not displayed.     Recent Labs   Lab 02/23/25  0705 02/22/25  0718   WBC  --  8.8   HGB  --  10.4*   HCT  --  32.8*   MCV  --  82    214      Imaging:   No " results found for this or any previous visit (from the past 48 hours).      Mike Brumfield MD     I've spent 50 minutes in chart review, ordering medications and tests, obtaining additional history as needed, evaluating the patient and in documentation for this encounter.

## 2025-02-24 NOTE — PLAN OF CARE
"Goal Outcome Evaluation:      Plan of Care Reviewed With: patient    Overall Patient Progress: no changeOverall Patient Progress: no change    Outcome Evaluation: Plan for biopsy today. Ortho to decide if surgery will be completed after biopsy.      VSS on RA, a/ox4, NPO since 0000, blood sugars monitored, voiding via external cath - bladder scanned 100 this am, PRN senna given for constipation, pain managed with scheduled meds and PRN tylenol and oxy. Plan for biopsy today. Ortho to decide if surgery will be completed after biopsy. Plans TBD.       Problem: Adult Inpatient Plan of Care  Goal: Plan of Care Review  Description: The Plan of Care Review/Shift note should be completed every shift.  The Outcome Evaluation is a brief statement about your assessment that the patient is improving, declining, or no change.  This information will be displayed automatically on your shift  note.  Outcome: Progressing  Flowsheets (Taken 2/24/2025 0317)  Outcome Evaluation: Plan for biopsy today. Ortho to decide if surgery will be completed after biopsy.  Plan of Care Reviewed With: patient  Overall Patient Progress: no change  Goal: Patient-Specific Goal (Individualized)  Description: You can add care plan individualizations to a care plan. Examples of Individualization might be:  \"Parent requests to be called daily at 9am for status\", \"I have a hard time hearing out of my right ear\", or \"Do not touch me to wake me up as it startles  me\".  Outcome: Progressing  Goal: Absence of Hospital-Acquired Illness or Injury  Outcome: Progressing  Intervention: Identify and Manage Fall Risk  Recent Flowsheet Documentation  Taken 2/23/2025 2051 by Valentina Stewart RN  Safety Promotion/Fall Prevention:   activity supervised   assistive device/personal items within reach   clutter free environment maintained   lighting adjusted   mobility aid in reach   nonskid shoes/slippers when out of bed   patient and family education   safety round/check " completed  Intervention: Prevent Skin Injury  Recent Flowsheet Documentation  Taken 2/23/2025 2051 by Valentina Stewart RN  Body Position: supine, head elevated  Skin Protection:   adhesive use limited   incontinence pads utilized  Intervention: Prevent and Manage VTE (Venous Thromboembolism) Risk  Recent Flowsheet Documentation  Taken 2/23/2025 2051 by Valentina Stewart RN  VTE Prevention/Management: SCDs off (sequential compression devices)  Intervention: Prevent Infection  Recent Flowsheet Documentation  Taken 2/23/2025 2051 by Valentina Stewart RN  Infection Prevention: rest/sleep promoted  Goal: Optimal Comfort and Wellbeing  Outcome: Progressing  Intervention: Monitor Pain and Promote Comfort  Recent Flowsheet Documentation  Taken 2/23/2025 2034 by Valentina Stewart RN  Pain Management Interventions:   medication (see MAR)   repositioned  Goal: Readiness for Transition of Care  Outcome: Progressing     Problem: Hip Fracture Medical Management  Goal: Optimal Coping with Change in Health Status  Outcome: Progressing  Intervention: Support Psychosocial Response to Injury  Recent Flowsheet Documentation  Taken 2/23/2025 2051 by Valentina Stewart RN  Supportive Measures:   active listening utilized   goal-setting facilitated   positive reinforcement provided   relaxation techniques promoted  Goal: Absence of Bleeding  Outcome: Progressing  Goal: Effective Bowel Elimination  Outcome: Progressing  Intervention: Promote Effective Bowel Elimination  Recent Flowsheet Documentation  Taken 2/23/2025 2051 by Valentina Stewart RN  Bowel Elimination Promotion: adequate fluid intake promoted  Goal: Baseline Cognitive Function Maintained  Outcome: Progressing  Goal: Absence of Embolism  Outcome: Progressing  Intervention: Prevent or Manage Embolism Risk  Recent Flowsheet Documentation  Taken 2/23/2025 2051 by Valentina Stewart RN  VTE Prevention/Management: SCDs off (sequential compression devices)  Goal: Fracture Stability  Outcome:  Progressing  Goal: Optimal Functional Performance  Outcome: Progressing  Intervention: Promote Optimal Functional Status  Recent Flowsheet Documentation  Taken 2/23/2025 2051 by Valentina Stewart RN  Activity Management: bedrest  Goal: Pain Control and Function  Outcome: Progressing  Intervention: Manage Acute Orthopaedic-Related Pain  Recent Flowsheet Documentation  Taken 2/23/2025 2034 by Valentina Stewart RN  Pain Management Interventions:   medication (see MAR)   repositioned  Goal: Effective Urinary Elimination  Outcome: Progressing     Problem: Skin Injury Risk Increased  Goal: Skin Health and Integrity  Outcome: Progressing  Intervention: Plan: Nurse Driven Intervention: Moisture Management  Recent Flowsheet Documentation  Taken 2/23/2025 2051 by Valentina Stewart RN  Moisture Interventions:   Incontinence pad   Urinary collection device   No brief in bed  Intervention: Optimize Skin Protection  Recent Flowsheet Documentation  Taken 2/23/2025 2051 by Valentina Stewart RN  Pressure Reduction Techniques: heels elevated off bed  Skin Protection:   adhesive use limited   incontinence pads utilized  Activity Management: bedrest  Head of Bed (HOB) Positioning: HOB at 20-30 degrees     Problem: Pain Acute  Goal: Optimal Pain Control and Function  Outcome: Progressing  Intervention: Optimize Psychosocial Wellbeing  Recent Flowsheet Documentation  Taken 2/23/2025 2051 by Valentina Stewart RN  Supportive Measures:   active listening utilized   goal-setting facilitated   positive reinforcement provided   relaxation techniques promoted  Intervention: Develop Pain Management Plan  Recent Flowsheet Documentation  Taken 2/23/2025 2034 by Valentina Stewart RN  Pain Management Interventions:   medication (see MAR)   repositioned  Intervention: Prevent or Manage Pain  Recent Flowsheet Documentation  Taken 2/23/2025 2051 by Valentina Stewart RN  Bowel Elimination Promotion: adequate fluid intake promoted  Medication Review/Management:  medications reviewed

## 2025-02-25 ENCOUNTER — ANESTHESIA (OUTPATIENT)
Dept: SURGERY | Facility: CLINIC | Age: 55
End: 2025-02-25
Payer: COMMERCIAL

## 2025-02-25 ENCOUNTER — TELEPHONE (OUTPATIENT)
Dept: FAMILY MEDICINE | Facility: CLINIC | Age: 55
End: 2025-02-25
Payer: COMMERCIAL

## 2025-02-25 ENCOUNTER — ANESTHESIA EVENT (OUTPATIENT)
Dept: SURGERY | Facility: CLINIC | Age: 55
End: 2025-02-25
Payer: COMMERCIAL

## 2025-02-25 ENCOUNTER — APPOINTMENT (OUTPATIENT)
Dept: GENERAL RADIOLOGY | Facility: CLINIC | Age: 55
End: 2025-02-25
Attending: ORTHOPAEDIC SURGERY
Payer: COMMERCIAL

## 2025-02-25 LAB
GLUCOSE BLDC GLUCOMTR-MCNC: 135 MG/DL (ref 70–99)
GLUCOSE BLDC GLUCOMTR-MCNC: 137 MG/DL (ref 70–99)
GLUCOSE BLDC GLUCOMTR-MCNC: 184 MG/DL (ref 70–99)
GLUCOSE BLDC GLUCOMTR-MCNC: 194 MG/DL (ref 70–99)
GLUCOSE BLDC GLUCOMTR-MCNC: 305 MG/DL (ref 70–99)
HGB BLD-MCNC: 11.1 G/DL (ref 11.7–15.7)
HOLD SPECIMEN: NORMAL

## 2025-02-25 PROCEDURE — 258N000003 HC RX IP 258 OP 636: Performed by: INTERNAL MEDICINE

## 2025-02-25 PROCEDURE — 250N000025 HC SEVOFLURANE, PER MIN: Performed by: ORTHOPAEDIC SURGERY

## 2025-02-25 PROCEDURE — 250N000011 HC RX IP 250 OP 636: Mod: JZ | Performed by: INTERNAL MEDICINE

## 2025-02-25 PROCEDURE — 250N000013 HC RX MED GY IP 250 OP 250 PS 637: Performed by: ORTHOPAEDIC SURGERY

## 2025-02-25 PROCEDURE — 250N000011 HC RX IP 250 OP 636: Performed by: NURSE ANESTHETIST, CERTIFIED REGISTERED

## 2025-02-25 PROCEDURE — 0QSB06Z REPOSITION RIGHT LOWER FEMUR WITH INTRAMEDULLARY INTERNAL FIXATION DEVICE, OPEN APPROACH: ICD-10-PCS | Performed by: ORTHOPAEDIC SURGERY

## 2025-02-25 PROCEDURE — 250N000013 HC RX MED GY IP 250 OP 250 PS 637: Performed by: INTERNAL MEDICINE

## 2025-02-25 PROCEDURE — 250N000011 HC RX IP 250 OP 636: Performed by: STUDENT IN AN ORGANIZED HEALTH CARE EDUCATION/TRAINING PROGRAM

## 2025-02-25 PROCEDURE — 250N000013 HC RX MED GY IP 250 OP 250 PS 637

## 2025-02-25 PROCEDURE — 250N000011 HC RX IP 250 OP 636: Performed by: PHYSICIAN ASSISTANT

## 2025-02-25 PROCEDURE — 250N000013 HC RX MED GY IP 250 OP 250 PS 637: Performed by: PHYSICIAN ASSISTANT

## 2025-02-25 PROCEDURE — 250N000011 HC RX IP 250 OP 636: Mod: JZ

## 2025-02-25 PROCEDURE — 250N000009 HC RX 250: Performed by: ORTHOPAEDIC SURGERY

## 2025-02-25 PROCEDURE — 250N000011 HC RX IP 250 OP 636: Performed by: ANESTHESIOLOGY

## 2025-02-25 PROCEDURE — 258N000003 HC RX IP 258 OP 636: Performed by: NURSE ANESTHETIST, CERTIFIED REGISTERED

## 2025-02-25 PROCEDURE — 85018 HEMOGLOBIN: CPT | Performed by: INTERNAL MEDICINE

## 2025-02-25 PROCEDURE — 258N000003 HC RX IP 258 OP 636: Performed by: ANESTHESIOLOGY

## 2025-02-25 PROCEDURE — 370N000017 HC ANESTHESIA TECHNICAL FEE, PER MIN: Performed by: ORTHOPAEDIC SURGERY

## 2025-02-25 PROCEDURE — 250N000013 HC RX MED GY IP 250 OP 250 PS 637: Performed by: ANESTHESIOLOGY

## 2025-02-25 PROCEDURE — 272N000001 HC OR GENERAL SUPPLY STERILE: Performed by: ORTHOPAEDIC SURGERY

## 2025-02-25 PROCEDURE — 360N000077 HC SURGERY LEVEL 4, PER MIN: Performed by: ORTHOPAEDIC SURGERY

## 2025-02-25 PROCEDURE — C1713 ANCHOR/SCREW BN/BN,TIS/BN: HCPCS | Performed by: ORTHOPAEDIC SURGERY

## 2025-02-25 PROCEDURE — 258N000003 HC RX IP 258 OP 636

## 2025-02-25 PROCEDURE — 120N000001 HC R&B MED SURG/OB

## 2025-02-25 PROCEDURE — 999N000141 HC STATISTIC PRE-PROCEDURE NURSING ASSESSMENT: Performed by: ORTHOPAEDIC SURGERY

## 2025-02-25 PROCEDURE — 999N000179 XR SURGERY CARM FLUORO LESS THAN 5 MIN W STILLS

## 2025-02-25 PROCEDURE — 36415 COLL VENOUS BLD VENIPUNCTURE: CPT | Performed by: INTERNAL MEDICINE

## 2025-02-25 PROCEDURE — 250N000009 HC RX 250: Performed by: NURSE ANESTHETIST, CERTIFIED REGISTERED

## 2025-02-25 PROCEDURE — 99232 SBSQ HOSP IP/OBS MODERATE 35: CPT | Performed by: INTERNAL MEDICINE

## 2025-02-25 PROCEDURE — 710N000009 HC RECOVERY PHASE 1, LEVEL 1, PER MIN: Performed by: ORTHOPAEDIC SURGERY

## 2025-02-25 DEVICE — LAG SCREW
Type: IMPLANTABLE DEVICE | Site: FEMUR | Status: FUNCTIONAL
Brand: GAMMA

## 2025-02-25 DEVICE — LOCKING SCREW
Type: IMPLANTABLE DEVICE | Site: FEMUR | Status: FUNCTIONAL
Brand: T2 ALPHA

## 2025-02-25 DEVICE — K-WIRE: Type: IMPLANTABLE DEVICE | Site: LEG | Status: FUNCTIONAL

## 2025-02-25 DEVICE — LONG NAIL, RIGHT
Type: IMPLANTABLE DEVICE | Site: FEMUR | Status: FUNCTIONAL
Brand: GAMMA

## 2025-02-25 DEVICE — PRECISION PIN TAPERED
Type: IMPLANTABLE DEVICE | Site: LEG | Status: FUNCTIONAL
Brand: GAMMA

## 2025-02-25 DEVICE — GUIDE WIRE
Type: IMPLANTABLE DEVICE | Site: LEG | Status: FUNCTIONAL
Brand: T2 ALPHA

## 2025-02-25 DEVICE — ADVANCED LOCKING SCREW: Type: IMPLANTABLE DEVICE | Site: FEMUR | Status: FUNCTIONAL

## 2025-02-25 RX ORDER — CEFAZOLIN SODIUM 2 G/100ML
2 INJECTION, SOLUTION INTRAVENOUS EVERY 8 HOURS
Status: COMPLETED | OUTPATIENT
Start: 2025-02-25 | End: 2025-02-26

## 2025-02-25 RX ORDER — METHADONE HYDROCHLORIDE 10 MG/ML
INJECTION, SOLUTION INTRAMUSCULAR; INTRAVENOUS; SUBCUTANEOUS PRN
Status: DISCONTINUED | OUTPATIENT
Start: 2025-02-25 | End: 2025-02-25

## 2025-02-25 RX ORDER — SODIUM CHLORIDE, SODIUM LACTATE, POTASSIUM CHLORIDE, CALCIUM CHLORIDE 600; 310; 30; 20 MG/100ML; MG/100ML; MG/100ML; MG/100ML
INJECTION, SOLUTION INTRAVENOUS CONTINUOUS
Status: DISCONTINUED | OUTPATIENT
Start: 2025-02-25 | End: 2025-02-25 | Stop reason: HOSPADM

## 2025-02-25 RX ORDER — ONDANSETRON 4 MG/1
4 TABLET, ORALLY DISINTEGRATING ORAL EVERY 30 MIN PRN
Status: DISCONTINUED | OUTPATIENT
Start: 2025-02-25 | End: 2025-02-25 | Stop reason: HOSPADM

## 2025-02-25 RX ORDER — METHADONE HYDROCHLORIDE 10 MG/ML
2 INJECTION, SOLUTION INTRAMUSCULAR; INTRAVENOUS; SUBCUTANEOUS 3 TIMES DAILY PRN
Status: COMPLETED | OUTPATIENT
Start: 2025-02-25 | End: 2025-02-25

## 2025-02-25 RX ORDER — ONDANSETRON 4 MG/1
4 TABLET, ORALLY DISINTEGRATING ORAL EVERY 6 HOURS PRN
Status: DISCONTINUED | OUTPATIENT
Start: 2025-02-25 | End: 2025-03-05 | Stop reason: HOSPADM

## 2025-02-25 RX ORDER — OXYCODONE HYDROCHLORIDE 5 MG/1
10 TABLET ORAL EVERY 4 HOURS PRN
Status: DISCONTINUED | OUTPATIENT
Start: 2025-02-25 | End: 2025-02-27

## 2025-02-25 RX ORDER — DEXAMETHASONE SODIUM PHOSPHATE 4 MG/ML
INJECTION, SOLUTION INTRA-ARTICULAR; INTRALESIONAL; INTRAMUSCULAR; INTRAVENOUS; SOFT TISSUE PRN
Status: DISCONTINUED | OUTPATIENT
Start: 2025-02-25 | End: 2025-02-25

## 2025-02-25 RX ORDER — HYDRALAZINE HYDROCHLORIDE 20 MG/ML
10 INJECTION INTRAMUSCULAR; INTRAVENOUS EVERY 10 MIN PRN
Status: DISCONTINUED | OUTPATIENT
Start: 2025-02-25 | End: 2025-02-25 | Stop reason: HOSPADM

## 2025-02-25 RX ORDER — ONDANSETRON 2 MG/ML
INJECTION INTRAMUSCULAR; INTRAVENOUS PRN
Status: DISCONTINUED | OUTPATIENT
Start: 2025-02-25 | End: 2025-02-25

## 2025-02-25 RX ORDER — DEXAMETHASONE SODIUM PHOSPHATE 4 MG/ML
4 INJECTION, SOLUTION INTRA-ARTICULAR; INTRALESIONAL; INTRAMUSCULAR; INTRAVENOUS; SOFT TISSUE
Status: DISCONTINUED | OUTPATIENT
Start: 2025-02-25 | End: 2025-02-25 | Stop reason: HOSPADM

## 2025-02-25 RX ORDER — LIDOCAINE 40 MG/G
CREAM TOPICAL
Status: DISCONTINUED | OUTPATIENT
Start: 2025-02-25 | End: 2025-02-25 | Stop reason: HOSPADM

## 2025-02-25 RX ORDER — POLYETHYLENE GLYCOL 3350 17 G/17G
17 POWDER, FOR SOLUTION ORAL DAILY
Status: DISCONTINUED | OUTPATIENT
Start: 2025-02-26 | End: 2025-03-05 | Stop reason: HOSPADM

## 2025-02-25 RX ORDER — LIDOCAINE 40 MG/G
CREAM TOPICAL
Status: DISCONTINUED | OUTPATIENT
Start: 2025-02-25 | End: 2025-02-27

## 2025-02-25 RX ORDER — KETOROLAC TROMETHAMINE 15 MG/ML
15 INJECTION, SOLUTION INTRAMUSCULAR; INTRAVENOUS
Status: COMPLETED | OUTPATIENT
Start: 2025-02-25 | End: 2025-02-25

## 2025-02-25 RX ORDER — PROPOFOL 10 MG/ML
INJECTION, EMULSION INTRAVENOUS PRN
Status: DISCONTINUED | OUTPATIENT
Start: 2025-02-25 | End: 2025-02-25

## 2025-02-25 RX ORDER — FAMOTIDINE 20 MG/1
20 TABLET, FILM COATED ORAL 2 TIMES DAILY
Status: DISCONTINUED | OUTPATIENT
Start: 2025-02-25 | End: 2025-03-05 | Stop reason: HOSPADM

## 2025-02-25 RX ORDER — NALOXONE HYDROCHLORIDE 0.4 MG/ML
0.1 INJECTION, SOLUTION INTRAMUSCULAR; INTRAVENOUS; SUBCUTANEOUS
Status: DISCONTINUED | OUTPATIENT
Start: 2025-02-25 | End: 2025-02-25 | Stop reason: HOSPADM

## 2025-02-25 RX ORDER — CEFAZOLIN SODIUM/WATER 3 G/30 ML
3 SYRINGE (ML) INTRAVENOUS
Status: COMPLETED | OUTPATIENT
Start: 2025-02-25 | End: 2025-02-25

## 2025-02-25 RX ORDER — PROCHLORPERAZINE MALEATE 10 MG
10 TABLET ORAL EVERY 6 HOURS PRN
Status: DISCONTINUED | OUTPATIENT
Start: 2025-02-25 | End: 2025-03-05 | Stop reason: HOSPADM

## 2025-02-25 RX ORDER — LIDOCAINE HYDROCHLORIDE 20 MG/ML
INJECTION, SOLUTION INFILTRATION; PERINEURAL PRN
Status: DISCONTINUED | OUTPATIENT
Start: 2025-02-25 | End: 2025-02-25

## 2025-02-25 RX ORDER — OXYCODONE HYDROCHLORIDE 5 MG/1
5 TABLET ORAL EVERY 4 HOURS PRN
Status: DISCONTINUED | OUTPATIENT
Start: 2025-02-25 | End: 2025-02-27

## 2025-02-25 RX ORDER — AMOXICILLIN 250 MG
1 CAPSULE ORAL 2 TIMES DAILY
Status: DISCONTINUED | OUTPATIENT
Start: 2025-02-25 | End: 2025-03-05 | Stop reason: HOSPADM

## 2025-02-25 RX ORDER — ACETAMINOPHEN 325 MG/1
975 TABLET ORAL EVERY 8 HOURS
Status: DISCONTINUED | OUTPATIENT
Start: 2025-02-25 | End: 2025-02-27

## 2025-02-25 RX ORDER — ONDANSETRON 2 MG/ML
4 INJECTION INTRAMUSCULAR; INTRAVENOUS EVERY 30 MIN PRN
Status: DISCONTINUED | OUTPATIENT
Start: 2025-02-25 | End: 2025-02-25 | Stop reason: HOSPADM

## 2025-02-25 RX ORDER — ALBUTEROL SULFATE 0.83 MG/ML
2.5 SOLUTION RESPIRATORY (INHALATION) EVERY 4 HOURS PRN
Status: DISCONTINUED | OUTPATIENT
Start: 2025-02-25 | End: 2025-02-25 | Stop reason: HOSPADM

## 2025-02-25 RX ORDER — MAGNESIUM SULFATE HEPTAHYDRATE 40 MG/ML
2 INJECTION, SOLUTION INTRAVENOUS
Status: DISCONTINUED | OUTPATIENT
Start: 2025-02-25 | End: 2025-02-25 | Stop reason: HOSPADM

## 2025-02-25 RX ORDER — LABETALOL HYDROCHLORIDE 5 MG/ML
10 INJECTION, SOLUTION INTRAVENOUS
Status: DISCONTINUED | OUTPATIENT
Start: 2025-02-25 | End: 2025-02-25 | Stop reason: HOSPADM

## 2025-02-25 RX ORDER — HYDROMORPHONE HCL IN WATER/PF 6 MG/30 ML
0.4 PATIENT CONTROLLED ANALGESIA SYRINGE INTRAVENOUS
Status: DISCONTINUED | OUTPATIENT
Start: 2025-02-25 | End: 2025-02-27

## 2025-02-25 RX ORDER — HYDROXYZINE HYDROCHLORIDE 25 MG/1
25 TABLET, FILM COATED ORAL EVERY 6 HOURS PRN
Status: DISCONTINUED | OUTPATIENT
Start: 2025-02-25 | End: 2025-03-05 | Stop reason: HOSPADM

## 2025-02-25 RX ORDER — HYDROMORPHONE HCL IN WATER/PF 6 MG/30 ML
0.2 PATIENT CONTROLLED ANALGESIA SYRINGE INTRAVENOUS
Status: DISCONTINUED | OUTPATIENT
Start: 2025-02-25 | End: 2025-02-27

## 2025-02-25 RX ORDER — ONDANSETRON 2 MG/ML
4 INJECTION INTRAMUSCULAR; INTRAVENOUS EVERY 6 HOURS PRN
Status: DISCONTINUED | OUTPATIENT
Start: 2025-02-25 | End: 2025-03-05 | Stop reason: HOSPADM

## 2025-02-25 RX ORDER — MAGNESIUM HYDROXIDE 1200 MG/15ML
LIQUID ORAL PRN
Status: DISCONTINUED | OUTPATIENT
Start: 2025-02-25 | End: 2025-02-25 | Stop reason: HOSPADM

## 2025-02-25 RX ORDER — SODIUM CHLORIDE, SODIUM LACTATE, POTASSIUM CHLORIDE, CALCIUM CHLORIDE 600; 310; 30; 20 MG/100ML; MG/100ML; MG/100ML; MG/100ML
INJECTION, SOLUTION INTRAVENOUS CONTINUOUS
Status: DISCONTINUED | OUTPATIENT
Start: 2025-02-25 | End: 2025-02-26

## 2025-02-25 RX ORDER — TRANEXAMIC ACID 650 MG/1
1950 TABLET ORAL ONCE
Status: COMPLETED | OUTPATIENT
Start: 2025-02-25 | End: 2025-02-25

## 2025-02-25 RX ORDER — ACETAMINOPHEN 325 MG/1
975 TABLET ORAL ONCE
Status: COMPLETED | OUTPATIENT
Start: 2025-02-25 | End: 2025-02-25

## 2025-02-25 RX ORDER — BISACODYL 10 MG
10 SUPPOSITORY, RECTAL RECTAL DAILY PRN
Status: DISCONTINUED | OUTPATIENT
Start: 2025-02-25 | End: 2025-03-05 | Stop reason: HOSPADM

## 2025-02-25 RX ORDER — CEFAZOLIN SODIUM/WATER 3 G/30 ML
3 SYRINGE (ML) INTRAVENOUS SEE ADMIN INSTRUCTIONS
Status: DISCONTINUED | OUTPATIENT
Start: 2025-02-25 | End: 2025-02-25 | Stop reason: HOSPADM

## 2025-02-25 RX ORDER — CEFAZOLIN SODIUM/WATER 2 G/20 ML
2 SYRINGE (ML) INTRAVENOUS EVERY 8 HOURS
Status: DISCONTINUED | OUTPATIENT
Start: 2025-02-25 | End: 2025-02-25

## 2025-02-25 RX ADMIN — METHADONE HYDROCHLORIDE 2 MG: 10 INJECTION, SOLUTION INTRAMUSCULAR; INTRAVENOUS; SUBCUTANEOUS at 14:08

## 2025-02-25 RX ADMIN — SODIUM CHLORIDE: 9 INJECTION, SOLUTION INTRAVENOUS at 03:25

## 2025-02-25 RX ADMIN — OXYCODONE HYDROCHLORIDE 5 MG: 5 TABLET ORAL at 16:47

## 2025-02-25 RX ADMIN — METHADONE HYDROCHLORIDE 2 MG: 10 INJECTION, SOLUTION INTRAMUSCULAR; INTRAVENOUS; SUBCUTANEOUS at 13:50

## 2025-02-25 RX ADMIN — ONDANSETRON 4 MG: 2 INJECTION INTRAMUSCULAR; INTRAVENOUS at 11:08

## 2025-02-25 RX ADMIN — CEFAZOLIN SODIUM 2 G: 2 INJECTION, SOLUTION INTRAVENOUS at 20:05

## 2025-02-25 RX ADMIN — LIDOCAINE HYDROCHLORIDE 50 MG: 20 INJECTION, SOLUTION INFILTRATION; PERINEURAL at 11:08

## 2025-02-25 RX ADMIN — OXYCODONE HYDROCHLORIDE 5 MG: 5 TABLET ORAL at 06:10

## 2025-02-25 RX ADMIN — Medication 15 MG: at 11:08

## 2025-02-25 RX ADMIN — PROPOFOL 160 MG: 10 INJECTION, EMULSION INTRAVENOUS at 11:08

## 2025-02-25 RX ADMIN — ATORVASTATIN CALCIUM 40 MG: 40 TABLET, FILM COATED ORAL at 16:48

## 2025-02-25 RX ADMIN — SODIUM CHLORIDE, POTASSIUM CHLORIDE, SODIUM LACTATE AND CALCIUM CHLORIDE: 600; 310; 30; 20 INJECTION, SOLUTION INTRAVENOUS at 14:10

## 2025-02-25 RX ADMIN — KETOROLAC TROMETHAMINE 15 MG: 15 INJECTION, SOLUTION INTRAMUSCULAR; INTRAVENOUS at 14:32

## 2025-02-25 RX ADMIN — HYDROMORPHONE HYDROCHLORIDE 1 MG: 1 INJECTION, SOLUTION INTRAMUSCULAR; INTRAVENOUS; SUBCUTANEOUS at 03:25

## 2025-02-25 RX ADMIN — ACETAMINOPHEN 975 MG: 325 TABLET, FILM COATED ORAL at 23:41

## 2025-02-25 RX ADMIN — ACETAMINOPHEN 975 MG: 325 TABLET, FILM COATED ORAL at 10:22

## 2025-02-25 RX ADMIN — Medication 5 MG: at 11:53

## 2025-02-25 RX ADMIN — OXYCODONE HYDROCHLORIDE 10 MG: 10 TABLET ORAL at 00:12

## 2025-02-25 RX ADMIN — IRBESARTAN 300 MG: 150 TABLET ORAL at 21:54

## 2025-02-25 RX ADMIN — SODIUM CHLORIDE, POTASSIUM CHLORIDE, SODIUM LACTATE AND CALCIUM CHLORIDE: 600; 310; 30; 20 INJECTION, SOLUTION INTRAVENOUS at 23:41

## 2025-02-25 RX ADMIN — MONTELUKAST 10 MG: 10 TABLET, FILM COATED ORAL at 21:54

## 2025-02-25 RX ADMIN — Medication 3 G: at 11:02

## 2025-02-25 RX ADMIN — CYCLOBENZAPRINE HYDROCHLORIDE 10 MG: 5 TABLET, FILM COATED ORAL at 21:54

## 2025-02-25 RX ADMIN — ACETAMINOPHEN 650 MG: 325 TABLET, FILM COATED ORAL at 00:12

## 2025-02-25 RX ADMIN — SENNOSIDES AND DOCUSATE SODIUM 1 TABLET: 50; 8.6 TABLET ORAL at 20:03

## 2025-02-25 RX ADMIN — HYDROMORPHONE HYDROCHLORIDE 0.5 MG: 1 INJECTION, SOLUTION INTRAMUSCULAR; INTRAVENOUS; SUBCUTANEOUS at 08:48

## 2025-02-25 RX ADMIN — SODIUM CHLORIDE, POTASSIUM CHLORIDE, SODIUM LACTATE AND CALCIUM CHLORIDE: 600; 310; 30; 20 INJECTION, SOLUTION INTRAVENOUS at 09:11

## 2025-02-25 RX ADMIN — OXYCODONE HYDROCHLORIDE 5 MG: 5 TABLET ORAL at 02:10

## 2025-02-25 RX ADMIN — HYDROMORPHONE HYDROCHLORIDE 0.4 MG: 0.2 INJECTION, SOLUTION INTRAMUSCULAR; INTRAVENOUS; SUBCUTANEOUS at 20:04

## 2025-02-25 RX ADMIN — ACETAMINOPHEN 975 MG: 325 TABLET, FILM COATED ORAL at 16:48

## 2025-02-25 RX ADMIN — DEXMEDETOMIDINE HYDROCHLORIDE 0.5 MCG/KG/HR: 100 INJECTION, SOLUTION INTRAVENOUS at 11:12

## 2025-02-25 RX ADMIN — TRANEXAMIC ACID 1950 MG: 650 TABLET ORAL at 09:14

## 2025-02-25 RX ADMIN — HYDROMORPHONE HYDROCHLORIDE 0.4 MG: 0.2 INJECTION, SOLUTION INTRAMUSCULAR; INTRAVENOUS; SUBCUTANEOUS at 16:06

## 2025-02-25 RX ADMIN — MIDAZOLAM 2 MG: 1 INJECTION INTRAMUSCULAR; INTRAVENOUS at 11:02

## 2025-02-25 RX ADMIN — SODIUM CHLORIDE, POTASSIUM CHLORIDE, SODIUM LACTATE AND CALCIUM CHLORIDE: 600; 310; 30; 20 INJECTION, SOLUTION INTRAVENOUS at 16:06

## 2025-02-25 RX ADMIN — DEXAMETHASONE SODIUM PHOSPHATE 8 MG: 4 INJECTION, SOLUTION INTRA-ARTICULAR; INTRALESIONAL; INTRAMUSCULAR; INTRAVENOUS; SOFT TISSUE at 11:08

## 2025-02-25 RX ADMIN — INSULIN ASPART 2 UNITS: 100 INJECTION, SOLUTION INTRAVENOUS; SUBCUTANEOUS at 17:49

## 2025-02-25 RX ADMIN — SERTRALINE HYDROCHLORIDE 150 MG: 100 TABLET ORAL at 20:04

## 2025-02-25 RX ADMIN — METHADONE HYDROCHLORIDE 2 MG: 10 INJECTION, SOLUTION INTRAMUSCULAR; INTRAVENOUS; SUBCUTANEOUS at 13:37

## 2025-02-25 ASSESSMENT — ACTIVITIES OF DAILY LIVING (ADL)
ADLS_ACUITY_SCORE: 48
ADLS_ACUITY_SCORE: 50
ADLS_ACUITY_SCORE: 48
ADLS_ACUITY_SCORE: 46
ADLS_ACUITY_SCORE: 46
ADLS_ACUITY_SCORE: 50
ADLS_ACUITY_SCORE: 48
ADLS_ACUITY_SCORE: 50
ADLS_ACUITY_SCORE: 48
ADLS_ACUITY_SCORE: 48
ADLS_ACUITY_SCORE: 46
ADLS_ACUITY_SCORE: 48
ADLS_ACUITY_SCORE: 46
ADLS_ACUITY_SCORE: 50
ADLS_ACUITY_SCORE: 48
ADLS_ACUITY_SCORE: 46

## 2025-02-25 NOTE — ANESTHESIA PREPROCEDURE EVALUATION
Anesthesia Pre-Procedure Evaluation    Patient: Brissa Mott   MRN: [id number] : 1970        Procedure : Procedure(s):  open reduction internal fixation, fracture, femur, using intramedullary frankie and fracture table          Past Medical History:   Diagnosis Date    Asthma     Cerebral infarction (H)     Diabetes (H)     Hypertension     Mixed hyperlipidemia     Morbid obesity (H)     KATHRINE (obstructive sleep apnea)     Osteoarthritis       Past Surgical History:   Procedure Laterality Date     SECTION      x2      Allergies   Allergen Reactions    Bydureon [Exenatide] Diarrhea    Penicillins Rash    Sulfa Antibiotics Rash      Social History     Tobacco Use    Smoking status: Former    Smokeless tobacco: Never   Substance Use Topics    Alcohol use: Not Currently      Wt Readings from Last 1 Encounters:   25 127.5 kg (281 lb 1.4 oz)        Anesthesia Evaluation            ROS/MED HX  ENT/Pulmonary:     (+) sleep apnea,                     asthma                  Neurologic:     (+)          CVA,  with deficits, - left sided hemiparesis.                   Cardiovascular:     (+) Dyslipidemia hypertension- -   -  - -                                      METS/Exercise Tolerance:     Hematologic:     (+)      anemia,          Musculoskeletal:   (+)  arthritis,   fracture, Fracture location: RLE,         GI/Hepatic:  - neg GI/hepatic ROS     Renal/Genitourinary:       Endo:     (+)  type II DM,             Obesity,       Psychiatric/Substance Use:       Infectious Disease:       Malignancy: Comment: Metastatic lytic disease  (+) Malignancy, History of Other.Other CA Active status post.    Other:            Physical Exam    Airway        Mallampati: II   TM distance: > 3 FB   Neck ROM: full   Mouth opening: > 3 cm    Respiratory Devices and Support         Dental       (+) Modest Abnormalities - crowns, retainers, 1 or 2 missing teeth      Cardiovascular          Rhythm and rate: regular and normal  "    Pulmonary           breath sounds clear to auscultation           OUTSIDE LABS:  CBC:   Lab Results   Component Value Date    WBC 8.8 02/22/2025    WBC 9.3 02/20/2025    HGB 11.1 (L) 02/25/2025    HGB 10.4 (L) 02/22/2025    HCT 32.8 (L) 02/22/2025    HCT 36.7 02/20/2025     02/23/2025     02/22/2025     BMP:   Lab Results   Component Value Date     02/22/2025     02/21/2025    POTASSIUM 4.0 02/22/2025    POTASSIUM 4.1 02/21/2025    CHLORIDE 103 02/22/2025    CHLORIDE 103 02/21/2025    CO2 24 02/22/2025    CO2 25 02/21/2025    BUN 6.7 02/22/2025    BUN 8.3 02/21/2025    CR 0.44 (L) 02/23/2025    CR 0.40 (L) 02/22/2025     (H) 02/25/2025     (H) 02/25/2025     COAGS: No results found for: \"PTT\", \"INR\", \"FIBR\"  POC: No results found for: \"BGM\", \"HCG\", \"HCGS\"  HEPATIC:   Lab Results   Component Value Date    ALBUMIN 3.1 (L) 02/21/2025    PROTTOTAL 6.2 (L) 02/21/2025    ALT 19 02/21/2025    AST 37 02/21/2025    ALKPHOS 90 02/21/2025    BILITOTAL 0.5 02/21/2025     OTHER:   Lab Results   Component Value Date    A1C 6.8 (H) 07/19/2024    SHIRLEY 10.3 02/22/2025    TSH 1.16 02/21/2025       Anesthesia Plan    ASA Status:  3    NPO Status:  NPO Appropriate    Anesthesia Type: General.     - Airway: LMA   Induction: Intravenous.   Maintenance: Balanced.   Techniques and Equipment:       - Drips/Meds: Dexmed. infusion     Consents    Anesthesia Plan(s) and associated risks, benefits, and realistic alternatives discussed. Questions answered and patient/representative(s) expressed understanding.     - Discussed:     - Discussed with:  Patient      - Extended Intubation/Ventilatory Support Discussed: No.      - Patient is DNR/DNI Status: No     Use of blood products discussed: No .     Postoperative Care    Pain management: Oral pain medications, Multi-modal analgesia, IV analgesics.     - Plan for long acting post-op opioid use   PONV prophylaxis: Dexamethasone or Solumedrol, Ondansetron " "(or other 5HT-3)     Comments:    Other Comments:   Methadone 20 mg  Dexadron 8 mg  Zofran 4 mg  Toradol 15 mg  Precedex  Sugammadex if paralytic to be used            Lenin Brand MD      Clinically Significant Risk Factors               # Hypoalbuminemia: Lowest albumin = 3.1 g/dL at 2/21/2025  7:26 AM, will monitor as appropriate     # Hypertension: Noted on problem list            # Severe Obesity: Estimated body mass index is 48.25 kg/m  as calculated from the following:    Height as of 2/18/25: 1.626 m (5' 4\").    Weight as of this encounter: 127.5 kg (281 lb 1.4 oz).      # Asthma: noted on problem list          "

## 2025-02-25 NOTE — BRIEF OP NOTE
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  Complications: None.  Implants:   Implant Name Type Inv. Item Serial No.  Lot No. LRB No. Used Action   GW ORTH 1000MM 3MM T2 ALPHA STRL LF DISP 2351-3102S - [id number] Wire GW ORTH 1000MM 3MM T2 ALPHA STRL LF DISP 2351-3102S  [employer] [id number] Right 1 Used as a Supply   IMP WIRE KIKE [id number] 1210-6450S - [id number]  IMP WIRE KIKE [id number] 1210-6450S  [employer] [id number] Right 1 Used as a Supply   PIN FIXATION GAMMA 3.2/3.9MM 4MM 450MM TEMP TAPER 1420-0065S - [id number] Metallic Hardware/[place] PIN FIXATION GAMMA 3.2/3.9MM 4MM 450MM TEMP TAPER 1420-0065S  [employer] [id number] Right 1 Used as a Supply   NAIL GAMMA LONG RIGHT [id number] X 125DEG 8425-0400S - [id number] Metallic Hardware/[place] NAIL GAMMA LONG RIGHT [id number] X 125DEG 8425-0400S  [employer] [id number] Right 1 Implanted   SCREW BONE LAG GAMMA D10.5X85MM 8160-0085S - [id number] Metallic Hardware/[place] SCREW BONE LAG GAMMA D10.5X85MM 8160-0085S  [employer] [id number] Right 1 Implanted   SCREW BN 75MM 5MM LCK STRL T2 ALPHA 2360-5075S - [id number] Metallic Hardware/[place] SCREW BN 75MM 5MM LCK STRL T2 ALPHA 2360-5075S  [employer] [id number] Right 1 Implanted   SCREW BN 55MM 5MM LCK STRL T2 ALPHA - [id number] Metallic Hardware/[place] SCREW BN 55MM 5MM LCK STRL T2 ALPHA  [employer] [facility]  [id number] Right 1 Implanted   SCREW BN 42MM 5MM LCK STRL 4939-5048V - [id number] Metallic Hardware/[place] SCREW BN 42MM 5MM LCK STRL [id number]  [employer] [id number] Right 1 Implanted

## 2025-02-25 NOTE — ANESTHESIA CARE TRANSFER NOTE
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    Anesthesia Type:   General     Note:    Oropharynx: oral airway in place  Level of Consciousness: awake and drowsy  Oxygen Supplementation: face mask  Level of Supplemental Oxygen (L/min / FiO2): 8  Independent Airway: airway patency satisfactory and stable  Dentition: dentition unchanged  Vital Signs Stable: post-procedure vital signs reviewed and stable  Report to RN Given: handoff report given  Patient transferred to: PACU    Handoff Report: Identifed the Patient, Identified the Reponsible Provider, Reviewed the pertinent medical history, Discussed the surgical course, Reviewed Intra-OP anesthesia mangement and issues during anesthesia, Set expectations for post-procedure period and Allowed opportunity for questions and acknowledgement of understanding      Vitals:  Vitals Value Taken Time   /76 02/25/25 1320   Temp 97.52  F (36.4  C) 02/25/25 1321   Pulse 75 02/25/25 1321   Resp 60 02/25/25 1321   SpO2 96 % 02/25/25 1321   Vitals shown include unfiled device data.    Electronically Signed By: RONNELL Timmons CRNA  February 25, 2025  1:23 PM

## 2025-02-25 NOTE — TELEPHONE ENCOUNTER
Patient Quality Outreach    Patient is due for the following:   Diabetes -  A1C and Eye Exam  Breast Cancer Screening - Mammogram  Cervical Cancer Screening - PAP Needed  Physical Preventive Adult Physical    Action(s) Taken:   Schedule a Adult Preventative    Type of outreach:    Phone, spoke to patient/parent. Patient has a fracture and having surgery. .plj    Questions for provider review:             Alix Brand CMA

## 2025-02-25 NOTE — PROGRESS NOTES
[facility]  Hospitalist Progress Note  Mike Brumfield MD 02/25/2025    Reason for Stay (Diagnosis): Pathologic fracture         Assessment and Plan:      Summary of Stay: Brissa Mott is a 54 year old female with past medical history including previous left-sided CVA, type 2 diabetes, hypertension, morbid obesity on Ozempic, KATHRINE, admitted on 2/20/2025 from transitional care unit with severe hypercalcemia and a distal right femur fracture found to have a lytic mass.    Orthopedic surgery was consulted and we expanded workup for suspected metastatic malignancy with CT chest abdomen and pelvis which showed a 9.9 cm complex right adnexal cystic mass.  Oncology was consulted as well.    Hypercalcemia normalized from 14.7 with IV fluids.  She underwent an IR biopsy of her distal right femur pathologic fracture 2/24 to help guide further management.  To OR on 2/25.    Problem List/Assessment and Plan:   Severe hypercalcemia of malignancy with apparent pathologic distal right femur fracture, also noted to have 9.9 cm complex right adnexal cystic mass: Highly concerning for metastatic malignancy.  Oncology, orthopedic surgery both following.  --IR biopsy of pathologic fracture 2/24  --Await biopsy results  --to OR w/ Orthopedics on 2/25.  --Oncology following, await pathology results  -- Pain control    2.   Type 2 diabetes mellitus: Continue SSI.  Ozempic on hold.  Hypoglycemia protocol.    3.   History of hypertension: Currently on amlodipine 10 mg, irbesartan 300 mg    4.   Depression: Continue sertraline 150 mg daily      DVT Prophylaxis: Pneumatic Compression Devices pending operative plans  Code Status: Full Code  Medically Ready for Discharge: Anticipated in 5+ Days      Clinically Significant Risk Factors               # Hypoalbuminemia: Lowest albumin = 3.1 g/dL at 2/21/2025  7:26 AM, will monitor as appropriate     # Hypertension: Noted on problem list            # Severe Obesity: Estimated  "body mass index is 48.23 kg/m  as calculated from the following:    Height as of 2/18/25: 1.626 m (5' 4\").    Weight as of 2/18/25: 127.5 kg (281 lb).      # Asthma: noted on problem list              Interval History (Subjective):        Underwent IR biopsy yesterday, await pathology results  Feels okay overall, requiring narcotic pain medicines  Going to the operating room with orthopedic surgery today                    Physical Exam:      Last Vital Signs:  BP (!) 153/83 (BP Location: Right arm)   Pulse 87   Temp 97.8  F (36.6  C) (Temporal)   Resp 17   LMP  (LMP Unknown)   SpO2 95%     I/O last 3 completed shifts:  In: 1610 [P.O.:560; I.V.:1050]  Out: 3300 [Urine:3300]    General: Alert, awake, no acute distress.  Slightly sedated but completely coherent.  HEENT: NC/AT, eyes anicteric, external occular movements intact, face symmetric.  Cardiac: RRR, S1, S2.  No murmurs appreciated.  Pulmonary: Normal chest rise, normal work of breathing.  Lungs CTA BL  Abdomen: soft, non-tender, non-distended.  Bowel Sounds Present.  No guarding.  Extremities: Some mild swelling around distal right thigh no deformities.  Warm, well perfused.  Skin: no rashes or lesions noted.  Warm and Dry.  Neuro: No focal deficits noted.  Speech clear.  Coordination and strength grossly normal.  Psych: Appropriate affect.         Medications:      All current medications were reviewed with changes reflected in problem list.         Data:      All new lab and imaging data was reviewed.   Labs:  Recent Labs   Lab 02/25/25  0725 02/23/25  0742 02/23/25  0705 02/22/25  0833 02/22/25  0718   NA  --   --   --   --  136   POTASSIUM  --   --   --   --  4.0   CHLORIDE  --   --   --   --  103   CO2  --   --   --   --  24   ANIONGAP  --   --   --   --  9   *   < >  --    < > 109*   BUN  --   --   --   --  6.7   CR  --   --  0.44*  --  0.40*   GFRESTIMATED  --   --  >90  --  >90   SHIRLEY  --   --   --   --  10.3    < > = values in this interval " not displayed.     Recent Labs   Lab 02/25/25  0600 02/23/25  0705 02/22/25  0718   WBC  --   --  8.8   HGB 11.1*  --  10.4*   HCT  --   --  32.8*   MCV  --   --  82   PLT  --  238 214      Imaging:   No results found for this or any previous visit (from the past 48 hours).      Mike Brumfield MD     I've spent 50 minutes in chart review, ordering medications and tests, obtaining additional history as needed, evaluating the patient and in documentation for this encounter.

## 2025-02-25 NOTE — PLAN OF CARE
"Goal Outcome Evaluation:      Plan of Care Reviewed With: patient    Overall Patient Progress: no changeOverall Patient Progress: no change    Outcome Evaluation: Plan for surgery today at 0915      VSS on 1L NC, bedrest, ax2 lift, NPO since 0000, blood sugars monitored, a/ox4, surgical bath and wipes completed, mepilex applied to sacrum, pain managed with scheduled meds and PRN IV dilaudid x2, tylenol, and oxy. voiding via external cath - bladder scanned 250 this am, plan for surgery today at 0915.      Problem: Adult Inpatient Plan of Care  Goal: Plan of Care Review  Description: The Plan of Care Review/Shift note should be completed every shift.  The Outcome Evaluation is a brief statement about your assessment that the patient is improving, declining, or no change.  This information will be displayed automatically on your shift  note.  Outcome: Progressing  Flowsheets (Taken 2/25/2025 0212)  Outcome Evaluation: Plan for surgery today at 0915  Plan of Care Reviewed With: patient  Overall Patient Progress: no change  Goal: Patient-Specific Goal (Individualized)  Description: You can add care plan individualizations to a care plan. Examples of Individualization might be:  \"Parent requests to be called daily at 9am for status\", \"I have a hard time hearing out of my right ear\", or \"Do not touch me to wake me up as it startles  me\".  Outcome: Progressing  Goal: Absence of Hospital-Acquired Illness or Injury  Outcome: Progressing  Intervention: Identify and Manage Fall Risk  Recent Flowsheet Documentation  Taken 2/24/2025 2143 by Valentina Stewart RN  Safety Promotion/Fall Prevention:   activity supervised   assistive device/personal items within reach   clutter free environment maintained   lighting adjusted   mobility aid in reach   nonskid shoes/slippers when out of bed   patient and family education   safety round/check completed  Intervention: Prevent Skin Injury  Recent Flowsheet Documentation  Taken 2/24/2025 2143 " by Keranen, Valentina, RN  Body Position: supine, head elevated  Skin Protection:   adhesive use limited   incontinence pads utilized  Intervention: Prevent and Manage VTE (Venous Thromboembolism) Risk  Recent Flowsheet Documentation  Taken 2/24/2025 2143 by Valentina Stewart RN  VTE Prevention/Management: SCDs off (sequential compression devices)  Intervention: Prevent Infection  Recent Flowsheet Documentation  Taken 2/24/2025 2143 by Valentina Stewart RN  Infection Prevention: rest/sleep promoted  Goal: Optimal Comfort and Wellbeing  Outcome: Progressing  Intervention: Monitor Pain and Promote Comfort  Recent Flowsheet Documentation  Taken 2/24/2025 2250 by Valentina Stewart RN  Pain Management Interventions: medication (see MAR)  Taken 2/24/2025 2133 by Valentina Stewart RN  Pain Management Interventions: medication (see MAR)  Goal: Readiness for Transition of Care  Outcome: Progressing     Problem: Hip Fracture Medical Management  Goal: Optimal Coping with Change in Health Status  Outcome: Progressing  Intervention: Support Psychosocial Response to Injury  Recent Flowsheet Documentation  Taken 2/24/2025 2143 by Valentina Stewart RN  Supportive Measures:   active listening utilized   goal-setting facilitated   positive reinforcement provided   relaxation techniques promoted   decision-making supported  Goal: Absence of Bleeding  Outcome: Progressing  Goal: Effective Bowel Elimination  Outcome: Progressing  Intervention: Promote Effective Bowel Elimination  Recent Flowsheet Documentation  Taken 2/24/2025 2143 by Valentina Stewart RN  Bowel Elimination Promotion: adequate fluid intake promoted  Goal: Baseline Cognitive Function Maintained  Outcome: Progressing  Goal: Absence of Embolism  Outcome: Progressing  Intervention: Prevent or Manage Embolism Risk  Recent Flowsheet Documentation  Taken 2/24/2025 2143 by Valentina Stewart RN  VTE Prevention/Management: SCDs off (sequential compression devices)  Goal: Fracture  Stability  Outcome: Progressing  Goal: Optimal Functional Performance  Outcome: Progressing  Intervention: Promote Optimal Functional Status  Recent Flowsheet Documentation  Taken 2/24/2025 2143 by Valentina Stewart RN  Self-Care Promotion: independence encouraged  Activity Management: bedrest  Goal: Pain Control and Function  Outcome: Progressing  Intervention: Manage Acute Orthopaedic-Related Pain  Recent Flowsheet Documentation  Taken 2/24/2025 2250 by Valentina Stewart RN  Pain Management Interventions: medication (see MAR)  Taken 2/24/2025 2133 by Valentina Stewart RN  Pain Management Interventions: medication (see MAR)  Goal: Effective Urinary Elimination  Outcome: Progressing     Problem: Skin Injury Risk Increased  Goal: Skin Health and Integrity  Outcome: Progressing  Intervention: Plan: Nurse Driven Intervention: Moisture Management  Recent Flowsheet Documentation  Taken 2/24/2025 2143 by Valentina Stewart RN  Moisture Interventions:   No brief in bed   Urinary collection device  Taken 2/24/2025 2000 by Valentina Stewart RN  Moisture Interventions:   No brief in bed   Urinary collection device  Bathing/Skin Care:   dressed/undressed   bath, complete  Intervention: Optimize Skin Protection  Recent Flowsheet Documentation  Taken 2/24/2025 2143 by Valentina Stewart RN  Pressure Reduction Techniques: heels elevated off bed  Skin Protection:   adhesive use limited   incontinence pads utilized  Activity Management: bedrest  Head of Bed (HOB) Positioning: HOB at 20-30 degrees     Problem: Pain Acute  Goal: Optimal Pain Control and Function  Outcome: Progressing  Intervention: Optimize Psychosocial Wellbeing  Recent Flowsheet Documentation  Taken 2/24/2025 2143 by Valentina Stewart RN  Supportive Measures:   active listening utilized   goal-setting facilitated   positive reinforcement provided   relaxation techniques promoted   decision-making supported  Diversional Activities: television  Intervention: Develop Pain Management  Plan  Recent Flowsheet Documentation  Taken 2/24/2025 2250 by Valentina Stewart RN  Pain Management Interventions: medication (see MAR)  Taken 2/24/2025 2133 by Valentina Stewart RN  Pain Management Interventions: medication (see MAR)  Intervention: Prevent or Manage Pain  Recent Flowsheet Documentation  Taken 2/24/2025 2143 by Valentina Stewart RN  Bowel Elimination Promotion: adequate fluid intake promoted  Medication Review/Management: medications reviewed     Problem: Comorbidity Management  Goal: Blood Glucose Levels Within Targeted Range  Outcome: Progressing  Intervention: Monitor and Manage Glycemia  Recent Flowsheet Documentation  Taken 2/24/2025 2143 by Valentina Stewart RN  Medication Review/Management: medications reviewed  Goal: Blood Pressure in Desired Range  Outcome: Progressing  Intervention: Maintain Blood Pressure Management  Recent Flowsheet Documentation  Taken 2/24/2025 2143 by Valentina Stewart RN  Medication Review/Management: medications reviewed

## 2025-02-25 NOTE — PROGRESS NOTES
Hematology/Oncology:      CHART CHECK:     Today's Date: 02/25/25  Date of Admission: 2/20/25  Reason for Consult: Hypercalcemia, lytic lesions     Admitted for hypercalcemia, femur fracture, concern for pathological fracture.  Received Zometa 4 mg x 1 on 2/20/2025.  Calcium improved to 10.3 on 2/22/2025.  CT CAP with adnexal mass with multiple lytic lesions, concerning for metastatic disease.  Myeloma labs normal.      was elevated, Gyn Onc saw patient, requesting  biopsy as well.  Biopsy completed right distal femur bone biopsy 2/20 and plan for surgical planning with Ortho today.     We will follow peripherally until we have biopsy results.  Please call us with any questions.      Elida Whitley PA-C  Hematology/Oncology  [facility]

## 2025-02-25 NOTE — ANESTHESIA POSTPROCEDURE EVALUATION
Patient: Brissa Mott    Procedure: Procedure(s):  open reduction internal fixation, fracture, femur, using intramedullary frankie and fracture table       Anesthesia Type:  General    Note:  Disposition: Inpatient   Postop Pain Control: Uneventful            Sign Out: Well controlled pain   PONV: No   Neuro/Psych: Uneventful            Sign Out: Acceptable/Baseline neuro status   Airway/Respiratory: Uneventful            Sign Out: Acceptable/Baseline resp. status   CV/Hemodynamics: Uneventful            Sign Out: Acceptable CV status   Other NRE: NONE   DID A NON-ROUTINE EVENT OCCUR? No           Last vitals:  Vitals Value Taken Time   /93 02/25/25 1430   Temp 97.7  F (36.5  C) 02/25/25 1436   Pulse 85 02/25/25 1436   Resp 65 02/25/25 1433   SpO2 94 % 02/25/25 1436   Vitals shown include unfiled device data.    Electronically Signed By: Lenin Brand MD  February 25, 2025  2:38 PM

## 2025-02-25 NOTE — ANESTHESIA PROCEDURE NOTES
Airway       Patient location during procedure: OR  Staff -        CRNA: Elida Joshi APRN CRNA       Performed By: CRNAIndications and Patient Condition       Indications for airway management: bonita-procedural       Induction type:intravenous       Mask difficulty assessment: 1 - vent by mask    Final Airway Details       Final airway type: supraglottic airway    Supraglottic Airway Details        Type: LMA       Brand: I-Gel       LMA size: 4    Post intubation assessment        Placement verified by: capnometry, equal breath sounds and chest rise        Number of attempts at approach: 1       Number of other approaches attempted: 0       Secured with: commercial tube mireles       Ease of procedure: easy       Dentition: Intact and Unchanged

## 2025-02-26 ENCOUNTER — APPOINTMENT (OUTPATIENT)
Dept: PHYSICAL THERAPY | Facility: CLINIC | Age: 55
End: 2025-02-26
Payer: COMMERCIAL

## 2025-02-26 LAB
1,25(OH)2D SERPL-MCNC: 15 PG/ML (ref 19.9–79.3)
DEPRECATED CALCIDIOL+CALCIFEROL SERPL-MC: <18 UG/L (ref 20–75)
GLUCOSE BLDC GLUCOMTR-MCNC: 178 MG/DL (ref 70–99)
GLUCOSE BLDC GLUCOMTR-MCNC: 201 MG/DL (ref 70–99)
GLUCOSE BLDC GLUCOMTR-MCNC: 216 MG/DL (ref 70–99)
GLUCOSE BLDC GLUCOMTR-MCNC: 247 MG/DL (ref 70–99)
GLUCOSE BLDC GLUCOMTR-MCNC: 254 MG/DL (ref 70–99)
GLUCOSE SERPL-MCNC: 226 MG/DL (ref 70–99)
HGB BLD-MCNC: 9.5 G/DL (ref 11.7–15.7)
VITAMIN D2 SERPL-MCNC: <5 UG/L
VITAMIN D3 SERPL-MCNC: 13 UG/L

## 2025-02-26 PROCEDURE — 250N000013 HC RX MED GY IP 250 OP 250 PS 637: Performed by: INTERNAL MEDICINE

## 2025-02-26 PROCEDURE — 250N000011 HC RX IP 250 OP 636: Performed by: STUDENT IN AN ORGANIZED HEALTH CARE EDUCATION/TRAINING PROGRAM

## 2025-02-26 PROCEDURE — 250N000013 HC RX MED GY IP 250 OP 250 PS 637

## 2025-02-26 PROCEDURE — 82947 ASSAY GLUCOSE BLOOD QUANT: CPT | Performed by: INTERNAL MEDICINE

## 2025-02-26 PROCEDURE — 36415 COLL VENOUS BLD VENIPUNCTURE: CPT

## 2025-02-26 PROCEDURE — 97530 THERAPEUTIC ACTIVITIES: CPT | Mod: GP

## 2025-02-26 PROCEDURE — 99232 SBSQ HOSP IP/OBS MODERATE 35: CPT | Performed by: INTERNAL MEDICINE

## 2025-02-26 PROCEDURE — 85018 HEMOGLOBIN: CPT

## 2025-02-26 PROCEDURE — 999N000111 HC STATISTIC OT IP EVAL DEFER

## 2025-02-26 PROCEDURE — 250N000011 HC RX IP 250 OP 636: Mod: JZ | Performed by: STUDENT IN AN ORGANIZED HEALTH CARE EDUCATION/TRAINING PROGRAM

## 2025-02-26 PROCEDURE — 97161 PT EVAL LOW COMPLEX 20 MIN: CPT | Mod: GP

## 2025-02-26 PROCEDURE — 120N000001 HC R&B MED SURG/OB

## 2025-02-26 PROCEDURE — 250N000012 HC RX MED GY IP 250 OP 636 PS 637: Performed by: INTERNAL MEDICINE

## 2025-02-26 RX ORDER — GUAIFENESIN AND DEXTROMETHORPHAN HYDROBROMIDE 600; 30 MG/1; MG/1
1 TABLET, EXTENDED RELEASE ORAL 2 TIMES DAILY PRN
Status: DISCONTINUED | OUTPATIENT
Start: 2025-02-26 | End: 2025-03-05 | Stop reason: HOSPADM

## 2025-02-26 RX ADMIN — MONTELUKAST 10 MG: 10 TABLET, FILM COATED ORAL at 23:17

## 2025-02-26 RX ADMIN — CYCLOBENZAPRINE HYDROCHLORIDE 10 MG: 5 TABLET, FILM COATED ORAL at 14:27

## 2025-02-26 RX ADMIN — ENOXAPARIN SODIUM 40 MG: 40 INJECTION SUBCUTANEOUS at 09:01

## 2025-02-26 RX ADMIN — AMLODIPINE BESYLATE 10 MG: 10 TABLET ORAL at 07:53

## 2025-02-26 RX ADMIN — FAMOTIDINE 20 MG: 20 TABLET, FILM COATED ORAL at 07:53

## 2025-02-26 RX ADMIN — IRBESARTAN 300 MG: 150 TABLET ORAL at 23:17

## 2025-02-26 RX ADMIN — ACETAMINOPHEN 975 MG: 325 TABLET, FILM COATED ORAL at 23:17

## 2025-02-26 RX ADMIN — CYCLOBENZAPRINE HYDROCHLORIDE 10 MG: 5 TABLET, FILM COATED ORAL at 23:17

## 2025-02-26 RX ADMIN — CYCLOBENZAPRINE HYDROCHLORIDE 10 MG: 5 TABLET, FILM COATED ORAL at 07:53

## 2025-02-26 RX ADMIN — ATORVASTATIN CALCIUM 40 MG: 40 TABLET, FILM COATED ORAL at 07:53

## 2025-02-26 RX ADMIN — FAMOTIDINE 20 MG: 20 TABLET, FILM COATED ORAL at 20:45

## 2025-02-26 RX ADMIN — ACETAMINOPHEN 975 MG: 325 TABLET, FILM COATED ORAL at 07:53

## 2025-02-26 RX ADMIN — POLYETHYLENE GLYCOL 3350 17 G: 17 POWDER, FOR SOLUTION ORAL at 07:52

## 2025-02-26 RX ADMIN — SENNOSIDES AND DOCUSATE SODIUM 1 TABLET: 50; 8.6 TABLET ORAL at 07:52

## 2025-02-26 RX ADMIN — ENOXAPARIN SODIUM 40 MG: 40 INJECTION SUBCUTANEOUS at 20:45

## 2025-02-26 RX ADMIN — CEFAZOLIN SODIUM 2 G: 2 INJECTION, SOLUTION INTRAVENOUS at 03:07

## 2025-02-26 RX ADMIN — SERTRALINE HYDROCHLORIDE 150 MG: 100 TABLET ORAL at 20:45

## 2025-02-26 RX ADMIN — Medication 1 LOZENGE: at 02:00

## 2025-02-26 RX ADMIN — INSULIN ASPART 2 UNITS: 100 INJECTION, SOLUTION INTRAVENOUS; SUBCUTANEOUS at 07:54

## 2025-02-26 RX ADMIN — INSULIN GLARGINE 18 UNITS: 100 INJECTION, SOLUTION SUBCUTANEOUS at 09:10

## 2025-02-26 RX ADMIN — SENNOSIDES AND DOCUSATE SODIUM 1 TABLET: 50; 8.6 TABLET ORAL at 20:45

## 2025-02-26 RX ADMIN — ACETAMINOPHEN 975 MG: 325 TABLET, FILM COATED ORAL at 16:03

## 2025-02-26 ASSESSMENT — ACTIVITIES OF DAILY LIVING (ADL)
ADLS_ACUITY_SCORE: 45
ADLS_ACUITY_SCORE: 47
ADLS_ACUITY_SCORE: 45
ADLS_ACUITY_SCORE: 46
ADLS_ACUITY_SCORE: 47
ADLS_ACUITY_SCORE: 45
ADLS_ACUITY_SCORE: 47
ADLS_ACUITY_SCORE: 47
ADLS_ACUITY_SCORE: 45
ADLS_ACUITY_SCORE: 47
ADLS_ACUITY_SCORE: 45
ADLS_ACUITY_SCORE: 47
ADLS_ACUITY_SCORE: 45
ADLS_ACUITY_SCORE: 45
ADLS_ACUITY_SCORE: 47
ADLS_ACUITY_SCORE: 45
ADLS_ACUITY_SCORE: 47

## 2025-02-26 NOTE — PLAN OF CARE
Occupational Therapy: Orders received. Chart reviewed and discussed with care team.? Occupational Therapy not indicated due to pt requiring TCU to address current impairments in ADLs and mobility. Will defer OT needs to higher level of care.? Defer discharge recommendations to evaluating PT and medical team.? Will complete orders. Please re-order if necessary for discharge.

## 2025-02-26 NOTE — OP NOTE
Preoperative diagnosis:  Pathologic right distal third femur fracture    Postoperative diagnosis:  As above    Procedure:  Open reduction insertion intramedullary nail right pathologic femur fracture    Surgeon:  Lazarus Meredith MD     Assistant:  Luh Harper PA-C  A physicians assistant was available for the surgery and participated to decrease the patient's morbidity by assisting with positioning, manipulation of the limb during the procedure, surgical retraction as necessary, closure of the surgical wound and transferring the patient back to a hospital bed    Anesthesia:  General    Estimated blood loss:  500 cc    Complications:  None readily apparent    Indications for procedure:  Brissa Mott is a 54 year old female who developed right thigh pain over the past several weeks.  This became acutely worse last week and she was unable to ambulate.  She therefore presented to an outside hospital.  Imaging studies were concerning for pathologic femur fracture.  As such, she was transferred to [facility] for definitive cares.  She underwent CT-guided biopsy yesterday and preliminary pathology demonstrated sheets of cells suspicious for metastatic process.  This consistent with CT of her chest abdomen and pelvis.  This was discussed with the pathology department.  I counseled the patient regarding treatment options.  This included distal femoral placement versus fixation of the pathologic fracture with intramedullary nail.  Given the pulmonary pathology, I have commended placement of a cephalomedullary nail in order to allow early weight bearing. She was amenable to this plan as was the family. They understood the risks of the procedure and wished to proceed.    Description of Procedure:  Brissa was identified in the preoperative area. Informed consent was obtained as outlined above.  The patient was in agreement.  Subsequently, the right lower extermity was marked. The patient was then brought back to the operating  room where they were placed under an appropriate anesthetic. The patient was then carefully positioned on the fracture table ensuring all bony prominences were well padded. A closed reduction was performed. The contralateral leg was then placed into the well leg mireles, and a C-arm was used to ensure adequate reduction of the injury. This being achieved, the patient was then prepped and draped in a standard sterile fashion. A time-out was called by the surgical team. All in attendance were in agreement that the right hip was the correct operative site.  The correct laterality, procedure and hospital identification number were confirmed.     A guide wire placement into the tip of the greater trochanter. An incision was created to allow placement of the guide all to ensure proper placement of the starting point on orthogonal views using fluoroscopic imagine intensification. The guide pin was then inserted to the level of the lesser trochanter and the opening reamer was used to open the proximal aspect of the femur. A long ball tip guidewire was inserted into the femur.  There was significant displacement on the lateral view.  As such, I performed an open reduction through a lateral approach.  The guidewire was then advanced to the level of the distal femoral physeal scar. Cephalomedullary nail length was the measured. The intramedullary canal was subsequently reamed to engage the femoral endosteal cortex. At that point, the appropriate nail was inserted manually. The ball tip guidewire was removed. Next, using the cephalomedullary guide jig, a guide pin was drilled into the center position on both the AP and lateral giving us appropriate tip-to-apex distance of our lag screw. It was then measured. The guide wire was drilled over and our cephalomedullary screw was inserted. We then proceeded to place two distal interlocking screws. Excellent purchase was obtained using the distal interlocks. Next, all instruments were  removed. We again took final fluoroscopic images and being satisfied with the construct, irrigated the wounds. Next, the wounds were closed in a layered fashion, and a sterile dressing was applied. The patient was safely transferred to hospital bed and awakened from the anesthetic and taken to the PACU for recovery. All instrument, needle and lap counts were correct in the case in accordance with hospital protocol    Postoperative Plan:  Brissa Mott will be return to the hospital were postoperatively. They will be weight-bearing as tolerated and will receive a physical therapy regimen. The pain will be controlled with a combination of IV and oral pain medicine. DVT prophylaxis will be Lovenox    Implants:  PlaceVine gamma 4 long nail measuring 400 mm in length with a 125 degree neck angle by 10 mm diameter. The cephalomedullary screw was 85 mm, and 3 distal interlocking screws.

## 2025-02-26 NOTE — PROGRESS NOTES
Hematology/Oncology:      CHART CHECK:     Today's Date: 02/26/25  Date of Admission: 2/20/25  Reason for Consult: Hypercalcemia, lytic lesions     Admitted for hypercalcemia, femur fracture, concern for pathological fracture.  Received Zometa 4 mg x 1 on 2/20/2025.  Calcium improved to 10.3 on 2/22/2025.  CT CAP with adnexal mass with multiple lytic lesions, concerning for metastatic disease.  Myeloma labs normal.      was elevated, Gyn Onc saw patient, requesting  biopsy as well.  Biopsy completed right distal femur bone biopsy 2/24 and still pending. Underwent ORIF right femur 2/25/25.      We will follow peripherally until we have biopsy results.  Please call us with any questions.        Elida Whitley PA-C  Hematology/Oncology  [facility]

## 2025-02-26 NOTE — PROGRESS NOTES
[facility]  Hospitalist Progress Note  Mike Brumfield MD 02/26/2025    Reason for Stay (Diagnosis): Pathologic fracture         Assessment and Plan:      Summary of Stay: Brissa Mott is a 54 year old female with past medical history including previous left-sided CVA, type 2 diabetes, hypertension, morbid obesity on Ozempic, KATHRINE, admitted on 2/20/2025 from transitional care unit with severe hypercalcemia and a distal right femur fracture found to have a lytic mass.    Orthopedic surgery was consulted and we expanded workup for suspected metastatic malignancy with CT chest abdomen and pelvis which showed a 9.9 cm complex right adnexal cystic mass.  Oncology was consulted as well.    Hypercalcemia normalized from 14.7 with IV fluids.  She underwent an IR biopsy of her distal right femur pathologic fracture 2/24 to help guide further management.      She was taken to the OR for ORIF of her pathologic fracture on 2/25.    Biopsy results pending.    Problem List/Assessment and Plan:   Severe hypercalcemia of malignancy with apparent pathologic distal right femur fracture, also noted to have 9.9 cm complex right adnexal cystic mass: Highly concerning for metastatic malignancy.  Oncology, orthopedic surgery both following.  --IR biopsy of pathologic fracture 2/24  --Await biopsy results  --to OR w/ Orthopedics on 2/25 for ORIF  --Oncology following, await pathology results  -- Pain control with Tylenol, Flexeril, oxycodone, Dilaudid and hydroxyzine.  -- PT consult    2.   Type 2 diabetes mellitus: Continue SSI.  Ozempic on hold.  Hypoglycemia protocol.  Home twice daily insulin mix on hold.  -- Added Lantus on 2/26.  Uptitrate to 24 units daily.  -- SSI high scale  -- Trend, titrate as needed.  Might need to add some mealtime insulin soon.    3.   History of hypertension: Currently on amlodipine 10 mg, irbesartan 300 mg    4.   Depression: Continue sertraline 150 mg daily    5.  Social: Completed  "[place] paperwork on 2/26 for her .      DVT Prophylaxis: Enoxaparin  Code Status: Full Code  Medically Ready for Discharge: Anticipated in 5+ Days      Clinically Significant Risk Factors               # Hypoalbuminemia: Lowest albumin = 3.1 g/dL at 2/21/2025  7:26 AM, will monitor as appropriate     # Hypertension: Noted on problem list            # Severe Obesity: Estimated body mass index is 48.25 kg/m  as calculated from the following:    Height as of 2/18/25: 1.626 m (5' 4\").    Weight as of this encounter: 127.5 kg (281 lb 1.4 oz).      # Asthma: noted on problem list              Interval History (Subjective):        Underwent ORIF yesterday with orthopedic surgery  Completed [place] paperwork for her   Pathology still pending  Hyperglycemic, adding back some insulin.                    Physical Exam:      Last Vital Signs:  /55   Pulse 82   Temp 97.7  F (36.5  C) (Temporal)   Resp 18   Wt 127.5 kg (281 lb 1.4 oz)   LMP  (LMP Unknown)   SpO2 92%   BMI 48.25 kg/m      I/O last 3 completed shifts:  In: 1040 [P.O.:240; I.V.:800]  Out: 3175 [Urine:2675; Blood:500]    General: Alert, awake, no acute distress.  Slightly sedated but completely coherent.  HEENT: NC/AT, eyes anicteric, external occular movements intact, face symmetric.  Cardiac: RRR, S1, S2.  No murmurs appreciated.  Pulmonary: Normal chest rise, normal work of breathing.  Lungs CTA BL  Abdomen: soft, non-tender, non-distended.  Bowel Sounds Present.  No guarding.  Extremities: Some mild swelling around distal right thigh no deformities.  Warm, well perfused.  Skin: no rashes or lesions noted.  Warm and Dry.  Neuro: No focal deficits noted.  Speech clear.  Coordination and strength grossly normal.  Psych: Appropriate affect.         Medications:      All current medications were reviewed with changes reflected in problem list.         Data:      All new lab and imaging data was reviewed.   Labs:  Recent Labs   Lab 02/26/25  0725 " 02/23/25  0742 02/23/25  0705 02/22/25  0833 02/22/25 0718   NA  --   --   --   --  136   POTASSIUM  --   --   --   --  4.0   CHLORIDE  --   --   --   --  103   CO2  --   --   --   --  24   ANIONGAP  --   --   --   --  9   *   < >  --    < > 109*   BUN  --   --   --   --  6.7   CR  --   --  0.44*  --  0.40*   GFRESTIMATED  --   --  >90  --  >90   SHIRLEY  --   --   --   --  10.3    < > = values in this interval not displayed.     Recent Labs   Lab 02/26/25  0725 02/25/25  0600 02/23/25  0705 02/22/25 0718   WBC  --   --   --  8.8   HGB 9.5*   < >  --  10.4*   HCT  --   --   --  32.8*   MCV  --   --   --  82   PLT  --   --  238 214    < > = values in this interval not displayed.      Imaging:   No results found for this or any previous visit (from the past 48 hours).      Mike Brumfield MD     I've spent 50 minutes in chart review, ordering medications and tests, obtaining additional history as needed, evaluating the patient and in documentation for this encounter.

## 2025-02-26 NOTE — PROGRESS NOTES
Orthopedic Surgery  Brissa Mott  02/26/2025     Admit Date:  2/20/2025  Assessment:   POD: 1 Day Post-Op   Procedure(s):  Open reduction internal fixation intramedullary rodding right hip fracture     Patient resting comfortably in bed.  She is currently on 1 L of supplemental oxygen.  She endorses pain this morning of the right lower extremity, surgical site.  She has not yet mobilized or stood out of bed.  Pending PT evaluation this morning.  Pending surgical pathology  Tolerating oral intake.    Denies nausea or vomiting  Denies chest pain or shortness of breath  VSS, afebrile     Temp:  [96.9  F (36.1  C)-97.7  F (36.5  C)] 97.7  F (36.5  C)  Pulse:  [76-94] 82  Resp:  [16-29] 18  BP: ()/(55-93) 119/55  SpO2:  [86 %-100 %] 92 %    Alert and oriented  Dressing is clean, dry, and intact.   Minimal erythema of the surrounding skin.   Bilateral calves are soft, non-tender.  Right lower extremity is NVI.  Sensation intact bilateral lower extremities  Patient able to resist dorsi and plantar flexion bilaterally  3+Dp pulse    Labs:  Recent Labs   Lab Test 02/26/25  0725 02/25/25  0600 02/23/25  0705 02/22/25  0718 02/20/25  0654 02/18/25 2024   WBC  --   --   --  8.8 9.3 10.9   HGB 9.5* 11.1*  --  10.4* 11.4* 12.0   PLT  --   --  238 214 231 277     Surgical Pathology: pending     Plan:   Resume Lovenox 40 mg POD1 for DVT prophylaxis.     Mobilize with PT/OT    25% WB RLE     Continue current pain regimen.   Dressings: Keep intact.  Change if >60% saturated or peeling off.    Follow-up: 2 weeks post-op with Luh/Dr. Meredith    Disposition:    Anticipate d/c to TCU when medically cleared and progressing in PT. Pending progress    Marjan Vasques PA-C

## 2025-02-26 NOTE — PLAN OF CARE
"VSS, on 1L NC, A&Ox4, low pain today, took scheduled tylenol, had pt sit up on the side of bed for about 10 minutes, changed purewick, IV saline locked - flushing well, CMS intact.     Goal Outcome Evaluation:      Plan of Care Reviewed With: patient    Overall Patient Progress: no changeOverall Patient Progress: no change         Problem: Adult Inpatient Plan of Care  Goal: Plan of Care Review  Description: The Plan of Care Review/Shift note should be completed every shift.  The Outcome Evaluation is a brief statement about your assessment that the patient is improving, declining, or no change.  This information will be displayed automatically on your shift  note.  Outcome: Progressing  Flowsheets (Taken 2/26/2025 1758)  Plan of Care Reviewed With: patient  Overall Patient Progress: no change  Goal: Patient-Specific Goal (Individualized)  Description: You can add care plan individualizations to a care plan. Examples of Individualization might be:  \"Parent requests to be called daily at 9am for status\", \"I have a hard time hearing out of my right ear\", or \"Do not touch me to wake me up as it startles  me\".  Outcome: Progressing  Goal: Absence of Hospital-Acquired Illness or Injury  Outcome: Progressing  Intervention: Identify and Manage Fall Risk  Recent Flowsheet Documentation  Taken 2/26/2025 0900 by Pema Cevallos RN  Safety Promotion/Fall Prevention:   activity supervised   nonskid shoes/slippers when out of bed   patient and family education   safety round/check completed  Intervention: Prevent Skin Injury  Recent Flowsheet Documentation  Taken 2/26/2025 0900 by Pema Cevallos RN  Body Position: position changed independently  Intervention: Prevent and Manage VTE (Venous Thromboembolism) Risk  Recent Flowsheet Documentation  Taken 2/26/2025 0900 by Pema Cevallos RN  VTE Prevention/Management: SCDs on (sequential compression devices)  Intervention: Prevent Infection  Recent Flowsheet " Documentation  Taken 2/26/2025 0900 by Pema Cevallos RN  Infection Prevention:   equipment surfaces disinfected   hand hygiene promoted   rest/sleep promoted  Goal: Optimal Comfort and Wellbeing  Outcome: Progressing  Goal: Readiness for Transition of Care  Outcome: Progressing     Problem: Hip Fracture Medical Management  Goal: Optimal Coping with Change in Health Status  Outcome: Progressing  Intervention: Support Psychosocial Response to Injury  Recent Flowsheet Documentation  Taken 2/26/2025 0900 by Pema Cevallos RN  Supportive Measures:   active listening utilized   goal-setting facilitated   positive reinforcement provided   relaxation techniques promoted   decision-making supported  Goal: Absence of Bleeding  Outcome: Progressing  Goal: Effective Bowel Elimination  Outcome: Progressing  Intervention: Promote Effective Bowel Elimination  Recent Flowsheet Documentation  Taken 2/26/2025 0900 by Pema Cevallos RN  Bowel Elimination Management: (senna and mirilax) other (see comments)  Bowel Elimination Promotion: (stool softner)   adequate fluid intake promoted   other (see comments)  Goal: Baseline Cognitive Function Maintained  Outcome: Progressing  Goal: Absence of Embolism  Outcome: Progressing  Intervention: Prevent or Manage Embolism Risk  Recent Flowsheet Documentation  Taken 2/26/2025 0900 by Pema Cevallos RN  VTE Prevention/Management: SCDs on (sequential compression devices)  Goal: Fracture Stability  Outcome: Progressing  Goal: Optimal Functional Performance  Outcome: Progressing  Goal: Pain Control and Function  Outcome: Progressing  Goal: Effective Urinary Elimination  Outcome: Progressing     Problem: Skin Injury Risk Increased  Goal: Skin Health and Integrity  Outcome: Progressing  Intervention: Plan: Nurse Driven Intervention: Moisture Management  Recent Flowsheet Documentation  Taken 2/26/2025 0900 by Pema Cevallos RN  Moisture Interventions:   No brief in bed   Incontinence  pad   Urinary collection device  Taken 2/26/2025 0800 by Pema Cevallos RN  Moisture Interventions:   No brief in bed   Incontinence pad   Urinary collection device  Bathing/Skin Care: incontinence care  Intervention: Plan: Nurse Driven Intervention: Friction and Shear  Recent Flowsheet Documentation  Taken 2/26/2025 0900 by Pema Cevallos RN  Friction/Shear Interventions: HOB 30 degrees or less  Intervention: Optimize Skin Protection  Recent Flowsheet Documentation  Taken 2/26/2025 0900 by Pema Cevallos RN  Head of Bed (HOB) Positioning: HOB at 30-45 degrees     Problem: Pain Acute  Goal: Optimal Pain Control and Function  Outcome: Progressing  Intervention: Optimize Psychosocial Wellbeing  Recent Flowsheet Documentation  Taken 2/26/2025 0900 by Pema Cevallos RN  Supportive Measures:   active listening utilized   goal-setting facilitated   positive reinforcement provided   relaxation techniques promoted   decision-making supported  Intervention: Prevent or Manage Pain  Recent Flowsheet Documentation  Taken 2/26/2025 0900 by Pema Cevallos RN  Bowel Elimination Promotion: (stool softner)   adequate fluid intake promoted   other (see comments)  Medication Review/Management: medications reviewed     Problem: Comorbidity Management  Goal: Blood Glucose Levels Within Targeted Range  Outcome: Progressing  Intervention: Monitor and Manage Glycemia  Recent Flowsheet Documentation  Taken 2/26/2025 0900 by Pema Cevallos RN  Medication Review/Management: medications reviewed  Goal: Blood Pressure in Desired Range  Outcome: Progressing  Intervention: Maintain Blood Pressure Management  Recent Flowsheet Documentation  Taken 2/26/2025 0900 by Pema Cevallos RN  Medication Review/Management: medications reviewed

## 2025-02-26 NOTE — CONSULTS
"CLINICAL NUTRITION SERVICES - ASSESSMENT NOTE    Malnutrition Status:    % Intake: Decreased intake does not meet criteria  % Weight Loss: Weight loss does not meet criteria   Subcutaneous Fat Loss: None observed  Muscle Loss: None observed but difficult to assess w/ BMI  Fluid Accumulation/Edema: Trace to mild/generalized     Malnutrition Diagnosis: Patient does not meet two of the established criteria necessary for diagnosing malnutrition  Malnutrition Present on Admission: No     Registered Dietitian Interventions:  - Diet as ordered by provider.  - Discussed option of smaller/more frequent meals or snacks as needed.  Encouraged consistent protein intake.  - Discussed option of Ensure max protein (Brissa declined regular Ensure).  Wrote name on white board in room and discussed how to order via room service.  Offered to send 1x but Brissa politely declined.  - Brissa asked if she can be taking her \"FIT\" FITTEAM \"oral supplement\" while admitted.  Deferred to pharmacy vs provider as contains 300 mcg vitamin B12 which is water soluble, but not clear if potential to have med interaction or if approved per hospital policy.       REASON FOR ASSESSMENT  Length of stay (LOS)     PMH of: L-sided CVA, DMII.    Admit 2/2: Severe hypercalcemia, R femur pathological fracture requiring ORIF on 2/25, found to have R adnexal cystic mass.      SUBJECTIVE INFORMATION  Assessed patient in room.    NUTRITION HISTORY  - Noted to be admitted from TCU.  - Diet at home: Brissa reports smaller/more frequent meals or snacks is normal for her.  States she was intentionally trying to lose weight PTA.  - Describes being a member of \"[employer]\" which has what appear to be vitamin supplements.  Using 1 FITTEAM \"FIT\" product (?powder) mixed into water BID (contains 0 calories, 0 g protein, 300 mcg Vitamin B12).    - Barriers to PO intakes: Denies having low appetite or unintentional changes to PO intakes PTA.  Verbalizes she works from home and " "therefore can snack or make meals throughout the day.  - Allergies: NKFA.    CURRENT NUTRITION ORDERS AND INTAKE/TOLERANCE  Diet: Regular    % intakes per flowsheets acutely.  Has had diet interruptions for procedures and variable meals ordered daily.    Brissa reports she has not been feeling hungry.  She would like to know if she should be drinking her FIT product here.      CONSULTS  - Oncology following.    LABS  Nutrition-relevant labs: Reviewed.     MEDICATIONS  Nutrition-relevant medications: Reviewed.     SYSTEM FINDINGS    - Skin: No current documentation of PI.   - GI symptoms: No recorded BM acutely?  - R femur bone biopsy completed 2/24.   - ORIF 2/25.    ANTHROPOMETRICS  Height: 5' 4\"  Most Recent Weight: 127.5 kg (281 lb 1.4 oz)  Body mass index is 48.25 kg/m .:   Obesity Class III BMI > 40  Wt Readings from Last 10 Encounters:   02/25/25 127.5 kg (281 lb 1.4 oz)   02/18/25 127.5 kg (281 lb)   07/19/24 132.5 kg (292 lb)   08/03/23 131.1 kg (289 lb)   06/07/22 132.9 kg (293 lb)   04/29/21 139.7 kg (308 lb)   11/24/20 142 kg (313 lb)   06/02/20 140.6 kg (310 lb)   05/26/20 140.6 kg (310 lb)   03/02/20 140.2 kg (309 lb)     - Documented to be on Ozempic PTA.  - Trace to mild/generalized edema documented.  - Brissa reports intentional wt loss PTA.      ASSESSED NUTRITION NEEDS  Dosing Weight: 128 kg, based on admit wt  Estimated Energy Needs: >/=25 kcal/kg  Justification: Maintenance  Estimated Protein Needs: 0.8-1 grams of pro/kg  Justification: Preservation of lean body mass  Estimated Fluid Needs: per provider    NUTRITION DIAGNOSIS  Inadequate oral intake related to acutely low appetite as evidenced by meeting <75% of nutrition needs over the past 6 days.    INTERVENTIONS  Collaboration by nutrition professional with other providers: Checked-in w/ RN on unit.   Medical food supplement therapy   Nutrition education content     Goals  Patient to consume % of nutritionally adequate meal trays " "TID, or the equivalent with supplements/snacks.     Monitoring/Evaluation  Progress toward goals will be monitored and evaluated per policy.      Deepthi Cool RDN, , LD  Clinical Dietitian  [facility] Message Group: \"Dietitian [Fe]\"  Office: [phone]  Pagers:  3rd floor/ICU: [phone]  All other floors: [phone]  Weekend/holiday: [phone]    "

## 2025-02-26 NOTE — PROGRESS NOTES
02/26/25 0930   Appointment Info   Signing Clinician's Name / Credentials (PT) Jessica Lynch DPT   Living Environment   People in Home spouse   Current Living Arrangements house   Home Accessibility stairs to enter home;stairs within home   Stair Railings, Within Home, Primary railings safe and in good condition   Transportation Anticipated family or friend will provide   Living Environment Comments Pt lives with her  in a multi-level split   Self-Care   Usual Activity Tolerance moderate   Current Activity Tolerance poor   Equipment Currently Used at Home walker, standard;other (see comments);cane, straight;commode chair  (Electric scooter)   Fall history within last six months yes   Number of times patient has fallen within last six months 2   Activity/Exercise/Self-Care Comment Pt reports at baseline she is IND, uses a walker outside the house. Pt has had recent hospital and TCU admissions, unable to navigate stairs at home, recent falls. Pt works from home,  is a public transport .   General Information   Onset of Illness/Injury or Date of Surgery 02/20/25   Referring Physician Luh Harper PA-C   Patient/Family Therapy Goals Statement (PT) Return home   Pertinent History of Current Problem (include personal factors and/or comorbidities that impact the POC) Brissa Mott is a 54 year old female POD #1 following R femur ORIF, with past medical history including previous left-sided CVA, type 2 diabetes, hypertension, morbid obesity on Ozempic, KATHRINE, admitted on 2/20/2025 from transitional care unit with severe hypercalcemia and a distal right femur fracture found to have a lytic mass.   Existing Precautions/Restrictions fall;weight bearing   Cognition   Affect/Mental Status (Cognition) WFL   Orientation Status (Cognition) oriented x 4   Follows Commands (Cognition) WFL   Pain Assessment   Patient Currently in Pain   (Pt reports pain in L knee and R LE)   Integumentary/Edema    Integumentary/Edema Comments Incision not observed, dressing in place   Posture    Posture Forward head position;Protracted shoulders   Range of Motion (ROM)   Range of Motion ROM deficits secondary to surgical procedure;ROM deficits secondary to pain;ROM deficits secondary to weakness   Strength (Manual Muscle Testing)   Strength (Manual Muscle Testing) Deficits observed during functional mobility   Bed Mobility   Comment, (Bed Mobility) Pt rolled w/ Max A   Transfers   Comment, (Transfers) Not tested d/t weakness and pain   Gait/Stairs (Locomotion)   Comment, (Gait/Stairs) Not tested d/t weakness and pain   Sensory Examination   Sensory Perception patient reports no sensory changes   Clinical Impression   Criteria for Skilled Therapeutic Intervention Yes, treatment indicated   PT Diagnosis (PT) Impaired functional mobility and gait   Influenced by the following impairments Pain, weakness, decreased activity tolerance, impaired balance   Functional limitations due to impairments Limited functional mobility requiring AD and assist   Clinical Presentation (PT Evaluation Complexity) stable   Clinical Presentation Rationale Based on PMH, current status, and social support   Clinical Decision Making (Complexity) low complexity   Planned Therapy Interventions (PT) balance training;bed mobility training;gait training;stair training;transfer training;progressive activity/exercise;strengthening;patient/family education;ROM (range of motion)   Risk & Benefits of therapy have been explained evaluation/treatment results reviewed;care plan/treatment goals reviewed;risks/benefits reviewed;current/potential barriers reviewed;participants voiced agreement with care plan;participants included;patient   PT Total Evaluation Time   PT Eval, Low Complexity Minutes (11311) 10   Physical Therapy Goals   PT Frequency 5x/week   PT Predicted Duration/Target Date for Goal Attainment 03/05/25   PT Goals Bed Mobility;Transfers;Gait;Stairs  "  PT: Bed Mobility Supervision/stand-by assist;Supine to/from sit   PT: Transfers Supervision/stand-by assist;Sit to/from stand;Assistive device;Within precautions   PT: Gait Supervision/stand-by assist;Assistive device;50 feet;Within precautions   PT: Stairs Minimal assist;Within precautions   Interventions   Interventions Quick Adds Therapeutic Activity   Therapeutic Activity   Therapeutic Activities: dynamic activities to improve functional performance Minutes (45205) 15   Symptoms Noted During/After Treatment Dizziness;Fatigue;Increased pain   Treatment Detail/Skilled Intervention Pt in bed upon therapist arrival, agreeable to PT. Time managing lines. Therapist guided pt through APs, pt denied symptoms. Therapist flattened knee section of bed, pt reported dizzy feeling \"like going to pass out.\" RN notified. HR in 80s, Spo2 95% on RA, BP 99/56. Pt responding to questions but slowly. After few minutes, BP increased, see VSFS. Pt rolled side to side w/ Max A x1-2, green sheet placed. Pt denied dizziness w/ rolling. Pt in bed at end of session, HOB elevated, all needs w/in reach, RN in room.   PT Discharge Planning   PT Plan Progress bed mob, trial lift to chair, monitor vitals   PT Discharge Recommendation (DC Rec) Transitional Care Facility   PT Rationale for DC Rec Pt below baseline, currently Ax2 and lift dependent. Pt w/ recent TCU admission. Recommend TCU to progress strength and functional mobility.   PT Brief overview of current status Lift. Goals of therapy will be to address safe mobility and make recs for d/c to next level of care. Pt and RN will continue to follow all falls risk precautions as documented by RN staff while hospitalized   PT Total Distance Amb During Session (feet) 0   Physical Therapy Time and Intention   Timed Code Treatment Minutes 15   Total Session Time (sum of timed and untimed services) 25     "

## 2025-02-26 NOTE — PLAN OF CARE
"Pt went to surgery around 0900 and returned around 1530. VSS, on 4L NC, CMS intact, surgical sites covered with aquacell - CDI, on capno, has KATHRINE - does not use a cpap, had significant pain after surgery, provided IV dilaudid and oxy, pain has been well managed since. Pt requesting  bring her ozempic tomorrow and we have pharmacy review so she can take her injection. Was a heavy assist at the end of shift - unable to get out of bed, will need to use a life. Placed purewick to see if she is able to void - bladder scanned showed over 500ml.      Problem: Adult Inpatient Plan of Care  Goal: Plan of Care Review  Description: The Plan of Care Review/Shift note should be completed every shift.  The Outcome Evaluation is a brief statement about your assessment that the patient is improving, declining, or no change.  This information will be displayed automatically on your shift  note.  Outcome: Progressing  Flowsheets (Taken 2/25/2025 1848)  Plan of Care Reviewed With: patient  Goal: Patient-Specific Goal (Individualized)  Description: You can add care plan individualizations to a care plan. Examples of Individualization might be:  \"Parent requests to be called daily at 9am for status\", \"I have a hard time hearing out of my right ear\", or \"Do not touch me to wake me up as it startles  me\".  Outcome: Progressing  Goal: Absence of Hospital-Acquired Illness or Injury  Outcome: Progressing  Intervention: Identify and Manage Fall Risk  Recent Flowsheet Documentation  Taken 2/25/2025 1542 by Pema Cevallos RN  Safety Promotion/Fall Prevention:   assistive device/personal items within reach   clutter free environment maintained   mobility aid in reach   nonskid shoes/slippers when out of bed  Intervention: Prevent Skin Injury  Recent Flowsheet Documentation  Taken 2/25/2025 1542 by Pema Cevallos RN  Body Position: supine, head elevated  Intervention: Prevent and Manage VTE (Venous Thromboembolism) Risk  Recent " Flowsheet Documentation  Taken 2/25/2025 1542 by Pema Cevallos RN  VTE Prevention/Management: SCDs on (sequential compression devices)  Intervention: Prevent Infection  Recent Flowsheet Documentation  Taken 2/25/2025 1542 by Pema Cevallos RN  Infection Prevention:   equipment surfaces disinfected   hand hygiene promoted   rest/sleep promoted  Goal: Optimal Comfort and Wellbeing  Outcome: Progressing  Intervention: Monitor Pain and Promote Comfort  Recent Flowsheet Documentation  Taken 2/25/2025 1648 by Pema Cevallos RN  Pain Management Interventions: medication (see MAR)  Goal: Readiness for Transition of Care  Outcome: Progressing     Problem: Hip Fracture Medical Management  Goal: Optimal Coping with Change in Health Status  Outcome: Progressing  Goal: Absence of Bleeding  Outcome: Progressing  Goal: Effective Bowel Elimination  Outcome: Progressing  Goal: Baseline Cognitive Function Maintained  Outcome: Progressing  Goal: Absence of Embolism  Outcome: Progressing  Intervention: Prevent or Manage Embolism Risk  Recent Flowsheet Documentation  Taken 2/25/2025 1542 by Pema Cevallos RN  VTE Prevention/Management: SCDs on (sequential compression devices)  Goal: Fracture Stability  Outcome: Progressing  Goal: Optimal Functional Performance  Outcome: Progressing  Goal: Pain Control and Function  Outcome: Progressing  Intervention: Manage Acute Orthopaedic-Related Pain  Recent Flowsheet Documentation  Taken 2/25/2025 1648 by Pema Cevallos RN  Pain Management Interventions: medication (see MAR)  Goal: Effective Urinary Elimination  Outcome: Progressing     Problem: Skin Injury Risk Increased  Goal: Skin Health and Integrity  Outcome: Progressing  Intervention: Plan: Nurse Driven Intervention: Moisture Management  Recent Flowsheet Documentation  Taken 2/25/2025 1542 by Pema Cevallos RN  Moisture Interventions:   Incontinence pad   No brief in bed   Urinary collection device   Perineal  cleanser  Taken 2/25/2025 0800 by Pema Cevallos RN  Moisture Interventions:   Incontinence pad   No brief in bed   Urinary collection device   Perineal cleanser  Bathing/Skin Care: incontinence care  Intervention: Plan: Nurse Driven Intervention: Friction and Shear  Recent Flowsheet Documentation  Taken 2/25/2025 1542 by Pema Cevallos RN  Friction/Shear Interventions: HOB 30 degrees or less  Intervention: Optimize Skin Protection  Recent Flowsheet Documentation  Taken 2/25/2025 1542 by Pema Cevallos RN  Head of Bed (HOB) Positioning: HOB at 30-45 degrees     Problem: Pain Acute  Goal: Optimal Pain Control and Function  Outcome: Progressing  Intervention: Develop Pain Management Plan  Recent Flowsheet Documentation  Taken 2/25/2025 1648 by Pema Cevallos RN  Pain Management Interventions: medication (see MAR)  Intervention: Prevent or Manage Pain  Recent Flowsheet Documentation  Taken 2/25/2025 1542 by Pema Cevallos RN  Medication Review/Management: medications reviewed     Problem: Comorbidity Management  Goal: Blood Glucose Levels Within Targeted Range  Outcome: Progressing  Intervention: Monitor and Manage Glycemia  Recent Flowsheet Documentation  Taken 2/25/2025 1542 by Pema Cevallos RN  Medication Review/Management: medications reviewed  Goal: Blood Pressure in Desired Range  Outcome: Progressing  Intervention: Maintain Blood Pressure Management  Recent Flowsheet Documentation  Taken 2/25/2025 1542 by Pema Cevallos RN  Medication Review/Management: medications reviewed   Goal Outcome Evaluation:      Plan of Care Reviewed With: patient

## 2025-02-26 NOTE — PLAN OF CARE
"Goal Outcome Evaluation:      Plan of Care Reviewed With: patient    Overall Patient Progress: improvingOverall Patient Progress: improving    Outcome Evaluation: A/Ox4. Some anxiety noted. Complained of pain around R hip, IV dilaudid given. Denied N/V, SOB.  Biopsy pending results. Pt voided during shift through purewick. Blood sugar monitored. 4L NC. Dressing C/D/I, Aquacel in place. Ax2 w/ lift. Reg diet. Insulin given per sliding scale.      Problem: Adult Inpatient Plan of Care  Goal: Plan of Care Review  Description: The Plan of Care Review/Shift note should be completed every shift.  The Outcome Evaluation is a brief statement about your assessment that the patient is improving, declining, or no change.  This information will be displayed automatically on your shift  note.  Outcome: Progressing  Flowsheets (Taken 2/26/2025 0657)  Outcome Evaluation: A/Ox4. Some anxiety noted. Complained of pain around R hip, IV dilaudid given. Denied N/V, SOB.  Biopsy pending results. Pt voided during shift through purewick. Blood sugar monitored. 4L NC. Dressing C/D/I, Aquacel in place. Ax2 w/ lift. Reg diet. Insulin given per sliding scale.  Plan of Care Reviewed With: patient  Overall Patient Progress: improving  Goal: Patient-Specific Goal (Individualized)  Description: You can add care plan individualizations to a care plan. Examples of Individualization might be:  \"Parent requests to be called daily at 9am for status\", \"I have a hard time hearing out of my right ear\", or \"Do not touch me to wake me up as it startles  me\".  Outcome: Progressing  Goal: Absence of Hospital-Acquired Illness or Injury  Outcome: Progressing  Intervention: Identify and Manage Fall Risk  Recent Flowsheet Documentation  Taken 2/25/2025 2005 by Luh Stahl, RN  Safety Promotion/Fall Prevention:   activity supervised   nonskid shoes/slippers when out of bed   patient and family education   safety round/check completed  Intervention: Prevent " Skin Injury  Recent Flowsheet Documentation  Taken 2/25/2025 2016 by Luh Stahl RN  Body Position: position changed independently  Taken 2/25/2025 2005 by Luh Stahl RN  Skin Protection:   adhesive use limited   incontinence pads utilized  Intervention: Prevent Infection  Recent Flowsheet Documentation  Taken 2/25/2025 2005 by Luh Stahl RN  Infection Prevention:   equipment surfaces disinfected   hand hygiene promoted   rest/sleep promoted  Goal: Optimal Comfort and Wellbeing  Outcome: Progressing  Intervention: Monitor Pain and Promote Comfort  Recent Flowsheet Documentation  Taken 2/25/2025 2016 by Luh Stahl RN  Pain Management Interventions:   care clustered   pillow support provided   quiet environment facilitated  Taken 2/25/2025 2004 by Luh Stahl RN  Pain Management Interventions: medication (see MAR)  Goal: Readiness for Transition of Care  Outcome: Progressing     Problem: Hip Fracture Medical Management  Goal: Optimal Coping with Change in Health Status  Outcome: Progressing  Intervention: Support Psychosocial Response to Injury  Recent Flowsheet Documentation  Taken 2/25/2025 2005 by Luh Stahl RN  Supportive Measures:   active listening utilized   goal-setting facilitated   positive reinforcement provided   relaxation techniques promoted   decision-making supported  Goal: Absence of Bleeding  Outcome: Progressing  Goal: Effective Bowel Elimination  Outcome: Progressing  Intervention: Promote Effective Bowel Elimination  Recent Flowsheet Documentation  Taken 2/25/2025 2005 by Luh Stahl RN  Bowel Elimination Promotion: adequate fluid intake promoted  Goal: Baseline Cognitive Function Maintained  Outcome: Progressing  Intervention: Maximize Cognitive Function  Recent Flowsheet Documentation  Taken 2/25/2025 2005 by Luh Stahl RN  Reorientation Measures: clock in view  Goal: Absence of Embolism  Outcome: Progressing  Goal: Fracture Stability  Outcome:  Progressing  Goal: Optimal Functional Performance  Outcome: Progressing  Intervention: Promote Optimal Functional Status  Recent Flowsheet Documentation  Taken 2/25/2025 2016 by Luh Stahl RN  Activity Management: activity adjusted per tolerance  Goal: Pain Control and Function  Outcome: Progressing  Intervention: Manage Acute Orthopaedic-Related Pain  Recent Flowsheet Documentation  Taken 2/25/2025 2016 by Luh Stahl RN  Pain Management Interventions:   care clustered   pillow support provided   quiet environment facilitated  Taken 2/25/2025 2004 by Luh Stahl RN  Pain Management Interventions: medication (see MAR)  Goal: Effective Urinary Elimination  Outcome: Progressing     Problem: Skin Injury Risk Increased  Goal: Skin Health and Integrity  Outcome: Progressing  Intervention: Plan: Nurse Driven Intervention: Moisture Management  Recent Flowsheet Documentation  Taken 2/25/2025 2005 by Luh Stahl RN  Moisture Interventions:   Incontinence pad   No brief in bed   Urinary collection device   Perineal cleanser  Taken 2/25/2025 2000 by Luh Stahl RN  Moisture Interventions:   Incontinence pad   No brief in bed   Urinary collection device   Perineal cleanser  Bathing/Skin Care: incontinence care  Intervention: Plan: Nurse Driven Intervention: Friction and Shear  Recent Flowsheet Documentation  Taken 2/25/2025 2005 by Luh Stahl RN  Friction/Shear Interventions: HOB 30 degrees or less  Intervention: Optimize Skin Protection  Recent Flowsheet Documentation  Taken 2/25/2025 2016 by Luh Stahl RN  Activity Management: activity adjusted per tolerance  Head of Bed (HOB) Positioning: HOB at 30-45 degrees  Taken 2/25/2025 2005 by Luh Stahl RN  Pressure Reduction Techniques: heels elevated off bed  Skin Protection:   adhesive use limited   incontinence pads utilized     Problem: Pain Acute  Goal: Optimal Pain Control and Function  Outcome: Progressing  Intervention: Optimize  Psychosocial Wellbeing  Recent Flowsheet Documentation  Taken 2/25/2025 2005 by Luh Stahl RN  Supportive Measures:   active listening utilized   goal-setting facilitated   positive reinforcement provided   relaxation techniques promoted   decision-making supported  Intervention: Develop Pain Management Plan  Recent Flowsheet Documentation  Taken 2/25/2025 2016 by Luh Stahl RN  Pain Management Interventions:   care clustered   pillow support provided   quiet environment facilitated  Taken 2/25/2025 2004 by Luh Stahl RN  Pain Management Interventions: medication (see MAR)  Intervention: Prevent or Manage Pain  Recent Flowsheet Documentation  Taken 2/25/2025 2005 by Luh Stahl RN  Bowel Elimination Promotion: adequate fluid intake promoted  Medication Review/Management: medications reviewed     Problem: Comorbidity Management  Goal: Blood Glucose Levels Within Targeted Range  Outcome: Progressing  Intervention: Monitor and Manage Glycemia  Recent Flowsheet Documentation  Taken 2/25/2025 2005 by Luh Stahl RN  Medication Review/Management: medications reviewed  Goal: Blood Pressure in Desired Range  Outcome: Progressing  Intervention: Maintain Blood Pressure Management  Recent Flowsheet Documentation  Taken 2/25/2025 2005 by Luh Stahl RN  Medication Review/Management: medications reviewed

## 2025-02-27 VITALS
OXYGEN SATURATION: 93 % | DIASTOLIC BLOOD PRESSURE: 53 MMHG | SYSTOLIC BLOOD PRESSURE: 113 MMHG | TEMPERATURE: 98.2 F | BODY MASS INDEX: 48.25 KG/M2 | HEART RATE: 95 BPM | RESPIRATION RATE: 18 BRPM | WEIGHT: 281.09 LBS

## 2025-02-27 LAB
GLUCOSE BLDC GLUCOMTR-MCNC: 123 MG/DL (ref 70–99)
GLUCOSE BLDC GLUCOMTR-MCNC: 135 MG/DL (ref 70–99)
GLUCOSE BLDC GLUCOMTR-MCNC: 161 MG/DL (ref 70–99)
GLUCOSE BLDC GLUCOMTR-MCNC: 180 MG/DL (ref 70–99)
GLUCOSE BLDC GLUCOMTR-MCNC: 187 MG/DL (ref 70–99)
GLUCOSE SERPL-MCNC: 198 MG/DL (ref 70–99)
HGB BLD-MCNC: 8.6 G/DL (ref 11.7–15.7)
PLATELET # BLD AUTO: 349 10E3/UL (ref 150–450)
PTH RELATED PROT SERPL-SCNC: 12 PMOL/L

## 2025-02-27 PROCEDURE — 85018 HEMOGLOBIN: CPT

## 2025-02-27 PROCEDURE — 36415 COLL VENOUS BLD VENIPUNCTURE: CPT

## 2025-02-27 PROCEDURE — 250N000011 HC RX IP 250 OP 636: Performed by: STUDENT IN AN ORGANIZED HEALTH CARE EDUCATION/TRAINING PROGRAM

## 2025-02-27 PROCEDURE — 250N000013 HC RX MED GY IP 250 OP 250 PS 637: Performed by: INTERNAL MEDICINE

## 2025-02-27 PROCEDURE — 250N000013 HC RX MED GY IP 250 OP 250 PS 637

## 2025-02-27 PROCEDURE — 250N000013 HC RX MED GY IP 250 OP 250 PS 637: Performed by: STUDENT IN AN ORGANIZED HEALTH CARE EDUCATION/TRAINING PROGRAM

## 2025-02-27 PROCEDURE — 85049 AUTOMATED PLATELET COUNT: CPT | Performed by: STUDENT IN AN ORGANIZED HEALTH CARE EDUCATION/TRAINING PROGRAM

## 2025-02-27 PROCEDURE — 250N000013 HC RX MED GY IP 250 OP 250 PS 637: Performed by: CLINICAL NURSE SPECIALIST

## 2025-02-27 PROCEDURE — 82947 ASSAY GLUCOSE BLOOD QUANT: CPT | Performed by: INTERNAL MEDICINE

## 2025-02-27 PROCEDURE — 120N000001 HC R&B MED SURG/OB

## 2025-02-27 PROCEDURE — 99222 1ST HOSP IP/OBS MODERATE 55: CPT | Performed by: CLINICAL NURSE SPECIALIST

## 2025-02-27 PROCEDURE — 99232 SBSQ HOSP IP/OBS MODERATE 35: CPT | Performed by: INTERNAL MEDICINE

## 2025-02-27 RX ORDER — METHOCARBAMOL 500 MG/1
500 TABLET, FILM COATED ORAL EVERY 6 HOURS
Status: DISCONTINUED | OUTPATIENT
Start: 2025-02-27 | End: 2025-03-04

## 2025-02-27 RX ORDER — OXYCODONE HYDROCHLORIDE 5 MG/1
10 TABLET ORAL EVERY 4 HOURS PRN
Status: DISCONTINUED | OUTPATIENT
Start: 2025-02-27 | End: 2025-02-27

## 2025-02-27 RX ORDER — OXYCODONE HYDROCHLORIDE 10 MG/1
20 TABLET ORAL EVERY 6 HOURS
Status: DISCONTINUED | OUTPATIENT
Start: 2025-02-27 | End: 2025-02-27

## 2025-02-27 RX ORDER — OXYCODONE HYDROCHLORIDE 10 MG/1
10 TABLET ORAL
Status: DISCONTINUED | OUTPATIENT
Start: 2025-02-27 | End: 2025-03-05 | Stop reason: HOSPADM

## 2025-02-27 RX ORDER — LIDOCAINE 4 G/G
1-3 PATCH TOPICAL
Status: DISCONTINUED | OUTPATIENT
Start: 2025-02-27 | End: 2025-03-05 | Stop reason: HOSPADM

## 2025-02-27 RX ORDER — OXYCODONE HYDROCHLORIDE 5 MG/1
5 TABLET ORAL EVERY 6 HOURS
Status: DISCONTINUED | OUTPATIENT
Start: 2025-02-27 | End: 2025-03-05 | Stop reason: HOSPADM

## 2025-02-27 RX ORDER — GABAPENTIN 300 MG/1
300 CAPSULE ORAL 3 TIMES DAILY
Status: DISCONTINUED | OUTPATIENT
Start: 2025-02-27 | End: 2025-02-27

## 2025-02-27 RX ORDER — OXYCODONE HYDROCHLORIDE 5 MG/1
5 TABLET ORAL
Status: DISCONTINUED | OUTPATIENT
Start: 2025-02-27 | End: 2025-02-27

## 2025-02-27 RX ORDER — METHOCARBAMOL 500 MG/1
500 TABLET, FILM COATED ORAL EVERY 6 HOURS
Status: DISCONTINUED | OUTPATIENT
Start: 2025-02-27 | End: 2025-02-27

## 2025-02-27 RX ORDER — HYDROMORPHONE HYDROCHLORIDE 1 MG/ML
0.3 INJECTION, SOLUTION INTRAMUSCULAR; INTRAVENOUS; SUBCUTANEOUS
Status: DISCONTINUED | OUTPATIENT
Start: 2025-02-27 | End: 2025-02-27

## 2025-02-27 RX ORDER — POLYETHYLENE GLYCOL 3350 17 G/17G
17 POWDER, FOR SOLUTION ORAL 2 TIMES DAILY PRN
Status: DISCONTINUED | OUTPATIENT
Start: 2025-02-27 | End: 2025-03-05 | Stop reason: HOSPADM

## 2025-02-27 RX ORDER — GABAPENTIN 300 MG/1
300 CAPSULE ORAL 2 TIMES DAILY
Status: DISCONTINUED | OUTPATIENT
Start: 2025-02-27 | End: 2025-03-05 | Stop reason: HOSPADM

## 2025-02-27 RX ORDER — ACETAMINOPHEN 325 MG/1
650 TABLET ORAL EVERY 6 HOURS
Status: DISCONTINUED | OUTPATIENT
Start: 2025-02-27 | End: 2025-03-05 | Stop reason: HOSPADM

## 2025-02-27 RX ORDER — HYDROMORPHONE HYDROCHLORIDE 1 MG/ML
0.3 INJECTION, SOLUTION INTRAMUSCULAR; INTRAVENOUS; SUBCUTANEOUS EVERY 6 HOURS PRN
Status: DISCONTINUED | OUTPATIENT
Start: 2025-02-27 | End: 2025-02-27

## 2025-02-27 RX ORDER — OXYCODONE HYDROCHLORIDE 5 MG/1
5 TABLET ORAL
Status: DISCONTINUED | OUTPATIENT
Start: 2025-02-27 | End: 2025-03-05 | Stop reason: HOSPADM

## 2025-02-27 RX ORDER — OXYCODONE HYDROCHLORIDE 10 MG/1
10 TABLET ORAL EVERY 6 HOURS
Status: DISCONTINUED | OUTPATIENT
Start: 2025-02-27 | End: 2025-02-27

## 2025-02-27 RX ORDER — HYDROMORPHONE HCL IN WATER/PF 6 MG/30 ML
0.2 PATIENT CONTROLLED ANALGESIA SYRINGE INTRAVENOUS
Status: DISCONTINUED | OUTPATIENT
Start: 2025-02-27 | End: 2025-03-03

## 2025-02-27 RX ADMIN — ACETAMINOPHEN 975 MG: 325 TABLET, FILM COATED ORAL at 07:09

## 2025-02-27 RX ADMIN — ATORVASTATIN CALCIUM 40 MG: 40 TABLET, FILM COATED ORAL at 07:09

## 2025-02-27 RX ADMIN — POLYETHYLENE GLYCOL 3350 17 G: 17 POWDER, FOR SOLUTION ORAL at 08:14

## 2025-02-27 RX ADMIN — OXYCODONE HYDROCHLORIDE 10 MG: 5 TABLET ORAL at 07:09

## 2025-02-27 RX ADMIN — MONTELUKAST 10 MG: 10 TABLET, FILM COATED ORAL at 22:02

## 2025-02-27 RX ADMIN — ENOXAPARIN SODIUM 40 MG: 40 INJECTION SUBCUTANEOUS at 22:04

## 2025-02-27 RX ADMIN — LIDOCAINE 1 PATCH: 4 PATCH TOPICAL at 20:24

## 2025-02-27 RX ADMIN — OXYCODONE HYDROCHLORIDE 5 MG: 5 TABLET ORAL at 20:17

## 2025-02-27 RX ADMIN — FAMOTIDINE 20 MG: 20 TABLET, FILM COATED ORAL at 20:18

## 2025-02-27 RX ADMIN — SENNOSIDES AND DOCUSATE SODIUM 1 TABLET: 50; 8.6 TABLET ORAL at 20:17

## 2025-02-27 RX ADMIN — GUAIFENESIN AND DEXTROMETHORPHAN HYDROBROMIDE 1 TABLET: 600; 30 TABLET, EXTENDED RELEASE ORAL at 02:26

## 2025-02-27 RX ADMIN — OXYCODONE HYDROCHLORIDE 5 MG: 5 TABLET ORAL at 13:33

## 2025-02-27 RX ADMIN — AMLODIPINE BESYLATE 10 MG: 10 TABLET ORAL at 07:09

## 2025-02-27 RX ADMIN — GABAPENTIN 300 MG: 300 CAPSULE ORAL at 20:18

## 2025-02-27 RX ADMIN — METFORMIN HYDROCHLORIDE 1000 MG: 500 TABLET ORAL at 17:44

## 2025-02-27 RX ADMIN — CYCLOBENZAPRINE HYDROCHLORIDE 10 MG: 5 TABLET, FILM COATED ORAL at 07:09

## 2025-02-27 RX ADMIN — SERTRALINE HYDROCHLORIDE 150 MG: 100 TABLET ORAL at 20:18

## 2025-02-27 RX ADMIN — ENOXAPARIN SODIUM 40 MG: 40 INJECTION SUBCUTANEOUS at 08:14

## 2025-02-27 RX ADMIN — METHOCARBAMOL 500 MG: 500 TABLET ORAL at 17:44

## 2025-02-27 RX ADMIN — GABAPENTIN 300 MG: 300 CAPSULE ORAL at 11:46

## 2025-02-27 RX ADMIN — ACETAMINOPHEN 650 MG: 325 TABLET, FILM COATED ORAL at 13:32

## 2025-02-27 RX ADMIN — FAMOTIDINE 20 MG: 20 TABLET, FILM COATED ORAL at 07:09

## 2025-02-27 RX ADMIN — ACETAMINOPHEN 650 MG: 325 TABLET, FILM COATED ORAL at 20:17

## 2025-02-27 RX ADMIN — SENNOSIDES AND DOCUSATE SODIUM 1 TABLET: 50; 8.6 TABLET ORAL at 07:09

## 2025-02-27 RX ADMIN — IRBESARTAN 300 MG: 150 TABLET ORAL at 22:01

## 2025-02-27 ASSESSMENT — ACTIVITIES OF DAILY LIVING (ADL)
ADLS_ACUITY_SCORE: 53
ADLS_ACUITY_SCORE: 51
ADLS_ACUITY_SCORE: 51
ADLS_ACUITY_SCORE: 53
ADLS_ACUITY_SCORE: 49
ADLS_ACUITY_SCORE: 49
ADLS_ACUITY_SCORE: 53
ADLS_ACUITY_SCORE: 51
ADLS_ACUITY_SCORE: 51
ADLS_ACUITY_SCORE: 53
ADLS_ACUITY_SCORE: 51
ADLS_ACUITY_SCORE: 49
ADLS_ACUITY_SCORE: 53
ADLS_ACUITY_SCORE: 49
ADLS_ACUITY_SCORE: 49
ADLS_ACUITY_SCORE: 53
ADLS_ACUITY_SCORE: 49
ADLS_ACUITY_SCORE: 51
ADLS_ACUITY_SCORE: 53

## 2025-02-27 NOTE — PLAN OF CARE
"Goal Outcome Evaluation:      Plan of Care Reviewed With: patient    Overall Patient Progress: improvingOverall Patient Progress: improving    Outcome Evaluation: discharge TBD.    Pain consult saw pt. Changes to medications. Started Robaxin now and discontinued Flexeril. Scheduled Oxycodone. Pain management has improved. Pain down at times as low as 2/10. Voiding via purewick good amounts. Bladder scan at 1840 for 441. Pt wants to void on her own and not be st cath at this time. Miralax given with small soft BM today. Pt did not get OOB today. BG monitoring. ,161,135. VSS RA. Dressings x2. Lower one dressing small old drainage. Cms intact. Lift with assist of 2.k      Problem: Adult Inpatient Plan of Care  Goal: Plan of Care Review  Description: The Plan of Care Review/Shift note should be completed every shift.  The Outcome Evaluation is a brief statement about your assessment that the patient is improving, declining, or no change.  This information will be displayed automatically on your shift  note.  Outcome: Progressing  Flowsheets (Taken 2/27/2025 1725)  Outcome Evaluation: discharge TBD.  Plan of Care Reviewed With: patient  Overall Patient Progress: improving  Goal: Patient-Specific Goal (Individualized)  Description: You can add care plan individualizations to a care plan. Examples of Individualization might be:  \"Parent requests to be called daily at 9am for status\", \"I have a hard time hearing out of my right ear\", or \"Do not touch me to wake me up as it startles  me\".  Outcome: Progressing  Goal: Absence of Hospital-Acquired Illness or Injury  Outcome: Progressing  Intervention: Identify and Manage Fall Risk  Recent Flowsheet Documentation  Taken 2/27/2025 0908 by Adele Goncalves, RN  Safety Promotion/Fall Prevention:   activity supervised   assistive device/personal items within reach   clutter free environment maintained   lighting adjusted   mobility aid in reach   nonskid shoes/slippers when " out of bed   patient and family education   safety round/check completed  Intervention: Prevent Skin Injury  Recent Flowsheet Documentation  Taken 2/27/2025 0908 by Adele Goncalves RN  Skin Protection: adhesive use limited  Taken 2/27/2025 0800 by Adele Goncalves RN  Body Position: supine, head elevated  Intervention: Prevent and Manage VTE (Venous Thromboembolism) Risk  Recent Flowsheet Documentation  Taken 2/27/2025 0800 by Adele Goncalves RN  VTE Prevention/Management: SCDs on (sequential compression devices)  Intervention: Prevent Infection  Recent Flowsheet Documentation  Taken 2/27/2025 0908 by Adele Goncalves RN  Infection Prevention:   environmental surveillance performed   hand hygiene promoted   single patient room provided  Goal: Optimal Comfort and Wellbeing  Outcome: Progressing  Intervention: Monitor Pain and Promote Comfort  Recent Flowsheet Documentation  Taken 2/27/2025 1333 by Adele Goncalves RN  Pain Management Interventions: medication (see MAR)  Taken 2/27/2025 1332 by Adele Goncalves RN  Pain Management Interventions: medication (see MAR)  Taken 2/27/2025 1146 by Adele Goncalves RN  Pain Management Interventions: medication (see MAR)  Taken 2/27/2025 0800 by Adele Goncalves RN  Pain Management Interventions:   pain management plan reviewed with patient/caregiver   cold applied  Goal: Readiness for Transition of Care  Outcome: Progressing     Problem: Hip Fracture Medical Management  Goal: Optimal Coping with Change in Health Status  Outcome: Progressing  Intervention: Support Psychosocial Response to Injury  Recent Flowsheet Documentation  Taken 2/27/2025 0800 by Adele Goncalves RN  Supportive Measures:   active listening utilized   goal-setting facilitated   positive reinforcement provided   self-care encouraged  Goal: Absence of Bleeding  Outcome: Progressing  Intervention: Monitor and Manage Bleeding  Recent Flowsheet Documentation  Taken 2/27/2025 0908 by  Adele Goncalves RN  Bleeding Management: dressing monitored  Goal: Effective Bowel Elimination  Outcome: Progressing  Intervention: Promote Effective Bowel Elimination  Recent Flowsheet Documentation  Taken 2/27/2025 0908 by Adele Goncalves RN  Bowel Elimination Promotion:   adequate fluid intake promoted   ambulation promoted  Goal: Baseline Cognitive Function Maintained  Outcome: Progressing  Intervention: Maximize Cognitive Function  Recent Flowsheet Documentation  Taken 2/27/2025 0800 by Adele Goncalves RN  Reorientation Measures: clock in view  Goal: Absence of Embolism  Outcome: Progressing  Intervention: Prevent or Manage Embolism Risk  Recent Flowsheet Documentation  Taken 2/27/2025 0800 by Adele Goncalves RN  VTE Prevention/Management: SCDs on (sequential compression devices)  Goal: Fracture Stability  Outcome: Progressing  Goal: Optimal Functional Performance  Outcome: Progressing  Intervention: Promote Optimal Functional Status  Recent Flowsheet Documentation  Taken 2/27/2025 0908 by Adele Goncalves RN  Range of Motion: active ROM (range of motion) encouraged  Taken 2/27/2025 0800 by Adele Goncalves RN  Activity Management: activity encouraged  Goal: Pain Control and Function  Outcome: Progressing  Intervention: Manage Acute Orthopaedic-Related Pain  Recent Flowsheet Documentation  Taken 2/27/2025 1333 by Adele Goncalves RN  Pain Management Interventions: medication (see MAR)  Taken 2/27/2025 1332 by Adele Goncalves RN  Pain Management Interventions: medication (see MAR)  Taken 2/27/2025 1146 by Adele Goncalves RN  Pain Management Interventions: medication (see MAR)  Taken 2/27/2025 0800 by Adele Goncalves RN  Pain Management Interventions:   pain management plan reviewed with patient/caregiver   cold applied  Goal: Effective Urinary Elimination  Outcome: Progressing     Problem: Skin Injury Risk Increased  Goal: Skin Health and Integrity  Outcome:  Progressing  Intervention: Plan: Nurse Driven Intervention: Moisture Management  Recent Flowsheet Documentation  Taken 2/27/2025 0908 by Adele Goncalves RN  Moisture Interventions:   No brief in bed   Incontinence pad   Urinary collection device  Intervention: Plan: Nurse Driven Intervention: Friction and Shear  Recent Flowsheet Documentation  Taken 2/27/2025 0908 by Adele Goncalves RN  Friction/Shear Interventions: HOB 30 degrees or less  Intervention: Optimize Skin Protection  Recent Flowsheet Documentation  Taken 2/27/2025 0908 by Adele Goncalves RN  Pressure Reduction Techniques:   frequent weight shift encouraged   heels elevated off bed  Skin Protection: adhesive use limited  Taken 2/27/2025 0800 by Adele Goncalves RN  Activity Management: activity encouraged  Head of Bed (HOB) Positioning: HOB at 20-30 degrees     Problem: Pain Acute  Goal: Optimal Pain Control and Function  Outcome: Progressing  Intervention: Optimize Psychosocial Wellbeing  Recent Flowsheet Documentation  Taken 2/27/2025 0800 by Adele Goncalves RN  Supportive Measures:   active listening utilized   goal-setting facilitated   positive reinforcement provided   self-care encouraged  Diversional Activities: television  Intervention: Develop Pain Management Plan  Recent Flowsheet Documentation  Taken 2/27/2025 1333 by Adele Goncalves RN  Pain Management Interventions: medication (see MAR)  Taken 2/27/2025 1332 by Adele Goncalves RN  Pain Management Interventions: medication (see MAR)  Taken 2/27/2025 1146 by Adele Goncalves RN  Pain Management Interventions: medication (see MAR)  Taken 2/27/2025 0800 by Adele Goncalves RN  Pain Management Interventions:   pain management plan reviewed with patient/caregiver   cold applied  Intervention: Prevent or Manage Pain  Recent Flowsheet Documentation  Taken 2/27/2025 0908 by Adele Goncalves RN  Bowel Elimination Promotion:   adequate fluid intake promoted    ambulation promoted  Medication Review/Management: medications reviewed     Problem: Comorbidity Management  Goal: Blood Glucose Levels Within Targeted Range  Outcome: Progressing  Intervention: Monitor and Manage Glycemia  Recent Flowsheet Documentation  Taken 2/27/2025 0908 by Adele Goncalves RN  Medication Review/Management: medications reviewed  Goal: Blood Pressure in Desired Range  Outcome: Progressing  Intervention: Maintain Blood Pressure Management  Recent Flowsheet Documentation  Taken 2/27/2025 0908 by Adele Goncalves RN  Medication Review/Management: medications reviewed

## 2025-02-27 NOTE — PLAN OF CARE
"Goal Outcome Evaluation:      Plan of Care Reviewed With: patient    Overall Patient Progress: no changeOverall Patient Progress: no change    Outcome Evaluation: POD2. Pt AOx4. Ax2 w/lift.1L O2 NC. Purewick in place. Pain managed with scheduled tylenol and flexeril. On lovenox. Biopsy result pending. Discharge TBD.      Problem: Adult Inpatient Plan of Care  Goal: Plan of Care Review  Description: The Plan of Care Review/Shift note should be completed every shift.  The Outcome Evaluation is a brief statement about your assessment that the patient is improving, declining, or no change.  This information will be displayed automatically on your shift  note.  Outcome: Progressing  Flowsheets (Taken 2/27/2025 0651)  Outcome Evaluation: POD2. Pt AOx4. Ax2 w/lift.1L O2 NC. Purewick in place. Pain managed with scheduled tylenol and flexeril. On lovenox. Biopsy result pending. Discharge TBD.  Plan of Care Reviewed With: patient  Overall Patient Progress: no change  Goal: Patient-Specific Goal (Individualized)  Description: You can add care plan individualizations to a care plan. Examples of Individualization might be:  \"Parent requests to be called daily at 9am for status\", \"I have a hard time hearing out of my right ear\", or \"Do not touch me to wake me up as it startles  me\".  Outcome: Progressing  Goal: Absence of Hospital-Acquired Illness or Injury  Outcome: Progressing  Intervention: Identify and Manage Fall Risk  Recent Flowsheet Documentation  Taken 2/26/2025 2059 by Kelly Koenig RN  Safety Promotion/Fall Prevention:   activity supervised   room near nurse's station   safety round/check completed  Intervention: Prevent Skin Injury  Recent Flowsheet Documentation  Taken 2/26/2025 2059 by Kelly Koenig RN  Body Position: supine, head elevated  Skin Protection:   adhesive use limited   incontinence pads utilized  Intervention: Prevent and Manage VTE (Venous Thromboembolism) Risk  Recent Flowsheet " Documentation  Taken 2/26/2025 2059 by Kelly Koenig RN  VTE Prevention/Management: SCDs on (sequential compression devices)  Intervention: Prevent Infection  Recent Flowsheet Documentation  Taken 2/26/2025 2059 by Kelly Koenig RN  Infection Prevention:   single patient room provided   rest/sleep promoted   hand hygiene promoted  Goal: Optimal Comfort and Wellbeing  Outcome: Progressing  Goal: Readiness for Transition of Care  Outcome: Progressing     Problem: Hip Fracture Medical Management  Goal: Optimal Coping with Change in Health Status  Outcome: Progressing  Intervention: Support Psychosocial Response to Injury  Recent Flowsheet Documentation  Taken 2/26/2025 2059 by Kelly Koenig RN  Supportive Measures:   active listening utilized   self-care encouraged   self-responsibility promoted   verbalization of feelings encouraged  Goal: Absence of Bleeding  Outcome: Progressing  Intervention: Monitor and Manage Bleeding  Recent Flowsheet Documentation  Taken 2/26/2025 2059 by Kelly Koenig RN  Bleeding Management: dressing monitored  Goal: Effective Bowel Elimination  Outcome: Progressing  Intervention: Promote Effective Bowel Elimination  Recent Flowsheet Documentation  Taken 2/26/2025 2059 by Kelly Koenig RN  Bowel Elimination Promotion: adequate fluid intake promoted  Goal: Baseline Cognitive Function Maintained  Outcome: Progressing  Goal: Absence of Embolism  Outcome: Progressing  Intervention: Prevent or Manage Embolism Risk  Recent Flowsheet Documentation  Taken 2/26/2025 2059 by Kelly Koenig RN  VTE Prevention/Management: SCDs on (sequential compression devices)  Goal: Fracture Stability  Outcome: Progressing  Goal: Optimal Functional Performance  Outcome: Progressing  Intervention: Promote Optimal Functional Status  Recent Flowsheet Documentation  Taken 2/26/2025 2059 by Kelly Koenig RN  Self-Care Promotion:   independence  encouraged   BADL personal objects within reach   BADL personal routines maintained  Range of Motion: active ROM (range of motion) encouraged  Goal: Pain Control and Function  Outcome: Progressing  Goal: Effective Urinary Elimination  Outcome: Progressing     Problem: Skin Injury Risk Increased  Goal: Skin Health and Integrity  Outcome: Progressing  Intervention: Plan: Nurse Driven Intervention: Moisture Management  Recent Flowsheet Documentation  Taken 2/26/2025 2059 by Kelly Koenig RN  Moisture Interventions:   No brief in bed   Incontinence pad   Urinary collection device  Taken 2/26/2025 2000 by Kelly Koenig RN  Moisture Interventions:   No brief in bed   Incontinence pad   Urinary collection device  Bathing/Skin Care: incontinence care  Intervention: Plan: Nurse Driven Intervention: Friction and Shear  Recent Flowsheet Documentation  Taken 2/26/2025 2059 by Kelly Koenig RN  Friction/Shear Interventions:   HOB 30 degrees or less   Silicone foam sacral dressing  Intervention: Optimize Skin Protection  Recent Flowsheet Documentation  Taken 2/26/2025 2059 by Kelly Koenig RN  Pressure Reduction Techniques:   heels elevated off bed   frequent weight shift encouraged  Skin Protection:   adhesive use limited   incontinence pads utilized  Head of Bed (HOB) Positioning: HOB at 20-30 degrees     Problem: Pain Acute  Goal: Optimal Pain Control and Function  Outcome: Progressing  Intervention: Optimize Psychosocial Wellbeing  Recent Flowsheet Documentation  Taken 2/26/2025 2059 by Kelly Koenig RN  Supportive Measures:   active listening utilized   self-care encouraged   self-responsibility promoted   verbalization of feelings encouraged  Intervention: Prevent or Manage Pain  Recent Flowsheet Documentation  Taken 2/26/2025 2059 by Kelly Koenig RN  Bowel Elimination Promotion: adequate fluid intake promoted  Medication Review/Management: medications  reviewed     Problem: Comorbidity Management  Goal: Blood Glucose Levels Within Targeted Range  Outcome: Progressing  Intervention: Monitor and Manage Glycemia  Recent Flowsheet Documentation  Taken 2/26/2025 2059 by Kelly Koenig RN  Medication Review/Management: medications reviewed  Goal: Blood Pressure in Desired Range  Outcome: Progressing  Intervention: Maintain Blood Pressure Management  Recent Flowsheet Documentation  Taken 2/26/2025 2059 by Kelly Koenig, RN  Medication Review/Management: medications reviewed

## 2025-02-27 NOTE — CONSULTS
[facility] ACUTE PAIN SERVICE    ([facility], [facility], [facility], [facility], [facility])  Pain Consult Note    Assessment/Plan:  Brissa Mott is a 54 year old female who was admitted on 2/20/2025.  Pain Service is asked to see the patient for assistance with cancer related pain.    Medical history significant for previous left-sided CVA, Type 2 diabetes, hypertension, morbid obesity on Ozempic, KATHRINE.     Recent hospitalization 1/30 to 2/4/25 at [facility] for right knee pain and inability to bear weight patient was discharged to TCU for rehab, she sustained a fall and was subsequently admitted here at [facility].    Admitted from transitional care unit with evaluation of severe hypercalcemia, distal right femur fracture found to have a lytic mass.  Orthopedic surgery was consulted with expanded workup for suspected metastatic malignancy with CT chest abdomen and pelvis which showed a 9.9 cm complex right adnexal cystic mass, multiple pulmonary nodules.   Patient is status post op day #1 open reduction internal fixation with intramedullary rodding of right hip fracture    Oncology Consulted: Right adnexal mass, elevated CA-125, metastatic disease with pulmonary nodules, hepatic lesions and lymphadenopathy, lytic lesions in humeral head, sacrum, iliac bone   Right femur fracture with intramedullary marrow replacing lesion.    Enlarged left axillary node plan for node biopsy   Biopsy completed right distal femur bone biopsy 2/24 results still pending     elevated Gyn Oncology Consulted as well as Oncology.            shows 5 total opioid prescriptions.  Most recently   02/11/25 oxycodone 10 mg tablets 2 for 1 day  02/09/25 oxycodone 10 mg tablets 14 for 2 days  02/04/25 oxycodone 10 mg tablets 13 for 2 days.   02/04/25 diazepam 5 mg tablets 9 for 2 days    Reviewed medication reconciliation 2/18/25 no changes on readmission 2/20/25.  Lidocaine patches, oxycodone 10 mg every four  "hours prn,  valium 5 mg every 6 hours prn, tylenol 1000 mg tid prn    Over the past 24 hours patient has received:  3(10mg) flexeril and 1(10mg) oxycodone.       Subjective:    Describes pain as sharp stabbing pain, making it difficult to reposition in bed or to sit up.  Pain goes up to 10 plus with movement.  Otherwise have generalized discomfort difficult to describe.  Frustrated with pain \"discomfort\" doesn't feel like getting pain medications when needed and is having difficulty requesting pain medications.    Discussed scheduling pain medications which she feels would help her keep pain under better control.    PLAN:  cancer related pain and acute post operative pain  Multimodal Medication Therapy:   Adjuvants: tylenol 975 mg every 6 hours scheduled with oxycodone Methocarbamol 500 mg every 6 hours gabapentin 300 mg bid  Opioids: Oxycodone 5 mg every 6 hours with additional  Oxycodone 5-10 mg every 3 hours prn, IV Dilaudid 0.2mg every 3 hours prn for severe pain not managed by oral pain medications  and may be helpful prior to repositioning working with PT  Non-medication interventions- Ice, heat prn  Constipation Prophylaxis- senna-S 1 tablet bid, miralax daily  Follow up /Discharge Recommendations - We recommend prescribing the following at the time of discharge:    May benefit from long acting opioids will continue to assess        <principal problem not specified>   Patient Active Problem List   Diagnosis    Morbid obesity (H)    Type 2 diabetes mellitus with hyperglycemia, with long-term current use of insulin (H)    KATHRINE (obstructive sleep apnea)    Secondary hypertension    Hemiplegia and hemiparesis following cerebral infarction affecting left non-dominant side (H)    Acute ischemic stroke (H)    Adjustment disorder with depressed mood    Dysphagia    Hyperlipidemia    Morbid obesity with BMI of 60.0-69.9, adult (H)    Obesity hypoventilation syndrome (H)    Reactive airway disease without complication "    Type 2 diabetes mellitus with complication, with long-term current use of insulin (H)    Hypercalcemia    Knee pain        History   Drug Use Unknown         Tobacco Use      Smoking status: Former      Smokeless tobacco: Never        Current Facility-Administered Medications   Medication Dose Route Frequency Provider Last Rate Last Admin    acetaminophen (TYLENOL) tablet 975 mg  975 mg Oral Q8H Luh Harper PA-C   975 mg at 02/27/25 0709    amLODIPine (NORVASC) tablet 10 mg  10 mg Oral Daily Eren Montenegro MD   10 mg at 02/27/25 0709    atorvastatin (LIPITOR) tablet 40 mg  40 mg Oral Daily Eren Montenegro MD   40 mg at 02/27/25 0709    cyclobenzaprine (FLEXERIL) tablet 10 mg  10 mg Oral TID Lilly Barrett MD   10 mg at 02/27/25 0709    enoxaparin ANTICOAGULANT (LOVENOX) injection 40 mg  40 mg Subcutaneous Q12H Marjan Vasques PA-C   40 mg at 02/27/25 0814    famotidine (PEPCID) tablet 20 mg  20 mg Oral BID Luh Harper PA-C   20 mg at 02/27/25 0709    Or    famotidine (PEPCID) injection 20 mg  20 mg Intravenous BID Luh Harper PA-C        gabapentin (NEURONTIN) capsule 300 mg  300 mg Oral TID Mike Brumfield MD        insulin aspart (NovoLOG) injection (RAPID ACTING)  1-10 Units Subcutaneous TID  Mike Brumfield MD   5 Units at 02/26/25 1802    insulin aspart (NovoLOG) injection (RAPID ACTING)  1-7 Units Subcutaneous At Bedtime Mike Brumfield MD        insulin glargine (LANTUS PEN) injection 24 Units  24 Units Subcutaneous QAM  Mike Brumfield MD   24 Units at 02/27/25 0813    irbesartan (AVAPRO) tablet 300 mg  300 mg Oral At Bedtime Eren Montenegro MD   300 mg at 02/26/25 2317    metFORMIN (GLUCOPHAGE) tablet 1,000 mg  1,000 mg Oral BID w/meals Mike Brumfield MD   1,000 mg at 02/23/25 1823    montelukast (SINGULAIR) tablet 10 mg  10 mg Oral At Bedtime Eren Montenegro MD   10 mg at 02/26/25 6414     polyethylene glycol (MIRALAX) Packet 17 g  17 g Oral Daily Luh Harper PA-C   17 g at 02/27/25 0814    senna-docusate (SENOKOT-S/PERICOLACE) 8.6-50 MG per tablet 1 tablet  1 tablet Oral BID Luh Harper PA-C   1 tablet at 02/27/25 0709    sertraline (ZOLOFT) tablet 150 mg  150 mg Oral Daily Eren Montenegro MD   150 mg at 02/26/25 2045    sodium chloride (PF) 0.9% PF flush 3 mL  3 mL Intracatheter Q8H Luh Harper PA-C   3 mL at 02/27/25 0714    sodium chloride (PF) 0.9% PF flush 3 mL  3 mL Intracatheter Q8H Lilly Barrett MD   3 mL at 02/25/25 2343       Objective:  Vital signs in last 24 hours:  B/P: 137/71, T: 97.9, P: 88, R: 18   Blood pressure 137/71, pulse 88, temperature 97.9  F (36.6  C), temperature source Temporal, resp. rate 18, weight 127.5 kg (281 lb 1.4 oz), SpO2 96%, not currently breastfeeding.      Weight:   Wt Readings from Last 2 Encounters:   02/25/25 127.5 kg (281 lb 1.4 oz)   02/18/25 127.5 kg (281 lb)         Intake/Output:    Intake/Output Summary (Last 24 hours) at 2/27/2025 1023  Last data filed at 2/27/2025 0908  Gross per 24 hour   Intake 100 ml   Output 450 ml   Net -350 ml        Review of Systems:   As per subjective, all others negative.    Physical Exam:     General Appearance:  Rests in bed, appears uncomfortable   Head:  Normocephalic, without obvious abnormality, atraumatic   Eyes:  PERRL, conjunctiva/corneas clear, EOM's intact   ENT/Throat: Lips moist    Lymph/Neck: Supple, symmetrical, trachea midline   Lungs:    respirations unlabored   Abdomen:   Soft, non-tender   Musculoskeletal: Extremities normal, atraumatic  Incision left hip incision with bandage covering   Skin: Skin is intact    Neurologic: Alert and oriented X 3, reduced mobility due to pain         Imaging:  Personally Reviewed.    Results for orders placed or performed during the hospital encounter of 02/20/25   CT Chest/Abdomen/Pelvis w Contrast    Impression    IMPRESSION:  1.  Findings suspicious  for metastatic disease. Enlarged left axillary lymph nodes, measuring up to 4.7 cm. Bilateral pulmonary nodules measuring up to 1.0 cm. Ill-defined hepatic lesions, measuring up to 2.9 cm. Right lower retroperitoneal 1.7 cm   nodule. Right pelvic lymphadenopathy, measuring up to 2.4 cm. Lytic lesions in the right humeral head, right sacrum, and left posterior iliac bone.    2.  Right adnexal 9.9 cm complex cystic mass, suspicious for malignancy, either metastatic or primary ovarian neoplasm.    3.  Left upper anterior chest wall 0.9 cm subcutaneous nodule and right gluteal 0.7 cm subcutaneous nodule, indeterminate and may be metastatic.   CT Bone Biopsy Superficial    Impression    IMPRESSION:  1.  Successful CT-guided biopsy of a soft tissue mass adjacent to the lytic lesion involving the distal right femur.    Reference CPT Codes: 45309, ,        Lab Results:  Personally Reviewed.   Last Comprehensive Metabolic Panel:  Sodium   Date Value Ref Range Status   02/22/2025 136 135 - 145 mmol/L Final   04/29/2021 137 133 - 144 mmol/L Final     Potassium   Date Value Ref Range Status   02/22/2025 4.0 3.4 - 5.3 mmol/L Final   06/07/2022 4.1 3.4 - 5.3 mmol/L Final   04/29/2021 3.9 3.4 - 5.3 mmol/L Final     Chloride   Date Value Ref Range Status   02/22/2025 103 98 - 107 mmol/L Final   06/07/2022 105 94 - 109 mmol/L Final   04/29/2021 104 94 - 109 mmol/L Final     Carbon Dioxide   Date Value Ref Range Status   04/29/2021 29 20 - 32 mmol/L Final     Carbon Dioxide (CO2)   Date Value Ref Range Status   02/22/2025 24 22 - 29 mmol/L Final   06/07/2022 26 20 - 32 mmol/L Final     Anion Gap   Date Value Ref Range Status   02/22/2025 9 7 - 15 mmol/L Final   06/07/2022 7 3 - 14 mmol/L Final   04/29/2021 4 3 - 14 mmol/L Final     Glucose   Date Value Ref Range Status   06/07/2022 156 (H) 70 - 99 mg/dL Final   04/29/2021 71 70 - 99 mg/dL Final     GLUCOSE BY METER POCT   Date Value Ref Range Status   02/27/2025 123 (H) 70 - 99 mg/dL  "Final     Urea Nitrogen   Date Value Ref Range Status   02/22/2025 6.7 6.0 - 20.0 mg/dL Final   06/07/2022 18 7 - 30 mg/dL Final   04/29/2021 22 7 - 30 mg/dL Final     Creatinine   Date Value Ref Range Status   02/23/2025 0.44 (L) 0.51 - 0.95 mg/dL Final   04/29/2021 0.75 0.52 - 1.04 mg/dL Final     GFR Estimate   Date Value Ref Range Status   02/23/2025 >90 >60 mL/min/1.73m2 Final     Comment:     eGFR calculated using 2021 CKD-EPI equation.   04/29/2021 >90 >60 mL/min/[1.73_m2] Final     Comment:     Non  GFR Calc  Starting 12/18/2018, serum creatinine based estimated GFR (eGFR) will be   calculated using the Chronic Kidney Disease Epidemiology Collaboration   (CKD-EPI) equation.       Calcium   Date Value Ref Range Status   02/22/2025 10.3 8.8 - 10.4 mg/dL Final   04/29/2021 8.7 8.5 - 10.1 mg/dL Final        UA: No results found for: \"UAMP\", \"UBARB\", \"BENZODIAZEUR\", \"UCANN\", \"UCOC\", \"OPIT\", \"UPCP\"                 RONNELL Hendricks, DNP CNS  Acute Inpatient Pain Team  M-F   Paging via [employer] or [employer]         "

## 2025-02-27 NOTE — PROGRESS NOTES
[facility]  Hospitalist Progress Note  Mike Brumfield MD 02/27/2025    Reason for Stay (Diagnosis): Pathologic fracture         Assessment and Plan:      Summary of Stay: Brissa Mott is a 54 year old female with past medical history including previous left-sided CVA, type 2 diabetes, hypertension, morbid obesity on Ozempic, KATHRINE, admitted on 2/20/2025 from transitional care unit with severe hypercalcemia and a distal right femur fracture found to have a lytic mass.    Orthopedic surgery was consulted and we expanded workup for suspected metastatic malignancy with CT chest abdomen and pelvis which showed a 9.9 cm complex right adnexal cystic mass, multiple pulmonary nodules.  Oncology was consulted as well.    Hypercalcemia normalized from 14.7 with IV fluids.  She underwent an IR biopsy of her distal right femur pathologic fracture 2/24 to help guide further management.      She was taken to the OR for ORIF of her pathologic fracture on 2/25.    Biopsy results pending.    Problem List/Assessment and Plan:   Severe hypercalcemia of malignancy with apparent pathologic distal right femur fracture, also noted to have 9.9 cm complex right adnexal cystic mass, pulmonary nodules: Highly concerning for metastatic malignancy.  Oncology, orthopedic surgery both following.  --IR biopsy of pathologic fracture 2/24  --Await biopsy results  --to OR w/ Orthopedics on 2/25 for ORIF  --Oncology following, await pathology results  -- Pain control with Tylenol, Flexeril, oxycodone, Dilaudid and hydroxyzine.  Added gabapentin.  --No BM yet, on a good bowel regimen.  Added extra prn miralax.  --pain management consult  -- PT consult    2.   Type 2 diabetes mellitus: Continue SSI.  Ozempic on hold.  Hypoglycemia protocol.  Home twice daily insulin mix on hold.  -- Added Lantus on 2/26.  24 units daily.  -- SSI high scale  -- Trend, titrate as needed.  Might need to add some mealtime insulin soon.  --add metformin  "(home med)    3.   History of hypertension: Currently on amlodipine 10 mg, irbesartan 300 mg    4.   Depression: Continue sertraline 150 mg daily    5.  Social: Completed [employer] paperwork on 2/26 for her .      DVT Prophylaxis: Enoxaparin  Code Status: Full Code  Medically Ready for Discharge: Anticipated in 5+ Days      Clinically Significant Risk Factors               # Hypoalbuminemia: Lowest albumin = 3.1 g/dL at 2/21/2025  7:26 AM, will monitor as appropriate     # Hypertension: Noted on problem list            # Severe Obesity: Estimated body mass index is 48.25 kg/m  as calculated from the following:    Height as of 2/18/25: 1.626 m (5' 4\").    Weight as of this encounter: 127.5 kg (281 lb 1.4 oz).      # Asthma: noted on problem list              Interval History (Subjective):        Glucose control better  Coughing but weaning O2, has pulmonary nodules.  Pathology pending  Pain management consult  Added gabapentin  No BM yet                    Physical Exam:      Last Vital Signs:  /71 (BP Location: Right arm)   Pulse 88   Temp 97.9  F (36.6  C) (Temporal)   Resp 18   Wt 127.5 kg (281 lb 1.4 oz)   LMP  (LMP Unknown)   SpO2 96%   BMI 48.25 kg/m      I/O last 3 completed shifts:  In: -   Out: 450 [Urine:450]    General: Alert, awake, no acute distress.  Slightly sedated but completely coherent.  HEENT: NC/AT, eyes anicteric, external occular movements intact, face symmetric.  Cardiac: RRR, S1, S2.  No murmurs appreciated.  Pulmonary: Normal chest rise, normal work of breathing.  Lungs CTA BL  Abdomen: soft, non-tender, non-distended.  Bowel Sounds Present.  No guarding.  Extremities: Some mild swelling around distal right thigh no deformities.  Warm, well perfused.  Skin: no rashes or lesions noted.  Warm and Dry.  Neuro: No focal deficits noted.  Speech clear.  Coordination and strength grossly normal.  Psych: Appropriate affect.         Medications:      All current medications were " reviewed with changes reflected in problem list.         Data:      All new lab and imaging data was reviewed.   Labs:  Recent Labs   Lab 02/27/25  0802 02/23/25  0742 02/23/25  0705 02/22/25  0833 02/22/25  0718   NA  --   --   --   --  136   POTASSIUM  --   --   --   --  4.0   CHLORIDE  --   --   --   --  103   CO2  --   --   --   --  24   ANIONGAP  --   --   --   --  9   *   < >  --    < > 109*   BUN  --   --   --   --  6.7   CR  --   --  0.44*  --  0.40*   GFRESTIMATED  --   --  >90  --  >90   SHIRLEY  --   --   --   --  10.3    < > = values in this interval not displayed.     Recent Labs   Lab 02/26/25  0725 02/25/25  0600 02/23/25  0705 02/22/25  0718   WBC  --   --   --  8.8   HGB 9.5*   < >  --  10.4*   HCT  --   --   --  32.8*   MCV  --   --   --  82   PLT  --   --  238 214    < > = values in this interval not displayed.      Imaging:   No results found for this or any previous visit (from the past 48 hours).      Mike Brumfield MD     I've spent 50 minutes in chart review, ordering medications and tests, obtaining additional history as needed, evaluating the patient and in documentation for this encounter.

## 2025-02-27 NOTE — PROGRESS NOTES
Hematology/Oncology:      CHART CHECK:     Today's Date: 02/27/25  Date of Admission: 2/20/25  Reason for Consult: Hypercalcemia, lytic lesions     Admitted for hypercalcemia, femur fracture, concern for pathological fracture.  Received Zometa 4 mg x 1 on 2/20/2025.  Calcium improved to 10.3 on 2/22/2025.  CT CAP with adnexal mass with multiple lytic lesions, concerning for metastatic disease.  Myeloma labs normal.      was elevated, Gyn Onc saw patient, requested biopsy as well.  Biopsy completed right distal femur bone biopsy 2/24 and still pending. Underwent ORIF right femur 2/25/25.      We will follow peripherally until we have biopsy results.  Please call us with any questions.      Elida Whitley PA-C  Hematology/Oncology  [facility]

## 2025-02-27 NOTE — PROGRESS NOTES
"SPIRITUAL HEALTH SERVICES - Progress Note  [facility]  Referral Source/Reason for Visit: Follow-up encounter for ongoing emotional/spiritual support.    Summary and Recommendations -  Pt Brissa reported that she was kept awake last night with worrisome thoughts about her biopsy and other related concerns.  She is relying on the support of her family, friends, and bala to help her through this time of waiting.  Brissa is Jain but not affiliated with a bala community.  She welcomed prayer.    Plan: Reviewed with pt how she can request further  support.  This author and other chaplains remain available per pt/family request.     Angel Navarro M.Div., [facility]  Staff     SHS available 24/7 for emergent requests/referrals, either by paging the on-call  or by entering an ASAP/STAT consult in [employer], which will also page the on-call .    Assessment    Saw pt Brissa Mott for an emotional support check-in.    Patient/Family Understanding of Illness and Goals of Care - Brissa shared that she continues to wait for the biopsy report to return.  In the meantime, she is focusing on healing from her surgery.    Distress and Loss - Brissa acknowledged that she was kept awake last night by worrisome thoughts about the biopsy and related concerns.    Strengths, Coping, and Resources -   She reported that family and friends are calling and encouraging her.   Brissa is listening to inspirational Jain music and \"talking to God.\"    Meaning, Beliefs, and Spirituality - Brissa is Jain but not affiliated with a bala community at this time.  She welcomed prayer and engaged in a few moments of guided prayerful breathing before praying.  "

## 2025-02-27 NOTE — PROGRESS NOTES
Notified that patient is coughing up a lot of sputum and requested something for it.   She is on 1 L nasal cannula previously was on 4 L.  Afebrile.  CT chest shows several solid pulmonary nodules bilaterally but no evidence of pneumonia.  Okay to treat symptomatically, Mucinex DM every 12 hours as needed ordered for her.

## 2025-02-28 LAB
GLUCOSE BLDC GLUCOMTR-MCNC: 117 MG/DL (ref 70–99)
GLUCOSE BLDC GLUCOMTR-MCNC: 119 MG/DL (ref 70–99)
GLUCOSE BLDC GLUCOMTR-MCNC: 141 MG/DL (ref 70–99)
GLUCOSE BLDC GLUCOMTR-MCNC: 145 MG/DL (ref 70–99)
GLUCOSE BLDC GLUCOMTR-MCNC: 157 MG/DL (ref 70–99)
PATH REPORT.ADDENDUM SPEC: ABNORMAL
PATH REPORT.COMMENTS IMP SPEC: ABNORMAL
PATH REPORT.COMMENTS IMP SPEC: YES
PATH REPORT.FINAL DX SPEC: ABNORMAL
PATH REPORT.GROSS SPEC: ABNORMAL
PATH REPORT.MICROSCOPIC SPEC OTHER STN: ABNORMAL
PATH REPORT.RELEVANT HX SPEC: ABNORMAL
PHOTO IMAGE: ABNORMAL

## 2025-02-28 PROCEDURE — 250N000013 HC RX MED GY IP 250 OP 250 PS 637

## 2025-02-28 PROCEDURE — 250N000013 HC RX MED GY IP 250 OP 250 PS 637: Performed by: INTERNAL MEDICINE

## 2025-02-28 PROCEDURE — 99233 SBSQ HOSP IP/OBS HIGH 50: CPT | Performed by: PHYSICIAN ASSISTANT

## 2025-02-28 PROCEDURE — G0463 HOSPITAL OUTPT CLINIC VISIT: HCPCS

## 2025-02-28 PROCEDURE — 250N000013 HC RX MED GY IP 250 OP 250 PS 637: Performed by: CLINICAL NURSE SPECIALIST

## 2025-02-28 PROCEDURE — 99232 SBSQ HOSP IP/OBS MODERATE 35: CPT | Performed by: CLINICAL NURSE SPECIALIST

## 2025-02-28 PROCEDURE — 250N000011 HC RX IP 250 OP 636: Performed by: STUDENT IN AN ORGANIZED HEALTH CARE EDUCATION/TRAINING PROGRAM

## 2025-02-28 PROCEDURE — 120N000001 HC R&B MED SURG/OB

## 2025-02-28 PROCEDURE — 99232 SBSQ HOSP IP/OBS MODERATE 35: CPT | Performed by: INTERNAL MEDICINE

## 2025-02-28 RX ADMIN — ACETAMINOPHEN 650 MG: 325 TABLET, FILM COATED ORAL at 01:25

## 2025-02-28 RX ADMIN — ENOXAPARIN SODIUM 40 MG: 40 INJECTION SUBCUTANEOUS at 20:14

## 2025-02-28 RX ADMIN — OXYCODONE HYDROCHLORIDE 5 MG: 5 TABLET ORAL at 02:20

## 2025-02-28 RX ADMIN — LIDOCAINE 3 PATCH: 4 PATCH TOPICAL at 20:09

## 2025-02-28 RX ADMIN — ENOXAPARIN SODIUM 40 MG: 40 INJECTION SUBCUTANEOUS at 08:11

## 2025-02-28 RX ADMIN — OXYCODONE HYDROCHLORIDE 5 MG: 5 TABLET ORAL at 11:28

## 2025-02-28 RX ADMIN — Medication 1 SPRAY: at 03:03

## 2025-02-28 RX ADMIN — ACETAMINOPHEN 650 MG: 325 TABLET, FILM COATED ORAL at 08:10

## 2025-02-28 RX ADMIN — ACETAMINOPHEN 650 MG: 325 TABLET, FILM COATED ORAL at 20:08

## 2025-02-28 RX ADMIN — METFORMIN HYDROCHLORIDE 1000 MG: 500 TABLET ORAL at 18:23

## 2025-02-28 RX ADMIN — METHOCARBAMOL 500 MG: 500 TABLET ORAL at 18:23

## 2025-02-28 RX ADMIN — FAMOTIDINE 20 MG: 20 TABLET, FILM COATED ORAL at 08:10

## 2025-02-28 RX ADMIN — OXYCODONE HYDROCHLORIDE 5 MG: 5 TABLET ORAL at 20:08

## 2025-02-28 RX ADMIN — METHOCARBAMOL 500 MG: 500 TABLET ORAL at 05:44

## 2025-02-28 RX ADMIN — MONTELUKAST 10 MG: 10 TABLET, FILM COATED ORAL at 22:18

## 2025-02-28 RX ADMIN — OXYCODONE HYDROCHLORIDE 5 MG: 5 TABLET ORAL at 14:22

## 2025-02-28 RX ADMIN — ATORVASTATIN CALCIUM 40 MG: 40 TABLET, FILM COATED ORAL at 08:09

## 2025-02-28 RX ADMIN — METHOCARBAMOL 500 MG: 500 TABLET ORAL at 01:25

## 2025-02-28 RX ADMIN — GABAPENTIN 300 MG: 300 CAPSULE ORAL at 08:09

## 2025-02-28 RX ADMIN — AMLODIPINE BESYLATE 10 MG: 10 TABLET ORAL at 08:09

## 2025-02-28 RX ADMIN — METFORMIN HYDROCHLORIDE 1000 MG: 500 TABLET ORAL at 08:09

## 2025-02-28 RX ADMIN — SERTRALINE HYDROCHLORIDE 150 MG: 100 TABLET ORAL at 20:08

## 2025-02-28 RX ADMIN — FAMOTIDINE 20 MG: 20 TABLET, FILM COATED ORAL at 20:08

## 2025-02-28 RX ADMIN — SENNOSIDES AND DOCUSATE SODIUM 1 TABLET: 50; 8.6 TABLET ORAL at 08:10

## 2025-02-28 RX ADMIN — IRBESARTAN 300 MG: 150 TABLET ORAL at 22:18

## 2025-02-28 RX ADMIN — POLYETHYLENE GLYCOL 3350 17 G: 17 POWDER, FOR SOLUTION ORAL at 08:10

## 2025-02-28 RX ADMIN — HYDROXYZINE HYDROCHLORIDE 25 MG: 25 TABLET, FILM COATED ORAL at 11:28

## 2025-02-28 RX ADMIN — METHOCARBAMOL 500 MG: 500 TABLET ORAL at 13:01

## 2025-02-28 RX ADMIN — OXYCODONE HYDROCHLORIDE 5 MG: 5 TABLET ORAL at 08:10

## 2025-02-28 RX ADMIN — ACETAMINOPHEN 650 MG: 325 TABLET, FILM COATED ORAL at 14:22

## 2025-02-28 RX ADMIN — GABAPENTIN 300 MG: 300 CAPSULE ORAL at 20:08

## 2025-02-28 ASSESSMENT — ACTIVITIES OF DAILY LIVING (ADL)
ADLS_ACUITY_SCORE: 51

## 2025-02-28 NOTE — PROGRESS NOTES
[facility] ACUTE PAIN SERVICE    ([facility], [facility], [facility], [facility])  Pain Progress Note    Assessment/Plan:  Brissa Mott is a 54 year old female who was admitted on 2/20/2025.  Pain Service is asked to see the patient for  assistance with cancer related pain.    Medical history significant for previous left-sided CVA, Type 2 diabetes, hypertension, morbid obesity on Ozempic, KATHRINE.     Recent hospitalization 1/30 to 2/4/25 at [facility] for right knee pain and inability to bear weight patient was discharged to TCU for rehab, she sustained a fall and was subsequently admitted here at [facility].    Admitted from transitional care unit with evaluation of severe hypercalcemia, distal right femur fracture found to have a lytic mass.  Orthopedic surgery was consulted with expanded workup for suspected metastatic malignancy with CT chest abdomen and pelvis which showed a 9.9 cm complex right adnexal cystic mass, multiple pulmonary nodules.     Patient is status post op day #2 open reduction internal fixation with intramedullary rodding of right hip fracture     Oncology Consulted: Right adnexal mass, elevated CA-125, metastatic disease with pulmonary nodules, hepatic lesions and lymphadenopathy, lytic lesions in humeral head, sacrum, iliac bone   Right femur fracture with intramedullary marrow replacing lesion.    Enlarged left axillary node plan for node biopsy   Biopsy completed right distal femur bone biopsy 2/24 results still pending     Gynecology Consulted with CA-125 elevated.           shows 5 total opioid prescriptions.  Most recently   02/11/25 oxycodone 10 mg tablets 2 for 1 day  02/09/25 oxycodone 10 mg tablets 14 for 2 days  02/04/25 oxycodone 10 mg tablets 13 for 2 days.   02/04/25 diazepam 5 mg tablets 9 for 2 days     Reviewed medication reconciliation 2/18/25 no changes on readmission 2/20/25.  Lidocaine patches, oxycodone 10 mg every four hours prn,   valium 5 mg every 6 hours prn, tylenol 1000 mg tid prn     Over the past 24 hours patient has received:  3(650) scheduled tylenol, 3(5mg) scheduled oxycodone, 3(500mg) scheduled Robaxin.         Subjective:  Pain under better control today.  Feeling overall more positive about things.  Planning to work with mobility more today.     Reviewed with RN.  Patient may benefit from IV dilaudid prior to movement since pain is so intense otherwise prn oxycodone is available as well.  Will have to trial and error to figure out best option for patient.      Continues with some urinary retention issues          PLAN:  Cancer related pain and acute post operative pain  Multimodal Medication Therapy:   Adjuvants: tylenol 975 mg every 6 hours scheduled with oxycodone Methocarbamol 500 mg every 6 hours gabapentin 300 mg bid  Opioids: Oxycodone 5 mg every 6 hours with additional  Oxycodone 5-10 mg every 3 hours prn, IV Dilaudid 0.2mg every 3 hours prn for severe pain not managed by oral pain medications  and may be helpful prior to repositioning working with PT  Non-medication interventions- Ice, heat prn  Constipation Prophylaxis- senna-S 1 tablet bid, miralax daily  Follow up /Discharge Recommendations - We recommend prescribing the following at the time of discharge:    May benefit from long acting opioids will continue to assess        <principal problem not specified>   Patient Active Problem List   Diagnosis    Morbid obesity (H)    Type 2 diabetes mellitus with hyperglycemia, with long-term current use of insulin (H)    KATHRINE (obstructive sleep apnea)    Secondary hypertension    Hemiplegia and hemiparesis following cerebral infarction affecting left non-dominant side (H)    Acute ischemic stroke (H)    Adjustment disorder with depressed mood    Dysphagia    Hyperlipidemia    Morbid obesity with BMI of 60.0-69.9, adult (H)    Obesity hypoventilation syndrome (H)    Reactive airway disease without complication    Type 2 diabetes  mellitus with complication, with long-term current use of insulin (H)    Hypercalcemia    Knee pain        History   Drug Use Unknown         Tobacco Use      Smoking status: Former      Smokeless tobacco: Never        Current Facility-Administered Medications   Medication Dose Route Frequency Provider Last Rate Last Admin    acetaminophen (TYLENOL) tablet 650 mg  650 mg Oral Q6H Myrtle Camarena APRN CNS   650 mg at 02/28/25 0125    amLODIPine (NORVASC) tablet 10 mg  10 mg Oral Daily Eren Montenegro MD   10 mg at 02/27/25 0709    atorvastatin (LIPITOR) tablet 40 mg  40 mg Oral Daily Eren Montenegro MD   40 mg at 02/27/25 0709    enoxaparin ANTICOAGULANT (LOVENOX) injection 40 mg  40 mg Subcutaneous Q12H Marjan Vasques PA-C   40 mg at 02/27/25 2204    famotidine (PEPCID) tablet 20 mg  20 mg Oral BID Luh Harper PA-C   20 mg at 02/27/25 2018    Or    famotidine (PEPCID) injection 20 mg  20 mg Intravenous BID Luh Harper PA-C        gabapentin (NEURONTIN) capsule 300 mg  300 mg Oral BID Myrtle Camarena APRN CNS   300 mg at 02/27/25 2018    insulin aspart (NovoLOG) injection (RAPID ACTING)  1-10 Units Subcutaneous TID  Mike Brumfield MD   1 Units at 02/27/25 1332    insulin aspart (NovoLOG) injection (RAPID ACTING)  1-7 Units Subcutaneous At Bedtime Mike Brumfield MD        insulin glargine (LANTUS PEN) injection 24 Units  24 Units Subcutaneous QAM  Mike Brumfield MD   24 Units at 02/27/25 0813    irbesartan (AVAPRO) tablet 300 mg  300 mg Oral At Bedtime Eren Montenegro MD   300 mg at 02/27/25 2201    Lidocaine (LIDOCARE) 4 % Patch 1-3 patch  1-3 patch Transdermal Q24h Myrtle Camarena APRN CNS   1 patch at 02/27/25 2024    metFORMIN (GLUCOPHAGE) tablet 1,000 mg  1,000 mg Oral BID w/meals Mike Brumfield MD   1,000 mg at 02/27/25 1744    methocarbamol (ROBAXIN) tablet 500 mg  500 mg Oral Q6H Myrtle Camarena, APRN CNS    500 mg at 02/28/25 0544    montelukast (SINGULAIR) tablet 10 mg  10 mg Oral At Bedtime Eren Montenegro MD   10 mg at 02/27/25 2202    oxyCODONE (ROXICODONE) tablet 5 mg  5 mg Oral Q6H Myrtle Camarena APRN CNS   5 mg at 02/28/25 0220    polyethylene glycol (MIRALAX) Packet 17 g  17 g Oral Daily Luh Harper PA-C   17 g at 02/27/25 0814    senna-docusate (SENOKOT-S/PERICOLACE) 8.6-50 MG per tablet 1 tablet  1 tablet Oral BID Luh Harper PA-C   1 tablet at 02/27/25 2017    sertraline (ZOLOFT) tablet 150 mg  150 mg Oral Daily Eren Montenegro MD   150 mg at 02/27/25 2018    sodium chloride (PF) 0.9% PF flush 3 mL  3 mL Intracatheter Q8H Luh Harper PA-C   3 mL at 02/28/25 0126    sodium chloride (PF) 0.9% PF flush 3 mL  3 mL Intracatheter Q8H Lilly Barrett MD   3 mL at 02/28/25 0126       Objective:  Vital signs in last 24 hours:  B/P: 148/76, T: 97.7, P: 85, R: 16   Blood pressure (!) 148/76, pulse 85, temperature 97.7  F (36.5  C), temperature source Temporal, resp. rate 16, weight 127.5 kg (281 lb 1.4 oz), SpO2 92%, not currently breastfeeding.      Weight:   Wt Readings from Last 2 Encounters:   02/25/25 127.5 kg (281 lb 1.4 oz)   02/18/25 127.5 kg (281 lb)         Intake/Output:    Intake/Output Summary (Last 24 hours) at 2/28/2025 0789  Last data filed at 2/27/2025 2247  Gross per 24 hour   Intake 350 ml   Output 2100 ml   Net -1750 ml        Review of Systems:   As per subjective, all others negative.    Physical Exam:     General Appearance:  Alert, cooperative, rests in bed   Head:  Normocephalic, without obvious abnormality, atraumatic   Eyes:  PERRL, conjunctiva/corneas clear, EOM's intact   ENT/Throat: Lips moist    Lymph/Neck: Supple, symmetrical, trachea midline   Lungs:   respirations unlabored   Abdomen:   Soft, non-tender, non distended   Musculoskeletal: Bilateral lower extremity edema, right leg with bandage covering incisions   Skin: Skin is intact    Neurologic: Alert and  oriented X 3, limited mobility         Imaging:  Personally Reviewed.    Results for orders placed or performed during the hospital encounter of 02/20/25   CT Chest/Abdomen/Pelvis w Contrast    Impression    IMPRESSION:  1.  Findings suspicious for metastatic disease. Enlarged left axillary lymph nodes, measuring up to 4.7 cm. Bilateral pulmonary nodules measuring up to 1.0 cm. Ill-defined hepatic lesions, measuring up to 2.9 cm. Right lower retroperitoneal 1.7 cm   nodule. Right pelvic lymphadenopathy, measuring up to 2.4 cm. Lytic lesions in the right humeral head, right sacrum, and left posterior iliac bone.    2.  Right adnexal 9.9 cm complex cystic mass, suspicious for malignancy, either metastatic or primary ovarian neoplasm.    3.  Left upper anterior chest wall 0.9 cm subcutaneous nodule and right gluteal 0.7 cm subcutaneous nodule, indeterminate and may be metastatic.   CT Bone Biopsy Superficial    Impression    IMPRESSION:  1.  Successful CT-guided biopsy of a soft tissue mass adjacent to the lytic lesion involving the distal right femur.    Reference CPT Codes: 81304, ,        Lab Results:  Personally Reviewed.   Last Comprehensive Metabolic Panel:  Sodium   Date Value Ref Range Status   02/22/2025 136 135 - 145 mmol/L Final   04/29/2021 137 133 - 144 mmol/L Final     Potassium   Date Value Ref Range Status   02/22/2025 4.0 3.4 - 5.3 mmol/L Final   06/07/2022 4.1 3.4 - 5.3 mmol/L Final   04/29/2021 3.9 3.4 - 5.3 mmol/L Final     Chloride   Date Value Ref Range Status   02/22/2025 103 98 - 107 mmol/L Final   06/07/2022 105 94 - 109 mmol/L Final   04/29/2021 104 94 - 109 mmol/L Final     Carbon Dioxide   Date Value Ref Range Status   04/29/2021 29 20 - 32 mmol/L Final     Carbon Dioxide (CO2)   Date Value Ref Range Status   02/22/2025 24 22 - 29 mmol/L Final   06/07/2022 26 20 - 32 mmol/L Final     Anion Gap   Date Value Ref Range Status   02/22/2025 9 7 - 15 mmol/L Final   06/07/2022 7 3 - 14 mmol/L Final  "  04/29/2021 4 3 - 14 mmol/L Final     Glucose   Date Value Ref Range Status   06/07/2022 156 (H) 70 - 99 mg/dL Final   04/29/2021 71 70 - 99 mg/dL Final     GLUCOSE BY METER POCT   Date Value Ref Range Status   02/28/2025 117 (H) 70 - 99 mg/dL Final     Urea Nitrogen   Date Value Ref Range Status   02/22/2025 6.7 6.0 - 20.0 mg/dL Final   06/07/2022 18 7 - 30 mg/dL Final   04/29/2021 22 7 - 30 mg/dL Final     Creatinine   Date Value Ref Range Status   02/23/2025 0.44 (L) 0.51 - 0.95 mg/dL Final   04/29/2021 0.75 0.52 - 1.04 mg/dL Final     GFR Estimate   Date Value Ref Range Status   02/23/2025 >90 >60 mL/min/1.73m2 Final     Comment:     eGFR calculated using 2021 CKD-EPI equation.   04/29/2021 >90 >60 mL/min/[1.73_m2] Final     Comment:     Non  GFR Calc  Starting 12/18/2018, serum creatinine based estimated GFR (eGFR) will be   calculated using the Chronic Kidney Disease Epidemiology Collaboration   (CKD-EPI) equation.       Calcium   Date Value Ref Range Status   02/22/2025 10.3 8.8 - 10.4 mg/dL Final   04/29/2021 8.7 8.5 - 10.1 mg/dL Final        UA: No results found for: \"UAMP\", \"UBARB\", \"BENZODIAZEUR\", \"UCANN\", \"UCOC\", \"OPIT\", \"UPCP\"           MANAGEMENT DISCUSSED with the following over the past 24 hours: RN   NOTE(S)/MEDICAL RECORDS REVIEWED over the past 24 hours: Nursing, Hospital Medicine, Hem/Onc, Spiritual Health  Medical complexity over the past 24 hours:  - Parenteral (IV) CONTROLLED SUBSTANCES ordered  - Prescription DRUG MANAGEMENT performed      RONNELL Hendricks, DNP CNS  Acute Inpatient Pain Team  M-F   Paging via [employer] or [facility]         "

## 2025-02-28 NOTE — CONSULTS
Care Management Initial Consult    General Information  Assessment completed with: Patient, Spouse or significant other,    Type of CM/SW Visit: Initial Assessment    Primary Care Provider verified and updated as needed: Yes   Readmission within the last 30 days: no previous admission in last 30 days      Reason for Consult: discharge planning  Advance Care Planning: Advance Care Planning Reviewed: no concerns identified        Communication Assessment  Patient's communication style: spoken language (English or Bilingual)    Hearing Difficulty or Deaf: no   Wear Glasses or Blind: yes    Cognitive  Cognitive/Neuro/Behavioral: WDL  Level of Consciousness: alert  Arousal Level: opens eyes spontaneously  Orientation: oriented x 4        Speech: clear, spontaneous    Living Environment:   People in home: spouse     Current living Arrangements: house      Able to return to prior arrangements: other (see comments)  Living Arrangement Comments: TBD    Family/Social Support:  Care provided by: self, spouse/significant other  Provides care for: no one  Marital Status:   Support system:           Description of Support System: Supportive, Involved    Support Assessment: Adequate family and caregiver support, Adequate social supports    Current Resources:   Patient receiving home care services: No     Community Resources: None  Equipment currently used at home: walker, standard, cane, straight, commode chair    Employment/Financial:  Employment Status:          Financial Concerns: none      Does the patient's insurance plan have a 3 day qualifying hospital stay waiver?  No    Lifestyle & Psychosocial Needs:  Social Drivers of Health     Food Insecurity: Low Risk  (2/20/2025)    Food Insecurity     Within the past 12 months, did you worry that your food would run out before you got money to buy more?: No     Within the past 12 months, did the food you bought just not last and you didn t have money to get more?: No    Depression: At risk (7/19/2024)    PHQ-2     PHQ-2 Score: 3   Housing Stability: Low Risk  (2/20/2025)    Housing Stability     Do you have housing? : Yes     Are you worried about losing your housing?: No   Tobacco Use: Medium Risk (2/25/2025)    Patient History     Smoking Tobacco Use: Former     Smokeless Tobacco Use: Never     Passive Exposure: Not on file   Financial Resource Strain: Low Risk  (2/20/2025)    Financial Resource Strain     Within the past 12 months, have you or your family members you live with been unable to get utilities (heat, electricity) when it was really needed?: No   Alcohol Use: Not on file   Transportation Needs: Low Risk  (2/20/2025)    Transportation Needs     Within the past 12 months, has lack of transportation kept you from medical appointments, getting your medicines, non-medical meetings or appointments, work, or from getting things that you need?: No   Physical Activity: Not on file   Interpersonal Safety: Low Risk  (2/25/2025)    Interpersonal Safety     Do you feel physically and emotionally safe where you currently live?: Yes     Within the past 12 months, have you been hit, slapped, kicked or otherwise physically hurt by someone?: No     Within the past 12 months, have you been humiliated or emotionally abused in other ways by your partner or ex-partner?: No   Stress: Not on file   Social Connections: Socially Integrated (1/31/2025)    [facility] from [employer] [facility]    Social Connections     Do you often feel lonely or isolated from those around you?: 0   Health Literacy: Not on file       Functional Status:  Prior to admission patient needed assistance:   Dependent ADLs:: Ambulation-walker, Ambulation-cane     Discussed  Partnership in Safe Discharge Planning  document with patient/family: No    Additional Information:  Care management consult for discharge planning/disposition. Patient transferred here from [facility] for concerns of  complicated right posterior femur fracture, hypercalcemia, concerning for metastatic malignancy. Patient being followed by pain, hem/onc, PT, OT.     Currently PT,OT recommendation is TCU. Patient is well below baseline currently requiring a lift and assist of 2 for transfers. She lives with her  in a split level home. Patient will be needing radiation and chemo that will not start until her hip is more healed. Patient to think over the weekend about TCU. She is aware that she will not be able to start chemo or radiation while in TCU.     Patient will not be ready for discharge over the weekend. Care management to follow up with patient on Monday for TCU choices as patient likely would not be able to discharge home immediately.     Care management to follow for discharge.    Next Steps: follow up with patient re: TCU    Pam Mcclain RN  Care Coordinator  [facility]

## 2025-02-28 NOTE — PLAN OF CARE
"Orientation: AAOx4, VSS  Pain: pain is managed with scheduled oxycodone and tylenol  O2: RA  GI/: Purewick in placed  Activity: A2 with lift  Diet: regular diet  Protocols: none  Major shift events: pt was retaining. Bladder scan 506. Straight cath patient. Output of 700. Post straight cath bladder scan 0.  Plan:continue with POC  Problem: Adult Inpatient Plan of Care  Goal: Plan of Care Review  Description: The Plan of Care Review/Shift note should be completed every shift.  The Outcome Evaluation is a brief statement about your assessment that the patient is improving, declining, or no change.  This information will be displayed automatically on your shift  note.  Outcome: Progressing  Flowsheets (Taken 2/27/2025 2302)  Plan of Care Reviewed With: patient  Overall Patient Progress: improving  Goal: Patient-Specific Goal (Individualized)  Description: You can add care plan individualizations to a care plan. Examples of Individualization might be:  \"Parent requests to be called daily at 9am for status\", \"I have a hard time hearing out of my right ear\", or \"Do not touch me to wake me up as it startles  me\".  Outcome: Progressing  Goal: Absence of Hospital-Acquired Illness or Injury  Outcome: Progressing  Intervention: Identify and Manage Fall Risk  Recent Flowsheet Documentation  Taken 2/27/2025 2015 by Asher Zhang RN  Safety Promotion/Fall Prevention: activity supervised  Intervention: Prevent Skin Injury  Recent Flowsheet Documentation  Taken 2/27/2025 2015 by Asher Zhang RN  Body Position: supine, head elevated  Intervention: Prevent and Manage VTE (Venous Thromboembolism) Risk  Recent Flowsheet Documentation  Taken 2/27/2025 2015 by Asher Zhang RN  VTE Prevention/Management: SCDs on (sequential compression devices)  Intervention: Prevent Infection  Recent Flowsheet Documentation  Taken 2/27/2025 2015 by Asher Zhang RN  Infection Prevention: hand hygiene promoted  Goal: Optimal Comfort and " Wellbeing  Outcome: Progressing  Goal: Readiness for Transition of Care  Outcome: Progressing     Problem: Hip Fracture Medical Management  Goal: Optimal Coping with Change in Health Status  Outcome: Progressing  Intervention: Support Psychosocial Response to Injury  Recent Flowsheet Documentation  Taken 2/27/2025 2015 by Asher Zhang RN  Supportive Measures:   active listening utilized   goal-setting facilitated   positive reinforcement provided   self-care encouraged  Goal: Absence of Bleeding  Outcome: Progressing  Goal: Effective Bowel Elimination  Outcome: Progressing  Goal: Baseline Cognitive Function Maintained  Outcome: Progressing  Intervention: Maximize Cognitive Function  Recent Flowsheet Documentation  Taken 2/27/2025 2015 by Asher Zhang RN  Reorientation Measures: clock in view  Goal: Absence of Embolism  Outcome: Progressing  Intervention: Prevent or Manage Embolism Risk  Recent Flowsheet Documentation  Taken 2/27/2025 2015 by Asher Zhang RN  VTE Prevention/Management: SCDs on (sequential compression devices)  Goal: Fracture Stability  Outcome: Progressing  Goal: Optimal Functional Performance  Outcome: Progressing  Intervention: Promote Optimal Functional Status  Recent Flowsheet Documentation  Taken 2/27/2025 2015 by Asher Zhang RN  Activity Management: activity encouraged  Goal: Pain Control and Function  Outcome: Progressing  Goal: Effective Urinary Elimination  Outcome: Progressing     Problem: Skin Injury Risk Increased  Goal: Skin Health and Integrity  Outcome: Progressing  Intervention: Plan: Nurse Driven Intervention: Friction and Shear  Recent Flowsheet Documentation  Taken 2/27/2025 2015 by Asher Zhang RN  Friction/Shear Interventions: HOB 30 degrees or less  Intervention: Optimize Skin Protection  Recent Flowsheet Documentation  Taken 2/27/2025 2015 by Asher Zhang RN  Activity Management: activity encouraged  Head of Bed (HOB) Positioning: HOB at 20-30 degrees      Problem: Pain Acute  Goal: Optimal Pain Control and Function  Outcome: Progressing  Intervention: Optimize Psychosocial Wellbeing  Recent Flowsheet Documentation  Taken 2/27/2025 2015 by Asher Zhang RN  Supportive Measures:   active listening utilized   goal-setting facilitated   positive reinforcement provided   self-care encouraged  Diversional Activities: television     Problem: Comorbidity Management  Goal: Blood Glucose Levels Within Targeted Range  Outcome: Progressing  Goal: Blood Pressure in Desired Range  Outcome: Progressing   Goal Outcome Evaluation:      Plan of Care Reviewed With: patient    Overall Patient Progress: improvingOverall Patient Progress: improving

## 2025-02-28 NOTE — CONSULTS
RADIATION ONCOLOGY INPATIENT CONSULTATION    DIAGNOSIS: Metastatic neuroendocrine carcinoma            HISTORY OF PRESENT ILLNESS:    Briefly, this is a 54-year-old woman admitted with pathologic fracture of the distal right femur status post open reduction internal fixation with insertion of intramedullary nail on 2025.  Biopsy from that procedure demonstrated poorly differentiated carcinoma with neuroendocrine features. X-rays and CT of the right knee from day of admission on 2025 demonstrated infiltrative appearing lucency and marrow replacing lesion measuring 7.5 cm craniocaudally with a 3.8 cm extraosseous component as well as a nondisplaced pathologic fracture of the distal femur. CT CAP from 2025 showed multiple osseous lesions, lymphadenopathy in the left axilla, bilateral pulmonary nodules, multiple hepatic lesions, right adnexal mass, and other soft tissue nodules concerning for metastatic disease.    SUBJECTIVE/REVIEW OF SYSTEMS:    On interview today, the patient endorses improvement in her pain with her current medication regimen.  She is not having any pain at rest but still endorses significant pain with movement.  She slept well last night and feels relatively well rested.  She asked a number of appropriate questions regarding her diagnosis as well as her prognosis that I answered to my best extent given the information available.    PAST MEDICAL HISTORY:  Past Medical History:   Diagnosis Date    Asthma     Cerebral infarction (H)     Diabetes (H)     Hypertension     Mixed hyperlipidemia     Morbid obesity (H)     KATHRINE (obstructive sleep apnea)     Osteoarthritis        PAST SURGICAL HISTORY:  Past Surgical History:   Procedure Laterality Date     SECTION      x2    INSERTION, INTRAMEDULLARY JAMES, RETROGRADE, FOR FEMORAL SHAFT FRACTURE Right 2025    Procedure: Open reduction internal fixation intramedullary rodding right hip fracture;  Surgeon:  Lazarus Meredith MD;  Location:  OR       MEDICATIONS:   Current Facility-Administered Medications   Medication Dose Route Frequency Provider Last Rate Last Admin    acetaminophen (TYLENOL) tablet 650 mg  650 mg Oral Q4H PRN Lilly Barrett MD   650 mg at 02/25/25 0012    Or    acetaminophen (TYLENOL) Suppository 650 mg  650 mg Rectal Q4H PRN Lilly Barrett MD        acetaminophen (TYLENOL) tablet 650 mg  650 mg Oral Q6H Myrtle Camarena APRN CNS   650 mg at 02/28/25 1422    albuterol (PROVENTIL HFA/VENTOLIN HFA) inhaler  2 puff Inhalation Q6H PRN Eren Montenegro MD        amLODIPine (NORVASC) tablet 10 mg  10 mg Oral Daily Eren Montenegro MD   10 mg at 02/28/25 0809    artificial saliva (BIOTENE MT) solution 1 spray  1 spray Swish & Spit 4x Daily PRN Shiela Austin MD   1 spray at 02/28/25 0303    atorvastatin (LIPITOR) tablet 40 mg  40 mg Oral Daily Eren Montenegro MD   40 mg at 02/28/25 0809    benzocaine-menthol (CHLORASEPTIC) 6-10 MG lozenge 1 lozenge  1 lozenge Buccal Q1H PRN Luh Harper PA-C   1 lozenge at 02/26/25 0200    bisacodyl (DULCOLAX) suppository 10 mg  10 mg Rectal Daily PRN Luh Harper PA-C        calcium carbonate (TUMS) chewable tablet 1,000 mg  1,000 mg Oral 4x Daily PRN Lilly Barrett MD        dextromethorphan-guaiFENesin (MUCINEX DM)  MG per 12 hr tablet 1 tablet  1 tablet Oral BID PRN Dotty Meyers MD   1 tablet at 02/27/25 0226    glucose gel 15-30 g  15-30 g Oral Q15 Min PRN Lilly Barrett MD        Or    dextrose 50 % injection 25-50 mL  25-50 mL Intravenous Q15 Min PRN Lilly Barrett MD        Or    glucagon injection 1 mg  1 mg Subcutaneous Q15 Min PRN Lilly Barrett MD        enoxaparin ANTICOAGULANT (LOVENOX) injection 40 mg  40 mg Subcutaneous Q12H Marjan Vasques PA-C   40 mg at 02/28/25 0811    famotidine (PEPCID) tablet 20 mg  20 mg Oral BID Luh Harper PA-C   20 mg at 02/28/25 0810    Or    famotidine (PEPCID) injection 20  mg  20 mg Intravenous BID Luh Harper PA-C        gabapentin (NEURONTIN) capsule 300 mg  300 mg Oral BID Myrtle Camarena APRN CNS   300 mg at 02/28/25 0809    HYDROmorphone (DILAUDID) injection 0.2 mg  0.2 mg Intravenous Q3H PRN Myrtle Camarena APRN CNS        hydrOXYzine HCl (ATARAX) tablet 25 mg  25 mg Oral Q6H PRN Luh Harper PA-C   25 mg at 02/28/25 1128    insulin aspart (NovoLOG) injection (RAPID ACTING)  1-10 Units Subcutaneous TID AC Mike Brumfield MD   1 Units at 02/27/25 1332    insulin aspart (NovoLOG) injection (RAPID ACTING)  1-7 Units Subcutaneous At Bedtime Mike Brumfield MD        insulin glargine (LANTUS PEN) injection 24 Units  24 Units Subcutaneous QAM AC Mike Brumfield MD   24 Units at 02/28/25 0817    irbesartan (AVAPRO) tablet 300 mg  300 mg Oral At Bedtime Eren Montenegro MD   300 mg at 02/27/25 2201    Lidocaine (LIDOCARE) 4 % Patch 1-3 patch  1-3 patch Transdermal Q24h Myrtle Camarena APRN CNS   1 patch at 02/27/25 2024    lidocaine (LMX4) cream   Topical Q1H PRN Lilly Barrett MD        lidocaine 1 % 0.1-1 mL  0.1-1 mL Other Q1H PRN Lilly Barrett MD        magnesium hydroxide (MILK OF MAGNESIA) suspension 30 mL  30 mL Oral Daily PRN Luh Harper PA-C        melatonin tablet 5 mg  5 mg Oral At Bedtime PRN Lilly Barrett MD        metFORMIN (GLUCOPHAGE) tablet 1,000 mg  1,000 mg Oral BID w/meals Mike Brumfield MD   1,000 mg at 02/28/25 0809    methocarbamol (ROBAXIN) tablet 500 mg  500 mg Oral Q6H Myrtle Camarena APRN CNS   500 mg at 02/28/25 1301    miconazole (MICATIN) 2 % powder   Topical BID PRN Lilly Barrett MD        montelukast (SINGULAIR) tablet 10 mg  10 mg Oral At Bedtime Eren Montenegro MD   10 mg at 02/27/25 2202    naloxone (NARCAN) injection 0.2 mg  0.2 mg Intravenous Q2 Min PRN Chilo Adkins MD        Or    naloxone (NARCAN) injection 0.4 mg  0.4 mg Intravenous Q2 Min PRN Jed  MD Chilo        Or    naloxone (NARCAN) injection 0.2 mg  0.2 mg Intramuscular Q2 Min PRN Chilo Adkins MD        Or    naloxone (NARCAN) injection 0.4 mg  0.4 mg Intramuscular Q2 Min PRN Chilo Adkins MD        ondansetron (ZOFRAN ODT) ODT tab 4 mg  4 mg Oral Q6H PRN Luh Harper PA-C        Or    ondansetron (ZOFRAN) injection 4 mg  4 mg Intravenous Q6H PRN Luh Harper PA-C        oxyCODONE (ROXICODONE) tablet 5 mg  5 mg Oral Q6H Myrtle Camarena, APRMARY LOU CNS   5 mg at 02/28/25 1422    oxyCODONE (ROXICODONE) tablet 5 mg  5 mg Oral Q3H PRN Myrtle Camarena APRN CNS   5 mg at 02/28/25 1128    Or    oxyCODONE IR (ROXICODONE) tablet 10 mg  10 mg Oral Q3H PRN Myrtle Camarena, APRN CNS        polyethylene glycol (MIRALAX) Packet 17 g  17 g Oral BID PRN Mike Brumfield MD        polyethylene glycol (MIRALAX) Packet 17 g  17 g Oral Daily Luh Harper PA-C   17 g at 02/28/25 0810    prochlorperazine (COMPAZINE) injection 10 mg  10 mg Intravenous Q6H PRN Luh Harper PA-C        Or    prochlorperazine (COMPAZINE) tablet 10 mg  10 mg Oral Q6H PRN Luh Harper PA-C        senna-docusate (SENOKOT-S/PERICOLACE) 8.6-50 MG per tablet 1 tablet  1 tablet Oral BID Luh Harper PA-C   1 tablet at 02/28/25 0810    senna-docusate (SENOKOT-S/PERICOLACE) 8.6-50 MG per tablet 1 tablet  1 tablet Oral BID PRN Lilly Barrett MD   1 tablet at 02/24/25 0153    Or    senna-docusate (SENOKOT-S/PERICOLACE) 8.6-50 MG per tablet 2 tablet  2 tablet Oral BID PRN Lilly Barrett MD        sertraline (ZOLOFT) tablet 150 mg  150 mg Oral Daily Eren Montenegro MD   150 mg at 02/27/25 2018    sodium chloride (PF) 0.9% PF flush 3 mL  3 mL Intracatheter Q8H Luh aHrper PA-C   3 mL at 02/28/25 0126    sodium chloride (PF) 0.9% PF flush 3 mL  3 mL Intracatheter q1 min prn Luh Harper PA-C        sodium chloride (PF) 0.9% PF flush 3 mL  3 mL Intracatheter Q8H Lilly Barrett MD   3 mL at 02/28/25 0815    sodium  chloride (PF) 0.9% PF flush 3 mL  3 mL Intracatheter q1 min prn Lilly Barrett MD           ALLERGIES:   Allergies   Allergen Reactions    Bydureon [Exenatide] Diarrhea    Penicillins Rash    Sulfa Antibiotics Rash       FAMILY HISTORY:   History reviewed. No pertinent family history.    SOCIAL HISTORY:   Social History     Socioeconomic History    Marital status:      Spouse name: Not on file    Number of children: Not on file    Years of education: Not on file    Highest education level: Not on file   Occupational History    Not on file   Tobacco Use    Smoking status: Former    Smokeless tobacco: Never   Vaping Use    Vaping status: Never Used   Substance and Sexual Activity    Alcohol use: Not Currently    Drug use: Never    Sexual activity: Not Currently     Partners: Female   Other Topics Concern    Not on file   Social History Narrative    Not on file     Social Drivers of Health     Financial Resource Strain: Low Risk  (2/20/2025)    Financial Resource Strain     Within the past 12 months, have you or your family members you live with been unable to get utilities (heat, electricity) when it was really needed?: No   Food Insecurity: Low Risk  (2/20/2025)    Food Insecurity     Within the past 12 months, did you worry that your food would run out before you got money to buy more?: No     Within the past 12 months, did the food you bought just not last and you didn t have money to get more?: No   Transportation Needs: Low Risk  (2/20/2025)    Transportation Needs     Within the past 12 months, has lack of transportation kept you from medical appointments, getting your medicines, non-medical meetings or appointments, work, or from getting things that you need?: No   Physical Activity: Not on file   Stress: Not on file   Social Connections: Socially Integrated (1/31/2025)    [facility]    Social Connections     Do you often feel lonely or isolated from those  around you?: 0   Interpersonal Safety: Low Risk  (2025)    Interpersonal Safety     Do you feel physically and emotionally safe where you currently live?: Yes     Within the past 12 months, have you been hit, slapped, kicked or otherwise physically hurt by someone?: No     Within the past 12 months, have you been humiliated or emotionally abused in other ways by your partner or ex-partner?: No   Housing Stability: Low Risk  (2025)    Housing Stability     Do you have housing? : Yes     Are you worried about losing your housing?: No       CARDIAC IMPLANTABLE ELECTRONIC DEVICE: None.    PREGNANCY STATUS: Post-menopausal    PRIOR RADIATION THERAPY: None    PHYSICAL EXAM:   General: Lying comfortably in no acute distress in hospital bed.    IMAGING, LABORATORY STUDIES, AND PATHOLOGY:  Relevant studies reviewed as mentioned above in the HPI.        ECO    ASSESSMENT/PLAN:      Brissa Mott is a 54 year old female with metastatic neuroendocrine carcinoma who is admitted and seen by Radiation Oncology for an inpatient consultation for further evaluation and management regarding radiotherapy.    I reviewed recommendations for palliative radiotherapy directed to the right femur for local control and pain improvement approximately 2-3 weeks postoperatively.  I discussed that the exact fractionation would depend on her treating physician but likely would be a treatment over 1-2 weeks.      We discussed the logistics of radiation simulation, planning, and daily treatment. We also reviewed the acute and late toxicities associated with treatment including fatigue, pain flare, wound healing complications, dermatitis.  The patient displayed understanding of the risks and benefits.     Given that the patient lives much closer to the [place] [facility] [place], I discussed that she likely could receive outpatient treatment closer to home should she be discharged.  Should the patient remain admitted, I would be  more than happy to treat her. Radiation oncology will sign off.  Should the patient remain admitted in 2 weeks, please place another referral for palliative treatment, otherwise I would recommend referral to [place] at the time of discharge.    I spent 15 minutes reviewing imaging, test results and other medical records, 20 minutes face to face with the patient.    Braulio Huerta MD  [facility] - [facility]

## 2025-02-28 NOTE — PLAN OF CARE
"Goal Outcome Evaluation:      Plan of Care Reviewed With: patient    Overall Patient Progress: improvingOverall Patient Progress: improving    Outcome Evaluation: Patient alert and oriented. VSS on room air. Minimal pain at rest, high pain with movement. Up to chair x 1 today via lift. Refused for dinner/breakfast. Voiding. Soft Bms x 2 today. CMS in tact. Scant drainage on dressing. Sacrum wound - woc saw today, dressing CDI. Plans pending.        Problem: Adult Inpatient Plan of Care  Goal: Plan of Care Review  Description: The Plan of Care Review/Shift note should be completed every shift.  The Outcome Evaluation is a brief statement about your assessment that the patient is improving, declining, or no change.  This information will be displayed automatically on your shift  note.  Outcome: Progressing  Flowsheets (Taken 2/28/2025 1757)  Outcome Evaluation: Patient alert and oriented.  Plan of Care Reviewed With: patient  Overall Patient Progress: improving  Goal: Patient-Specific Goal (Individualized)  Description: You can add care plan individualizations to a care plan. Examples of Individualization might be:  \"Parent requests to be called daily at 9am for status\", \"I have a hard time hearing out of my right ear\", or \"Do not touch me to wake me up as it startles  me\".  Outcome: Progressing  Goal: Absence of Hospital-Acquired Illness or Injury  Outcome: Progressing  Intervention: Identify and Manage Fall Risk  Recent Flowsheet Documentation  Taken 2/28/2025 0818 by Elida Aguayo, RN  Safety Promotion/Fall Prevention:   activity supervised   assistive device/personal items within reach   mobility aid in reach   nonskid shoes/slippers when out of bed   patient and family education   safety round/check completed  Intervention: Prevent Skin Injury  Recent Flowsheet Documentation  Taken 2/28/2025 0818 by Elida Aguayo, RN  Body Position: position changed independently  Intervention: Prevent and Manage VTE (Venous " Thromboembolism) Risk  Recent Flowsheet Documentation  Taken 2/28/2025 0818 by Elida Aguayo RN  VTE Prevention/Management: (anticoagulation therapy) SCDs off (sequential compression devices)  Intervention: Prevent Infection  Recent Flowsheet Documentation  Taken 2/28/2025 0818 by Elida Aguayo RN  Infection Prevention:   rest/sleep promoted   single patient room provided  Goal: Optimal Comfort and Wellbeing  Outcome: Progressing  Intervention: Monitor Pain and Promote Comfort  Recent Flowsheet Documentation  Taken 2/28/2025 0808 by Elida Aguayo RN  Pain Management Interventions: medication (see MAR)  Goal: Readiness for Transition of Care  Outcome: Progressing     Problem: Hip Fracture Medical Management  Goal: Optimal Coping with Change in Health Status  Outcome: Progressing  Intervention: Support Psychosocial Response to Injury  Recent Flowsheet Documentation  Taken 2/28/2025 0818 by Elida Aguayo RN  Supportive Measures:   active listening utilized   goal-setting facilitated   verbalization of feelings encouraged  Goal: Absence of Bleeding  Outcome: Progressing  Intervention: Monitor and Manage Bleeding  Recent Flowsheet Documentation  Taken 2/28/2025 0818 by Elida Aguayo RN  Bleeding Management: dressing monitored  Goal: Effective Bowel Elimination  Outcome: Progressing  Goal: Baseline Cognitive Function Maintained  Outcome: Progressing  Goal: Absence of Embolism  Outcome: Progressing  Intervention: Prevent or Manage Embolism Risk  Recent Flowsheet Documentation  Taken 2/28/2025 0818 by Elida Aguayo RN  VTE Prevention/Management: (anticoagulation therapy) SCDs off (sequential compression devices)  Goal: Fracture Stability  Outcome: Progressing  Intervention: Promote Fracture Stability and Healing  Recent Flowsheet Documentation  Taken 2/28/2025 0818 by Elida Aguayo RN  Equipment: ice  Goal: Optimal Functional Performance  Outcome: Progressing  Intervention: Promote Optimal Functional  Status  Recent Flowsheet Documentation  Taken 2/28/2025 1227 by Elida Aguayo RN  Activity Management:   up to bedside commode   up in chair  Taken 2/28/2025 0818 by Elida Aguayo RN  Range of Motion: active ROM (range of motion) encouraged  Activity Management:   activity encouraged   activity adjusted per tolerance  Goal: Pain Control and Function  Outcome: Progressing  Intervention: Manage Acute Orthopaedic-Related Pain  Recent Flowsheet Documentation  Taken 2/28/2025 0808 by Elida Aguayo RN  Pain Management Interventions: medication (see MAR)  Goal: Effective Urinary Elimination  Outcome: Progressing     Problem: Skin Injury Risk Increased  Goal: Skin Health and Integrity  Outcome: Progressing  Intervention: Plan: Nurse Driven Intervention: Moisture Management  Recent Flowsheet Documentation  Taken 2/28/2025 1227 by Elida Aguayo RN  Bathing/Skin Care: incontinence care  Taken 2/28/2025 0818 by Elida Aguayo RN  Moisture Interventions:   Encourage regular toileting   Urinary collection device  Intervention: Plan: Nurse Driven Intervention: Friction and Shear  Recent Flowsheet Documentation  Taken 2/28/2025 0818 by Elida Aguayo RN  Friction/Shear Interventions: HOB 30 degrees or less  Intervention: Optimize Skin Protection  Recent Flowsheet Documentation  Taken 2/28/2025 1227 by Elida Aguayo RN  Activity Management:   up to bedside commode   up in chair  Taken 2/28/2025 0818 by Elida Aguayo RN  Activity Management:   activity encouraged   activity adjusted per tolerance  Head of Bed (HOB) Positioning: HOB at 20-30 degrees     Problem: Pain Acute  Goal: Optimal Pain Control and Function  Outcome: Progressing  Intervention: Optimize Psychosocial Wellbeing  Recent Flowsheet Documentation  Taken 2/28/2025 0818 by Elida Aguayo RN  Supportive Measures:   active listening utilized   goal-setting facilitated   verbalization of feelings encouraged  Intervention: Develop Pain Management  Plan  Recent Flowsheet Documentation  Taken 2/28/2025 0808 by Elida Aguayo RN  Pain Management Interventions: medication (see MAR)  Intervention: Prevent or Manage Pain  Recent Flowsheet Documentation  Taken 2/28/2025 0818 by Elida Aguayo RN  Medication Review/Management: medications reviewed     Problem: Comorbidity Management  Goal: Blood Glucose Levels Within Targeted Range  Outcome: Progressing  Intervention: Monitor and Manage Glycemia  Recent Flowsheet Documentation  Taken 2/28/2025 0818 by Elida Aguayo RN  Medication Review/Management: medications reviewed  Goal: Blood Pressure in Desired Range  Outcome: Progressing  Intervention: Maintain Blood Pressure Management  Recent Flowsheet Documentation  Taken 2/28/2025 0818 by Elida Aguayo RN  Medication Review/Management: medications reviewed

## 2025-02-28 NOTE — CONSULTS
[facility] Nurse Inpatient Assessment     Consulted for: Buttock    Summary: Patient with new pressure injury.  Patient has been mostly laying on back due to pain but is willing to lay on sides now after education.    [facility] nurse follow-up plan: twice weekly    Patient History (according to provider note(s):      Brissa Mott is a 54 year old female with past medical history including previous left-sided CVA, type 2 diabetes, hypertension, morbid obesity on Ozempic, KATHRINE, admitted on 2/20/2025 from transitional care unit with severe hypercalcemia and a distal right femur fracture found to have a lytic mass.     Assessment:      Areas visualized during today's visit: Focused:    Pressure Injury Location: Sacrococcygeal to right buttock  Last photo: 2/28/25    Wound type: Pressure Injury     Pressure Injury Stage: either Stage 2 or 3 deferring definitive staging until wound base has evolved, hospital acquired   Wound history/plan of care:   Patient admitted with femur fracture but was unable to have surgery until 2/25/25 due to a femur mass.   Patient reports she has been laying mostly on her back before and after surgery due to pain.    Wound base: Sacral area covered by dry drainage and fibrin/slough.  Edges of dry drainage is lifted in several areas with pink superficial areas noted underneath.  Area extending from below sacrum to right buttock with dermis.        Palpation of the wound bed: normal      Drainage: moderate     Description of drainage: serosanguinous     Measurements (length x width x depth, in cm) 5.5  x 2  x  0.1 cm      Tunneling N/A     Undermining N/A  Periwound skin: Erythema- blanchable      Color: pink      Temperature: normal   Odor: none  Pain: mild  Pain intervention prior to dressing change: slow and gentle cares   Treatment goal: Heal  and Protection  STATUS: initial assessment  Supplies ordered: gathered    My PI Risk Assessment     Sensory Perception: 3 -  "Slightly Limited     Moisture: 3 - Occasionally moist      Activity: 2 - Chairfast     Mobility: 2 - Very limited     Nutrition: 2 - Probably inadequate      Friction/Shear: 2 - Potential problem      TOTAL: 14       Treatment Plan:     Sacrum wound(s): Every other day  Cleanse with wound cleanser and dry  Apply Aquacel Ag (cut to fit) to top area of wound  Cover wound with Mepilex sacral  No briefs in bed  Sidelying in bed    Pressure Injury Prevention (PIP) Plan:  If patient is declining pressure injury prevention interventions: Explore reason why and address patient's concerns, Educate on pressure injury risk and prevention intervention(s), If patient is still declining, document \"informed refusal\" , and Ensure Care team is aware ( provider, charge nurse, etc)  Mattress: Follow bed algorithm, add Low Air Loss (Air+) mattress pump if skin is very moist or constantly moist.   HOB: Maintain at or below 30 degrees, unless contraindicated  Repositioning in bed: Every 1-2 hours  and Left/right positioning; avoid supine  Heels: Pillows under calves  Protective Dressing: Sacral Mepilex for prevention (#[id number]),  especially for the agitated patient   Chair positioning:  bariatric chair cushion    If patient has a buttock pressure injury, or high risk for PI use chair cushion or SPS.  Moisture Management: Avoid brief in bed  Under Devices: Inspect skin under all medical devices during skin inspection , Ensure tubes are stabilized without tension, and Ensure patient is not lying on medical devices or equipment when repositioned  Ask provider to discontinue device when no longer needed.       Orders: Written    RECOMMEND PRIMARY TEAM ORDER: None, at this time  Education provided: importance of repositioning, plan of care, and Off-loading pressure  Discussed plan of care with: Patient and Nurse  Notify C if wound(s) deteriorate.  Nursing to notify the Provider(s) and re-consult the WOC Nurse if new skin concern.    DATA: "     Current support surface: Standard  Low air loss (ELODIA pump, Isolibrium, Pulsate)  Containment of urine/stool: Incontinence Protocol and Suction based external urinary catheter   BMI: Body mass index is 48.25 kg/m .   Active diet order: Orders Placed This Encounter      Advance Diet as Tolerated: Regular Diet Adult     Output: I/O last 3 completed shifts:  In: 250 [P.O.:250]  Out: 2050 [Urine:2050]     Labs:   Recent Labs   Lab 02/27/25  0952 02/25/25  0600 02/22/25  0718   HGB 8.6*   < > 10.4*   WBC  --   --  8.8    < > = values in this interval not displayed.     Pressure injury risk assessment:   Sensory Perception: 4-->no impairment  Moisture: 3-->occasionally moist  Activity: 1-->bedfast  Mobility: 2-->very limited  Nutrition: 3-->adequate  Friction and Shear: 2-->potential problem  Dominic Score: 15    Triston Crocker RN CWOCN  Contact Via [facility] Nurse (Fe)  Dept. Office Number: [phone]

## 2025-02-28 NOTE — PROGRESS NOTES
[facility]  Hospitalist Progress Note  Mike Brumfield MD 02/28/2025    Reason for Stay (Diagnosis): Pathologic fracture         Assessment and Plan:      Summary of Stay: Brissa Mott is a 54 year old female with past medical history including previous left-sided CVA, type 2 diabetes, hypertension, morbid obesity on Ozempic, KATHRINE, admitted on 2/20/2025 from transitional care unit with severe hypercalcemia and a distal right femur fracture found to have a lytic mass.    Orthopedic surgery was consulted and we expanded workup for suspected metastatic malignancy with CT chest abdomen and pelvis which showed a 9.9 cm complex right adnexal cystic mass, multiple pulmonary nodules.  Oncology was consulted as well.    Hypercalcemia normalized from 14.7 with IV fluids.  She underwent an IR biopsy of her distal right femur pathologic fracture 2/24 to help guide further management.      She was taken to the OR for ORIF of her pathologic fracture on 2/25.    Biopsy results pending.    Problem List/Assessment and Plan:   Severe hypercalcemia of malignancy with apparent pathologic distal right femur fracture, also noted to have 9.9 cm complex right adnexal cystic mass, pulmonary nodules: Highly concerning for metastatic malignancy.  Oncology, orthopedic surgery both following.  --IR biopsy of pathologic fracture 2/24  --Await biopsy results  --to OR w/ Orthopedics on 2/25 for ORIF  --Oncology following, await pathology results  -- Pain control with Tylenol, Robaxin, oxycodone, Dilaudid and hydroxyzine.  Added gabapentin.  --No BM yet, on a good bowel regimen.  Added extra prn miralax.  --pain management consulted  -- PT consult    2.   Type 2 diabetes mellitus: Continue SSI.  Ozempic on hold.  Hypoglycemia protocol.  Home twice daily insulin mix on hold.  -- Added Lantus on 2/26.  24 units daily.  -- SSI high scale  -- Trend, titrate as needed.  Might need to add some mealtime insulin soon.  --added back  "metformin (home med)    3.   History of hypertension: Currently on amlodipine 10 mg, irbesartan 300 mg    4.   Depression: Continue sertraline 150 mg daily    5.  Social: Completed [employer] paperwork on 2/26 for her .    6.  Acute blood loss anemia: Recheck.  Infuse for hemoglobin greater than 7.      DVT Prophylaxis: Enoxaparin  Code Status: Full Code  Medically Ready for Discharge: Anticipated in 5+ Days      Clinically Significant Risk Factors               # Hypoalbuminemia: Lowest albumin = 3.1 g/dL at 2/21/2025  7:26 AM, will monitor as appropriate     # Hypertension: Noted on problem list            # Severe Obesity: Estimated body mass index is 48.25 kg/m  as calculated from the following:    Height as of 2/18/25: 1.626 m (5' 4\").    Weight as of this encounter: 127.5 kg (281 lb 1.4 oz).      # Asthma: noted on problem list              Interval History (Subjective):        Glucose control better  Pathology still pending this morning  Pain control better                    Physical Exam:      Last Vital Signs:  BP (!) 148/76 (BP Location: Right arm)   Pulse 85   Temp 97.7  F (36.5  C) (Temporal)   Resp 16   Wt 127.5 kg (281 lb 1.4 oz)   LMP  (LMP Unknown)   SpO2 92%   BMI 48.25 kg/m      I/O last 3 completed shifts:  In: 350 [P.O.:350]  Out: 2100 [Urine:2100]    General: Alert, awake, no acute distress.  No sedation.  HEENT: NC/AT, eyes anicteric, external occular movements intact, face symmetric.  Cardiac: RRR, S1, S2.  No murmurs appreciated.  Pulmonary: Normal chest rise, normal work of breathing.  Lungs CTA BL  Abdomen: soft, non-tender, non-distended.  Bowel Sounds Present.  No guarding.  Extremities: Some mild swelling around distal right thigh no deformities.  Warm, well perfused.  Skin: no rashes or lesions noted.  Warm and Dry.  Neuro: No focal deficits noted.  Speech clear.  Coordination and strength grossly normal.  Psych: Appropriate affect.         Medications:      All current " medications were reviewed with changes reflected in problem list.         Data:      All new lab and imaging data was reviewed.   Labs:  Recent Labs   Lab 02/28/25  0738 02/23/25  0742 02/23/25  0705 02/22/25  0833 02/22/25  0718   NA  --   --   --   --  136   POTASSIUM  --   --   --   --  4.0   CHLORIDE  --   --   --   --  103   CO2  --   --   --   --  24   ANIONGAP  --   --   --   --  9   *   < >  --    < > 109*   BUN  --   --   --   --  6.7   CR  --   --  0.44*  --  0.40*   GFRESTIMATED  --   --  >90  --  >90   SHIRLEY  --   --   --   --  10.3    < > = values in this interval not displayed.     Recent Labs   Lab 02/27/25  0952 02/23/25  0705 02/22/25 0718   WBC  --   --  8.8   HGB 8.6*   < > 10.4*   HCT  --   --  32.8*   MCV  --   --  82      < > 214    < > = values in this interval not displayed.      Imaging:   No results found for this or any previous visit (from the past 48 hours).      Mike Brumfield MD     I've spent 50 minutes in chart review, ordering medications and tests, obtaining additional history as needed, evaluating the patient and in documentation for this encounter.

## 2025-02-28 NOTE — PLAN OF CARE
"Goal Outcome Evaluation:      Plan of Care Reviewed With: patient    Overall Patient Progress: improvingOverall Patient Progress: improving    Outcome Evaluation: pt is A&OX4. on 0.5 L oxgen during the night time. cont. BG checks.IV SL . A-2 , lift. tolerating diet. pain 3/10  with scheduled  Robaxin, tylenol and Oxycodone.CMS intact.aquacell dressing CDI.discharge TBD.  Bladder smith was 385, pt want to void. Refused straight cath.     Problem: Adult Inpatient Plan of Care  Goal: Plan of Care Review  Description: The Plan of Care Review/Shift note should be completed every shift.  The Outcome Evaluation is a brief statement about your assessment that the patient is improving, declining, or no change.  This information will be displayed automatically on your shift  note.  Outcome: Progressing  Flowsheets (Taken 2/28/2025 0443)  Outcome Evaluation: pt is A&OX4. on 0.5 L oxgen during the night time. cont. BG checks.IV SL . A-2 , lift. tolerating diet. pain 3/10  with scheduled  Robaxin, tylenol and Oxycodone.CMS intact.aquacell dressing CDI.discharge TBD.  Plan of Care Reviewed With: patient  Overall Patient Progress: improving  Goal: Patient-Specific Goal (Individualized)  Description: You can add care plan individualizations to a care plan. Examples of Individualization might be:  \"Parent requests to be called daily at 9am for status\", \"I have a hard time hearing out of my right ear\", or \"Do not touch me to wake me up as it startles  me\".  Outcome: Progressing  Goal: Absence of Hospital-Acquired Illness or Injury  Outcome: Progressing  Intervention: Prevent and Manage VTE (Venous Thromboembolism) Risk  Recent Flowsheet Documentation  Taken 2/28/2025 0100 by Agnieszka Mandel RN  VTE Prevention/Management: SCDs on (sequential compression devices)  Intervention: Prevent Infection  Recent Flowsheet Documentation  Taken 2/28/2025 0100 by Agnieszka Mandel RN  Infection Prevention: hand hygiene promoted  Goal: Optimal Comfort " and Wellbeing  Outcome: Progressing  Intervention: Monitor Pain and Promote Comfort  Recent Flowsheet Documentation  Taken 2/28/2025 0302 by Agnieszka Mandel, RN  Pain Management Interventions: medication (see MAR)  Goal: Readiness for Transition of Care  Outcome: Progressing

## 2025-02-28 NOTE — PROGRESS NOTES
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   -Likely outpatient chemotherapy with platinum/etoposide and possible atezolizumab, but we will need to allow her to recover from her surgery.  -Brain MRI ordered, please obtain inpatient if possible  -Plan for outpatient follow-up at [place]. Heme/Onc outpatient referral ordered.   -Dr Phelps will see her tomorrow to discuss plan in detail with next steps  -We will follow    Hypercalcemia  Pathological femur fx  Secondary to malignancy, received Zometa 4 mg on 2/20/2025, calcium has normalized.  -Appreciate Ortho, now s/p ORIF 2/26  -Continue PT  -Radiation oncology consult, appreciate Dr. Huerta' recommendations,  -Outpatient Rad Onc referral ordered, [place] location      Discussed with Dr Phelps and Dr Brumfield.        INTERIM HISTORY: Brissa was seen in her room,  at bedside.  Lexie Mena was actually in the room as well.  Pain is a little bit improving since surgery and now better pain control.       MEDICATIONS:  Current Facility-Administered Medications   Medication Dose Route Frequency Provider Last Rate Last Admin    acetaminophen (TYLENOL) tablet 650 mg  650 mg Oral Q4H PRN Lilly Barrett MD   650 mg at 02/25/25 0012    Or    acetaminophen (TYLENOL) Suppository 650 mg  650 mg Rectal Q4H PRN Lilly Barrett MD        acetaminophen (TYLENOL) tablet 650 mg  650 mg Oral Q6H Myrtle Camarena APRN CNS   650 mg at 02/28/25 1422    albuterol (PROVENTIL HFA/VENTOLIN HFA) inhaler  2 puff Inhalation Q6H PRN Eren Montenegro MD        amLODIPine (NORVASC) tablet 10 mg  10 mg Oral Daily Eren Montenegro MD   10 mg at 02/28/25  0809    artificial saliva (BIOTENE MT) solution 1 spray  1 spray Swish & Spit 4x Daily PRN Shiela Austin MD   1 spray at 02/28/25 0303    atorvastatin (LIPITOR) tablet 40 mg  40 mg Oral Daily Eren Montenegro MD   40 mg at 02/28/25 0809    benzocaine-menthol (CHLORASEPTIC) 6-10 MG lozenge 1 lozenge  1 lozenge Buccal Q1H PRN Luh Harper PA-C   1 lozenge at 02/26/25 0200    bisacodyl (DULCOLAX) suppository 10 mg  10 mg Rectal Daily PRN Luh Harper PA-C        calcium carbonate (TUMS) chewable tablet 1,000 mg  1,000 mg Oral 4x Daily PRN Lilly Barrett MD        dextromethorphan-guaiFENesin (MUCINEX DM)  MG per 12 hr tablet 1 tablet  1 tablet Oral BID PRN Dotty Meyers MD   1 tablet at 02/27/25 0226    glucose gel 15-30 g  15-30 g Oral Q15 Min PRN Lilly Barrett MD        Or    dextrose 50 % injection 25-50 mL  25-50 mL Intravenous Q15 Min PRN Lilly Barrett MD        Or    glucagon injection 1 mg  1 mg Subcutaneous Q15 Min PRN Lilly Barrett MD        enoxaparin ANTICOAGULANT (LOVENOX) injection 40 mg  40 mg Subcutaneous Q12H Marjan Vasques PA-C   40 mg at 02/28/25 0811    famotidine (PEPCID) tablet 20 mg  20 mg Oral BID Luh Harper PA-C   20 mg at 02/28/25 0810    Or    famotidine (PEPCID) injection 20 mg  20 mg Intravenous BID Luh Harper PA-C        gabapentin (NEURONTIN) capsule 300 mg  300 mg Oral BID Myrtle Camarena APRN CNS   300 mg at 02/28/25 0809    HYDROmorphone (DILAUDID) injection 0.2 mg  0.2 mg Intravenous Q3H PRN Myrtle Camarena APRN CNS        hydrOXYzine HCl (ATARAX) tablet 25 mg  25 mg Oral Q6H PRN Luh Harper PA-C   25 mg at 02/28/25 1128    insulin aspart (NovoLOG) injection (RAPID ACTING)  1-10 Units Subcutaneous TID AC Mike Brumfield MD   1 Units at 02/27/25 1332    insulin aspart (NovoLOG) injection (RAPID ACTING)  1-7 Units Subcutaneous At Bedtime Mike Brumfield MD        insulin glargine (LANTUS PEN) injection 24  Units  24 Units Subcutaneous QAM AC Mike Brumfield MD   24 Units at 02/28/25 0817    irbesartan (AVAPRO) tablet 300 mg  300 mg Oral At Bedtime Eren Montenegro MD   300 mg at 02/27/25 2201    Lidocaine (LIDOCARE) 4 % Patch 1-3 patch  1-3 patch Transdermal Q24h Myrtle Camarena APRN CNS   1 patch at 02/27/25 2024    lidocaine (LMX4) cream   Topical Q1H PRN Lilly Barrett MD        lidocaine 1 % 0.1-1 mL  0.1-1 mL Other Q1H PRN Lilly Barrett MD        magnesium hydroxide (MILK OF MAGNESIA) suspension 30 mL  30 mL Oral Daily PRN Luh Harper PA-C        melatonin tablet 5 mg  5 mg Oral At Bedtime PRN Lilly Barrett MD        metFORMIN (GLUCOPHAGE) tablet 1,000 mg  1,000 mg Oral BID w/meals Mike Brumfield MD   1,000 mg at 02/28/25 0809    methocarbamol (ROBAXIN) tablet 500 mg  500 mg Oral Q6H Myrtle Camarena, RONNELL CNS   500 mg at 02/28/25 1301    miconazole (MICATIN) 2 % powder   Topical BID PRN Lilly Barrett MD        montelukast (SINGULAIR) tablet 10 mg  10 mg Oral At Bedtime Eren Montenegro MD   10 mg at 02/27/25 2202    naloxone (NARCAN) injection 0.2 mg  0.2 mg Intravenous Q2 Min PRN Chilo Adkins MD        Or    naloxone (NARCAN) injection 0.4 mg  0.4 mg Intravenous Q2 Min PRN Chilo Adkins MD        Or    naloxone (NARCAN) injection 0.2 mg  0.2 mg Intramuscular Q2 Min PRN Chilo Adkins MD        Or    naloxone (NARCAN) injection 0.4 mg  0.4 mg Intramuscular Q2 Min PRN Chilo Adkins MD        ondansetron (ZOFRAN ODT) ODT tab 4 mg  4 mg Oral Q6H PRN Luh Harper PA-C        Or    ondansetron (ZOFRAN) injection 4 mg  4 mg Intravenous Q6H PRN Luh Harper PA-C        oxyCODONE (ROXICODONE) tablet 5 mg  5 mg Oral Q6H Myrtle Camarena APRN CNS   5 mg at 02/28/25 1422    oxyCODONE (ROXICODONE) tablet 5 mg  5 mg Oral Q3H PRN Myrtle Camarena APRN CNS   5 mg at 02/28/25 1128    Or    oxyCODONE IR (ROXICODONE) tablet 10 mg  10 mg Oral Q3H PRN Myrtle Camarena  "APRN CNS        polyethylene glycol (MIRALAX) Packet 17 g  17 g Oral BID PRN Mike Brumfield MD        polyethylene glycol (MIRALAX) Packet 17 g  17 g Oral Daily Luh Harper PA-C   17 g at 02/28/25 0810    prochlorperazine (COMPAZINE) injection 10 mg  10 mg Intravenous Q6H PRN Luh Harper PA-C        Or    prochlorperazine (COMPAZINE) tablet 10 mg  10 mg Oral Q6H PRN Luh Harper PA-C        senna-docusate (SENOKOT-S/PERICOLACE) 8.6-50 MG per tablet 1 tablet  1 tablet Oral BID Luh Harper PA-C   1 tablet at 02/28/25 0810    senna-docusate (SENOKOT-S/PERICOLACE) 8.6-50 MG per tablet 1 tablet  1 tablet Oral BID PRN Lilly Barrett MD   1 tablet at 02/24/25 0153    Or    senna-docusate (SENOKOT-S/PERICOLACE) 8.6-50 MG per tablet 2 tablet  2 tablet Oral BID PRN Lilly Barrett MD        sertraline (ZOLOFT) tablet 150 mg  150 mg Oral Daily Eren Montenegro MD   150 mg at 02/27/25 2018    sodium chloride (PF) 0.9% PF flush 3 mL  3 mL Intracatheter Q8H Luh Harper PA-C   3 mL at 02/28/25 0126    sodium chloride (PF) 0.9% PF flush 3 mL  3 mL Intracatheter q1 min prn Luh Harper PA-C        sodium chloride (PF) 0.9% PF flush 3 mL  3 mL Intracatheter Q8H Lilly Barrett MD   3 mL at 02/28/25 0815    sodium chloride (PF) 0.9% PF flush 3 mL  3 mL Intracatheter q1 min prn Lilly Barrett MD               ALLERGIES:  Allergies   Allergen Reactions    Bydureon [Exenatide] Diarrhea    Penicillins Rash    Sulfa Antibiotics Rash         PHYSICAL EXAM:  Vital signs:  Temp: 97.7  F (36.5  C) Temp src: Temporal BP: (!) 148/76 Pulse: 85   Resp: 16 SpO2: 92 % O2 Device: Nasal cannula Oxygen Delivery: 1 LPM   Weight: 127.5 kg (281 lb 1.4 oz)  Estimated body mass index is 48.25 kg/m  as calculated from the following:    Height as of 2/18/25: 1.626 m (5' 4\").    Weight as of this encounter: 127.5 kg (281 lb 1.4 oz).    GENERAL:  female, in no acute distress.  Alert.   HEENT:  Normocephalic, " atraumatic.   LUNGS:  Nonlabored breathing  SKIN: No rash on exposed skin.   NEURO: CN grossly intact, speech normal  PSYCH: Mentation appears normal      LABS:  CBC RESULTS:   Recent Labs   Lab Test 02/27/25  0952 02/23/25  0705 02/22/25  0718   WBC  --   --  8.8   RBC  --   --  4.02   HGB 8.6*   < > 10.4*   HCT  --   --  32.8*   MCV  --   --  82   MCH  --   --  25.9*   MCHC  --   --  31.7   RDW  --   --  14.0      < > 214    < > = values in this interval not displayed.       Recent Labs   Lab Test 02/28/25  1253 02/28/25  0738 02/23/25  0742 02/23/25  0705 02/22/25  0833 02/22/25  0718 02/21/25  0932 02/21/25  0726   NA  --   --   --   --   --  136  --  137   POTASSIUM  --   --   --   --   --  4.0  --  4.1   CHLORIDE  --   --   --   --   --  103  --  103   CO2  --   --   --   --   --  24  --  25   ANIONGAP  --   --   --   --   --  9  --  9   * 117*   < >  --    < > 109*   < > 155*   BUN  --   --   --   --   --  6.7  --  8.3   CR  --   --   --  0.44*  --  0.40*  --  0.41*   SHIRLEY  --   --   --   --   --  10.3  --  11.4*    < > = values in this interval not displayed.         PATHOLOGY:  Bone, right distal femur, biopsy-  Poorly-differentiated carcinoma with neuroendocrine features (see comment)   Electronically signed by Franco Machado MD on 2/28/2025 at 10:48 AM   Comment  [facility]   The morphologic features are that of a poorly differentiated carcinoma, metastatic.  The cytokeratin 7 positive, cytokeratin 20 negative profile in combination with positive reactivity for CDX2 raises the possibility of an upper gastrointestinal origin for this tumor.  The noted synaptophysin positivity suggest neuroendocrine differentiation.  The markers typically associated with müllerian/ovarian lesions are negative in this case.  Clinical correlation is required.  Intradepartmental consultation has been obtained.       Elida Whitley PA-C  Hematology/Oncology  [facility]

## 2025-03-01 ENCOUNTER — APPOINTMENT (OUTPATIENT)
Dept: PHYSICAL THERAPY | Facility: CLINIC | Age: 55
End: 2025-03-01
Attending: STUDENT IN AN ORGANIZED HEALTH CARE EDUCATION/TRAINING PROGRAM
Payer: COMMERCIAL

## 2025-03-01 ENCOUNTER — APPOINTMENT (OUTPATIENT)
Dept: MRI IMAGING | Facility: CLINIC | Age: 55
End: 2025-03-01
Attending: PHYSICIAN ASSISTANT
Payer: COMMERCIAL

## 2025-03-01 LAB
ALBUMIN SERPL BCG-MCNC: 2.7 G/DL (ref 3.5–5.2)
ALP SERPL-CCNC: 119 U/L (ref 40–150)
ALT SERPL W P-5'-P-CCNC: 25 U/L (ref 0–50)
ANION GAP SERPL CALCULATED.3IONS-SCNC: 8 MMOL/L (ref 7–15)
AST SERPL W P-5'-P-CCNC: 44 U/L (ref 0–45)
BILIRUB SERPL-MCNC: 0.6 MG/DL
BUN SERPL-MCNC: 7.9 MG/DL (ref 6–20)
CALCIUM SERPL-MCNC: 7.9 MG/DL (ref 8.8–10.4)
CHLORIDE SERPL-SCNC: 104 MMOL/L (ref 98–107)
CREAT SERPL-MCNC: 0.45 MG/DL (ref 0.51–0.95)
EGFRCR SERPLBLD CKD-EPI 2021: >90 ML/MIN/1.73M2
ERYTHROCYTE [DISTWIDTH] IN BLOOD BY AUTOMATED COUNT: 14.4 % (ref 10–15)
GLUCOSE BLDC GLUCOMTR-MCNC: 113 MG/DL (ref 70–99)
GLUCOSE BLDC GLUCOMTR-MCNC: 134 MG/DL (ref 70–99)
GLUCOSE BLDC GLUCOMTR-MCNC: 134 MG/DL (ref 70–99)
GLUCOSE BLDC GLUCOMTR-MCNC: 149 MG/DL (ref 70–99)
GLUCOSE BLDC GLUCOMTR-MCNC: 159 MG/DL (ref 70–99)
GLUCOSE SERPL-MCNC: 181 MG/DL (ref 70–99)
HCO3 SERPL-SCNC: 26 MMOL/L (ref 22–29)
HCT VFR BLD AUTO: 27.5 % (ref 35–47)
HGB BLD-MCNC: 8.4 G/DL (ref 11.7–15.7)
LDH SERPL L TO P-CCNC: 498 U/L (ref 0–250)
MCH RBC QN AUTO: 25 PG (ref 26.5–33)
MCHC RBC AUTO-ENTMCNC: 30.5 G/DL (ref 31.5–36.5)
MCV RBC AUTO: 82 FL (ref 78–100)
PHOSPHATE SERPL-MCNC: 1.9 MG/DL (ref 2.5–4.5)
PLATELET # BLD AUTO: 372 10E3/UL (ref 150–450)
POTASSIUM SERPL-SCNC: 3.8 MMOL/L (ref 3.4–5.3)
PROT SERPL-MCNC: 5.8 G/DL (ref 6.4–8.3)
RBC # BLD AUTO: 3.36 10E6/UL (ref 3.8–5.2)
SODIUM SERPL-SCNC: 138 MMOL/L (ref 135–145)
URATE SERPL-MCNC: 2.1 MG/DL (ref 2.4–5.7)
WBC # BLD AUTO: 11.6 10E3/UL (ref 4–11)

## 2025-03-01 PROCEDURE — A9585 GADOBUTROL INJECTION: HCPCS | Performed by: PHYSICIAN ASSISTANT

## 2025-03-01 PROCEDURE — 250N000011 HC RX IP 250 OP 636: Mod: JZ | Performed by: CLINICAL NURSE SPECIALIST

## 2025-03-01 PROCEDURE — 84100 ASSAY OF PHOSPHORUS: CPT | Performed by: INTERNAL MEDICINE

## 2025-03-01 PROCEDURE — 36415 COLL VENOUS BLD VENIPUNCTURE: CPT | Performed by: INTERNAL MEDICINE

## 2025-03-01 PROCEDURE — 250N000013 HC RX MED GY IP 250 OP 250 PS 637

## 2025-03-01 PROCEDURE — 250N000013 HC RX MED GY IP 250 OP 250 PS 637: Performed by: INTERNAL MEDICINE

## 2025-03-01 PROCEDURE — 120N000001 HC R&B MED SURG/OB

## 2025-03-01 PROCEDURE — 82310 ASSAY OF CALCIUM: CPT | Performed by: INTERNAL MEDICINE

## 2025-03-01 PROCEDURE — 250N000013 HC RX MED GY IP 250 OP 250 PS 637: Performed by: CLINICAL NURSE SPECIALIST

## 2025-03-01 PROCEDURE — 250N000011 HC RX IP 250 OP 636

## 2025-03-01 PROCEDURE — 99232 SBSQ HOSP IP/OBS MODERATE 35: CPT | Performed by: INTERNAL MEDICINE

## 2025-03-01 PROCEDURE — 82247 BILIRUBIN TOTAL: CPT | Performed by: INTERNAL MEDICINE

## 2025-03-01 PROCEDURE — 250N000011 HC RX IP 250 OP 636: Performed by: STUDENT IN AN ORGANIZED HEALTH CARE EDUCATION/TRAINING PROGRAM

## 2025-03-01 PROCEDURE — 255N000002 HC RX 255 OP 636: Performed by: PHYSICIAN ASSISTANT

## 2025-03-01 PROCEDURE — 70553 MRI BRAIN STEM W/O & W/DYE: CPT

## 2025-03-01 PROCEDURE — 87637 SARSCOV2&INF A&B&RSV AMP PRB: CPT | Performed by: INTERNAL MEDICINE

## 2025-03-01 PROCEDURE — 85027 COMPLETE CBC AUTOMATED: CPT | Performed by: INTERNAL MEDICINE

## 2025-03-01 PROCEDURE — 97530 THERAPEUTIC ACTIVITIES: CPT | Mod: GP

## 2025-03-01 PROCEDURE — 82947 ASSAY GLUCOSE BLOOD QUANT: CPT | Performed by: INTERNAL MEDICINE

## 2025-03-01 PROCEDURE — 84550 ASSAY OF BLOOD/URIC ACID: CPT | Performed by: INTERNAL MEDICINE

## 2025-03-01 PROCEDURE — 83615 LACTATE (LD) (LDH) ENZYME: CPT | Performed by: INTERNAL MEDICINE

## 2025-03-01 RX ORDER — GADOBUTROL 604.72 MG/ML
12 INJECTION INTRAVENOUS ONCE
Status: COMPLETED | OUTPATIENT
Start: 2025-03-01 | End: 2025-03-01

## 2025-03-01 RX ADMIN — OXYCODONE HYDROCHLORIDE 5 MG: 5 TABLET ORAL at 14:37

## 2025-03-01 RX ADMIN — AMLODIPINE BESYLATE 10 MG: 10 TABLET ORAL at 07:45

## 2025-03-01 RX ADMIN — METHOCARBAMOL 500 MG: 500 TABLET ORAL at 06:23

## 2025-03-01 RX ADMIN — METFORMIN HYDROCHLORIDE 1000 MG: 500 TABLET ORAL at 17:17

## 2025-03-01 RX ADMIN — PROCHLORPERAZINE EDISYLATE 10 MG: 5 INJECTION INTRAMUSCULAR; INTRAVENOUS at 22:28

## 2025-03-01 RX ADMIN — HYDROMORPHONE HYDROCHLORIDE 0.2 MG: 0.2 INJECTION, SOLUTION INTRAMUSCULAR; INTRAVENOUS; SUBCUTANEOUS at 22:31

## 2025-03-01 RX ADMIN — METHOCARBAMOL 500 MG: 500 TABLET ORAL at 12:52

## 2025-03-01 RX ADMIN — ACETAMINOPHEN 650 MG: 325 TABLET, FILM COATED ORAL at 07:45

## 2025-03-01 RX ADMIN — GADOBUTROL 12 ML: 604.72 INJECTION INTRAVENOUS at 00:35

## 2025-03-01 RX ADMIN — ENOXAPARIN SODIUM 40 MG: 40 INJECTION SUBCUTANEOUS at 07:45

## 2025-03-01 RX ADMIN — FAMOTIDINE 20 MG: 20 TABLET, FILM COATED ORAL at 07:44

## 2025-03-01 RX ADMIN — GABAPENTIN 300 MG: 300 CAPSULE ORAL at 07:45

## 2025-03-01 RX ADMIN — OXYCODONE HYDROCHLORIDE 5 MG: 5 TABLET ORAL at 07:45

## 2025-03-01 RX ADMIN — LIDOCAINE 1 PATCH: 4 PATCH TOPICAL at 01:24

## 2025-03-01 RX ADMIN — Medication 1 SPRAY: at 00:23

## 2025-03-01 RX ADMIN — ONDANSETRON 4 MG: 4 TABLET, ORALLY DISINTEGRATING ORAL at 20:14

## 2025-03-01 RX ADMIN — ATORVASTATIN CALCIUM 40 MG: 40 TABLET, FILM COATED ORAL at 07:45

## 2025-03-01 RX ADMIN — ACETAMINOPHEN 650 MG: 325 TABLET, FILM COATED ORAL at 12:52

## 2025-03-01 RX ADMIN — ENOXAPARIN SODIUM 40 MG: 40 INJECTION SUBCUTANEOUS at 23:37

## 2025-03-01 RX ADMIN — ACETAMINOPHEN 650 MG: 325 TABLET, FILM COATED ORAL at 17:17

## 2025-03-01 RX ADMIN — HYDROXYZINE HYDROCHLORIDE 25 MG: 25 TABLET, FILM COATED ORAL at 12:52

## 2025-03-01 RX ADMIN — METFORMIN HYDROCHLORIDE 1000 MG: 500 TABLET ORAL at 07:44

## 2025-03-01 RX ADMIN — METHOCARBAMOL 500 MG: 500 TABLET ORAL at 01:23

## 2025-03-01 RX ADMIN — ACETAMINOPHEN 650 MG: 325 TABLET, FILM COATED ORAL at 01:23

## 2025-03-01 RX ADMIN — OXYCODONE HYDROCHLORIDE 5 MG: 5 TABLET ORAL at 01:24

## 2025-03-01 ASSESSMENT — ACTIVITIES OF DAILY LIVING (ADL)
ADLS_ACUITY_SCORE: 48
ADLS_ACUITY_SCORE: 48
ADLS_ACUITY_SCORE: 51
ADLS_ACUITY_SCORE: 48
ADLS_ACUITY_SCORE: 51
ADLS_ACUITY_SCORE: 48
ADLS_ACUITY_SCORE: 51
ADLS_ACUITY_SCORE: 48
ADLS_ACUITY_SCORE: 51
ADLS_ACUITY_SCORE: 51
ADLS_ACUITY_SCORE: 48
ADLS_ACUITY_SCORE: 51
ADLS_ACUITY_SCORE: 52
ADLS_ACUITY_SCORE: 48
ADLS_ACUITY_SCORE: 51
ADLS_ACUITY_SCORE: 52

## 2025-03-01 NOTE — PROGRESS NOTES
[facility]  Hospitalist Progress Note  Mike Brumfield MD 03/01/2025    Reason for Stay (Diagnosis): Pathologic fracture         Assessment and Plan:      Summary of Stay: Brissa Mott is a 54 year old female with past medical history including previous left-sided CVA, type 2 diabetes, hypertension, morbid obesity on Ozempic, KATHRINE, admitted on 2/20/2025 from transitional care unit with severe hypercalcemia and a distal right femur fracture found to have a lytic mass.    Orthopedic surgery was consulted and we expanded workup for suspected metastatic malignancy with CT chest abdomen and pelvis which showed a 9.9 cm complex right adnexal cystic mass, multiple pulmonary nodules.  Oncology was consulted as well.    Hypercalcemia normalized from 14.7 with IV fluids.  She underwent an IR biopsy of her distal right femur pathologic fracture 2/24 to help guide further management.      She was taken to the OR for ORIF of her pathologic fracture on 2/25.    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  Markers typically associated with ovarian lesions are negative.  Oncology is following.  MRI brain thankfully is negative for metastatic disease.    Problem List/Assessment and Plan:   Severe hypercalcemia of malignancy with apparent pathologic distal right femur fracture, also noted to have 9.9 cm complex right adnexal cystic mass, pulmonary nodules: Pathology as above shows a poorly differentiated carcinoma, possible upper GI origin with neuroendocrine differentiation oncology, orthopedic surgery both following.  --IR biopsy of pathologic fracture 2/24 with pathology as above  --Oncology following.  --? Need for EGD but defer to oncology  --MRI brain negative  --to OR w/ Orthopedics on 2/25 for ORIF  -- Pain control with Tylenol, Robaxin, oxycodone, Dilaudid and hydroxyzine.  Added  "gabapentin.  -- Narcotic induced constipation noted, on a good bowel regimen.  Added extra prn miralax.  --pain management consulted  -- PT consulted, might need TCU    2.   Type 2 diabetes mellitus: Continue SSI.  Ozempic on hold.  Hypoglycemia protocol.  Home twice daily insulin mix on hold.  -- Added Lantus on 2/26.  24 units daily.  -- SSI high scale  -- Trend, titrate as needed.  Might need to add some mealtime insulin soon.  --added back metformin (home med)    3.   History of hypertension: Currently on amlodipine 10 mg, irbesartan 300 mg    4.   Depression: Continue sertraline 150 mg daily    5.  Social: Completed [employer] paperwork on 2/26 for her .    6.  Acute blood loss anemia: Recheck.  Infuse for hemoglobin greater than 7.      DVT Prophylaxis: Enoxaparin  Code Status: Full Code  Medically Ready for Discharge: Anticipated in 2-4 Days.  Await further oncology input, likely will need TCU.      Clinically Significant Risk Factors               # Hypoalbuminemia: Lowest albumin = 2.7 g/dL at 3/1/2025  6:44 AM, will monitor as appropriate     # Hypertension: Noted on problem list            # Severe Obesity: Estimated body mass index is 48.25 kg/m  as calculated from the following:    Height as of 2/18/25: 1.626 m (5' 4\").    Weight as of this encounter: 127.5 kg (281 lb 1.4 oz).      # Financial/Environmental Concerns: none  # Asthma: noted on problem list              Interval History (Subjective):        Glucose control better  Appreciate oncology input now that pathology has returned  MRI brain negative  Seems she will likely need TCU prior to going home  Awaiting further input from oncology regarding any further inpatient workup required                    Physical Exam:      Last Vital Signs:  BP (!) 141/69 (BP Location: Right arm)   Pulse 84   Temp 97.4  F (36.3  C) (Temporal)   Resp 18   Wt 127.5 kg (281 lb 1.4 oz)   LMP  (LMP Unknown)   SpO2 98%   BMI 48.25 kg/m      I/O last 3 completed " shifts:  In: -   Out: 1850 [Urine:1850]    General: Alert, awake, no acute distress.  No sedation.  HEENT: NC/AT, eyes anicteric, external occular movements intact, face symmetric.  Cardiac: RRR, S1, S2.  No murmurs appreciated.  Pulmonary: Normal chest rise, normal work of breathing.  Lungs CTA BL  Abdomen: soft, non-tender, non-distended.  Bowel Sounds Present.  No guarding.  Extremities: Some mild swelling around distal right thigh no deformities.  Warm, well perfused.  Skin: no rashes or lesions noted.  Warm and Dry.  Neuro: No focal deficits noted.  Speech clear.  Coordination and strength grossly normal.  Psych: Appropriate affect.         Medications:      All current medications were reviewed with changes reflected in problem list.         Data:      All new lab and imaging data was reviewed.   Labs:  Recent Labs   Lab 03/01/25  0751 03/01/25  0644   NA  --  138   POTASSIUM  --  3.8   CHLORIDE  --  104   CO2  --  26   ANIONGAP  --  8   * 181*   BUN  --  7.9   CR  --  0.45*   GFRESTIMATED  --  >90   SHIRLEY  --  7.9*     Recent Labs   Lab 03/01/25  0644   WBC 11.6*   HGB 8.4*   HCT 27.5*   MCV 82         Imaging:   No results found for this or any previous visit (from the past 48 hours).      Mike Brumfield MD     I've spent 50 minutes in chart review, ordering medications and tests, obtaining additional history as needed, evaluating the patient and in documentation for this encounter.

## 2025-03-01 NOTE — PROGRESS NOTES
Mri brain reviewed. Will see patient tomorrow for discussion of chemotherapy, I need to see if she has TLS if so will get a port placed and inpatient treatment.     Labs ordered. Plan on discusson tomorrow    Wallace Phelps MD

## 2025-03-01 NOTE — PLAN OF CARE
"Goal Outcome Evaluation:      Plan of Care Reviewed With: patient    Overall Patient Progress: no changeOverall Patient Progress: no change    Outcome Evaluation: No acute events overnight      Diet: Regular   Mental Status:  A&O x 4  O2: 1L O2 via NC overnight, RA while awake   Pain: Tylenol, gabapentin, Robaxin, lidocaine patch, Oxy   Mobility: lift   LDA's: Right hip incision, sacral/buttock pressure wound   Consults: WOC, Ortho, Pain, oncology   Other Cares: BG monitoring, repositioning   GI/: Voiding with purewick. No nausea or vomiting   Discharge Disposition: PT recommending TCU- TBD    Discharge Time: pending     Brain MRI completed. Aqua cells with small drainage.       Problem: Adult Inpatient Plan of Care  Goal: Plan of Care Review  Description: The Plan of Care Review/Shift note should be completed every shift.  The Outcome Evaluation is a brief statement about your assessment that the patient is improving, declining, or no change.  This information will be displayed automatically on your shift  note.  Outcome: Progressing  Flowsheets (Taken 3/1/2025 0248)  Outcome Evaluation: No acute events overnight  Plan of Care Reviewed With: patient  Overall Patient Progress: no change  Goal: Patient-Specific Goal (Individualized)  Description: You can add care plan individualizations to a care plan. Examples of Individualization might be:  \"Parent requests to be called daily at 9am for status\", \"I have a hard time hearing out of my right ear\", or \"Do not touch me to wake me up as it startles  me\".  Outcome: Progressing  Goal: Absence of Hospital-Acquired Illness or Injury  Outcome: Progressing  Intervention: Identify and Manage Fall Risk  Recent Flowsheet Documentation  Taken 2/28/2025 2000 by Gia Vazquez RN  Safety Promotion/Fall Prevention:   assistive device/personal items within reach   safety round/check completed  Intervention: Prevent Skin Injury  Recent Flowsheet Documentation  Taken " 2/28/2025 2217 by Gia Vazquez RN  Body Position:   turned   right   heels elevated  Taken 2/28/2025 2000 by Gia Vazquez RN  Skin Protection: silicone foam dressing in place  Intervention: Prevent Infection  Recent Flowsheet Documentation  Taken 2/28/2025 2000 by Gia Vazquez RN  Infection Prevention:   cohorting utilized   rest/sleep promoted  Goal: Optimal Comfort and Wellbeing  Outcome: Progressing  Intervention: Monitor Pain and Promote Comfort  Recent Flowsheet Documentation  Taken 2/28/2025 2006 by Gia Vazquez RN  Pain Management Interventions: medication (see MAR)  Goal: Readiness for Transition of Care  Outcome: Progressing     Problem: Hip Fracture Medical Management  Goal: Optimal Coping with Change in Health Status  Outcome: Progressing  Goal: Absence of Bleeding  Outcome: Progressing  Goal: Effective Bowel Elimination  Outcome: Progressing  Goal: Baseline Cognitive Function Maintained  Outcome: Progressing  Goal: Absence of Embolism  Outcome: Progressing  Goal: Fracture Stability  Outcome: Progressing  Goal: Optimal Functional Performance  Outcome: Progressing  Intervention: Promote Optimal Functional Status  Recent Flowsheet Documentation  Taken 2/28/2025 2000 by Gia Vazquez RN  Activity Management: activity adjusted per tolerance  Goal: Pain Control and Function  Outcome: Progressing  Intervention: Manage Acute Orthopaedic-Related Pain  Recent Flowsheet Documentation  Taken 2/28/2025 2006 by Gia Vazquez RN  Pain Management Interventions: medication (see MAR)  Goal: Effective Urinary Elimination  Outcome: Progressing     Problem: Skin Injury Risk Increased  Goal: Skin Health and Integrity  Outcome: Progressing  Intervention: Plan: Nurse Driven Intervention: Moisture Management  Recent Flowsheet Documentation  Taken 2/28/2025 2000 by Gia Vazquez RN  Moisture Interventions: Urinary collection device  Intervention:  Plan: Nurse Driven Intervention: Friction and Shear  Recent Flowsheet Documentation  Taken 2/28/2025 2000 by Gia Vazquez RN  Friction/Shear Interventions: HOB 30 degrees or less  Intervention: Optimize Skin Protection  Recent Flowsheet Documentation  Taken 2/28/2025 2217 by Gia Vazquez, RN  Head of Bed (HOB) Positioning: HOB at 20-30 degrees  Taken 2/28/2025 2000 by Gia Vazquez RN  Pressure Reduction Techniques:   weight shift assistance provided   positioned off wounds   heels elevated off bed   frequent weight shift encouraged  Skin Protection: silicone foam dressing in place  Activity Management: activity adjusted per tolerance     Problem: Pain Acute  Goal: Optimal Pain Control and Function  Outcome: Progressing  Intervention: Develop Pain Management Plan  Recent Flowsheet Documentation  Taken 2/28/2025 2006 by Gia Vazquez RN  Pain Management Interventions: medication (see MAR)  Intervention: Prevent or Manage Pain  Recent Flowsheet Documentation  Taken 2/28/2025 2000 by Gia Vazquez RN  Medication Review/Management:   medications reviewed   high-risk medications identified     Problem: Comorbidity Management  Goal: Blood Glucose Levels Within Targeted Range  Outcome: Progressing  Intervention: Monitor and Manage Glycemia  Recent Flowsheet Documentation  Taken 2/28/2025 2000 by Gia Vazquez RN  Medication Review/Management:   medications reviewed   high-risk medications identified  Goal: Blood Pressure in Desired Range  Outcome: Progressing  Intervention: Maintain Blood Pressure Management  Recent Flowsheet Documentation  Taken 2/28/2025 2000 by Gia Vazquez RN  Medication Review/Management:   medications reviewed   high-risk medications identified

## 2025-03-02 ENCOUNTER — APPOINTMENT (OUTPATIENT)
Dept: PHYSICAL THERAPY | Facility: CLINIC | Age: 55
End: 2025-03-02
Attending: STUDENT IN AN ORGANIZED HEALTH CARE EDUCATION/TRAINING PROGRAM
Payer: COMMERCIAL

## 2025-03-02 LAB
ALBUMIN UR-MCNC: 20 MG/DL
APPEARANCE UR: CLEAR
BILIRUB UR QL STRIP: NEGATIVE
COLOR UR AUTO: YELLOW
ERYTHROCYTE [DISTWIDTH] IN BLOOD BY AUTOMATED COUNT: 14.5 % (ref 10–15)
FLUAV RNA SPEC QL NAA+PROBE: NEGATIVE
FLUBV RNA RESP QL NAA+PROBE: NEGATIVE
GLUCOSE BLDC GLUCOMTR-MCNC: 128 MG/DL (ref 70–99)
GLUCOSE BLDC GLUCOMTR-MCNC: 128 MG/DL (ref 70–99)
GLUCOSE BLDC GLUCOMTR-MCNC: 135 MG/DL (ref 70–99)
GLUCOSE BLDC GLUCOMTR-MCNC: 141 MG/DL (ref 70–99)
GLUCOSE BLDC GLUCOMTR-MCNC: 151 MG/DL (ref 70–99)
GLUCOSE UR STRIP-MCNC: 70 MG/DL
HCT VFR BLD AUTO: 26.2 % (ref 35–47)
HGB BLD-MCNC: 8.3 G/DL (ref 11.7–15.7)
HGB UR QL STRIP: NEGATIVE
HOLD SPECIMEN: NORMAL
KETONES UR STRIP-MCNC: NEGATIVE MG/DL
LACTATE SERPL-SCNC: 1.3 MMOL/L (ref 0.7–2)
LEUKOCYTE ESTERASE UR QL STRIP: NEGATIVE
MCH RBC QN AUTO: 25.6 PG (ref 26.5–33)
MCHC RBC AUTO-ENTMCNC: 31.7 G/DL (ref 31.5–36.5)
MCV RBC AUTO: 81 FL (ref 78–100)
NITRATE UR QL: NEGATIVE
PH UR STRIP: 5.5 [PH] (ref 5–7)
PHOSPHATE SERPL-MCNC: 2.6 MG/DL (ref 2.5–4.5)
PLATELET # BLD AUTO: 369 10E3/UL (ref 150–450)
PLATELET # BLD AUTO: 378 10E3/UL (ref 150–450)
PROCALCITONIN SERPL IA-MCNC: 0.17 NG/ML
RBC # BLD AUTO: 3.24 10E6/UL (ref 3.8–5.2)
RBC URINE: 3 /HPF
RSV RNA SPEC NAA+PROBE: NEGATIVE
SARS-COV-2 RNA RESP QL NAA+PROBE: NEGATIVE
SP GR UR STRIP: 1.02 (ref 1–1.03)
SQUAMOUS EPITHELIAL: 1 /HPF
UROBILINOGEN UR STRIP-MCNC: NORMAL MG/DL
WBC # BLD AUTO: 11 10E3/UL (ref 4–11)
WBC URINE: 13 /HPF

## 2025-03-02 PROCEDURE — 87086 URINE CULTURE/COLONY COUNT: CPT | Performed by: INTERNAL MEDICINE

## 2025-03-02 PROCEDURE — 250N000013 HC RX MED GY IP 250 OP 250 PS 637: Performed by: INTERNAL MEDICINE

## 2025-03-02 PROCEDURE — 250N000011 HC RX IP 250 OP 636: Performed by: STUDENT IN AN ORGANIZED HEALTH CARE EDUCATION/TRAINING PROGRAM

## 2025-03-02 PROCEDURE — 36415 COLL VENOUS BLD VENIPUNCTURE: CPT | Performed by: STUDENT IN AN ORGANIZED HEALTH CARE EDUCATION/TRAINING PROGRAM

## 2025-03-02 PROCEDURE — 36415 COLL VENOUS BLD VENIPUNCTURE: CPT | Performed by: INTERNAL MEDICINE

## 2025-03-02 PROCEDURE — 99233 SBSQ HOSP IP/OBS HIGH 50: CPT | Performed by: INTERNAL MEDICINE

## 2025-03-02 PROCEDURE — 258N000003 HC RX IP 258 OP 636: Performed by: STUDENT IN AN ORGANIZED HEALTH CARE EDUCATION/TRAINING PROGRAM

## 2025-03-02 PROCEDURE — 81001 URINALYSIS AUTO W/SCOPE: CPT | Performed by: INTERNAL MEDICINE

## 2025-03-02 PROCEDURE — 83605 ASSAY OF LACTIC ACID: CPT | Performed by: INTERNAL MEDICINE

## 2025-03-02 PROCEDURE — 250N000013 HC RX MED GY IP 250 OP 250 PS 637: Performed by: CLINICAL NURSE SPECIALIST

## 2025-03-02 PROCEDURE — 87040 BLOOD CULTURE FOR BACTERIA: CPT | Performed by: INTERNAL MEDICINE

## 2025-03-02 PROCEDURE — 97110 THERAPEUTIC EXERCISES: CPT | Mod: GP | Performed by: PHYSICAL THERAPIST

## 2025-03-02 PROCEDURE — 85018 HEMOGLOBIN: CPT | Performed by: INTERNAL MEDICINE

## 2025-03-02 PROCEDURE — 84100 ASSAY OF PHOSPHORUS: CPT | Performed by: INTERNAL MEDICINE

## 2025-03-02 PROCEDURE — 250N000013 HC RX MED GY IP 250 OP 250 PS 637

## 2025-03-02 PROCEDURE — 85049 AUTOMATED PLATELET COUNT: CPT | Performed by: STUDENT IN AN ORGANIZED HEALTH CARE EDUCATION/TRAINING PROGRAM

## 2025-03-02 PROCEDURE — 84145 PROCALCITONIN (PCT): CPT | Performed by: INTERNAL MEDICINE

## 2025-03-02 PROCEDURE — 120N000001 HC R&B MED SURG/OB

## 2025-03-02 PROCEDURE — 250N000009 HC RX 250: Performed by: STUDENT IN AN ORGANIZED HEALTH CARE EDUCATION/TRAINING PROGRAM

## 2025-03-02 RX ADMIN — MONTELUKAST 10 MG: 10 TABLET, FILM COATED ORAL at 21:36

## 2025-03-02 RX ADMIN — LIDOCAINE 3 PATCH: 4 PATCH TOPICAL at 02:21

## 2025-03-02 RX ADMIN — IRBESARTAN 300 MG: 150 TABLET ORAL at 02:25

## 2025-03-02 RX ADMIN — IRBESARTAN 300 MG: 150 TABLET ORAL at 21:36

## 2025-03-02 RX ADMIN — SERTRALINE HYDROCHLORIDE 150 MG: 100 TABLET ORAL at 02:24

## 2025-03-02 RX ADMIN — OXYCODONE HYDROCHLORIDE 5 MG: 5 TABLET ORAL at 20:29

## 2025-03-02 RX ADMIN — METFORMIN HYDROCHLORIDE 1000 MG: 500 TABLET ORAL at 07:53

## 2025-03-02 RX ADMIN — SENNOSIDES AND DOCUSATE SODIUM 1 TABLET: 50; 8.6 TABLET ORAL at 20:29

## 2025-03-02 RX ADMIN — MONTELUKAST 10 MG: 10 TABLET, FILM COATED ORAL at 02:25

## 2025-03-02 RX ADMIN — METHOCARBAMOL 500 MG: 500 TABLET ORAL at 02:20

## 2025-03-02 RX ADMIN — GABAPENTIN 300 MG: 300 CAPSULE ORAL at 20:29

## 2025-03-02 RX ADMIN — METHOCARBAMOL 500 MG: 500 TABLET ORAL at 07:53

## 2025-03-02 RX ADMIN — SODIUM PHOSPHATE, MONOBASIC, MONOHYDRATE AND SODIUM PHOSPHATE, DIBASIC, ANHYDROUS 15 MMOL: 142; 276 INJECTION, SOLUTION INTRAVENOUS at 04:34

## 2025-03-02 RX ADMIN — ENOXAPARIN SODIUM 40 MG: 40 INJECTION SUBCUTANEOUS at 13:06

## 2025-03-02 RX ADMIN — METFORMIN HYDROCHLORIDE 1000 MG: 500 TABLET ORAL at 18:29

## 2025-03-02 RX ADMIN — SERTRALINE HYDROCHLORIDE 150 MG: 100 TABLET ORAL at 20:29

## 2025-03-02 RX ADMIN — OXYCODONE HYDROCHLORIDE 5 MG: 5 TABLET ORAL at 14:05

## 2025-03-02 RX ADMIN — Medication: at 18:32

## 2025-03-02 RX ADMIN — LIDOCAINE 3 PATCH: 4 PATCH TOPICAL at 20:24

## 2025-03-02 RX ADMIN — ACETAMINOPHEN 650 MG: 325 TABLET, FILM COATED ORAL at 07:55

## 2025-03-02 RX ADMIN — FAMOTIDINE 20 MG: 20 TABLET, FILM COATED ORAL at 20:29

## 2025-03-02 RX ADMIN — DICLOFENAC SODIUM 4 G: 10 GEL TOPICAL at 18:32

## 2025-03-02 RX ADMIN — ACETAMINOPHEN 650 MG: 325 TABLET, FILM COATED ORAL at 02:19

## 2025-03-02 RX ADMIN — ACETAMINOPHEN 650 MG: 325 TABLET, FILM COATED ORAL at 14:05

## 2025-03-02 RX ADMIN — GABAPENTIN 300 MG: 300 CAPSULE ORAL at 07:53

## 2025-03-02 RX ADMIN — OXYCODONE HYDROCHLORIDE 5 MG: 5 TABLET ORAL at 07:53

## 2025-03-02 RX ADMIN — HYDROXYZINE HYDROCHLORIDE 25 MG: 25 TABLET, FILM COATED ORAL at 21:36

## 2025-03-02 RX ADMIN — OXYCODONE HYDROCHLORIDE 5 MG: 5 TABLET ORAL at 02:20

## 2025-03-02 RX ADMIN — METHOCARBAMOL 500 MG: 500 TABLET ORAL at 20:28

## 2025-03-02 RX ADMIN — FAMOTIDINE 20 MG: 20 TABLET, FILM COATED ORAL at 02:25

## 2025-03-02 RX ADMIN — ACETAMINOPHEN 650 MG: 325 TABLET, FILM COATED ORAL at 20:28

## 2025-03-02 RX ADMIN — METHOCARBAMOL 500 MG: 500 TABLET ORAL at 14:05

## 2025-03-02 RX ADMIN — SODIUM PHOSPHATE, MONOBASIC, MONOHYDRATE AND SODIUM PHOSPHATE, DIBASIC, ANHYDROUS 15 MMOL: 142; 276 INJECTION, SOLUTION INTRAVENOUS at 06:42

## 2025-03-02 RX ADMIN — Medication: at 13:05

## 2025-03-02 RX ADMIN — DICLOFENAC SODIUM 4 G: 10 GEL TOPICAL at 13:05

## 2025-03-02 RX ADMIN — ATORVASTATIN CALCIUM 40 MG: 40 TABLET, FILM COATED ORAL at 07:53

## 2025-03-02 RX ADMIN — GABAPENTIN 300 MG: 300 CAPSULE ORAL at 02:20

## 2025-03-02 RX ADMIN — FAMOTIDINE 20 MG: 20 TABLET, FILM COATED ORAL at 07:53

## 2025-03-02 ASSESSMENT — ACTIVITIES OF DAILY LIVING (ADL)
ADLS_ACUITY_SCORE: 52
ADLS_ACUITY_SCORE: 48
ADLS_ACUITY_SCORE: 52
ADLS_ACUITY_SCORE: 48
ADLS_ACUITY_SCORE: 48
ADLS_ACUITY_SCORE: 52
ADLS_ACUITY_SCORE: 48
ADLS_ACUITY_SCORE: 48
ADLS_ACUITY_SCORE: 52
ADLS_ACUITY_SCORE: 48
ADLS_ACUITY_SCORE: 48
ADLS_ACUITY_SCORE: 52
ADLS_ACUITY_SCORE: 48

## 2025-03-02 NOTE — PROGRESS NOTES
PROGRESS NOTE    SUBJECTIVE  Brissa Mott is status post open reduction and internal fixation with IM frankie of the right femur for pathological fracture on 2/25/2025. No acute events overnight. Afebrile with stable vitals. She was having severe nausea and chills throughout the night but states she is feeling much better now. Was given IV dilaudid and compazine. Patient reports pain is well controlled on oral medication today, although she has been having trouble sleeping due to pain and not being able to get comfortable. Patient is tolerating a general diet. Brissa has worked with PT but was unable to do much due to pain and general weakness. Patient denies any numbness/tingling in the operative extremity or chest pain. She has no shortness of breath on room air during the day but sleeps with 1L NC overnight.     PHYSICAL EXAM  General: No acute distress. Alert and oriented to person, time and place.  Pulmonary: Breathing comfortably on room air.   Cardiac: Bilateral PT pulses are equal and symmetric. Regular rate and rhythm.   Musculoskeletal: Postoperative dressing is clean and dry. No erythema surrounding dressing. Patient demonstrates active range of motion of the operative hip. Compartments are soft and nontender.  Neurological: Patient is able to fire quadriceps, tibialis anterior, extensor hallucis longus and gastrocnemius. Sensation intact to light touch in bilateral L3-S1 distributions.     LABS  Recent Labs   Lab 03/02/25  0021 03/01/25  0644 02/27/25  0952 02/26/25  0725 02/25/25  0600   HGB 8.3* 8.4* 8.6* 9.5* 11.1*     WBC counts: 11.0 (3/2/2025), 11.6 (3/1/2025), 8.8 (2/22/2025)    IMAGING  Post-operative radiographs were reviewed and demonstrate components in good position.     ASSESSMENT/PLAN  #1 Status post open reduction and internal fixation with Im frankie of the right femur for pathological fracture on 2/25/2025.    Brissa Mott is status post the above procedure. Continue working with PT with goals of  being able to safely transfer and stand/ambulate with use of 4WW. Continue multimodal pain medication regimen. Orthopedics will continue to follow at this time. Likely will discharge to TCU in 2-4 days.    Activity: Weightbearing as tolerated. Activity as tolerated. No precautions. Continue to work with PT while in hospital.   Antibiotics: Weight-based Ancef x2 doses postoperatively or until discharge.  Cultures: None  Diet: Advance as tolerated. Goal diet: General.   Dressing: Patient is able to shower. Patient will keep dressing in place until post-op day 7.  DVT Prophylaxis: Lovenox 40 mg  Pain medication: Multimodal pain regimen with ice, elevation, Tylenol, Robaxin, Oxycodone, Dilaudid, Atarax    Disposition: Likely discharge to TCU in 2-4 days. Orthopedics will continue to follow.

## 2025-03-02 NOTE — PROGRESS NOTES
[facility]  Hospitalist Progress Note  Mike Brumfield MD 03/02/2025    Reason for Stay (Diagnosis): Pathologic fracture         Assessment and Plan:      Summary of Stay: Brissa Mott is a 54 year old female with past medical history including previous left-sided CVA, type 2 diabetes, hypertension, morbid obesity on Ozempic, KATHRINE, admitted on 2/20/2025 from transitional care unit with severe hypercalcemia and a distal right femur fracture found to have a lytic mass.    Orthopedic surgery was consulted and we expanded workup for suspected metastatic malignancy with CT chest abdomen and pelvis which showed a 9.9 cm complex right adnexal cystic mass, multiple pulmonary nodules.  Oncology was consulted as well.    Hypercalcemia normalized from 14.7 with IV fluids.  She underwent an IR biopsy of her distal right femur pathologic fracture 2/24 to help guide further management.      She was taken to the OR for ORIF of her pathologic fracture on 2/25.    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  Markers typically associated with ovarian lesions are negative.  Oncology is following.  MRI brain thankfully is negative for metastatic disease.    She was seen by oncology.  Tumor lysis labs not compatible with tumor lysis syndrome.  Current plan appears to be for her to establish care with [facility] either in [place] or [facility] in [facility].    Problem List/Assessment and Plan:   Severe hypercalcemia of malignancy with apparent pathologic distal right femur fracture, also noted to have 9.9 cm complex right adnexal cystic mass, pulmonary nodules: Pathology as above shows a poorly differentiated carcinoma, possible upper GI origin with neuroendocrine differentiation oncology, orthopedic surgery both following.  --IR biopsy of pathologic fracture 2/24 with pathology as  above  --Oncology following.  --MRI brain negative  --to OR w/ Orthopedics on 2/25 for ORIF  -- Pain control with Tylenol, Robaxin, oxycodone, Dilaudid and hydroxyzine.  Added gabapentin.  -- Narcotic induced constipation noted, on a good bowel regimen.  Added extra prn miralax.  --pain management consulted  -- PT consulted, likely will need TCU    2.   Type 2 diabetes mellitus: Continue SSI.  Ozempic on hold.  Hypoglycemia protocol.  Home twice daily insulin mix on hold.  -- Added Lantus on 2/26.  24 units daily.  -- SSI high scale  -- Trend, titrate as needed.  Might need to add some mealtime insulin soon.  --added back metformin (home med)    3.   History of hypertension: Currently on amlodipine 10 mg, irbesartan 300 mg    4.   Depression: Continue sertraline 150 mg daily    5.  Social: Completed FMLA paperwork on 2/26 for her .    6.  Acute blood loss anemia: Recheck.  Infuse for hemoglobin greater than 7.    7.  Spell overnight early 3/2: Cause unclear.  Consider panic attack versus related to neuroendocrine tumor.  Currently resolved.  Monitor and treat supportively.  Rule out underlying infection.  -- infectious workup started, procalcitonin low.  Urine analysis negative for infection.  Blood cultures pending.  -- COVID, influenza and RSV testing negative.  8.  Chronic left knee pain: Reports severe osteoarthritis which previously was improved with hyaluronic acid injection.  Referral placed to ideally get this done at short-term follow-up.    DVT Prophylaxis: Enoxaparin  Code Status: Full Code  Medically Ready for Discharge: Anticipated in 2-4 Days.  Await further oncology input, likely will need TCU.      Clinically Significant Risk Factors               # Hypoalbuminemia: Lowest albumin = 2.7 g/dL at 3/1/2025  6:44 AM, will monitor as appropriate     # Hypertension: Noted on problem list            # Severe Obesity: Estimated body mass index is 48.25 kg/m  as calculated from the following:    Height  "as of 2/18/25: 1.626 m (5' 4\").    Weight as of this encounter: 127.5 kg (281 lb 1.4 oz).      # Financial/Environmental Concerns: none  # Asthma: noted on problem list              Interval History (Subjective):        Glucose control better  Appreciate oncology input now that pathology has returned  MRI brain negative  Seems she will likely need TCU prior to going home  Tumor lysis labs noted  Spell overnight, cause unclear                    Physical Exam:      Last Vital Signs:  BP 97/70 (BP Location: Right arm)   Pulse 89   Temp 97.4  F (36.3  C) (Temporal)   Resp 20   Wt 127.5 kg (281 lb 1.4 oz)   LMP  (LMP Unknown)   SpO2 93%   BMI 48.25 kg/m      I/O last 3 completed shifts:  In: -   Out: 250 [Urine:250]    General: Alert, awake, no acute distress.  No sedation.  HEENT: NC/AT, eyes anicteric, external occular movements intact, face symmetric.  Cardiac: RRR, S1, S2.  No murmurs appreciated.  Pulmonary: Normal chest rise, normal work of breathing.  Lungs CTA BL  Abdomen: soft, non-tender, non-distended.  Bowel Sounds Present.  No guarding.  Extremities: Some mild swelling around distal right thigh no deformities.  Warm, well perfused.  Skin: no rashes or lesions noted.  Warm and Dry.  Neuro: No focal deficits noted.  Speech clear.  Coordination and strength grossly normal.  Psych: Appropriate affect.         Medications:      All current medications were reviewed with changes reflected in problem list.         Data:      All new lab and imaging data was reviewed.   Labs:  Recent Labs   Lab 03/02/25  0749 03/01/25  0751 03/01/25  0644   NA  --   --  138   POTASSIUM  --   --  3.8   CHLORIDE  --   --  104   CO2  --   --  26   ANIONGAP  --   --  8   *   < > 181*   BUN  --   --  7.9   CR  --   --  0.45*   GFRESTIMATED  --   --  >90   SHIRLEY  --   --  7.9*    < > = values in this interval not displayed.     Recent Labs   Lab 03/02/25  0636 03/02/25  0021   WBC  --  11.0   HGB  --  8.3*   HCT  --  26.2* "   MCV  --  81    378      Imaging:   No results found for this or any previous visit (from the past 48 hours).      Mike Brumfield MD     I've spent 50 minutes in chart review, ordering medications and tests, obtaining additional history as needed, evaluating the patient and in documentation for this encounter.

## 2025-03-02 NOTE — PROGRESS NOTES
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   -Likely outpatient chemotherapy with platinum/etoposide and possible atezolizumab, but we will need to allow her to recover from her surgery.  -Brain MRI negative   -discussed TLS labs uric acid is low, can arrange OP follow with oncology for initiating chemotherapy in 1-2 weeks after discharge.   -acute rehab   --Brissa lives in [place] and she can establish care with [facility], sw can help     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  -Appreciate Ortho, now s/p ORIF 2/26  -Continue PT  -Radiation oncology consult, appreciate Dr. Huerta' recommendations,  -Outpatient Rad Onc referral ordered, [place] location    Total time spent on day of visit, including review of tests, obtaining/reviewing separately obtained history, ordering medications/tests/procedures, communicating with PCP/consultants, and documenting in electronic medical record: 50 minutes          INTERIM HISTORY: Brissa states pain is doing ok today.        MEDICATIONS:  Current Facility-Administered Medications   Medication Dose Route Frequency Provider Last Rate Last Admin    acetaminophen (TYLENOL) tablet 650 mg  650 mg Oral Q4H PRN Lilly Barrett MD   650 mg at 03/01/25 1252    Or    acetaminophen (TYLENOL) Suppository 650 mg  650 mg Rectal Q4H PRN Lilly Barrett MD        acetaminophen (TYLENOL) tablet 650 mg  650 mg Oral Q6H Myrtle Camarena, RONNELL CNS   650 mg at  03/02/25 0755    albuterol (PROVENTIL HFA/VENTOLIN HFA) inhaler  2 puff Inhalation Q6H PRN Eren Montenegro MD        amLODIPine (NORVASC) tablet 10 mg  10 mg Oral Daily Eren Montenegro MD   10 mg at 03/01/25 0745    artificial saliva (BIOTENE MT) solution 1 spray  1 spray Swish & Spit 4x Daily PRN Shiela Austin MD   1 spray at 03/01/25 0023    atorvastatin (LIPITOR) tablet 40 mg  40 mg Oral Daily Eren Montenegro MD   40 mg at 03/02/25 0753    benzocaine-menthol (CHLORASEPTIC) 6-10 MG lozenge 1 lozenge  1 lozenge Buccal Q1H PRN Luh Harper PA-C   1 lozenge at 02/26/25 0200    bisacodyl (DULCOLAX) suppository 10 mg  10 mg Rectal Daily PRN Luh Harper PA-C        calcium carbonate (TUMS) chewable tablet 1,000 mg  1,000 mg Oral 4x Daily PRN Lilly Barrett MD        dextromethorphan-guaiFENesin (MUCINEX DM)  MG per 12 hr tablet 1 tablet  1 tablet Oral BID PRN Dotty Meyers MD   1 tablet at 02/27/25 0226    glucose gel 15-30 g  15-30 g Oral Q15 Min PRN Lilly Barrett MD        Or    dextrose 50 % injection 25-50 mL  25-50 mL Intravenous Q15 Min PRN Lilly Barrett MD        Or    glucagon injection 1 mg  1 mg Subcutaneous Q15 Min PRN Lilly Barrett MD        diclofenac (VOLTAREN) 1 % topical gel 4 g  4 g Topical 4x Daily PRN Mike Brumfield MD        enoxaparin ANTICOAGULANT (LOVENOX) injection 40 mg  40 mg Subcutaneous Q12H Marjan Vasques PA-C   40 mg at 03/01/25 2337    famotidine (PEPCID) tablet 20 mg  20 mg Oral BID Luh Harper PA-C   20 mg at 03/02/25 0753    Or    famotidine (PEPCID) injection 20 mg  20 mg Intravenous BID Luh Harper PA-C        gabapentin (NEURONTIN) capsule 300 mg  300 mg Oral BID Myrtle Camarena APRN CNS   300 mg at 03/02/25 0753    HYDROmorphone (DILAUDID) injection 0.2 mg  0.2 mg Intravenous Q3H PRN Myrtle Camaerna APRN CNS   0.2 mg at 03/01/25 2231    hydrOXYzine HCl (ATARAX) tablet 25 mg  25 mg Oral Q6H PRN Luh Harper,  PA-C   25 mg at 03/01/25 1252    insulin aspart (NovoLOG) injection (RAPID ACTING)  1-10 Units Subcutaneous TID  Mike Brumfield MD   1 Units at 03/01/25 0754    insulin aspart (NovoLOG) injection (RAPID ACTING)  1-7 Units Subcutaneous At Bedtime Mike Brumfield MD        insulin glargine (LANTUS PEN) injection 24 Units  24 Units Subcutaneous QAM AC Mike Brumfield MD   24 Units at 03/02/25 0755    irbesartan (AVAPRO) tablet 300 mg  300 mg Oral At Bedtime Eren Montenegro MD   300 mg at 03/02/25 0225    Lidocaine (LIDOCARE) 4 % Patch 1-3 patch  1-3 patch Transdermal Q24h Myrtle Camarena APRN CNS   3 patch at 03/02/25 0221    lidocaine (LMX4) cream   Topical Q1H PRN Lilly Barrett MD        lidocaine 1 % 0.1-1 mL  0.1-1 mL Other Q1H PRN Lilly Barrett MD        magnesium hydroxide (MILK OF MAGNESIA) suspension 30 mL  30 mL Oral Daily PRN Luh Harper PA-C        melatonin tablet 5 mg  5 mg Oral At Bedtime PRN Lilly Barrett MD        menthol (Topical Analgesic) 2.5% (BENGAY VANISHING SCENT) 2.5 % topical gel   Topical Q6H PRN Mike Brumfield MD        metFORMIN (GLUCOPHAGE) tablet 1,000 mg  1,000 mg Oral BID w/meals Mike Brumfield MD   1,000 mg at 03/02/25 0753    methocarbamol (ROBAXIN) tablet 500 mg  500 mg Oral Q6H Myrtle Camarena APRN CNS   500 mg at 03/02/25 0753    miconazole (MICATIN) 2 % powder   Topical BID PRN Lilly Barrett MD        montelukast (SINGULAIR) tablet 10 mg  10 mg Oral At Bedtime Eren Montenegro MD   10 mg at 03/02/25 0225    naloxone (NARCAN) injection 0.2 mg  0.2 mg Intravenous Q2 Min PRN Chilo Adkins MD        Or    naloxone (NARCAN) injection 0.4 mg  0.4 mg Intravenous Q2 Min PRN Chilo Adkins MD        Or    naloxone (NARCAN) injection 0.2 mg  0.2 mg Intramuscular Q2 Min PRN Chilo Adkins MD        Or    naloxone (NARCAN) injection 0.4 mg  0.4 mg Intramuscular Q2 Min PRN Chilo Adkins MD         ondansetron (ZOFRAN ODT) ODT tab 4 mg  4 mg Oral Q6H PRN Luh Harper PA-C   4 mg at 03/01/25 2014    Or    ondansetron (ZOFRAN) injection 4 mg  4 mg Intravenous Q6H PRN Luh Harper PA-C        oxyCODONE (ROXICODONE) tablet 5 mg  5 mg Oral Q6H Myrtle Camarena APRN CNS   5 mg at 03/02/25 0753    oxyCODONE (ROXICODONE) tablet 5 mg  5 mg Oral Q3H PRN Myrtle Camarena APRN CNS   5 mg at 02/28/25 1128    Or    oxyCODONE IR (ROXICODONE) tablet 10 mg  10 mg Oral Q3H PRN Myrtle Camarena APRN CNS        polyethylene glycol (MIRALAX) Packet 17 g  17 g Oral BID PRN Mike Brumfield MD        polyethylene glycol (MIRALAX) Packet 17 g  17 g Oral Daily Luh Harper PA-C   17 g at 02/28/25 0810    prochlorperazine (COMPAZINE) injection 10 mg  10 mg Intravenous Q6H PRN Luh Harper PA-C   10 mg at 03/01/25 2228    Or    prochlorperazine (COMPAZINE) tablet 10 mg  10 mg Oral Q6H PRN Luh Harper PA-C        senna-docusate (SENOKOT-S/PERICOLACE) 8.6-50 MG per tablet 1 tablet  1 tablet Oral BID Luh Harper PA-C   1 tablet at 02/28/25 0810    senna-docusate (SENOKOT-S/PERICOLACE) 8.6-50 MG per tablet 1 tablet  1 tablet Oral BID PRN Lilly Barrett MD   1 tablet at 02/24/25 0153    Or    senna-docusate (SENOKOT-S/PERICOLACE) 8.6-50 MG per tablet 2 tablet  2 tablet Oral BID PRN Lilly Barrett MD        sertraline (ZOLOFT) tablet 150 mg  150 mg Oral Daily Eren Montenegro MD   150 mg at 03/02/25 0224    sodium chloride (PF) 0.9% PF flush 3 mL  3 mL Intracatheter Q8H Luh Harper PA-C   3 mL at 03/01/25 1729    sodium chloride (PF) 0.9% PF flush 3 mL  3 mL Intracatheter q1 min prn Luh Harper PA-C        sodium chloride (PF) 0.9% PF flush 3 mL  3 mL Intracatheter Q8H Lilly Barrett MD   3 mL at 03/01/25 1718    sodium chloride (PF) 0.9% PF flush 3 mL  3 mL Intracatheter q1 min prn Lilly Barrett MD               ALLERGIES:  Allergies   Allergen Reactions    Bydureon [Exenatide]  "Diarrhea    Penicillins Rash    Sulfa Antibiotics Rash         PHYSICAL EXAM:  Vital signs:  Temp: 97.4  F (36.3  C) Temp src: Temporal BP: 97/70 Pulse: 89   Resp: 20 SpO2: 93 % O2 Device: None (Room air) Oxygen Delivery: 2 LPM   Weight: 127.5 kg (281 lb 1.4 oz)  Estimated body mass index is 48.25 kg/m  as calculated from the following:    Height as of 2/18/25: 1.626 m (5' 4\").    Weight as of this encounter: 127.5 kg (281 lb 1.4 oz).    GENERAL:  female, in no acute distress.  Alert.   HEENT:  Normocephalic, atraumatic.   LUNGS:  Nonlabored breathing  SKIN: No rash on exposed skin.   NEURO: CN grossly intact, speech normal  PSYCH: Mentation appears normal      LABS:  CBC RESULTS:   Recent Labs   Lab Test 02/27/25  0952 02/23/25  0705 02/22/25  0718   WBC  --   --  8.8   RBC  --   --  4.02   HGB 8.6*   < > 10.4*   HCT  --   --  32.8*   MCV  --   --  82   MCH  --   --  25.9*   MCHC  --   --  31.7   RDW  --   --  14.0      < > 214    < > = values in this interval not displayed.       Recent Labs   Lab Test 02/28/25  1253 02/28/25  0738 02/23/25  0742 02/23/25  0705 02/22/25  0833 02/22/25  0718 02/21/25  0932 02/21/25  0726   NA  --   --   --   --   --  136  --  137   POTASSIUM  --   --   --   --   --  4.0  --  4.1   CHLORIDE  --   --   --   --   --  103  --  103   CO2  --   --   --   --   --  24  --  25   ANIONGAP  --   --   --   --   --  9  --  9   * 117*   < >  --    < > 109*   < > 155*   BUN  --   --   --   --   --  6.7  --  8.3   CR  --   --   --  0.44*  --  0.40*  --  0.41*   SHIRLEY  --   --   --   --   --  10.3  --  11.4*    < > = values in this interval not displayed.         PATHOLOGY:  Bone, right distal femur, biopsy-  Poorly-differentiated carcinoma with neuroendocrine features (see comment)   Electronically signed by Franco Machado MD on 2/28/2025 at 10:48 AM   Comment  [facility]   The morphologic features are that of a poorly differentiated carcinoma, metastatic.  The cytokeratin 7 " positive, cytokeratin 20 negative profile in combination with positive reactivity for CDX2 raises the possibility of an upper gastrointestinal origin for this tumor.  The noted synaptophysin positivity suggest neuroendocrine differentiation.  The markers typically associated with müllerian/ovarian lesions are negative in this case.  Clinical correlation is required.  Intradepartmental consultation has been obtained.       Wallace Phelps MD   Hematology/Oncology  [facility]

## 2025-03-02 NOTE — PLAN OF CARE
"Goal Outcome Evaluation:      Plan of Care Reviewed With: patient    Overall Patient Progress: no changeOverall Patient Progress: no change         Diet: Reg  Mental Status:  A&O x 4  Resp: 2L O2 via NC overnight   Pain: right hip, bilateral knees. Pain following   Mobility:  ceiling lift   LDA's: Right hip incision, sacral wound   Consults: Oncology, ortho, pain, SW for dispo   Other Cares: Phos protocol   GI/: Voiding with purewick. Nausea overnight   Discharge Disposition:  TCU likely   Discharge Time: pending    Pt with severe nausea and dry heaving, muscle cramps, chills overnight. VSS. . Some improvement with compazine and IV Dilaudid x 1. Orders obtained from x-cover. Labs drawn. Phos replaced, recheck this afternoon. Still need to obtain UA, on-coming nurse informed. Pt reports feeling much improved this morning and able to tolerate po intake.       Problem: Adult Inpatient Plan of Care  Goal: Plan of Care Review  Description: The Plan of Care Review/Shift note should be completed every shift.  The Outcome Evaluation is a brief statement about your assessment that the patient is improving, declining, or no change.  This information will be displayed automatically on your shift  note.  3/2/2025 0737 by Gia Vazquez RN  Outcome: Progressing  Flowsheets (Taken 3/2/2025 0737)  Plan of Care Reviewed With: patient  Overall Patient Progress: no change  3/2/2025 0737 by Gia Vazquez, RN  Outcome: Progressing  Flowsheets (Taken 3/2/2025 0737)  Plan of Care Reviewed With: patient  Overall Patient Progress: no change  Goal: Patient-Specific Goal (Individualized)  Description: You can add care plan individualizations to a care plan. Examples of Individualization might be:  \"Parent requests to be called daily at 9am for status\", \"I have a hard time hearing out of my right ear\", or \"Do not touch me to wake me up as it startles  me\".  3/2/2025 0737 by Gia Vazquez RN  Outcome: " Progressing  3/2/2025 0737 by Gia Vazquez RN  Outcome: Progressing  Goal: Absence of Hospital-Acquired Illness or Injury  3/2/2025 0737 by Gia Vazquez RN  Outcome: Progressing  3/2/2025 0737 by Gia Vazquez RN  Outcome: Progressing  Intervention: Identify and Manage Fall Risk  Recent Flowsheet Documentation  Taken 3/1/2025 2100 by Gia Vazquez RN  Safety Promotion/Fall Prevention:   assistive device/personal items within reach   safety round/check completed  Intervention: Prevent Skin Injury  Recent Flowsheet Documentation  Taken 3/1/2025 2300 by Gia Vazquez RN  Body Position:   turned   left   heels elevated  Taken 3/1/2025 2030 by Gia Vazquez RN  Body Position:   turned   right   heels elevated  Intervention: Prevent Infection  Recent Flowsheet Documentation  Taken 3/1/2025 2100 by Gia Vazquez RN  Infection Prevention:   cohorting utilized   rest/sleep promoted  Goal: Optimal Comfort and Wellbeing  3/2/2025 0737 by Gia Vazquez RN  Outcome: Progressing  3/2/2025 0737 by Gia Vazquez RN  Outcome: Progressing  Intervention: Monitor Pain and Promote Comfort  Recent Flowsheet Documentation  Taken 3/2/2025 0220 by Gia Vazquez RN  Pain Management Interventions: medication (see MAR)  Goal: Readiness for Transition of Care  3/2/2025 0737 by Gia Vazquez RN  Outcome: Progressing  3/2/2025 0737 by Gia Vazquez RN  Outcome: Progressing     Problem: Hip Fracture Medical Management  Goal: Optimal Coping with Change in Health Status  3/2/2025 0737 by Gia Vazquez RN  Outcome: Progressing  3/2/2025 0737 by Gia Vazquez RN  Outcome: Progressing  Goal: Absence of Bleeding  3/2/2025 0737 by Gia Vazquez RN  Outcome: Progressing  3/2/2025 0737 by Gia Vazquez RN  Outcome: Progressing  Goal: Effective Bowel Elimination  3/2/2025 0737 by  Gia Vazquez RN  Outcome: Progressing  3/2/2025 0737 by Gia Vazquez RN  Outcome: Progressing  Goal: Baseline Cognitive Function Maintained  3/2/2025 0737 by Gia Vazquez RN  Outcome: Progressing  3/2/2025 0737 by Gia Vazquez RN  Outcome: Progressing  Goal: Absence of Embolism  3/2/2025 0737 by Gia Vazquez, RN  Outcome: Progressing  3/2/2025 0737 by Gia Vazquez RN  Outcome: Progressing  Goal: Fracture Stability  3/2/2025 0737 by Gia Vazquez, RN  Outcome: Progressing  3/2/2025 0737 by Gia Vazquez RN  Outcome: Progressing  Goal: Optimal Functional Performance  3/2/2025 0737 by Gia Vazquez RN  Outcome: Progressing  3/2/2025 0737 by Gia Vazquez RN  Outcome: Progressing  Intervention: Promote Optimal Functional Status  Recent Flowsheet Documentation  Taken 3/1/2025 2300 by Gia Vazquez RN  Activity Management: activity adjusted per tolerance  Taken 3/1/2025 2030 by Gia Vazquez RN  Activity Management: back to bed  Goal: Pain Control and Function  3/2/2025 0737 by Gia Vazquez RN  Outcome: Progressing  3/2/2025 0737 by Gia Vazquez RN  Outcome: Progressing  Intervention: Manage Acute Orthopaedic-Related Pain  Recent Flowsheet Documentation  Taken 3/2/2025 0220 by Gia Vazquez RN  Pain Management Interventions: medication (see MAR)  Goal: Effective Urinary Elimination  3/2/2025 0737 by Gia Vazquez RN  Outcome: Progressing  3/2/2025 0737 by Gia Vazquez RN  Outcome: Progressing     Problem: Skin Injury Risk Increased  Goal: Skin Health and Integrity  3/2/2025 0737 by Gia Vazquez RN  Outcome: Progressing  3/2/2025 0737 by Gia Vazquez RN  Outcome: Progressing  Intervention: Plan: Nurse Driven Intervention: Moisture Management  Recent Flowsheet Documentation  Taken 3/1/2025 2100 by  Gia Vazquez RN  Moisture Interventions:   Incontinence pad   Urinary collection device  Intervention: Plan: Nurse Driven Intervention: Friction and Shear  Recent Flowsheet Documentation  Taken 3/1/2025 2100 by Gia Vazquez RN  Friction/Shear Interventions:   Assistive lifting device (portable/ceiling lift, etc.)   HOB 30 degrees or less   Silicone foam sacral dressing  Intervention: Optimize Skin Protection  Recent Flowsheet Documentation  Taken 3/1/2025 2300 by Gia Vazquez RN  Activity Management: activity adjusted per tolerance  Head of Bed (HOB) Positioning: HOB at 20-30 degrees  Taken 3/1/2025 2100 by Gia Vazquez RN  Pressure Reduction Techniques:   weight shift assistance provided   heels elevated off bed  Taken 3/1/2025 2030 by Gia Vazquez RN  Activity Management: back to bed  Head of Bed (HOB) Positioning: HOB at 20-30 degrees     Problem: Pain Acute  Goal: Optimal Pain Control and Function  3/2/2025 0737 by Gia Vazquez RN  Outcome: Progressing  3/2/2025 0737 by Gia Vazquez RN  Outcome: Progressing  Intervention: Develop Pain Management Plan  Recent Flowsheet Documentation  Taken 3/2/2025 0220 by Gia Vazquez RN  Pain Management Interventions: medication (see MAR)  Intervention: Prevent or Manage Pain  Recent Flowsheet Documentation  Taken 3/1/2025 2100 by Gia Vazquez RN  Medication Review/Management:   medications reviewed   high-risk medications identified     Problem: Comorbidity Management  Goal: Blood Glucose Levels Within Targeted Range  3/2/2025 0737 by Gia Vazquez RN  Outcome: Progressing  3/2/2025 0737 by Gia Vazquez RN  Outcome: Progressing  Intervention: Monitor and Manage Glycemia  Recent Flowsheet Documentation  Taken 3/1/2025 2100 by Gia Vazquez RN  Medication Review/Management:   medications reviewed   high-risk medications identified  Goal:  Blood Pressure in Desired Range  3/2/2025 0737 by Gia Vazquez, RN  Outcome: Progressing  3/2/2025 0737 by Gia Vazquez, RN  Outcome: Progressing  Intervention: Maintain Blood Pressure Management  Recent Flowsheet Documentation  Taken 3/1/2025 2100 by Gia Vazquez, RN  Medication Review/Management:   medications reviewed   high-risk medications identified

## 2025-03-02 NOTE — PLAN OF CARE
"Goal Outcome Evaluation:      Plan of Care Reviewed With: patient    Overall Patient Progress: improvingOverall Patient Progress: improving    Outcome Evaluation: Patient alert and oriented. VSS on room air during day. Pain managed with oral medications to be able to participate in lift to commode and chair. Assist of 2 lift. Voiding. Soft/loose stools. Dressing with small drainage, marked not extended. CMS in tact. Plans pending.        Problem: Adult Inpatient Plan of Care  Goal: Plan of Care Review  Description: The Plan of Care Review/Shift note should be completed every shift.  The Outcome Evaluation is a brief statement about your assessment that the patient is improving, declining, or no change.  This information will be displayed automatically on your shift  note.  Outcome: Progressing  Flowsheets (Taken 3/1/2025 1902)  Outcome Evaluation: Patient alert and oriented. VSS on room air during day.  Plan of Care Reviewed With: patient  Overall Patient Progress: improving  Goal: Patient-Specific Goal (Individualized)  Description: You can add care plan individualizations to a care plan. Examples of Individualization might be:  \"Parent requests to be called daily at 9am for status\", \"I have a hard time hearing out of my right ear\", or \"Do not touch me to wake me up as it startles  me\".  Outcome: Progressing  Goal: Absence of Hospital-Acquired Illness or Injury  Outcome: Progressing  Intervention: Identify and Manage Fall Risk  Recent Flowsheet Documentation  Taken 3/1/2025 0756 by Elida Aguayo, RN  Safety Promotion/Fall Prevention:   activity supervised   assistive device/personal items within reach   clutter free environment maintained   patient and family education   safety round/check completed  Intervention: Prevent Skin Injury  Recent Flowsheet Documentation  Taken 3/1/2025 1330 by Elida Aguayo, RN  Body Position: turned  Intervention: Prevent and Manage VTE (Venous Thromboembolism) Risk  Recent " Flowsheet Documentation  Taken 3/1/2025 0756 by Elida Aguayo RN  VTE Prevention/Management: (anticoagulation therapy) SCDs off (sequential compression devices)  Intervention: Prevent Infection  Recent Flowsheet Documentation  Taken 3/1/2025 0756 by Elida Aguayo RN  Infection Prevention:   rest/sleep promoted   single patient room provided  Goal: Optimal Comfort and Wellbeing  Outcome: Progressing  Intervention: Monitor Pain and Promote Comfort  Recent Flowsheet Documentation  Taken 3/1/2025 0742 by Elida Aguayo RN  Pain Management Interventions: medication (see MAR)  Goal: Readiness for Transition of Care  Outcome: Progressing     Problem: Hip Fracture Medical Management  Goal: Optimal Coping with Change in Health Status  Outcome: Progressing  Intervention: Support Psychosocial Response to Injury  Recent Flowsheet Documentation  Taken 3/1/2025 0756 by Elida Aguayo RN  Supportive Measures:   active listening utilized   verbalization of feelings encouraged   goal-setting facilitated  Goal: Absence of Bleeding  Outcome: Progressing  Intervention: Monitor and Manage Bleeding  Recent Flowsheet Documentation  Taken 3/1/2025 0756 by Elida Aguaoy RN  Bleeding Management: dressing monitored  Goal: Effective Bowel Elimination  Outcome: Progressing  Goal: Baseline Cognitive Function Maintained  Outcome: Progressing  Goal: Absence of Embolism  Outcome: Progressing  Intervention: Prevent or Manage Embolism Risk  Recent Flowsheet Documentation  Taken 3/1/2025 0756 by Elida Aguayo RN  VTE Prevention/Management: (anticoagulation therapy) SCDs off (sequential compression devices)  Goal: Fracture Stability  Outcome: Progressing  Intervention: Promote Fracture Stability and Healing  Recent Flowsheet Documentation  Taken 3/1/2025 0756 by Elida Aguayo RN  Equipment: ice  Goal: Optimal Functional Performance  Outcome: Progressing  Intervention: Promote Optimal Functional Status  Recent Flowsheet  Documentation  Taken 3/1/2025 1901 by Elida Aguayo RN  Activity Management: up in chair  Taken 3/1/2025 1330 by Elida Aguayo RN  Activity Management: back to bed  Taken 3/1/2025 1100 by Elida Aguayo RN  Activity Management:   up to bedside commode   up in chair  Taken 3/1/2025 0756 by Elida Aguayo RN  Activity Management: (stated she will get into chair for lunch and dinner) patient refuses activity  Goal: Pain Control and Function  Outcome: Progressing  Intervention: Manage Acute Orthopaedic-Related Pain  Recent Flowsheet Documentation  Taken 3/1/2025 0742 by Elida Aguayo RN  Pain Management Interventions: medication (see MAR)  Goal: Effective Urinary Elimination  Outcome: Progressing     Problem: Skin Injury Risk Increased  Goal: Skin Health and Integrity  Outcome: Progressing  Intervention: Plan: Nurse Driven Intervention: Moisture Management  Recent Flowsheet Documentation  Taken 3/1/2025 1901 by Elida Aguayo RN  Bathing/Skin Care: incontinence care  Taken 3/1/2025 1330 by Elida Aguayo RN  Bathing/Skin Care: incontinence care  Taken 3/1/2025 1100 by Elida Aguayo RN  Bathing/Skin Care:   incontinence care   linen changed  Taken 3/1/2025 0756 by Elida Aguayo RN  Moisture Interventions: Urinary collection device  Intervention: Plan: Nurse Driven Intervention: Friction and Shear  Recent Flowsheet Documentation  Taken 3/1/2025 0756 by Elida Aguayo RN  Friction/Shear Interventions: Assistive lifting device (portable/ceiling lift, etc.)  Intervention: Optimize Skin Protection  Recent Flowsheet Documentation  Taken 3/1/2025 1901 by Elida Aguayo RN  Activity Management: up in chair  Taken 3/1/2025 1330 by Elida Aguayo RN  Activity Management: back to bed  Taken 3/1/2025 1100 by Elida Aguayo RN  Activity Management:   up to bedside commode   up in chair  Taken 3/1/2025 0756 by Elida Aguayo RN  Activity Management: (stated she will get into chair for lunch and dinner)  patient refuses activity     Problem: Pain Acute  Goal: Optimal Pain Control and Function  Outcome: Progressing  Intervention: Optimize Psychosocial Wellbeing  Recent Flowsheet Documentation  Taken 3/1/2025 0756 by Elida Aguayo RN  Supportive Measures:   active listening utilized   verbalization of feelings encouraged   goal-setting facilitated  Intervention: Develop Pain Management Plan  Recent Flowsheet Documentation  Taken 3/1/2025 0742 by Elida Aguayo RN  Pain Management Interventions: medication (see MAR)  Intervention: Prevent or Manage Pain  Recent Flowsheet Documentation  Taken 3/1/2025 0756 by Elida gAuayo RN  Medication Review/Management: medications reviewed     Problem: Comorbidity Management  Goal: Blood Glucose Levels Within Targeted Range  Outcome: Progressing  Intervention: Monitor and Manage Glycemia  Recent Flowsheet Documentation  Taken 3/1/2025 0756 by Elida Aguayo RN  Medication Review/Management: medications reviewed  Goal: Blood Pressure in Desired Range  Outcome: Progressing  Intervention: Maintain Blood Pressure Management  Recent Flowsheet Documentation  Taken 3/1/2025 0756 by Elida Aguayo RN  Medication Review/Management: medications reviewed

## 2025-03-02 NOTE — PROGRESS NOTES
Cross cover notified that patient is not doing well the night.  She is having nausea and dry heaving despite receiving Zofran and Compazine.  She has chills and muscle cramps.  Unable to take scheduled oral medications.  She is postop day 4 for a pathologic femur fracture with new diagnosis of metastatic neuroendocrine disorder.  She also has a complex right adnexal cystic mass, unclear if this is contributing to her nausea and vomiting.  She is on a bowel regimen as she is also on pain medications and has had 3 soft BMs per her nurse.  Her phosphorus is now low at 1.9 as of this afternoon, she did receive Zometa for hypercalcemia of malignancy earlier in admission.  Her white blood cell count is now rising to 11.6, I do not see any recent steroid administration.  Afebrile.  -Follow-up with COVID19, influenza A/B, RSV PCR  -Obtain UA/UC looks like she had a UTI couple weeks ago  -Obtain 2 sets blood cultures, CBC, lactic acid phosphorus replacement protocol

## 2025-03-03 ENCOUNTER — APPOINTMENT (OUTPATIENT)
Dept: PHYSICAL THERAPY | Facility: CLINIC | Age: 55
DRG: 478 | End: 2025-03-03
Attending: STUDENT IN AN ORGANIZED HEALTH CARE EDUCATION/TRAINING PROGRAM
Payer: COMMERCIAL

## 2025-03-03 LAB
ALBUMIN SERPL BCG-MCNC: 2.8 G/DL (ref 3.5–5.2)
ALP SERPL-CCNC: 147 U/L (ref 40–150)
ALT SERPL W P-5'-P-CCNC: 24 U/L (ref 0–50)
ANION GAP SERPL CALCULATED.3IONS-SCNC: 10 MMOL/L (ref 7–15)
AST SERPL W P-5'-P-CCNC: 42 U/L (ref 0–45)
BACTERIA UR CULT: NORMAL
BASOPHILS # BLD AUTO: 0 10E3/UL (ref 0–0.2)
BASOPHILS NFR BLD AUTO: 0 %
BILIRUB SERPL-MCNC: 0.6 MG/DL
BUN SERPL-MCNC: 5.5 MG/DL (ref 6–20)
CALCIUM SERPL-MCNC: 8.1 MG/DL (ref 8.8–10.4)
CHLORIDE SERPL-SCNC: 104 MMOL/L (ref 98–107)
CREAT SERPL-MCNC: 0.43 MG/DL (ref 0.51–0.95)
EGFRCR SERPLBLD CKD-EPI 2021: >90 ML/MIN/1.73M2
EOSINOPHIL # BLD AUTO: 0.5 10E3/UL (ref 0–0.7)
EOSINOPHIL NFR BLD AUTO: 5 %
ERYTHROCYTE [DISTWIDTH] IN BLOOD BY AUTOMATED COUNT: 14.4 % (ref 10–15)
GLUCOSE BLDC GLUCOMTR-MCNC: 119 MG/DL (ref 70–99)
GLUCOSE BLDC GLUCOMTR-MCNC: 119 MG/DL (ref 70–99)
GLUCOSE BLDC GLUCOMTR-MCNC: 139 MG/DL (ref 70–99)
GLUCOSE BLDC GLUCOMTR-MCNC: 140 MG/DL (ref 70–99)
GLUCOSE BLDC GLUCOMTR-MCNC: 182 MG/DL (ref 70–99)
GLUCOSE SERPL-MCNC: 143 MG/DL (ref 70–99)
HCO3 SERPL-SCNC: 28 MMOL/L (ref 22–29)
HCT VFR BLD AUTO: 26.8 % (ref 35–47)
HGB BLD-MCNC: 8.2 G/DL (ref 11.7–15.7)
IMM GRANULOCYTES # BLD: 0.4 10E3/UL
IMM GRANULOCYTES NFR BLD: 4 %
LYMPHOCYTES # BLD AUTO: 1.4 10E3/UL (ref 0.8–5.3)
LYMPHOCYTES NFR BLD AUTO: 14 %
MCH RBC QN AUTO: 25.2 PG (ref 26.5–33)
MCHC RBC AUTO-ENTMCNC: 30.6 G/DL (ref 31.5–36.5)
MCV RBC AUTO: 82 FL (ref 78–100)
MONOCYTES # BLD AUTO: 1 10E3/UL (ref 0–1.3)
MONOCYTES NFR BLD AUTO: 10 %
NEUTROPHILS # BLD AUTO: 6.7 10E3/UL (ref 1.6–8.3)
NEUTROPHILS NFR BLD AUTO: 67 %
NRBC # BLD AUTO: 0 10E3/UL
NRBC BLD AUTO-RTO: 0 /100
PHOSPHATE SERPL-MCNC: 1.9 MG/DL (ref 2.5–4.5)
PLATELET # BLD AUTO: 405 10E3/UL (ref 150–450)
POTASSIUM SERPL-SCNC: 4 MMOL/L (ref 3.4–5.3)
PROT SERPL-MCNC: 6.5 G/DL (ref 6.4–8.3)
RBC # BLD AUTO: 3.26 10E6/UL (ref 3.8–5.2)
SODIUM SERPL-SCNC: 142 MMOL/L (ref 135–145)
WBC # BLD AUTO: 10 10E3/UL (ref 4–11)

## 2025-03-03 PROCEDURE — 84100 ASSAY OF PHOSPHORUS: CPT | Performed by: INTERNAL MEDICINE

## 2025-03-03 PROCEDURE — 250N000013 HC RX MED GY IP 250 OP 250 PS 637: Performed by: INTERNAL MEDICINE

## 2025-03-03 PROCEDURE — 97110 THERAPEUTIC EXERCISES: CPT | Mod: GP | Performed by: PHYSICAL THERAPIST

## 2025-03-03 PROCEDURE — 99232 SBSQ HOSP IP/OBS MODERATE 35: CPT | Performed by: INTERNAL MEDICINE

## 2025-03-03 PROCEDURE — 250N000011 HC RX IP 250 OP 636: Performed by: STUDENT IN AN ORGANIZED HEALTH CARE EDUCATION/TRAINING PROGRAM

## 2025-03-03 PROCEDURE — 99232 SBSQ HOSP IP/OBS MODERATE 35: CPT | Performed by: PHYSICIAN ASSISTANT

## 2025-03-03 PROCEDURE — 120N000001 HC R&B MED SURG/OB

## 2025-03-03 PROCEDURE — 80053 COMPREHEN METABOLIC PANEL: CPT | Performed by: INTERNAL MEDICINE

## 2025-03-03 PROCEDURE — 85014 HEMATOCRIT: CPT | Performed by: INTERNAL MEDICINE

## 2025-03-03 PROCEDURE — 250N000013 HC RX MED GY IP 250 OP 250 PS 637: Performed by: CLINICAL NURSE SPECIALIST

## 2025-03-03 PROCEDURE — 250N000013 HC RX MED GY IP 250 OP 250 PS 637

## 2025-03-03 PROCEDURE — 85004 AUTOMATED DIFF WBC COUNT: CPT | Performed by: INTERNAL MEDICINE

## 2025-03-03 PROCEDURE — 36415 COLL VENOUS BLD VENIPUNCTURE: CPT | Performed by: INTERNAL MEDICINE

## 2025-03-03 PROCEDURE — 97530 THERAPEUTIC ACTIVITIES: CPT | Mod: GP | Performed by: PHYSICAL THERAPIST

## 2025-03-03 RX ORDER — HYDROMORPHONE HCL IN WATER/PF 6 MG/30 ML
0.2 PATIENT CONTROLLED ANALGESIA SYRINGE INTRAVENOUS EVERY 8 HOURS PRN
Status: DISCONTINUED | OUTPATIENT
Start: 2025-03-03 | End: 2025-03-04

## 2025-03-03 RX ADMIN — POTASSIUM & SODIUM PHOSPHATES POWDER PACK 280-160-250 MG 2 PACKET: 280-160-250 PACK at 16:11

## 2025-03-03 RX ADMIN — GABAPENTIN 300 MG: 300 CAPSULE ORAL at 07:56

## 2025-03-03 RX ADMIN — FAMOTIDINE 20 MG: 20 TABLET, FILM COATED ORAL at 19:20

## 2025-03-03 RX ADMIN — ENOXAPARIN SODIUM 40 MG: 40 INJECTION SUBCUTANEOUS at 00:02

## 2025-03-03 RX ADMIN — METFORMIN HYDROCHLORIDE 1000 MG: 500 TABLET ORAL at 07:56

## 2025-03-03 RX ADMIN — ACETAMINOPHEN 650 MG: 325 TABLET, FILM COATED ORAL at 06:54

## 2025-03-03 RX ADMIN — LIDOCAINE 3 PATCH: 4 PATCH TOPICAL at 19:23

## 2025-03-03 RX ADMIN — GABAPENTIN 300 MG: 300 CAPSULE ORAL at 19:20

## 2025-03-03 RX ADMIN — IRBESARTAN 300 MG: 150 TABLET ORAL at 21:21

## 2025-03-03 RX ADMIN — ATORVASTATIN CALCIUM 40 MG: 40 TABLET, FILM COATED ORAL at 07:56

## 2025-03-03 RX ADMIN — POTASSIUM & SODIUM PHOSPHATES POWDER PACK 280-160-250 MG 2 PACKET: 280-160-250 PACK at 23:57

## 2025-03-03 RX ADMIN — OXYCODONE HYDROCHLORIDE 5 MG: 5 TABLET ORAL at 13:51

## 2025-03-03 RX ADMIN — AMLODIPINE BESYLATE 10 MG: 10 TABLET ORAL at 07:56

## 2025-03-03 RX ADMIN — SENNOSIDES AND DOCUSATE SODIUM 1 TABLET: 50; 8.6 TABLET ORAL at 19:21

## 2025-03-03 RX ADMIN — METHOCARBAMOL 500 MG: 500 TABLET ORAL at 21:21

## 2025-03-03 RX ADMIN — POLYETHYLENE GLYCOL 3350 17 G: 17 POWDER, FOR SOLUTION ORAL at 07:55

## 2025-03-03 RX ADMIN — ENOXAPARIN SODIUM 40 MG: 40 INJECTION SUBCUTANEOUS at 23:57

## 2025-03-03 RX ADMIN — SERTRALINE HYDROCHLORIDE 150 MG: 100 TABLET ORAL at 19:23

## 2025-03-03 RX ADMIN — OXYCODONE HYDROCHLORIDE 5 MG: 5 TABLET ORAL at 19:21

## 2025-03-03 RX ADMIN — SENNOSIDES AND DOCUSATE SODIUM 1 TABLET: 50; 8.6 TABLET ORAL at 07:56

## 2025-03-03 RX ADMIN — ACETAMINOPHEN 650 MG: 325 TABLET, FILM COATED ORAL at 19:20

## 2025-03-03 RX ADMIN — POTASSIUM & SODIUM PHOSPHATES POWDER PACK 280-160-250 MG 2 PACKET: 280-160-250 PACK at 21:21

## 2025-03-03 RX ADMIN — METHOCARBAMOL 500 MG: 500 TABLET ORAL at 07:56

## 2025-03-03 RX ADMIN — ACETAMINOPHEN 650 MG: 325 TABLET, FILM COATED ORAL at 12:46

## 2025-03-03 RX ADMIN — MONTELUKAST 10 MG: 10 TABLET, FILM COATED ORAL at 21:21

## 2025-03-03 RX ADMIN — HYDROXYZINE HYDROCHLORIDE 25 MG: 25 TABLET, FILM COATED ORAL at 06:54

## 2025-03-03 RX ADMIN — ACETAMINOPHEN 650 MG: 325 TABLET, FILM COATED ORAL at 02:00

## 2025-03-03 RX ADMIN — METHOCARBAMOL 500 MG: 500 TABLET ORAL at 13:51

## 2025-03-03 RX ADMIN — ENOXAPARIN SODIUM 40 MG: 40 INJECTION SUBCUTANEOUS at 12:47

## 2025-03-03 RX ADMIN — OXYCODONE HYDROCHLORIDE 5 MG: 5 TABLET ORAL at 02:00

## 2025-03-03 RX ADMIN — METFORMIN HYDROCHLORIDE 1000 MG: 500 TABLET ORAL at 18:03

## 2025-03-03 RX ADMIN — OXYCODONE HYDROCHLORIDE 5 MG: 5 TABLET ORAL at 07:56

## 2025-03-03 RX ADMIN — METHOCARBAMOL 500 MG: 500 TABLET ORAL at 02:00

## 2025-03-03 RX ADMIN — FAMOTIDINE 20 MG: 20 TABLET, FILM COATED ORAL at 07:56

## 2025-03-03 ASSESSMENT — ACTIVITIES OF DAILY LIVING (ADL)
ADLS_ACUITY_SCORE: 52

## 2025-03-03 NOTE — CONSULTS
"SPIRITUAL HEALTH SERVICES - Consult Note  [facility]  Referral Source/Reason for Visit: [facility] consult.    Summary and Recommendations -  Pt Brissa reported that she has \"stage IV cancer with mets to the bones\" and processed her thoughts and feelings about the new diagnosis.  She reflected that she wants to \"stay positive\" and shared that she wants to stay alive as long as possible to see her grandchildren grow up.  Brissa is Mosque but not affiliated with a bala community.  She welcomes prayer.    Plan: Reviewed with pt how she can request further  support.  This author and other chaplains remain available per pt/family request.     Angel Navarro M.Div., [facility]  Staff     SHS available 24/7 for emergent requests/referrals, either by paging the on-call  or by entering an ASAP/STAT consult in [facility], which will also page the on-call .    Assessment    Saw pt Brissa Mott per [facility] consult; follow-up encounter to assess further needs per pt's new diagnosis.    Patient/Family Understanding of Illness and Goals of Care -   Brissa reported that she has \"stage IV cancer with mets to the bones.\"  She anticipates that she will discharge soon to a TCU for rehab and then start radiation.    Distress and Loss - She processed her thoughts and feelings about her new diagnosis (see coping below).    Strengths, Coping, and Resources -   Brissa shared that she wants to \"stay positive\" and \"find the positive\" with each \"baby step\" she takes with her care plan.  She reflected that she is motivated to stay alive for as long as possible to see her grandchildren grow up.  Brissa finds elias in being with her grandchildren, and her face lights up when she talks about them.    Meaning, Beliefs, and Spirituality - Brissa is Mosque but not affiliated with a bala community.  She welcomed prayer and engaged in a few moments of guided prayerful breathing before praying.   "

## 2025-03-03 NOTE — PROGRESS NOTES
[facility]  Hospitalist Progress Note  Mike Brumfield MD 03/03/2025    Reason for Stay (Diagnosis): Pathologic fracture         Assessment and Plan:      Summary of Stay: Brissa Mott is a 54 year old female with past medical history including previous left-sided CVA, type 2 diabetes, hypertension, morbid obesity on Ozempic, KATHRINE, admitted on 2/20/2025 from transitional care unit with severe hypercalcemia and a distal right femur fracture found to have a lytic mass.    Orthopedic surgery was consulted and we expanded workup for suspected metastatic malignancy with CT chest abdomen and pelvis which showed a 9.9 cm complex right adnexal cystic mass, multiple pulmonary nodules.  Oncology was consulted as well.    Hypercalcemia normalized from 14.7 with IV fluids.  She underwent an IR biopsy of her distal right femur pathologic fracture 2/24 to help guide further management.      She was taken to the OR for ORIF of her pathologic fracture on 2/25.    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  Markers typically associated with ovarian lesions are negative.  Oncology is following.  MRI brain thankfully is negative for metastatic disease.    She was seen by oncology.  Tumor lysis labs not compatible with tumor lysis syndrome.  Current plan appears to be for her to establish care with [facility] either in [place] or [facility] in [facility].    Problem List/Assessment and Plan:   Severe hypercalcemia of malignancy with apparent pathologic distal right femur fracture, also noted to have 9.9 cm complex right adnexal cystic mass, pulmonary nodules: Pathology as above shows a poorly differentiated carcinoma, possible upper GI origin with neuroendocrine differentiation oncology, orthopedic surgery both following.  --IR biopsy of pathologic fracture 2/24 with pathology as  "above  --Oncology following.  --MRI brain negative  --to OR w/ Orthopedics on 2/25 for ORIF  -- Pain control with Tylenol, Robaxin, oxycodone, Dilaudid and hydroxyzine.  Added gabapentin.  -- Narcotic induced constipation noted, on a good bowel regimen.  Added extra prn miralax.  --pain management consulted  -- PT consulted, likely will need TCU    2.   Type 2 diabetes mellitus: Continue SSI.  Ozempic on hold.  Hypoglycemia protocol.  Home twice daily insulin mix on hold.  -- Added Lantus on 2/26.  24 units daily.  -- SSI high scale  -- Trend, titrate as needed.  Might need to add some mealtime insulin soon.  --added back metformin (home med)    3.   History of hypertension: Currently on amlodipine 10 mg, irbesartan 300 mg    4.   Depression: Continue sertraline 150 mg daily    5.  Social: Completed FMLA paperwork on 2/26 for her .    6.  Acute blood loss anemia: Recheck.  Infuse for hemoglobin greater than 7.    7.  Spell overnight early 3/2: Cause unclear.  Consider panic attack versus related to neuroendocrine tumor.  Currently resolved.  Monitor and treat supportively.  Rule out underlying infection.  -- infectious workup started, procalcitonin low.  Urine analysis negative for infection.  Blood cultures pending.  -- COVID, influenza and RSV testing negative.  8.  Chronic left knee pain: Reports severe osteoarthritis which previously was improved with hyaluronic acid injection.    -- She would like to discuss this with orthopedic surgery.    DVT Prophylaxis: Enoxaparin  Code Status: Full Code  Medically Ready for Discharge: Anticipated Today.  Needs TCU placement.      Clinically Significant Risk Factors               # Hypoalbuminemia: Lowest albumin = 2.7 g/dL at 3/1/2025  6:44 AM, will monitor as appropriate     # Hypertension: Noted on problem list            # Severe Obesity: Estimated body mass index is 48.25 kg/m  as calculated from the following:    Height as of 2/18/25: 1.626 m (5' 4\").    Weight " as of this encounter: 127.5 kg (281 lb 1.4 oz).      # Financial/Environmental Concerns: none  # Asthma: noted on problem list              Interval History (Subjective):        She again asks me about hyaluronic acid injection in her left knee, we will request orthopedic surgery address this with her.  She is concerned she will be unable to rehab without it.  Pain control reasonable, working on TCU placement  Seems to be medically stable to discharge to TCU once placement is found and left knee is addressed by orthopedic surgery.                    Physical Exam:      Last Vital Signs:  BP (!) 161/86 (BP Location: Right arm, Patient Position: Semi-Villanueva's, Cuff Size: Adult Regular)   Pulse 86   Temp 98.4  F (36.9  C) (Temporal)   Resp 18   Wt 127.5 kg (281 lb 1.4 oz)   LMP  (LMP Unknown)   SpO2 94%   BMI 48.25 kg/m      I/O last 3 completed shifts:  In: -   Out: 1400 [Urine:1400]    General: Alert, awake, no acute distress.  No sedation.  HEENT: NC/AT, eyes anicteric, external occular movements intact, face symmetric.  Cardiac: RRR, S1, S2.  No murmurs appreciated.  Pulmonary: Normal chest rise, normal work of breathing.  Lungs CTA BL  Abdomen: soft, non-tender, non-distended.  Bowel Sounds Present.  No guarding.  Extremities: Some mild swelling around distal right thigh no deformities.  Warm, well perfused.  Skin: no rashes or lesions noted.  Warm and Dry.  Neuro: No focal deficits noted.  Speech clear.  Coordination and strength grossly normal.  Psych: Appropriate affect.         Medications:      All current medications were reviewed with changes reflected in problem list.         Data:      All new lab and imaging data was reviewed.   Labs:  Recent Labs   Lab 03/03/25  0201 03/01/25  0751 03/01/25  0644   NA  --   --  138   POTASSIUM  --   --  3.8   CHLORIDE  --   --  104   CO2  --   --  26   ANIONGAP  --   --  8   *   < > 181*   BUN  --   --  7.9   CR  --   --  0.45*   GFRESTIMATED  --   --  >90    SHIRLEY  --   --  7.9*    < > = values in this interval not displayed.     Recent Labs   Lab 03/02/25  0636 03/02/25  0021   WBC  --  11.0   HGB  --  8.3*   HCT  --  26.2*   MCV  --  81    378      Imaging:   No results found for this or any previous visit (from the past 48 hours).      Mike Brumfield MD     I've spent 50 minutes in chart review, ordering medications and tests, obtaining additional history as needed, evaluating the patient and in documentation for this encounter.

## 2025-03-03 NOTE — CONSULTS
PROGRESS NOTE    SUBJECTIVE  Brissa Mott is status post open reduction and internal fixation with IM frankie of the right femur for pathological fracture on 2/25/2025 (Dr Meredith). No acute events overnight. Afebrile with stable vitals. She is complaining of significant left knee pain with known advanced arthritis.  Unable to weight bear on the left due to knee pain and limited weight bearing on the right.     PHYSICAL EXAM  General: No acute distress. Alert and oriented to person, time and place.  Pulmonary: Breathing comfortably on room air.   Musculoskeletal: Postoperative dressing does have 30% drainage. No erythema surrounding dressing. Patient demonstrates active range of motion of the operative hip. Compartments are soft and nontender.  Left knee: moderate effusion.  Medial joint line tenderness.    Neurological: Patient is able to dorsi and plantar flex bilaterally.    Pulses intact and equal     LABS  Recent Labs   Lab 03/03/25  0838 03/02/25  0021 03/01/25  0644 02/27/25  0952 02/26/25  0725   HGB 8.2* 8.3* 8.4* 8.6* 9.5*     WBC counts: 11.0 (3/2/2025), 11.6 (3/1/2025), 8.8 (2/22/2025)    IMAGING  Post-operative radiographs were reviewed and demonstrate components in good position.     ASSESSMENT/PLAN  Status post open reduction and internal fixation with Im frankie of the right femur for pathological fracture on 2/25/2025.    Activity: 25% weight bearing on right LE. WBAT Left LE. Okay to range right knee at rest.  Activity as tolerated. No precautions. Continue to work with PT while in hospital.   Antibiotics: Weight-based Ancef x2 doses postoperatively or until discharge.  Diet: Advance as tolerated. Goal diet: General.   Dressing: Patient is able to shower. Patient will keep dressing in place until post-op day 7.  Change if saturated.   DVT Prophylaxis: Lovenox 40 mg  Pain medication: Multimodal pain regimen with ice, elevation, Tylenol, Robaxin, Oxycodone, Dilaudid, Atarax  LEFT KNEE: discussed trial of a  left knee cortisone injection. This will be performed tomorrow morning at bedside.  She is understanding this will likely provide very short term relief.  Definitive treatment is a TKA.     Disposition: Discharge to TCU.

## 2025-03-03 NOTE — PLAN OF CARE
Goal Outcome Evaluation:      Plan of Care Reviewed With: patient    Overall Patient Progress: no changeOverall Patient Progress: no change     A&ox4, RA, VSS. Pain treated with scheduled medications. Up in chair for 1 hour. X2 lift back to bed, wound cares complete. Purewick in place. Phos protocol. Pending discharge plans      Problem: Adult Inpatient Plan of Care  Goal: Plan of Care Review  Outcome: Progressing  Flowsheets (Taken 3/3/2025 1441)  Plan of Care Reviewed With: patient  Overall Patient Progress: no change  Goal: Patient-Specific Goal (Individualized)  Outcome: Progressing  Goal: Absence of Hospital-Acquired Illness or Injury  Outcome: Progressing  Intervention: Identify and Manage Fall Risk  Recent Flowsheet Documentation  Taken 3/3/2025 1400 by Ruby Thurman RN  Safety Promotion/Fall Prevention:   activity supervised   assistive device/personal items within reach   safety round/check completed   room organization consistent  Intervention: Prevent Skin Injury  Recent Flowsheet Documentation  Taken 3/3/2025 1400 by Ruby Thurman RN  Body Position:   turned   left  Intervention: Prevent and Manage VTE (Venous Thromboembolism) Risk  Recent Flowsheet Documentation  Taken 3/3/2025 1243 by Ruby Thurman RN  VTE Prevention/Management: SCDs off (sequential compression devices)  Intervention: Prevent Infection  Recent Flowsheet Documentation  Taken 3/3/2025 1400 by Ruby Thurman RN  Infection Prevention:   hand hygiene promoted   rest/sleep promoted  Goal: Optimal Comfort and Wellbeing  Outcome: Progressing  Goal: Readiness for Transition of Care  Outcome: Progressing     Problem: Hip Fracture Medical Management  Goal: Optimal Coping with Change in Health Status  Outcome: Progressing  Goal: Absence of Bleeding  Outcome: Progressing  Goal: Effective Bowel Elimination  Outcome: Progressing  Goal: Baseline Cognitive Function Maintained  Outcome: Progressing  Goal: Absence of Embolism  Outcome:  Progressing  Intervention: Prevent or Manage Embolism Risk  Recent Flowsheet Documentation  Taken 3/3/2025 1243 by Ruby Thurman RN  VTE Prevention/Management: SCDs off (sequential compression devices)  Goal: Fracture Stability  Outcome: Progressing  Goal: Optimal Functional Performance  Outcome: Progressing  Intervention: Promote Optimal Functional Status  Recent Flowsheet Documentation  Taken 3/3/2025 1400 by Ruby Thurman RN  Range of Motion: active ROM (range of motion) encouraged  Activity Management:   activity adjusted per tolerance   activity encouraged  Goal: Pain Control and Function  Outcome: Progressing  Goal: Effective Urinary Elimination  Outcome: Progressing     Problem: Skin Injury Risk Increased  Goal: Skin Health and Integrity  Outcome: Progressing  Intervention: Plan: Nurse Driven Intervention: Moisture Management  Recent Flowsheet Documentation  Taken 3/3/2025 1243 by Ruby Thurman RN  Moisture Interventions:   Incontinence pad   Fecal collection device   Encourage regular toileting  Intervention: Plan: Nurse Driven Intervention: Friction and Shear  Recent Flowsheet Documentation  Taken 3/3/2025 1243 by Ruby Thurman RN  Friction/Shear Interventions:   HOB 30 degrees or less   Silicone foam sacral dressing  Intervention: Optimize Skin Protection  Recent Flowsheet Documentation  Taken 3/3/2025 1400 by Ruby Thurman RN  Activity Management:   activity adjusted per tolerance   activity encouraged  Head of Bed (HOB) Positioning: HOB at 15 degrees     Problem: Pain Acute  Goal: Optimal Pain Control and Function  Outcome: Progressing  Intervention: Prevent or Manage Pain  Recent Flowsheet Documentation  Taken 3/3/2025 1400 by Ruby Thurman RN  Medication Review/Management:   medications reviewed   high-risk medications identified     Problem: Comorbidity Management  Goal: Blood Glucose Levels Within Targeted Range  Outcome: Progressing  Intervention: Monitor and Manage  Glycemia  Recent Flowsheet Documentation  Taken 3/3/2025 1400 by Ruby Thurman, RN  Medication Review/Management:   medications reviewed   high-risk medications identified  Goal: Blood Pressure in Desired Range  Outcome: Progressing  Intervention: Maintain Blood Pressure Management  Recent Flowsheet Documentation  Taken 3/3/2025 1400 by Ruby Thurman, RN  Medication Review/Management:   medications reviewed   high-risk medications identified

## 2025-03-03 NOTE — PROGRESS NOTES
[facility] ACUTE INPATIENT PAIN SERVICE    [facility], [facility], [facility], [facility], [place]   PAIN PROGRESS          Assessment/Plan:  Brissa Mott is a 54 year old female who was admitted on 2/20/2025.  Pain Service is asked to see the patient for assistance with cancer related pain.      Medical history significant for previous left-sided CVA, Type 2 diabetes, hypertension, morbid obesity on Ozempic, KATHRINE.     Recent hospitalization 1/30 to 2/4/25 at [facility] for right knee pain and inability to bear weight patient was discharged to TCU for rehab, she sustained a fall and was subsequently admitted here at [facility].      Admitted from transitional care unit with evaluation of severe hypercalcemia, distal right femur fracture found to have a lytic mass.  Orthopedic surgery was consulted with expanded workup for suspected metastatic malignancy with CT chest abdomen and pelvis which showed a 9.9 cm complex right adnexal cystic mass, multiple pulmonary nodules.     Patient is status post op day #2 open reduction internal fixation with intramedullary rodding of right hip fracture     Oncology Consulted: Right adnexal mass, elevated CA-125, metastatic disease with pulmonary nodules, hepatic lesions and lymphadenopathy, lytic lesions in humeral head, sacrum, iliac bone   Right femur fracture with intramedullary marrow replacing lesion.        shows 5 total opioid prescriptions.  Most recently   02/11/25 oxycodone 10 mg tablets 2 for 1 day  02/09/25 oxycodone 10 mg tablets 14 for 2 days  02/04/25 oxycodone 10 mg tablets 13 for 2 days.   02/04/25 diazepam 5 mg tablets 9 for 2 days     Reviewed medication reconciliation 2/18/25 no changes on readmission 2/20/25.    Lidocaine patches, oxycodone 10 mg every four hours prn,  valium 5 mg every 6 hours prn, tylenol 1000 mg tid prn     Over the past 24 hours patient has received:  4(5mg) oxycodone for a total of 30 morphine equivalents           Subjective:  Brissa is seen today in her bed alert and oriented in no acute distress. Reports her pain is currently a 4/10 located in her right hip. Reports pain has been gradually improving and she has not  needed to use any of her breakthrough medications.     Denies nausea, vomiting, constipation.      Reviewed continuing with current plan, all questions answered.       PLAN:  Cancer related pain and acute post operative pain  Multimodal Medication Therapy:   Adjuvants: tylenol 975 mg every 6 hours scheduled with oxycodone Methocarbamol 500 mg every 6 hours gabapentin 300 mg bid  Opioids: Oxycodone 5 mg every 6 hours with additional  Oxycodone 5-10 mg every 3 hours prn, IV Dilaudid 0.2mg every 3 hours prn changed to q8h prn for severe pain not managed by oral pain medications    Non-medication interventions- Ice, heat prn  Constipation Prophylaxis- senna-S 1 tablet bid, miralax daily  Follow up /Discharge Recommendations - We recommend prescribing the following at the time of discharge:    May benefit from long acting opioids will continue to assess    History   Drug Use Unknown         Tobacco Use      Smoking status: Former      Smokeless tobacco: Never        Objective:  Vital signs in last 24 hours:  B/P: 125/76, T: 98.1, P: 90, R: 18   Blood pressure 125/76, pulse 90, temperature 98.1  F (36.7  C), temperature source Temporal, resp. rate 18, weight 127.5 kg (281 lb 1.4 oz), SpO2 95%, not currently breastfeeding.        Review of Systems:   As per subjective, all others negative.    Physical Exam    General: in no apparent distress and non-toxic   HEENT: Head normocephalic atraumatic, oral mucosa moist. Sclerae anicteric  CV: Regular rhythm, normal rate, no murmurs  Resp: No wheezes, no rales or rhonchi, no focal consolidations  GI: Non-tender; +bs  Skin: Dressing CDI  Extremities: No peripheral edema  Psych: Normal affect, mood euthymic  Neuro: Grossly normal          Imaging:  Personally  Reviewed.    Results for orders placed or performed during the hospital encounter of 02/20/25   CT Chest/Abdomen/Pelvis w Contrast    Impression    IMPRESSION:  1.  Findings suspicious for metastatic disease. Enlarged left axillary lymph nodes, measuring up to 4.7 cm. Bilateral pulmonary nodules measuring up to 1.0 cm. Ill-defined hepatic lesions, measuring up to 2.9 cm. Right lower retroperitoneal 1.7 cm   nodule. Right pelvic lymphadenopathy, measuring up to 2.4 cm. Lytic lesions in the right humeral head, right sacrum, and left posterior iliac bone.    2.  Right adnexal 9.9 cm complex cystic mass, suspicious for malignancy, either metastatic or primary ovarian neoplasm.    3.  Left upper anterior chest wall 0.9 cm subcutaneous nodule and right gluteal 0.7 cm subcutaneous nodule, indeterminate and may be metastatic.   CT Bone Biopsy Superficial    Impression    IMPRESSION:  1.  Successful CT-guided biopsy of a soft tissue mass adjacent to the lytic lesion involving the distal right femur.    Reference CPT Codes: 31959, ,   MR Brain w/o & w Contrast    Impression    IMPRESSION:  1.  No intracranial pathologic enhancement to indicate metastatic disease.    2.  Calvarium demonstrates diffuse loss normal fatty marrow, nonspecific. Expanded differential provided above.    3.  No acute intracranial process identified.    4.  Chronic intracranial changes described above.        Lab Results:  Personally Reviewed.   Last Comprehensive Metabolic Panel:  Sodium   Date Value Ref Range Status   03/01/2025 138 135 - 145 mmol/L Final   04/29/2021 137 133 - 144 mmol/L Final     Potassium   Date Value Ref Range Status   03/01/2025 3.8 3.4 - 5.3 mmol/L Final   06/07/2022 4.1 3.4 - 5.3 mmol/L Final   04/29/2021 3.9 3.4 - 5.3 mmol/L Final     Chloride   Date Value Ref Range Status   03/01/2025 104 98 - 107 mmol/L Final   06/07/2022 105 94 - 109 mmol/L Final   04/29/2021 104 94 - 109 mmol/L Final     Carbon Dioxide   Date Value Ref  "Range Status   04/29/2021 29 20 - 32 mmol/L Final     Carbon Dioxide (CO2)   Date Value Ref Range Status   03/01/2025 26 22 - 29 mmol/L Final   06/07/2022 26 20 - 32 mmol/L Final     Anion Gap   Date Value Ref Range Status   03/01/2025 8 7 - 15 mmol/L Final   06/07/2022 7 3 - 14 mmol/L Final   04/29/2021 4 3 - 14 mmol/L Final     Glucose   Date Value Ref Range Status   06/07/2022 156 (H) 70 - 99 mg/dL Final   04/29/2021 71 70 - 99 mg/dL Final     GLUCOSE BY METER POCT   Date Value Ref Range Status   03/03/2025 119 (H) 70 - 99 mg/dL Final     Urea Nitrogen   Date Value Ref Range Status   03/01/2025 7.9 6.0 - 20.0 mg/dL Final   06/07/2022 18 7 - 30 mg/dL Final   04/29/2021 22 7 - 30 mg/dL Final     Creatinine   Date Value Ref Range Status   03/01/2025 0.45 (L) 0.51 - 0.95 mg/dL Final   04/29/2021 0.75 0.52 - 1.04 mg/dL Final     GFR Estimate   Date Value Ref Range Status   03/01/2025 >90 >60 mL/min/1.73m2 Final     Comment:     eGFR calculated using 2021 CKD-EPI equation.   04/29/2021 >90 >60 mL/min/[1.73_m2] Final     Comment:     Non  GFR Calc  Starting 12/18/2018, serum creatinine based estimated GFR (eGFR) will be   calculated using the Chronic Kidney Disease Epidemiology Collaboration   (CKD-EPI) equation.       Calcium   Date Value Ref Range Status   03/01/2025 7.9 (L) 8.8 - 10.4 mg/dL Final   04/29/2021 8.7 8.5 - 10.1 mg/dL Final        UA: No results found for: \"UAMP\", \"UBARB\", \"BENZODIAZEUR\", \"UCANN\", \"UCOC\", \"OPIT\", \"UPCP\"           Please see A&P for additional details of medical decision making.      Ramy Mora PA-C  Acute Care Pain Management  Team  Hours of pain coverage Mon-Fri 8-1600, afterhours please call the primary team   [facility] ([place]AR[place], [place], [place], [place])   Page via Vocera text web console -Click for [employer]            "

## 2025-03-03 NOTE — CONSULTS
Care Management Follow Up    Length of Stay (days): 11    Expected Discharge Date: 03/05/2025     Concerns to be Addressed: discharge planning     Patient plan of care discussed at interdisciplinary rounds: Yes    Anticipated Discharge Disposition:  TCU    Additional Information:  New care management consult generated for elevated risk score. See consult completed 2/28. Care management following for discharge planning.     Pam Mcclain RN  Care Coordinator  [facility]

## 2025-03-03 NOTE — PROGRESS NOTES
Care Management Follow Up    Length of Stay (days): 11    Expected Discharge Date: 03/05/2025     Concerns to be Addressed: discharge planning     Patient plan of care discussed at interdisciplinary rounds: No    Anticipated Discharge Disposition:  TCU    Patient/family educated on Medicare website which has current facility and service quality ratings:  yes  Education Provided on the Discharge Plan:  yes  Patient/Family in Agreement with the Plan:  yes    Referrals Placed by CM/SW:  TCU  Private pay costs discussed: transportation costs    Discussed  Partnership in Safe Discharge Planning  document with patient/family: No     Handoff Completed: Yes, [facility] PCP: Internal handoff referral completed    Additional Information:  Met with patient and obtained TCU choices. Would like to remain in [facility] if possible. Patient would like initial referrals sent to [facility], [facility], [facility] and [facility]   Reviewed transportation options. Reviewed out of pocket cost for [facility] transport, $90.70 base and $6.08 per mile to the destination. Spoke with patient, they expressed understanding and are agreeable to this.    Initial TCU referrals sent to listed facilities. Care management to continue to follow for discharge.    Next Steps: follow up on TCU referrals    Pam Mcclain RN  Care Coordinator  [facility]

## 2025-03-03 NOTE — PLAN OF CARE
"Goal Outcome Evaluation:      Plan of Care Reviewed With: spouse, patient    Overall Patient Progress: improvingOverall Patient Progress: improving    Outcome Evaluation: No acute events overnight    A&O x 4. VSS. Tolerating diet. No nausea or vomiting. Voiding via purewick. Sacral/buttock wound with foam dressing in place. Side to side repositioning overnight. Aqual cell dressing with small serosanguinous drainage. Pain managed with scheduled po meds. Some anxiety at HS, improved after PRN Atarax. Phos protocol.  for dispo- plan for TCU.       Problem: Adult Inpatient Plan of Care  Goal: Plan of Care Review  Description: The Plan of Care Review/Shift note should be completed every shift.  The Outcome Evaluation is a brief statement about your assessment that the patient is improving, declining, or no change.  This information will be displayed automatically on your shift  note.  Outcome: Progressing  Flowsheets (Taken 3/3/2025 0536)  Outcome Evaluation: No acute events overnight  Plan of Care Reviewed With:   spouse   patient  Overall Patient Progress: improving  Goal: Patient-Specific Goal (Individualized)  Description: You can add care plan individualizations to a care plan. Examples of Individualization might be:  \"Parent requests to be called daily at 9am for status\", \"I have a hard time hearing out of my right ear\", or \"Do not touch me to wake me up as it startles  me\".  Outcome: Progressing  Goal: Absence of Hospital-Acquired Illness or Injury  Outcome: Progressing  Intervention: Identify and Manage Fall Risk  Recent Flowsheet Documentation  Taken 3/2/2025 2000 by Gia Vazquez RN  Safety Promotion/Fall Prevention:   assistive device/personal items within reach   safety round/check completed  Intervention: Prevent Skin Injury  Recent Flowsheet Documentation  Taken 3/2/2025 2000 by Gia Vazquez, RN  Body Position:   turned   right   weight shifting   heels elevated  Skin Protection: " adhesive use limited  Intervention: Prevent Infection  Recent Flowsheet Documentation  Taken 3/2/2025 2000 by Gia Vazquez RN  Infection Prevention:   cohorting utilized   rest/sleep promoted  Goal: Optimal Comfort and Wellbeing  Outcome: Progressing  Intervention: Monitor Pain and Promote Comfort  Recent Flowsheet Documentation  Taken 3/2/2025 2029 by Gia Vazquez RN  Pain Management Interventions: medication (see MAR)  Goal: Readiness for Transition of Care  Outcome: Progressing     Problem: Hip Fracture Medical Management  Goal: Optimal Coping with Change in Health Status  Outcome: Progressing  Goal: Absence of Bleeding  Outcome: Progressing  Goal: Effective Bowel Elimination  Outcome: Progressing  Goal: Baseline Cognitive Function Maintained  Outcome: Progressing  Goal: Absence of Embolism  Outcome: Progressing  Goal: Fracture Stability  Outcome: Progressing  Goal: Optimal Functional Performance  Outcome: Progressing  Intervention: Promote Optimal Functional Status  Recent Flowsheet Documentation  Taken 3/2/2025 2000 by Gia Vazquez RN  Activity Management: activity adjusted per tolerance  Goal: Pain Control and Function  Outcome: Progressing  Intervention: Manage Acute Orthopaedic-Related Pain  Recent Flowsheet Documentation  Taken 3/2/2025 2029 by Gia Vazquez RN  Pain Management Interventions: medication (see MAR)  Goal: Effective Urinary Elimination  Outcome: Progressing     Problem: Skin Injury Risk Increased  Goal: Skin Health and Integrity  Outcome: Progressing  Intervention: Plan: Nurse Driven Intervention: Moisture Management  Recent Flowsheet Documentation  Taken 3/2/2025 2000 by Gia Vazquez RN  Moisture Interventions:   Incontinence pad   Urinary collection device  Intervention: Plan: Nurse Driven Intervention: Friction and Shear  Recent Flowsheet Documentation  Taken 3/2/2025 2000 by Gia Vazquez RN  Friction/Shear  Interventions:   HOB 30 degrees or less   Assistive lifting device (portable/ceiling lift, etc.)  Intervention: Optimize Skin Protection  Recent Flowsheet Documentation  Taken 3/2/2025 2000 by Gia Vazquez RN  Pressure Reduction Techniques:   weight shift assistance provided   positioned off wounds  Pressure Reduction Devices: pressure-redistributing mattress utilized  Skin Protection: adhesive use limited  Activity Management: activity adjusted per tolerance  Head of Bed (HOB) Positioning: HOB at 20-30 degrees     Problem: Pain Acute  Goal: Optimal Pain Control and Function  Outcome: Progressing  Intervention: Develop Pain Management Plan  Recent Flowsheet Documentation  Taken 3/2/2025 2029 by Gia Vazquez RN  Pain Management Interventions: medication (see MAR)  Intervention: Prevent or Manage Pain  Recent Flowsheet Documentation  Taken 3/2/2025 2000 by Gia Vazquez RN  Medication Review/Management:   medications reviewed   high-risk medications identified     Problem: Comorbidity Management  Goal: Blood Glucose Levels Within Targeted Range  Outcome: Progressing  Intervention: Monitor and Manage Glycemia  Recent Flowsheet Documentation  Taken 3/2/2025 2000 by Gia Vazquez RN  Medication Review/Management:   medications reviewed   high-risk medications identified  Goal: Blood Pressure in Desired Range  Outcome: Progressing  Intervention: Maintain Blood Pressure Management  Recent Flowsheet Documentation  Taken 3/2/2025 2000 by Gia Vazquez RN  Medication Review/Management:   medications reviewed   high-risk medications identified

## 2025-03-03 NOTE — PLAN OF CARE
"Goal Outcome Evaluation:      Plan of Care Reviewed With: patient    Overall Patient Progress: no changeOverall Patient Progress: no change    Outcome Evaluation: Patient alert and oriented. VSS on room air. Pain managed at rest but high levels with movement. Assist of 2 lift. Voiding via purewick. No BM today. Dressing with drainage slightly extended but still small in amount. CMS in tact. Plan for Tcu then chemo outpatient.        Problem: Adult Inpatient Plan of Care  Goal: Plan of Care Review  Description: The Plan of Care Review/Shift note should be completed every shift.  The Outcome Evaluation is a brief statement about your assessment that the patient is improving, declining, or no change.  This information will be displayed automatically on your shift  note.  Outcome: Progressing  Flowsheets (Taken 3/2/2025 7238)  Outcome Evaluation: Patient alert and oriented. VSS on room air.  Plan of Care Reviewed With: patient  Overall Patient Progress: no change  Goal: Patient-Specific Goal (Individualized)  Description: You can add care plan individualizations to a care plan. Examples of Individualization might be:  \"Parent requests to be called daily at 9am for status\", \"I have a hard time hearing out of my right ear\", or \"Do not touch me to wake me up as it startles  me\".  Outcome: Progressing  Goal: Absence of Hospital-Acquired Illness or Injury  Outcome: Progressing  Intervention: Identify and Manage Fall Risk  Recent Flowsheet Documentation  Taken 3/2/2025 0746 by Elida Aguayo RN  Safety Promotion/Fall Prevention:   activity supervised   assistive device/personal items within reach   patient and family education   safety round/check completed  Intervention: Prevent Skin Injury  Recent Flowsheet Documentation  Taken 3/2/2025 1800 by Elida Aguayo RN  Body Position:   turned   left  Taken 3/2/2025 1030 by Elida Aguayo, RN  Body Position:   turned   right   heels elevated  Taken 3/2/2025 0746 by Dede, " BRY Marrero  Body Position: turned  Intervention: Prevent and Manage VTE (Venous Thromboembolism) Risk  Recent Flowsheet Documentation  Taken 3/2/2025 0746 by Elida Aguayo RN  VTE Prevention/Management: SCDs off (sequential compression devices)  Intervention: Prevent Infection  Recent Flowsheet Documentation  Taken 3/2/2025 0746 by Elida Aguayo RN  Infection Prevention:   rest/sleep promoted   single patient room provided  Goal: Optimal Comfort and Wellbeing  Outcome: Progressing  Intervention: Monitor Pain and Promote Comfort  Recent Flowsheet Documentation  Taken 3/2/2025 0746 by Elida Aguayo RN  Pain Management Interventions: medication (see MAR)  Goal: Readiness for Transition of Care  Outcome: Progressing     Problem: Hip Fracture Medical Management  Goal: Optimal Coping with Change in Health Status  Outcome: Progressing  Goal: Absence of Bleeding  Outcome: Progressing  Intervention: Monitor and Manage Bleeding  Recent Flowsheet Documentation  Taken 3/2/2025 0746 by Elida Aguayo RN  Bleeding Management: dressing monitored  Goal: Effective Bowel Elimination  Outcome: Progressing  Goal: Baseline Cognitive Function Maintained  Outcome: Progressing  Goal: Absence of Embolism  Outcome: Progressing  Intervention: Prevent or Manage Embolism Risk  Recent Flowsheet Documentation  Taken 3/2/2025 0746 by Elida Aguayo RN  VTE Prevention/Management: SCDs off (sequential compression devices)  Goal: Fracture Stability  Outcome: Progressing  Goal: Optimal Functional Performance  Outcome: Progressing  Intervention: Promote Optimal Functional Status  Recent Flowsheet Documentation  Taken 3/2/2025 1800 by Elida Aguayo RN  Activity Management: back to bed  Taken 3/2/2025 1430 by Elida Aguayo, RN  Activity Management: up in chair  Taken 3/2/2025 1030 by Elida Aguayo RN  Activity Management: activity adjusted per tolerance  Taken 3/2/2025 0746 by Elida Aguayo, RN  Activity Management:   activity  encouraged   activity adjusted per tolerance  Goal: Pain Control and Function  Outcome: Progressing  Intervention: Manage Acute Orthopaedic-Related Pain  Recent Flowsheet Documentation  Taken 3/2/2025 0746 by Eliad Aguayo RN  Pain Management Interventions: medication (see MAR)  Goal: Effective Urinary Elimination  Outcome: Progressing     Problem: Skin Injury Risk Increased  Goal: Skin Health and Integrity  Outcome: Progressing  Intervention: Plan: Nurse Driven Intervention: Moisture Management  Recent Flowsheet Documentation  Taken 3/2/2025 1030 by lEida Aguayo RN  Bathing/Skin Care:   incontinence care   linen changed  Taken 3/2/2025 0746 by Elida Aguayo RN  Moisture Interventions:   Incontinence pad   Urinary collection device  Intervention: Plan: Nurse Driven Intervention: Friction and Shear  Recent Flowsheet Documentation  Taken 3/2/2025 0746 by Elida Aguayo RN  Friction/Shear Interventions: Assistive lifting device (portable/ceiling lift, etc.)  Intervention: Optimize Skin Protection  Recent Flowsheet Documentation  Taken 3/2/2025 1800 by Elida Aguayo RN  Activity Management: back to bed  Taken 3/2/2025 1430 by Elida Aguayo RN  Activity Management: up in chair  Taken 3/2/2025 1030 by Elida Aguayo RN  Activity Management: activity adjusted per tolerance  Head of Bed (HOB) Positioning: HOB at 20-30 degrees  Taken 3/2/2025 0746 by Elida Aguayo RN  Activity Management:   activity encouraged   activity adjusted per tolerance  Head of Bed (HOB) Positioning: HOB at 30 degrees     Problem: Pain Acute  Goal: Optimal Pain Control and Function  Outcome: Progressing  Intervention: Develop Pain Management Plan  Recent Flowsheet Documentation  Taken 3/2/2025 0746 by Elida Aguayo RN  Pain Management Interventions: medication (see MAR)  Intervention: Prevent or Manage Pain  Recent Flowsheet Documentation  Taken 3/2/2025 0746 by Elida Aguayo RN  Medication Review/Management: medications  reviewed     Problem: Comorbidity Management  Goal: Blood Glucose Levels Within Targeted Range  Outcome: Progressing  Intervention: Monitor and Manage Glycemia  Recent Flowsheet Documentation  Taken 3/2/2025 0746 by Elida Aguayo RN  Medication Review/Management: medications reviewed  Goal: Blood Pressure in Desired Range  Outcome: Progressing  Intervention: Maintain Blood Pressure Management  Recent Flowsheet Documentation  Taken 3/2/2025 0746 by Elida Aguayo, RN  Medication Review/Management: medications reviewed

## 2025-03-04 ENCOUNTER — APPOINTMENT (OUTPATIENT)
Dept: PHYSICAL THERAPY | Facility: CLINIC | Age: 55
DRG: 478 | End: 2025-03-04
Attending: STUDENT IN AN ORGANIZED HEALTH CARE EDUCATION/TRAINING PROGRAM
Payer: COMMERCIAL

## 2025-03-04 LAB
GLUCOSE BLDC GLUCOMTR-MCNC: 119 MG/DL (ref 70–99)
GLUCOSE BLDC GLUCOMTR-MCNC: 122 MG/DL (ref 70–99)
GLUCOSE BLDC GLUCOMTR-MCNC: 127 MG/DL (ref 70–99)
GLUCOSE BLDC GLUCOMTR-MCNC: 144 MG/DL (ref 70–99)
GLUCOSE BLDC GLUCOMTR-MCNC: 164 MG/DL (ref 70–99)
HOLD SPECIMEN: NORMAL
PHOSPHATE SERPL-MCNC: 2.2 MG/DL (ref 2.5–4.5)
PHOSPHATE SERPL-MCNC: 2.2 MG/DL (ref 2.5–4.5)

## 2025-03-04 PROCEDURE — 99232 SBSQ HOSP IP/OBS MODERATE 35: CPT | Performed by: PHYSICIAN ASSISTANT

## 2025-03-04 PROCEDURE — 97530 THERAPEUTIC ACTIVITIES: CPT | Mod: GP

## 2025-03-04 PROCEDURE — 84100 ASSAY OF PHOSPHORUS: CPT | Performed by: INTERNAL MEDICINE

## 2025-03-04 PROCEDURE — G0463 HOSPITAL OUTPT CLINIC VISIT: HCPCS

## 2025-03-04 PROCEDURE — 250N000013 HC RX MED GY IP 250 OP 250 PS 637: Performed by: INTERNAL MEDICINE

## 2025-03-04 PROCEDURE — 250N000013 HC RX MED GY IP 250 OP 250 PS 637

## 2025-03-04 PROCEDURE — 250N000013 HC RX MED GY IP 250 OP 250 PS 637: Performed by: CLINICAL NURSE SPECIALIST

## 2025-03-04 PROCEDURE — 36415 COLL VENOUS BLD VENIPUNCTURE: CPT | Performed by: INTERNAL MEDICINE

## 2025-03-04 PROCEDURE — 250N000011 HC RX IP 250 OP 636: Mod: JZ | Performed by: PHYSICIAN ASSISTANT

## 2025-03-04 PROCEDURE — 250N000009 HC RX 250: Performed by: PHYSICIAN ASSISTANT

## 2025-03-04 PROCEDURE — 120N000001 HC R&B MED SURG/OB

## 2025-03-04 PROCEDURE — 99232 SBSQ HOSP IP/OBS MODERATE 35: CPT | Performed by: INTERNAL MEDICINE

## 2025-03-04 PROCEDURE — 250N000011 HC RX IP 250 OP 636: Performed by: STUDENT IN AN ORGANIZED HEALTH CARE EDUCATION/TRAINING PROGRAM

## 2025-03-04 PROCEDURE — 3E0U33Z INTRODUCTION OF ANTI-INFLAMMATORY INTO JOINTS, PERCUTANEOUS APPROACH: ICD-10-PCS | Performed by: PHYSICIAN ASSISTANT

## 2025-03-04 RX ORDER — AMOXICILLIN 250 MG
1 CAPSULE ORAL 2 TIMES DAILY PRN
DISCHARGE
Start: 2025-03-04

## 2025-03-04 RX ORDER — ACETAMINOPHEN 325 MG/1
650 TABLET ORAL EVERY 6 HOURS
DISCHARGE
Start: 2025-03-04

## 2025-03-04 RX ORDER — ACETAMINOPHEN 325 MG/1
650 TABLET ORAL EVERY 4 HOURS PRN
DISCHARGE
Start: 2025-03-04 | End: 2025-03-06

## 2025-03-04 RX ORDER — METHYLPREDNISOLONE ACETATE 40 MG/ML
40 INJECTION, SUSPENSION INTRA-ARTICULAR; INTRALESIONAL; INTRAMUSCULAR; SOFT TISSUE ONCE
Status: COMPLETED | OUTPATIENT
Start: 2025-03-04 | End: 2025-03-04

## 2025-03-04 RX ORDER — OXYCODONE HYDROCHLORIDE 5 MG/1
5-10 TABLET ORAL
Qty: 20 TABLET | Refills: 0 | Status: SHIPPED | OUTPATIENT
Start: 2025-03-04 | End: 2025-03-06

## 2025-03-04 RX ORDER — ENOXAPARIN SODIUM 100 MG/ML
40 INJECTION SUBCUTANEOUS EVERY 12 HOURS
DISCHARGE
Start: 2025-03-04 | End: 2025-03-18

## 2025-03-04 RX ORDER — METHOCARBAMOL 500 MG/1
500 TABLET, FILM COATED ORAL EVERY 6 HOURS PRN
Status: DISCONTINUED | OUTPATIENT
Start: 2025-03-04 | End: 2025-03-05 | Stop reason: HOSPADM

## 2025-03-04 RX ADMIN — HYDROXYZINE HYDROCHLORIDE 25 MG: 25 TABLET, FILM COATED ORAL at 05:55

## 2025-03-04 RX ADMIN — ACETAMINOPHEN 650 MG: 325 TABLET, FILM COATED ORAL at 01:30

## 2025-03-04 RX ADMIN — METHOCARBAMOL 500 MG: 500 TABLET ORAL at 01:31

## 2025-03-04 RX ADMIN — ACETAMINOPHEN 650 MG: 325 TABLET, FILM COATED ORAL at 13:36

## 2025-03-04 RX ADMIN — METFORMIN HYDROCHLORIDE 1000 MG: 500 TABLET ORAL at 08:50

## 2025-03-04 RX ADMIN — ENOXAPARIN SODIUM 40 MG: 40 INJECTION SUBCUTANEOUS at 11:32

## 2025-03-04 RX ADMIN — METFORMIN HYDROCHLORIDE 1000 MG: 500 TABLET ORAL at 17:57

## 2025-03-04 RX ADMIN — POTASSIUM & SODIUM PHOSPHATES POWDER PACK 280-160-250 MG 1 PACKET: 280-160-250 PACK at 08:36

## 2025-03-04 RX ADMIN — GABAPENTIN 300 MG: 300 CAPSULE ORAL at 08:45

## 2025-03-04 RX ADMIN — OXYCODONE HYDROCHLORIDE 5 MG: 5 TABLET ORAL at 14:36

## 2025-03-04 RX ADMIN — POTASSIUM & SODIUM PHOSPHATES POWDER PACK 280-160-250 MG 1 PACKET: 280-160-250 PACK at 12:20

## 2025-03-04 RX ADMIN — ATORVASTATIN CALCIUM 40 MG: 40 TABLET, FILM COATED ORAL at 08:39

## 2025-03-04 RX ADMIN — POTASSIUM & SODIUM PHOSPHATES POWDER PACK 280-160-250 MG 1 PACKET: 280-160-250 PACK at 04:09

## 2025-03-04 RX ADMIN — MONTELUKAST 10 MG: 10 TABLET, FILM COATED ORAL at 21:53

## 2025-03-04 RX ADMIN — GABAPENTIN 300 MG: 300 CAPSULE ORAL at 20:20

## 2025-03-04 RX ADMIN — FAMOTIDINE 20 MG: 20 TABLET, FILM COATED ORAL at 08:39

## 2025-03-04 RX ADMIN — DICLOFENAC SODIUM 4 G: 10 GEL TOPICAL at 19:09

## 2025-03-04 RX ADMIN — OXYCODONE HYDROCHLORIDE 10 MG: 10 TABLET ORAL at 06:43

## 2025-03-04 RX ADMIN — ACETAMINOPHEN 650 MG: 325 TABLET, FILM COATED ORAL at 20:20

## 2025-03-04 RX ADMIN — Medication: at 19:09

## 2025-03-04 RX ADMIN — ACETAMINOPHEN 650 MG: 325 TABLET, FILM COATED ORAL at 08:38

## 2025-03-04 RX ADMIN — METHYLPREDNISOLONE ACETATE 40 MG: 40 INJECTION, SUSPENSION INTRA-ARTICULAR; INTRALESIONAL; INTRAMUSCULAR; SOFT TISSUE at 09:25

## 2025-03-04 RX ADMIN — SERTRALINE HYDROCHLORIDE 150 MG: 100 TABLET ORAL at 20:20

## 2025-03-04 RX ADMIN — LIDOCAINE HYDROCHLORIDE 10 ML: 10 INJECTION, SOLUTION INFILTRATION; PERINEURAL at 09:25

## 2025-03-04 RX ADMIN — IRBESARTAN 300 MG: 150 TABLET ORAL at 21:54

## 2025-03-04 RX ADMIN — OXYCODONE HYDROCHLORIDE 5 MG: 5 TABLET ORAL at 01:31

## 2025-03-04 RX ADMIN — OXYCODONE HYDROCHLORIDE 5 MG: 5 TABLET ORAL at 20:20

## 2025-03-04 RX ADMIN — FAMOTIDINE 20 MG: 20 TABLET, FILM COATED ORAL at 20:20

## 2025-03-04 RX ADMIN — LIDOCAINE 3 PATCH: 4 PATCH TOPICAL at 20:21

## 2025-03-04 RX ADMIN — AMLODIPINE BESYLATE 10 MG: 10 TABLET ORAL at 08:38

## 2025-03-04 RX ADMIN — METHOCARBAMOL 500 MG: 500 TABLET ORAL at 08:45

## 2025-03-04 ASSESSMENT — ACTIVITIES OF DAILY LIVING (ADL)
ADLS_ACUITY_SCORE: 58
ADLS_ACUITY_SCORE: 52
ADLS_ACUITY_SCORE: 58
ADLS_ACUITY_SCORE: 52
ADLS_ACUITY_SCORE: 58
ADLS_ACUITY_SCORE: 52
ADLS_ACUITY_SCORE: 57
ADLS_ACUITY_SCORE: 58
ADLS_ACUITY_SCORE: 58
ADLS_ACUITY_SCORE: 52
ADLS_ACUITY_SCORE: 58
ADLS_ACUITY_SCORE: 52
ADLS_ACUITY_SCORE: 58
ADLS_ACUITY_SCORE: 52

## 2025-03-04 NOTE — PROGRESS NOTES
[facility]  Hospitalist Progress Note  Abelardo Matthews MD 03/04/2025    Reason for Stay (Diagnosis): new dx of neuroendocrine carcinoma this admit, pathologic femur fx         Assessment and Plan:      Summary of Stay: Brissa Mott is a 54 year old female with past medical history including previous left-sided CVA, type 2 diabetes, hypertension, morbid obesity on Ozempic, KATHRINE, admitted on 2/20/2025 from transitional care unit with severe hypercalcemia and a distal right femur fracture found to have a lytic mass.     Orthopedic surgery was consulted and we expanded workup for suspected metastatic malignancy with CT chest abdomen and pelvis which showed a 9.9 cm complex right adnexal cystic mass, multiple pulmonary nodules.  Oncology was consulted as well.     Hypercalcemia normalized from 14.7 with IV fluids.  She underwent an IR biopsy of her distal right femur pathologic fracture 2/24 to help guide further management.       She was taken to the OR for ORIF of her pathologic fracture on 2/25.     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  Markers typically associated with ovarian lesions are negative.  Oncology is following.  MRI brain thankfully is negative for metastatic disease.     She was seen by oncology.  Tumor lysis labs not compatible with tumor lysis syndrome.  Current plan appears to be for her to establish care with [facility] either in [place] or [facility] in [facility].    Plans today:  - ortho to inject L knee today for treatment of DJD  - SW has arranged TCU placement for tomorrow  - d/w patient that she will need follow up for treatment of her malignancy after she recovers from surgery.  She wants to f/up in [place]     Problem List/Assessment and Plan:   Severe hypercalcemia of malignancy with apparent pathologic distal right femur fracture,  also noted to have 9.9 cm complex right adnexal cystic mass, pulmonary nodules: Pathology as above shows a poorly differentiated carcinoma, possible upper GI origin with neuroendocrine differentiation oncology, orthopedic surgery both following.  --IR biopsy of pathologic fracture 2/24 with pathology as above  --Oncology following.  --MRI brain negative  --to OR w/ Orthopedics on 2/25 for ORIF  -- Pain control with Tylenol, Robaxin, oxycodone, Dilaudid and hydroxyzine.  Added gabapentin.  -- Narcotic induced constipation noted, on a good bowel regimen.  Added extra prn miralax.  --pain management consulted  -- PT consulted, TCU being planned     2.   Type 2 diabetes mellitus: Continue SSI.  Ozempic on hold.  Hypoglycemia protocol.  Home twice daily insulin mix on hold.  -- Added Lantus on 2/26.  24 units daily.  -- SSI high scale  --added back metformin (home med)     3.   History of hypertension: Currently on amlodipine 10 mg, irbesartan 300 mg     4.   Depression: Continue sertraline 150 mg daily     5.  Social: Completed [employer] paperwork on 2/26 for her .     6.  Acute blood loss anemia: Recheck.  Infuse for hemoglobin greater than 7.     7.  Spell overnight early 3/2: Cause unclear.  Consider panic attack versus related to neuroendocrine tumor.  Currently resolved.  Monitor and treat supportively.  Rule out underlying infection.  -- infectious workup started, procalcitonin low.  Urine analysis negative for infection.  Blood cultures pending.  -- COVID, influenza and RSV testing negative.    8.  Chronic left knee pain: Reports severe osteoarthritis which previously was improved with hyaluronic acid injection.   -Orthopedics performing bedside joint injection today     DVT Prophylaxis: Enoxaparin  Code Status: Full Code  Medically Ready for Discharge: to TCU tomorrow as arranged by [facility]           Interval History (Subjective):      Patient asking about left knee joint injection.  Orthopedics planning on doing  that today.  Discussed with patient that when TCU bed is found she will discharge.  Informed by social work later on the day that TCU bed found for tomorrow                  Physical Exam:      Last Vital Signs:  BP (!) 154/84 (BP Location: Right arm, Patient Position: Semi-Villanueva's, Cuff Size: Adult Regular)   Pulse 82   Temp 97.1  F (36.2  C) (Temporal)   Resp 16   Wt 127.5 kg (281 lb 1.4 oz)   LMP  (LMP Unknown)   SpO2 92%   BMI 48.25 kg/m        Intake/Output Summary (Last 24 hours) at 3/4/2025 1158  Last data filed at 3/4/2025 1129  Gross per 24 hour   Intake 240 ml   Output 3650 ml   Net -3410 ml       Constitutional: Awake, alert, cooperative, no apparent distress     Respiratory: Clear to auscultation bilaterally, no crackles or wheezing   Cardiovascular: Regular rate and rhythm, normal S1 and S2, and no murmur noted   Abdomen: Normal bowel sounds, soft, non-distended, non-tender   Skin: No rashes, no cyanosis, dry to touch   Neuro: Alert and oriented x3, no weakness, numbness, memory loss   Extremities: No edema, normal range of motion   Other(s):        All other systems: Negative          Medications:      All current medications were reviewed with changes reflected in problem list.         Data:      All new lab and imaging data was reviewed.   Labs:  Recent Labs   Lab 03/04/25  0844 03/03/25  1325 03/03/25  0838   NA  --   --  142   POTASSIUM  --   --  4.0   CHLORIDE  --   --  104   CO2  --   --  28   ANIONGAP  --   --  10   *   < > 143*   BUN  --   --  5.5*   CR  --   --  0.43*   GFRESTIMATED  --   --  >90   SHIRLEY  --   --  8.1*    < > = values in this interval not displayed.     Recent Labs   Lab 03/03/25  0838 03/02/25  0636 03/02/25  0021 03/01/25  0644   WBC 10.0  --  11.0 11.6*   HGB 8.2*  --  8.3* 8.4*   HCT 26.8*  --  26.2* 27.5*   MCV 82  --  81 82    [phone]      Imaging:   No results found for this or any previous visit (from the past 24 hours).

## 2025-03-04 NOTE — PROGRESS NOTES
[facility] Nurse Inpatient Assessment     Consulted for: Buttock    Summary: Patient with new pressure injury.  Patient has been mostly laying on back due to pain but is willing to lay on sides now after education.    [facility] nurse follow-up plan: twice weekly    Patient History (according to provider note(s):      Brissa Mott is a 54 year old female with past medical history including previous left-sided CVA, type 2 diabetes, hypertension, morbid obesity on Ozempic, KATHRINE, admitted on 2/20/2025 from transitional care unit with severe hypercalcemia and a distal right femur fracture found to have a lytic mass.     Assessment:      Areas visualized during today's visit: Focused:    Pressure Injury Location: Sacrococcygeal to right buttock     2/28    Last photo: 3/4/25  Wound type: Pressure Injury     Pressure Injury Stage: Unstageable, hospital acquired   Wound history/plan of care:   Patient admitted with femur fracture but was unable to have surgery until 2/25/25 due to a femur mass.   Patient reports she has been laying mostly on her back before and after surgery due to pain.    3/4/25: Co-visit with Triston NOWAK and we determined wound is now unstageable.  Wound base: Sacral area covered by 100% fibrin/slough.  Scab came off over the weekend.  Area extending from below sacrum to right buttock with dermis vs fibrin.        Palpation of the wound bed: normal      Drainage: moderate     Description of drainage: serosanguinous     Measurements (length x width x depth, in cm) 6  x 2.5  x  0.1 cm overall- sacrococcygeal wound is 1.7 x 1.3 x 0.3     Tunneling N/A     Undermining N/A  Periwound skin: Erythema- blanchable      Color: pink      Temperature: normal   Odor: none  Pain: mild  Pain intervention prior to dressing change: slow and gentle cares   Treatment goal: Heal  and Protection  STATUS: evolving  Supplies ordered: gathered    My PI Risk Assessment     Sensory Perception: 3 -  "Slightly Limited     Moisture: 3 - Occasionally moist      Activity: 2 - Chairfast     Mobility: 2 - Very limited     Nutrition: 2 - Probably inadequate      Friction/Shear: 2 - Potential problem      TOTAL: 14       Treatment Plan:     Sacrum wound(s): Every other day  Cleanse with wound cleanser and dry  Apply Aquacel Ag (cut to fit) to top area of wound  Cover wound with Mepilex sacral  No briefs in bed  Sidelying in bed    Pressure Injury Prevention (PIP) Plan:  If patient is declining pressure injury prevention interventions: Explore reason why and address patient's concerns, Educate on pressure injury risk and prevention intervention(s), If patient is still declining, document \"informed refusal\" , and Ensure Care team is aware ( provider, charge nurse, etc)  Mattress: Follow bed algorithm, add Low Air Loss (Air+) mattress pump if skin is very moist or constantly moist.   HOB: Maintain at or below 30 degrees, unless contraindicated  Repositioning in bed: Every 1-2 hours  and Left/right positioning; avoid supine  Heels: Pillows under calves  Protective Dressing: Sacral Mepilex for prevention (#[id number]),  especially for the agitated patient   Chair positioning:  bariatric chair cushion    If patient has a buttock pressure injury, or high risk for PI use chair cushion or SPS.  Moisture Management: Avoid brief in bed  Under Devices: Inspect skin under all medical devices during skin inspection , Ensure tubes are stabilized without tension, and Ensure patient is not lying on medical devices or equipment when repositioned  Ask provider to discontinue device when no longer needed.       Orders: Reviewed    RECOMMEND PRIMARY TEAM ORDER: None, at this time  Education provided: importance of repositioning, plan of care, and Off-loading pressure  Discussed plan of care with: Patient and Nurse  Notify WOC if wound(s) deteriorate.  Nursing to notify the Provider(s) and re-consult the WOC Nurse if new skin concern.    DATA: "     Current support surface: Standard  Low air loss (ELODIA pump, Isolibrium, Pulsate)  Containment of urine/stool: Incontinence Protocol and Suction based external urinary catheter   BMI: Body mass index is 48.25 kg/m .   Active diet order: Orders Placed This Encounter      Advance Diet as Tolerated: Regular Diet Adult     Output: I/O last 3 completed shifts:  In: -   Out: 3050 [Urine:3050]     Labs:   Recent Labs   Lab 03/03/25  0838   ALBUMIN 2.8*   HGB 8.2*   WBC 10.0     Pressure injury risk assessment:   Sensory Perception: 3-->slightly limited  Moisture: 3-->occasionally moist  Activity: 2-->chairfast  Mobility: 2-->very limited  Nutrition: 3-->adequate  Friction and Shear: 1-->problem  Dominic Score: 14      Contact [facility] Nurse (Fe)  Dept. Office Number: [phone]

## 2025-03-04 NOTE — PROCEDURES
Orthopedic Surgery    Following a sterile skin prep with betadine,  the left knee was injected with 5 ml 1% lidocaine.  The knee was then injected with 40mg depomedrol and 5ml 1% lidocaine.  Patient tolerated procedure without complication.  All questions answered.    Stephanie Wong PA-C on 3/4/2025 at 10:19 AM

## 2025-03-04 NOTE — PLAN OF CARE
"Goal Outcome Evaluation:      Plan of Care Reviewed With: patient    Overall Patient Progress: no changeOverall Patient Progress: no change    Outcome Evaluation: Plan to discharge to TCU awaiting placement      VSS on 2L NC, ax2 lift, Q2 turns, blood sugars monitored, pain managed with scheduled meds, PRN atarax and 10mg oxy, voiding via purewick, bladder scanned 540 VPR this am, straight cathed 800. mepilex to coccyx, phos protocol - first replacement dose given, plan to discharge to TCU - awaiting placement.       Problem: Adult Inpatient Plan of Care  Goal: Plan of Care Review  Description: The Plan of Care Review/Shift note should be completed every shift.  The Outcome Evaluation is a brief statement about your assessment that the patient is improving, declining, or no change.  This information will be displayed automatically on your shift  note.  Outcome: Progressing  Flowsheets (Taken 3/4/2025 0233)  Outcome Evaluation: Plan to discharge to TCU awaiting placement  Plan of Care Reviewed With: patient  Overall Patient Progress: no change  Goal: Patient-Specific Goal (Individualized)  Description: You can add care plan individualizations to a care plan. Examples of Individualization might be:  \"Parent requests to be called daily at 9am for status\", \"I have a hard time hearing out of my right ear\", or \"Do not touch me to wake me up as it startles  me\".  Outcome: Progressing  Goal: Absence of Hospital-Acquired Illness or Injury  Outcome: Progressing  Intervention: Identify and Manage Fall Risk  Recent Flowsheet Documentation  Taken 3/3/2025 2357 by Valentina Stewart, RN  Safety Promotion/Fall Prevention:   activity supervised   assistive device/personal items within reach   clutter free environment maintained   nonskid shoes/slippers when out of bed  Intervention: Prevent Skin Injury  Recent Flowsheet Documentation  Taken 3/4/2025 0216 by Valentina Stewart, RN  Body Position:   turned   right  Taken 3/4/2025 0000 by " Valentina Stewart RN  Body Position:   turned   left  Taken 3/3/2025 2357 by Valentina Stewart RN  Skin Protection: adhesive use limited  Intervention: Prevent and Manage VTE (Venous Thromboembolism) Risk  Recent Flowsheet Documentation  Taken 3/3/2025 2357 by Valentina Stewart RN  VTE Prevention/Management: SCDs off (sequential compression devices)  Intervention: Prevent Infection  Recent Flowsheet Documentation  Taken 3/3/2025 2357 by Valentina Stewart RN  Infection Prevention: single patient room provided  Goal: Optimal Comfort and Wellbeing  Outcome: Progressing  Intervention: Monitor Pain and Promote Comfort  Recent Flowsheet Documentation  Taken 3/4/2025 0131 by Valentina Stewart RN  Pain Management Interventions:   medication (see MAR)   cold applied  Goal: Readiness for Transition of Care  Outcome: Progressing     Problem: Hip Fracture Medical Management  Goal: Optimal Coping with Change in Health Status  Outcome: Progressing  Intervention: Support Psychosocial Response to Injury  Recent Flowsheet Documentation  Taken 3/3/2025 2357 by Valentina Stewart RN  Supportive Measures:   active listening utilized   verbalization of feelings encouraged   goal-setting facilitated  Goal: Absence of Bleeding  Outcome: Progressing  Intervention: Monitor and Manage Bleeding  Recent Flowsheet Documentation  Taken 3/3/2025 2357 by Valentina Stewart RN  Bleeding Management: dressing monitored  Goal: Effective Bowel Elimination  Outcome: Progressing  Intervention: Promote Effective Bowel Elimination  Recent Flowsheet Documentation  Taken 3/3/2025 2357 by Valentina Stewart RN  Bowel Elimination Promotion:   adequate fluid intake promoted   ambulation promoted  Goal: Baseline Cognitive Function Maintained  Outcome: Progressing  Intervention: Maximize Cognitive Function  Recent Flowsheet Documentation  Taken 3/3/2025 2357 by Valentina Stewart RN  Reorientation Measures: clock in view  Goal: Absence of Embolism  Outcome: Progressing  Intervention:  Prevent or Manage Embolism Risk  Recent Flowsheet Documentation  Taken 3/3/2025 2357 by Valentina Stewart RN  VTE Prevention/Management: SCDs off (sequential compression devices)  Goal: Fracture Stability  Outcome: Progressing  Intervention: Promote Fracture Stability and Healing  Recent Flowsheet Documentation  Taken 3/3/2025 2357 by Valentina Stewart RN  Equipment: ice  Goal: Optimal Functional Performance  Outcome: Progressing  Intervention: Promote Optimal Functional Status  Recent Flowsheet Documentation  Taken 3/3/2025 2357 by Valentina Steawrt RN  Range of Motion: active ROM (range of motion) encouraged  Activity Management: activity adjusted per tolerance  Goal: Pain Control and Function  Outcome: Progressing  Intervention: Manage Acute Orthopaedic-Related Pain  Recent Flowsheet Documentation  Taken 3/4/2025 0131 by Valentina Stewart RN  Pain Management Interventions:   medication (see MAR)   cold applied  Goal: Effective Urinary Elimination  Outcome: Progressing     Problem: Skin Injury Risk Increased  Goal: Skin Health and Integrity  Outcome: Progressing  Intervention: Plan: Nurse Driven Intervention: Moisture Management  Recent Flowsheet Documentation  Taken 3/3/2025 2357 by Valentina Stewart RN  Moisture Interventions:   Incontinence pad   Urinary collection device  Intervention: Plan: Nurse Driven Intervention: Friction and Shear  Recent Flowsheet Documentation  Taken 3/3/2025 2357 by Valentina Stewart RN  Friction/Shear Interventions:   HOB 30 degrees or less   Silicone foam sacral dressing  Intervention: Optimize Skin Protection  Recent Flowsheet Documentation  Taken 3/4/2025 0216 by Valentina Stewart RN  Head of Bed (HOB) Positioning: HOB at 15 degrees  Taken 3/4/2025 0000 by Valentina Stewart RN  Head of Bed (HOB) Positioning: HOB at 20-30 degrees  Taken 3/3/2025 2357 by Valentina Stewart RN  Pressure Reduction Techniques:   weight shift assistance provided   positioned off wounds  Pressure Reduction Devices:  pressure-redistributing mattress utilized  Skin Protection: adhesive use limited  Activity Management: activity adjusted per tolerance     Problem: Pain Acute  Goal: Optimal Pain Control and Function  Outcome: Progressing  Intervention: Optimize Psychosocial Wellbeing  Recent Flowsheet Documentation  Taken 3/3/2025 2357 by Valentina Stewart RN  Supportive Measures:   active listening utilized   verbalization of feelings encouraged   goal-setting facilitated  Diversional Activities: television  Intervention: Develop Pain Management Plan  Recent Flowsheet Documentation  Taken 3/4/2025 0131 by Valentina Stewart RN  Pain Management Interventions:   medication (see MAR)   cold applied  Intervention: Prevent or Manage Pain  Recent Flowsheet Documentation  Taken 3/3/2025 2357 by Valentina Stewart RN  Bowel Elimination Promotion:   adequate fluid intake promoted   ambulation promoted  Medication Review/Management: medications reviewed     Problem: Comorbidity Management  Goal: Blood Glucose Levels Within Targeted Range  Outcome: Progressing  Intervention: Monitor and Manage Glycemia  Recent Flowsheet Documentation  Taken 3/3/2025 2357 by Valentina Stewart RN  Medication Review/Management: medications reviewed  Goal: Blood Pressure in Desired Range  Outcome: Progressing  Intervention: Maintain Blood Pressure Management  Recent Flowsheet Documentation  Taken 3/3/2025 2357 by Valentina Stewart RN  Medication Review/Management: medications reviewed

## 2025-03-04 NOTE — PROGRESS NOTES
[facility] ACUTE INPATIENT PAIN SERVICE    [facility], [facility], [facility], [facility], [place]   PAIN PROGRESS          Assessment/Plan:  Brissa Mott is a 54 year old female who was admitted on 2/20/2025.  Pain Service is asked to see the patient for assistance with cancer related pain.       Medical history significant for previous left-sided CVA, Type 2 diabetes, hypertension, morbid obesity on Ozempic, KATHRINE.     Recent hospitalization 1/30 to 2/4/25 at [facility] for right knee pain and inability to bear weight patient was discharged to TCU for rehab, she sustained a fall and was subsequently admitted here at [facility].       Admitted from transitional care unit with evaluation of severe hypercalcemia, distal right femur fracture found to have a lytic mass.  Orthopedic surgery was consulted with expanded workup for suspected metastatic malignancy with CT chest abdomen and pelvis which showed a 9.9 cm complex right adnexal cystic mass, multiple pulmonary nodules.      Patient is status post op day #3 open reduction internal fixation with intramedullary rodding of right hip fracture     Oncology Consulted: Right adnexal mass, elevated CA-125, metastatic disease with pulmonary nodules, hepatic lesions and lymphadenopathy, lytic lesions in humeral head, sacrum, iliac bone   Right femur fracture with intramedullary marrow replacing lesion.        shows 5 total opioid prescriptions.  Most recently   02/11/25 oxycodone 10 mg tablets 2 for 1 day  02/09/25 oxycodone 10 mg tablets 14 for 2 days  02/04/25 oxycodone 10 mg tablets 13 for 2 days.   02/04/25 diazepam 5 mg tablets 9 for 2 days     Reviewed medication reconciliation 2/18/25 no changes on readmission 2/20/25.     Lidocaine patches, oxycodone 10 mg every four hours prn,  valium 5 mg every 6 hours prn, tylenol 1000 mg tid prn     Over the past 24 hours patient has received:  3(5mg) oxycodone, 1(10mg) oxycodone for a total of 37.5 morphine  equivalents          Subjective:  Brissa is seen today in her bed alert and oriented in no acute distress. Reports her pain is currently a 4/10 located in her right hip. Reports pain has been gradually improving. Currently awaiting TCU placement.     Endorses constipation. Bowel regimen has been helpful.     Reviewed continuing with current plan, all questions answered.       PLAN:  Cancer related pain and acute post operative pain  Multimodal Medication Therapy:   Adjuvants: tylenol 975 mg every 6 hours scheduled with oxycodone Methocarbamol 500 mg every 6 hours gabapentin 300 mg bid  Opioids: Oxycodone 5 mg every 6 hours with additional  Oxycodone 5-10 mg every 3 hours prn, STOP IV Dilaudid 0.2mg q8h prn for severe pain not managed by oral pain medications    Non-medication interventions- Ice, heat prn  Constipation Prophylaxis- senna-S 1 tablet bid, miralax daily  Follow up /Discharge Recommendations - We recommend prescribing the following at the time of discharge:    May benefit from long acting opioids will continue to assess    History   Drug Use Unknown         Tobacco Use      Smoking status: Former      Smokeless tobacco: Never        Objective:  Vital signs in last 24 hours:  B/P: 154/84, T: 97.1, P: 82, R: 16   Blood pressure (!) 154/84, pulse 82, temperature 97.1  F (36.2  C), temperature source Temporal, resp. rate 16, weight 127.5 kg (281 lb 1.4 oz), SpO2 93%, not currently breastfeeding.        Review of Systems:   As per subjective, all others negative.    Physical Exam    General: in no apparent distress and non-toxic   HEENT: Head normocephalic atraumatic, oral mucosa moist. Sclerae anicteric  CV: Regular rhythm, normal rate, no murmurs  Resp: No wheezes, no rales or rhonchi, no focal consolidations  GI: Non-tender; +bs  Skin: Dressing CDI  Extremities: No peripheral edema  Psych: Normal affect, mood euthymic  Neuro: Grossly normal          Imaging:  Personally Reviewed.    Results for orders  placed or performed during the hospital encounter of 02/20/25   CT Chest/Abdomen/Pelvis w Contrast    Impression    IMPRESSION:  1.  Findings suspicious for metastatic disease. Enlarged left axillary lymph nodes, measuring up to 4.7 cm. Bilateral pulmonary nodules measuring up to 1.0 cm. Ill-defined hepatic lesions, measuring up to 2.9 cm. Right lower retroperitoneal 1.7 cm   nodule. Right pelvic lymphadenopathy, measuring up to 2.4 cm. Lytic lesions in the right humeral head, right sacrum, and left posterior iliac bone.    2.  Right adnexal 9.9 cm complex cystic mass, suspicious for malignancy, either metastatic or primary ovarian neoplasm.    3.  Left upper anterior chest wall 0.9 cm subcutaneous nodule and right gluteal 0.7 cm subcutaneous nodule, indeterminate and may be metastatic.   CT Bone Biopsy Superficial    Impression    IMPRESSION:  1.  Successful CT-guided biopsy of a soft tissue mass adjacent to the lytic lesion involving the distal right femur.    Reference CPT Codes: 84606, ,   MR Brain w/o & w Contrast    Impression    IMPRESSION:  1.  No intracranial pathologic enhancement to indicate metastatic disease.    2.  Calvarium demonstrates diffuse loss normal fatty marrow, nonspecific. Expanded differential provided above.    3.  No acute intracranial process identified.    4.  Chronic intracranial changes described above.        Lab Results:  Personally Reviewed.   Last Comprehensive Metabolic Panel:  Sodium   Date Value Ref Range Status   03/03/2025 142 135 - 145 mmol/L Final   04/29/2021 137 133 - 144 mmol/L Final     Potassium   Date Value Ref Range Status   03/03/2025 4.0 3.4 - 5.3 mmol/L Final   06/07/2022 4.1 3.4 - 5.3 mmol/L Final   04/29/2021 3.9 3.4 - 5.3 mmol/L Final     Chloride   Date Value Ref Range Status   03/03/2025 104 98 - 107 mmol/L Final   06/07/2022 105 94 - 109 mmol/L Final   04/29/2021 104 94 - 109 mmol/L Final     Carbon Dioxide   Date Value Ref Range Status   04/29/2021 29 20 -  "32 mmol/L Final     Carbon Dioxide (CO2)   Date Value Ref Range Status   03/03/2025 28 22 - 29 mmol/L Final   06/07/2022 26 20 - 32 mmol/L Final     Anion Gap   Date Value Ref Range Status   03/03/2025 10 7 - 15 mmol/L Final   06/07/2022 7 3 - 14 mmol/L Final   04/29/2021 4 3 - 14 mmol/L Final     Glucose   Date Value Ref Range Status   06/07/2022 156 (H) 70 - 99 mg/dL Final   04/29/2021 71 70 - 99 mg/dL Final     GLUCOSE BY METER POCT   Date Value Ref Range Status   03/04/2025 127 (H) 70 - 99 mg/dL Final     Urea Nitrogen   Date Value Ref Range Status   03/03/2025 5.5 (L) 6.0 - 20.0 mg/dL Final   06/07/2022 18 7 - 30 mg/dL Final   04/29/2021 22 7 - 30 mg/dL Final     Creatinine   Date Value Ref Range Status   03/03/2025 0.43 (L) 0.51 - 0.95 mg/dL Final   04/29/2021 0.75 0.52 - 1.04 mg/dL Final     GFR Estimate   Date Value Ref Range Status   03/03/2025 >90 >60 mL/min/1.73m2 Final     Comment:     eGFR calculated using 2021 CKD-EPI equation.   04/29/2021 >90 >60 mL/min/[1.73_m2] Final     Comment:     Non  GFR Calc  Starting 12/18/2018, serum creatinine based estimated GFR (eGFR) will be   calculated using the Chronic Kidney Disease Epidemiology Collaboration   (CKD-EPI) equation.       Calcium   Date Value Ref Range Status   03/03/2025 8.1 (L) 8.8 - 10.4 mg/dL Final   04/29/2021 8.7 8.5 - 10.1 mg/dL Final        UA: No results found for: \"UAMP\", \"UBARB\", \"BENZODIAZEUR\", \"UCANN\", \"UCOC\", \"OPIT\", \"UPCP\"           Please see A&P for additional details of medical decision making.      Ramy Mora PA-C  Acute Care Pain Management  Team  Hours of pain coverage Mon-Fri 8-1600, afterhours please call the primary team    [facility] (SARAH, [place], [place], [place])   Page via [employer] web console -Click for [employer]           "

## 2025-03-04 NOTE — PROGRESS NOTES
PROGRESS NOTE    SUBJECTIVE  Brissa Mott is status post open reduction and internal fixation with IM frankie of the right femur for pathological fracture on 2/25/2025. No acute events overnight. Afebrile with stable vitals. She was having severe nausea and chills throughout the night but states she is feeling much better now. Was given IV dilaudid and compazine. Patient reports pain is well controlled on oral medication today, although she has been having trouble sleeping due to pain and not being able to get comfortable. Patient is tolerating a general diet. Brissa has worked with PT but was unable to do much due to pain and general weakness. Patient denies any numbness/tingling in the operative extremity or chest pain. She has no shortness of breath on room air during the day but sleeps with 1L NC overnight.     PHYSICAL EXAM  General: No acute distress. Alert and oriented to person, time and place.  Pulmonary: Breathing comfortably on room air.   Cardiac: Bilateral PT pulses are equal and symmetric. Regular rate and rhythm.   Musculoskeletal: Postoperative dressing is clean and dry. No erythema surrounding dressing. Patient demonstrates active range of motion of the operative hip. Compartments are soft and nontender.  Neurological: Patient is able to fire quadriceps, tibialis anterior, extensor hallucis longus and gastrocnemius. Sensation intact to light touch in bilateral L3-S1 distributions.     LABS  Recent Labs   Lab 03/03/25  0838 03/02/25  0021 03/01/25  0644 02/27/25  0952 02/26/25  0725   HGB 8.2* 8.3* 8.4* 8.6* 9.5*     WBC counts: 11.0 (3/2/2025), 11.6 (3/1/2025), 8.8 (2/22/2025)    IMAGING  Post-operative radiographs were reviewed and demonstrate components in good position.     ASSESSMENT/PLAN  Status post open reduction and internal fixation with Im frankie of the right femur for pathological fracture on 2/25/2025.    Brissa Mott is status post the above procedure. Continue working with PT with goals of  being able to safely transfer and stand/ambulate with use of 4WW. Continue multimodal pain medication regimen. Orthopedics will continue to follow at this time. Likely will discharge to TCU .    Left knee osteoarthritis: will be receiving a CS injection today. Will continue to follow.     Activity: Weightbearing as tolerated. Activity as tolerated. No precautions. Continue to work with PT while in hospital.   Antibiotics: Weight-based Ancef x2 doses postoperatively or until discharge.  Cultures: None  Diet: Advance as tolerated. Goal diet: General.   Dressing: Patient is able to shower. Patient will keep dressing in place until post-op day 7.  DVT Prophylaxis: Lovenox 40 mg  Pain medication: Multimodal pain regimen with ice, elevation, Tylenol, Robaxin, Oxycodone, Dilaudid, Atarax    Disposition: Likely discharge. Orthopedics will continue to follow.

## 2025-03-04 NOTE — PLAN OF CARE
" Goal Outcome Evaluation:      Plan of Care Reviewed With: patient    Overall Patient Progress: no changeOverall Patient Progress: no change    Outcome Evaluation: A&O x4, VSS on RA, voiding ok via purewick, repositioned, WOC saw dressing change on sacrum, ortho saw AM dressing change & knee injection, pain mngd w/ gabapentin & robaxin, tylenol, oxycodone, ACHS glucose, phos replacement, discharge plans TCU tomorrow    Problem: Adult Inpatient Plan of Care  Goal: Plan of Care Review  Description: The Plan of Care Review/Shift note should be completed every shift.  The Outcome Evaluation is a brief statement about your assessment that the patient is improving, declining, or no change.  This information will be displayed automatically on your shift  note.  Outcome: Progressing  Flowsheets (Taken 3/4/2025 1342)  Outcome Evaluation: A&O x4, VSS on RA, voiding ok via purewick, repositioned, WOC saw dressing change on sacrum, ortho saw AM dressing change & knee injection, pain mngd w/ gabapentin & robaxin, tylenol, oxycodone, ACHS glucose, phos replacement, discharge plans TCU tomorrow  Plan of Care Reviewed With: patient  Overall Patient Progress: no change  Goal: Patient-Specific Goal (Individualized)  Description: You can add care plan individualizations to a care plan. Examples of Individualization might be:  \"Parent requests to be called daily at 9am for status\", \"I have a hard time hearing out of my right ear\", or \"Do not touch me to wake me up as it startles  me\".  Outcome: Progressing  Goal: Absence of Hospital-Acquired Illness or Injury  Outcome: Progressing  Intervention: Identify and Manage Fall Risk  Recent Flowsheet Documentation  Taken 3/4/2025 0854 by Miryam Lowe, RN  Safety Promotion/Fall Prevention: safety round/check completed  Intervention: Prevent Skin Injury  Recent Flowsheet Documentation  Taken 3/4/2025 1130 by Miryam Lowe, RN  Body Position:   turned   right   heels elevated   supine, " head elevated  Taken 3/4/2025 0854 by Miryam Lowe RN  Body Position:   turned   left   supine, head elevated  Intervention: Prevent and Manage VTE (Venous Thromboembolism) Risk  Recent Flowsheet Documentation  Taken 3/4/2025 0854 by Miryam Lowe RN  VTE Prevention/Management: patient refused intervention  Intervention: Prevent Infection  Recent Flowsheet Documentation  Taken 3/4/2025 0854 by Miryam Lowe RN  Infection Prevention: hand hygiene promoted  Goal: Optimal Comfort and Wellbeing  Outcome: Progressing  Intervention: Monitor Pain and Promote Comfort  Recent Flowsheet Documentation  Taken 3/4/2025 1336 by Miryam Lowe RN  Pain Management Interventions: medication (see MAR)  Taken 3/4/2025 0930 by Miryam Lowe RN  Pain Management Interventions: repositioned  Taken 3/4/2025 0831 by Miryam Lowe RN  Pain Management Interventions:   medication (see MAR)   rest  Goal: Readiness for Transition of Care  Outcome: Progressing     Problem: Hip Fracture Medical Management  Goal: Optimal Coping with Change in Health Status  Outcome: Progressing  Goal: Absence of Bleeding  Outcome: Progressing  Goal: Effective Bowel Elimination  Outcome: Progressing  Goal: Baseline Cognitive Function Maintained  Outcome: Progressing  Goal: Absence of Embolism  Outcome: Progressing  Intervention: Prevent or Manage Embolism Risk  Recent Flowsheet Documentation  Taken 3/4/2025 0854 by Miryam Lowe RN  VTE Prevention/Management: patient refused intervention  Goal: Fracture Stability  Outcome: Progressing  Goal: Optimal Functional Performance  Outcome: Progressing  Intervention: Promote Optimal Functional Status  Recent Flowsheet Documentation  Taken 3/4/2025 1336 by Miryam Lowe RN  Activity Management: (refused repositioning states she repositioned her legs on own) patient refuses activity  Taken 3/4/2025 0854 by Miryam Lowe RN  Activity Management: activity adjusted per  tolerance  Goal: Pain Control and Function  Outcome: Progressing  Intervention: Manage Acute Orthopaedic-Related Pain  Recent Flowsheet Documentation  Taken 3/4/2025 1336 by Miryam Lowe RN  Pain Management Interventions: medication (see MAR)  Taken 3/4/2025 0930 by Miryam Lowe RN  Pain Management Interventions: repositioned  Taken 3/4/2025 0831 by Miryam Lowe RN  Pain Management Interventions:   medication (see MAR)   rest  Goal: Effective Urinary Elimination  Outcome: Progressing     Problem: Skin Injury Risk Increased  Goal: Skin Health and Integrity  Outcome: Progressing  Intervention: Plan: Nurse Driven Intervention: Moisture Management  Recent Flowsheet Documentation  Taken 3/4/2025 1130 by Miryam Lowe RN  Bathing/Skin Care:   incontinence care   linen changed  Taken 3/4/2025 0854 by Miryam Lowe RN  Moisture Interventions: No brief in bed  Bathing/Skin Care: incontinence care  Taken 3/4/2025 0800 by Miryam Lowe RN  Moisture Interventions:   No brief in bed   Urinary collection device  Bathing/Skin Care: incontinence care  Intervention: Plan: Nurse Driven Intervention: Friction and Shear  Recent Flowsheet Documentation  Taken 3/4/2025 0854 by Miryam Lowe RN  Friction/Shear Interventions: HOB 30 degrees or less  Intervention: Optimize Skin Protection  Recent Flowsheet Documentation  Taken 3/4/2025 1336 by Miryam Lowe RN  Activity Management: (refused repositioning states she repositioned her legs on own) patient refuses activity  Taken 3/4/2025 1130 by Miryam Lowe RN  Head of Bed (HOB) Positioning: HOB at 20-30 degrees  Taken 3/4/2025 0854 by Miryam Lowe RN  Activity Management: activity adjusted per tolerance  Head of Bed (HOB) Positioning: HOB at 30-45 degrees     Problem: Pain Acute  Goal: Optimal Pain Control and Function  Outcome: Progressing  Intervention: Develop Pain Management Plan  Recent Flowsheet Documentation  Taken  3/4/2025 1336 by Miryam Lowe, RN  Pain Management Interventions: medication (see MAR)  Taken 3/4/2025 0930 by Miryam Lowe RN  Pain Management Interventions: repositioned  Taken 3/4/2025 0831 by Miryam Lowe, RN  Pain Management Interventions:   medication (see MAR)   rest  Intervention: Prevent or Manage Pain  Recent Flowsheet Documentation  Taken 3/4/2025 0854 by Miryam Lowe RN  Medication Review/Management: medications reviewed     Problem: Comorbidity Management  Goal: Blood Glucose Levels Within Targeted Range  Outcome: Progressing  Intervention: Monitor and Manage Glycemia  Recent Flowsheet Documentation  Taken 3/4/2025 0854 by Miryam Lowe RN  Medication Review/Management: medications reviewed  Goal: Blood Pressure in Desired Range  Outcome: Progressing  Intervention: Maintain Blood Pressure Management  Recent Flowsheet Documentation  Taken 3/4/2025 0854 by Miryam Lowe, RN  Medication Review/Management: medications reviewed

## 2025-03-04 NOTE — PROGRESS NOTES
Care Management Follow Up    Length of Stay (days): 12    Expected Discharge Date: 03/05/2025     Concerns to be Addressed: discharge planning     Patient plan of care discussed at interdisciplinary rounds: Yes    Anticipated Discharge Disposition:  TCU    Education Provided on the Discharge Plan:  yes  Patient/Family in Agreement with the Plan:  yes    Referrals Placed by CM/SW:    Private pay costs discussed: transportation costs    Discussed  Partnership in Safe Discharge Planning  document with patient/family: No     Handoff Completed: No, handoff not indicated or clinically appropriate    Additional Information:  Patient has been accepted for TCU placement at [facility] in [place]. Met with patient to update and she is agreeable to go there.   Updated [place] admissions and they can accept patient tomorrow after 12pm.  Wheelchair transport set up for tomorrow between [id number]. Updated care team.    Next Steps: PAS, fax discharge orders    Pam Mcclain RN  Care Coordinator  [employer]       Oriented - self; Oriented - place; Oriented - time

## 2025-03-04 NOTE — PLAN OF CARE
"Goal Outcome Evaluation:      Plan of Care Reviewed With: patient    Overall Patient Progress: no changeOverall Patient Progress: no change    Patient A&Ox4, VSS. Using lift for transfers, using the external catheter. Oxycodone, tylenol, robaxin and Lidocaine patches used for pain. Phosphorus replaced, dressing on bottom CDI, repositioned in bed. Discharge plans still in planning.  Problem: Adult Inpatient Plan of Care  Goal: Plan of Care Review  Description: The Plan of Care Review/Shift note should be completed every shift.  The Outcome Evaluation is a brief statement about your assessment that the patient is improving, declining, or no change.  This information will be displayed automatically on your shift  note.  Outcome: Progressing  Flowsheets (Taken 3/3/2025 2215)  Plan of Care Reviewed With: patient  Overall Patient Progress: no change  Goal: Patient-Specific Goal (Individualized)  Description: You can add care plan individualizations to a care plan. Examples of Individualization might be:  \"Parent requests to be called daily at 9am for status\", \"I have a hard time hearing out of my right ear\", or \"Do not touch me to wake me up as it startles  me\".  Outcome: Progressing  Goal: Absence of Hospital-Acquired Illness or Injury  Outcome: Progressing  Intervention: Identify and Manage Fall Risk  Recent Flowsheet Documentation  Taken 3/3/2025 1610 by Marlin Porras, RN  Safety Promotion/Fall Prevention:   activity supervised   assistive device/personal items within reach   clutter free environment maintained   nonskid shoes/slippers when out of bed  Intervention: Prevent Skin Injury  Recent Flowsheet Documentation  Taken 3/3/2025 1610 by Marlin Porras, RN  Body Position: weight shifting  Intervention: Prevent and Manage VTE (Venous Thromboembolism) Risk  Recent Flowsheet Documentation  Taken 3/3/2025 1610 by Marlin Porras, RN  VTE Prevention/Management: SCDs off (sequential compression devices)  Goal: " Optimal Comfort and Wellbeing  Outcome: Progressing  Intervention: Monitor Pain and Promote Comfort  Recent Flowsheet Documentation  Taken 3/3/2025 1921 by Marlin Porras RN  Pain Management Interventions:   medication (see MAR)   cold applied  Goal: Readiness for Transition of Care  Outcome: Progressing     Problem: Hip Fracture Medical Management  Goal: Optimal Coping with Change in Health Status  Outcome: Progressing  Goal: Absence of Bleeding  Outcome: Progressing  Intervention: Monitor and Manage Bleeding  Recent Flowsheet Documentation  Taken 3/3/2025 1610 by Marlin Porras RN  Bleeding Management: dressing monitored  Goal: Effective Bowel Elimination  Outcome: Progressing  Goal: Baseline Cognitive Function Maintained  Outcome: Progressing  Goal: Absence of Embolism  Outcome: Progressing  Intervention: Prevent or Manage Embolism Risk  Recent Flowsheet Documentation  Taken 3/3/2025 1610 by Marlin Porras RN  VTE Prevention/Management: SCDs off (sequential compression devices)  Goal: Fracture Stability  Outcome: Progressing  Goal: Optimal Functional Performance  Outcome: Progressing  Intervention: Promote Optimal Functional Status  Recent Flowsheet Documentation  Taken 3/3/2025 1610 by Marlin Porras RN  Activity Management: activity adjusted per tolerance  Goal: Pain Control and Function  Outcome: Progressing  Intervention: Manage Acute Orthopaedic-Related Pain  Recent Flowsheet Documentation  Taken 3/3/2025 1921 by Marlin Porras RN  Pain Management Interventions:   medication (see MAR)   cold applied  Goal: Effective Urinary Elimination  Outcome: Progressing     Problem: Skin Injury Risk Increased  Goal: Skin Health and Integrity  Outcome: Progressing  Intervention: Optimize Skin Protection  Recent Flowsheet Documentation  Taken 3/3/2025 1610 by Marlin Porras RN  Activity Management: activity adjusted per tolerance  Head of Bed (HOB) Positioning: HOB at 20-30 degrees      Problem: Pain Acute  Goal: Optimal Pain Control and Function  Outcome: Progressing  Intervention: Develop Pain Management Plan  Recent Flowsheet Documentation  Taken 3/3/2025 1921 by Marlin Porras RN  Pain Management Interventions:   medication (see MAR)   cold applied  Intervention: Prevent or Manage Pain  Recent Flowsheet Documentation  Taken 3/3/2025 1610 by Marlin Porras RN  Medication Review/Management: medications reviewed     Problem: Comorbidity Management  Goal: Blood Glucose Levels Within Targeted Range  Outcome: Progressing  Intervention: Monitor and Manage Glycemia  Recent Flowsheet Documentation  Taken 3/3/2025 1610 by Marlin Porras RN  Medication Review/Management: medications reviewed  Goal: Blood Pressure in Desired Range  Outcome: Progressing  Intervention: Maintain Blood Pressure Management  Recent Flowsheet Documentation  Taken 3/3/2025 1610 by Marlin Porras RN  Medication Review/Management: medications reviewed

## 2025-03-05 ENCOUNTER — PATIENT OUTREACH (OUTPATIENT)
Dept: ONCOLOGY | Facility: CLINIC | Age: 55
End: 2025-03-05
Payer: COMMERCIAL

## 2025-03-05 ENCOUNTER — LAB REQUISITION (OUTPATIENT)
Dept: LAB | Facility: CLINIC | Age: 55
End: 2025-03-05
Payer: COMMERCIAL

## 2025-03-05 ENCOUNTER — DOCUMENTATION ONLY (OUTPATIENT)
Dept: GERIATRICS | Facility: CLINIC | Age: 55
End: 2025-03-05
Payer: COMMERCIAL

## 2025-03-05 VITALS
TEMPERATURE: 97.8 F | OXYGEN SATURATION: 95 % | SYSTOLIC BLOOD PRESSURE: 143 MMHG | HEART RATE: 85 BPM | DIASTOLIC BLOOD PRESSURE: 84 MMHG | RESPIRATION RATE: 16 BRPM | BODY MASS INDEX: 48.25 KG/M2 | WEIGHT: 281.09 LBS

## 2025-03-05 DIAGNOSIS — R76.12 NONSPECIFIC REACTION TO CELL MEDIATED IMMUNITY MEASUREMENT OF GAMMA INTERFERON ANTIGEN RESPONSE WITHOUT ACTIVE TUBERCULOSIS: ICD-10-CM

## 2025-03-05 PROBLEM — M25.561 ACUTE PAIN OF RIGHT KNEE: Status: ACTIVE | Noted: 2025-01-31

## 2025-03-05 PROBLEM — J96.01 ACUTE RESPIRATORY FAILURE WITH HYPOXIA (H): Status: ACTIVE | Noted: 2017-04-13
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
## 2025-03-05 PROBLEM — J18.9 PNEUMONIA, COMMUNITY ACQUIRED: Status: ACTIVE | Noted: 2017-04-13

## 2025-03-05 PROBLEM — I10 HYPERTENSION: Status: ACTIVE | Noted: 2017-07-17

## 2025-03-05 PROBLEM — M25.562 LEFT KNEE PAIN: Status: ACTIVE | Noted: 2019-11-01

## 2025-03-05 LAB
GLUCOSE BLDC GLUCOMTR-MCNC: 119 MG/DL (ref 70–99)
HOLD SPECIMEN: NORMAL
PLATELET # BLD AUTO: 475 10E3/UL (ref 150–450)

## 2025-03-05 PROCEDURE — 250N000013 HC RX MED GY IP 250 OP 250 PS 637: Performed by: INTERNAL MEDICINE

## 2025-03-05 PROCEDURE — 99232 SBSQ HOSP IP/OBS MODERATE 35: CPT | Performed by: PHYSICIAN ASSISTANT

## 2025-03-05 PROCEDURE — 250N000013 HC RX MED GY IP 250 OP 250 PS 637

## 2025-03-05 PROCEDURE — 250N000013 HC RX MED GY IP 250 OP 250 PS 637: Performed by: CLINICAL NURSE SPECIALIST

## 2025-03-05 PROCEDURE — 99239 HOSP IP/OBS DSCHRG MGMT >30: CPT | Performed by: INTERNAL MEDICINE

## 2025-03-05 PROCEDURE — 36415 COLL VENOUS BLD VENIPUNCTURE: CPT | Performed by: STUDENT IN AN ORGANIZED HEALTH CARE EDUCATION/TRAINING PROGRAM

## 2025-03-05 PROCEDURE — 85049 AUTOMATED PLATELET COUNT: CPT | Performed by: STUDENT IN AN ORGANIZED HEALTH CARE EDUCATION/TRAINING PROGRAM

## 2025-03-05 PROCEDURE — 250N000011 HC RX IP 250 OP 636: Performed by: STUDENT IN AN ORGANIZED HEALTH CARE EDUCATION/TRAINING PROGRAM

## 2025-03-05 RX ORDER — HYDROXYZINE HYDROCHLORIDE 25 MG/1
25 TABLET, FILM COATED ORAL EVERY 6 HOURS PRN
DISCHARGE
Start: 2025-03-05

## 2025-03-05 RX ORDER — GABAPENTIN 300 MG/1
300 CAPSULE ORAL 2 TIMES DAILY
DISCHARGE
Start: 2025-03-05

## 2025-03-05 RX ORDER — INSULIN ASPART 100 [IU]/ML
INJECTION, SOLUTION INTRAVENOUS; SUBCUTANEOUS
DISCHARGE
Start: 2025-03-05

## 2025-03-05 RX ADMIN — ACETAMINOPHEN 650 MG: 325 TABLET, FILM COATED ORAL at 08:11

## 2025-03-05 RX ADMIN — OXYCODONE HYDROCHLORIDE 5 MG: 5 TABLET ORAL at 08:12

## 2025-03-05 RX ADMIN — METFORMIN HYDROCHLORIDE 1000 MG: 500 TABLET ORAL at 08:11

## 2025-03-05 RX ADMIN — GABAPENTIN 300 MG: 300 CAPSULE ORAL at 08:11

## 2025-03-05 RX ADMIN — ENOXAPARIN SODIUM 40 MG: 40 INJECTION SUBCUTANEOUS at 11:59

## 2025-03-05 RX ADMIN — ENOXAPARIN SODIUM 40 MG: 40 INJECTION SUBCUTANEOUS at 00:31

## 2025-03-05 RX ADMIN — FAMOTIDINE 20 MG: 20 TABLET, FILM COATED ORAL at 08:11

## 2025-03-05 RX ADMIN — ACETAMINOPHEN 650 MG: 325 TABLET, FILM COATED ORAL at 02:01

## 2025-03-05 RX ADMIN — ATORVASTATIN CALCIUM 40 MG: 40 TABLET, FILM COATED ORAL at 08:11

## 2025-03-05 RX ADMIN — OXYCODONE HYDROCHLORIDE 5 MG: 5 TABLET ORAL at 02:00

## 2025-03-05 RX ADMIN — AMLODIPINE BESYLATE 10 MG: 10 TABLET ORAL at 08:11

## 2025-03-05 ASSESSMENT — ACTIVITIES OF DAILY LIVING (ADL)
ADLS_ACUITY_SCORE: 58
ADLS_ACUITY_SCORE: 54
ADLS_ACUITY_SCORE: 58
ADLS_ACUITY_SCORE: 58
ADLS_ACUITY_SCORE: 62
ADLS_ACUITY_SCORE: 58
ADLS_ACUITY_SCORE: 62
ADLS_ACUITY_SCORE: 62

## 2025-03-05 NOTE — PROGRESS NOTES
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  Findings suspicious for metastatic disease. Enlarged left axillary lymph nodes, measuring up to 4.7 cm. Bilateral pulmonary nodules measuring up to 1.0 cm. Ill-defined hepatic lesions, measuring up to 2.9 cm. Right lower retroperitoneal 1.7 cm   nodule. Right pelvic lymphadenopathy, measuring up to 2.4 cm. Lytic lesions in the right humeral head, right sacrum, and left posterior iliac bone.     2.  Right adnexal 9.9 cm complex cystic mass, suspicious for malignancy, either metastatic or primary ovarian neoplasm.     3.  Left upper anterior chest wall 0.9 cm subcutaneous nodule and right gluteal 0.7 cm subcutaneous nodule, indeterminate and may be metastatic.          2/24/2025 CT bone biopsy    Addendum   An immunoperoxidase stain for HNF-1 beta is negative.     The following assays; ALK (D5F3), ER, HER2, PD-L1, BRAF, CD20, CD30 AZF, CD30 Formalin, MLH-1, MSH-2, MSH-6 and PMS-2, have not been validated on decalcified tissues. Results should be interpreted with caution given the possibility of false negative results on decalcified specimens.         Addendum electronically signed by Franco Machado MD on 2/28/2025 at  1:32 PM   Final Diagnosis   Bone, right distal femur, biopsy-  Poorly-differentiated carcinoma with neuroendocrine features (see comment)    Electronically signed by Franco Machado MD on 2/28/2025 at 10:48 AM   Comment  [facility]   The morphologic features are that of a poorly differentiated carcinoma, metastatic.  The cytokeratin 7 positive, cytokeratin 20 negative profile in combination with positive reactivity for CDX2 raises the possibility of an upper gastrointestinal origin for this tumor.  The noted synaptophysin positivity suggest neuroendocrine differentiation.  The markers typically associated with müllerian/ovarian lesions are negative in this case.  Clinical correlation is required.  Intradepartmental consultation has been obtained.         Clinical Assessment / Barriers to Care (Per Nurse):    Pt still currently hospitalized at [facility]. Pt may prefer cancer care at [facility] or [facility]. She may discharge to TCU today (3/5/25).    Pt will need Med Onc and Radiation Onc consults for metastatic cancer. We will call to arrange appts as requested. If pt prefers [facility], we will fax referral to [facility].        Records Location: [facility]     Records Needed:     N/A    Additional testing needed prior to consult:     N/A    Referral updates and Plan:     TBTANIA Denson RN, BSN, OCN  Oncology New Patient Nurse Navigator   [facility]  [phone]

## 2025-03-05 NOTE — PLAN OF CARE
"Goal Outcome Evaluation:      Plan of Care Reviewed With: patient    Overall Patient Progress: no changeOverall Patient Progress: no change    Patient A&Ox4, VSS. Transfers with assist of two using lift, pain managed with tylenol, oxycodone, gabapentin and Lidocaine patches. Tolerated diet, using pure wick. Repositioned in bed, plan is to discharge to TCU tomorrow.  Problem: Adult Inpatient Plan of Care  Goal: Plan of Care Review  Description: The Plan of Care Review/Shift note should be completed every shift.  The Outcome Evaluation is a brief statement about your assessment that the patient is improving, declining, or no change.  This information will be displayed automatically on your shift  note.  Outcome: Progressing  Flowsheets (Taken 3/4/2025 2208)  Plan of Care Reviewed With: patient  Goal: Patient-Specific Goal (Individualized)  Description: You can add care plan individualizations to a care plan. Examples of Individualization might be:  \"Parent requests to be called daily at 9am for status\", \"I have a hard time hearing out of my right ear\", or \"Do not touch me to wake me up as it startles  me\".  Outcome: Progressing  Goal: Absence of Hospital-Acquired Illness or Injury  Outcome: Progressing  Intervention: Identify and Manage Fall Risk  Recent Flowsheet Documentation  Taken 3/4/2025 1638 by Marlin Porras, RN  Safety Promotion/Fall Prevention:   activity supervised   assistive device/personal items within reach   clutter free environment maintained   nonskid shoes/slippers when out of bed  Intervention: Prevent Skin Injury  Recent Flowsheet Documentation  Taken 3/4/2025 1638 by Marlin Porras, RN  Body Position: turned  Skin Protection: tubing/devices free from skin contact  Intervention: Prevent and Manage VTE (Venous Thromboembolism) Risk  Recent Flowsheet Documentation  Taken 3/4/2025 1638 by Marlin Porras, RN  VTE Prevention/Management: patient refused intervention  Goal: Optimal Comfort " and Wellbeing  Outcome: Progressing  Intervention: Monitor Pain and Promote Comfort  Recent Flowsheet Documentation  Taken 3/4/2025 2020 by Marlin Porras RN  Pain Management Interventions: medication (see MAR)  Goal: Readiness for Transition of Care  Outcome: Progressing     Problem: Hip Fracture Medical Management  Goal: Optimal Coping with Change in Health Status  Outcome: Progressing  Goal: Absence of Bleeding  Outcome: Progressing  Intervention: Monitor and Manage Bleeding  Recent Flowsheet Documentation  Taken 3/4/2025 1638 by Marlin Porras RN  Bleeding Management: dressing monitored  Goal: Effective Bowel Elimination  Outcome: Progressing  Goal: Baseline Cognitive Function Maintained  Outcome: Progressing  Goal: Absence of Embolism  Outcome: Progressing  Intervention: Prevent or Manage Embolism Risk  Recent Flowsheet Documentation  Taken 3/4/2025 1638 by Marlin Porras RN  VTE Prevention/Management: patient refused intervention  Goal: Fracture Stability  Outcome: Progressing  Goal: Optimal Functional Performance  Outcome: Progressing  Intervention: Promote Optimal Functional Status  Recent Flowsheet Documentation  Taken 3/4/2025 1638 by Marlin Porras RN  Activity Management: activity adjusted per tolerance  Goal: Pain Control and Function  Outcome: Progressing  Intervention: Manage Acute Orthopaedic-Related Pain  Recent Flowsheet Documentation  Taken 3/4/2025 2020 by Marlin Porras RN  Pain Management Interventions: medication (see MAR)  Goal: Effective Urinary Elimination  Outcome: Progressing     Problem: Skin Injury Risk Increased  Goal: Skin Health and Integrity  Outcome: Progressing  Intervention: Optimize Skin Protection  Recent Flowsheet Documentation  Taken 3/4/2025 1638 by Marlin Porras RN  Pressure Reduction Techniques: weight shift assistance provided  Pressure Reduction Devices: pressure-redistributing mattress utilized  Skin Protection: tubing/devices free from  skin contact  Activity Management: activity adjusted per tolerance  Head of Bed (HOB) Positioning: HOB at 20-30 degrees     Problem: Pain Acute  Goal: Optimal Pain Control and Function  Outcome: Progressing  Intervention: Develop Pain Management Plan  Recent Flowsheet Documentation  Taken 3/4/2025 2020 by Marlin Porras, RN  Pain Management Interventions: medication (see MAR)  Intervention: Prevent or Manage Pain  Recent Flowsheet Documentation  Taken 3/4/2025 1638 by Marlin Porras RN  Medication Review/Management: medications reviewed     Problem: Comorbidity Management  Goal: Blood Glucose Levels Within Targeted Range  Outcome: Progressing  Intervention: Monitor and Manage Glycemia  Recent Flowsheet Documentation  Taken 3/4/2025 1638 by Marlin Porras, RN  Medication Review/Management: medications reviewed  Goal: Blood Pressure in Desired Range  Outcome: Progressing  Intervention: Maintain Blood Pressure Management  Recent Flowsheet Documentation  Taken 3/4/2025 1638 by Marlin Porras, RN  Medication Review/Management: medications reviewed

## 2025-03-05 NOTE — DISCHARGE SUMMARY
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 Enlarged left axillary lymph nodes, measuring up to 4.7 cm. Bilateral pulmonary nodules measuring up to 1.0 cm. Ill-defined hepatic lesions, measuring up to 2.9 cm. Right lower retroperitoneal 1.7 cm   nodule. Right pelvic lymphadenopathy, measuring up to 2.4 cm. Lytic lesions in the right humeral head, right sacrum, and left posterior iliac bone.  2) Right adnexal 9.9 cm complex cystic mass, suspicious for malignancy, either metastatic or primary ovarian neoplasm.  3)  Left upper anterior chest wall 0.9 cm subcutaneous nodule and right gluteal 0.7 cm subcutaneous nodule, indeterminate and may be metastatic.    Severe hypercalcemia due to malignancy, resolved  - received dose of zolendronic acid on 2/20/25    Acute blood loss anemia due to femur fracture and surgery    Chronic left knee pain due to degenerative joint disease  -Received methylprednisone injection into left knee on 3/4/25    PMH:  Obesity  CVA  Osteoarthritis  Hypertension  Dyslipidemia    Clinically Significant Risk Factors     # Severe Obesity: Estimated body mass index is 48.25 kg/m  as calculated from the following:    Height as of 2/18/25: 1.626 m (5' 4\").    Weight as of this encounter: 127.5 kg (281 lb 1.4 oz).   "     Follow-ups Needed After Discharge   Follow-up Appointments       Follow Up and recommended labs and tests      Follow-up with Dr. Meredith at [facility] approximately 2 weeks following your surgery.  Call the care coordinator at [phone] to arrange appointment or if any questions.  If applicable and patient is difficult to transport, patient can be seen by facility provider and have x-rays sent to the surgeon's office.   [facility] clinic numbers:  Felice [phone],  Angeli [phone]  Walk in clinic hours: 8 am - 8 pm        Follow Up and recommended labs and tests      See your primary provider within 1 week after discharge from TC    Recommend follow up with either [facility] in [place] or the [facility] in [place] for treatment of your new diagnosis of metastatic neuroendocrine carcinoma                Unresulted Labs Ordered in the Past 30 Days of this Admission       Date and Time Order Name Status Description    3/1/2025 11:34 PM Blood Culture Hand, Left Preliminary     3/1/2025 11:34 PM Blood Culture Hand, Right Preliminary             Discharge Disposition   Discharged to home  Condition at discharge: Stable    Hospital Course   54 year old female with past medical history including previous left-sided CVA, type 2 diabetes, hypertension, morbid obesity on Ozempic, KATHRINE, admitted on 2/20/2025 from transitional care unit with severe hypercalcemia and a pathologic distal right femur fracture.     Given suspected pathologic fracture on admission, CT chest abdomen and pelvis was performed which showed a 9.9 cm complex right adnexal cystic mass and multiple pulmonary nodules.       Hypercalcemia normalized from 14.7 with IV fluids and a dose of zolendronic acid on 2/20/25.  She underwent an IR biopsy of her pathologic distal right femur fracture 2/24 to help guide further management.       She was taken to the OR by orthopedic surgery for ORIF of her  pathologic fracture on 2/25.     Biopsy results eventually returned and were consistent with a poorly differentiated neuroendocrine carcinoma.  Markers typically associated with ovarian lesions were negative.  MRI brain was negative for metastatic disease.     She was seen by oncology.  Current plan is for the patient to establish care either with [facility] cancer care clinic in [place] or [facility] oncology clinic in [facility] [place] (referral was placed for [facility] on discharge).  It is anticipated she will undergo cancer treatment after she recovers from her femur fracture surgery    The patient was discharged to TCU today    Consultations This Hospital Stay   ORTHOPEDIC SURGERY IP CONSULT  HEMATOLOGY & ONCOLOGY IP CONSULT  ADVANCE DIRECTIVE IP CONSULT  INTERVENTIONAL RADIOLOGY ADULT/PEDS IP CONSULT  GYNECOLOGIC ONCOLOGY IP CONSULT  INTERVENTIONAL RADIOLOGY ADULT/PEDS IP CONSULT  SPIRITUAL HEALTH SERVICES IP CONSULT  CARE MANAGEMENT / SOCIAL WORK IP CONSULT  PHYSICAL THERAPY ADULT IP CONSULT  OCCUPATIONAL THERAPY ADULT IP CONSULT  PAIN MANAGEMENT ADULT IP CONSULT  WOUND OSTOMY CONTINENCE NURSE  IP CONSULT  RADIATION ONCOLOGY IP CONSULT  CARE MANAGEMENT / SOCIAL WORK IP CONSULT  CARE MANAGEMENT / SOCIAL WORK IP CONSULT  ORTHOPEDIC SURGERY IP CONSULT  SPIRITUAL HEALTH SERVICES IP CONSULT  PHYSICAL THERAPY ADULT IP CONSULT  OCCUPATIONAL THERAPY ADULT IP CONSULT    Code Status   Full Code    Time Spent on this Encounter   I, Abelardo Matthews MD, personally saw the patient today and spent greater than 30 minutes discharging this patient.       Abelardo Matthews MD  [facility]  [address] [place] [address]  Phone: [phone]  Fax: [phone]  ______________________________________________________________________    Physical Exam   Vital Signs: Temp: 97.8  F (36.6  C) Temp src: Temporal BP: (!) 143/84 Pulse: 85   Resp: 16 SpO2: 95 % O2 Device: None  (Room air)    Weight: 281 lbs 1.38 oz  Awake, alert, oriented x 3  Cardiovascular: Regular rate and rhythm  Pulmonary: Clear to auscultation bilaterally  Abdomen: Nontender/nondistended.  Soft       Primary Care Physician   Juli Ramsey    Discharge Orders      Primary Care - Care Coordination Referral      Adult Oncology/Hematology  Referral      Radiation Therapy Referral      Orthopedic  Referral      General info for SNF    Length of Stay Estimate: Short Term Care: Estimated # of Days <30  Condition at Discharge: Improving  Level of care:skilled   Rehabilitation Potential: Good  Admission H&P remains valid and up-to-date: Yes  Recent Chemotherapy: N/A  Use Nursing Home Standing Orders: Yes     Mantoux instructions    Give two-step Mantoux (PPD) Per Facility Policy Yes     Reason for your hospital stay    Right distal femur pathologic fracture: IM Nail     Wound care    Site:   Right LE   Instructions:  Keep dressings clean, dry and intact.  Okay to shower over aquacel dressings.  Do not immerse.  Change if peeling or saturated.     Follow Up and recommended labs and tests    Follow-up with Dr. Meredith at [facility] approximately 2 weeks following your surgery.  Call the care coordinator at [phone] to arrange appointment or if any questions.  If applicable and patient is difficult to transport, patient can be seen by facility provider and have x-rays sent to the surgeon's office.   [facility] clinic numbers:  Felice [phone],  Angeli [phone]  Walk in clinic hours: 8 am - 8 pm     Activity - Up with assistive device     Weight bearing status    25% WB right LE, WBAT Left LE (patient has significant left knee arthritis on Left: pain with ambulation)     General info for SNF    Length of Stay Estimate: Short Term Care: Estimated # of Days <30  Condition at Discharge: Improving  Level of care:skilled   Rehabilitation Potential: Good  Admission H&P remains valid and  up-to-date: Yes  Recent Chemotherapy: N/A  Use Nursing Home Standing Orders: Yes     Mantoux instructions    Give two-step Mantoux (PPD) Per Facility Policy Yes     Follow Up and recommended labs and tests    See your primary provider within 1 week after discharge from TCU    Recommend follow up with either [facility] [place] in [place] or the [facility] in [place] for treatment of your new diagnosis of metastatic neuroendocrine carcinoma     Glucose monitor nursing POCT    Before meals and at bedtime     Activity - Up with nursing assistance     Physical Therapy Adult Consult    Evaluate and treat as clinically indicated.    Reason:  Right distal femur pathologic fracture: IM Nail     Occupational Therapy Adult Consult    Evaluate and treat as clinically indicated.    Reason:  Right distal femur pathologic fracture: IM Nail     Fall precautions     Crutches DME    DME Documentation: Describe the reason for need to support medical necessity: Impaired gait status post knee surgery. I, the undersigned, certify that the above prescribed supplies are medically necessary for this patient and is both reasonable and necessary in reference to accepted standards of medical practice in the treatment of this patient's condition and is not prescribed as a convenience.     Cane DME    DME Documentation: Describe the reason for need to support medical necessity: Impaired gait status post knee surgery. I, the undersigned, certify that the above prescribed supplies are medically necessary for this patient and is both reasonable and necessary in reference to accepted standards of medical practice in the treatment of this patient's condition and is not prescribed as a convenience.     Walker DME    DME Documentation: Describe the reason for need to support medical necessity: Impaired gait status post knee surgery. I, the undersigned, certify that the above prescribed supplies are medically  necessary for this patient and is both reasonable and necessary in reference to accepted standards of medical practice in the treatment of this patient's condition and is not prescribed as a convenience.     Diet    Follow this diet upon discharge: regular diet       Significant Results and Procedures   Results for orders placed or performed during the hospital encounter of 02/20/25   CT Chest/Abdomen/Pelvis w Contrast    Narrative    EXAM: CT CHEST/ABDOMEN/PELVIS W CONTRAST  LOCATION: [facility]  DATE: 2/20/2025    INDICATION: Right distal femoral lytic lesion, suspicious for metastatic disease.  COMPARISON: None  TECHNIQUE: CT scan of the chest, abdomen, and pelvis was performed following injection of IV contrast. Multiplanar reformats were obtained. Dose reduction techniques were used.   CONTRAST: 100mL Isovue 370    FINDINGS:   LUNGS AND PLEURA: Several solid pulmonary nodules bilaterally, with a few representative nodules described as follows. Right middle lobe 1.0 cm nodule (series 4, image 122). Left upper lobe 0.8 cm nodule (image 79). Right lower lobe 0.7 cm nodule (image   190). No focal airspace disease. No pleural effusion or pneumothorax.    MEDIASTINUM/AXILLAE: Enlarged left axillary lymph node measuring 4.7 x 3.2 cm, with adjacent borderline enlarged lymph nodes. No mediastinal or hilar lymphadenopathy. Mild cardiomegaly. No pericardial effusion.    CORONARY ARTERY CALCIFICATION: Severe.    HEPATOBILIARY: Several ill-defined low-attenuation lesion in the liver, with a few representative lesions described as follows. A hepatic segment 8 lesion measuring 2.9 cm (series 3, image 114). Hepatic segment IVb lesion measuring 2.2 cm (image 136).   Hepatic segment 7 lesion measuring 1.1 cm (series 3, image 163).    PANCREAS: Normal.    SPLEEN: Normal.    ADRENAL GLANDS: Normal.    KIDNEYS/BLADDER: Normal.    BOWEL: No obstruction or inflammatory change. Normal appendix. No evidence of  acute appendicitis.    PERITONEUM/RETROPERITONEUM: Right lower retroperitoneal nodule measuring 1.7 x 1.4 cm (series 3, image 211).    LYMPH NODES: Right pelvic sidewall lymph node 2.4 x 1.5 cm. Right common iliac lymph nodes measuring up to 1.4 cm in short axis. Few prominent but nonenlarged retroperitoneal lymph nodes.    VASCULATURE: Mild atherosclerotic calcifications.    PELVIC ORGANS: 4.4 cm calcification at the uterine fundus, likely calcified fibroid. Complex cystic mass in the right adnexa, measuring 9.3 x 7.4 x 9.9 cm.    MUSCULOSKELETAL: Right humeral head lytic lesion measuring 1.8 cm. Right sacral lytic lesion with extraosseous soft tissue extension, measuring 5.2 x 3.2 cm. Lytic lesion in the left posterior iliac bone measuring 1.6 cm (series 3, image 208). Left upper   anterior chest wall 0.9 cm subcutaneous nodule (series 3, image 24). Right gluteal 0.7 cm subcutaneous nodule (series 3, image 258).      Impression    IMPRESSION:  1.  Findings suspicious for metastatic disease. Enlarged left axillary lymph nodes, measuring up to 4.7 cm. Bilateral pulmonary nodules measuring up to 1.0 cm. Ill-defined hepatic lesions, measuring up to 2.9 cm. Right lower retroperitoneal 1.7 cm   nodule. Right pelvic lymphadenopathy, measuring up to 2.4 cm. Lytic lesions in the right humeral head, right sacrum, and left posterior iliac bone.    2.  Right adnexal 9.9 cm complex cystic mass, suspicious for malignancy, either metastatic or primary ovarian neoplasm.    3.  Left upper anterior chest wall 0.9 cm subcutaneous nodule and right gluteal 0.7 cm subcutaneous nodule, indeterminate and may be metastatic.   CT Bone Biopsy Superficial    Narrative    EXAM:  1. PERCUTANEOUS BIOPSY OF A SOFT TISSUE MASS ADJACENT TO A LYTIC LESION INVOLVING THE DISTAL RIGHT FEMUR  2. CT GUIDANCE  3. CONSCIOUS SEDATION  LOCATION: [facility]  DATE: 2/24/2025    INDICATION: soft tissue lesion over the femor (above right  knee)  TECHNIQUE: Dose reduction techniques were used.    PROCEDURE: Informed consent obtained. Site marked. Prior images reviewed. Required items made available. Patient identity confirmed verbally and with arm band. Patient reevaluated immediately before administering sedation. Universal protocol was   followed. Time out performed. The site was prepped and draped in sterile fashion. 10 mL of 1% lidocaine was infused into the local soft tissues. Using standard technique and under direct CT guidance, a 18 biopsy device was used to obtain 7 core biopsies.   Tissue was submitted to Pathology.    The patient tolerated the procedure well. No immediate complications.    SEDATION: Versed 3 mg. Fentanyl 150 mcg. The procedure was performed with administration intravenous conscious sedation with appropriate preoperative, intraoperative, and postoperative evaluation.    20 minutes of supervised face to face conscious sedation time was provided by a radiology nurse under my direct supervision.      Impression    IMPRESSION:  1.  Successful CT-guided biopsy of a soft tissue mass adjacent to the lytic lesion involving the distal right femur.    Reference CPT Codes: 28922, ,   XR Surgery ANALISA L/T 5 Min Fluoro w Stills    Narrative    This exam was marked as non-reportable because it will not be read by a   radiologist or a [place] non-radiologist provider.         MR Brain w/o & w Contrast    Narrative    EXAM: MR BRAIN W/O and W CONTRAST  LOCATION: [facility]  DATE: 3/1/2025    INDICATION: new neuroendocrine cancer, need additional imaging  COMPARISON: None.  CONTRAST: 12 mL Gadavist  TECHNIQUE: Routine multiplanar multisequence head MRI without and with intravenous contrast.    FINDINGS:  INTRACRANIAL CONTENTS: No acute or subacute infarct. No mass, acute hemorrhage, or extra-axial fluid collections. Patchy nonspecific T2/FLAIR hyperintensities within the cerebral white matter and latoya most consistent  with mild to moderate chronic   microvascular ischemic change. Normal ventricles and sulci. Normal position of the cerebellar tonsils. No pathologic contrast enhancement.    Right basal ganglia prominent chronic infarct with hemosiderin deposition. Right cerebellum demonstrates multiple chronic infarcts. Right inferior lateral frontal lobe and right frontal operculum small area of tissue loss and gliosis.    SELLA: No abnormality accounting for technique.    OSSEOUS STRUCTURES/SOFT TISSUES: Calvarium demonstrates diffuse loss normal fatty marrow, nonspecific. Expanded differential includes bone marrow hyperplasias, chronic anemias, HIV, hematopoetic disorders and malignancies, metastatic disease among other   etiologies. Please correlate clinically.     The major intracranial vascular flow voids are maintained.     ORBITS: No abnormality accounting for technique.     SINUSES/MASTOIDS: Mild mucosal thickening scattered about the paranasal sinuses. No middle ear or mastoid effusion.       Impression    IMPRESSION:  1.  No intracranial pathologic enhancement to indicate metastatic disease.    2.  Calvarium demonstrates diffuse loss normal fatty marrow, nonspecific. Expanded differential provided above.    3.  No acute intracranial process identified.    4.  Chronic intracranial changes described above.       Discharge Medications   Current Discharge Medication List        START taking these medications    Details   enoxaparin ANTICOAGULANT (LOVENOX) 40 MG/0.4ML syringe Inject 0.4 mLs (40 mg) subcutaneously every 12 hours for 14 days.    Associated Diagnoses: Pathological fracture of right femur due to neoplastic disease, initial encounter (H)      gabapentin (NEURONTIN) 300 MG capsule Take 1 capsule (300 mg) by mouth 2 times daily.    Associated Diagnoses: Acute pain of right knee      hydrOXYzine HCl (ATARAX) 25 MG tablet Take 1 tablet (25 mg) by mouth every 6 hours as needed for other (adjuvant pain).    Associated  Diagnoses: Acute pain of right knee      insulin aspart (NOVOLOG FLEXPEN) 100 UNIT/ML pen For Pre-Meal  - 189 give 1 unit.  For Pre-Meal  - 239 give 2 units.  For Pre-Meal  - 289 give 3 units.  For Pre-Meal  - 339 give 4 units.  For Pre-Meal - 389 give 5 units.  For Pre-Meal -439 give 6 units For Pre-Meal BG greater than or equal to 440 give 7 units.  For BEDTIME: For  - 249 give 1 unit.  For  - 299 give 2 units.  For  - 349 give 3 units.  For  - 399 give 4 units.  For BG greater than or equal to 400 give 5 units.    Associated Diagnoses: Type 2 diabetes mellitus with hyperglycemia, with long-term current use of insulin (H)      insulin glargine (LANTUS PEN) 100 UNIT/ML pen Inject 24 Units subcutaneously every morning (before breakfast).    Comments: If Lantus is not covered by insurance, may substitute Basaglar or Semglee or other insulin glargine product per insurance preference at same dose and frequency.    Associated Diagnoses: Type 2 diabetes mellitus with hyperglycemia, with long-term current use of insulin (H)      senna-docusate (SENOKOT-S/PERICOLACE) 8.6-50 MG tablet Take 1 tablet by mouth 2 times daily as needed for constipation.    Associated Diagnoses: Pathological fracture of right femur due to neoplastic disease, initial encounter (H)           CONTINUE these medications which have CHANGED    Details   !! acetaminophen (TYLENOL) 325 MG tablet Take 2 tablets (650 mg) by mouth every 6 hours.    Associated Diagnoses: Pathological fracture of right femur due to neoplastic disease, initial encounter (H)      !! acetaminophen (TYLENOL) 325 MG tablet Take 2 tablets (650 mg) by mouth every 4 hours as needed for mild pain, other, headaches or fever (and adjunct with moderate or severe pain or per patient request).    Associated Diagnoses: Pathological fracture of right femur due to neoplastic disease, initial encounter (H)      oxyCODONE (ROXICODONE)  "5 MG tablet Take 1-2 tablets (5-10 mg) by mouth every 3 hours as needed for moderate pain. 1 tab po q 3 hrs prn pain scale \"3-6\"  2 tabs po q 3 hrs prn pain scale \"7-10\"  Qty: 20 tablet, Refills: 0    Associated Diagnoses: Pathological fracture of right femur due to neoplastic disease, initial encounter (H)       !! - Potential duplicate medications found. Please discuss with provider.        CONTINUE these medications which have NOT CHANGED    Details   albuterol (PROAIR HFA/PROVENTIL HFA/VENTOLIN HFA) 108 (90 Base) MCG/ACT inhaler Inhale 2 puffs into the lungs every 6 hours as needed for wheezing  Qty: 6.7 g, Refills: 3    Comments: Profile Pharmacy may dispense brand covered by insurance (Proair, or proventil or ventolin or generic albuterol inhaler)  Associated Diagnoses: Moderate persistent asthma without complication      amLODIPine (NORVASC) 10 MG tablet Take 1 tablet (10 mg) by mouth daily  Qty: 90 tablet, Refills: 3    Comments: Profile  Associated Diagnoses: Benign essential hypertension      atorvastatin (LIPITOR) 40 MG tablet Take 1 tablet (40 mg) by mouth daily  Qty: 90 tablet, Refills: 3    Comments: Profile  Associated Diagnoses: Hyperlipidemia LDL goal <100      !! Continuous Glucose Sensor (FREESTYLE SANDRA 14 DAY SENSOR) MISC Change every 14 days.  Qty: 2 each, Refills: 5    Associated Diagnoses: Type 2 diabetes mellitus with hyperglycemia, with long-term current use of insulin (H)      !! Continuous Glucose Sensor (FREESTYLE SANDRA 3 SENSOR) MISC 1 each every 14 days Use 1 sensor every 14 days. Use to read blood sugars per 's instructions.  Qty: 6 each, Refills: 5    Associated Diagnoses: Type 2 diabetes mellitus with hyperglycemia, with long-term current use of insulin (H)      ibuprofen (ADVIL/MOTRIN) 200 MG tablet Take 200-400 mg by mouth every 4 hours as needed for mild pain.      insulin pen needle (BD PEN NEEDLE TWYLA 2ND GEN) 32G X 4 MM miscellaneous USE TWICE DAILY OR AS " "DIRECTED  Qty: 200 each, Refills: 2    Associated Diagnoses: Type 2 diabetes mellitus without complication, with long-term current use of insulin (H)      irbesartan (AVAPRO) 300 MG tablet TAKE 1 TABLET(300 MG) BY MOUTH AT BEDTIME  Qty: 90 tablet, Refills: 2    Associated Diagnoses: Secondary hypertension      Lidocaine (LIDOCARE) 4 % Patch Place 1 patch onto the skin every 24 hours. On for 12 hours, off for 12 hours.      metFORMIN (GLUCOPHAGE) 1000 MG tablet TAKE 1 TABLET BY MOUTH TWICE DAILY WITH MEALS  Qty: 180 tablet, Refills: 1    Associated Diagnoses: Type 2 diabetes mellitus without complication, with long-term current use of insulin (H)      Misc. Devices (ROLLATOR ULTRA-LIGHT) MISC Extra side walker with front wheels for home use.  Purchase, lifetime need. Extra wide rolling walker (\"Walker 4\", item #:  [id number]) needed for safe transfers and ambulation.  Pt weight is 335 lbs.      montelukast (SINGULAIR) 10 MG tablet Take 1 tablet (10 mg) by mouth at bedtime  Qty: 90 tablet, Refills: 3    Comments: Profile  Associated Diagnoses: Moderate persistent asthma without complication      Semaglutide, 2 MG/DOSE, (OZEMPIC, 2 MG/DOSE,) 8 MG/3ML pen Inject 2 mg subcutaneously every 7 days  Qty: 9 mL, Refills: 2    Associated Diagnoses: Type 2 diabetes mellitus with hyperglycemia, with long-term current use of insulin (H)      sertraline (ZOLOFT) 100 MG tablet Take 1.5 tablets (150 mg) by mouth daily  Qty: 135 tablet, Refills: 3    Comments: Profile  Associated Diagnoses: Adjustment disorder with anxious mood       !! - Potential duplicate medications found. Please discuss with provider.        STOP taking these medications       aspirin (ASA) 81 MG tablet Comments:   Reason for Stopping:         diazepam (VALIUM) 5 MG tablet Comments:   Reason for Stopping:         insulin NPH-Regular (HUMULIN MIX 70/30 KWIKPEN) susp Comments:   Reason for Stopping:         nystatin (MYCOSTATIN) 169881 UNIT/GM external powder " Comments:   Reason for Stopping:         triamcinolone (KENALOG) 0.1 % external cream Comments:   Reason for Stopping:             Allergies   Allergies   Allergen Reactions    Bydureon [Exenatide] Diarrhea    Penicillins Rash    Sulfa Antibiotics Rash

## 2025-03-05 NOTE — PLAN OF CARE
"Goal Outcome Evaluation:      Plan of Care Reviewed With: patient    Overall Patient Progress: improvingOverall Patient Progress: improving    Outcome Evaluation: Pt is A&Ox4. RA. Assist x2 w/lift. Voiding via purewick. PIV R arm SL. Phosphorous protocol w/recheck in the morning. WOC follows sacrum pressure injury. R hip dressings CDI. CMS intact. Pain controlled with tylenol, oxy, gabapentin, and robaxin. ACHS glucose checks. Discharge today to TCU after 1pm.      Problem: Adult Inpatient Plan of Care  Goal: Plan of Care Review  Description: The Plan of Care Review/Shift note should be completed every shift.  The Outcome Evaluation is a brief statement about your assessment that the patient is improving, declining, or no change.  This information will be displayed automatically on your shift  note.  Outcome: Progressing  Flowsheets (Taken 3/5/2025 0204)  Outcome Evaluation: Pt is A&Ox4. RA. Assist x2 w/lift. Voiding via purewick. PIV R arm SL. Phosphorous protocol w/recheck in the morning. WOC follows sacrum pressure injury. R hip dressings CDI. Pain controlled with tylenol, oxy, gabapentin, and robaxin. ACHS glucose checks. Discharge today to TCU after 1pm.  Plan of Care Reviewed With: patient  Overall Patient Progress: improving  Goal: Patient-Specific Goal (Individualized)  Description: You can add care plan individualizations to a care plan. Examples of Individualization might be:  \"Parent requests to be called daily at 9am for status\", \"I have a hard time hearing out of my right ear\", or \"Do not touch me to wake me up as it startles  me\".  Outcome: Progressing  Goal: Absence of Hospital-Acquired Illness or Injury  Outcome: Progressing  Intervention: Identify and Manage Fall Risk  Recent Flowsheet Documentation  Taken 3/5/2025 0030 by Jan Canseco, RN  Safety Promotion/Fall Prevention:   assistive device/personal items within reach   activity supervised   clutter free environment maintained   mobility aid " in reach  Intervention: Prevent Skin Injury  Recent Flowsheet Documentation  Taken 3/5/2025 0030 by Jan Canseco RN  Body Position: supine, head elevated  Skin Protection: tubing/devices free from skin contact  Intervention: Prevent and Manage VTE (Venous Thromboembolism) Risk  Recent Flowsheet Documentation  Taken 3/5/2025 0030 by Jan Canseco RN  VTE Prevention/Management: SCDs off (sequential compression devices)  Intervention: Prevent Infection  Recent Flowsheet Documentation  Taken 3/5/2025 0030 by Jan Canseco RN  Infection Prevention:   rest/sleep promoted   hand hygiene promoted  Goal: Optimal Comfort and Wellbeing  Outcome: Progressing  Intervention: Monitor Pain and Promote Comfort  Recent Flowsheet Documentation  Taken 3/5/2025 0029 by Jan Canseco RN  Pain Management Interventions: rest  Goal: Readiness for Transition of Care  Outcome: Progressing     Problem: Hip Fracture Medical Management  Goal: Optimal Coping with Change in Health Status  Outcome: Progressing  Goal: Absence of Bleeding  Outcome: Progressing  Intervention: Monitor and Manage Bleeding  Recent Flowsheet Documentation  Taken 3/5/2025 0030 by Jan Canseco RN  Bleeding Management: dressing monitored  Goal: Effective Bowel Elimination  Outcome: Progressing  Intervention: Promote Effective Bowel Elimination  Recent Flowsheet Documentation  Taken 3/5/2025 0030 by Jan Canseco RN  Bowel Elimination Promotion: adequate fluid intake promoted  Goal: Baseline Cognitive Function Maintained  Outcome: Progressing  Goal: Absence of Embolism  Outcome: Progressing  Intervention: Prevent or Manage Embolism Risk  Recent Flowsheet Documentation  Taken 3/5/2025 0030 by Jan Canseco RN  VTE Prevention/Management: SCDs off (sequential compression devices)  Goal: Fracture Stability  Outcome: Progressing  Goal: Optimal Functional Performance  Outcome: Progressing  Intervention: Promote Optimal Functional  Status  Recent Flowsheet Documentation  Taken 3/5/2025 0030 by Jan Canseco RN  Range of Motion: active ROM (range of motion) encouraged  Activity Management:   activity adjusted per tolerance   activity encouraged  Goal: Pain Control and Function  Outcome: Progressing  Intervention: Manage Acute Orthopaedic-Related Pain  Recent Flowsheet Documentation  Taken 3/5/2025 0029 by Jan Canseco RN  Pain Management Interventions: rest  Goal: Effective Urinary Elimination  Outcome: Progressing     Problem: Skin Injury Risk Increased  Goal: Skin Health and Integrity  Outcome: Progressing  Intervention: Plan: Nurse Driven Intervention: Moisture Management  Recent Flowsheet Documentation  Taken 3/5/2025 0030 by Jan Canseco RN  Moisture Interventions: Encourage regular toileting  Intervention: Plan: Nurse Driven Intervention: Friction and Shear  Recent Flowsheet Documentation  Taken 3/5/2025 0030 by Jan Canseco RN  Friction/Shear Interventions: HOB 30 degrees or less  Intervention: Optimize Skin Protection  Recent Flowsheet Documentation  Taken 3/5/2025 0030 by Jan Canseco RN  Pressure Reduction Techniques: weight shift assistance provided  Pressure Reduction Devices: pressure-redistributing mattress utilized  Skin Protection: tubing/devices free from skin contact  Activity Management:   activity adjusted per tolerance   activity encouraged  Head of Bed (HOB) Positioning: HOB at 20-30 degrees     Problem: Pain Acute  Goal: Optimal Pain Control and Function  Outcome: Progressing  Intervention: Develop Pain Management Plan  Recent Flowsheet Documentation  Taken 3/5/2025 0029 by Jan Canseco RN  Pain Management Interventions: rest  Intervention: Prevent or Manage Pain  Recent Flowsheet Documentation  Taken 3/5/2025 0030 by Jan Canseco RN  Bowel Elimination Promotion: adequate fluid intake promoted  Medication Review/Management: medications reviewed     Problem: Comorbidity  Management  Goal: Blood Glucose Levels Within Targeted Range  Outcome: Progressing  Intervention: Monitor and Manage Glycemia  Recent Flowsheet Documentation  Taken 3/5/2025 0030 by Jan Canseco, RN  Medication Review/Management: medications reviewed  Goal: Blood Pressure in Desired Range  Outcome: Progressing  Intervention: Maintain Blood Pressure Management  Recent Flowsheet Documentation  Taken 3/5/2025 0030 by Jan Canseco, RN  Medication Review/Management: medications reviewed

## 2025-03-05 NOTE — PROGRESS NOTES
[facility] ACUTE INPATIENT PAIN SERVICE    [facility], [facility], [facility], [facility], [place]   PAIN PROGRESS          Assessment/Plan:  Brissa Mott is a 54 year old female who was admitted on 2/20/2025.  Pain Service is asked to see the patient for assistance with cancer related pain.       Medical history significant for previous left-sided CVA, Type 2 diabetes, hypertension, morbid obesity on Ozempic, KATHRINE.       Recent hospitalization 1/30 to 2/4/25 at [facility] for right knee pain and inability to bear weight patient was discharged to TCU for rehab, she sustained a fall and was subsequently admitted here at [facility].       Admitted from transitional care unit with evaluation of severe hypercalcemia, distal right femur fracture found to have a lytic mass.  Orthopedic surgery was consulted with expanded workup for suspected metastatic malignancy with CT chest abdomen and pelvis which showed a 9.9 cm complex right adnexal cystic mass, multiple pulmonary nodules.      Patient is status post op day #4 open reduction internal fixation with intramedullary rodding of right hip fracture     Oncology Consulted: Right adnexal mass, elevated CA-125, metastatic disease with pulmonary nodules, hepatic lesions and lymphadenopathy, lytic lesions in humeral head, sacrum, iliac bone   Right femur fracture with intramedullary marrow replacing lesion.        shows 5 total opioid prescriptions.  Most recently   02/11/25 oxycodone 10 mg tablets 2 for 1 day  02/09/25 oxycodone 10 mg tablets 14 for 2 days  02/04/25 oxycodone 10 mg tablets 13 for 2 days.   02/04/25 diazepam 5 mg tablets 9 for 2 days     Reviewed medication reconciliation 2/18/25 no changes on readmission 2/20/25.       Over the past 24 hours patient has received:  4(5mg) oxycodone for a total of 30 morphine equivalents          Subjective:  Brissa is seen today in her bed alert and oriented in no acute distress. Reports her pain is currently a  1/10 located in her right hip. Reports pain has been gradually improving and is currently controlled with scheduled oxycodone 5mg q4h. Plans to discharge to the TCU later today. No other pain concerns.     Endorses constipation. Bowel regimen has been helpful.     Reviewed continuing with current plan, all questions answered.       PLAN:  Cancer related pain and acute post operative pain  Multimodal Medication Therapy:   Adjuvants: tylenol 975 mg every 6 hours scheduled with oxycodone Methocarbamol 500 mg every 6 hours gabapentin 300 mg bid  Opioids: Oxycodone 5 mg every 6 hours with additional  Oxycodone 5-10 mg every 3 hours prn  Non-medication interventions- Ice, heat prn  Constipation Prophylaxis- senna-S 1 tablet bid, miralax daily  Follow up /Discharge Recommendations - We recommend prescribing the following at the time of discharge:    May benefit from long acting opioids will continue to assess    History   Drug Use Unknown         Tobacco Use      Smoking status: Former      Smokeless tobacco: Never        Objective:  Vital signs in last 24 hours:  B/P: 143/84, T: 97.8, P: 85, R: 16   Blood pressure (!) 143/84, pulse 85, temperature 97.8  F (36.6  C), temperature source Temporal, resp. rate 16, weight 127.5 kg (281 lb 1.4 oz), SpO2 95%, not currently breastfeeding.        Review of Systems:   As per subjective, all others negative.    Physical Exam    General: in no apparent distress and non-toxic   HEENT: Head normocephalic atraumatic, oral mucosa moist. Sclerae anicteric  CV: Regular rhythm, normal rate, no murmurs  Resp: No wheezes, no rales or rhonchi, no focal consolidations  GI: Non-tender; +bs  Skin: Dressing CDI  Extremities: No peripheral edema  Psych: Normal affect, mood euthymic  Neuro: Grossly normal          Imaging:  Personally Reviewed.    Results for orders placed or performed during the hospital encounter of 02/20/25   CT Chest/Abdomen/Pelvis w Contrast    Impression    IMPRESSION:  1.   Findings suspicious for metastatic disease. Enlarged left axillary lymph nodes, measuring up to 4.7 cm. Bilateral pulmonary nodules measuring up to 1.0 cm. Ill-defined hepatic lesions, measuring up to 2.9 cm. Right lower retroperitoneal 1.7 cm   nodule. Right pelvic lymphadenopathy, measuring up to 2.4 cm. Lytic lesions in the right humeral head, right sacrum, and left posterior iliac bone.    2.  Right adnexal 9.9 cm complex cystic mass, suspicious for malignancy, either metastatic or primary ovarian neoplasm.    3.  Left upper anterior chest wall 0.9 cm subcutaneous nodule and right gluteal 0.7 cm subcutaneous nodule, indeterminate and may be metastatic.   CT Bone Biopsy Superficial    Impression    IMPRESSION:  1.  Successful CT-guided biopsy of a soft tissue mass adjacent to the lytic lesion involving the distal right femur.    Reference CPT Codes: 49729, ,   MR Brain w/o & w Contrast    Impression    IMPRESSION:  1.  No intracranial pathologic enhancement to indicate metastatic disease.    2.  Calvarium demonstrates diffuse loss normal fatty marrow, nonspecific. Expanded differential provided above.    3.  No acute intracranial process identified.    4.  Chronic intracranial changes described above.        Lab Results:  Personally Reviewed.   Last Comprehensive Metabolic Panel:  Sodium   Date Value Ref Range Status   03/03/2025 142 135 - 145 mmol/L Final   04/29/2021 137 133 - 144 mmol/L Final     Potassium   Date Value Ref Range Status   03/03/2025 4.0 3.4 - 5.3 mmol/L Final   06/07/2022 4.1 3.4 - 5.3 mmol/L Final   04/29/2021 3.9 3.4 - 5.3 mmol/L Final     Chloride   Date Value Ref Range Status   03/03/2025 104 98 - 107 mmol/L Final   06/07/2022 105 94 - 109 mmol/L Final   04/29/2021 104 94 - 109 mmol/L Final     Carbon Dioxide   Date Value Ref Range Status   04/29/2021 29 20 - 32 mmol/L Final     Carbon Dioxide (CO2)   Date Value Ref Range Status   03/03/2025 28 22 - 29 mmol/L Final   06/07/2022 26 20 - 32  "mmol/L Final     Anion Gap   Date Value Ref Range Status   03/03/2025 10 7 - 15 mmol/L Final   06/07/2022 7 3 - 14 mmol/L Final   04/29/2021 4 3 - 14 mmol/L Final     Glucose   Date Value Ref Range Status   06/07/2022 156 (H) 70 - 99 mg/dL Final   04/29/2021 71 70 - 99 mg/dL Final     GLUCOSE BY METER POCT   Date Value Ref Range Status   03/05/2025 119 (H) 70 - 99 mg/dL Final     Urea Nitrogen   Date Value Ref Range Status   03/03/2025 5.5 (L) 6.0 - 20.0 mg/dL Final   06/07/2022 18 7 - 30 mg/dL Final   04/29/2021 22 7 - 30 mg/dL Final     Creatinine   Date Value Ref Range Status   03/03/2025 0.43 (L) 0.51 - 0.95 mg/dL Final   04/29/2021 0.75 0.52 - 1.04 mg/dL Final     GFR Estimate   Date Value Ref Range Status   03/03/2025 >90 >60 mL/min/1.73m2 Final     Comment:     eGFR calculated using 2021 CKD-EPI equation.   04/29/2021 >90 >60 mL/min/[1.73_m2] Final     Comment:     Non  GFR Calc  Starting 12/18/2018, serum creatinine based estimated GFR (eGFR) will be   calculated using the Chronic Kidney Disease Epidemiology Collaboration   (CKD-EPI) equation.       Calcium   Date Value Ref Range Status   03/03/2025 8.1 (L) 8.8 - 10.4 mg/dL Final   04/29/2021 8.7 8.5 - 10.1 mg/dL Final        UA: No results found for: \"UAMP\", \"UBARB\", \"BENZODIAZEUR\", \"UCANN\", \"UCOC\", \"OPIT\", \"UPCP\"           Please see A&P for additional details of medical decision making.    Ramy Mora PA-C  Acute Care Pain Management  Team  Hours of pain coverage Mon-Fri 8-1600, afterhours please call the primary team   [facility] (SARAH, [place], [place], [place])   Page via Vocera text web console -Click for [employer]           "

## 2025-03-05 NOTE — PROGRESS NOTES
PROGRESS NOTE    SUBJECTIVE  Brissa Mott is status post open reduction and internal fixation with IM frankie of the right femur for pathological fracture on 2/25/2025 (Dr Meredith). No acute events overnight. Afebrile with stable vitals. Continues to have left knee pain with known advanced arthritis.  States pain in her left knee is slightly improved following the injection.     PHYSICAL EXAM  General: No acute distress. Alert and oriented to person, time and place.  Pulmonary: Breathing comfortably on room air.   Musculoskeletal: Postoperative dressings are clean/dry and intact (changed yesterday). No erythema surrounding dressing. Patient demonstrates active range of motion of the operative hip. Compartments are soft and nontender.  Left knee: mild effusion.  Medial joint line tenderness.    Neurological: Patient is able to dorsi and plantar flex bilaterally.    Pulses intact and equal     LABS  Recent Labs   Lab 03/03/25  0838 03/02/25  0021 03/01/25  0644 02/27/25  0952   HGB 8.2* 8.3* 8.4* 8.6*       IMAGING  Post-operative radiographs were reviewed and demonstrate components in good position.     ASSESSMENT/PLAN  Status post open reduction and internal fixation with Im frankei of the right femur for pathological fracture on 2/25/2025.    Activity: 25% weight bearing on right LE. WBAT Left LE. Okay to range right knee at rest.  Activity as tolerated. No precautions. Continue to work with PT while in hospital.   Antibiotics: Weight-based Ancef x2 doses postoperatively or until discharge.  Diet: Advance as tolerated. Goal diet: General.   Dressing: Patient is able to shower. Patient will keep dressing in place until post-op day 7 or until 2 weeks post-op.  Change if saturated.   DVT Prophylaxis: Lovenox 40 mg  Pain medication: Multimodal pain regimen with ice, elevation, Tylenol, Robaxin, Oxycodone, Dilaudid, Atarax  LEFT KNEE: received a steroid injection yesterday.   She is understanding this will likely provide very  short term relief.  Definitive treatment is a TKA. Discussed use of a locked brace on the left if unable to pivot.  Patient will consider and have us notified if she'd like this ordered.     Disposition: Discharge to TCU. Ortho stable for discharge.

## 2025-03-05 NOTE — PROGRESS NOTES
Care Management Discharge Note    Discharge Date: 03/05/2025       Discharge Disposition:  TCU    Discharge Services:  rehab    Discharge DME:  yes    Discharge Transportation: agency    Private pay costs discussed: transportation costs    Does the patient's insurance plan have a 3 day qualifying hospital stay waiver?  No    PAS Confirmation Code: [id number]  Patient/family educated on Medicare website which has current facility and service quality ratings:  yes    Education Provided on the Discharge Plan:  yes  Persons Notified of Discharge Plans: patient  Patient/Family in Agreement with the Plan:  yes    Handoff Referral Completed: No, handoff not indicated or clinically appropriate    Additional Information:  Patient discharging to TCU today. Discharge orders faxed. Confirmed with TCU the transport time and orders.     Pam Mcclain RN  Care Coordinator  [facility]

## 2025-03-05 NOTE — PROGRESS NOTES
I saw and examined this patient today.  Overall she feels well, no complaints or concerns    She appears stable for discharge to TCU today

## 2025-03-05 NOTE — PLAN OF CARE
"A&Ox4. VSS on room air. CMS intact. Denies nausea. Pain managed with scheduled tylenol and oxycodone. Dressing to R hip/leg CDI. Mepilex to sacrum. Tolerating regular diet. Blood sugar 119 this shift. Transfers ax2 lift. Voiding with PW. One loose BM this shift.     IV removed. Copy of AVS provided to pt. Pt has no further questions at this time. Packet given to transport. Pt discharged to TCU around 1210.    Goal Outcome Evaluation:      Plan of Care Reviewed With: patient    Overall Patient Progress: improvingOverall Patient Progress: improving       Problem: Adult Inpatient Plan of Care  Goal: Plan of Care Review  Description: The Plan of Care Review/Shift note should be completed every shift.  The Outcome Evaluation is a brief statement about your assessment that the patient is improving, declining, or no change.  This information will be displayed automatically on your shift  note.  Outcome: Adequate for Care Transition  Flowsheets (Taken 3/5/2025 1027)  Plan of Care Reviewed With: patient  Overall Patient Progress: improving  Goal: Patient-Specific Goal (Individualized)  Description: You can add care plan individualizations to a care plan. Examples of Individualization might be:  \"Parent requests to be called daily at 9am for status\", \"I have a hard time hearing out of my right ear\", or \"Do not touch me to wake me up as it startles  me\".  Outcome: Adequate for Care Transition  Goal: Absence of Hospital-Acquired Illness or Injury  Outcome: Adequate for Care Transition  Intervention: Identify and Manage Fall Risk  Recent Flowsheet Documentation  Taken 3/5/2025 0820 by Saira Worthy, RN  Safety Promotion/Fall Prevention: safety round/check completed  Intervention: Prevent Skin Injury  Recent Flowsheet Documentation  Taken 3/5/2025 0820 by Saira Worthy, RN  Body Position:   weight shifting   supine, head elevated  Skin Protection:   adhesive use limited   incontinence pads utilized   tubing/devices free from " skin contact  Intervention: Prevent and Manage VTE (Venous Thromboembolism) Risk  Recent Flowsheet Documentation  Taken 3/5/2025 0820 by Saira Worthy RN  VTE Prevention/Management: SCDs off (sequential compression devices)  Intervention: Prevent Infection  Recent Flowsheet Documentation  Taken 3/5/2025 0820 by Saira Worthy RN  Infection Prevention:   rest/sleep promoted   single patient room provided   hand hygiene promoted  Goal: Optimal Comfort and Wellbeing  Outcome: Adequate for Care Transition  Intervention: Monitor Pain and Promote Comfort  Recent Flowsheet Documentation  Taken 3/5/2025 0756 by Saira Worthy RN  Pain Management Interventions:   rest   medication (see MAR)  Goal: Readiness for Transition of Care  Outcome: Adequate for Care Transition     Problem: Hip Fracture Medical Management  Goal: Optimal Coping with Change in Health Status  Outcome: Adequate for Care Transition  Intervention: Support Psychosocial Response to Injury  Recent Flowsheet Documentation  Taken 3/5/2025 0820 by Saira Worthy RN  Supportive Measures: active listening utilized  Goal: Absence of Bleeding  Outcome: Adequate for Care Transition  Intervention: Monitor and Manage Bleeding  Recent Flowsheet Documentation  Taken 3/5/2025 0820 by Saira Worthy RN  Bleeding Management: dressing monitored  Goal: Effective Bowel Elimination  Outcome: Adequate for Care Transition  Intervention: Promote Effective Bowel Elimination  Recent Flowsheet Documentation  Taken 3/5/2025 0820 by Saira Worthy RN  Bowel Elimination Promotion:   adequate fluid intake promoted   ambulation promoted  Goal: Baseline Cognitive Function Maintained  Outcome: Adequate for Care Transition  Goal: Absence of Embolism  Outcome: Adequate for Care Transition  Intervention: Prevent or Manage Embolism Risk  Recent Flowsheet Documentation  Taken 3/5/2025 0820 by Saira Worthy RN  VTE Prevention/Management: SCDs off (sequential compression  devices)  Goal: Fracture Stability  Outcome: Adequate for Care Transition  Goal: Optimal Functional Performance  Outcome: Adequate for Care Transition  Intervention: Promote Optimal Functional Status  Recent Flowsheet Documentation  Taken 3/5/2025 0936 by Saira Worthy RN  Activity Management:   up to bedside commode   up in chair   activity adjusted per tolerance  Taken 3/5/2025 0820 by Saira Worthy RN  Range of Motion: active ROM (range of motion) encouraged  Goal: Pain Control and Function  Outcome: Adequate for Care Transition  Intervention: Manage Acute Orthopaedic-Related Pain  Recent Flowsheet Documentation  Taken 3/5/2025 0756 by Saira Worthy RN  Pain Management Interventions:   rest   medication (see MAR)  Goal: Effective Urinary Elimination  Outcome: Adequate for Care Transition     Problem: Skin Injury Risk Increased  Goal: Skin Health and Integrity  Outcome: Adequate for Care Transition  Intervention: Plan: Nurse Driven Intervention: Moisture Management  Recent Flowsheet Documentation  Taken 3/5/2025 0936 by Saira Worthy RN  Bathing/Skin Care:   incontinence care   linen changed  Taken 3/5/2025 0820 by Saira Worthy RN  Moisture Interventions:   Encourage regular toileting   Urinary collection device   Incontinence pad  Intervention: Plan: Nurse Driven Intervention: Friction and Shear  Recent Flowsheet Documentation  Taken 3/5/2025 0820 by aSira Worthy RN  Friction/Shear Interventions: HOB 30 degrees or less  Intervention: Optimize Skin Protection  Recent Flowsheet Documentation  Taken 3/5/2025 0936 by Saira Worthy RN  Activity Management:   up to bedside commode   up in chair   activity adjusted per tolerance  Taken 3/5/2025 0820 by Saira Worthy RN  Pressure Reduction Techniques: weight shift assistance provided  Skin Protection:   adhesive use limited   incontinence pads utilized   tubing/devices free from skin contact  Head of Bed (HOB) Positioning: HOB at 30-45 degrees      Problem: Pain Acute  Goal: Optimal Pain Control and Function  Outcome: Adequate for Care Transition  Intervention: Optimize Psychosocial Wellbeing  Recent Flowsheet Documentation  Taken 3/5/2025 0820 by Saira Worthy RN  Supportive Measures: active listening utilized  Intervention: Develop Pain Management Plan  Recent Flowsheet Documentation  Taken 3/5/2025 0756 by Saira Worthy RN  Pain Management Interventions:   rest   medication (see MAR)  Intervention: Prevent or Manage Pain  Recent Flowsheet Documentation  Taken 3/5/2025 0820 by Saira Worthy RN  Bowel Elimination Promotion:   adequate fluid intake promoted   ambulation promoted  Medication Review/Management: medications reviewed     Problem: Comorbidity Management  Goal: Blood Glucose Levels Within Targeted Range  Outcome: Adequate for Care Transition  Intervention: Monitor and Manage Glycemia  Recent Flowsheet Documentation  Taken 3/5/2025 0820 by Saira Worthy RN  Medication Review/Management: medications reviewed  Goal: Blood Pressure in Desired Range  Outcome: Adequate for Care Transition  Intervention: Maintain Blood Pressure Management  Recent Flowsheet Documentation  Taken 3/5/2025 0820 by Saira Worthy RN  Medication Review/Management: medications reviewed

## 2025-03-05 NOTE — PROGRESS NOTES
"[facility] GERIATRICS  INITIAL VISIT NOTE      PRIMARY CARE PROVIDER AND CLINIC: Juli Ramseyan [id number]  / [facility] [id number]    [facility] Medical Record Number: [id number]  [facility] [[id number]]    Chief Complaint   Patient presents with    Hospital F/U     [facility] 2/20/2025 - 3/5/2025     HPI:    Brissa Mott is a 54 year old (1970) female was admitted to the above facility from [facility]. Hospital stay 2/20/25 through 3/5/25 where they were admitted for pathologic right femur fracture. . Now admitted to this facility for rehab, medical management, and nursing care.      History obtained from: facility chart records, facility staff, patient report, and [facility] chart review.      Brief Hospital Course: PMH of obesity, HTN, HLD, CVA, osteoarthritis, DM2, who presented with sever hypercalcemia, pathological right femur fracture. Abdominal/Pelvic CT showed complex right adnexal cystic mass and multiple pulmonary nodules. Calcium improved with IVF and zoledronic acid. Underwent ORIF on 2/25. Biopsy returned  differentiated neuroendocrine carcinoma. Referral placed to oncology clinic. Was started on glargine and SSI,  Humulin 70/30 discontinued. Started on gabapentin for pain. When medically stable was discharged to TCU for further rehab and medical management.     TCU Course: Seen today resting in recliner. She is having some pain in her leg, but overall feel current regimen in enough to control her pain. She has also been having pain in her left knee, she had a steroid injection while IP on Tuesday, says she has bad arthritis in that knee and is was her \"bad leg\" before she fell and broke her right leg. She denies any SOB, no chest pain. Appetite is ok. She had not had a BM since admission to TCU, but they have been putting her on the bedpan and she does not think she will be able to " go on that, is hoping she will be able to use commode. Staff are reporting pressure injury to buttock that was present on admission.      CODE STATUS/ADVANCE DIRECTIVES: CPR/Full code     ALLERGIES:  Allergies   Allergen Reactions    Sulfa Antibiotics Shortness Of Breath, Hives and Rash    Bydureon [Exenatide] Diarrhea    Penicillins Rash and Hives     PAST MEDICAL HISTORY:   Past Medical History:   Diagnosis Date    Asthma     Cerebral infarction (H)     Diabetes (H)     Hypertension     Mixed hyperlipidemia     Morbid obesity (H)     KATHRINE (obstructive sleep apnea)     Osteoarthritis      PAST SURGICAL HISTORY:   Past Surgical History:   Procedure Laterality Date     SECTION      x2    INSERTION, INTRAMEDULLARY JAMES, RETROGRADE, FOR FEMORAL SHAFT FRACTURE Right 2025    Procedure: Open reduction internal fixation intramedullary rodding right hip fracture;  Surgeon: Lazarus Meredith MD;  Location:  OR         SOCIAL HISTORY:   Patient's living condition: lives with spouse    MEDICATIONS  Post Discharge Medication Reconciliation Status: discharge medications reconciled and changed, per note/orders.  Current Outpatient Medications   Medication Sig Dispense Refill    acetaminophen (TYLENOL) 325 MG tablet Take 2 tablets (650 mg) by mouth every 6 hours.      acetaminophen (TYLENOL) 325 MG tablet Take 2 tablets (650 mg) by mouth every 4 hours as needed for mild pain, other, headaches or fever (and adjunct with moderate or severe pain or per patient request).      albuterol (PROAIR HFA/PROVENTIL HFA/VENTOLIN HFA) 108 (90 Base) MCG/ACT inhaler Inhale 2 puffs into the lungs every 6 hours as needed for wheezing 6.7 g 3    amLODIPine (NORVASC) 10 MG tablet Take 1 tablet (10 mg) by mouth daily 90 tablet 3    atorvastatin (LIPITOR) 40 MG tablet Take 1 tablet (40 mg) by mouth daily 90 tablet 3    Continuous Glucose Sensor (FREESTYLE SANDRA 14 DAY SENSOR) MISC Change every 14 days. 2 each 5    Continuous Glucose  "Sensor (FREESTYLE SANDRA 3 SENSOR) [facility] 1 each every 14 days Use 1 sensor every 14 days. Use to read blood sugars per 's instructions. 6 each 5    enoxaparin ANTICOAGULANT (LOVENOX) 40 MG/0.4ML syringe Inject 0.4 mLs (40 mg) subcutaneously every 12 hours for 14 days.      gabapentin (NEURONTIN) 300 MG capsule Take 1 capsule (300 mg) by mouth 2 times daily.      hydrOXYzine HCl (ATARAX) 25 MG tablet Take 1 tablet (25 mg) by mouth every 6 hours as needed for other (adjuvant pain).      ibuprofen (ADVIL/MOTRIN) 200 MG tablet Take 200-400 mg by mouth every 4 hours as needed for mild pain.      insulin aspart (NOVOLOG FLEXPEN) 100 UNIT/ML pen For Pre-Meal  - 189 give 1 unit.  For Pre-Meal  - 239 give 2 units.  For Pre-Meal  - 289 give 3 units.  For Pre-Meal  - 339 give 4 units.  For Pre-Meal - 389 give 5 units.  For Pre-Meal -439 give 6 units For Pre-Meal BG greater than or equal to 440 give 7 units.  For BEDTIME: For  - 249 give 1 unit.  For  - 299 give 2 units.  For  - 349 give 3 units.  For  - 399 give 4 units.  For BG greater than or equal to 400 give 5 units.      insulin glargine (LANTUS PEN) 100 UNIT/ML pen Inject 24 Units subcutaneously every morning (before breakfast).      insulin pen needle (BD PEN NEEDLE TWYLA 2ND GEN) 32G X 4 MM miscellaneous USE TWICE DAILY OR AS DIRECTED 200 each 2    irbesartan (AVAPRO) 300 MG tablet TAKE 1 TABLET(300 MG) BY MOUTH AT BEDTIME 90 tablet 2    Lidocaine (LIDOCARE) 4 % Patch Place 1 patch onto the skin every 24 hours. On for 12 hours, off for 12 hours.      metFORMIN (GLUCOPHAGE) 1000 MG tablet TAKE 1 TABLET BY MOUTH TWICE DAILY WITH MEALS 180 tablet 1    Misc. Devices (ROLLATOR ULTRA-LIGHT) MISC Extra side walker with front wheels for home use.  Purchase, lifetime need. Extra wide rolling walker (\"Walker 4\", item #:  [id number]) needed for safe transfers and ambulation.  Pt weight is 335 lbs.      montelukast " "(SINGULAIR) 10 MG tablet Take 1 tablet (10 mg) by mouth at bedtime 90 tablet 3    oxyCODONE (ROXICODONE) 5 MG tablet Take 1-2 tablets (5-10 mg) by mouth every 3 hours as needed for moderate pain. 1 tab po q 3 hrs prn pain scale \"3-6\"  2 tabs po q 3 hrs prn pain scale \"7-10\" 20 tablet 0    Semaglutide, 2 MG/DOSE, (OZEMPIC, 2 MG/DOSE,) 8 MG/3ML pen Inject 2 mg subcutaneously every 7 days 9 mL 2    senna-docusate (SENOKOT-S/PERICOLACE) 8.6-50 MG tablet Take 1 tablet by mouth 2 times daily as needed for constipation.      sertraline (ZOLOFT) 100 MG tablet Take 1.5 tablets (150 mg) by mouth daily 135 tablet 3     ROS:  10 point ROS neg other than the symptoms noted above in the HPI.      PHYSICAL EXAM:  BP (!) 142/83   Pulse 85   Temp 97.6  F (36.4  C)   Resp 20   Ht 1.626 m (5' 4\")   Wt 119.1 kg (262 lb 9.6 oz)   LMP  (LMP Unknown)   SpO2 (!) 91%   BMI 45.08 kg/m    Physical Exam  Cardiovascular:      Rate and Rhythm: Normal rate and regular rhythm.      Heart sounds: Normal heart sounds.   Pulmonary:      Effort: Pulmonary effort is normal.      Breath sounds: Normal breath sounds.   Abdominal:      General: Bowel sounds are normal.      Palpations: Abdomen is soft.   Musculoskeletal:      Right lower leg: Edema present.      Comments: Decreased ROM to right hip/knee   Neurological:      Mental Status: She is alert.   Psychiatric:         Mood and Affect: Mood normal.         Thought Content: Thought content normal.          LABORATORY/IMAGING DATA:  Reviewed as per [facility] and/or [facility]    ASSESSMENT/PLAN:  Pathological fracture of right femur due to neoplastic disease with routine healing, subsequent encounter  S/P ORIF (open reduction internal fixation) fracture  Physical deconditioning  Surgery without complication, pain variable, but she feels it is controlled. Mobility limited, likely will have slow progress.  Discussed with patient, nursing staff, therapy.  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  Discussed with patient, nursing staff.   - follow-up with oncology, radiation oncology.     Type 2 diabetes mellitus with hyperglycemia, with long-term current use of insulin (H)  A1C 6.8%, Humulin 70/30 changes to glargine while IP. Reviewed medication regiment with patient. -208 since TCU admission  - continue glargine 24 units daily, sliding scale insulin TID with meals (will not discharge home with this) metformin BID, Ozempic weekly.   - monitor and adjust     Uncomplicated asthma, unspecified asthma severity, unspecified whether persistent  Appears compensated   - continue Albuterol MDI PRN, singulair    Hemiplegia and hemiparesis following cerebral infarction affecting left non-dominant side (H)  Hyperlipidemia, unspecified hyperlipidemia type  CVA 6 years ago, minimal residual  - continue atorvastatin    Secondary hypertension  -140 since TCU admission  - continue irbesartan, amlodipine; monitor and adjust     Adjustment disorder with depressed mood  Mild anxiety during visit, admits to be overwhelmed with current diagnosis,   - continue sertraline     Chronic pain of left knee  Chronic issues, steroid injection on 3/4 while IP. Discussed may take 1-2 weeks to take full effective. Going to contribute to limited mobility   - analgesia as above  - PT/OT    KATHRINE (obstructive sleep apnea)  -CPAP at home settings.     Morbid obesity (H)  BMI 45, reports she has lost over 100lbs. History of HTN, HLD, CVA, DM2, sleep apnea  - supportive cares  - dietician following in TCU    Hypercalcemia  Likely due to underlying malignancy, treated with zoledronic acid  - BMP to monitor for stability      Pressure injury of sacral region, unstageable (H)  Present on admission, mobility is limited.   - wound care  - encourage repositioning, may need to consider ordering air mattress          Orders:   Discontinue Prn tylenol  BMP on 3/10  Cover wound on coccyx with mepiliex, change q72 hour and PRN  Apply lotion to feet BID     Total time spent with patient visit at the skilled nursing facility was 55 minutes including patient visit and review of past records; includes time spent reviewing records from hospitalization within my organization including labs, imaging reports and provider/consult notes, review of TCU facility records, medication reconciliation, discussion of plan of care with patient, nursing staff and therapy as stated above as well as time spent on documentation.      Electronically signed by:  RONNELL Dowling CNP

## 2025-03-06 ENCOUNTER — TRANSITIONAL CARE UNIT VISIT (OUTPATIENT)
Dept: GERIATRICS | Facility: CLINIC | Age: 55
End: 2025-03-06
Payer: COMMERCIAL

## 2025-03-06 ENCOUNTER — PATIENT OUTREACH (OUTPATIENT)
Dept: CARE COORDINATION | Facility: CLINIC | Age: 55
End: 2025-03-06

## 2025-03-06 ENCOUNTER — TELEPHONE (OUTPATIENT)
Dept: TRANSPLANT | Facility: CLINIC | Age: 55
End: 2025-03-06
Payer: COMMERCIAL

## 2025-03-06 VITALS
BODY MASS INDEX: 44.83 KG/M2 | HEIGHT: 64 IN | OXYGEN SATURATION: 91 % | DIASTOLIC BLOOD PRESSURE: 83 MMHG | WEIGHT: 262.6 LBS | RESPIRATION RATE: 20 BRPM | SYSTOLIC BLOOD PRESSURE: 142 MMHG | HEART RATE: 85 BPM | TEMPERATURE: 97.6 F

## 2025-03-06 DIAGNOSIS — Z87.81 S/P ORIF (OPEN REDUCTION INTERNAL FIXATION) FRACTURE: ICD-10-CM

## 2025-03-06 DIAGNOSIS — L89.150 PRESSURE INJURY OF SACRAL REGION, UNSTAGEABLE (H): ICD-10-CM

## 2025-03-06 DIAGNOSIS — I69.354 HEMIPLEGIA AND HEMIPARESIS FOLLOWING CEREBRAL INFARCTION AFFECTING LEFT NON-DOMINANT SIDE (H): ICD-10-CM

## 2025-03-06 DIAGNOSIS — I15.9 SECONDARY HYPERTENSION: ICD-10-CM

## 2025-03-06 DIAGNOSIS — Z98.890 S/P ORIF (OPEN REDUCTION INTERNAL FIXATION) FRACTURE: ICD-10-CM

## 2025-03-06 DIAGNOSIS — G47.33 OSA (OBSTRUCTIVE SLEEP APNEA): ICD-10-CM

## 2025-03-06 DIAGNOSIS — M25.562 CHRONIC PAIN OF LEFT KNEE: ICD-10-CM

## 2025-03-06 DIAGNOSIS — J45.909 UNCOMPLICATED ASTHMA, UNSPECIFIED ASTHMA SEVERITY, UNSPECIFIED WHETHER PERSISTENT: Chronic | ICD-10-CM

## 2025-03-06 DIAGNOSIS — E66.01 MORBID OBESITY (H): ICD-10-CM

## 2025-03-06 DIAGNOSIS — E11.65 TYPE 2 DIABETES MELLITUS WITH HYPERGLYCEMIA, WITH LONG-TERM CURRENT USE OF INSULIN (H): ICD-10-CM

## 2025-03-06 DIAGNOSIS — E78.5 HYPERLIPIDEMIA, UNSPECIFIED HYPERLIPIDEMIA TYPE: ICD-10-CM

## 2025-03-06 DIAGNOSIS — M84.551D PATHOLOGICAL FRACTURE OF RIGHT FEMUR DUE TO NEOPLASTIC DISEASE WITH ROUTINE HEALING, SUBSEQUENT ENCOUNTER: Primary | ICD-10-CM

## 2025-03-06 DIAGNOSIS — C7A.8 NEUROENDOCRINE CARCINOMA METASTATIC TO BONE (H): ICD-10-CM

## 2025-03-06 DIAGNOSIS — G89.29 CHRONIC PAIN OF LEFT KNEE: ICD-10-CM

## 2025-03-06 DIAGNOSIS — C7B.8 NEUROENDOCRINE CARCINOMA METASTATIC TO BONE (H): ICD-10-CM

## 2025-03-06 DIAGNOSIS — E83.52 HYPERCALCEMIA: ICD-10-CM

## 2025-03-06 DIAGNOSIS — F43.21 ADJUSTMENT DISORDER WITH DEPRESSED MOOD: ICD-10-CM

## 2025-03-06 DIAGNOSIS — R53.81 PHYSICAL DECONDITIONING: ICD-10-CM

## 2025-03-06 DIAGNOSIS — Z79.4 TYPE 2 DIABETES MELLITUS WITH HYPERGLYCEMIA, WITH LONG-TERM CURRENT USE OF INSULIN (H): ICD-10-CM

## 2025-03-06 PROBLEM — J96.01 ACUTE RESPIRATORY FAILURE WITH HYPOXIA (H): Status: RESOLVED | Noted: 2017-04-13 | Resolved: 2025-03-06

## 2025-03-06 LAB
BACTERIA BLD CULT: NORMAL
BACTERIA BLD CULT: NORMAL

## 2025-03-06 PROCEDURE — 86481 TB AG RESPONSE T-CELL SUSP: CPT | Mod: ORL | Performed by: NURSE PRACTITIONER

## 2025-03-06 PROCEDURE — 36415 COLL VENOUS BLD VENIPUNCTURE: CPT | Mod: ORL | Performed by: NURSE PRACTITIONER

## 2025-03-06 PROCEDURE — P9604 ONE-WAY ALLOW PRORATED TRIP: HCPCS | Mod: ORL | Performed by: NURSE PRACTITIONER

## 2025-03-06 RX ORDER — OXYCODONE HYDROCHLORIDE 5 MG/1
5-10 TABLET ORAL
Qty: 40 TABLET | Refills: 0 | Status: SHIPPED | OUTPATIENT
Start: 2025-03-06

## 2025-03-06 NOTE — LETTER
" 3/6/2025      Brissa Mott  [address]  [facility] [address]        [facility] GERIATRICS  INITIAL VISIT NOTE      PRIMARY CARE PROVIDER AND CLINIC: Juli Ramsey [id number]  / [facility] [id number]    [facility] Medical Record Number: [id number]  [facility] [[id number]]    Chief Complaint   Patient presents with     Hospital F/U     [facility] 2/20/2025 - 3/5/2025     HPI:    Brissa Mott is a 54 year old (1970) female was admitted to the above facility from . Hospital stay 2/20/25 through 3/5/25 where they were admitted for pathologic right femur fracture. . Now admitted to this facility for rehab, medical management, and nursing care.      History obtained from: facility chart records, facility staff, patient report, and [facility] chart review.      Brief Hospital Course: PMH of obesity, HTN, HLD, CVA, osteoarthritis, DM2, who presented with sever hypercalcemia, pathological right femur fracture. Abdominal/Pelvic CT showed complex right adnexal cystic mass and multiple pulmonary nodules. Calcium improved with IVF and zoledronic acid. Underwent ORIF on 2/25. Biopsy returned  differentiated neuroendocrine carcinoma. Referral placed to oncology clinic. Was started on glargine and SSI,  Humulin 70/30 discontinued. Started on gabapentin for pain. When medically stable was discharged to TCU for further rehab and medical management.     TCU Course: Seen today resting in recliner. She is having some pain in her leg, but overall feel current regimen in enough to control her pain. She has also been having pain in her left knee, she had a steroid injection while IP on Tuesday, says she has bad arthritis in that knee and is was her \"bad leg\" before she fell and broke her right leg. She denies any SOB, no chest pain. Appetite is ok. She had not had a BM since admission to TCU, but they " have been putting her on the bedpan and she does not think she will be able to go on that, is hoping she will be able to use commode. Staff are reporting pressure injury to buttock that was present on admission.      CODE STATUS/ADVANCE DIRECTIVES: CPR/Full code     ALLERGIES:  Allergies   Allergen Reactions     Sulfa Antibiotics Shortness Of Breath, Hives and Rash     Bydureon [Exenatide] Diarrhea     Penicillins Rash and Hives     PAST MEDICAL HISTORY:   Past Medical History:   Diagnosis Date     Asthma      Cerebral infarction (H)      Diabetes (H)      Hypertension      Mixed hyperlipidemia      Morbid obesity (H)      KATHRINE (obstructive sleep apnea)      Osteoarthritis      PAST SURGICAL HISTORY:   Past Surgical History:   Procedure Laterality Date      SECTION      x2     INSERTION, INTRAMEDULLARY JAMES, RETROGRADE, FOR FEMORAL SHAFT FRACTURE Right 2025    Procedure: Open reduction internal fixation intramedullary rodding right hip fracture;  Surgeon: Lazarus Meerdith MD;  Location:  OR         SOCIAL HISTORY:   Patient's living condition: lives with spouse    MEDICATIONS  Post Discharge Medication Reconciliation Status: discharge medications reconciled and changed, per note/orders.  Current Outpatient Medications   Medication Sig Dispense Refill     acetaminophen (TYLENOL) 325 MG tablet Take 2 tablets (650 mg) by mouth every 6 hours.       acetaminophen (TYLENOL) 325 MG tablet Take 2 tablets (650 mg) by mouth every 4 hours as needed for mild pain, other, headaches or fever (and adjunct with moderate or severe pain or per patient request).       albuterol (PROAIR HFA/PROVENTIL HFA/VENTOLIN HFA) 108 (90 Base) MCG/ACT inhaler Inhale 2 puffs into the lungs every 6 hours as needed for wheezing 6.7 g 3     amLODIPine (NORVASC) 10 MG tablet Take 1 tablet (10 mg) by mouth daily 90 tablet 3     atorvastatin (LIPITOR) 40 MG tablet Take 1 tablet (40 mg) by mouth daily 90 tablet 3     Continuous Glucose  Sensor (FREESTYLE SANDRA 14 DAY SENSOR) MISC Change every 14 days. 2 each 5     Continuous Glucose Sensor (FREESTYLE SANDRA 3 SENSOR) MISC 1 each every 14 days Use 1 sensor every 14 days. Use to read blood sugars per 's instructions. 6 each 5     enoxaparin ANTICOAGULANT (LOVENOX) 40 MG/0.4ML syringe Inject 0.4 mLs (40 mg) subcutaneously every 12 hours for 14 days.       gabapentin (NEURONTIN) 300 MG capsule Take 1 capsule (300 mg) by mouth 2 times daily.       hydrOXYzine HCl (ATARAX) 25 MG tablet Take 1 tablet (25 mg) by mouth every 6 hours as needed for other (adjuvant pain).       ibuprofen (ADVIL/MOTRIN) 200 MG tablet Take 200-400 mg by mouth every 4 hours as needed for mild pain.       insulin aspart (NOVOLOG FLEXPEN) 100 UNIT/ML pen For Pre-Meal  - 189 give 1 unit.  For Pre-Meal  - 239 give 2 units.  For Pre-Meal  - 289 give 3 units.  For Pre-Meal  - 339 give 4 units.  For Pre-Meal - 389 give 5 units.  For Pre-Meal -439 give 6 units For Pre-Meal BG greater than or equal to 440 give 7 units.  For BEDTIME: For  - 249 give 1 unit.  For  - 299 give 2 units.  For  - 349 give 3 units.  For  - 399 give 4 units.  For BG greater than or equal to 400 give 5 units.       insulin glargine (LANTUS PEN) 100 UNIT/ML pen Inject 24 Units subcutaneously every morning (before breakfast).       insulin pen needle (BD PEN NEEDLE TWYLA 2ND GEN) 32G X 4 MM miscellaneous USE TWICE DAILY OR AS DIRECTED 200 each 2     irbesartan (AVAPRO) 300 MG tablet TAKE 1 TABLET(300 MG) BY MOUTH AT BEDTIME 90 tablet 2     Lidocaine (LIDOCARE) 4 % Patch Place 1 patch onto the skin every 24 hours. On for 12 hours, off for 12 hours.       metFORMIN (GLUCOPHAGE) 1000 MG tablet TAKE 1 TABLET BY MOUTH TWICE DAILY WITH MEALS 180 tablet 1     Misc. Devices (ROLLATOR ULTRA-LIGHT) MISC Extra side walker with front wheels for home use.  Purchase, lifetime need. Extra wide rolling walker  "(\"Walker 4\", item #:  [id number]) needed for safe transfers and ambulation.  Pt weight is 335 lbs.       montelukast (SINGULAIR) 10 MG tablet Take 1 tablet (10 mg) by mouth at bedtime 90 tablet 3     oxyCODONE (ROXICODONE) 5 MG tablet Take 1-2 tablets (5-10 mg) by mouth every 3 hours as needed for moderate pain. 1 tab po q 3 hrs prn pain scale \"3-6\"  2 tabs po q 3 hrs prn pain scale \"7-10\" 20 tablet 0     Semaglutide, 2 MG/DOSE, (OZEMPIC, 2 MG/DOSE,) 8 MG/3ML pen Inject 2 mg subcutaneously every 7 days 9 mL 2     senna-docusate (SENOKOT-S/PERICOLACE) 8.6-50 MG tablet Take 1 tablet by mouth 2 times daily as needed for constipation.       sertraline (ZOLOFT) 100 MG tablet Take 1.5 tablets (150 mg) by mouth daily 135 tablet 3     ROS:  10 point ROS neg other than the symptoms noted above in the HPI.      PHYSICAL EXAM:  BP (!) 142/83   Pulse 85   Temp 97.6  F (36.4  C)   Resp 20   Ht 1.626 m (5' 4\")   Wt 119.1 kg (262 lb 9.6 oz)   LMP  (LMP Unknown)   SpO2 (!) 91%   BMI 45.08 kg/m    Physical Exam  Cardiovascular:      Rate and Rhythm: Normal rate and regular rhythm.      Heart sounds: Normal heart sounds.   Pulmonary:      Effort: Pulmonary effort is normal.      Breath sounds: Normal breath sounds.   Abdominal:      General: Bowel sounds are normal.      Palpations: Abdomen is soft.   Musculoskeletal:      Right lower leg: Edema present.      Comments: Decreased ROM to right hip/knee   Neurological:      Mental Status: She is alert.   Psychiatric:         Mood and Affect: Mood normal.         Thought Content: Thought content normal.          LABORATORY/IMAGING DATA:  Reviewed as per [facility] and/or [facility]    ASSESSMENT/PLAN:  Pathological fracture of right femur due to neoplastic disease with routine healing, subsequent encounter  S/P ORIF (open reduction internal fixation) fracture  Physical deconditioning  Surgery without complication, pain variable, but she feels it is controlled. Mobility limited, " likely will have slow progress.  Discussed with patient, nursing staff, therapy.  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  Discussed with patient, nursing staff.   - follow-up with oncology, radiation oncology.     Type 2 diabetes mellitus with hyperglycemia, with long-term current use of insulin (H)  A1C 6.8%, Humulin 70/30 changes to glargine while IP. Reviewed medication regiment with patient. -208 since TCU admission  - continue glargine 24 units daily, sliding scale insulin TID with meals (will not discharge home with this) metformin BID, Ozempic weekly.   - monitor and adjust     Uncomplicated asthma, unspecified asthma severity, unspecified whether persistent  Appears compensated   - continue Albuterol MDI PRN, singulair    Hemiplegia and hemiparesis following cerebral infarction affecting left non-dominant side (H)  Hyperlipidemia, unspecified hyperlipidemia type  CVA 6 years ago, minimal residual  - continue atorvastatin    Secondary hypertension  -140 since TCU admission  - continue irbesartan, amlodipine; monitor and adjust     Adjustment disorder with depressed mood  Mild anxiety during visit, admits to be overwhelmed with current diagnosis,   - continue sertraline     Chronic pain of left knee  Chronic issues, steroid injection on 3/4 while IP. Discussed may take 1-2 weeks to take full effective. Going to contribute to limited mobility   - analgesia as above  - PT/OT    KATHRINE (obstructive sleep apnea)  -CPAP at home settings.     Morbid obesity (H)  BMI 45, reports she has lost over 100lbs. History of HTN, HLD, CVA, DM2, sleep apnea  - supportive cares  - dietician following in  TCU    Hypercalcemia  Likely due to underlying malignancy, treated with zoledronic acid  - BMP to monitor for stability     Pressure injury of sacral region, unstageable (H)  Present on admission, mobility is limited.   - wound care  - encourage repositioning, may need to consider ordering air mattress          Orders:   Discontinue Prn tylenol  BMP on 3/10  Cover wound on coccyx with mepiliex, change q72 hour and PRN  Apply lotion to feet BID     Total time spent with patient visit at the skilled nursing facility was 55 minutes including patient visit and review of past records; includes time spent reviewing records from hospitalization within my organization including labs, imaging reports and provider/consult notes, review of TCU facility records, medication reconciliation, discussion of plan of care with patient, nursing staff and therapy as stated above as well as time spent on documentation.      Electronically signed by:  RONNELL Dowling CNP       Sincerely,        RONNELL Dowling CNP    Electronically signed

## 2025-03-06 NOTE — PROGRESS NOTES
Clinic Care Coordination Contact  Care Coordination Transition Communication    Clinical Data:  [facility]  Hospitalist Discharge Summary       Date of Admission:  2/20/2025  Date of Discharge:  3/5/2025  Discharging Provider: Abelardo Matthews MD  Discharge Service: Hospitalist Service        Discharge Diagnoses  Pathologic right femur fracture, status-post operative repair this admission     New diagnosis of poorly differentiated and widely metastatic neuroendocrine carcinoma  -Diagnosed based on pathology from right distal femur biopsy performed 2/24/25  -Appreciate oncology input this admission  -Recommend outpatient oncologic follow-up for treatment after she recovers from hip surgery     .     Assessment: Patient has transitioned to TCU/ARU for short term rehabilitation:    Facility Name: [facility]  Transition Communication:  Notified facility of Primary Care- Care Coordination support via TCU hand-in e-mail.    Plan: Care Coordinator will await notification from facility staff informing of patient's discharge plans/needs. Care Coordinator will review chart and outreach to facility staff every 4 weeks and as needed.     [facility]   Mary Ochoa RN, Care Coordinator   [facility]   E-mail [email or web address]   [phone]

## 2025-03-06 NOTE — PLAN OF CARE
Physical Therapy Discharge Summary    Reason for therapy discharge:    Discharged to transitional care facility.    Progress towards therapy goal(s). See goals on Care Plan in [facility] electronic health record for goal details.  Goals not met.  Barriers to achieving goals:   limited tolerance for therapy and discharge from facility.    Therapy recommendation(s):    Continued therapy is recommended.  Rationale/Recommendations:  TCU to maximize return to PLOF.

## 2025-03-06 NOTE — TELEPHONE ENCOUNTER
Returned call to Matilda from her VM on [facility]'s direct line. Left VM informing her that the note of wound healing is in and stating she is healed. Plan on bringing her to committee review 3/12/25 for potential activation - will try to prioritize her if the meeting is too long.  Will give her a call next week with the results.

## 2025-03-07 LAB
BACTERIA BLD CULT: NO GROWTH
BACTERIA BLD CULT: NO GROWTH
GAMMA INTERFERON BACKGROUND BLD IA-ACNC: 0.02 IU/ML
M TB IFN-G BLD-IMP: ABNORMAL
M TB IFN-G CD4+ BCKGRND COR BLD-ACNC: 0.05 IU/ML
MITOGEN IGNF BCKGRD COR BLD-ACNC: 0 IU/ML
MITOGEN IGNF BCKGRD COR BLD-ACNC: 0 IU/ML
QUANTIFERON MITOGEN: 0.07 IU/ML
QUANTIFERON NIL TUBE: 0.02 IU/ML
QUANTIFERON TB1 TUBE: 0.02 IU/ML
QUANTIFERON TB2 TUBE: 0.02

## 2025-03-07 NOTE — PROGRESS NOTES
[facility] GERIATRICS  ACUTE/EPISODIC VISIT    [facility] Medical Record Number: [id number]  Place of Service where encounter took place: [facility] ([facility]) [[id number]]    Chief Complaint   Patient presents with    RECHECK     HPI:    Brissa Mott is a 54 year old (1970), who is being seen today for an episodic care visit. HPI information obtained from: facility chart records, facility staff, patient report, and [facility] chart review.    Today's concern is: Recent hospitalization with pathological fracture to right femur s/p ORIF. New diagnosis of neuroendocrine carcinoma. Seen today in room after therapy. She was tearful on visit, hard to not worry about her upcoming oncology appointments. She also has a hard time moving. Says she knows she can do more than what she is currently doing but gest anxious and then has a hard time moving. Has pain in left knee, not really sure that injection has helped at all. Does have pain especially with movement, but she does feel it is controlled. Appetite is ok. She is having bowel movements - has been able to get up to commode. Staff are using [employer] stand for transfers     ALLERGIES:   Allergies   Allergen Reactions    Sulfa Antibiotics Shortness Of Breath, Hives and Rash    Bydureon [Exenatide] Diarrhea    Penicillins Rash and Hives      MEDICATIONS:  Post Discharge Medication Reconciliation Status: medication reconcilation previously completed during another office visit.     Current Outpatient Medications   Medication Sig Dispense Refill    acetaminophen (TYLENOL) 325 MG tablet Take 2 tablets (650 mg) by mouth every 6 hours.      albuterol (PROAIR HFA/PROVENTIL HFA/VENTOLIN HFA) 108 (90 Base) MCG/ACT inhaler Inhale 2 puffs into the lungs every 6 hours as needed for wheezing 6.7 g 3    amLODIPine (NORVASC) 10 MG tablet Take 1 tablet (10 mg) by mouth daily 90 tablet 3    atorvastatin (LIPITOR) 40 MG tablet Take 1 tablet (40 mg) by mouth  daily 90 tablet 3    Continuous Glucose Sensor (FREESTYLE SANDRA 14 DAY SENSOR) MISC Change every 14 days. 2 each 5    Continuous Glucose Sensor (FREESTYLE SANDRA 3 SENSOR) MISC 1 each every 14 days Use 1 sensor every 14 days. Use to read blood sugars per 's instructions. 6 each 5    enoxaparin ANTICOAGULANT (LOVENOX) 40 MG/0.4ML syringe Inject 0.4 mLs (40 mg) subcutaneously every 12 hours for 14 days.      gabapentin (NEURONTIN) 300 MG capsule Take 1 capsule (300 mg) by mouth 2 times daily.      hydrOXYzine HCl (ATARAX) 25 MG tablet Take 1 tablet (25 mg) by mouth every 6 hours as needed for other (adjuvant pain).      ibuprofen (ADVIL/MOTRIN) 200 MG tablet Take 200-400 mg by mouth every 4 hours as needed for mild pain.      insulin aspart (NOVOLOG FLEXPEN) 100 UNIT/ML pen For Pre-Meal  - 189 give 1 unit.  For Pre-Meal  - 239 give 2 units.  For Pre-Meal  - 289 give 3 units.  For Pre-Meal  - 339 give 4 units.  For Pre-Meal - 389 give 5 units.  For Pre-Meal -439 give 6 units For Pre-Meal BG greater than or equal to 440 give 7 units.  For BEDTIME: For  - 249 give 1 unit.  For  - 299 give 2 units.  For  - 349 give 3 units.  For  - 399 give 4 units.  For BG greater than or equal to 400 give 5 units.      insulin glargine (LANTUS PEN) 100 UNIT/ML pen Inject 24 Units subcutaneously every morning (before breakfast).      insulin pen needle (BD PEN NEEDLE TWYLA 2ND GEN) 32G X 4 MM miscellaneous USE TWICE DAILY OR AS DIRECTED 200 each 2    irbesartan (AVAPRO) 300 MG tablet TAKE 1 TABLET(300 MG) BY MOUTH AT BEDTIME 90 tablet 2    Lidocaine (LIDOCARE) 4 % Patch Place 1 patch onto the skin every 24 hours. On for 12 hours, off for 12 hours.      metFORMIN (GLUCOPHAGE) 1000 MG tablet TAKE 1 TABLET BY MOUTH TWICE DAILY WITH MEALS 180 tablet 1    Misc. Devices (ROLLATOR ULTRA-LIGHT) MISC Extra side walker with front wheels for home use.  Purchase, lifetime need.  "Extra wide rolling walker (\"Walker 4\", item #:  [id number]) needed for safe transfers and ambulation.  Pt weight is 335 lbs.      montelukast (SINGULAIR) 10 MG tablet Take 1 tablet (10 mg) by mouth at bedtime 90 tablet 3    oxyCODONE (ROXICODONE) 5 MG tablet Take 1-2 tablets (5-10 mg) by mouth every 3 hours as needed for moderate pain. 1 tab po q 3 hrs prn pain scale \"3-6\"  2 tabs po q 3 hrs prn pain scale \"7-10\" 40 tablet 0    Semaglutide, 2 MG/DOSE, (OZEMPIC, 2 MG/DOSE,) 8 MG/3ML pen Inject 2 mg subcutaneously every 7 days 9 mL 2    senna-docusate (SENOKOT-S/PERICOLACE) 8.6-50 MG tablet Take 1 tablet by mouth 2 times daily as needed for constipation.      sertraline (ZOLOFT) 100 MG tablet Take 1.5 tablets (150 mg) by mouth daily 135 tablet 3     Medications reviewed:  Medications reconciled to facility chart and changes were made to reflect current medications as identified as above med list. Below are the changes that were made:   Medications stopped since last EPIC medication reconciliation:   There are no discontinued medications.    Medications started since last [facility] medication reconciliation:  No orders of the defined types were placed in this encounter.        REVIEW OF SYSTEMS:  4 point ROS neg other than the symptoms noted above in the HPI.      PHYSICAL EXAM:  /75   Pulse 88   Temp 97.6  F (36.4  C)   Resp 16   Ht 1.626 m (5' 4\")   Wt 119.1 kg (262 lb 9.6 oz)   LMP  (LMP Unknown)   SpO2 95%   BMI 45.08 kg/m    Physical Exam  Cardiovascular:      Rate and Rhythm: Normal rate and regular rhythm.      Heart sounds: Normal heart sounds.   Pulmonary:      Effort: Pulmonary effort is normal.      Breath sounds: Normal breath sounds.   Abdominal:      General: Bowel sounds are normal.      Palpations: Abdomen is soft.   Musculoskeletal:         General: Swelling (mild, right thigh) present.      Comments: Decreased ROM to right hip and knee   Neurological:      Mental Status: She is alert. "   Psychiatric:         Mood and Affect: Mood normal.         Thought Content: Thought content normal.      Comments: Tearful at times         ASSESSMENT / PLAN:  Pathological fracture of right femur due to neoplastic disease with routine healing, subsequent encounter  S/P ORIF (open reduction internal fixation) fracture  Physical deconditioning  Surgery without complication. Having pain but feels it is controlled with current regimen. Continues to need EZ stand for transfers, feels anxiety is limiting her some.  Discussed with patient, nursing staff, therapy.   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  Tearful on exam, nervous about upcoming oncology appointments  - follow-up with oncology, radiation oncology as scheduled on 3/21  - supportive cares    Type 2 diabetes mellitus with hyperglycemia, with long-term current use of insulin (H)  A1C 6.8%, Humulin 70/30 changes to glargine while IP. -255, typically <200- continue glargine 24 units daily, sliding scale insulin TID with meals (will not discharge home with this) metformin BID, Ozempic weekly.   - monitor and adjust     Adjustment disorder with depressed mood  Tearful on exam, some anxiety on visit today.   - continue sertraline, moved to  per patient's request    Hypercalcemia  Likely due to underlying malignancy, treated with zoledronic acid while IP. CA stable on labs today  - check BMP PRN     Secondary hypertension  -140 since TCU admission  - continue irbesartan, amlodipine; monitor and adjust     Orders:  Ok to remove sutures/staples on   Aquacel Ag to coccyx wound, cover with mepilex, change every other day and PRN    Electronically  signed by:  RONNELL Dowling CNP

## 2025-03-09 ENCOUNTER — LAB REQUISITION (OUTPATIENT)
Dept: LAB | Facility: CLINIC | Age: 55
End: 2025-03-09
Payer: COMMERCIAL

## 2025-03-09 DIAGNOSIS — E83.52 HYPERCALCEMIA: ICD-10-CM

## 2025-03-10 ENCOUNTER — TRANSITIONAL CARE UNIT VISIT (OUTPATIENT)
Dept: GERIATRICS | Facility: CLINIC | Age: 55
End: 2025-03-10
Payer: COMMERCIAL

## 2025-03-10 VITALS
WEIGHT: 262.6 LBS | HEIGHT: 64 IN | HEART RATE: 88 BPM | DIASTOLIC BLOOD PRESSURE: 75 MMHG | TEMPERATURE: 97.6 F | OXYGEN SATURATION: 95 % | SYSTOLIC BLOOD PRESSURE: 135 MMHG | BODY MASS INDEX: 44.83 KG/M2 | RESPIRATION RATE: 16 BRPM

## 2025-03-10 DIAGNOSIS — Z79.4 TYPE 2 DIABETES MELLITUS WITH HYPERGLYCEMIA, WITH LONG-TERM CURRENT USE OF INSULIN (H): ICD-10-CM

## 2025-03-10 DIAGNOSIS — E83.52 HYPERCALCEMIA: ICD-10-CM

## 2025-03-10 DIAGNOSIS — R53.81 PHYSICAL DECONDITIONING: ICD-10-CM

## 2025-03-10 DIAGNOSIS — F43.21 ADJUSTMENT DISORDER WITH DEPRESSED MOOD: ICD-10-CM

## 2025-03-10 DIAGNOSIS — Z87.81 S/P ORIF (OPEN REDUCTION INTERNAL FIXATION) FRACTURE: ICD-10-CM

## 2025-03-10 DIAGNOSIS — C7B.8 NEUROENDOCRINE CARCINOMA METASTATIC TO BONE (H): ICD-10-CM

## 2025-03-10 DIAGNOSIS — Z98.890 S/P ORIF (OPEN REDUCTION INTERNAL FIXATION) FRACTURE: ICD-10-CM

## 2025-03-10 DIAGNOSIS — M84.551D PATHOLOGICAL FRACTURE OF RIGHT FEMUR DUE TO NEOPLASTIC DISEASE WITH ROUTINE HEALING, SUBSEQUENT ENCOUNTER: Primary | ICD-10-CM

## 2025-03-10 DIAGNOSIS — C7A.8 NEUROENDOCRINE CARCINOMA METASTATIC TO BONE (H): ICD-10-CM

## 2025-03-10 DIAGNOSIS — E11.65 TYPE 2 DIABETES MELLITUS WITH HYPERGLYCEMIA, WITH LONG-TERM CURRENT USE OF INSULIN (H): ICD-10-CM

## 2025-03-10 LAB
ANION GAP SERPL CALCULATED.3IONS-SCNC: 15 MMOL/L (ref 7–15)
BUN SERPL-MCNC: 7.1 MG/DL (ref 6–20)
CALCIUM SERPL-MCNC: 9.6 MG/DL (ref 8.8–10.4)
CHLORIDE SERPL-SCNC: 102 MMOL/L (ref 98–107)
CREAT SERPL-MCNC: 0.46 MG/DL (ref 0.51–0.95)
EGFRCR SERPLBLD CKD-EPI 2021: >90 ML/MIN/1.73M2
GLUCOSE SERPL-MCNC: 163 MG/DL (ref 70–99)
HCO3 SERPL-SCNC: 24 MMOL/L (ref 22–29)
POTASSIUM SERPL-SCNC: 4.6 MMOL/L (ref 3.4–5.3)
SODIUM SERPL-SCNC: 141 MMOL/L (ref 135–145)

## 2025-03-10 PROCEDURE — 36415 COLL VENOUS BLD VENIPUNCTURE: CPT | Mod: ORL | Performed by: NURSE PRACTITIONER

## 2025-03-10 PROCEDURE — P9603 ONE-WAY ALLOW PRORATED MILES: HCPCS | Mod: ORL | Performed by: NURSE PRACTITIONER

## 2025-03-10 PROCEDURE — 80048 BASIC METABOLIC PNL TOTAL CA: CPT | Mod: ORL | Performed by: NURSE PRACTITIONER

## 2025-03-10 PROCEDURE — 99309 SBSQ NF CARE MODERATE MDM 30: CPT | Performed by: NURSE PRACTITIONER

## 2025-03-10 NOTE — LETTER
3/10/2025      Brissa Mott  [address]  [facility] [address]        [facility] GERIATRICS  ACUTE/EPISODIC VISIT    [facility] Medical Record Number: [id number]  Place of Service where encounter took place: [facility] [place] [facility]) [[id number]]    Chief Complaint   Patient presents with     RECHECK     HPI:    Brissa Mott is a 54 year old (1970), who is being seen today for an episodic care visit. HPI information obtained from: facility chart records, facility staff, patient report, and [facility] chart review.    Today's concern is: Recent hospitalization with pathological fracture to right femur s/p ORIF. New diagnosis of neuroendocrine carcinoma. Seen today in room after therapy. She was tearful on visit, hard to not worry about her upcoming oncology appointments. She also has a hard time moving. Says she knows she can do more than what she is currently doing but gest anxious and then has a hard time moving. Has pain in left knee, not really sure that injection has helped at all. Does have pain especially with movement, but she does feel it is controlled. Appetite is ok. She is having bowel movements - has been able to get up to commode. Staff are using EZ stand for transfers     ALLERGIES:   Allergies   Allergen Reactions     Sulfa Antibiotics Shortness Of Breath, Hives and Rash     Bydureon [Exenatide] Diarrhea     Penicillins Rash and Hives      MEDICATIONS:  Post Discharge Medication Reconciliation Status: medication reconcilation previously completed during another office visit.     Current Outpatient Medications   Medication Sig Dispense Refill     acetaminophen (TYLENOL) 325 MG tablet Take 2 tablets (650 mg) by mouth every 6 hours.       albuterol (PROAIR HFA/PROVENTIL HFA/VENTOLIN HFA) 108 (90 Base) MCG/ACT inhaler Inhale 2 puffs into the lungs every 6 hours as needed for wheezing 6.7 g 3     amLODIPine (NORVASC) 10 MG tablet Take 1 tablet (10 mg) by mouth daily  90 tablet 3     atorvastatin (LIPITOR) 40 MG tablet Take 1 tablet (40 mg) by mouth daily 90 tablet 3     Continuous Glucose Sensor (FREESTYLE SANDRA 14 DAY SENSOR) MISC Change every 14 days. 2 each 5     Continuous Glucose Sensor (FREESTYLE SANDRA 3 SENSOR) MISC 1 each every 14 days Use 1 sensor every 14 days. Use to read blood sugars per 's instructions. 6 each 5     enoxaparin ANTICOAGULANT (LOVENOX) 40 MG/0.4ML syringe Inject 0.4 mLs (40 mg) subcutaneously every 12 hours for 14 days.       gabapentin (NEURONTIN) 300 MG capsule Take 1 capsule (300 mg) by mouth 2 times daily.       hydrOXYzine HCl (ATARAX) 25 MG tablet Take 1 tablet (25 mg) by mouth every 6 hours as needed for other (adjuvant pain).       ibuprofen (ADVIL/MOTRIN) 200 MG tablet Take 200-400 mg by mouth every 4 hours as needed for mild pain.       insulin aspart (NOVOLOG FLEXPEN) 100 UNIT/ML pen For Pre-Meal  - 189 give 1 unit.  For Pre-Meal  - 239 give 2 units.  For Pre-Meal  - 289 give 3 units.  For Pre-Meal  - 339 give 4 units.  For Pre-Meal - 389 give 5 units.  For Pre-Meal -439 give 6 units For Pre-Meal BG greater than or equal to 440 give 7 units.  For BEDTIME: For  - 249 give 1 unit.  For  - 299 give 2 units.  For  - 349 give 3 units.  For  - 399 give 4 units.  For BG greater than or equal to 400 give 5 units.       insulin glargine (LANTUS PEN) 100 UNIT/ML pen Inject 24 Units subcutaneously every morning (before breakfast).       insulin pen needle (BD PEN NEEDLE TWYLA 2ND GEN) 32G X 4 MM miscellaneous USE TWICE DAILY OR AS DIRECTED 200 each 2     irbesartan (AVAPRO) 300 MG tablet TAKE 1 TABLET(300 MG) BY MOUTH AT BEDTIME 90 tablet 2     Lidocaine (LIDOCARE) 4 % Patch Place 1 patch onto the skin every 24 hours. On for 12 hours, off for 12 hours.       metFORMIN (GLUCOPHAGE) 1000 MG tablet TAKE 1 TABLET BY MOUTH TWICE DAILY WITH MEALS 180 tablet 1     Misc. Devices (ROLLATOR  "ULTRA-LIGHT) MISC Extra side walker with front wheels for home use.  Purchase, lifetime need. Extra wide rolling walker (\"Walker 4\", item #:  [id number]) needed for safe transfers and ambulation.  Pt weight is 335 lbs.       montelukast (SINGULAIR) 10 MG tablet Take 1 tablet (10 mg) by mouth at bedtime 90 tablet 3     oxyCODONE (ROXICODONE) 5 MG tablet Take 1-2 tablets (5-10 mg) by mouth every 3 hours as needed for moderate pain. 1 tab po q 3 hrs prn pain scale \"3-6\"  2 tabs po q 3 hrs prn pain scale \"7-10\" 40 tablet 0     Semaglutide, 2 MG/DOSE, (OZEMPIC, 2 MG/DOSE,) 8 MG/3ML pen Inject 2 mg subcutaneously every 7 days 9 mL 2     senna-docusate (SENOKOT-S/PERICOLACE) 8.6-50 MG tablet Take 1 tablet by mouth 2 times daily as needed for constipation.       sertraline (ZOLOFT) 100 MG tablet Take 1.5 tablets (150 mg) by mouth daily 135 tablet 3     Medications reviewed:  Medications reconciled to facility chart and changes were made to reflect current medications as identified as above med list. Below are the changes that were made:   Medications stopped since last EPIC medication reconciliation:   There are no discontinued medications.    Medications started since last [facility] medication reconciliation:  No orders of the defined types were placed in this encounter.        REVIEW OF SYSTEMS:  4 point ROS neg other than the symptoms noted above in the HPI.      PHYSICAL EXAM:  /75   Pulse 88   Temp 97.6  F (36.4  C)   Resp 16   Ht 1.626 m (5' 4\")   Wt 119.1 kg (262 lb 9.6 oz)   LMP  (LMP Unknown)   SpO2 95%   BMI 45.08 kg/m    Physical Exam  Cardiovascular:      Rate and Rhythm: Normal rate and regular rhythm.      Heart sounds: Normal heart sounds.   Pulmonary:      Effort: Pulmonary effort is normal.      Breath sounds: Normal breath sounds.   Abdominal:      General: Bowel sounds are normal.      Palpations: Abdomen is soft.   Musculoskeletal:         General: Swelling (mild, right thigh) present.      " Comments: Decreased ROM to right hip and knee   Neurological:      Mental Status: She is alert.   Psychiatric:         Mood and Affect: Mood normal.         Thought Content: Thought content normal.      Comments: Tearful at times         ASSESSMENT / PLAN:  Pathological fracture of right femur due to neoplastic disease with routine healing, subsequent encounter  S/P ORIF (open reduction internal fixation) fracture  Physical deconditioning  Surgery without complication. Having pain but feels it is controlled with current regimen. Continues to need EZ stand for transfers, feels anxiety is limiting her some.  Discussed with patient, nursing staff, therapy.   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  Tearful on exam, nervous about upcoming oncology appointments  - follow-up with oncology, radiation oncology as scheduled on 3/21  - supportive cares    Type 2 diabetes mellitus with hyperglycemia, with long-term current use of insulin (H)  A1C 6.8%, Humulin 70/30 changes to glargine while IP. -255, typically <200- continue glargine 24 units daily, sliding scale insulin TID with meals (will not discharge home with this) metformin BID, Ozempic weekly.   - monitor and adjust     Adjustment disorder with depressed mood  Tearful on exam, some anxiety on visit today.   - continue sertraline, moved to  per patient's request    Hypercalcemia  Likely due to underlying malignancy, treated with zoledronic acid while IP. CA stable on labs today  - check BMP PRN     Secondary hypertension  -140 since TCU admission  - continue irbesartan, amlodipine; monitor and adjust     Orders:  Ok to remove sutures/staples on    Aquacel Ag to coccyx wound, cover with mepilex, change every other day and PRN    Electronically signed by:  RONNELL Dowling CNP      Sincerely,        RONNELL Dowling CNP    Electronically signed

## 2025-03-11 NOTE — PROGRESS NOTES
"[facility] GERIATRICS    PRIMARY CARE PROVIDER AND CLINIC:  RONNELL Bianchi CNP, [address] [place] [address] / [facility] [id number]***  Chief Complaint   Patient presents with    Hospital F/U      [place] Medical Record Number:  [id number]  [id number]    Brissa Mott  is a 54 year old  (1970), admitted to the above facility from  [facility]. Hospital stay 2/20/25 through 3/5/25..   HPI:    As per CNP's note:\"Brief Hospital Course: PMH of obesity, HTN, HLD, CVA, osteoarthritis, DM2, who presented with severe hypercalcemia, pathological right femur fracture. Abdominal/Pelvic CT showed complex right adnexal cystic mass and multiple pulmonary nodules. Calcium improved with IVF and zoledronic acid. Underwent ORIF on 2/25. Biopsy returned  differentiated neuroendocrine carcinoma. Referral placed to oncology clinic. Was started on glargine and SSI,  Humulin 70/30 discontinued. Started on gabapentin for pain. When medically stable was discharged to TCU for further rehab and medical management. \"      TODAY:  -Rt femur pathological fx s/p ORIF (2/25/25)  -Neuroendocrine carcinoma with mets to bone,lung,   -Rt adnexal cystis mass  -Left Knee joint pain s/p steroid injection while IP  .   .        -T2D  .       Unstageable PI over sacrum:      =====================================================================================  CODE STATUS/ADVANCE DIRECTIVES DISCUSSION:  Full Code  CPR/Full code   ALLERGIES:   Allergies   Allergen Reactions    Sulfa Antibiotics Shortness Of Breath, Hives and Rash    Bydureon [Exenatide] Diarrhea    Penicillins Rash and Hives      PAST MEDICAL HISTORY:   Past Medical History:   Diagnosis Date    Asthma     Cerebral infarction (H)     Diabetes (H)     Hypertension     Mixed hyperlipidemia     Morbid obesity (H)     KATHRINE (obstructive sleep apnea)     Osteoarthritis       PAST SURGICAL " HISTORY:   has a past surgical history that includes  section and Insertion, Intramedullary Humphrey, Retrograde, For Femoral Shaft Fracture (Right, 2025).  FAMILY HISTORY: family history is not on file.  SOCIAL HISTORY:   reports that she has quit smoking. She has never used smokeless tobacco. She reports that she does not currently use alcohol. She reports that she does not use drugs.  Patient's living condition: lives with spouse    Post Discharge Medication Reconciliation Status:   MED REC REQUIRED{TIP  Click the link below to document or use med rec list, use list to pull in response :[id number]}  Post Medication Reconciliation Status: medication reconcilation previously completed during another office visit       Current Outpatient Medications   Medication Sig Dispense Refill    acetaminophen (TYLENOL) 325 MG tablet Take 2 tablets (650 mg) by mouth every 6 hours.      albuterol (PROAIR HFA/PROVENTIL HFA/VENTOLIN HFA) 108 (90 Base) MCG/ACT inhaler Inhale 2 puffs into the lungs every 6 hours as needed for wheezing 6.7 g 3    amLODIPine (NORVASC) 10 MG tablet Take 1 tablet (10 mg) by mouth daily 90 tablet 3    atorvastatin (LIPITOR) 40 MG tablet Take 1 tablet (40 mg) by mouth daily 90 tablet 3    Continuous Glucose Sensor (FREESTYLE SANDRA 14 DAY SENSOR) MISC Change every 14 days. 2 each 5    Continuous Glucose Sensor (FREESTYLE SANDRA 3 SENSOR) MISC 1 each every 14 days Use 1 sensor every 14 days. Use to read blood sugars per 's instructions. 6 each 5    enoxaparin ANTICOAGULANT (LOVENOX) 40 MG/0.4ML syringe Inject 0.4 mLs (40 mg) subcutaneously every 12 hours for 14 days.      gabapentin (NEURONTIN) 300 MG capsule Take 1 capsule (300 mg) by mouth 2 times daily.      hydrOXYzine HCl (ATARAX) 25 MG tablet Take 1 tablet (25 mg) by mouth every 6 hours as needed for other (adjuvant pain).      ibuprofen (ADVIL/MOTRIN) 200 MG tablet Take 200-400 mg by mouth every 4 hours as needed for mild pain.       "insulin aspart (NOVOLOG FLEXPEN) 100 UNIT/ML pen For Pre-Meal  - 189 give 1 unit.  For Pre-Meal  - 239 give 2 units.  For Pre-Meal  - 289 give 3 units.  For Pre-Meal  - 339 give 4 units.  For Pre-Meal - 389 give 5 units.  For Pre-Meal -439 give 6 units For Pre-Meal BG greater than or equal to 440 give 7 units.  For BEDTIME: For  - 249 give 1 unit.  For  - 299 give 2 units.  For  - 349 give 3 units.  For  - 399 give 4 units.  For BG greater than or equal to 400 give 5 units.      insulin glargine (LANTUS PEN) 100 UNIT/ML pen Inject 24 Units subcutaneously every morning (before breakfast).      insulin pen needle (BD PEN NEEDLE TWYLA 2ND GEN) 32G X 4 MM miscellaneous USE TWICE DAILY OR AS DIRECTED 200 each 2    irbesartan (AVAPRO) 300 MG tablet TAKE 1 TABLET(300 MG) BY MOUTH AT BEDTIME 90 tablet 2    Lidocaine (LIDOCARE) 4 % Patch Place 1 patch onto the skin every 24 hours. On for 12 hours, off for 12 hours.      metFORMIN (GLUCOPHAGE) 1000 MG tablet TAKE 1 TABLET BY MOUTH TWICE DAILY WITH MEALS 180 tablet 1    Misc. Devices (ROLLATOR ULTRA-LIGHT) MISC Extra side walker with front wheels for home use.  Purchase, lifetime need. Extra wide rolling walker (\"Walker 4\", item #:  [id number]) needed for safe transfers and ambulation.  Pt weight is 335 lbs.      montelukast (SINGULAIR) 10 MG tablet Take 1 tablet (10 mg) by mouth at bedtime 90 tablet 3    oxyCODONE (ROXICODONE) 5 MG tablet Take 1-2 tablets (5-10 mg) by mouth every 3 hours as needed for moderate pain. 1 tab po q 3 hrs prn pain scale \"3-6\"  2 tabs po q 3 hrs prn pain scale \"7-10\" 40 tablet 0    Semaglutide, 2 MG/DOSE, (OZEMPIC, 2 MG/DOSE,) 8 MG/3ML pen Inject 2 mg subcutaneously every 7 days 9 mL 2    senna-docusate (SENOKOT-S/PERICOLACE) 8.6-50 MG tablet Take 1 tablet by mouth 2 times daily as needed for constipation.      sertraline (ZOLOFT) 100 MG tablet Take 1.5 tablets (150 mg) by mouth daily 135 " "tablet 3     No current facility-administered medications for this visit.       ROS:  10 point ROS of systems including Constitutional, Eyes, Respiratory, Cardiovascular, Gastroenterology, Genitourinary, Integumentary, Musculoskeletal, Psychiatric were all negative except for pertinent positives noted in my HPI.    Vitals:  /74   Pulse 94   Temp 98.4  F (36.9  C)   Resp 22   Ht 1.626 m (5' 4\")   Wt 113.4 kg (250 lb)   LMP  (LMP Unknown)   SpO2 96%   BMI 42.91 kg/m    Exam:  GENERAL APPEARANCE:  i***n no distress,   RESP:  Unlabored breathing. ***CTA b/l.   CV:  S1S2 audible, regular HR, ***no murmur appreciated.   ABDOMEN:  soft, NT/ND, BS audible.   M/S:   n***o joint deformity noted on observation.   SKIN:  ***No rash noted on observation  NEURO:   ***No NFD appreciated on observation.   PSYCH:  ***affect and mood normal   {Nursing home physical exam :[id number]}    Lab/Diagnostic data:Reviewed in the chart and EHR.      ASSESSMENT/PLAN:  ------------------------------------  Pathological fracture of right femur due to neoplastic disease with routine healing, subsequent encounter  S/P ORIF (open reduction internal fixation) fracture  Chronic pain of left knee  -- Analgesia optimal*** with the current regimen. Continue present plan and medications.  - Followed by Orthopedic Team. Follow on the recommendations / instructions.   - DVT prophylaxis according to Orthopedic team recommendations  - Started rehab program, making a ***progress, continue until desired goal is achieved.         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 Clinically stable. Continue to monitor and adjust meds prn      Uncomplicated asthma, unspecified asthma severity, unspecified whether persistent  KATHRINE (obstructive sleep apnea)  -pulmonary wise appears stable. The current medical regimen is effective;  continue present plan and medications.      Secondary hypertension  - No concern today. Continue current plan of care.         Orders:  NNO***    Electronically signed by:  Dereje Burk MD

## 2025-03-12 ENCOUNTER — COMMITTEE REVIEW (OUTPATIENT)
Dept: TRANSPLANT | Facility: CLINIC | Age: 55
End: 2025-03-12
Payer: COMMERCIAL

## 2025-03-13 ENCOUNTER — TRANSITIONAL CARE UNIT VISIT (OUTPATIENT)
Dept: GERIATRICS | Facility: CLINIC | Age: 55
End: 2025-03-13
Payer: COMMERCIAL

## 2025-03-13 VITALS
HEART RATE: 94 BPM | RESPIRATION RATE: 22 BRPM | DIASTOLIC BLOOD PRESSURE: 74 MMHG | BODY MASS INDEX: 42.68 KG/M2 | TEMPERATURE: 98.4 F | HEIGHT: 64 IN | WEIGHT: 250 LBS | SYSTOLIC BLOOD PRESSURE: 118 MMHG | OXYGEN SATURATION: 96 %

## 2025-03-13 DIAGNOSIS — C7A.8 NEUROENDOCRINE CARCINOMA METASTATIC TO BONE (H): ICD-10-CM

## 2025-03-13 DIAGNOSIS — C7B.8 NEUROENDOCRINE CARCINOMA METASTATIC TO BONE (H): ICD-10-CM

## 2025-03-13 DIAGNOSIS — M84.551D PATHOLOGICAL FRACTURE OF RIGHT FEMUR DUE TO NEOPLASTIC DISEASE WITH ROUTINE HEALING, SUBSEQUENT ENCOUNTER: Primary | ICD-10-CM

## 2025-03-13 DIAGNOSIS — G89.29 CHRONIC PAIN OF LEFT KNEE: ICD-10-CM

## 2025-03-13 DIAGNOSIS — J45.909 UNCOMPLICATED ASTHMA, UNSPECIFIED ASTHMA SEVERITY, UNSPECIFIED WHETHER PERSISTENT: ICD-10-CM

## 2025-03-13 DIAGNOSIS — N83.8 COMPLEX CYST OF UTERINE ADNEXA: ICD-10-CM

## 2025-03-13 DIAGNOSIS — E83.52 HYPERCALCEMIA: ICD-10-CM

## 2025-03-13 DIAGNOSIS — G47.33 OSA (OBSTRUCTIVE SLEEP APNEA): ICD-10-CM

## 2025-03-13 DIAGNOSIS — Z87.81 S/P ORIF (OPEN REDUCTION INTERNAL FIXATION) FRACTURE: ICD-10-CM

## 2025-03-13 DIAGNOSIS — I15.9 SECONDARY HYPERTENSION: ICD-10-CM

## 2025-03-13 DIAGNOSIS — M25.562 CHRONIC PAIN OF LEFT KNEE: ICD-10-CM

## 2025-03-13 DIAGNOSIS — L89.150 PRESSURE INJURY OF SACRAL REGION, UNSTAGEABLE (H): ICD-10-CM

## 2025-03-13 DIAGNOSIS — C79.51 MALIGNANT NEOPLASM METASTATIC TO BONE (H): ICD-10-CM

## 2025-03-13 DIAGNOSIS — C78.00 MALIGNANT NEOPLASM METASTATIC TO LUNG, UNSPECIFIED LATERALITY (H): ICD-10-CM

## 2025-03-13 DIAGNOSIS — Z79.4 TYPE 2 DIABETES MELLITUS WITH HYPERGLYCEMIA, WITH LONG-TERM CURRENT USE OF INSULIN (H): ICD-10-CM

## 2025-03-13 DIAGNOSIS — Z98.890 S/P ORIF (OPEN REDUCTION INTERNAL FIXATION) FRACTURE: ICD-10-CM

## 2025-03-13 DIAGNOSIS — E11.65 TYPE 2 DIABETES MELLITUS WITH HYPERGLYCEMIA, WITH LONG-TERM CURRENT USE OF INSULIN (H): ICD-10-CM

## 2025-03-13 NOTE — COMMITTEE REVIEW
Kidney/Pancreas Committee Review Note     Evaluation Date: 11/4/2022  Committee Review Date: 3/12/2025    Organ being evaluated for: Kidney    Transplant Phase: Evaluation  Transplant Status: Active    Transplant Coordinator: Alaina Ballard  Transplant Surgeon:        Referring Physician: Olga Freeman    Primary Diagnosis: Polycystic Kidneys  Secondary Diagnosis:     Committee Review Members:  Nephrology Shoshana Gonzalez NP, Lizet Livingston MD, Jil Akhtar MD    - Clinical Radha Quiñonez, [facility]   Transplant GINNA ORTIZ, RN, Suellen Ndiaye, RN, Elise Mckeon, RN, Jami Coffey, ESMER, KUMAR Hill CNP, Lucy Campos, ESMER, Ingrid Ortiz, ESMER, Nay Corral RN, Paulo Dugan MD, Krys Godinez RN   Transplant Surgery Jean-Paul Mccain MD, Paulo Dugan MD       Transplant Eligibility: Irreversible chronic kidney disease treated w/dialysis or expected need for dialysis    Committee Review Decision: Approved    Relative Contraindications:     Absolute Contraindications:      Committee Chair Jean-Paul Mccain MD verbally attested to the committee's decision.    Committee Discussion Details:     Reviewed patient's evaluation to date to potentially activate on the kidney wait list.  Reviewed patient's previous committee review and discussed follow up wait list visits with nephrology, transplant surgery, and repeat frailty exam after her hospitalizations in 2024.  Patient was deemed acceptable however had open wounds that needed to be healed.  Per 3/04/2025 physical therapy/wound care note, patient's wounds are closed and she has not needed debridement of wound tissue, just calluses.    Committee agreed that patient can be listed for active status on the kidney wait list. Per imaging review, transplant should go on the right side with no need to remove native kidneys prior.

## 2025-03-13 NOTE — LETTER
" 3/13/2025      Brissa Mott  [address]  [facility] [address]        [facility] GERIATRICS    PRIMARY CARE PROVIDER AND CLINIC:  RONNELL Bianchi CNP, [address] [place] [address] / [facility] [id number]***  No chief complaint on file.     [facility] Medical Record Number:  [id number]  Place of Service where encounter took place:  No question data found.    Brissa Mott  is a 54 year old  (1970), admitted to the above facility from  [facility]. Hospital stay 2/20/25 through 3/5/25..   HPI:    As per CNP's note:\"Brief Hospital Course: PMH of obesity, HTN, HLD, CVA, osteoarthritis, DM2, who presented with severe hypercalcemia, pathological right femur fracture. Abdominal/Pelvic CT showed complex right adnexal cystic mass and multiple pulmonary nodules. Calcium improved with IVF and zoledronic acid. Underwent ORIF on 2/25. Biopsy returned  differentiated neuroendocrine carcinoma. Referral placed to oncology clinic. Was started on glargine and SSI,  Humulin 70/30 discontinued. Started on gabapentin for pain. When medically stable was discharged to TCU for further rehab and medical management. 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   .        -T2D  .       Unstageable PI over sacrum:      =====================================================================================  CODE STATUS/ADVANCE DIRECTIVES DISCUSSION:  Full Code  CPR/Full code   ALLERGIES:   Allergies   Allergen Reactions    Sulfa Antibiotics Shortness Of Breath, Hives and Rash    Bydureon [Exenatide] Diarrhea    Penicillins Rash and Hives      PAST MEDICAL HISTORY:   Past Medical History:   Diagnosis Date    Asthma     Cerebral infarction (H)     Diabetes (H)     Hypertension     Mixed hyperlipidemia     Morbid obesity (H)     KATHRINE (obstructive sleep apnea)     Osteoarthritis     "   PAST SURGICAL HISTORY:   has a past surgical history that includes  section and Insertion, Intramedullary Humphrey, Retrograde, For Femoral Shaft Fracture (Right, 2025).  FAMILY HISTORY: family history is not on file.  SOCIAL HISTORY:   reports that she has quit smoking. She has never used smokeless tobacco. She reports that she does not currently use alcohol. She reports that she does not use drugs.  Patient's living condition: lives with spouse    Post Discharge Medication Reconciliation Status:   MED REC REQUIRED{TIP  Click the link below to document or use med rec list, use list to pull in response :[id number]}  Post Medication Reconciliation Status: medication reconcilation previously completed during another office visit       Current Outpatient Medications   Medication Sig Dispense Refill    acetaminophen (TYLENOL) 325 MG tablet Take 2 tablets (650 mg) by mouth every 6 hours.      albuterol (PROAIR HFA/PROVENTIL HFA/VENTOLIN HFA) 108 (90 Base) MCG/ACT inhaler Inhale 2 puffs into the lungs every 6 hours as needed for wheezing 6.7 g 3    amLODIPine (NORVASC) 10 MG tablet Take 1 tablet (10 mg) by mouth daily 90 tablet 3    atorvastatin (LIPITOR) 40 MG tablet Take 1 tablet (40 mg) by mouth daily 90 tablet 3    Continuous Glucose Sensor (FREESTYLE SANDRA 14 DAY SENSOR) MISC Change every 14 days. 2 each 5    Continuous Glucose Sensor (FREESTYLE SANDRA 3 SENSOR) MISC 1 each every 14 days Use 1 sensor every 14 days. Use to read blood sugars per 's instructions. 6 each 5    enoxaparin ANTICOAGULANT (LOVENOX) 40 MG/0.4ML syringe Inject 0.4 mLs (40 mg) subcutaneously every 12 hours for 14 days.      gabapentin (NEURONTIN) 300 MG capsule Take 1 capsule (300 mg) by mouth 2 times daily.      hydrOXYzine HCl (ATARAX) 25 MG tablet Take 1 tablet (25 mg) by mouth every 6 hours as needed for other (adjuvant pain).      ibuprofen (ADVIL/MOTRIN) 200 MG tablet Take 200-400 mg by mouth every 4 hours as needed for  "mild pain.      insulin aspart (NOVOLOG FLEXPEN) 100 UNIT/ML pen For Pre-Meal  - 189 give 1 unit.  For Pre-Meal  - 239 give 2 units.  For Pre-Meal  - 289 give 3 units.  For Pre-Meal  - 339 give 4 units.  For Pre-Meal - 389 give 5 units.  For Pre-Meal -439 give 6 units For Pre-Meal BG greater than or equal to 440 give 7 units.  For BEDTIME: For  - 249 give 1 unit.  For  - 299 give 2 units.  For  - 349 give 3 units.  For  - 399 give 4 units.  For BG greater than or equal to 400 give 5 units.      insulin glargine (LANTUS PEN) 100 UNIT/ML pen Inject 24 Units subcutaneously every morning (before breakfast).      insulin pen needle (BD PEN NEEDLE TWYLA 2ND GEN) 32G X 4 MM miscellaneous USE TWICE DAILY OR AS DIRECTED 200 each 2    irbesartan (AVAPRO) 300 MG tablet TAKE 1 TABLET(300 MG) BY MOUTH AT BEDTIME 90 tablet 2    Lidocaine (LIDOCARE) 4 % Patch Place 1 patch onto the skin every 24 hours. On for 12 hours, off for 12 hours.      metFORMIN (GLUCOPHAGE) 1000 MG tablet TAKE 1 TABLET BY MOUTH TWICE DAILY WITH MEALS 180 tablet 1    Misc. Devices (ROLLATOR ULTRA-LIGHT) MISC Extra side walker with front wheels for home use.  Purchase, lifetime need. Extra wide rolling walker (\"Walker 4\", item #:  [id number]) needed for safe transfers and ambulation.  Pt weight is 335 lbs.      montelukast (SINGULAIR) 10 MG tablet Take 1 tablet (10 mg) by mouth at bedtime 90 tablet 3    oxyCODONE (ROXICODONE) 5 MG tablet Take 1-2 tablets (5-10 mg) by mouth every 3 hours as needed for moderate pain. 1 tab po q 3 hrs prn pain scale \"3-6\"  2 tabs po q 3 hrs prn pain scale \"7-10\" 40 tablet 0    Semaglutide, 2 MG/DOSE, (OZEMPIC, 2 MG/DOSE,) 8 MG/3ML pen Inject 2 mg subcutaneously every 7 days 9 mL 2    senna-docusate (SENOKOT-S/PERICOLACE) 8.6-50 MG tablet Take 1 tablet by mouth 2 times daily as needed for constipation.      sertraline (ZOLOFT) 100 MG tablet Take 1.5 tablets (150 mg) by " mouth daily 135 tablet 3     No current facility-administered medications for this visit.       ROS:  10 point ROS of systems including Constitutional, Eyes, Respiratory, Cardiovascular, Gastroenterology, Genitourinary, Integumentary, Musculoskeletal, Psychiatric were all negative except for pertinent positives noted in my HPI.    Vitals:  LMP  (LMP Unknown)   Exam:  GENERAL APPEARANCE:  i***n no distress,   RESP:  Unlabored breathing. ***CTA b/l.   CV:  S1S2 audible, regular HR, ***no murmur appreciated.   ABDOMEN:  soft, NT/ND, BS audible.   M/S:   n***o joint deformity noted on observation.   SKIN:  ***No rash noted on observation  NEURO:   ***No NFD appreciated on observation.   PSYCH:  ***affect and mood normal   {Nursing home physical exam :[id number]}    Lab/Diagnostic data:Reviewed in the chart and EHR.      ASSESSMENT/PLAN:  ------------------------------------  Pathological fracture of right femur due to neoplastic disease with routine healing, subsequent encounter  S/P ORIF (open reduction internal fixation) fracture  Chronic pain of left knee  -- Analgesia optimal*** with the current regimen. Continue present plan and medications.  - Followed by Orthopedic Team. Follow on the recommendations / instructions.   - DVT prophylaxis according to Orthopedic team recommendations  - Started rehab program, making a ***progress, continue until desired goal is achieved.         Neuroendocrine carcinoma metastatic to bone (H)  Hypercalcemia  Malignant neoplasm metastatic to bone (H)  Malignant neoplasm metastatic to lung, unspecified laterality (H)  Complex cyst of uterine adnexa  -oncolgoy follow up  -emotional and social support      Pressure injury of sacral region, unstageable (H)  - present  upon admission to TCU  -wound care in place    Type 2 diabetes mellitus with hyperglycemia, with long-term current use of insulin (H)  -   A1C 6.8 07/19/2024    A1C 6.7 08/03/2023    A1C 7.1 06/07/2022   - controlled. Clinically  stable. Continue to monitor and adjust meds prn      Uncomplicated asthma, unspecified asthma severity, unspecified whether persistent  KATHRINE (obstructive sleep apnea)  -pulmonary wise appears stable. The current medical regimen is effective;  continue present plan and medications.      Secondary hypertension  - No concern today. Continue current plan of care.         Orders:  NNO***    Electronically signed by:  Dereje Burk MD                     Sincerely,        Dereje Burk MD    Electronically signed

## 2025-03-19 ENCOUNTER — LAB (OUTPATIENT)
Dept: LAB | Facility: CLINIC | Age: 55
End: 2025-03-19
Payer: COMMERCIAL

## 2025-03-19 DIAGNOSIS — M84.551D PATHOLOGICAL FRACTURE OF RIGHT FEMUR DUE TO NEOPLASTIC DISEASE WITH ROUTINE HEALING, SUBSEQUENT ENCOUNTER: Primary | ICD-10-CM

## 2025-03-19 DIAGNOSIS — Z01.818 PRE-TRANSPLANT EVALUATION FOR KIDNEY TRANSPLANT: ICD-10-CM

## 2025-03-19 DIAGNOSIS — Z76.82 ORGAN TRANSPLANT CANDIDATE: ICD-10-CM

## 2025-03-19 NOTE — PROGRESS NOTES
[facility] GERIATRICS  ACUTE/EPISODIC VISIT    [facility] Medical Record Number: [id number]  Place of Service where encounter took place: [facility] ([facility]) [[id number]]    Chief Complaint   Patient presents with    RECHECK     HPI:    Brissa Mott is a 54 year old (1970), who is being seen today for an episodic care visit. HPI information obtained from: facility chart records, facility staff, patient report, and [facility] chart review.    Today's concern is: Recent hospitalization with pathological fracture to right femur s/p ORIF. New diagnosis of neuroendocrine carcinoma. Seen today in room,she was able to stand for over 1 minute on Tuesday but unfortunately unable to stand at all yesterday due to pain in left knee. She is using ACE to left knee which does help some. Says spouse has ordered her a knee brace, hoping that will be more helpful. Feels if left knee is what limits her mobility not the fracture on her right side. Appetite is ok. She is having bowel movements.     ALLERGIES:   Allergies   Allergen Reactions    Sulfa Antibiotics Shortness Of Breath, Hives and Rash    Bydureon [Exenatide] Diarrhea    Penicillins Rash and Hives      MEDICATIONS:  Post Discharge Medication Reconciliation Status: medication reconcilation previously completed during another office visit.     Current Outpatient Medications   Medication Sig Dispense Refill    acetaminophen (TYLENOL) 325 MG tablet Take 2 tablets (650 mg) by mouth every 6 hours.      albuterol (PROAIR HFA/PROVENTIL HFA/VENTOLIN HFA) 108 (90 Base) MCG/ACT inhaler Inhale 2 puffs into the lungs every 6 hours as needed for wheezing 6.7 g 3    amLODIPine (NORVASC) 10 MG tablet Take 1 tablet (10 mg) by mouth daily 90 tablet 3    atorvastatin (LIPITOR) 40 MG tablet Take 1 tablet (40 mg) by mouth daily 90 tablet 3    Continuous Glucose Sensor (FREESTYLE SANDRA 14 DAY SENSOR) MISC Change every 14 days. 2 each 5    Continuous Glucose  "Sensor (FREESTYLE SANDRA 3 SENSOR) [facility] 1 each every 14 days Use 1 sensor every 14 days. Use to read blood sugars per 's instructions. 6 each 5    gabapentin (NEURONTIN) 300 MG capsule Take 1 capsule (300 mg) by mouth 2 times daily.      hydrOXYzine HCl (ATARAX) 25 MG tablet Take 1 tablet (25 mg) by mouth every 6 hours as needed for other (adjuvant pain).      ibuprofen (ADVIL/MOTRIN) 200 MG tablet Take 200-400 mg by mouth every 4 hours as needed for mild pain.      insulin aspart (NOVOLOG FLEXPEN) 100 UNIT/ML pen For Pre-Meal  - 189 give 1 unit.  For Pre-Meal  - 239 give 2 units.  For Pre-Meal  - 289 give 3 units.  For Pre-Meal  - 339 give 4 units.  For Pre-Meal - 389 give 5 units.  For Pre-Meal -439 give 6 units For Pre-Meal BG greater than or equal to 440 give 7 units.  For BEDTIME: For  - 249 give 1 unit.  For  - 299 give 2 units.  For  - 349 give 3 units.  For  - 399 give 4 units.  For BG greater than or equal to 400 give 5 units.      insulin glargine (LANTUS PEN) 100 UNIT/ML pen Inject 24 Units subcutaneously every morning (before breakfast).      insulin pen needle (BD PEN NEEDLE TWYLA 2ND GEN) 32G X 4 MM miscellaneous USE TWICE DAILY OR AS DIRECTED 200 each 2    irbesartan (AVAPRO) 300 MG tablet TAKE 1 TABLET(300 MG) BY MOUTH AT BEDTIME 90 tablet 2    Lidocaine (LIDOCARE) 4 % Patch Place 1 patch onto the skin every 24 hours. On for 12 hours, off for 12 hours.      metFORMIN (GLUCOPHAGE) 1000 MG tablet TAKE 1 TABLET BY MOUTH TWICE DAILY WITH MEALS 180 tablet 1    Misc. Devices (ROLLATOR ULTRA-LIGHT) MISC Extra side walker with front wheels for home use.  Purchase, lifetime need. Extra wide rolling walker (\"Walker 4\", item #:  [id number]) needed for safe transfers and ambulation.  Pt weight is 335 lbs.      montelukast (SINGULAIR) 10 MG tablet Take 1 tablet (10 mg) by mouth at bedtime 90 tablet 3    oxyCODONE (ROXICODONE) 5 MG tablet Take 1-2 " "tablets (5-10 mg) by mouth every 3 hours as needed for moderate pain. 1 tab po q 3 hrs prn pain scale \"3-6\"  2 tabs po q 3 hrs prn pain scale \"7-10\" 40 tablet 0    Semaglutide, 2 MG/DOSE, (OZEMPIC, 2 MG/DOSE,) 8 MG/3ML pen Inject 2 mg subcutaneously every 7 days 9 mL 2    senna-docusate (SENOKOT-S/PERICOLACE) 8.6-50 MG tablet Take 1 tablet by mouth 2 times daily as needed for constipation.      sertraline (ZOLOFT) 100 MG tablet Take 1.5 tablets (150 mg) by mouth daily 135 tablet 3     Medications reviewed:  Medications reconciled to facility chart and changes were made to reflect current medications as identified as above med list. Below are the changes that were made:   Medications stopped since last EPIC medication reconciliation:   There are no discontinued medications.    Medications started since last [facility] medication reconciliation:  No orders of the defined types were placed in this encounter.        REVIEW OF SYSTEMS:  4 point ROS neg other than the symptoms noted above in the HPI.      PHYSICAL EXAM:  BP (!) 159/87   Pulse 96   Temp 98.3  F (36.8  C)   Resp 16   Ht 1.626 m (5' 4\")   Wt 110.2 kg (243 lb)   LMP  (LMP Unknown)   SpO2 (!) 91%   BMI 41.71 kg/m    Physical Exam  Cardiovascular:      Rate and Rhythm: Normal rate and regular rhythm.   Pulmonary:      Breath sounds: Normal breath sounds.   Abdominal:      General: Bowel sounds are normal.      Palpations: Abdomen is soft.   Neurological:      Mental Status: She is alert.   Psychiatric:         Mood and Affect: Mood normal.         Thought Content: Thought content normal.         ASSESSMENT / PLAN:  Pathological fracture of right femur due to neoplastic disease with routine healing, subsequent encounter  S/P ORIF (open reduction internal fixation) fracture  Physical deconditioning  Surgery without complication.EZ stand for transfers, pain in left knee more limiting that right leg pain. Had follow-up with ortho on 3/17, advanced to 50% weight " "bearing.   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   - follow-up with oncology, radiation oncology as scheduled on 3/21  - supportive cares    Type 2 diabetes mellitus with hyperglycemia, with long-term current use of insulin (H)  A1C 6.8%, Humulin 70/30 changes to glargine while IP. -281  - increase glargine to 28 units daily, sliding scale insulin TID with meals (will not discharge home with this) metformin BID, Ozempic weekly.   - monitor and adjust     Secondary hypertension  -150  - continue  irbesartan, amlodipine; monitor and adjust       Orders:  Increase glargine to 28 units q AM    Electronically signed by:  RONNELL Dowling CNP  "

## 2025-03-19 NOTE — TELEPHONE ENCOUNTER
RECORDS STATUS - ALL OTHER DIAGNOSIS      RECORDS RECEIVED FROM: [facility]   NOTES STATUS DETAILS   OFFICE NOTE from referring provider [facility] Elida Ramsey PA-c   DISCHARGE SUMMARY from hospital [facility] 2/20/25: [facility]    OPERATIVE REPORT Epic 2/28/25: CT Bone Bx   MEDICATION LIST [facility]    LABS     PATHOLOGY REPORTS Report in Epic 2/28/25: [id number]    ANYTHING RELATED TO DIAGNOSIS Epic Most recent 3/10/25   IMAGING (NEED IMAGES & REPORT)     CT SCANS PACS 2/24/25: CT Bone Bx  2/20/25: CT CAP   MRI PACS 3/1/25: MR Brain

## 2025-03-19 NOTE — TELEPHONE ENCOUNTER
MEDICAL RECORDS REQUEST   Radiation Oncology  [address] [place] [address]  Fax: [phone]          FUTURE VISIT INFORMATION                                                   Brissa Mott, : 1970 scheduled for future visit at [facility] Radiation [facility]cology    RECORDS REQUESTED FOR VISIT                                                     SOFT TISSUE     OFFICE NOTE from PCP Epic Elida Ramsey PA-C   OPERATIVE/BIOPSY REPORTS [facility] 25: CT Bone Bx    MEDICATION LIST [facility]    LABS     PATHOLOGY REPORTS Report in Epic 25: [id number]    ANYTHING RELATED TO DIAGNOSIS Epic Most recent 3/10/25    IMAGING (NEED IMAGES & REPORT)     CT SCANS PACS 25: CT Bone Bx  25: CT CAP   MRI PACS 3/1/25: MR Brain

## 2025-03-20 ENCOUNTER — TRANSITIONAL CARE UNIT VISIT (OUTPATIENT)
Dept: GERIATRICS | Facility: CLINIC | Age: 55
End: 2025-03-20
Payer: COMMERCIAL

## 2025-03-20 VITALS
DIASTOLIC BLOOD PRESSURE: 87 MMHG | HEART RATE: 96 BPM | HEIGHT: 64 IN | WEIGHT: 243 LBS | TEMPERATURE: 98.3 F | SYSTOLIC BLOOD PRESSURE: 159 MMHG | RESPIRATION RATE: 16 BRPM | BODY MASS INDEX: 41.48 KG/M2 | OXYGEN SATURATION: 91 %

## 2025-03-20 DIAGNOSIS — E11.65 TYPE 2 DIABETES MELLITUS WITH HYPERGLYCEMIA, WITH LONG-TERM CURRENT USE OF INSULIN (H): ICD-10-CM

## 2025-03-20 DIAGNOSIS — C7B.8 NEUROENDOCRINE CARCINOMA METASTATIC TO BONE (H): ICD-10-CM

## 2025-03-20 DIAGNOSIS — C7A.8 NEUROENDOCRINE CARCINOMA METASTATIC TO BONE (H): ICD-10-CM

## 2025-03-20 DIAGNOSIS — M84.551D PATHOLOGICAL FRACTURE OF RIGHT FEMUR DUE TO NEOPLASTIC DISEASE WITH ROUTINE HEALING, SUBSEQUENT ENCOUNTER: Primary | ICD-10-CM

## 2025-03-20 DIAGNOSIS — I15.9 SECONDARY HYPERTENSION: ICD-10-CM

## 2025-03-20 DIAGNOSIS — G89.29 CHRONIC PAIN OF LEFT KNEE: ICD-10-CM

## 2025-03-20 DIAGNOSIS — R53.81 PHYSICAL DECONDITIONING: ICD-10-CM

## 2025-03-20 DIAGNOSIS — M25.562 CHRONIC PAIN OF LEFT KNEE: ICD-10-CM

## 2025-03-20 DIAGNOSIS — Z79.4 TYPE 2 DIABETES MELLITUS WITH HYPERGLYCEMIA, WITH LONG-TERM CURRENT USE OF INSULIN (H): ICD-10-CM

## 2025-03-20 DIAGNOSIS — Z87.81 S/P ORIF (OPEN REDUCTION INTERNAL FIXATION) FRACTURE: ICD-10-CM

## 2025-03-20 DIAGNOSIS — Z98.890 S/P ORIF (OPEN REDUCTION INTERNAL FIXATION) FRACTURE: ICD-10-CM

## 2025-03-20 RX ORDER — ENOXAPARIN SODIUM 100 MG/ML
40 INJECTION SUBCUTANEOUS EVERY 12 HOURS
Status: SHIPPED
Start: 2025-03-20 | End: 2025-04-17

## 2025-03-20 RX ORDER — OXYCODONE HYDROCHLORIDE 5 MG/1
5-10 TABLET ORAL
Qty: 40 TABLET | Refills: 0 | Status: SHIPPED | OUTPATIENT
Start: 2025-03-20

## 2025-03-20 NOTE — LETTER
3/20/2025      Brissa Mott  [address]  [facility] [address]        [facility] GERIATRICS  ACUTE/EPISODIC VISIT    [facility] Medical Record Number: [id number]  Place of Service where encounter took place: [facility] [place] [facility] ([facility]) [[id number]]    Chief Complaint   Patient presents with     RECHECK     HPI:    Brissa Mott is a 54 year old (1970), who is being seen today for an episodic care visit. HPI information obtained from: facility chart records, facility staff, patient report, and [facility] chart review.    Today's concern is: Recent hospitalization with pathological fracture to right femur s/p ORIF. New diagnosis of neuroendocrine carcinoma. Seen today in room,she was able to stand for over 1 minute on Tuesday but unfortunately unable to stand at all yesterday due to pain in left knee. She is using ACE to left knee which does help some. Says spouse has ordered her a knee brace, hoping that will be more helpful. Feels if left knee is what limits her mobility not the fracture on her right side. Appetite is ok. She is having bowel movements.     ALLERGIES:   Allergies   Allergen Reactions     Sulfa Antibiotics Shortness Of Breath, Hives and Rash     Bydureon [Exenatide] Diarrhea     Penicillins Rash and Hives      MEDICATIONS:  Post Discharge Medication Reconciliation Status: medication reconcilation previously completed during another office visit.     Current Outpatient Medications   Medication Sig Dispense Refill     acetaminophen (TYLENOL) 325 MG tablet Take 2 tablets (650 mg) by mouth every 6 hours.       albuterol (PROAIR HFA/PROVENTIL HFA/VENTOLIN HFA) 108 (90 Base) MCG/ACT inhaler Inhale 2 puffs into the lungs every 6 hours as needed for wheezing 6.7 g 3     amLODIPine (NORVASC) 10 MG tablet Take 1 tablet (10 mg) by mouth daily 90 tablet 3     atorvastatin (LIPITOR) 40 MG tablet Take 1 tablet (40 mg) by mouth daily 90 tablet 3     Continuous Glucose Sensor  "(FREESTYLE SANDRA 14 DAY SENSOR) MISC Change every 14 days. 2 each 5     Continuous Glucose Sensor (FREESTYLE SANDRA 3 SENSOR) MISC 1 each every 14 days Use 1 sensor every 14 days. Use to read blood sugars per 's instructions. 6 each 5     gabapentin (NEURONTIN) 300 MG capsule Take 1 capsule (300 mg) by mouth 2 times daily.       hydrOXYzine HCl (ATARAX) 25 MG tablet Take 1 tablet (25 mg) by mouth every 6 hours as needed for other (adjuvant pain).       ibuprofen (ADVIL/MOTRIN) 200 MG tablet Take 200-400 mg by mouth every 4 hours as needed for mild pain.       insulin aspart (NOVOLOG FLEXPEN) 100 UNIT/ML pen For Pre-Meal  - 189 give 1 unit.  For Pre-Meal  - 239 give 2 units.  For Pre-Meal  - 289 give 3 units.  For Pre-Meal  - 339 give 4 units.  For Pre-Meal - 389 give 5 units.  For Pre-Meal -439 give 6 units For Pre-Meal BG greater than or equal to 440 give 7 units.  For BEDTIME: For  - 249 give 1 unit.  For  - 299 give 2 units.  For  - 349 give 3 units.  For  - 399 give 4 units.  For BG greater than or equal to 400 give 5 units.       insulin glargine (LANTUS PEN) 100 UNIT/ML pen Inject 24 Units subcutaneously every morning (before breakfast).       insulin pen needle (BD PEN NEEDLE TWYLA 2ND GEN) 32G X 4 MM miscellaneous USE TWICE DAILY OR AS DIRECTED 200 each 2     irbesartan (AVAPRO) 300 MG tablet TAKE 1 TABLET(300 MG) BY MOUTH AT BEDTIME 90 tablet 2     Lidocaine (LIDOCARE) 4 % Patch Place 1 patch onto the skin every 24 hours. On for 12 hours, off for 12 hours.       metFORMIN (GLUCOPHAGE) 1000 MG tablet TAKE 1 TABLET BY MOUTH TWICE DAILY WITH MEALS 180 tablet 1     Misc. Devices (ROLLATOR ULTRA-LIGHT) MISC Extra side walker with front wheels for home use.  Purchase, lifetime need. Extra wide rolling walker (\"Walker 4\", item #:  [id number]) needed for safe transfers and ambulation.  Pt weight is 335 lbs.       montelukast (SINGULAIR) 10 MG " "tablet Take 1 tablet (10 mg) by mouth at bedtime 90 tablet 3     oxyCODONE (ROXICODONE) 5 MG tablet Take 1-2 tablets (5-10 mg) by mouth every 3 hours as needed for moderate pain. 1 tab po q 3 hrs prn pain scale \"3-6\"  2 tabs po q 3 hrs prn pain scale \"7-10\" 40 tablet 0     Semaglutide, 2 MG/DOSE, (OZEMPIC, 2 MG/DOSE,) 8 MG/3ML pen Inject 2 mg subcutaneously every 7 days 9 mL 2     senna-docusate (SENOKOT-S/PERICOLACE) 8.6-50 MG tablet Take 1 tablet by mouth 2 times daily as needed for constipation.       sertraline (ZOLOFT) 100 MG tablet Take 1.5 tablets (150 mg) by mouth daily 135 tablet 3     Medications reviewed:  Medications reconciled to facility chart and changes were made to reflect current medications as identified as above med list. Below are the changes that were made:   Medications stopped since last EPIC medication reconciliation:   There are no discontinued medications.    Medications started since last [facility] medication reconciliation:  No orders of the defined types were placed in this encounter.        REVIEW OF SYSTEMS:  4 point ROS neg other than the symptoms noted above in the HPI.      PHYSICAL EXAM:  BP (!) 159/87   Pulse 96   Temp 98.3  F (36.8  C)   Resp 16   Ht 1.626 m (5' 4\")   Wt 110.2 kg (243 lb)   LMP  (LMP Unknown)   SpO2 (!) 91%   BMI 41.71 kg/m    Physical Exam  Cardiovascular:      Rate and Rhythm: Normal rate and regular rhythm.   Pulmonary:      Breath sounds: Normal breath sounds.   Abdominal:      General: Bowel sounds are normal.      Palpations: Abdomen is soft.   Neurological:      Mental Status: She is alert.   Psychiatric:         Mood and Affect: Mood normal.         Thought Content: Thought content normal.         ASSESSMENT / PLAN:  Pathological fracture of right femur due to neoplastic disease with routine healing, subsequent encounter  S/P ORIF (open reduction internal fixation) fracture  Physical deconditioning  Surgery without complication.EZ stand for transfers, " "pain in left knee more limiting that right leg pain. Had follow-up with ortho on 3/17, advanced to 50% weight bearing.   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   - follow-up with oncology, radiation oncology as scheduled on 3/21  - supportive cares    Type 2 diabetes mellitus with hyperglycemia, with long-term current use of insulin (H)  A1C 6.8%, Humulin 70/30 changes to glargine while IP. -281  - increase glargine to 28 units daily, sliding scale insulin TID with meals (will not discharge home with this) metformin BID, Ozempic weekly.   - monitor and adjust     Secondary hypertension  -150  - continue  irbesartan, amlodipine; monitor and adjust       Orders:  Increase glargine to 28 units q AM    Electronically signed by:  RONNELL Dowling CNP      Sincerely,        RONNELL Dowling CNP    Electronically signed  "

## 2025-03-21 ENCOUNTER — PRE VISIT (OUTPATIENT)
Dept: RADIATION THERAPY | Facility: OUTPATIENT CENTER | Age: 55
End: 2025-03-21

## 2025-03-21 ENCOUNTER — PRE VISIT (OUTPATIENT)
Dept: ONCOLOGY | Facility: CLINIC | Age: 55
End: 2025-03-21
Payer: COMMERCIAL

## 2025-03-21 PROBLEM — C7A.8 NEUROENDOCRINE CARCINOMA (H): Status: ACTIVE | Noted: 2025-03-21

## 2025-03-21 PROBLEM — M84.551A: Status: ACTIVE | Noted: 2025-03-21

## 2025-03-24 ENCOUNTER — PATIENT OUTREACH (OUTPATIENT)
Dept: CARE COORDINATION | Facility: CLINIC | Age: 55
End: 2025-03-24
Payer: COMMERCIAL

## 2025-03-24 NOTE — PROGRESS NOTES
Social Work - Telephone/Charlie Apphart message  [facility]  Data:   Patient Name: Brissa Mott  Goes By: Brissa    /Age: 1970 (54 year old)      Referral Source: Kirsty Calvert RNCC    Reason for Referral: New metastatic cancer dx    Intervention: SW called Brissa to introduce self and assess resource/support needs. Left voicemail with contact information and invited a call back when available.    Plan:  will await patient's return phone call/message and provide assistance at that time.   ANANTH Umanzor, [facility]  Adult Oncology Clinics  [place] (M,W), [place] (T) & [place] (Th)  *I am off Friday  Office: [phone]

## 2025-03-27 ENCOUNTER — TRANSITIONAL CARE UNIT VISIT (OUTPATIENT)
Dept: GERIATRICS | Facility: CLINIC | Age: 55
End: 2025-03-27
Payer: COMMERCIAL

## 2025-03-27 VITALS
WEIGHT: 239 LBS | OXYGEN SATURATION: 93 % | HEART RATE: 95 BPM | SYSTOLIC BLOOD PRESSURE: 163 MMHG | TEMPERATURE: 98.2 F | HEIGHT: 64 IN | DIASTOLIC BLOOD PRESSURE: 91 MMHG | RESPIRATION RATE: 20 BRPM | BODY MASS INDEX: 40.8 KG/M2

## 2025-03-27 DIAGNOSIS — G89.29 CHRONIC PAIN OF LEFT KNEE: ICD-10-CM

## 2025-03-27 DIAGNOSIS — C7A.8 NEUROENDOCRINE CARCINOMA METASTATIC TO BONE (H): ICD-10-CM

## 2025-03-27 DIAGNOSIS — M25.562 CHRONIC PAIN OF LEFT KNEE: ICD-10-CM

## 2025-03-27 DIAGNOSIS — I15.9 SECONDARY HYPERTENSION: ICD-10-CM

## 2025-03-27 DIAGNOSIS — L89.150 PRESSURE INJURY OF SACRAL REGION, UNSTAGEABLE (H): ICD-10-CM

## 2025-03-27 DIAGNOSIS — C7B.8 NEUROENDOCRINE CARCINOMA METASTATIC TO BONE (H): ICD-10-CM

## 2025-03-27 DIAGNOSIS — Z98.890 S/P ORIF (OPEN REDUCTION INTERNAL FIXATION) FRACTURE: ICD-10-CM

## 2025-03-27 DIAGNOSIS — M84.551D PATHOLOGICAL FRACTURE OF RIGHT FEMUR DUE TO NEOPLASTIC DISEASE WITH ROUTINE HEALING, SUBSEQUENT ENCOUNTER: Primary | ICD-10-CM

## 2025-03-27 DIAGNOSIS — Z87.81 S/P ORIF (OPEN REDUCTION INTERNAL FIXATION) FRACTURE: ICD-10-CM

## 2025-03-27 DIAGNOSIS — E11.65 TYPE 2 DIABETES MELLITUS WITH HYPERGLYCEMIA, WITH LONG-TERM CURRENT USE OF INSULIN (H): ICD-10-CM

## 2025-03-27 DIAGNOSIS — R53.81 PHYSICAL DECONDITIONING: ICD-10-CM

## 2025-03-27 DIAGNOSIS — Z79.4 TYPE 2 DIABETES MELLITUS WITH HYPERGLYCEMIA, WITH LONG-TERM CURRENT USE OF INSULIN (H): ICD-10-CM

## 2025-03-27 LAB
PATH REPORT.ADDENDUM SPEC: ABNORMAL
PATH REPORT.COMMENTS IMP SPEC: ABNORMAL
PATH REPORT.COMMENTS IMP SPEC: YES
PATH REPORT.FINAL DX SPEC: ABNORMAL
PATH REPORT.GROSS SPEC: ABNORMAL
PATH REPORT.MICROSCOPIC SPEC OTHER STN: ABNORMAL
PATH REPORT.RELEVANT HX SPEC: ABNORMAL
PHOTO IMAGE: ABNORMAL

## 2025-03-27 NOTE — LETTER
3/27/2025      Brissa Mott  [address]  [facility] [address]        [facility] GERIATRICS  ACUTE/EPISODIC VISIT    [facility] Medical Record Number: [id number]  Place of Service where encounter took place: [facility] [place] [facility] ([facility]) [[id number]]    Chief Complaint   Patient presents with     RECHECK     HPI:    Brissa Mott is a 54 year old (1970), who is being seen today for an episodic care visit. HPI information obtained from: facility chart records, facility staff, patient report, and [facility] chart review.    Today's concern is: Recent hospitalization with pathological fracture to right femur s/p ORIF. New diagnosis of neuroendocrine carcinoma. Mobility remains limited due to weight bearing status and limitation and advanced arthritis in left knee. She continues to need EZ stand for transfers. Appetite is ok, but not great. She is having bowel movements. Denies SOB. Has been sleeping ok . Staff report she is incontinent of urine at times due to difficulty getting up.     ALLERGIES:   Allergies   Allergen Reactions     Sulfa Antibiotics Shortness Of Breath, Hives and Rash     Bydureon [Exenatide] Diarrhea     Penicillins Rash and Hives      MEDICATIONS:  Post Discharge Medication Reconciliation Status: medication reconcilation previously completed during another office visit.     Current Outpatient Medications   Medication Sig Dispense Refill     acetaminophen (TYLENOL) 325 MG tablet Take 2 tablets (650 mg) by mouth every 6 hours.       albuterol (PROAIR HFA/PROVENTIL HFA/VENTOLIN HFA) 108 (90 Base) MCG/ACT inhaler Inhale 2 puffs into the lungs every 6 hours as needed for wheezing 6.7 g 3     amLODIPine (NORVASC) 10 MG tablet Take 1 tablet (10 mg) by mouth daily 90 tablet 3     atorvastatin (LIPITOR) 40 MG tablet Take 1 tablet (40 mg) by mouth daily 90 tablet 3     Continuous Glucose Sensor (FREESTYLE SANDRA 14 DAY SENSOR) MISC Change every 14 days. 2 each 5      "Continuous Glucose Sensor (FREESTYLE SANDRA 3 SENSOR) [facility] 1 each every 14 days Use 1 sensor every 14 days. Use to read blood sugars per 's instructions. 6 each 5     enoxaparin ANTICOAGULANT (LOVENOX) 40 MG/0.4ML syringe Inject 0.4 mLs (40 mg) subcutaneously every 12 hours for 28 days.       gabapentin (NEURONTIN) 300 MG capsule Take 1 capsule (300 mg) by mouth 2 times daily.       hydrOXYzine HCl (ATARAX) 25 MG tablet Take 1 tablet (25 mg) by mouth every 6 hours as needed for other (adjuvant pain).       ibuprofen (ADVIL/MOTRIN) 200 MG tablet Take 200-400 mg by mouth every 4 hours as needed for mild pain.       insulin aspart (NOVOLOG FLEXPEN) 100 UNIT/ML pen For Pre-Meal  - 189 give 1 unit.  For Pre-Meal  - 239 give 2 units.  For Pre-Meal  - 289 give 3 units.  For Pre-Meal  - 339 give 4 units.  For Pre-Meal - 389 give 5 units.  For Pre-Meal -439 give 6 units For Pre-Meal BG greater than or equal to 440 give 7 units.  For BEDTIME: For  - 249 give 1 unit.  For  - 299 give 2 units.  For  - 349 give 3 units.  For  - 399 give 4 units.  For BG greater than or equal to 400 give 5 units.       insulin glargine (LANTUS PEN) 100 UNIT/ML pen Inject 24 Units subcutaneously every morning (before breakfast).       insulin pen needle (BD PEN NEEDLE TWYLA 2ND GEN) 32G X 4 MM miscellaneous USE TWICE DAILY OR AS DIRECTED 200 each 2     irbesartan (AVAPRO) 300 MG tablet TAKE 1 TABLET(300 MG) BY MOUTH AT BEDTIME 90 tablet 2     Lidocaine (LIDOCARE) 4 % Patch Place 1 patch onto the skin every 24 hours. On for 12 hours, off for 12 hours.       metFORMIN (GLUCOPHAGE) 1000 MG tablet TAKE 1 TABLET BY MOUTH TWICE DAILY WITH MEALS 180 tablet 1     Misc. Devices (ROLLATOR ULTRA-LIGHT) MISC Extra side walker with front wheels for home use.  Purchase, lifetime need. Extra wide rolling walker (\"Walker 4\", item #:  [id number]) needed for safe transfers and ambulation.  Pt " "weight is 335 lbs.       montelukast (SINGULAIR) 10 MG tablet Take 1 tablet (10 mg) by mouth at bedtime 90 tablet 3     oxyCODONE (ROXICODONE) 5 MG tablet Take 1-2 tablets (5-10 mg) by mouth every 3 hours as needed for moderate pain. 1 tab po q 3 hrs prn pain scale \"3-6\"  2 tabs po q 3 hrs prn pain scale \"7-10\" 40 tablet 0     Semaglutide, 2 MG/DOSE, (OZEMPIC, 2 MG/DOSE,) 8 MG/3ML pen Inject 2 mg subcutaneously every 7 days 9 mL 2     senna-docusate (SENOKOT-S/PERICOLACE) 8.6-50 MG tablet Take 1 tablet by mouth 2 times daily as needed for constipation.       sertraline (ZOLOFT) 100 MG tablet Take 1.5 tablets (150 mg) by mouth daily 135 tablet 3     Medications reviewed:  Medications reconciled to facility chart and changes were made to reflect current medications as identified as above med list. Below are the changes that were made:   Medications stopped since last EPIC medication reconciliation:   There are no discontinued medications.    Medications started since last [facility] medication reconciliation:  No orders of the defined types were placed in this encounter.        REVIEW OF SYSTEMS:  4 point ROS neg other than the symptoms noted above in the HPI.      PHYSICAL EXAM:  BP (!) 163/91   Pulse 95   Temp 98.2  F (36.8  C)   Resp 20   Ht 1.626 m (5' 4\")   Wt 108.4 kg (239 lb)   LMP  (LMP Unknown)   SpO2 93%   BMI 41.02 kg/m    Physical Exam  Constitutional:       Appearance: She is obese.   Cardiovascular:      Rate and Rhythm: Normal rate and regular rhythm.   Pulmonary:      Breath sounds: Normal breath sounds.   Abdominal:      General: Bowel sounds are normal.      Palpations: Abdomen is soft.   Neurological:      Mental Status: She is alert.   Psychiatric:         Mood and Affect: Mood normal.         ASSESSMENT / PLAN:  Pathological fracture of right femur due to neoplastic disease with routine healing, subsequent encounter  S/P ORIF (open reduction internal fixation) fracture  Physical " "deconditioning  Surgery without complication. Mobility remains limited, thinks she \"psych's herself out.\" EZ stand for transfers, pain in left knee more limiting that right leg pain. Had follow-up with ortho on 3/17, advanced to 50% weight bearing.   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 Saw oncology and radiation oncology on 3/21, planning for radiation therapy, biopsy of left axillary node.   - supportive cares   - biopsy 4/14,   - CT scan on 4/3    Type 2 diabetes mellitus with hyperglycemia, with long-term current use of insulin (H)  A1C 6.8%, Humulin 70/30 changes to glargine while IP. -250, glargine increased in TCU.   - insulin glargine (LANTUS PEN) 100 UNIT/ML pen; Inject 30 Units subcutaneously every morning (before breakfast).  - sliding scale insulin TIE with meals   - continue metformin BID, Ozempic weekly.   - monitor and adjust     Secondary hypertension  -150  - continue  irbesartan, amlodipine; monitor and adjust     Pressure injury of sacral region, unstageable (H)  Followed by in house wound clinic  - continue current wound cares             Orders:  Increase glargine to 30 units subcue q AM     Electronically signed by:  RONNELL Dowling CNP      Sincerely,        RONNELL Dowling CNP    Electronically signed  "

## 2025-03-27 NOTE — PROGRESS NOTES
[facility] GERIATRICS  ACUTE/EPISODIC VISIT    [facility] Medical Record Number: [id number]  Place of Service where encounter took place: [facility] [place] [facility] ([facility]) [[id number]]    Chief Complaint   Patient presents with    RECHECK     HPI:    Brissa Mott is a 54 year old (1970), who is being seen today for an episodic care visit. HPI information obtained from: facility chart records, facility staff, patient report, and [facility] chart review.    Today's concern is: Recent hospitalization with pathological fracture to right femur s/p ORIF. New diagnosis of neuroendocrine carcinoma. Mobility remains limited due to weight bearing status and limitation and advanced arthritis in left knee. She continues to need EZ stand for transfers. Appetite is ok, but not great. She is having bowel movements. Denies SOB. Has been sleeping ok . Staff report she is incontinent of urine at times due to difficulty getting up.     ALLERGIES:   Allergies   Allergen Reactions    Sulfa Antibiotics Shortness Of Breath, Hives and Rash    Bydureon [Exenatide] Diarrhea    Penicillins Rash and Hives      MEDICATIONS:  Post Discharge Medication Reconciliation Status: medication reconcilation previously completed during another office visit.     Current Outpatient Medications   Medication Sig Dispense Refill    acetaminophen (TYLENOL) 325 MG tablet Take 2 tablets (650 mg) by mouth every 6 hours.      albuterol (PROAIR HFA/PROVENTIL HFA/VENTOLIN HFA) 108 (90 Base) MCG/ACT inhaler Inhale 2 puffs into the lungs every 6 hours as needed for wheezing 6.7 g 3    amLODIPine (NORVASC) 10 MG tablet Take 1 tablet (10 mg) by mouth daily 90 tablet 3    atorvastatin (LIPITOR) 40 MG tablet Take 1 tablet (40 mg) by mouth daily 90 tablet 3    Continuous Glucose Sensor (FREESTYLE SANDRA 14 DAY SENSOR) MISC Change every 14 days. 2 each 5    Continuous Glucose Sensor (FREESTYLE SANDRA 3 SENSOR) MISC 1 each every 14 days Use 1 sensor  "every 14 days. Use to read blood sugars per 's instructions. 6 each 5    enoxaparin ANTICOAGULANT (LOVENOX) 40 MG/0.4ML syringe Inject 0.4 mLs (40 mg) subcutaneously every 12 hours for 28 days.      gabapentin (NEURONTIN) 300 MG capsule Take 1 capsule (300 mg) by mouth 2 times daily.      hydrOXYzine HCl (ATARAX) 25 MG tablet Take 1 tablet (25 mg) by mouth every 6 hours as needed for other (adjuvant pain).      ibuprofen (ADVIL/MOTRIN) 200 MG tablet Take 200-400 mg by mouth every 4 hours as needed for mild pain.      insulin aspart (NOVOLOG FLEXPEN) 100 UNIT/ML pen For Pre-Meal  - 189 give 1 unit.  For Pre-Meal  - 239 give 2 units.  For Pre-Meal  - 289 give 3 units.  For Pre-Meal  - 339 give 4 units.  For Pre-Meal - 389 give 5 units.  For Pre-Meal -439 give 6 units For Pre-Meal BG greater than or equal to 440 give 7 units.  For BEDTIME: For  - 249 give 1 unit.  For  - 299 give 2 units.  For  - 349 give 3 units.  For  - 399 give 4 units.  For BG greater than or equal to 400 give 5 units.      insulin glargine (LANTUS PEN) 100 UNIT/ML pen Inject 24 Units subcutaneously every morning (before breakfast).      insulin pen needle (BD PEN NEEDLE TWYLA 2ND GEN) 32G X 4 MM miscellaneous USE TWICE DAILY OR AS DIRECTED 200 each 2    irbesartan (AVAPRO) 300 MG tablet TAKE 1 TABLET(300 MG) BY MOUTH AT BEDTIME 90 tablet 2    Lidocaine (LIDOCARE) 4 % Patch Place 1 patch onto the skin every 24 hours. On for 12 hours, off for 12 hours.      metFORMIN (GLUCOPHAGE) 1000 MG tablet TAKE 1 TABLET BY MOUTH TWICE DAILY WITH MEALS 180 tablet 1    Misc. Devices (ROLLATOR ULTRA-LIGHT) MISC Extra side walker with front wheels for home use.  Purchase, lifetime need. Extra wide rolling walker (\"Walker 4\", item #:  [id number]) needed for safe transfers and ambulation.  Pt weight is 335 lbs.      montelukast (SINGULAIR) 10 MG tablet Take 1 tablet (10 mg) by mouth at bedtime 90 " "tablet 3    oxyCODONE (ROXICODONE) 5 MG tablet Take 1-2 tablets (5-10 mg) by mouth every 3 hours as needed for moderate pain. 1 tab po q 3 hrs prn pain scale \"3-6\"  2 tabs po q 3 hrs prn pain scale \"7-10\" 40 tablet 0    Semaglutide, 2 MG/DOSE, (OZEMPIC, 2 MG/DOSE,) 8 MG/3ML pen Inject 2 mg subcutaneously every 7 days 9 mL 2    senna-docusate (SENOKOT-S/PERICOLACE) 8.6-50 MG tablet Take 1 tablet by mouth 2 times daily as needed for constipation.      sertraline (ZOLOFT) 100 MG tablet Take 1.5 tablets (150 mg) by mouth daily 135 tablet 3     Medications reviewed:  Medications reconciled to facility chart and changes were made to reflect current medications as identified as above med list. Below are the changes that were made:   Medications stopped since last EPIC medication reconciliation:   There are no discontinued medications.    Medications started since last [facility] medication reconciliation:  No orders of the defined types were placed in this encounter.        REVIEW OF SYSTEMS:  4 point ROS neg other than the symptoms noted above in the HPI.      PHYSICAL EXAM:  BP (!) 163/91   Pulse 95   Temp 98.2  F (36.8  C)   Resp 20   Ht 1.626 m (5' 4\")   Wt 108.4 kg (239 lb)   LMP  (LMP Unknown)   SpO2 93%   BMI 41.02 kg/m    Physical Exam  Constitutional:       Appearance: She is obese.   Cardiovascular:      Rate and Rhythm: Normal rate and regular rhythm.   Pulmonary:      Breath sounds: Normal breath sounds.   Abdominal:      General: Bowel sounds are normal.      Palpations: Abdomen is soft.   Neurological:      Mental Status: She is alert.   Psychiatric:         Mood and Affect: Mood normal.         ASSESSMENT / PLAN:  Pathological fracture of right femur due to neoplastic disease with routine healing, subsequent encounter  S/P ORIF (open reduction internal fixation) fracture  Physical deconditioning  Surgery without complication. Mobility remains limited, thinks she \"psych's herself out.\" EZ stand for " "transfers, pain in left knee more limiting that right leg pain. Had follow-up with ortho on 3/17, advanced to 50% weight bearing.   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 Saw oncology and radiation oncology on 3/21, planning for radiation therapy, biopsy of left axillary node.   - supportive cares   - biopsy 4/14,   - CT scan on 4/3    Type 2 diabetes mellitus with hyperglycemia, with long-term current use of insulin (H)  A1C 6.8%, Humulin 70/30 changes to glargine while IP. -250, glargine increased in TCU.   - insulin glargine (LANTUS PEN) 100 UNIT/ML pen; Inject 30 Units subcutaneously every morning (before breakfast).  - sliding scale insulin TIE with meals   - continue metformin BID, Ozempic weekly.   - monitor and adjust     Secondary hypertension  -150  - continue  irbesartan, amlodipine; monitor and adjust     Pressure injury of sacral region, unstageable (H)  Followed by in house wound clinic  - continue current wound cares             Orders:  Increase glargine to 30 units subcue q AM     Electronically signed by:  RONNELL Dowling CNP  "

## 2025-03-31 ENCOUNTER — DISCHARGE SUMMARY NURSING HOME (OUTPATIENT)
Dept: GERIATRICS | Facility: CLINIC | Age: 55
End: 2025-03-31
Payer: COMMERCIAL

## 2025-03-31 VITALS
OXYGEN SATURATION: 91 % | HEART RATE: 99 BPM | RESPIRATION RATE: 16 BRPM | TEMPERATURE: 97.8 F | DIASTOLIC BLOOD PRESSURE: 81 MMHG | WEIGHT: 238 LBS | BODY MASS INDEX: 40.63 KG/M2 | SYSTOLIC BLOOD PRESSURE: 154 MMHG | HEIGHT: 64 IN

## 2025-03-31 DIAGNOSIS — L89.150 PRESSURE INJURY OF SACRAL REGION, UNSTAGEABLE (H): ICD-10-CM

## 2025-03-31 DIAGNOSIS — I69.354 HEMIPLEGIA AND HEMIPARESIS FOLLOWING CEREBRAL INFARCTION AFFECTING LEFT NON-DOMINANT SIDE (H): ICD-10-CM

## 2025-03-31 DIAGNOSIS — G47.33 OSA (OBSTRUCTIVE SLEEP APNEA): ICD-10-CM

## 2025-03-31 DIAGNOSIS — E11.65 TYPE 2 DIABETES MELLITUS WITH HYPERGLYCEMIA, WITH LONG-TERM CURRENT USE OF INSULIN (H): ICD-10-CM

## 2025-03-31 DIAGNOSIS — N83.8 COMPLEX CYST OF UTERINE ADNEXA: ICD-10-CM

## 2025-03-31 DIAGNOSIS — C78.02 MALIGNANT NEOPLASM METASTATIC TO BOTH LUNGS (H): ICD-10-CM

## 2025-03-31 DIAGNOSIS — C7A.8 NEUROENDOCRINE CARCINOMA METASTATIC TO BONE (H): ICD-10-CM

## 2025-03-31 DIAGNOSIS — Z98.890 S/P ORIF (OPEN REDUCTION INTERNAL FIXATION) FRACTURE: ICD-10-CM

## 2025-03-31 DIAGNOSIS — E83.52 HYPERCALCEMIA: ICD-10-CM

## 2025-03-31 DIAGNOSIS — J45.909 UNCOMPLICATED ASTHMA, UNSPECIFIED ASTHMA SEVERITY, UNSPECIFIED WHETHER PERSISTENT: ICD-10-CM

## 2025-03-31 DIAGNOSIS — C78.01 MALIGNANT NEOPLASM METASTATIC TO BOTH LUNGS (H): ICD-10-CM

## 2025-03-31 DIAGNOSIS — G89.29 CHRONIC PAIN OF LEFT KNEE: ICD-10-CM

## 2025-03-31 DIAGNOSIS — C7B.8 NEUROENDOCRINE CARCINOMA METASTATIC TO BONE (H): ICD-10-CM

## 2025-03-31 DIAGNOSIS — Z79.4 TYPE 2 DIABETES MELLITUS WITH HYPERGLYCEMIA, WITH LONG-TERM CURRENT USE OF INSULIN (H): ICD-10-CM

## 2025-03-31 DIAGNOSIS — R53.81 PHYSICAL DECONDITIONING: ICD-10-CM

## 2025-03-31 DIAGNOSIS — M25.562 CHRONIC PAIN OF LEFT KNEE: ICD-10-CM

## 2025-03-31 DIAGNOSIS — M84.551D PATHOLOGICAL FRACTURE OF RIGHT FEMUR DUE TO NEOPLASTIC DISEASE WITH ROUTINE HEALING, SUBSEQUENT ENCOUNTER: Primary | ICD-10-CM

## 2025-03-31 DIAGNOSIS — Z87.81 S/P ORIF (OPEN REDUCTION INTERNAL FIXATION) FRACTURE: ICD-10-CM

## 2025-03-31 DIAGNOSIS — E78.5 HYPERLIPIDEMIA, UNSPECIFIED HYPERLIPIDEMIA TYPE: ICD-10-CM

## 2025-03-31 DIAGNOSIS — E66.01 MORBID OBESITY (H): ICD-10-CM

## 2025-03-31 DIAGNOSIS — I15.9 SECONDARY HYPERTENSION: ICD-10-CM

## 2025-03-31 DIAGNOSIS — C79.51 MALIGNANT NEOPLASM METASTATIC TO BONE (H): ICD-10-CM

## 2025-03-31 DIAGNOSIS — F43.21 ADJUSTMENT DISORDER WITH DEPRESSED MOOD: ICD-10-CM

## 2025-03-31 PROCEDURE — 99316 NF DSCHRG MGMT 30 MIN+: CPT | Performed by: NURSE PRACTITIONER

## 2025-03-31 RX ORDER — OXYCODONE HYDROCHLORIDE 5 MG/1
5-10 TABLET ORAL
Qty: 30 TABLET | Refills: 0 | Status: SHIPPED | OUTPATIENT
Start: 2025-03-31

## 2025-03-31 NOTE — LETTER
3/31/2025      Brissa Mott  [address]  [place] [address]        [facility] GERIATRICS DISCHARGE SUMMARY  Patient Name: Brissa Mott  YOB: 1970  [place] Medical Record Number: [id number]  Place of Service Where Encounter Took Place: [facility] (SNF) [[id number]]    PRIMARY CARE PROVIDER AND CLINIC RESPONSIBLE AFTER TRANSFER: Juli Ramsey, RONNELL CNP, [address] [place] [address] [place] [address]; [facility] Provider     Transferring providers: RONNELL Mckenzie CNP; Dereje Burk MD  Recent Hospitalization/ED: [facility] Hospital stay 2/20/25 to 3/5/25.  Date of SNF Admission: March 05, 2025  Date of SNF (anticipated) Discharge:  ~4/2/25  Discharged to: new skilled nursing facility [facility]  Cognitive Scores: BIMS: 15/15  Physical Function: Wheelchair dependent and Mechanical lift dependent for transfers  DME: Wheelchair    CODE STATUS/ADVANCE DIRECTIVES DISCUSSION: Full Code  ALLERGIES: Sulfa antibiotics, Bydureon [exenatide], and Penicillins    NURSING FACILITY COURSE:  Medication Changes/Rationale:   Increased glargine to 33 units due to elevated BG'      Summary of nursing facility stay:   Brief Hospital Course: PMH of obesity, HTN, HLD, CVA, osteoarthritis, DM2, who presented with sever hypercalcemia, pathological right femur fracture. Abdominal/Pelvic CT showed complex right adnexal cystic mass and multiple pulmonary nodules. Calcium improved with IVF and zoledronic acid. Underwent ORIF on 2/25. Biopsy returned  differentiated neuroendocrine carcinoma. Referral placed to oncology clinic. Was started on glargine and SSI,  Humulin 70/30 discontinued. Started on gabapentin for pain. When medically stable was discharged to TCU for further rehab and medical management.     Pathological fracture of right femur due to neoplastic disease with routine healing, subsequent encounter  S/P ORIF (open reduction internal fixation) fracture  Physical  "deconditioning  Surgery without complication. Had follow-up with ortho on 3/17, advanced to 50% weight bearing.  Unfortunately remain unable to stand. EZ stand for transfers, pain/weakness in left knee more limiting that right leg pain. LTC recommended due to care needs.   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 Saw oncology and radiation oncology on 3/21, planning for radiation therapy, biopsy of left axillary node.   - supportive cares   - biopsy 4/14,   - CT scan on 4/3    Type 2 diabetes mellitus with hyperglycemia, with long-term current use of insulin (H)  A1C 6.8%, Humulin 70/30 changes to glargine while IP. -260, glargine increased again today.   - insulin glargine (LANTUS PEN) 100 UNIT/ML pen; Inject 33 Units subcutaneously every morning (before breakfast).  - sliding scale insulin TID with meals   - continue metformin BID, Ozempic weekly.     Secondary hypertension  -150  - continue  irbesartan, amlodipine; monitor and adjust    Pressure injury of sacral region, unstageable (H)  In the setting of limited mobility. Followed by in house wound nurse  - continue current wound care     Hypercalcemia  Likely due to underlying malignancy, treated with zoledronic acid. Stable at recheck in TCU     Uncomplicated asthma, unspecified asthma severity, unspecified " "whether persistent  Compensated, no concern  - albuterol MDI PRN. Singulair     KATHRINE (obstructive sleep apnea)  - CPAP at home settings     Adjustment disorder with depressed mood  Anxious at time, new cancer DX  - continue hydroxyzine PRN, sertraline  - may benefit from referral in house psych at LT facility (not available a TCU)    Hemiplegia and hemiparesis following cerebral infarction affecting left non-dominant side (H)  Hyperlipidemia, unspecified hyperlipidemia type  CVA 6 years ago, minimal residual  - continue atorvastatin    Morbid obesity (H)  BMI 40.1, in th setting of HLD, HTN, and DM2 and KATHRINE. did loose weight in TCU, somewhat intentional, but also reports decreased appetite  - dietician to follow at LTC facility       Discharge Medications:  MED REC REQUIRED  Post Medication Reconciliation Status: medication reconcilation previously completed during another office visit    Current Outpatient Medications   Medication Sig Dispense Refill     oxyCODONE (ROXICODONE) 5 MG tablet Take 1-2 tablets (5-10 mg) by mouth every 3 hours as needed for moderate pain. 1 tab po q 3 hrs prn pain scale \"3-6\"  2 tabs po q 3 hrs prn pain scale \"7-10\" 30 tablet 0     acetaminophen (TYLENOL) 325 MG tablet Take 2 tablets (650 mg) by mouth every 6 hours.       albuterol (PROAIR HFA/PROVENTIL HFA/VENTOLIN HFA) 108 (90 Base) MCG/ACT inhaler Inhale 2 puffs into the lungs every 6 hours as needed for wheezing 6.7 g 3     amLODIPine (NORVASC) 10 MG tablet Take 1 tablet (10 mg) by mouth daily 90 tablet 3     atorvastatin (LIPITOR) 40 MG tablet Take 1 tablet (40 mg) by mouth daily 90 tablet 3     Continuous Glucose Sensor (FREESTYLE SANDRA 14 DAY SENSOR) MISC Change every 14 days. 2 each 5     Continuous Glucose Sensor (FREESTYLE SANDRA 3 SENSOR) MISC 1 each every 14 days Use 1 sensor every 14 days. Use to read blood sugars per 's instructions. 6 each 5     enoxaparin ANTICOAGULANT (LOVENOX) 40 MG/0.4ML syringe Inject 0.4 " "mLs (40 mg) subcutaneously every 12 hours for 28 days.       gabapentin (NEURONTIN) 300 MG capsule Take 1 capsule (300 mg) by mouth 2 times daily.       hydrOXYzine HCl (ATARAX) 25 MG tablet Take 1 tablet (25 mg) by mouth every 6 hours as needed for other (adjuvant pain).       ibuprofen (ADVIL/MOTRIN) 200 MG tablet Take 200-400 mg by mouth every 4 hours as needed for mild pain.       insulin aspart (NOVOLOG FLEXPEN) 100 UNIT/ML pen For Pre-Meal  - 189 give 1 unit.  For Pre-Meal  - 239 give 2 units.  For Pre-Meal  - 289 give 3 units.  For Pre-Meal  - 339 give 4 units.  For Pre-Meal - 389 give 5 units.  For Pre-Meal -439 give 6 units For Pre-Meal BG greater than or equal to 440 give 7 units.  For BEDTIME: For  - 249 give 1 unit.  For  - 299 give 2 units.  For  - 349 give 3 units.  For  - 399 give 4 units.  For BG greater than or equal to 400 give 5 units.       insulin glargine (LANTUS PEN) 100 UNIT/ML pen Inject 33 Units subcutaneously every morning (before breakfast).       insulin pen needle (BD PEN NEEDLE TWYLA 2ND GEN) 32G X 4 MM miscellaneous USE TWICE DAILY OR AS DIRECTED 200 each 2     irbesartan (AVAPRO) 300 MG tablet TAKE 1 TABLET(300 MG) BY MOUTH AT BEDTIME 90 tablet 2     Lidocaine (LIDOCARE) 4 % Patch Place 1 patch onto the skin every 24 hours. On for 12 hours, off for 12 hours.       metFORMIN (GLUCOPHAGE) 1000 MG tablet TAKE 1 TABLET BY MOUTH TWICE DAILY WITH MEALS 180 tablet 1     Misc. Devices (ROLLATOR ULTRA-LIGHT) MISC Extra side walker with front wheels for home use.  Purchase, lifetime need. Extra wide rolling walker (\"Walker 4\", item #:  [id number]) needed for safe transfers and ambulation.  Pt weight is 335 lbs.       montelukast (SINGULAIR) 10 MG tablet Take 1 tablet (10 mg) by mouth at bedtime 90 tablet 3     Semaglutide, 2 MG/DOSE, (OZEMPIC, 2 MG/DOSE,) 8 MG/3ML pen Inject 2 mg subcutaneously every 7 days 9 mL 2     senna-docusate " "(SENOKOT-S/PERICOLACE) 8.6-50 MG tablet Take 1 tablet by mouth 2 times daily as needed for constipation.       sertraline (ZOLOFT) 100 MG tablet Take 1.5 tablets (150 mg) by mouth daily 135 tablet 3     Controlled medications:   Medication: oxycodone , ~30 tabs given to patient at the time of discharge to take home     Past Medical History:   Past Medical History:   Diagnosis Date     Asthma      Cerebral infarction (H)      Diabetes (H)      Hypertension      Mixed hyperlipidemia      Morbid obesity (H)      KATHRINE (obstructive sleep apnea)      Osteoarthritis      Physical Exam:   Vitals: BP (!) 154/81   Pulse 99   Temp 97.8  F (36.6  C)   Resp 16   Ht 1.626 m (5' 4\")   Wt 108 kg (238 lb)   LMP  (LMP Unknown)   SpO2 (!) 91%   BMI 40.85 kg/m    BMI: Body mass index is 40.85 kg/m .  GENERAL APPEARANCE:  Alert, in no distress, cooperative,   RESP:  lungs clear to auscultation , no respiratory distress   CV:  regular rate and rhythm, no murmur, rub, or gallop, 1-2+ BLE edema  ABDOMEN:  bowel sounds normal,   M/S:   crepitus, decreased ROM to left knee, decreased ROM to right hip  NEURO:   Cn 2-12 grossly intact,   PSYCH:  oriented X 3, affect and mood normal, mild anxiety at times      SNF Labs: Recent labs in [facility] reviewed by me today.     DISCHARGE PLAN:  Follow up labs: No labs orders/due  Medical Follow Up:   Follow up with primary care provider in 2 weeks  Follow up with specialist oncology and radiation oncology as scheduled    Follow-up with ortho on 4/11  [facility] scheduled appointments: None.   Discharge Services: PT/OT through LTC facility   Discharge Instructions Verbalized to Patient at Discharge:   Weight bearing restrictions:  Partial weight bearing (30 - 50%) RLE  Wound care: continue current cares to coccyx wound.   24-hour supervision is recommended for safety.     TOTAL DISCHARGE TIME: Greater than 30 minutes  Electronically signed by:  RONNELL Dowling " CNP        Sincerely,        Zabrina Alonso APRN CNP    Electronically signed

## 2025-03-31 NOTE — PROGRESS NOTES
[facility] GERIATRICS DISCHARGE SUMMARY  Patient Name: Brissa Mott  YOB: 1970  [place] Medical Record Number: [id number]  [facility][id number]    PRIMARY CARE PROVIDER AND CLINIC RESPONSIBLE AFTER TRANSFER: RONNELL Bianchi CNP, [address] [place] [address] / [facility] [address]; [facility] Provider     Transferring providers: RONNELL Mckenzie CNP; Dereje Burk MD  Recent Hospitalization/ED: [facility] Hospital stay 2/20/25 to 3/5/25.  Date of SNF Admission: March 05, 2025  Date of SNF (anticipated) Discharge:  ~4/2/25  Discharged to: new skilled nursing facility [facility]  Cognitive Scores: BIMS: 15/15  Physical Function: Wheelchair dependent and Mechanical lift dependent for transfers  DME: Wheelchair    CODE STATUS/ADVANCE DIRECTIVES DISCUSSION: Full Code  ALLERGIES: Sulfa antibiotics, Bydureon [exenatide], and Penicillins    NURSING FACILITY COURSE:  Medication Changes/Rationale:   Increased glargine to 33 units due to elevated BG'      Summary of nursing facility stay:   Brief Hospital Course: PMH of obesity, HTN, HLD, CVA, osteoarthritis, DM2, who presented with sever hypercalcemia, pathological right femur fracture. Abdominal/Pelvic CT showed complex right adnexal cystic mass and multiple pulmonary nodules. Calcium improved with IVF and zoledronic acid. Underwent ORIF on 2/25. Biopsy returned  differentiated neuroendocrine carcinoma. Referral placed to oncology clinic. Was started on glargine and SSI,  Humulin 70/30 discontinued. Started on gabapentin for pain. When medically stable was discharged to TCU for further rehab and medical management.     Pathological fracture of right femur due to neoplastic disease with routine healing, subsequent encounter  S/P ORIF (open reduction internal fixation) fracture  Physical deconditioning  Surgery without complication. Had follow-up with ortho on  "3/17, advanced to 50% weight bearing.  Unfortunately remain unable to stand. EZ stand for transfers, pain/weakness in left knee more limiting that right leg pain. LTC recommended due to care needs.   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 Saw oncology and radiation oncology on 3/21, planning for radiation therapy, biopsy of left axillary node.   - supportive cares   - biopsy 4/14,   - CT scan on 4/3    Type 2 diabetes mellitus with hyperglycemia, with long-term current use of insulin (H)  A1C 6.8%, Humulin 70/30 changes to glargine while IP. -260, glargine increased again today.   - insulin glargine (LANTUS PEN) 100 UNIT/ML pen; Inject 33 Units subcutaneously every morning (before breakfast).  - sliding scale insulin TID with meals   - continue metformin BID, Ozempic weekly.     Secondary hypertension  -150  - continue  irbesartan, amlodipine; monitor and adjust    Pressure injury of sacral region, unstageable (H)  In the setting of limited mobility. Followed by in house wound nurse  - continue current wound care     Hypercalcemia  Likely due to underlying malignancy, treated with zoledronic acid. Stable at recheck in TCU     Uncomplicated asthma, unspecified asthma severity, unspecified whether persistent  Compensated, no concern  - albuterol MDI PRN. Singulair " "    KATHRINE (obstructive sleep apnea)  - CPAP at home settings     Adjustment disorder with depressed mood  Anxious at time, new cancer DX  - continue hydroxyzine PRN, sertraline  - may benefit from referral in house psych at LT facility (not available a TCU)    Hemiplegia and hemiparesis following cerebral infarction affecting left non-dominant side (H)  Hyperlipidemia, unspecified hyperlipidemia type  CVA 6 years ago, minimal residual  - continue atorvastatin    Morbid obesity (H)  BMI 40.1, in th setting of HLD, HTN, and DM2 and KATHRINE. did loose weight in TCU, somewhat intentional, but also reports decreased appetite  - dietician to follow at LTC facility       Discharge Medications:  MED REC REQUIRED  Post Medication Reconciliation Status: medication reconcilation previously completed during another office visit    Current Outpatient Medications   Medication Sig Dispense Refill    oxyCODONE (ROXICODONE) 5 MG tablet Take 1-2 tablets (5-10 mg) by mouth every 3 hours as needed for moderate pain. 1 tab po q 3 hrs prn pain scale \"3-6\"  2 tabs po q 3 hrs prn pain scale \"7-10\" 30 tablet 0    acetaminophen (TYLENOL) 325 MG tablet Take 2 tablets (650 mg) by mouth every 6 hours.      albuterol (PROAIR HFA/PROVENTIL HFA/VENTOLIN HFA) 108 (90 Base) MCG/ACT inhaler Inhale 2 puffs into the lungs every 6 hours as needed for wheezing 6.7 g 3    amLODIPine (NORVASC) 10 MG tablet Take 1 tablet (10 mg) by mouth daily 90 tablet 3    atorvastatin (LIPITOR) 40 MG tablet Take 1 tablet (40 mg) by mouth daily 90 tablet 3    Continuous Glucose Sensor (FREESTYLE SANDRA 14 DAY SENSOR) MISC Change every 14 days. 2 each 5    Continuous Glucose Sensor (FREESTYLE SANDRA 3 SENSOR) MISC 1 each every 14 days Use 1 sensor every 14 days. Use to read blood sugars per 's instructions. 6 each 5    enoxaparin ANTICOAGULANT (LOVENOX) 40 MG/0.4ML syringe Inject 0.4 mLs (40 mg) subcutaneously every 12 hours for 28 days.      gabapentin (NEURONTIN) " "300 MG capsule Take 1 capsule (300 mg) by mouth 2 times daily.      hydrOXYzine HCl (ATARAX) 25 MG tablet Take 1 tablet (25 mg) by mouth every 6 hours as needed for other (adjuvant pain).      ibuprofen (ADVIL/MOTRIN) 200 MG tablet Take 200-400 mg by mouth every 4 hours as needed for mild pain.      insulin aspart (NOVOLOG FLEXPEN) 100 UNIT/ML pen For Pre-Meal  - 189 give 1 unit.  For Pre-Meal  - 239 give 2 units.  For Pre-Meal  - 289 give 3 units.  For Pre-Meal  - 339 give 4 units.  For Pre-Meal - 389 give 5 units.  For Pre-Meal -439 give 6 units For Pre-Meal BG greater than or equal to 440 give 7 units.  For BEDTIME: For  - 249 give 1 unit.  For  - 299 give 2 units.  For  - 349 give 3 units.  For  - 399 give 4 units.  For BG greater than or equal to 400 give 5 units.      insulin glargine (LANTUS PEN) 100 UNIT/ML pen Inject 33 Units subcutaneously every morning (before breakfast).      insulin pen needle (BD PEN NEEDLE TWYLA 2ND GEN) 32G X 4 MM miscellaneous USE TWICE DAILY OR AS DIRECTED 200 each 2    irbesartan (AVAPRO) 300 MG tablet TAKE 1 TABLET(300 MG) BY MOUTH AT BEDTIME 90 tablet 2    Lidocaine (LIDOCARE) 4 % Patch Place 1 patch onto the skin every 24 hours. On for 12 hours, off for 12 hours.      metFORMIN (GLUCOPHAGE) 1000 MG tablet TAKE 1 TABLET BY MOUTH TWICE DAILY WITH MEALS 180 tablet 1    Misc. Devices (ROLLATOR ULTRA-LIGHT) MISC Extra side walker with front wheels for home use.  Purchase, lifetime need. Extra wide rolling walker (\"Walker 4\", item #:  [id number]) needed for safe transfers and ambulation.  Pt weight is 335 lbs.      montelukast (SINGULAIR) 10 MG tablet Take 1 tablet (10 mg) by mouth at bedtime 90 tablet 3    Semaglutide, 2 MG/DOSE, (OZEMPIC, 2 MG/DOSE,) 8 MG/3ML pen Inject 2 mg subcutaneously every 7 days 9 mL 2    senna-docusate (SENOKOT-S/PERICOLACE) 8.6-50 MG tablet Take 1 tablet by mouth 2 times daily as needed for " "constipation.      sertraline (ZOLOFT) 100 MG tablet Take 1.5 tablets (150 mg) by mouth daily 135 tablet 3     Controlled medications:   Medication: oxycodone , ~30 tabs given to patient at the time of discharge to take home     Past Medical History:   Past Medical History:   Diagnosis Date    Asthma     Cerebral infarction (H)     Diabetes (H)     Hypertension     Mixed hyperlipidemia     Morbid obesity (H)     KATHRINE (obstructive sleep apnea)     Osteoarthritis      Physical Exam:   Vitals: BP (!) 154/81   Pulse 99   Temp 97.8  F (36.6  C)   Resp 16   Ht 1.626 m (5' 4\")   Wt 108 kg (238 lb)   LMP  (LMP Unknown)   SpO2 (!) 91%   BMI 40.85 kg/m    BMI: Body mass index is 40.85 kg/m .  GENERAL APPEARANCE:  Alert, in no distress, cooperative,   RESP:  lungs clear to auscultation , no respiratory distress   CV:  regular rate and rhythm, no murmur, rub, or gallop, 1-2+ BLE edema  ABDOMEN:  bowel sounds normal,   M/S:   crepitus, decreased ROM to left knee, decreased ROM to right hip  NEURO:   Cn 2-12 grossly intact,   PSYCH:  oriented X 3, affect and mood normal, mild anxiety at times      SNF Labs: Recent labs in [facility] reviewed by me today.     DISCHARGE PLAN:  Follow up labs: No labs orders/due  Medical Follow Up:   Follow up with primary care provider in 2 weeks  Follow up with specialist oncology and radiation oncology as scheduled    Follow-up with ortho on 4/11  [facility] scheduled appointments: None.   Discharge Services: PT/OT through LTC facility   Discharge Instructions Verbalized to Patient at Discharge:   Weight bearing restrictions:  Partial weight bearing (30 - 50%) RLE  Wound care: continue current cares to coccyx wound.   24-hour supervision is recommended for safety.     TOTAL DISCHARGE TIME: Greater than 30 minutes  Electronically signed by:  RONNELL Dowling CNP      "

## 2025-04-03 ENCOUNTER — OFFICE VISIT (OUTPATIENT)
Dept: RADIATION THERAPY | Facility: OUTPATIENT CENTER | Age: 55
End: 2025-04-03
Payer: COMMERCIAL

## 2025-04-03 ENCOUNTER — PATIENT OUTREACH (OUTPATIENT)
Dept: CARE COORDINATION | Facility: CLINIC | Age: 55
End: 2025-04-03
Payer: COMMERCIAL

## 2025-04-03 DIAGNOSIS — M25.562 LEFT KNEE PAIN: Primary | ICD-10-CM

## 2025-04-03 NOTE — PROGRESS NOTES
Clinic Care Coordination Contact  Care Coordination Clinician Chart Review    Situation: Patient chart reviewed by care coordinator.    Background: Clinic Care Coordination Referral received from inpatient care team for transition handoff communication following hospital admission.    Assessment: Upon chart review, patient is not a candidate for Primary Care Clinic Care Coordination enrollment due to reason stated below:  Patient transitioned to Long Term Care. [facility]    Plan/Recommendations: Clinic Care Coordination Referral/order cancelled. RN/SW CC will perform no further monitoring/outreaches at this time and will remain available as needed. If new needs arise, a new Care Coordination Referral may be placed.    [facility]   Mary Ochoa RN, Care Coordinator   [facility]'s   E-mail [email or web address]   [phone]

## 2025-04-03 NOTE — LETTER
4/3/2025      Brissa Mott  [address] [place] [facility] [address]      Dear Colleague,    Thank you for referring your patient, Brissa Mott, to the [facility] RAD[facility]ATION [facility]Y [facility]. Please see a copy of my visit note below.    Patient was unable to undergo CT simulation today as she is currently in the transitional care unit with current plan to transition to long-term facility prior to initiation of her cancer directed therapy.  Social work helping to weigh in to discuss options as well.    Bear Miller M.D.  Department of Radiation Oncology  [facility]       Again, thank you for allowing me to participate in the care of your patient.        Sincerely,        Bear Miller MD    Electronically signed

## 2025-04-03 NOTE — PROGRESS NOTES
Patient was unable to undergo CT simulation today as she is currently in the transitional care unit with current plan to transition to long-term facility prior to initiation of her cancer directed therapy.  Social work helping to weigh in to discuss options as well.    Bear Miller M.D.  Department of Radiation Oncology  [facility]

## 2025-04-07 ENCOUNTER — TRANSITIONAL CARE UNIT VISIT (OUTPATIENT)
Dept: GERIATRICS | Facility: CLINIC | Age: 55
End: 2025-04-07
Payer: COMMERCIAL

## 2025-04-07 VITALS
HEART RATE: 99 BPM | OXYGEN SATURATION: 90 % | TEMPERATURE: 97.8 F | DIASTOLIC BLOOD PRESSURE: 84 MMHG | SYSTOLIC BLOOD PRESSURE: 151 MMHG | BODY MASS INDEX: 40.51 KG/M2 | WEIGHT: 237.3 LBS | RESPIRATION RATE: 16 BRPM | HEIGHT: 64 IN

## 2025-04-07 DIAGNOSIS — C7B.8 NEUROENDOCRINE CARCINOMA METASTATIC TO BONE (H): ICD-10-CM

## 2025-04-07 DIAGNOSIS — I69.354 HEMIPLEGIA AND HEMIPARESIS FOLLOWING CEREBRAL INFARCTION AFFECTING LEFT NON-DOMINANT SIDE (H): ICD-10-CM

## 2025-04-07 DIAGNOSIS — E66.01 MORBID OBESITY (H): ICD-10-CM

## 2025-04-07 DIAGNOSIS — L89.150 PRESSURE INJURY OF SACRAL REGION, UNSTAGEABLE (H): ICD-10-CM

## 2025-04-07 DIAGNOSIS — J45.909 UNCOMPLICATED ASTHMA, UNSPECIFIED ASTHMA SEVERITY, UNSPECIFIED WHETHER PERSISTENT: ICD-10-CM

## 2025-04-07 DIAGNOSIS — C78.01 MALIGNANT NEOPLASM METASTATIC TO BOTH LUNGS (H): ICD-10-CM

## 2025-04-07 DIAGNOSIS — Z98.890 S/P ORIF (OPEN REDUCTION INTERNAL FIXATION) FRACTURE: Primary | ICD-10-CM

## 2025-04-07 DIAGNOSIS — R53.81 PHYSICAL DECONDITIONING: ICD-10-CM

## 2025-04-07 DIAGNOSIS — M84.551D PATHOLOGICAL FRACTURE OF RIGHT FEMUR DUE TO NEOPLASTIC DISEASE WITH ROUTINE HEALING, SUBSEQUENT ENCOUNTER: ICD-10-CM

## 2025-04-07 DIAGNOSIS — N83.8 COMPLEX CYST OF UTERINE ADNEXA: ICD-10-CM

## 2025-04-07 DIAGNOSIS — F43.21 ADJUSTMENT DISORDER WITH DEPRESSED MOOD: ICD-10-CM

## 2025-04-07 DIAGNOSIS — G89.29 CHRONIC PAIN OF LEFT KNEE: ICD-10-CM

## 2025-04-07 DIAGNOSIS — C7A.8 NEUROENDOCRINE CARCINOMA METASTATIC TO BONE (H): ICD-10-CM

## 2025-04-07 DIAGNOSIS — E83.52 HYPERCALCEMIA: ICD-10-CM

## 2025-04-07 DIAGNOSIS — E11.65 TYPE 2 DIABETES MELLITUS WITH HYPERGLYCEMIA, WITH LONG-TERM CURRENT USE OF INSULIN (H): ICD-10-CM

## 2025-04-07 DIAGNOSIS — E78.5 HYPERLIPIDEMIA, UNSPECIFIED HYPERLIPIDEMIA TYPE: ICD-10-CM

## 2025-04-07 DIAGNOSIS — C79.51 MALIGNANT NEOPLASM METASTATIC TO BONE (H): ICD-10-CM

## 2025-04-07 DIAGNOSIS — I15.9 SECONDARY HYPERTENSION: ICD-10-CM

## 2025-04-07 DIAGNOSIS — M25.562 CHRONIC PAIN OF LEFT KNEE: ICD-10-CM

## 2025-04-07 DIAGNOSIS — C78.02 MALIGNANT NEOPLASM METASTATIC TO BOTH LUNGS (H): ICD-10-CM

## 2025-04-07 DIAGNOSIS — Z87.81 S/P ORIF (OPEN REDUCTION INTERNAL FIXATION) FRACTURE: Primary | ICD-10-CM

## 2025-04-07 DIAGNOSIS — G47.33 OSA (OBSTRUCTIVE SLEEP APNEA): ICD-10-CM

## 2025-04-07 DIAGNOSIS — Z79.4 TYPE 2 DIABETES MELLITUS WITH HYPERGLYCEMIA, WITH LONG-TERM CURRENT USE OF INSULIN (H): ICD-10-CM

## 2025-04-07 PROCEDURE — 99315 NF DSCHRG MGMT 30 MIN/LESS: CPT | Performed by: NURSE PRACTITIONER

## 2025-04-07 RX ORDER — HYDROXYZINE HYDROCHLORIDE 25 MG/1
25 TABLET, FILM COATED ORAL EVERY 6 HOURS PRN
Qty: 90 TABLET | Refills: 0 | Status: SHIPPED | OUTPATIENT
Start: 2025-04-07

## 2025-04-07 RX ORDER — GABAPENTIN 300 MG/1
300 CAPSULE ORAL 2 TIMES DAILY
Qty: 60 CAPSULE | Refills: 0 | Status: SHIPPED | OUTPATIENT
Start: 2025-04-07

## 2025-04-07 RX ORDER — OXYCODONE HYDROCHLORIDE 5 MG/1
5-10 TABLET ORAL
Qty: 40 TABLET | Refills: 0 | Status: SHIPPED | OUTPATIENT
Start: 2025-04-07

## 2025-04-07 NOTE — PROGRESS NOTES
[facility] GERIATRICS DISCHARGE SUMMARY  Patient Name: Brissa Mott  YOB: 1970  [place] Medical Record Number: [id number]  Place of Service Where Encounter Took Place: [facility] ([facility]) [[id number]]    PRIMARY CARE PROVIDER AND CLINIC RESPONSIBLE AFTER TRANSFER: RONNELL Bianchi CNP, [address] [place] [address] / [facility] [id number]; [facility] Provider     Transferring providers: RONNELL Mckenzie CNP; Dereje Burk MD  Recent Hospitalization/ED: [facility] Hospital stay 2/20/25 to 3/5/25.  Date of [facility] Admission: March 05, 2025  Date of SNF (anticipated) Discharge: April 08, 2025  Discharged to: previous independent home  Cognitive Scores: BIMS: 15/15  Physical Function: Wheelchair dependent, Mechanical lift dependent for transfers, and Pivot transfers  DME: Wheelchair, Hospital Bed, Walker, and EZ stand    CODE STATUS/ADVANCE DIRECTIVES DISCUSSION: Full Code   ALLERGIES: Sulfa antibiotics, Bydureon [exenatide], and Penicillins    NURSING FACILITY COURSE:  Medication Changes/Rationale:   Discontinued Novolog sliding scale insulin as not used at home     Summary of nursing facility stay:   Brief Hospital Course: PMH of obesity, HTN, HLD, CVA, osteoarthritis, DM2, who presented with sever hypercalcemia, pathological right femur fracture. Abdominal/Pelvic CT showed complex right adnexal cystic mass and multiple pulmonary nodules. Calcium improved with IVF and zoledronic acid. Underwent ORIF on 2/25. Biopsy returned differentiated neuroendocrine carcinoma. Referral placed to oncology clinic. Was started on glargine and SSI, Humulin 70/30 discontinued. Started on gabapentin for pain. When medically stable was discharged to TCU for further rehab and medical management.     Pathological fracture of right femur due to neoplastic disease with routine healing, subsequent encounter  S/P ORIF (open reduction internal fixation) fracture  Physical deconditioning  Surgery  "without complication. Had follow-up with ortho on 3/17, advanced to 50% weight bearing.  Unfortunately remain unable to stand. EZ stand for transfers, pain/weakness in left knee more limiting that right leg pain. LTC recommended due to care needs, unfortunately unable to find LTC placement, so family opting to bring home so she can start cancer treatment. She is MA pending. Hospital bed and EZ stand ordered, has wheelchair. Getting commode.   - DVT ppx with Lovenox x 6 weeks (4/17)  - 50% weight bearing to RLE  - follow-up with ortho as scheduled on 4/10  - oxyCODONE (ROXICODONE) 5 MG tablet; Take 1-2 tablets (5-10 mg) by mouth every 3 hours as needed for moderate pain. 1 tab po q 3 hrs prn pain scale \"3-6\"  2 tabs po q 3 hrs prn pain scale \"7-10\"  - naloxone (NARCAN) 4 MG/0.1ML nasal spray; Spray 1 spray (4 mg) into one nostril alternating nostrils as needed for opioid reversal. every 2-3 minutes until assistance arrives  - gabapentin (NEURONTIN) 300 MG capsule; Take 1 capsule (300 mg) by mouth 2 times daily.  - hydrOXYzine HCl (ATARAX) 25 MG tablet; Take 1 tablet (25 mg) by mouth every 6 hours as needed for other (adjuvant pain).  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 Saw oncology and radiation oncology on 3/21, planning for radiation therapy, biopsy of left axillary node.   - supportive cares   - biopsy and CT 4/14,   - Home Care Referral    Type 2 diabetes mellitus with hyperglycemia, with long-term current use of insulin " (H)  A1C 6.8%, Humulin 70/30 changes to glargine while IP. -1260  - insulin glargine (LANTUS PEN) 100 UNIT/ML pen; Inject 33 Units subcutaneously every morning; she is planning to resume PTA 70/30 insulin when she goes back home, but will send home with glargine pen so she can use that up if she desires and then resume previous insulin.   - sliding scale insulin TID with meals   - continue metformin BID, Ozempic weekly.     Secondary hypertension  -150  - continue  irbesartan, amlodipine; monitor and adjust    Pressure injury of sacral region, unstageable (H)  In the setting of limited mobility. Followed by in house wound nurse  - continue current wound care   - Home Care Referral    Hypercalcemia  Likely due to underlying malignancy, treated with zoledronic acid. Stable at recheck in TCU     Uncomplicated asthma, unspecified asthma severity, unspecified whether persistent  Likely due to underlying malignancy, treated with zoledronic acid. Stable at recheck in TCU     KATHRINE (obstructive sleep apnea)  - CPAP at home settgins    Adjustment disorder with depressed mood  Anxious at time, new cancer DX  - continue hydroxyzine PRN, sertraline     Hemiplegia and hemiparesis following cerebral infarction affecting left non-dominant side (H)  Hyperlipidemia, unspecified hyperlipidemia type   CVA 6 years ago, minimal residual  - continue atorvastatin    Morbid obesity (H)  BMI 40.1, in th setting of HLD, HTN, and DM2 and KATHRINE. did loose weight in TCU, somewhat intentional, but also reports decreased appetite.      Discharge Medications:  MED REC REQUIRED  Post Medication Reconciliation Status: medication reconcilation previously completed during another office visit    Current Outpatient Medications   Medication Sig Dispense Refill    acetaminophen (TYLENOL) 325 MG tablet Take 2 tablets (650 mg) by mouth every 6 hours.      albuterol (PROAIR HFA/PROVENTIL HFA/VENTOLIN HFA) 108 (90 Base) MCG/ACT inhaler Inhale 2 puffs  "into the lungs every 6 hours as needed for wheezing 6.7 g 3    amLODIPine (NORVASC) 10 MG tablet Take 1 tablet (10 mg) by mouth daily 90 tablet 3    atorvastatin (LIPITOR) 40 MG tablet Take 1 tablet (40 mg) by mouth daily 90 tablet 3    Continuous Glucose Sensor (FREESTYLE SANDRA 14 DAY SENSOR) [facility] Change every 14 days. 2 each 5    Continuous Glucose Sensor (FREESTYLE SANDRA 3 SENSOR) MISC 1 each every 14 days Use 1 sensor every 14 days. Use to read blood sugars per 's instructions. 6 each 5    enoxaparin ANTICOAGULANT (LOVENOX) 40 MG/0.4ML syringe Inject 0.4 mLs (40 mg) subcutaneously every 12 hours for 28 days.      gabapentin (NEURONTIN) 300 MG capsule Take 1 capsule (300 mg) by mouth 2 times daily.      hydrOXYzine HCl (ATARAX) 25 MG tablet Take 1 tablet (25 mg) by mouth every 6 hours as needed for other (adjuvant pain).      ibuprofen (ADVIL/MOTRIN) 200 MG tablet Take 200-400 mg by mouth every 4 hours as needed for mild pain.      insulin glargine (LANTUS PEN) 100 UNIT/ML pen Inject 33 Units subcutaneously every morning (before breakfast).      insulin pen needle (BD PEN NEEDLE TWYLA 2ND GEN) 32G X 4 MM miscellaneous USE TWICE DAILY OR AS DIRECTED 200 each 2    irbesartan (AVAPRO) 300 MG tablet TAKE 1 TABLET(300 MG) BY MOUTH AT BEDTIME 90 tablet 2    Lidocaine (LIDOCARE) 4 % Patch Place 1 patch onto the skin every 24 hours. On for 12 hours, off for 12 hours.      metFORMIN (GLUCOPHAGE) 1000 MG tablet TAKE 1 TABLET BY MOUTH TWICE DAILY WITH MEALS 180 tablet 1    Misc. Devices (ROLLATOR ULTRA-LIGHT) MISC Extra side walker with front wheels for home use.  Purchase, lifetime need. Extra wide rolling walker (\"Walker 4\", item #:  [id number]) needed for safe transfers and ambulation.  Pt weight is 335 lbs.      montelukast (SINGULAIR) 10 MG tablet Take 1 tablet (10 mg) by mouth at bedtime 90 tablet 3    oxyCODONE (ROXICODONE) 5 MG tablet Take 1-2 tablets (5-10 mg) by mouth every 3 hours as needed for moderate " "pain. 1 tab po q 3 hrs prn pain scale \"3-6\"  2 tabs po q 3 hrs prn pain scale \"7-10\" 30 tablet 0    Semaglutide, 2 MG/DOSE, (OZEMPIC, 2 MG/DOSE,) 8 MG/3ML pen Inject 2 mg subcutaneously every 7 days 9 mL 2    senna-docusate (SENOKOT-S/PERICOLACE) 8.6-50 MG tablet Take 1 tablet by mouth 2 times daily as needed for constipation.      sertraline (ZOLOFT) 100 MG tablet Take 1.5 tablets (150 mg) by mouth daily 135 tablet 3     Controlled medications:   Medication: oxycodone , ~10 tabs given to patient at the time of discharge to take home  Script for oxycodone medication for 40 tabs and 0 refills given to patient at discharge to have them fill at their out patient pharmacy     Past Medical History:   Past Medical History:   Diagnosis Date    Asthma     Cerebral infarction (H)     Diabetes (H)     Hypertension     Mixed hyperlipidemia     Morbid obesity (H)     KATHRINE (obstructive sleep apnea)     Osteoarthritis      Physical Exam:   Vitals: BP (!) 151/84   Pulse 99   Temp 97.8  F (36.6  C)   Resp 16   Ht 1.626 m (5' 4\")   Wt 107.6 kg (237 lb 4.8 oz)   LMP  (LMP Unknown)   SpO2 (!) 90%   BMI 40.73 kg/m    BMI: Body mass index is 40.73 kg/m .  GENERAL APPEARANCE:  Alert, in no distress, cooperative,   RESP:  lungs clear to auscultation , no respiratory distress   CV:  regular rate and rhythm, no murmur, rub, or gallop, no edema  ABDOMEN:  bowel sounds normal,   M/S:   decreased ROM to right hip,   NEURO:   Cn 2-12 grossly intact,   PSYCH:  oriented X 3, affect and mood mildly anxious      SNF Labs: Recent labs in [facility] reviewed by me today.     DISCHARGE PLAN:  Follow up labs: No labs orders/due  Medical Follow Up:   Follow up with primary care provider in 2 weeks  Follow up with specialist oncology as scheduled   Current [place] scheduled appointments: None.   Discharge Services: Home Care: Physical Therapy, Occupational Therapy, Registered Nurse, Home Health Aide, . From: [facility].  Discharge Instructions Verbalized to Patient at Discharge:   Monitor blood glucose monitoring 2 times a day. Keep a record and bring it to your next primary provider visit.   Driving is not recommended until cleared by PCP to resume.   24-hour supervision is recommended for safety.   Continue current wound cares to coccyx      Electronically signed by:  RONNELL Dowling CNP    Home care Face to Face documentation done in [facility] attached to Home care orders for [facility].

## 2025-04-07 NOTE — LETTER
4/7/2025      Brissa Mott  [address]  [place] [address]        [facility] GERIATRICS DISCHARGE SUMMARY  Patient Name: Brissa Mott  YOB: 1970  [place] Medical Record Number: [id number]  Place of Service Where Encounter Took Place: [facility] TCU - [facility] (SNF) [[id number]]    PRIMARY CARE PROVIDER AND CLINIC RESPONSIBLE AFTER TRANSFER: Juli Ramsey, RONNELL CNP, [address] [place] [address] [place] [address]; [facility] Provider     Transferring providers: RONNELL Mckenzie CNP; Dereje Burk MD  Recent Hospitalization/ED: [facility] Hospital stay 2/20/25 to 3/5/25.  Date of SNF Admission: March 05, 2025  Date of SNF (anticipated) Discharge: April 08, 2025  Discharged to: previous independent home  Cognitive Scores: BIMS: 15/15  Physical Function: Wheelchair dependent, Mechanical lift dependent for transfers, and Pivot transfers  DME: Wheelchair, Hospital Bed, Walker, and EZ stand    CODE STATUS/ADVANCE DIRECTIVES DISCUSSION: Full Code   ALLERGIES: Sulfa antibiotics, Bydureon [exenatide], and Penicillins    NURSING FACILITY COURSE:  Medication Changes/Rationale:   Discontinued Novolog sliding scale insulin as not used at home     Summary of nursing facility stay:   Brief Hospital Course: PMH of obesity, HTN, HLD, CVA, osteoarthritis, DM2, who presented with sever hypercalcemia, pathological right femur fracture. Abdominal/Pelvic CT showed complex right adnexal cystic mass and multiple pulmonary nodules. Calcium improved with IVF and zoledronic acid. Underwent ORIF on 2/25. Biopsy returned differentiated neuroendocrine carcinoma. Referral placed to oncology clinic. Was started on glargine and SSI, Humulin 70/30 discontinued. Started on gabapentin for pain. When medically stable was discharged to TCU for further rehab and medical management.     Pathological fracture of right femur due to neoplastic disease with routine healing, subsequent encounter  S/P ORIF  "(open reduction internal fixation) fracture  Physical deconditioning  Surgery without complication. Had follow-up with ortho on 3/17, advanced to 50% weight bearing.  Unfortunately remain unable to stand. EZ stand for transfers, pain/weakness in left knee more limiting that right leg pain. LTC recommended due to care needs, unfortunately unable to find LTC placement, so family opting to bring home so she can start cancer treatment. She is MA pending. Hospital bed and EZ stand ordered, has wheelchair. Getting commode.   - DVT ppx with Lovenox x 6 weeks (4/17)  - 50% weight bearing to RLE  - follow-up with ortho as scheduled on 4/10  - oxyCODONE (ROXICODONE) 5 MG tablet; Take 1-2 tablets (5-10 mg) by mouth every 3 hours as needed for moderate pain. 1 tab po q 3 hrs prn pain scale \"3-6\"  2 tabs po q 3 hrs prn pain scale \"7-10\"  - naloxone (NARCAN) 4 MG/0.1ML nasal spray; Spray 1 spray (4 mg) into one nostril alternating nostrils as needed for opioid reversal. every 2-3 minutes until assistance arrives  - gabapentin (NEURONTIN) 300 MG capsule; Take 1 capsule (300 mg) by mouth 2 times daily.  - hydrOXYzine HCl (ATARAX) 25 MG tablet; Take 1 tablet (25 mg) by mouth every 6 hours as needed for other (adjuvant pain).  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 Saw oncology and radiation oncology on 3/21, planning for radiation therapy, biopsy of left axillary node.   - supportive cares   - biopsy and CT 4/14,   - Home Care Referral    Type 2 " diabetes mellitus with hyperglycemia, with long-term current use of insulin (H)  A1C 6.8%, Humulin 70/30 changes to glargine while IP. -1260  - insulin glargine (LANTUS PEN) 100 UNIT/ML pen; Inject 33 Units subcutaneously every morning; she is planning to resume PTA 70/30 insulin when she goes back home, but will send home with glargine pen so she can use that up if she desires and then resume previous insulin.   - sliding scale insulin TID with meals   - continue metformin BID, Ozempic weekly.     Secondary hypertension  -150  - continue  irbesartan, amlodipine; monitor and adjust    Pressure injury of sacral region, unstageable (H)  In the setting of limited mobility. Followed by in house wound nurse  - continue current wound care   - Home Care Referral    Hypercalcemia  Likely due to underlying malignancy, treated with zoledronic acid. Stable at recheck in TCU     Uncomplicated asthma, unspecified asthma severity, unspecified whether persistent  Likely due to underlying malignancy, treated with zoledronic acid. Stable at recheck in TCU     KATHRINE (obstructive sleep apnea)  - CPAP at home settgins    Adjustment disorder with depressed mood  Anxious at time, new cancer DX  - continue hydroxyzine PRN, sertraline     Hemiplegia and hemiparesis following cerebral infarction affecting left non-dominant side (H)  Hyperlipidemia, unspecified hyperlipidemia type   CVA 6 years ago, minimal residual  - continue atorvastatin    Morbid obesity (H)  BMI 40.1, in th setting of HLD, HTN, and DM2 and KATHRINE. did loose weight in TCU, somewhat intentional, but also reports decreased appetite.      Discharge Medications:  MED REC REQUIRED  Post Medication Reconciliation Status: medication reconcilation previously completed during another office visit    Current Outpatient Medications   Medication Sig Dispense Refill     acetaminophen (TYLENOL) 325 MG tablet Take 2 tablets (650 mg) by mouth every 6 hours.       albuterol  "(PROAIR HFA/PROVENTIL HFA/VENTOLIN HFA) 108 (90 Base) MCG/ACT inhaler Inhale 2 puffs into the lungs every 6 hours as needed for wheezing 6.7 g 3     amLODIPine (NORVASC) 10 MG tablet Take 1 tablet (10 mg) by mouth daily 90 tablet 3     atorvastatin (LIPITOR) 40 MG tablet Take 1 tablet (40 mg) by mouth daily 90 tablet 3     Continuous Glucose Sensor (FREESTYLE SANDRA 14 DAY SENSOR) MISC Change every 14 days. 2 each 5     Continuous Glucose Sensor (FREESTYLE SANDRA 3 SENSOR) MISC 1 each every 14 days Use 1 sensor every 14 days. Use to read blood sugars per 's instructions. 6 each 5     enoxaparin ANTICOAGULANT (LOVENOX) 40 MG/0.4ML syringe Inject 0.4 mLs (40 mg) subcutaneously every 12 hours for 28 days.       gabapentin (NEURONTIN) 300 MG capsule Take 1 capsule (300 mg) by mouth 2 times daily.       hydrOXYzine HCl (ATARAX) 25 MG tablet Take 1 tablet (25 mg) by mouth every 6 hours as needed for other (adjuvant pain).       ibuprofen (ADVIL/MOTRIN) 200 MG tablet Take 200-400 mg by mouth every 4 hours as needed for mild pain.       insulin glargine (LANTUS PEN) 100 UNIT/ML pen Inject 33 Units subcutaneously every morning (before breakfast).       insulin pen needle (BD PEN NEEDLE TWYLA 2ND GEN) 32G X 4 MM miscellaneous USE TWICE DAILY OR AS DIRECTED 200 each 2     irbesartan (AVAPRO) 300 MG tablet TAKE 1 TABLET(300 MG) BY MOUTH AT BEDTIME 90 tablet 2     Lidocaine (LIDOCARE) 4 % Patch Place 1 patch onto the skin every 24 hours. On for 12 hours, off for 12 hours.       metFORMIN (GLUCOPHAGE) 1000 MG tablet TAKE 1 TABLET BY MOUTH TWICE DAILY WITH MEALS 180 tablet 1     Misc. Devices (ROLLATOR ULTRA-LIGHT) MISC Extra side walker with front wheels for home use.  Purchase, lifetime need. Extra wide rolling walker (\"Walker 4\", item #:  [id number]) needed for safe transfers and ambulation.  Pt weight is 335 lbs.       montelukast (SINGULAIR) 10 MG tablet Take 1 tablet (10 mg) by mouth at bedtime 90 tablet 3     " "oxyCODONE (ROXICODONE) 5 MG tablet Take 1-2 tablets (5-10 mg) by mouth every 3 hours as needed for moderate pain. 1 tab po q 3 hrs prn pain scale \"3-6\"  2 tabs po q 3 hrs prn pain scale \"7-10\" 30 tablet 0     Semaglutide, 2 MG/DOSE, (OZEMPIC, 2 MG/DOSE,) 8 MG/3ML pen Inject 2 mg subcutaneously every 7 days 9 mL 2     senna-docusate (SENOKOT-S/PERICOLACE) 8.6-50 MG tablet Take 1 tablet by mouth 2 times daily as needed for constipation.       sertraline (ZOLOFT) 100 MG tablet Take 1.5 tablets (150 mg) by mouth daily 135 tablet 3     Controlled medications:   Medication: oxycodone , ~10 tabs given to patient at the time of discharge to take home  Script for oxycodone medication for 40 tabs and 0 refills given to patient at discharge to have them fill at their out patient pharmacy     Past Medical History:   Past Medical History:   Diagnosis Date     Asthma      Cerebral infarction (H)      Diabetes (H)      Hypertension      Mixed hyperlipidemia      Morbid obesity (H)      KATHRINE (obstructive sleep apnea)      Osteoarthritis      Physical Exam:   Vitals: BP (!) 151/84   Pulse 99   Temp 97.8  F (36.6  C)   Resp 16   Ht 1.626 m (5' 4\")   Wt 107.6 kg (237 lb 4.8 oz)   LMP  (LMP Unknown)   SpO2 (!) 90%   BMI 40.73 kg/m    BMI: Body mass index is 40.73 kg/m .  GENERAL APPEARANCE:  Alert, in no distress, cooperative,   RESP:  lungs clear to auscultation , no respiratory distress   CV:  regular rate and rhythm, no murmur, rub, or gallop, no edema  ABDOMEN:  bowel sounds normal,   M/S:   decreased ROM to right hip,   NEURO:   Cn 2-12 grossly intact,   PSYCH:  oriented X 3, affect and mood mildly anxious      SNF Labs: Recent labs in [facility] reviewed by me today.     DISCHARGE PLAN:  Follow up labs: No labs orders/due  Medical Follow Up:   Follow up with primary care provider in 2 weeks  Follow up with specialist oncology as scheduled   Current [place] scheduled appointments: None.   Discharge Services: Home Care: Physical " Therapy, Occupational Therapy, Registered Nurse, Home Health Aide, . From: [facility].  Discharge Instructions Verbalized to Patient at Discharge:   Monitor blood glucose monitoring 2 times a day. Keep a record and bring it to your next primary provider visit.   Driving is not recommended until cleared by PCP to resume.   24-hour supervision is recommended for safety.   Continue current wound cares to coccyx      Electronically signed by:  RONNELL Dowling CNP    Home care Face to Face documentation done in [facility] attached to Home care orders for [facility].       Sincerely,        RONNELL Dowling CNP    Electronically signed

## 2025-04-09 ENCOUNTER — PATIENT OUTREACH (OUTPATIENT)
Dept: CARE COORDINATION | Facility: CLINIC | Age: 55
End: 2025-04-09
Payer: COMMERCIAL

## 2025-04-09 DIAGNOSIS — Z09 HOSPITAL DISCHARGE FOLLOW-UP: ICD-10-CM

## 2025-04-09 ASSESSMENT — ACTIVITIES OF DAILY LIVING (ADL): DEPENDENT_IADLS:: CLEANING;COOKING;LAUNDRY;SHOPPING;MEAL PREPARATION;MEDICATION MANAGEMENT;TRANSPORTATION

## 2025-04-09 NOTE — PROGRESS NOTES
Clinic Care Coordination Contact  Clinic Care Coordination Contact  OUTREACH    Referral Information:  Referral Source: IP Handoff    Primary Diagnosis: Orthopedic    Chief Complaint   Patient presents with    Clinic Care Coordination - Post Hospital     Clinic Care Coordination RN         Universal Utilization:   [facility]  Hospitalist Discharge Summary       Date of Admission:  2/20/2025  Date of Discharge:  3/5/2025  Discharging Provider: Abelardo Matthews MD  Discharge Service: Hospitalist Service        Discharge Diagnoses  Pathologic right femur fracture, status-post operative repair this admission     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      Assessment: Patient has transitioned to TCU/ARU for short term rehabilitation:     Facility Name: [facility]  Clinic Utilization  Difficulty keeping appointments:: No  Compliance Concerns: No  No-Show Concerns: No  No PCP office visit in Past Year: No  Utilization      No Show Count (past year)  0             ED Visits  1             Hospital Admissions  1                    Current as of: 4/9/2025 10:32 AM                Clinical Concerns:  Current Medical Concerns:  Patient reports she has a [facility] RN visit today between 2-3:00    Denies any pain with her femur break left knee is painful and uses a ace wrap     Current Behavioral Concerns: No    Education Provided to patient: CC role introduced    Pain  Pain (GOAL):: No  Health Maintenance Reviewed: Not assessed  Clinical Pathway: None    Medication Management:  Medication review status:   Will be reviewed by home RN today at her visit this afternoon      Functional Status:  Dependent ADLs:: Ambulation-walker,  Ambulation-cane  Dependent IADLs:: Cleaning, Cooking, Laundry, Shopping, Meal Preparation, Medication Management, Transportation  Bed or wheelchair confined:: No  Mobility Status: Independent w/Device    Living Situation:  Current living arrangement:: I live in a private home with spouse    Lifestyle & Psychosocial Needs:    Social Drivers of Health     Food Insecurity: Low Risk  (2/20/2025)    Food Insecurity     Within the past 12 months, did you worry that your food would run out before you got money to buy more?: No     Within the past 12 months, did the food you bought just not last and you didn t have money to get more?: No   Depression: At risk (7/19/2024)    PHQ-2     PHQ-2 Score: 3   Housing Stability: Low Risk  (2/20/2025)    Housing Stability     Do you have housing? : Yes     Are you worried about losing your housing?: No   Tobacco Use: Medium Risk (2/25/2025)    Patient History     Smoking Tobacco Use: Former     Smokeless Tobacco Use: Never     Passive Exposure: Not on file   Financial Resource Strain: Low Risk  (2/20/2025)    Financial Resource Strain     Within the past 12 months, have you or your family members you live with been unable to get utilities (heat, electricity) when it was really needed?: No   Alcohol Use: Not on file   Transportation Needs: Low Risk  (2/20/2025)    Transportation Needs     Within the past 12 months, has lack of transportation kept you from medical appointments, getting your medicines, non-medical meetings or appointments, work, or from getting things that you need?: No   Physical Activity: Not on file   Interpersonal Safety: Low Risk  (2/25/2025)    Interpersonal Safety     Do you feel physically and emotionally safe where you currently live?: Yes     Within the past 12 months, have you been hit, slapped, kicked or otherwise physically hurt by someone?: No     Within the past 12 months, have you been humiliated or emotionally abused in other ways by your partner or  ex-partner?: No   Stress: Not on file   Social Connections: Socially Integrated (1/31/2025)    Received from [employer] [facility]    Social Connections     Do you often feel lonely or isolated from those around you?: 0   Health Literacy: Not on file     Diet:: Regular  Inadequate nutrition (GOAL):: No  Tube Feeding: No  Inadequate activity/exercise (GOAL):: No  Significant changes in sleep pattern (GOAL): No  Transportation means:: Family     Christianity or spiritual beliefs that impact treatment:: No  Mental health DX:: Yes  Mental health DX how managed:: Medication  Mental health management concern (GOAL):: No  Chemical Dependency Status: No Current Concerns  Informal Support system:: Spouse           Resources and Interventions:  Current Resources:   Skilled Home Care Services: Skilled Nursing, Home Health Aid, Physical Therapy, Occupational Therapy  Community Resources: Home Care  Supplies Currently Used at Home: Wound Care Supplies  Equipment Currently Used at Home: walker, standard, cane, straight, commode chair            Advance Care Plan/Directive  Advanced Care Plans/Directives on file:: No    Referrals Placed: None      Patient/Caregiver understanding: Expresses good understanding of discharge instructions        Future Appointments                In 5 days [place] [facility]; [facility]    In 5 days Fe Fontenot MD  [facility]    In 6 days Luis Ontiveros MD [facility]            Plan:   Patient will make a hospital follow up with PCP  Patient will start home car services this afternoon   Patient is closely followed by Oncology   No unmet needs, no further care coordination is needed at this time     [facility]   Mary Ochoa RN, Care Coordinator   [facility]'s   E-mail [email or web address]    [phone]

## 2025-04-14 ENCOUNTER — APPOINTMENT (OUTPATIENT)
Dept: CT IMAGING | Facility: CLINIC | Age: 55
End: 2025-04-14
Attending: FAMILY MEDICINE
Payer: COMMERCIAL

## 2025-04-14 ENCOUNTER — HOSPITAL ENCOUNTER (INPATIENT)
Facility: CLINIC | Age: 55
End: 2025-04-14
Attending: FAMILY MEDICINE | Admitting: INTERNAL MEDICINE
Payer: COMMERCIAL

## 2025-04-14 ENCOUNTER — TELEPHONE (OUTPATIENT)
Dept: FAMILY MEDICINE | Facility: CLINIC | Age: 55
End: 2025-04-14

## 2025-04-14 DIAGNOSIS — R53.1 GENERAL WEAKNESS: ICD-10-CM

## 2025-04-14 DIAGNOSIS — A49.9 UTI (URINARY TRACT INFECTION), BACTERIAL: ICD-10-CM

## 2025-04-14 DIAGNOSIS — L30.4 INTERTRIGO: ICD-10-CM

## 2025-04-14 DIAGNOSIS — N39.0 UTI (URINARY TRACT INFECTION), BACTERIAL: ICD-10-CM

## 2025-04-14 DIAGNOSIS — A41.9 SEPSIS, DUE TO UNSPECIFIED ORGANISM, UNSPECIFIED WHETHER ACUTE ORGAN DYSFUNCTION PRESENT (H): ICD-10-CM

## 2025-04-14 DIAGNOSIS — C77.8 CARCINOMA METASTATIC TO LYMPH NODES OF MULTIPLE SITES WITH UNKNOWN PRIMARY SITE (H): Primary | ICD-10-CM

## 2025-04-14 DIAGNOSIS — I26.99 OTHER ACUTE PULMONARY EMBOLISM, UNSPECIFIED WHETHER ACUTE COR PULMONALE PRESENT (H): ICD-10-CM

## 2025-04-14 DIAGNOSIS — Z79.4 TYPE 2 DIABETES MELLITUS WITH HYPERGLYCEMIA, WITH LONG-TERM CURRENT USE OF INSULIN (H): ICD-10-CM

## 2025-04-14 DIAGNOSIS — E83.52 HYPERCALCEMIA OF MALIGNANCY: ICD-10-CM

## 2025-04-14 DIAGNOSIS — Z79.4 TYPE 2 DIABETES MELLITUS WITH COMPLICATION, WITH LONG-TERM CURRENT USE OF INSULIN (H): ICD-10-CM

## 2025-04-14 DIAGNOSIS — E78.5 HYPERLIPIDEMIA, UNSPECIFIED HYPERLIPIDEMIA TYPE: ICD-10-CM

## 2025-04-14 DIAGNOSIS — E11.8 TYPE 2 DIABETES MELLITUS WITH COMPLICATION, WITH LONG-TERM CURRENT USE OF INSULIN (H): ICD-10-CM

## 2025-04-14 DIAGNOSIS — E11.65 TYPE 2 DIABETES MELLITUS WITH HYPERGLYCEMIA, WITH LONG-TERM CURRENT USE OF INSULIN (H): ICD-10-CM

## 2025-04-14 DIAGNOSIS — C80.1 CARCINOMA METASTATIC TO LYMPH NODES OF MULTIPLE SITES WITH UNKNOWN PRIMARY SITE (H): Primary | ICD-10-CM

## 2025-04-14 DIAGNOSIS — E83.42 HYPOMAGNESEMIA: ICD-10-CM

## 2025-04-14 DIAGNOSIS — F41.1 GENERALIZED ANXIETY DISORDER: ICD-10-CM

## 2025-04-14 DIAGNOSIS — M84.551A PATHOLOGICAL FRACTURE OF RIGHT FEMUR DUE TO NEOPLASTIC DISEASE, INITIAL ENCOUNTER (H): ICD-10-CM

## 2025-04-14 PROBLEM — I10 HYPERTENSION: Status: RESOLVED | Noted: 2017-07-17 | Resolved: 2025-04-14

## 2025-04-14 PROBLEM — I15.9 SECONDARY HYPERTENSION: Status: ACTIVE | Noted: 2019-08-22

## 2025-04-14 PROBLEM — G47.33 OSA (OBSTRUCTIVE SLEEP APNEA): Status: ACTIVE | Noted: 2019-08-12

## 2025-04-14 PROBLEM — I69.354 HEMIPLEGIA AND HEMIPARESIS FOLLOWING CEREBRAL INFARCTION AFFECTING LEFT NON-DOMINANT SIDE (H): Status: ACTIVE | Noted: 2022-06-07

## 2025-04-14 LAB
ALBUMIN SERPL BCG-MCNC: 2.7 G/DL (ref 3.5–5.2)
ALBUMIN SERPL BCG-MCNC: 3.2 G/DL (ref 3.5–5.2)
ALBUMIN UR-MCNC: NEGATIVE MG/DL
ALP SERPL-CCNC: 192 U/L (ref 40–150)
ALT SERPL W P-5'-P-CCNC: 14 U/L (ref 0–50)
ANION GAP SERPL CALCULATED.3IONS-SCNC: 10 MMOL/L (ref 7–15)
ANION GAP SERPL CALCULATED.3IONS-SCNC: 12 MMOL/L (ref 7–15)
APPEARANCE UR: CLEAR
AST SERPL W P-5'-P-CCNC: 61 U/L (ref 0–45)
B-OH-BUTYR SERPL-SCNC: <0.18 MMOL/L
BACTERIA #/AREA URNS HPF: ABNORMAL /HPF
BASE EXCESS BLDV CALC-SCNC: 1.6 MMOL/L (ref -3–3)
BASE EXCESS BLDV CALC-SCNC: 5 MMOL/L (ref -3–3)
BASOPHILS # BLD AUTO: 0.1 10E3/UL (ref 0–0.2)
BASOPHILS NFR BLD AUTO: 1 %
BILIRUB SERPL-MCNC: 0.4 MG/DL
BILIRUB UR QL STRIP: NEGATIVE
BUN SERPL-MCNC: 14.2 MG/DL (ref 6–20)
BUN SERPL-MCNC: 19.1 MG/DL (ref 6–20)
CA-I BLD-MCNC: 7.5 MG/DL (ref 4.4–5.2)
CALCIUM SERPL-MCNC: 12.1 MG/DL (ref 8.8–10.4)
CALCIUM SERPL-MCNC: 13.8 MG/DL (ref 8.8–10.4)
CHLORIDE SERPL-SCNC: 100 MMOL/L (ref 98–107)
CHLORIDE SERPL-SCNC: 95 MMOL/L (ref 98–107)
COLOR UR AUTO: YELLOW
CREAT SERPL-MCNC: 0.38 MG/DL (ref 0.51–0.95)
CREAT SERPL-MCNC: 0.39 MG/DL (ref 0.51–0.95)
EGFRCR SERPLBLD CKD-EPI 2021: >90 ML/MIN/1.73M2
EGFRCR SERPLBLD CKD-EPI 2021: >90 ML/MIN/1.73M2
EOSINOPHIL # BLD AUTO: 0.2 10E3/UL (ref 0–0.7)
EOSINOPHIL NFR BLD AUTO: 1 %
ERYTHROCYTE [DISTWIDTH] IN BLOOD BY AUTOMATED COUNT: 17.2 % (ref 10–15)
EST. AVERAGE GLUCOSE BLD GHB EST-MCNC: 148 MG/DL
FLUAV RNA SPEC QL NAA+PROBE: NEGATIVE
FLUBV RNA RESP QL NAA+PROBE: NEGATIVE
GLUCOSE BLDC GLUCOMTR-MCNC: 142 MG/DL (ref 70–99)
GLUCOSE SERPL-MCNC: 101 MG/DL (ref 70–99)
GLUCOSE SERPL-MCNC: 241 MG/DL (ref 70–99)
GLUCOSE UR STRIP-MCNC: NEGATIVE MG/DL
HBA1C MFR BLD: 6.8 %
HCO3 BLDV-SCNC: 28 MMOL/L (ref 21–28)
HCO3 BLDV-SCNC: 29 MMOL/L (ref 21–28)
HCO3 SERPL-SCNC: 23 MMOL/L (ref 22–29)
HCO3 SERPL-SCNC: 25 MMOL/L (ref 22–29)
HCT VFR BLD AUTO: 32.6 % (ref 35–47)
HGB BLD-MCNC: 9.6 G/DL (ref 11.7–15.7)
HGB UR QL STRIP: NEGATIVE
HOLD SPECIMEN: NORMAL
IMM GRANULOCYTES # BLD: 0.7 10E3/UL
IMM GRANULOCYTES NFR BLD: 4 %
KETONES UR STRIP-MCNC: NEGATIVE MG/DL
LACTATE SERPL-SCNC: 1.7 MMOL/L (ref 0.7–2)
LACTATE SERPL-SCNC: 2.1 MMOL/L (ref 0.7–2)
LACTATE SERPL-SCNC: 3 MMOL/L (ref 0.7–2)
LEUKOCYTE ESTERASE UR QL STRIP: ABNORMAL
LIPASE SERPL-CCNC: 14 U/L (ref 13–60)
LYMPHOCYTES # BLD AUTO: 1.5 10E3/UL (ref 0.8–5.3)
LYMPHOCYTES NFR BLD AUTO: 10 %
MAGNESIUM SERPL-MCNC: 1.1 MG/DL (ref 1.7–2.3)
MAGNESIUM SERPL-MCNC: 1.5 MG/DL (ref 1.7–2.3)
MCH RBC QN AUTO: 23.3 PG (ref 26.5–33)
MCHC RBC AUTO-ENTMCNC: 29.4 G/DL (ref 31.5–36.5)
MCV RBC AUTO: 79 FL (ref 78–100)
MONOCYTES # BLD AUTO: 1.3 10E3/UL (ref 0–1.3)
MONOCYTES NFR BLD AUTO: 8 %
MUCOUS THREADS #/AREA URNS LPF: PRESENT /LPF
NEUTROPHILS # BLD AUTO: 11.8 10E3/UL (ref 1.6–8.3)
NEUTROPHILS NFR BLD AUTO: 76 %
NITRATE UR QL: POSITIVE
NRBC # BLD AUTO: 0 10E3/UL
NRBC BLD AUTO-RTO: 0 /100
O2/TOTAL GAS SETTING VFR VENT: 21 %
O2/TOTAL GAS SETTING VFR VENT: 24 %
OXYHGB MFR BLDV: 68 % (ref 70–75)
OXYHGB MFR BLDV: 87 % (ref 70–75)
PCO2 BLDV: 42 MM HG (ref 40–50)
PCO2 BLDV: 57 MM HG (ref 40–50)
PH BLDV: 7.31 [PH] (ref 7.32–7.43)
PH BLDV: 7.45 [PH] (ref 7.32–7.43)
PH UR STRIP: 5.5 [PH] (ref 5–7)
PHOSPHATE SERPL-MCNC: 1.9 MG/DL (ref 2.5–4.5)
PHOSPHATE SERPL-MCNC: 2.5 MG/DL (ref 2.5–4.5)
PLATELET # BLD AUTO: 512 10E3/UL (ref 150–450)
PO2 BLDV: 41 MM HG (ref 25–47)
PO2 BLDV: 54 MM HG (ref 25–47)
POTASSIUM SERPL-SCNC: 4.1 MMOL/L (ref 3.4–5.3)
POTASSIUM SERPL-SCNC: 4.4 MMOL/L (ref 3.4–5.3)
PROT SERPL-MCNC: 6.5 G/DL (ref 6.4–8.3)
RADIOLOGIST FLAGS: ABNORMAL
RBC # BLD AUTO: 4.12 10E6/UL (ref 3.8–5.2)
RBC URINE: 2 /HPF
RSV RNA SPEC NAA+PROBE: NEGATIVE
SAO2 % BLDV: 69.3 % (ref 70–75)
SAO2 % BLDV: 89.3 % (ref 70–75)
SARS-COV-2 RNA RESP QL NAA+PROBE: NEGATIVE
SODIUM SERPL-SCNC: 132 MMOL/L (ref 135–145)
SODIUM SERPL-SCNC: 133 MMOL/L (ref 135–145)
SP GR UR STRIP: 1.02 (ref 1–1.03)
SQUAMOUS EPITHELIAL: <1 /HPF
TROPONIN T SERPL HS-MCNC: 11 NG/L
TROPONIN T SERPL HS-MCNC: 11 NG/L
UROBILINOGEN UR STRIP-MCNC: NORMAL MG/DL
WBC # BLD AUTO: 15.5 10E3/UL (ref 4–11)
WBC URINE: 9 /HPF

## 2025-04-14 PROCEDURE — 99418 PROLNG IP/OBS E/M EA 15 MIN: CPT

## 2025-04-14 PROCEDURE — 250N000011 HC RX IP 250 OP 636: Mod: JZ | Performed by: PHYSICIAN ASSISTANT

## 2025-04-14 PROCEDURE — 258N000003 HC RX IP 258 OP 636: Performed by: FAMILY MEDICINE

## 2025-04-14 PROCEDURE — 36415 COLL VENOUS BLD VENIPUNCTURE: CPT | Performed by: FAMILY MEDICINE

## 2025-04-14 PROCEDURE — 96376 TX/PRO/DX INJ SAME DRUG ADON: CPT

## 2025-04-14 PROCEDURE — 83735 ASSAY OF MAGNESIUM: CPT | Performed by: FAMILY MEDICINE

## 2025-04-14 PROCEDURE — 82010 KETONE BODYS QUAN: CPT | Performed by: FAMILY MEDICINE

## 2025-04-14 PROCEDURE — 82805 BLOOD GASES W/O2 SATURATION: CPT | Performed by: FAMILY MEDICINE

## 2025-04-14 PROCEDURE — 250N000009 HC RX 250: Performed by: FAMILY MEDICINE

## 2025-04-14 PROCEDURE — 83690 ASSAY OF LIPASE: CPT | Performed by: FAMILY MEDICINE

## 2025-04-14 PROCEDURE — 81001 URINALYSIS AUTO W/SCOPE: CPT | Performed by: FAMILY MEDICINE

## 2025-04-14 PROCEDURE — 82805 BLOOD GASES W/O2 SATURATION: CPT

## 2025-04-14 PROCEDURE — 96367 TX/PROPH/DG ADDL SEQ IV INF: CPT

## 2025-04-14 PROCEDURE — 250N000011 HC RX IP 250 OP 636: Performed by: FAMILY MEDICINE

## 2025-04-14 PROCEDURE — 83735 ASSAY OF MAGNESIUM: CPT

## 2025-04-14 PROCEDURE — 83605 ASSAY OF LACTIC ACID: CPT | Performed by: FAMILY MEDICINE

## 2025-04-14 PROCEDURE — 99285 EMERGENCY DEPT VISIT HI MDM: CPT | Mod: 25

## 2025-04-14 PROCEDURE — 85004 AUTOMATED DIFF WBC COUNT: CPT | Performed by: EMERGENCY MEDICINE

## 2025-04-14 PROCEDURE — 96366 THER/PROPH/DIAG IV INF ADDON: CPT

## 2025-04-14 PROCEDURE — 99285 EMERGENCY DEPT VISIT HI MDM: CPT | Mod: 25 | Performed by: FAMILY MEDICINE

## 2025-04-14 PROCEDURE — 83605 ASSAY OF LACTIC ACID: CPT

## 2025-04-14 PROCEDURE — 96375 TX/PRO/DX INJ NEW DRUG ADDON: CPT

## 2025-04-14 PROCEDURE — 74177 CT ABD & PELVIS W/CONTRAST: CPT

## 2025-04-14 PROCEDURE — 250N000013 HC RX MED GY IP 250 OP 250 PS 637

## 2025-04-14 PROCEDURE — 84100 ASSAY OF PHOSPHORUS: CPT

## 2025-04-14 PROCEDURE — 36415 COLL VENOUS BLD VENIPUNCTURE: CPT | Performed by: EMERGENCY MEDICINE

## 2025-04-14 PROCEDURE — 87040 BLOOD CULTURE FOR BACTERIA: CPT | Performed by: FAMILY MEDICINE

## 2025-04-14 PROCEDURE — 83036 HEMOGLOBIN GLYCOSYLATED A1C: CPT

## 2025-04-14 PROCEDURE — 82330 ASSAY OF CALCIUM: CPT | Performed by: FAMILY MEDICINE

## 2025-04-14 PROCEDURE — 93010 ELECTROCARDIOGRAM REPORT: CPT | Performed by: FAMILY MEDICINE

## 2025-04-14 PROCEDURE — 250N000011 HC RX IP 250 OP 636

## 2025-04-14 PROCEDURE — 93005 ELECTROCARDIOGRAM TRACING: CPT

## 2025-04-14 PROCEDURE — 87186 SC STD MICRODIL/AGAR DIL: CPT | Performed by: FAMILY MEDICINE

## 2025-04-14 PROCEDURE — 250N000013 HC RX MED GY IP 250 OP 250 PS 637: Performed by: INTERNAL MEDICINE

## 2025-04-14 PROCEDURE — 85025 COMPLETE CBC W/AUTO DIFF WBC: CPT | Performed by: FAMILY MEDICINE

## 2025-04-14 PROCEDURE — 82310 ASSAY OF CALCIUM: CPT

## 2025-04-14 PROCEDURE — 80053 COMPREHEN METABOLIC PANEL: CPT | Performed by: FAMILY MEDICINE

## 2025-04-14 PROCEDURE — 258N000003 HC RX IP 258 OP 636

## 2025-04-14 PROCEDURE — 120N000001 HC R&B MED SURG/OB

## 2025-04-14 PROCEDURE — 99223 1ST HOSP IP/OBS HIGH 75: CPT

## 2025-04-14 PROCEDURE — 87637 SARSCOV2&INF A&B&RSV AMP PRB: CPT | Performed by: FAMILY MEDICINE

## 2025-04-14 PROCEDURE — 96361 HYDRATE IV INFUSION ADD-ON: CPT

## 2025-04-14 PROCEDURE — 96365 THER/PROPH/DIAG IV INF INIT: CPT | Mod: 59

## 2025-04-14 PROCEDURE — 84484 ASSAY OF TROPONIN QUANT: CPT | Performed by: FAMILY MEDICINE

## 2025-04-14 PROCEDURE — 36415 COLL VENOUS BLD VENIPUNCTURE: CPT

## 2025-04-14 RX ORDER — ONDANSETRON 4 MG/1
4 TABLET, ORALLY DISINTEGRATING ORAL EVERY 6 HOURS PRN
Status: DISCONTINUED | OUTPATIENT
Start: 2025-04-14 | End: 2025-04-14

## 2025-04-14 RX ORDER — ONDANSETRON 2 MG/ML
4 INJECTION INTRAMUSCULAR; INTRAVENOUS EVERY 6 HOURS PRN
Status: DISCONTINUED | OUTPATIENT
Start: 2025-04-14 | End: 2025-04-14

## 2025-04-14 RX ORDER — POLYETHYLENE GLYCOL 3350 17 G/17G
17 POWDER, FOR SOLUTION ORAL 2 TIMES DAILY PRN
Status: DISCONTINUED | OUTPATIENT
Start: 2025-04-14 | End: 2025-04-24 | Stop reason: HOSPADM

## 2025-04-14 RX ORDER — AMOXICILLIN 250 MG
1 CAPSULE ORAL 2 TIMES DAILY PRN
Status: DISCONTINUED | OUTPATIENT
Start: 2025-04-14 | End: 2025-04-24 | Stop reason: HOSPADM

## 2025-04-14 RX ORDER — HYDROXYZINE HYDROCHLORIDE 25 MG/1
25 TABLET, FILM COATED ORAL EVERY 6 HOURS PRN
Status: DISCONTINUED | OUTPATIENT
Start: 2025-04-14 | End: 2025-04-24 | Stop reason: HOSPADM

## 2025-04-14 RX ORDER — NALOXONE HYDROCHLORIDE 0.4 MG/ML
0.2 INJECTION, SOLUTION INTRAMUSCULAR; INTRAVENOUS; SUBCUTANEOUS
Status: DISCONTINUED | OUTPATIENT
Start: 2025-04-14 | End: 2025-04-24 | Stop reason: HOSPADM

## 2025-04-14 RX ORDER — NICOTINE POLACRILEX 4 MG
15-30 LOZENGE BUCCAL
Status: DISCONTINUED | OUTPATIENT
Start: 2025-04-14 | End: 2025-04-24 | Stop reason: HOSPADM

## 2025-04-14 RX ORDER — ONDANSETRON 4 MG/1
4 TABLET, ORALLY DISINTEGRATING ORAL EVERY 6 HOURS PRN
Status: DISCONTINUED | OUTPATIENT
Start: 2025-04-14 | End: 2025-04-24 | Stop reason: HOSPADM

## 2025-04-14 RX ORDER — ONDANSETRON 2 MG/ML
4 INJECTION INTRAMUSCULAR; INTRAVENOUS ONCE
Status: COMPLETED | OUTPATIENT
Start: 2025-04-14 | End: 2025-04-14

## 2025-04-14 RX ORDER — CEFTRIAXONE 2 G/1
2 INJECTION, POWDER, FOR SOLUTION INTRAMUSCULAR; INTRAVENOUS ONCE
Status: COMPLETED | OUTPATIENT
Start: 2025-04-14 | End: 2025-04-14

## 2025-04-14 RX ORDER — IOPAMIDOL 755 MG/ML
116 INJECTION, SOLUTION INTRAVASCULAR ONCE
Status: COMPLETED | OUTPATIENT
Start: 2025-04-14 | End: 2025-04-14

## 2025-04-14 RX ORDER — NALOXONE HYDROCHLORIDE 0.4 MG/ML
0.4 INJECTION, SOLUTION INTRAMUSCULAR; INTRAVENOUS; SUBCUTANEOUS
Status: DISCONTINUED | OUTPATIENT
Start: 2025-04-14 | End: 2025-04-24 | Stop reason: HOSPADM

## 2025-04-14 RX ORDER — SODIUM CHLORIDE, SODIUM LACTATE, POTASSIUM CHLORIDE, CALCIUM CHLORIDE 600; 310; 30; 20 MG/100ML; MG/100ML; MG/100ML; MG/100ML
INJECTION, SOLUTION INTRAVENOUS CONTINUOUS
Status: DISCONTINUED | OUTPATIENT
Start: 2025-04-14 | End: 2025-04-17

## 2025-04-14 RX ORDER — CEFTRIAXONE 2 G/1
2 INJECTION, POWDER, FOR SOLUTION INTRAMUSCULAR; INTRAVENOUS EVERY 24 HOURS
Status: DISCONTINUED | OUTPATIENT
Start: 2025-04-15 | End: 2025-04-16

## 2025-04-14 RX ORDER — OXYCODONE HYDROCHLORIDE 5 MG/1
5 TABLET ORAL
Status: DISCONTINUED | OUTPATIENT
Start: 2025-04-14 | End: 2025-04-24 | Stop reason: HOSPADM

## 2025-04-14 RX ORDER — LIDOCAINE 4 G/G
2 PATCH TOPICAL
Status: DISCONTINUED | OUTPATIENT
Start: 2025-04-15 | End: 2025-04-24 | Stop reason: HOSPADM

## 2025-04-14 RX ORDER — MONTELUKAST SODIUM 10 MG/1
10 TABLET ORAL AT BEDTIME
Status: DISCONTINUED | OUTPATIENT
Start: 2025-04-14 | End: 2025-04-24 | Stop reason: HOSPADM

## 2025-04-14 RX ORDER — HYDROMORPHONE HCL IN WATER/PF 6 MG/30 ML
0.2 PATIENT CONTROLLED ANALGESIA SYRINGE INTRAVENOUS
Status: DISCONTINUED | OUTPATIENT
Start: 2025-04-14 | End: 2025-04-24 | Stop reason: HOSPADM

## 2025-04-14 RX ORDER — ATORVASTATIN CALCIUM 20 MG/1
40 TABLET, FILM COATED ORAL AT BEDTIME
Status: DISCONTINUED | OUTPATIENT
Start: 2025-04-14 | End: 2025-04-24 | Stop reason: HOSPADM

## 2025-04-14 RX ORDER — LIDOCAINE 40 MG/G
CREAM TOPICAL
Status: DISCONTINUED | OUTPATIENT
Start: 2025-04-14 | End: 2025-04-24 | Stop reason: HOSPADM

## 2025-04-14 RX ORDER — DEXTROSE MONOHYDRATE 25 G/50ML
25-50 INJECTION, SOLUTION INTRAVENOUS
Status: DISCONTINUED | OUTPATIENT
Start: 2025-04-14 | End: 2025-04-24 | Stop reason: HOSPADM

## 2025-04-14 RX ORDER — KETOROLAC TROMETHAMINE 15 MG/ML
15 INJECTION, SOLUTION INTRAMUSCULAR; INTRAVENOUS ONCE
Status: COMPLETED | OUTPATIENT
Start: 2025-04-14 | End: 2025-04-14

## 2025-04-14 RX ORDER — ACETAMINOPHEN 325 MG/1
650 TABLET ORAL EVERY 6 HOURS PRN
Status: ON HOLD | COMMUNITY
End: 2025-04-24

## 2025-04-14 RX ORDER — PROCHLORPERAZINE MALEATE 5 MG/1
10 TABLET ORAL EVERY 6 HOURS PRN
Status: DISCONTINUED | OUTPATIENT
Start: 2025-04-14 | End: 2025-04-24 | Stop reason: HOSPADM

## 2025-04-14 RX ORDER — LIDOCAINE 4 G/G
2 PATCH TOPICAL EVERY 24 HOURS
Status: DISCONTINUED | OUTPATIENT
Start: 2025-04-14 | End: 2025-04-14

## 2025-04-14 RX ORDER — OXYCODONE HYDROCHLORIDE 5 MG/1
10 TABLET ORAL
Status: DISCONTINUED | OUTPATIENT
Start: 2025-04-14 | End: 2025-04-24 | Stop reason: HOSPADM

## 2025-04-14 RX ORDER — ENOXAPARIN SODIUM 100 MG/ML
0.75 INJECTION SUBCUTANEOUS EVERY 12 HOURS
Status: DISCONTINUED | OUTPATIENT
Start: 2025-04-14 | End: 2025-04-16

## 2025-04-14 RX ORDER — GABAPENTIN 300 MG/1
300 CAPSULE ORAL 2 TIMES DAILY
Status: DISCONTINUED | OUTPATIENT
Start: 2025-04-14 | End: 2025-04-24 | Stop reason: HOSPADM

## 2025-04-14 RX ORDER — IBUPROFEN 400 MG/1
400 TABLET, FILM COATED ORAL EVERY 4 HOURS PRN
Status: DISCONTINUED | OUTPATIENT
Start: 2025-04-14 | End: 2025-04-19

## 2025-04-14 RX ORDER — ACETAMINOPHEN 325 MG/1
650 TABLET ORAL EVERY 4 HOURS PRN
Status: DISCONTINUED | OUTPATIENT
Start: 2025-04-14 | End: 2025-04-16

## 2025-04-14 RX ORDER — HYDROMORPHONE HYDROCHLORIDE 1 MG/ML
0.5 INJECTION, SOLUTION INTRAMUSCULAR; INTRAVENOUS; SUBCUTANEOUS
Status: DISCONTINUED | OUTPATIENT
Start: 2025-04-14 | End: 2025-04-14

## 2025-04-14 RX ORDER — IRBESARTAN 75 MG/1
300 TABLET ORAL AT BEDTIME
Status: DISCONTINUED | OUTPATIENT
Start: 2025-04-14 | End: 2025-04-24 | Stop reason: HOSPADM

## 2025-04-14 RX ORDER — AMLODIPINE BESYLATE 10 MG/1
10 TABLET ORAL DAILY
Status: DISCONTINUED | OUTPATIENT
Start: 2025-04-15 | End: 2025-04-24 | Stop reason: HOSPADM

## 2025-04-14 RX ORDER — IBUPROFEN 200 MG
200-400 TABLET ORAL EVERY 4 HOURS PRN
Status: DISCONTINUED | OUTPATIENT
Start: 2025-04-14 | End: 2025-04-14

## 2025-04-14 RX ORDER — MAGNESIUM SULFATE HEPTAHYDRATE 40 MG/ML
2 INJECTION, SOLUTION INTRAVENOUS ONCE
Status: COMPLETED | OUTPATIENT
Start: 2025-04-14 | End: 2025-04-14

## 2025-04-14 RX ORDER — ATORVASTATIN CALCIUM 20 MG/1
40 TABLET, FILM COATED ORAL EVERY EVENING
Status: DISCONTINUED | OUTPATIENT
Start: 2025-04-14 | End: 2025-04-14

## 2025-04-14 RX ORDER — ONDANSETRON 2 MG/ML
4 INJECTION INTRAMUSCULAR; INTRAVENOUS EVERY 6 HOURS PRN
Status: DISCONTINUED | OUTPATIENT
Start: 2025-04-14 | End: 2025-04-24 | Stop reason: HOSPADM

## 2025-04-14 RX ORDER — CEFTRIAXONE 2 G/1
2 INJECTION, POWDER, FOR SOLUTION INTRAMUSCULAR; INTRAVENOUS EVERY 24 HOURS
Status: DISCONTINUED | OUTPATIENT
Start: 2025-04-15 | End: 2025-04-14

## 2025-04-14 RX ORDER — MAGNESIUM HYDROXIDE/ALUMINUM HYDROXICE/SIMETHICONE 120; 1200; 1200 MG/30ML; MG/30ML; MG/30ML
30 SUSPENSION ORAL EVERY 4 HOURS PRN
Status: DISCONTINUED | OUTPATIENT
Start: 2025-04-14 | End: 2025-04-24 | Stop reason: HOSPADM

## 2025-04-14 RX ORDER — DEXTROSE MONOHYDRATE, SODIUM CHLORIDE, AND POTASSIUM CHLORIDE 50; 1.49; 4.5 G/1000ML; G/1000ML; G/1000ML
INJECTION, SOLUTION INTRAVENOUS CONTINUOUS
Status: DISCONTINUED | OUTPATIENT
Start: 2025-04-14 | End: 2025-04-14

## 2025-04-14 RX ADMIN — MONTELUKAST 10 MG: 10 TABLET, FILM COATED ORAL at 21:19

## 2025-04-14 RX ADMIN — SODIUM CHLORIDE, SODIUM LACTATE, POTASSIUM CHLORIDE, AND CALCIUM CHLORIDE: .6; .31; .03; .02 INJECTION, SOLUTION INTRAVENOUS at 21:20

## 2025-04-14 RX ADMIN — IRBESARTAN 300 MG: 75 TABLET ORAL at 21:22

## 2025-04-14 RX ADMIN — MAGNESIUM SULFATE HEPTAHYDRATE 2 G: 40 INJECTION, SOLUTION INTRAVENOUS at 11:18

## 2025-04-14 RX ADMIN — SODIUM CHLORIDE 100 ML: 9 INJECTION, SOLUTION INTRAVENOUS at 12:54

## 2025-04-14 RX ADMIN — KETOROLAC TROMETHAMINE 15 MG: 15 INJECTION, SOLUTION INTRAMUSCULAR; INTRAVENOUS at 09:48

## 2025-04-14 RX ADMIN — IOPAMIDOL 116 ML: 755 INJECTION, SOLUTION INTRAVENOUS at 12:54

## 2025-04-14 RX ADMIN — SODIUM CHLORIDE 1000 ML: 0.9 INJECTION, SOLUTION INTRAVENOUS at 13:34

## 2025-04-14 RX ADMIN — IBUPROFEN 400 MG: 200 TABLET, FILM COATED ORAL at 17:42

## 2025-04-14 RX ADMIN — HYDROMORPHONE HYDROCHLORIDE 0.2 MG: 0.2 INJECTION, SOLUTION INTRAMUSCULAR; INTRAVENOUS; SUBCUTANEOUS at 18:54

## 2025-04-14 RX ADMIN — ATORVASTATIN CALCIUM 40 MG: 20 TABLET, FILM COATED ORAL at 21:19

## 2025-04-14 RX ADMIN — ACETAMINOPHEN 650 MG: 325 TABLET ORAL at 21:06

## 2025-04-14 RX ADMIN — HYDROMORPHONE HYDROCHLORIDE 0.5 MG: 1 INJECTION, SOLUTION INTRAMUSCULAR; INTRAVENOUS; SUBCUTANEOUS at 13:37

## 2025-04-14 RX ADMIN — ONDANSETRON 4 MG: 2 INJECTION, SOLUTION INTRAMUSCULAR; INTRAVENOUS at 09:47

## 2025-04-14 RX ADMIN — GABAPENTIN 300 MG: 300 CAPSULE ORAL at 18:54

## 2025-04-14 RX ADMIN — SERTRALINE HYDROCHLORIDE 150 MG: 50 TABLET ORAL at 21:19

## 2025-04-14 RX ADMIN — SODIUM CHLORIDE, SODIUM LACTATE, POTASSIUM CHLORIDE, AND CALCIUM CHLORIDE: .6; .31; .03; .02 INJECTION, SOLUTION INTRAVENOUS at 16:50

## 2025-04-14 RX ADMIN — SODIUM CHLORIDE 1000 ML: 9 INJECTION, SOLUTION INTRAVENOUS at 11:18

## 2025-04-14 RX ADMIN — SODIUM CHLORIDE 1000 ML: 9 INJECTION, SOLUTION INTRAVENOUS at 09:46

## 2025-04-14 RX ADMIN — OXYCODONE HYDROCHLORIDE 10 MG: 5 TABLET ORAL at 17:58

## 2025-04-14 RX ADMIN — ENOXAPARIN SODIUM 80 MG: 100 INJECTION SUBCUTANEOUS at 16:16

## 2025-04-14 RX ADMIN — HYDROMORPHONE HYDROCHLORIDE 0.5 MG: 1 INJECTION, SOLUTION INTRAMUSCULAR; INTRAVENOUS; SUBCUTANEOUS at 10:23

## 2025-04-14 RX ADMIN — CEFTRIAXONE 2 G: 2 INJECTION, POWDER, FOR SOLUTION INTRAMUSCULAR; INTRAVENOUS at 10:43

## 2025-04-14 RX ADMIN — HYDROXYZINE HYDROCHLORIDE 25 MG: 25 TABLET ORAL at 21:06

## 2025-04-14 RX ADMIN — IBUPROFEN 400 MG: 400 TABLET ORAL at 21:06

## 2025-04-14 ASSESSMENT — ACTIVITIES OF DAILY LIVING (ADL)
ADLS_ACUITY_SCORE: 61
ADLS_ACUITY_SCORE: 65
ADLS_ACUITY_SCORE: 61
ADLS_ACUITY_SCORE: 65
ADLS_ACUITY_SCORE: 61

## 2025-04-14 NOTE — MEDICATION SCRIBE - ADMISSION MEDICATION HISTORY
Medication Scribe Admission Medication History    Admission medication history is complete. The information provided in this note is only as accurate as the sources available at the time of the update.    Information Source(s): Patient, Family member, and CareEverywhere/SureScripts via in-person    Pertinent Information: PTA med list reviewed with patient in room with spouse with printed med that they brought with today and finished at desk.  She cannot remember if she took Gabapentin this morning or not?  She takes Semaglutide on Mondays.  Did not get a dose today.    Changes made to PTA medication list:  Added:   Acetaminophen 325 mg prn  Humulin 70/30 Insulin (took 20 units this morning)    Deleted:   Acetaminophen 325 mg scheduled  Albuterol Inhaler from July 2024(she states that she hasn't used it in years).  Lantus Insulin    Changed: None    Allergies reviewed with patient and updates made in EHR: yes, no change    Medication History Completed By: Eulalia Carson 4/14/2025 11:48 AM    PTA Med List   Medication Sig Note Last Dose/Taking    acetaminophen (TYLENOL) 325 MG tablet Take 650 mg by mouth every 6 hours as needed for pain.  4/14/2025 at  4:00 AM    amLODIPine (NORVASC) 10 MG tablet Take 1 tablet (10 mg) by mouth daily  4/14/2025 Morning    atorvastatin (LIPITOR) 40 MG tablet Take 1 tablet (40 mg) by mouth daily  4/13/2025 Bedtime    Continuous Glucose Sensor (FREESTYLE SANDRA 3 SENSOR) MISC 1 each every 14 days Use 1 sensor every 14 days. Use to read blood sugars per 's instructions. 4/14/2025: New one on a week ago.  Checked blood glucose this morning.  Doesn't remember what the reading was. 4/7/2025    enoxaparin ANTICOAGULANT (LOVENOX) 40 MG/0.4ML syringe Inject 0.4 mLs (40 mg) subcutaneously every 12 hours for 28 days.  4/13/2025 Bedtime    gabapentin (NEURONTIN) 300 MG capsule Take 1 capsule (300 mg) by mouth 2 times daily. 4/14/2025: She cannot remember if she took this medicine this  "morning or not? Taking    hydrOXYzine HCl (ATARAX) 25 MG tablet Take 1 tablet (25 mg) by mouth every 6 hours as needed for other (adjuvant pain).  Unknown    ibuprofen (ADVIL/MOTRIN) 200 MG tablet Take 200-400 mg by mouth every 4 hours as needed for mild pain.  4/14/2025 Morning    insulin NPH-Regular (HUMULIN 70/30;NOVOLIN 70/30) susp Inject 18-20 units subcutaneously 2 times daily.  4/14/2025: Took 20 units this morning. 4/14/2025 Morning    insulin pen needle (BD PEN NEEDLE TWYLA 2ND GEN) 32G X 4 MM miscellaneous USE TWICE DAILY OR AS DIRECTED  4/14/2025 Morning    irbesartan (AVAPRO) 300 MG tablet TAKE 1 TABLET(300 MG) BY MOUTH AT BEDTIME  4/13/2025 Bedtime    Lidocaine (LIDOCARE) 4 % Patch Place 1 patch onto the skin every 24 hours. On for 12 hours, off for 12 hours.  More than a month    metFORMIN (GLUCOPHAGE) 1000 MG tablet TAKE 1 TABLET BY MOUTH TWICE DAILY WITH MEALS  4/14/2025 Morning    Misc. Devices (ROLLATOR ULTRA-LIGHT) MISC Extra side walker with front wheels for home use.  Purchase, lifetime need. Extra wide rolling walker (\"Walker 4\", item #:  [id number]) needed for safe transfers and ambulation.  Pt weight is 335 lbs.  Unknown    montelukast (SINGULAIR) 10 MG tablet Take 1 tablet (10 mg) by mouth at bedtime  4/13/2025 Bedtime    naloxone (NARCAN) 4 MG/0.1ML nasal spray Spray 1 spray (4 mg) into one nostril alternating nostrils as needed for opioid reversal. every 2-3 minutes until assistance arrives 4/14/2025: Did not get this yet. Taking As Needed    oxyCODONE (ROXICODONE) 5 MG tablet Take 1-2 tablets (5-10 mg) by mouth every 3 hours as needed for moderate pain. 1 tab po q 3 hrs prn pain scale \"3-6\"  2 tabs po q 3 hrs prn pain scale \"7-10\"  4/14/2025 at  5:30 AM    Semaglutide, 2 MG/DOSE, (OZEMPIC, 2 MG/DOSE,) 8 MG/3ML pen Inject 2 mg subcutaneously every 7 days 4/14/2025: Takes on Mondays 4/7/2025    senna-docusate (SENOKOT-S/PERICOLACE) 8.6-50 MG tablet Take 1 tablet by mouth 2 times daily as " needed for constipation.  More than a month    sertraline (ZOLOFT) 100 MG tablet Take 1.5 tablets (150 mg) by mouth daily  4/13/2025 Evening

## 2025-04-14 NOTE — PROGRESS NOTES
"[place] [facility] ADMISSION NOTE    Patient admitted to room 2303 at approximately 1815 via bed from emergency room. Patient was accompanied by spouse and transport tech.     Verbal SBAR report received from BRY [place] prior to patient arrival.     Patient trasferred to bed via air zohra. Patient alert and oriented X 3. Pain is not well controlled.  Medication(s) being used: acetaminophen, ibuprofen (OTC), and narcotic analgesics including oxycodone (Oxycontin, Oxyir).  . Admission vital signs: Blood pressure 138/79, pulse 95, temperature 97.5  F (36.4  C), temperature source Oral, resp. rate 18, height 1.626 m (5' 4\"), weight 107.5 kg (237 lb), SpO2 93%, not currently breastfeeding. Patient and spouse were oriented to plan of care, call light, bed controls, tv, telephone, bathroom, and visiting hours.     Risk Assessment    The following safety risks were identified during admission: fall and skin. Yellow risk band applied: YES.     Skin Initial Assessment    This writer admitted this patient and completed a full skin assessment and Dominic score in the Adult PCS flowsheet.   Photo documentation of skin problem and/or wound competed via [employer] application (located under Media):  N/A    Appropriate interventions initiated as needed.     Secondary skin check declined.    Dominic Risk Assessment  Sensory Perception: 3-->slightly limited  Moisture: 2-->very moist  Activity: 1-->bedfast  Mobility: 1-->completely immobile  Nutrition: 3-->adequate  Friction and Shear: 2-->potential problem  Dominic Score: 12  Moisture Interventions: Urinary collection device  Friction/Shear Interventions: HOB 30 degrees or less  Sensory/Activity/Mobility Interventions: Turn: left/right positioning  Mattress: Standard gel/foam mattress (IsoFlex, Atmos Air, etc.)  Bed Frame: Standard width and length    Education    Patient has a [place] to Observation order: No  Observation education completed and documented: N/A      Mirian Foreman RN      "

## 2025-04-14 NOTE — ED TRIAGE NOTES
Pt arrives via EMS from home for generalized weakness. Pt was recently discharged home from a TCU for failure to improve. Family reports pt has been increasingly weak. Pt also slid out of her barbara this morning w/o injury.

## 2025-04-14 NOTE — TELEPHONE ENCOUNTER
Patient Quality Outreach    Patient is due for the following:   Diabetes -  Diabetic Follow-Up Visit    Action(s) Taken:   Schedule a office visit for diabetes    Type of outreach:    Chart review performed, no outreach needed. Patient in ER today    Questions for provider review:    None         Alix Brand CMA  Chart routed to None.

## 2025-04-14 NOTE — ED NOTES
Pt straight cathed for UA, brief changed, pure wick applied. Open area noted over pts sacrum,  says this has been there at home. Mepiliex applied to sacrum

## 2025-04-14 NOTE — ED NOTES
"[facility]   Admission Handoff    The patient is Brissa Mott, 54 year old who arrived in the ED by AMBULANCE from home with a complaint of Generalized Weakness  . The patient's current symptoms are an exacerbation of a chronic illness and during this time the symptoms have remained the same. In the ED, patient was diagnosed with   Final diagnoses:   General weakness   UTI (urinary tract infection), bacterial   Sepsis, due to unspecified organism, unspecified whether acute organ dysfunction present (H)   Hypercalcemia of malignancy   Hypomagnesemia   Other acute pulmonary embolism, unspecified whether acute cor pulmonale present (H)         Needed?: No    Allergies:    Allergies   Allergen Reactions    Sulfa Antibiotics Shortness Of Breath, Hives and Rash    Bydureon [Exenatide] Diarrhea    Penicillins Rash and Hives       Past Medical Hx:   Past Medical History:   Diagnosis Date    Asthma     Cerebral infarction (H)     Diabetes (H)     Hypertension     Mixed hyperlipidemia     Morbid obesity (H)     KATHRINE (obstructive sleep apnea)     Osteoarthritis        Initial vitals were: BP: (!) 172/79  Pulse: 101  Temp: 98.3  F (36.8  C)  Resp: 20  Height: 162.6 cm (5' 4\")  Weight: 107.5 kg (237 lb)  SpO2: (!) 91 %   Recent vital Signs: BP (!) 150/88   Pulse 94   Temp 98.3  F (36.8  C)   Resp 20   Ht 1.626 m (5' 4\")   Wt 107.5 kg (237 lb)   LMP  (LMP Unknown)   SpO2 95%   BMI 40.68 kg/m      Elimination Status: Continent: purwick     Activity Level: Total assist/lift    Fall Status: Reason for falls risk:  Mobility  arm band in place    Baseline Mental status: WDL  Current Mental Status changes: at basesline    Infection present or suspected this encounter:     Bariatric equipment needed?: Yes    In the ED these meds were given:   Medications   cefTRIAXone (ROCEPHIN) 2 g vial to attach to  ml bag for ADULTS or NS 50 ml bag for PEDS (has no administration in time range) "   naloxone (NARCAN) injection 0.2 mg (has no administration in time range)     Or   naloxone (NARCAN) injection 0.4 mg (has no administration in time range)     Or   naloxone (NARCAN) injection 0.2 mg (has no administration in time range)     Or   naloxone (NARCAN) injection 0.4 mg (has no administration in time range)   ondansetron (ZOFRAN ODT) ODT tab 4 mg (has no administration in time range)     Or   ondansetron (ZOFRAN) injection 4 mg (has no administration in time range)   amLODIPine (NORVASC) tablet 10 mg (has no administration in time range)   atorvastatin (LIPITOR) tablet 40 mg (has no administration in time range)   gabapentin (NEURONTIN) capsule 300 mg (has no administration in time range)   hydrOXYzine HCl (ATARAX) tablet 25 mg (has no administration in time range)   ibuprofen (ADVIL/MOTRIN) tablet 200-400 mg (has no administration in time range)   insulin NPH-Regular (HUMULIN 70/30;NOVOLIN 70/30) injection 15 Units (has no administration in time range)   irbesartan (AVAPRO) tablet 300 mg (has no administration in time range)   Lidocaine (LIDOCARE) 4 % Patch 2 patch (has no administration in time range)   metFORMIN (GLUCOPHAGE) tablet 1,000 mg ( Oral Automatically Held 4/17/25 1800)   montelukast (SINGULAIR) tablet 10 mg (has no administration in time range)   oxyCODONE (ROXICODONE) tablet 5 mg (has no administration in time range)   oxyCODONE (ROXICODONE) tablet 10 mg (has no administration in time range)   senna-docusate (SENOKOT-S/PERICOLACE) 8.6-50 MG per tablet 1 tablet (has no administration in time range)   sertraline (ZOLOFT) tablet 150 mg (has no administration in time range)   lidocaine 1 % 0.1-1 mL (has no administration in time range)   lidocaine (LMX4) kit (has no administration in time range)   sodium chloride (PF) 0.9% PF flush 3 mL (has no administration in time range)   sodium chloride (PF) 0.9% PF flush 3 mL (has no administration in time range)   glucose gel 15-30 g (has no  administration in time range)     Or   dextrose 50 % injection 25-50 mL (has no administration in time range)     Or   glucagon injection 1 mg (has no administration in time range)   Patient is already receiving anticoagulation with heparin, enoxaparin (LOVENOX), warfarin (COUMADIN)  or other anticoagulant medication (has no administration in time range)   lactated ringers infusion (has no administration in time range)   acetaminophen (TYLENOL) tablet 650 mg (has no administration in time range)     Or   acetaminophen (TYLENOL) Suppository 650 mg (has no administration in time range)   melatonin tablet 5 mg (has no administration in time range)   polyethylene glycol (MIRALAX) Packet 17 g (has no administration in time range)   prochlorperazine (COMPAZINE) injection 10 mg (has no administration in time range)     Or   prochlorperazine (COMPAZINE) tablet 10 mg (has no administration in time range)   alum & mag hydroxide-simethicone (MAALOX) suspension 30 mL (has no administration in time range)   benzocaine-menthol (CHLORASEPTIC) 6-10 MG lozenge 1 lozenge (has no administration in time range)   insulin aspart (NovoLOG) injection (RAPID ACTING) (has no administration in time range)   insulin aspart (NovoLOG) injection (RAPID ACTING) (has no administration in time range)   enoxaparin ANTICOAGULANT (LOVENOX) injection 80 mg (80 mg Subcutaneous $Given 4/14/25 1616)   sodium chloride 0.9% BOLUS 1,000 mL (0 mLs Intravenous Stopped 4/14/25 1118)   ondansetron (ZOFRAN) injection 4 mg (4 mg Intravenous $Given 4/14/25 0947)   ketorolac (TORADOL) injection 15 mg (15 mg Intravenous $Given 4/14/25 0948)   sodium chloride 0.9% BOLUS 1,000 mL (0 mLs Intravenous Stopped 4/14/25 1249)   cefTRIAXone (ROCEPHIN) 2 g vial to attach to  ml bag for ADULTS or NS 50 ml bag for PEDS (0 g Intravenous Stopped 4/14/25 1118)   magnesium sulfate 2 g in 50 mL sterile water intermittent infusion (0 g Intravenous Stopped 4/14/25 1250)    iopamidol (ISOVUE-370) solution 116 mL (116 mLs Intravenous $Given 4/14/25 1254)   sodium chloride 0.9 % bag for CT scan flush (100 mLs Intravenous $Given 4/14/25 1254)   sodium chloride 0.9% BOLUS 1,000 mL (0 mLs Intravenous Stopped 4/14/25 1448)       Drips running?  No    Home pump  No    Current LDAs: Peripheral IV: Site left forearm and right AC; Gauge 20 and 18  LR running    Results:   Labs/Imaging  Ordered and Resulted from Time of ED Arrival Up to the Time of Departure from the ED  Results for orders placed or performed during the hospital encounter of 04/14/25 (from the past 24 hours)   CBC with platelets, differential    Narrative    The following orders were created for panel order CBC with platelets, differential.  Procedure                               Abnormality         Status                     ---------                               -----------         ------                     CBC with platelets and ...[[id number]]  Abnormal            Final result                 Please view results for these tests on the individual orders.   Comprehensive metabolic panel   Result Value Ref Range    Sodium 132 (L) 135 - 145 mmol/L    Potassium 4.4 3.4 - 5.3 mmol/L    Carbon Dioxide (CO2) 25 22 - 29 mmol/L    Anion Gap 12 7 - 15 mmol/L    Urea Nitrogen 19.1 6.0 - 20.0 mg/dL    Creatinine 0.38 (L) 0.51 - 0.95 mg/dL    GFR Estimate >90 >60 mL/min/1.73m2    Calcium 13.8 (H) 8.8 - 10.4 mg/dL    Chloride 95 (L) 98 - 107 mmol/L    Glucose 241 (H) 70 - 99 mg/dL    Alkaline Phosphatase 192 (H) 40 - 150 U/L    AST 61 (H) 0 - 45 U/L    ALT 14 0 - 50 U/L    Protein Total 6.5 6.4 - 8.3 g/dL    Albumin 3.2 (L) 3.5 - 5.2 g/dL    Bilirubin Total 0.4 <=1.2 mg/dL   [facility] Draw    Narrative    The following orders were created for panel order [place] Draw.  Procedure                               Abnormality         Status                     ---------                               -----------         ------                      Extra Blue Top Tube[[id number]]                             Final result                 Please view results for these tests on the individual orders.   CBC with platelets and differential   Result Value Ref Range    WBC Count 15.5 (H) 4.0 - 11.0 10e3/uL    RBC Count 4.12 3.80 - 5.20 10e6/uL    Hemoglobin 9.6 (L) 11.7 - 15.7 g/dL    Hematocrit 32.6 (L) 35.0 - 47.0 %    MCV 79 78 - 100 fL    MCH 23.3 (L) 26.5 - 33.0 pg    MCHC 29.4 (L) 31.5 - 36.5 g/dL    RDW 17.2 (H) 10.0 - 15.0 %    Platelet Count 512 (H) 150 - 450 10e3/uL    % Neutrophils 76 %    % Lymphocytes 10 %    % Monocytes 8 %    % Eosinophils 1 %    % Basophils 1 %    % Immature Granulocytes 4 %    NRBCs per 100 WBC 0 <1 /100    Absolute Neutrophils 11.8 (H) 1.6 - 8.3 10e3/uL    Absolute Lymphocytes 1.5 0.8 - 5.3 10e3/uL    Absolute Monocytes 1.3 0.0 - 1.3 10e3/uL    Absolute Eosinophils 0.2 0.0 - 0.7 10e3/uL    Absolute Basophils 0.1 0.0 - 0.2 10e3/uL    Absolute Immature Granulocytes 0.7 (H) <=0.4 10e3/uL    Absolute NRBCs 0.0 10e3/uL   Extra Blue Top Tube   Result Value Ref Range    Hold Specimen JIC    Lipase   Result Value Ref Range    Lipase 14 13 - 60 U/L   Troponin T, High Sensitivity   Result Value Ref Range    Troponin T, High Sensitivity 11 <=14 ng/L   Magnesium   Result Value Ref Range    Magnesium 1.1 (L) 1.7 - 2.3 mg/dL   Ketone Beta-Hydroxybutyrate Quantitative   Result Value Ref Range    Ketone (Beta-Hydroxybutyrate) Quantitative <0.18 <=0.30 mmol/L   Hemoglobin A1c   Result Value Ref Range    Estimated Average Glucose 148 (H) <117 mg/dL    Hemoglobin A1C 6.8 (H) <5.7 %   EKG 12 lead   Result Value Ref Range    Systolic Blood Pressure  mmHg    Diastolic Blood Pressure  mmHg    Ventricular Rate 103 BPM    Atrial Rate 103 BPM    NE Interval 146 ms    QRS Duration 156 ms     ms    QTc 466 ms    P Axis 73 degrees    R AXIS 32 degrees    T Axis -26 degrees    Interpretation ECG       Sinus tachycardia  Right bundle branch block  Inferior  infarct , age undetermined  Anterior infarct , age undetermined  Abnormal ECG  No previous ECGs available     Blood gas venous   Result Value Ref Range    pH Venous 7.45 (H) 7.32 - 7.43    pCO2 Venous 42 40 - 50 mm Hg    pO2 Venous 54 (H) 25 - 47 mm Hg    Bicarbonate Venous 29 (H) 21 - 28 mmol/L    Base Excess/Deficit Venous 5.0 (H) -3.0 - 3.0 mmol/L    FIO2 21     Oxyhemoglobin Venous 87 (H) 70 - 75 %    O2 Sat, Venous 89.3 (H) 70.0 - 75.0 %    Narrative    In healthy individuals, oxyhemoglobin (O2Hb) and oxygen saturation (SO2) are approximately equal. In the presence of dyshemoglobins, oxyhemoglobin can be considerably lower than oxygen saturation.   Ionized Calcium   Result Value Ref Range    Calcium Ionized Whole Blood 7.5 (HH) 4.4 - 5.2 mg/dL   Lactic Acid Whole Blood with 1X Repeat in 2 HR when >2   Result Value Ref Range    Lactic Acid, Initial 3.0 (H) 0.7 - 2.0 mmol/L   Troponin T, High Sensitivity   Result Value Ref Range    Troponin T, High Sensitivity 11 <=14 ng/L   Influenza A/B, RSV and SARS-CoV2 PCR (COVID-19) Nose    Specimen: Nose; Swab   Result Value Ref Range    Influenza A PCR Negative Negative    Influenza B PCR Negative Negative    RSV PCR Negative Negative    SARS CoV2 PCR Negative Negative    Narrative    Testing was performed using the Xpert Xpress CoV2/Flu/RSV Assay on the i[employer] GeneXpert Instrument. This test should be ordered for the detection of SARS-CoV2, influenza, and RSV viruses in individuals with signs and symptoms of respiratory tract infection. This test is for in vitro diagnostic use under the [place] FDA for laboratories certified under CLIA to perform high or moderate complexity testing. This test has been [place] FDA cleared. A negative result does not rule out the presence of PCR inhibitors in the specimen or target RNA in concentration below the limit of detection for the assay. If only one viral target is positive but coinfection with multiple targets is suspected, the sample  should be re-tested with another FDA cleared, approved, or authorized test, if coninfection would change clinical management. This test was validated by the [facility]. These laboratories are certified under the Clinical Laboratory Improvement Amendments of 1988 (CLIA-88) as qualified to perfom high complexity laboratory testing.   UA Macroscopic with reflex to Microscopic and Culture    Specimen: Urine, Catheter   Result Value Ref Range    Color Urine Yellow Colorless, Straw, Light Yellow, Yellow    Appearance Urine Clear Clear    Glucose Urine Negative Negative mg/dL    Bilirubin Urine Negative Negative    Ketones Urine Negative Negative mg/dL    Specific Gravity Urine 1.021 1.003 - 1.035    Blood Urine Negative Negative    pH Urine 5.5 5.0 - 7.0    Protein Albumin Urine Negative Negative mg/dL    Urobilinogen Urine Normal Normal mg/dL    Nitrite Urine Positive (A) Negative    Leukocyte Esterase Urine Trace (A) Negative    Bacteria Urine Few (A) None Seen /HPF    Mucus Urine Present (A) None Seen /LPF    RBC Urine 2 <=2 /HPF    WBC Urine 9 (H) <=5 /HPF    Squamous Epithelials Urine <1 <=1 /HPF    Narrative    Urine Culture ordered based on laboratory criteria   Lactic acid whole blood   Result Value Ref Range    Lactic Acid 2.1 (H) 0.7 - 2.0 mmol/L   Phosphorus   Result Value Ref Range    Phosphorus 1.9 (L) 2.5 - 4.5 mg/dL   CT Chest (PE) Abdomen Pelvis w Contrast   Result Value Ref Range    Radiologist flags New diagnosis of pulmonary embolism (AA)     Narrative    EXAM: CT CHEST PE ABDOMEN PELVIS W CONTRAST  LOCATION: [facility]  DATE: 4/14/2025    INDICATION: Generalized weakness, metastatic cancer, hypercalcemia occasionally hypoxic, intermittent abdominal pain, evaluate for PE  COMPARISON: CT CAP 2/20/2025.  TECHNIQUE: CT chest pulmonary angiogram and routine CT abdomen pelvis with IV contrast. Arterial phase through the chest and venous phase through the  abdomen and pelvis. Multiplanar reformats and MIP reconstructions were performed. Dose reduction   techniques were used.   CONTRAST: 116 mL Isovue 370    FINDINGS:  ANGIOGRAM CHEST: There is poor opacification of a few subsegmental pulmonary arteries (for example in the right upper lobe on series 4 image 105 and right middle lobe on series 4 image 132). Pulmonary trunk is dilated measuring up to 40 mm which can be   seen in setting of pulmonary hypertension. Thoracic aorta is negative for dissection within the limitations of cardiac motion artifact.    LUNGS AND PLEURA: Interval resolution of multiple previously seen pulmonary nodules. A right middle lobe nodule measures 5 mm compared to 7 mm (series 6, image 118). Right lower lobe nodule measures 8 mm compared to 6 mm (series 6, image 171). A 4 mm   subpleural nodule of the right lower lobe is new (series 6, image 168). No airspace consolidation. Trace left pleural fluid.    MEDIASTINUM/AXILLAE: Worsening left axillary adenopathy with largest node measuring 4.8 x 3.8 cm compared to 4.7 x 3.2 cm. Stable mild cardiomegaly with chronic enlargement of the right ventricle. Trace pericardial fluid.    CORONARY ARTERY CALCIFICATION: Severe.    HEPATOBILIARY: Numerous hypoenhancing liver metastases which are increased in conspicuity compared to prior. Multiple lesions were not previously seen such as a 0.9 cm metastasis of the hepatic dome (series 12, image 41). No radiopaque gallstone. No   biliary ductal dilatation.    PANCREAS: Normal.    SPLEEN: Normal.    ADRENAL GLANDS: Stable mild nodularity of the left adrenal gland. The right adrenal is unremarkable.    KIDNEYS/BLADDER: Normal.    BOWEL: No obstruction or inflammatory change. Normal appendix.    LYMPH NODES: Assuming retroperitoneal and right iliac chain adenopathy. For example, a left para-aortic retroperitoneal node measures 1.1 cm in short axis compared to 0.6 cm (series 12, image 122). A metastatic right  retroperitoneal nodule measures 1.6 x   1.7 cm compared to 1.4 x 1.7 cm (series 12, image 151).    VASCULATURE: Mild burden of vascular calcifications without abdominal aortic aneurysm.    PELVIC ORGANS: A mixed cystic solid right adnexal mass measures 8.0 x 8.4 x 9.4 cm compared to 8.4 x 10.2 x 10.2 cm when measured with similar technique. Calcified uterine fibroid.    MUSCULOSKELETAL: Diffuse soft tissue nodules of the ventral abdominal wall, likely sequela of medication injection. Enlarging destructive lytic metastasis involving the right sacrum and medial right iliac bone with soft tissue component now measuring up   to 8.2 x 5.2 cm compared to 5.2 x 3.2 cm. This lytic lesion involves multiple right sacral neural foramina. Additional sites of worsening osseous metastatic disease including the left posterior iliac bone, left posterior 11th rib, left anterior third   rib, and right humeral head. Enlarging destructive lesion of the manubrium with new nondisplaced pathologic fracture. Partially imaged surgical fixation hardware of the right femur. Degenerative changes of the spine.      Impression    IMPRESSION:  1.  There is poor opacification of a few subsegmental pulmonary arteries which is suspicious for small volume pulmonary emboli.  2.  Progression of metastatic disease with new hepatic metastases, enlarging osseous metastases, and enlarging lymph nodes above and below the diaphragm. This includes enlarging manubrial metastasis with pathologic fracture and enlarging destructive   lesion of the right hemisacrum and right medial iliac bone.  3.  Interval resolution of multiple previously seen pulmonary nodules. New right lower lobe nodule. A few additional pulmonary nodules are stable or mildly increased in size.  4.  A right adnexal mass is mildly decreased in size.  5.  Multiple additional findings as above.      [Critical Result: New diagnosis of pulmonary embolism]    Finding was identified on 4/14/2025  1:36 PM CDT.     Dr. Cross was contacted by me on 4/14/2025 2:03 PM CDT and verbalized understanding of the critical result.   Renal panel   Result Value Ref Range    Sodium 133 (L) 135 - 145 mmol/L    Potassium 4.1 3.4 - 5.3 mmol/L    Chloride 100 98 - 107 mmol/L    Carbon Dioxide (CO2) 23 22 - 29 mmol/L    Anion Gap 10 7 - 15 mmol/L    Glucose 101 (H) 70 - 99 mg/dL    Urea Nitrogen 14.2 6.0 - 20.0 mg/dL    Creatinine 0.39 (L) 0.51 - 0.95 mg/dL    GFR Estimate >90 >60 mL/min/1.73m2    Calcium 12.1 (H) 8.8 - 10.4 mg/dL    Albumin 2.7 (L) 3.5 - 5.2 g/dL    Phosphorus 2.5 2.5 - 4.5 mg/dL   Magnesium   Result Value Ref Range    Magnesium 1.5 (L) 1.7 - 2.3 mg/dL   Lactic acid whole blood   Result Value Ref Range    Lactic Acid 1.7 0.7 - 2.0 mmol/L   Blood gas venous   Result Value Ref Range    pH Venous 7.31 (L) 7.32 - 7.43    pCO2 Venous 57 (H) 40 - 50 mm Hg    pO2 Venous 41 25 - 47 mm Hg    Bicarbonate Venous 28 21 - 28 mmol/L    Base Excess/Deficit Venous 1.6 -3.0 - 3.0 mmol/L    FIO2 24     Oxyhemoglobin Venous 68 (L) 70 - 75 %    O2 Sat, Venous 69.3 (L) 70.0 - 75.0 %    Narrative    In healthy individuals, oxyhemoglobin (O2Hb) and oxygen saturation (SO2) are approximately equal. In the presence of dyshemoglobins, oxyhemoglobin can be considerably lower than oxygen saturation.       For the majority of the shift this patient's behavior was Green     Cardiac Rhythm: N/A  Pt needs tele? No  Skin/wound Issues: wound on buttock    Code Status: to be discussed    Pain control: fair    Nausea control: pt had none    Patient tested for COVID 19 prior to admission: YES     OBS brochure/video discussed/provided to patient/family: N/A     Family present during ED course? Yes     Family Comments/Social Situation comments: none    Tasks needing completion: None    Latricia Zazueta RN

## 2025-04-14 NOTE — ED PROVIDER NOTES
History     Chief Complaint   Patient presents with    Generalized Weakness     HPI  Brissa Mott is a 54 year old female, past medical history is significant for metastatic neuroendocrine carcinoma, pathologic fracture of right femur due to neoplastic disease, hypercalcemia, CVA, adjustment disorder with depressed mood, frequent falls, hemiplegia and hemiparesis following CVA, dysphagia, hypertension, type 2 diabetes, KATHRINE, morbid obesity, hyperlipidemia, hypertension, type 2 diabetes, allergic rhinitis, asthma, presents to the emergency department by EMS with concerns of generalized weakness, recent discharge from TCU.  History is obtained from the patient,  and sister-in-law who arrived shortly after she does by EMS.  They are concerned that she is unsafe at home and they are not able to look after her, she is extremely weak, difficult to lift at home for her which she keeps slipping off as she did this morning.  She has intractable pain in both her right leg where she has had recent surgery with a pathologic fracture related to her metastatic neuroendocrine carcinoma, chronic left knee pain and also right shoulder pain which she tells me is chronic as well.  Pain managed at home per  with oxycodone.  Anorexia, low-grade nausea at times none presently, no vomiting.  Denies any cough runny nose sore throat fever chills or sweats.  No chest pain or abdominal pain.  Was in TCU approximately a week ago and discharged home and is not doing well.  The patient fell this morning as well as yesterday with attempts at using the left.  She reports that she was not injured and the areas of pain are unchanged and she has no new areas of pain to account.    Allergies:  Allergies   Allergen Reactions    Sulfa Antibiotics Shortness Of Breath, Hives and Rash    Bydureon [Exenatide] Diarrhea    Penicillins Rash and Hives       Problem List:    Patient Active Problem List    Diagnosis Date Noted    Hypomagnesemia  04/14/2025     Priority: Medium    General weakness 04/14/2025     Priority: Medium    Hypercalcemia of malignancy 04/14/2025     Priority: Medium    UTI (urinary tract infection), bacterial 04/14/2025     Priority: Medium    Sepsis, due to unspecified organism, unspecified whether acute organ dysfunction present (H) 04/14/2025     Priority: Medium    Other acute pulmonary embolism, unspecified whether acute cor pulmonale present (H) 04/14/2025     Priority: Medium    Neuroendocrine carcinoma (H) 03/21/2025     Priority: Medium    Pathological fracture of right femur due to neoplastic disease, initial encounter (H) 03/21/2025     Priority: Medium    Hypercalcemia 02/20/2025     Priority: Medium    Knee pain 02/20/2025     Priority: Medium    Acute ischemic stroke (H) 02/18/2025     Priority: Medium    Adjustment disorder with depressed mood 02/18/2025     Priority: Medium    Fall 01/31/2025     Priority: Medium    Acute pain of right knee 01/31/2025     Priority: Medium    Hemiplegia and hemiparesis following cerebral infarction affecting left non-dominant side (H) 06/07/2022     Priority: Medium    Left knee pain 11/01/2019     Priority: Medium    Dysphagia 10/31/2019     Priority: Medium    Secondary hypertension 08/22/2019     Priority: Medium    Type 2 diabetes mellitus with hyperglycemia, with long-term current use of insulin (H) 08/12/2019     Priority: Medium    KATHRINE (obstructive sleep apnea) 08/12/2019     Priority: Medium     Has bipap but does not use      Morbid obesity (H) 06/05/2019     Priority: Medium    Hyperlipidemia 07/17/2017     Priority: Medium    Pneumonia, community acquired 04/13/2017     Priority: Medium    Obesity hypoventilation syndrome (H) 01/01/2017     Priority: Medium    Type 2 diabetes mellitus with complication, with long-term current use of insulin (H) 12/21/2016     Priority: Medium    Morbid obesity with BMI of 60.0-69.9, adult (H) 05/08/2016     Priority: Medium    Reactive  airway disease without complication 2015     Priority: Medium    Allergic rhinitis 10/01/2009     Priority: Medium    Asthma 2004     Priority: Medium     Asthma  NOS          Past Medical History:    Past Medical History:   Diagnosis Date    Asthma     Cerebral infarction (H)     Diabetes (H)     Hypertension     Mixed hyperlipidemia     Morbid obesity (H)     KATHRINE (obstructive sleep apnea)     Osteoarthritis        Past Surgical History:    Past Surgical History:   Procedure Laterality Date     SECTION      x2    INSERTION, INTRAMEDULLARY JAMES, RETROGRADE, FOR FEMORAL SHAFT FRACTURE Right 2025    Procedure: Open reduction internal fixation intramedullary rodding right hip fracture;  Surgeon: Lazarus Meredith MD;  Location: [facility] OR       Family History:    No family history on file.    Social History:  Marital Status:   [2]  Social History     Tobacco Use    Smoking status: Former    Smokeless tobacco: Never   Vaping Use    Vaping status: Never Used   Substance Use Topics    Alcohol use: Not Currently    Drug use: Never        Medications:    acetaminophen (TYLENOL) 325 MG tablet  amLODIPine (NORVASC) 10 MG tablet  atorvastatin (LIPITOR) 40 MG tablet  Continuous Glucose Sensor (FREESTYLE SANDRA 3 SENSOR) MISC  enoxaparin ANTICOAGULANT (LOVENOX) 40 MG/0.4ML syringe  gabapentin (NEURONTIN) 300 MG capsule  hydrOXYzine HCl (ATARAX) 25 MG tablet  ibuprofen (ADVIL/MOTRIN) 200 MG tablet  insulin NPH-Regular (HUMULIN 70/30;NOVOLIN 70/30) susp  insulin pen needle (BD PEN NEEDLE WTYLA 2ND GEN) 32G X 4 MM miscellaneous  irbesartan (AVAPRO) 300 MG tablet  Lidocaine (LIDOCARE) 4 % Patch  metFORMIN (GLUCOPHAGE) 1000 MG tablet  Misc. Devices (ROLLATOR ULTRA-LIGHT) MISC  montelukast (SINGULAIR) 10 MG tablet  naloxone (NARCAN) 4 MG/0.1ML nasal spray  oxyCODONE (ROXICODONE) 5 MG tablet  Semaglutide, 2 MG/DOSE, (OZEMPIC, 2 MG/DOSE,) 8 MG/3ML pen  senna-docusate (SENOKOT-S/PERICOLACE) 8.6-50 MG  "tablet  sertraline (ZOLOFT) 100 MG tablet          Review of Systems   All other systems reviewed and are negative.      Physical Exam   BP: (!) 172/79  Pulse: 101  Temp: 98.3  F (36.8  C)  Resp: 20  Height: 162.6 cm (5' 4\")  Weight: 107.5 kg (237 lb)  SpO2: (!) 91 %      Physical Exam  Vitals and nursing note reviewed.   Constitutional:       General: She is in acute distress.      Appearance: Normal appearance. She is obese. She is ill-appearing.      Comments: Appears acutely uncomfortable due to pain.   HENT:      Head: Normocephalic and atraumatic.      Right Ear: Tympanic membrane, ear canal and external ear normal.      Left Ear: Tympanic membrane, ear canal and external ear normal.      Nose: Nose normal.      Mouth/Throat:      Mouth: Mucous membranes are dry.      Pharynx: Oropharynx is clear.   Eyes:      Extraocular Movements: Extraocular movements intact.      Conjunctiva/sclera: Conjunctivae normal.      Pupils: Pupils are equal, round, and reactive to light.   Cardiovascular:      Rate and Rhythm: Normal rate and regular rhythm.      Pulses: Normal pulses.      Heart sounds: Normal heart sounds.   Pulmonary:      Effort: Pulmonary effort is normal.      Breath sounds: Normal breath sounds.   Abdominal:      General: Bowel sounds are normal.      Palpations: Abdomen is soft.   Musculoskeletal:      Cervical back: Normal range of motion and neck supple.   Skin:     General: Skin is warm and dry.      Capillary Refill: Capillary refill takes less than 2 seconds.   Neurological:      General: No focal deficit present.      Mental Status: She is alert and oriented to person, place, and time.   Psychiatric:         Mood and Affect: Mood normal.         Behavior: Behavior normal.         ED Course        Procedures              EKG Interpretation:      Interpreted by Bruce Cross MD  Time reviewed: Time obtained 925 time interpreted same sinus tachycardia 103 bpm right bundle branch block,     "     Results for orders placed or performed during the hospital encounter of 04/14/25 (from the past 24 hours)   CBC with platelets, differential    Narrative    The following orders were created for panel order CBC with platelets, differential.  Procedure                               Abnormality         Status                     ---------                               -----------         ------                     CBC with platelets and ...[[id number]]  Abnormal            Final result                 Please view results for these tests on the individual orders.   Comprehensive metabolic panel   Result Value Ref Range    Sodium 132 (L) 135 - 145 mmol/L    Potassium 4.4 3.4 - 5.3 mmol/L    Carbon Dioxide (CO2) 25 22 - 29 mmol/L    Anion Gap 12 7 - 15 mmol/L    Urea Nitrogen 19.1 6.0 - 20.0 mg/dL    Creatinine 0.38 (L) 0.51 - 0.95 mg/dL    GFR Estimate >90 >60 mL/min/1.73m2    Calcium 13.8 (H) 8.8 - 10.4 mg/dL    Chloride 95 (L) 98 - 107 mmol/L    Glucose 241 (H) 70 - 99 mg/dL    Alkaline Phosphatase 192 (H) 40 - 150 U/L    AST 61 (H) 0 - 45 U/L    ALT 14 0 - 50 U/L    Protein Total 6.5 6.4 - 8.3 g/dL    Albumin 3.2 (L) 3.5 - 5.2 g/dL    Bilirubin Total 0.4 <=1.2 mg/dL   Gillespie Draw    Narrative    The following orders were created for panel order Gilles[facility] Draw.  Procedure                               Abnormality         Status                     ---------                               -----------         ------                     Extra Blue Top Tube[[id number]]                             Final result                 Please view results for these tests on the individual orders.   CBC with platelets and differential   Result Value Ref Range    WBC Count 15.5 (H) 4.0 - 11.0 10e3/uL    RBC Count 4.12 3.80 - 5.20 10e6/uL    Hemoglobin 9.6 (L) 11.7 - 15.7 g/dL    Hematocrit 32.6 (L) 35.0 - 47.0 %    MCV 79 78 - 100 fL    MCH 23.3 (L) 26.5 - 33.0 pg    MCHC 29.4 (L) 31.5 - 36.5 g/dL    RDW 17.2 (H) 10.0 - 15.0 %     Platelet Count 512 (H) 150 - 450 10e3/uL    % Neutrophils 76 %    % Lymphocytes 10 %    % Monocytes 8 %    % Eosinophils 1 %    % Basophils 1 %    % Immature Granulocytes 4 %    NRBCs per 100 WBC 0 <1 /100    Absolute Neutrophils 11.8 (H) 1.6 - 8.3 10e3/uL    Absolute Lymphocytes 1.5 0.8 - 5.3 10e3/uL    Absolute Monocytes 1.3 0.0 - 1.3 10e3/uL    Absolute Eosinophils 0.2 0.0 - 0.7 10e3/uL    Absolute Basophils 0.1 0.0 - 0.2 10e3/uL    Absolute Immature Granulocytes 0.7 (H) <=0.4 10e3/uL    Absolute NRBCs 0.0 10e3/uL   Extra Blue Top Tube   Result Value Ref Range    Hold Specimen JIC    Lipase   Result Value Ref Range    Lipase 14 13 - 60 U/L   Troponin T, High Sensitivity   Result Value Ref Range    Troponin T, High Sensitivity 11 <=14 ng/L   Magnesium   Result Value Ref Range    Magnesium 1.1 (L) 1.7 - 2.3 mg/dL   Ketone Beta-Hydroxybutyrate Quantitative   Result Value Ref Range    Ketone (Beta-Hydroxybutyrate) Quantitative <0.18 <=0.30 mmol/L   Hemoglobin A1c   Result Value Ref Range    Estimated Average Glucose 148 (H) <117 mg/dL    Hemoglobin A1C 6.8 (H) <5.7 %   EKG 12 lead   Result Value Ref Range    Systolic Blood Pressure  mmHg    Diastolic Blood Pressure  mmHg    Ventricular Rate 103 BPM    Atrial Rate 103 BPM    IL Interval 146 ms    QRS Duration 156 ms     ms    QTc 466 ms    P Axis 73 degrees    R AXIS 32 degrees    T Axis -26 degrees    Interpretation ECG       Sinus tachycardia  Right bundle branch block  Inferior infarct , age undetermined  Anterior infarct , age undetermined  Abnormal ECG  No previous ECGs available     Blood gas venous   Result Value Ref Range    pH Venous 7.45 (H) 7.32 - 7.43    pCO2 Venous 42 40 - 50 mm Hg    pO2 Venous 54 (H) 25 - 47 mm Hg    Bicarbonate Venous 29 (H) 21 - 28 mmol/L    Base Excess/Deficit Venous 5.0 (H) -3.0 - 3.0 mmol/L    FIO2 21     Oxyhemoglobin Venous 87 (H) 70 - 75 %    O2 Sat, Venous 89.3 (H) 70.0 - 75.0 %    Narrative    In healthy individuals,  oxyhemoglobin (O2Hb) and oxygen saturation (SO2) are approximately equal. In the presence of dyshemoglobins, oxyhemoglobin can be considerably lower than oxygen saturation.   Ionized Calcium   Result Value Ref Range    Calcium Ionized Whole Blood 7.5 (HH) 4.4 - 5.2 mg/dL   Lactic Acid Whole Blood with 1X Repeat in 2 HR when >2   Result Value Ref Range    Lactic Acid, Initial 3.0 (H) 0.7 - 2.0 mmol/L   Troponin T, High Sensitivity   Result Value Ref Range    Troponin T, High Sensitivity 11 <=14 ng/L   Influenza A/B, RSV and SARS-CoV2 PCR (COVID-19) Nose    Specimen: Nose; Swab   Result Value Ref Range    Influenza A PCR Negative Negative    Influenza B PCR Negative Negative    RSV PCR Negative Negative    SARS CoV2 PCR Negative Negative    Narrative    Testing was performed using the Xpert Xpress CoV2/Flu/RSV Assay on the Cepheid GeneXpert Instrument. This test should be ordered for the detection of SARS-CoV2, influenza, and RSV viruses in individuals with signs and symptoms of respiratory tract infection. This test is for in vitro diagnostic use under the [place] FDA for laboratories certified under CLIA to perform high or moderate complexity testing. This test has been [place] FDA cleared. A negative result does not rule out the presence of PCR inhibitors in the specimen or target RNA in concentration below the limit of detection for the assay. If only one viral target is positive but coinfection with multiple targets is suspected, the sample should be re-tested with another FDA cleared, approved, or authorized test, if coninfection would change clinical management. This test was validated by the [facility]. These laboratories are certified under the Clinical Laboratory Improvement Amendments of 1988 (CLIA-88) as qualified to perfom high complexity laboratory testing.   UA Macroscopic with reflex to Microscopic and Culture    Specimen: Urine, Catheter   Result Value Ref Range    Color Urine Yellow  Colorless, Straw, Light Yellow, Yellow    Appearance Urine Clear Clear    Glucose Urine Negative Negative mg/dL    Bilirubin Urine Negative Negative    Ketones Urine Negative Negative mg/dL    Specific Gravity Urine 1.021 1.003 - 1.035    Blood Urine Negative Negative    pH Urine 5.5 5.0 - 7.0    Protein Albumin Urine Negative Negative mg/dL    Urobilinogen Urine Normal Normal mg/dL    Nitrite Urine Positive (A) Negative    Leukocyte Esterase Urine Trace (A) Negative    Bacteria Urine Few (A) None Seen /HPF    Mucus Urine Present (A) None Seen /LPF    RBC Urine 2 <=2 /HPF    WBC Urine 9 (H) <=5 /HPF    Squamous Epithelials Urine <1 <=1 /HPF    Narrative    Urine Culture ordered based on laboratory criteria   Lactic acid whole blood   Result Value Ref Range    Lactic Acid 2.1 (H) 0.7 - 2.0 mmol/L   Phosphorus   Result Value Ref Range    Phosphorus 1.9 (L) 2.5 - 4.5 mg/dL   CT Chest (PE) Abdomen Pelvis w Contrast   Result Value Ref Range    Radiologist flags New diagnosis of pulmonary embolism (AA)     Narrative    EXAM: CT CHEST PE ABDOMEN PELVIS W CONTRAST  LOCATION: [facility]  DATE: 4/14/2025    INDICATION: Generalized weakness, metastatic cancer, hypercalcemia occasionally hypoxic, intermittent abdominal pain, evaluate for PE  COMPARISON: CT CAP 2/20/2025.  TECHNIQUE: CT chest pulmonary angiogram and routine CT abdomen pelvis with IV contrast. Arterial phase through the chest and venous phase through the abdomen and pelvis. Multiplanar reformats and MIP reconstructions were performed. Dose reduction   techniques were used.   CONTRAST: 116 mL Isovue 370    FINDINGS:  ANGIOGRAM CHEST: There is poor opacification of a few subsegmental pulmonary arteries (for example in the right upper lobe on series 4 image 105 and right middle lobe on series 4 image 132). Pulmonary trunk is dilated measuring up to 40 mm which can be   seen in setting of pulmonary hypertension. Thoracic aorta is negative  for dissection within the limitations of cardiac motion artifact.    LUNGS AND PLEURA: Interval resolution of multiple previously seen pulmonary nodules. A right middle lobe nodule measures 5 mm compared to 7 mm (series 6, image 118). Right lower lobe nodule measures 8 mm compared to 6 mm (series 6, image 171). A 4 mm   subpleural nodule of the right lower lobe is new (series 6, image 168). No airspace consolidation. Trace left pleural fluid.    MEDIASTINUM/AXILLAE: Worsening left axillary adenopathy with largest node measuring 4.8 x 3.8 cm compared to 4.7 x 3.2 cm. Stable mild cardiomegaly with chronic enlargement of the right ventricle. Trace pericardial fluid.    CORONARY ARTERY CALCIFICATION: Severe.    HEPATOBILIARY: Numerous hypoenhancing liver metastases which are increased in conspicuity compared to prior. Multiple lesions were not previously seen such as a 0.9 cm metastasis of the hepatic dome (series 12, image 41). No radiopaque gallstone. No   biliary ductal dilatation.    PANCREAS: Normal.    SPLEEN: Normal.    ADRENAL GLANDS: Stable mild nodularity of the left adrenal gland. The right adrenal is unremarkable.    KIDNEYS/BLADDER: Normal.    BOWEL: No obstruction or inflammatory change. Normal appendix.    LYMPH NODES: Assuming retroperitoneal and right iliac chain adenopathy. For example, a left para-aortic retroperitoneal node measures 1.1 cm in short axis compared to 0.6 cm (series 12, image 122). A metastatic right retroperitoneal nodule measures 1.6 x   1.7 cm compared to 1.4 x 1.7 cm (series 12, image 151).    VASCULATURE: Mild burden of vascular calcifications without abdominal aortic aneurysm.    PELVIC ORGANS: A mixed cystic solid right adnexal mass measures 8.0 x 8.4 x 9.4 cm compared to 8.4 x 10.2 x 10.2 cm when measured with similar technique. Calcified uterine fibroid.    MUSCULOSKELETAL: Diffuse soft tissue nodules of the ventral abdominal wall, likely sequela of medication injection.  Enlarging destructive lytic metastasis involving the right sacrum and medial right iliac bone with soft tissue component now measuring up   to 8.2 x 5.2 cm compared to 5.2 x 3.2 cm. This lytic lesion involves multiple right sacral neural foramina. Additional sites of worsening osseous metastatic disease including the left posterior iliac bone, left posterior 11th rib, left anterior third   rib, and right humeral head. Enlarging destructive lesion of the manubrium with new nondisplaced pathologic fracture. Partially imaged surgical fixation hardware of the right femur. Degenerative changes of the spine.      Impression    IMPRESSION:  1.  There is poor opacification of a few subsegmental pulmonary arteries which is suspicious for small volume pulmonary emboli.  2.  Progression of metastatic disease with new hepatic metastases, enlarging osseous metastases, and enlarging lymph nodes above and below the diaphragm. This includes enlarging manubrial metastasis with pathologic fracture and enlarging destructive   lesion of the right hemisacrum and right medial iliac bone.  3.  Interval resolution of multiple previously seen pulmonary nodules. New right lower lobe nodule. A few additional pulmonary nodules are stable or mildly increased in size.  4.  A right adnexal mass is mildly decreased in size.  5.  Multiple additional findings as above.      [Critical Result: New diagnosis of pulmonary embolism]    Finding was identified on 4/14/2025 1:36 PM CDT.     Dr. Cross was contacted by me on 4/14/2025 2:03 PM CDT and verbalized understanding of the critical result.   Lactic acid whole blood   Result Value Ref Range    Lactic Acid 1.7 0.7 - 2.0 mmol/L   Blood gas venous   Result Value Ref Range    pH Venous 7.31 (L) 7.32 - 7.43    pCO2 Venous 57 (H) 40 - 50 mm Hg    pO2 Venous 41 25 - 47 mm Hg    Bicarbonate Venous 28 21 - 28 mmol/L    Base Excess/Deficit Venous 1.6 -3.0 - 3.0 mmol/L    FIO2 24     Oxyhemoglobin Venous 68  (L) 70 - 75 %    O2 Sat, Venous 69.3 (L) 70.0 - 75.0 %    Narrative    In healthy individuals, oxyhemoglobin (O2Hb) and oxygen saturation (SO2) are approximately equal. In the presence of dyshemoglobins, oxyhemoglobin can be considerably lower than oxygen saturation.     3:19 PM  All diagnostics with the exception of the still unread CT chest PE abdomen pelvis were reviewed with  with respect to hospital admission for this patient.  Agreed to accept the care of the patient in transfer to the hospitalist service inpatient status.  I did discussed with the hospitalist if there were any significant findings such as a PE that I would call her back.  I had my unit clerk text message the hospitalist with the finding and to give me a call if she had any questions.  I received no callback.  Yohan Georges came to the emergency department and we have reviewed the patient briefly and he is aware of the pulmonary emboli and the initiation of Xarelto.    Medications   HYDROmorphone (PF) (DILAUDID) injection 0.5 mg (0.5 mg Intravenous $Given 4/14/25 1537)   cefTRIAXone (ROCEPHIN) 2 g vial to attach to  ml bag for ADULTS or NS 50 ml bag for PEDS (has no administration in time range)   rivaroxaban ANTICOAGULANT (XARELTO) tablet 15 mg (has no administration in time range)     Followed by   rivaroxaban ANTICOAGULANT (XARELTO) tablet 20 mg (has no administration in time range)   sodium chloride 0.9% BOLUS 1,000 mL (0 mLs Intravenous Stopped 4/14/25 1118)   ondansetron (ZOFRAN) injection 4 mg (4 mg Intravenous $Given 4/14/25 0947)   ketorolac (TORADOL) injection 15 mg (15 mg Intravenous $Given 4/14/25 0948)   sodium chloride 0.9% BOLUS 1,000 mL (0 mLs Intravenous Stopped 4/14/25 1249)   cefTRIAXone (ROCEPHIN) 2 g vial to attach to  ml bag for ADULTS or NS 50 ml bag for PEDS (0 g Intravenous Stopped 4/14/25 1118)   magnesium sulfate 2 g in 50 mL sterile water intermittent infusion (0 g Intravenous Stopped 4/14/25  1250)   iopamidol (ISOVUE-370) solution 116 mL (116 mLs Intravenous $Given 4/14/25 1254)   sodium chloride 0.9 % bag for CT scan flush (100 mLs Intravenous $Given 4/14/25 1254)   sodium chloride 0.9% BOLUS 1,000 mL (0 mLs Intravenous Stopped 4/14/25 1448)       Assessments & Plan (with Medical Decision Making)   Unfortunate 54-year-old female who presents from home with multiple concerns including generalized weakness, pain in her bilateral knees with recent surgery for pathologic fracture right femur, care concerns at home identified by family and that she is extremely weak, has fallen off the lift a couple of times at home after only being there a week from the TCU.  Multiple abnormalities are identified on her evaluation here today including UTI with sepsis, hypercalcemia of malignancy, hypomagnesemia, and additionally small pulmonary emboli at the subsegmental level.  The patient is admitted after discussion with the hospitalist service for further cares.      Disclaimer: This note consists of symbols derived from keyboarding, dictation and/or voice recognition software. As a result, there may be errors in the script that have gone undetected. Please consider this when interpreting information found in this chart.      I have reviewed the nursing notes.    I have reviewed the findings, diagnosis, plan and need for follow up with the patient.        New Prescriptions    No medications on file       Final diagnoses:   General weakness   UTI (urinary tract infection), bacterial   Sepsis, due to unspecified organism, unspecified whether acute organ dysfunction present (H)   Hypercalcemia of malignancy   Hypomagnesemia   Other acute pulmonary embolism, unspecified whether acute cor pulmonale present (H)       4/14/2025   [facility] EMERGENCY DEPT       Bruce Cross MD  04/14/25 [id number]

## 2025-04-14 NOTE — ED NOTES
arrived and voiced concern that pt is unsafe at home, has had multiple falls and continues to get weaker.  would like pt to be placed in LTC

## 2025-04-14 NOTE — H&P
[facility]    History and Physical - Hospitalist Service       Date of Admission:  4/14/2025    Assessment & Plan    Brissa Mott is a 54 year old female with past medical hx of recently diagnosed neuroendocrine carcinoma, T2DM, CVA with residual hemiparesis, hypercalcemia, HTN, HLD, KATHRINE admitted on 4/14/2025. She presented for generalized weakness after recent discharge from TCU.    Generalized weakness  Fall, initial encounter    Recently admitted 2/20-3/5 for pathological fracture (see below) and discharged to TCU. Left TCU on 4/7 and has had 3 falls in the interim. Falls without injuries, but family unable to help her up and requires EMS assistance. Unsafe to remain at home with  and daughter. Weakness multifactorial due to UTI, hypomagnesemia, PE, progression of cancer.    - PT/OT/SW consulted  - See below for etiologies    Acute UTI without hematuria  Severe sepsis without septic shock  UA with nitrites, trace LE, few bacteria, 9 WBC. Arrived septic based on leukocytosis (15.5), HR (110s). Lactic 3.0.    - Ceftriaxone 2g IV daily  - Blood cultures pending  - Urine culture pending    Acute pulmonary embolism  Not hypoxic. CT with poor opacification of few subsegmental pulmonary arteries suspicious for small volume pulmonary emboli. After her ORIF on 2/25 (see below) she was given Lovenox 40mg BID for 6 weeks (ending 4/17) which she endorses compliance with.  - Lovenox 80mg IV BID. Avoiding DOACs for now due to plans for biopsy    Hypercalcemia  Calcium 13.8 and ionized calcium 7.5 on admission. Seen on previous admission up to 14.7 and resolved with IVF and Zometa. Due to malignancy.    - LR 200ml/hr   - Consider chronic bisphosphonate's at discharge to prevent recurrence    Hypomagnesemia  Hypophosphatemia  Mag 1.1 and phos 1.9 on admission.  - RN repletion protocol  - Tele    Neuroendocrine carcinoma, metastatic  Pathological fracture of right femur due to neoplastic disease  s/p ORIF 2/25/25    Admitted 2/20-3/5 for a pathologic fracture of right femur, and pathology showed poorly differentiated carcinoma with neuroendocrine features. Underwent ORIF 2/25 and received Lovenox for 30 days post-op. CT c/a/p showed multiple pulmonary nodules, left axillary node, several hypodense areas in liver, bony lesion on right humeral head/right sacrum/pelvis, several subcutaneous nodules in anterior chest wall and right gluteal region.    Established with oncology 3/21. Current plan is for radiation therapy to the pathological fracture and biopsy of the left axillary lymph node, which was scheduled for 4/14 but she ended up in the ER. Has not started treatment yet.    CT on admission showed progression of metastatic disease with new hepatic mets, enlarging osseous mets, enlarging lymph nodes above and below diaphragm. This includes enlarging manubrial mets with pathologic fracture and enlarging destructive lesion of right hemisacrum and right medial iliac bone. Resolution of multiple previously seen pulmonary nodules but new RLL nodule. Other pulmonary nodules stable or mildly increased. Right adnexal mass mildly decreased.   - Continued PTA Lidocaine patches, PRN Tylenol, PRN Ibuprofen, PRN Oxycodone 5-10mg    Anorexia   very concerned about poor PO intake, and believes it's due to polypharmacy. Besides physical decline and multiple comorbidities as above, review of med list only reveals Ozempic as possible etiology. She hadn't taken it for ~6 weeks while admitted and at TCU, then took a dose when she got home on 4/7.  states this correlates with onset of poor PO intake.   - Recommend discontinuing Ozempic    Hx of CVA  Hemiplegia and hemiparesis following cerebral infarction affecting left non-dominant side   HLD  CVA in 2018. Residual left sided weakness.  - Continued PTA Atorvastatin 40mg    Type 2 diabetes mellitus with neuropathy, with long-term current use of insulin   A1c 6.8%  "on admission.  - sliding scale insulin  - Consistent carb diet  - Continued PTA Gabapentin 300mg BID, Humulin 70/30 BID 15U  - Held PTA Metformin 1000mg BID for lactic acidosis   - Held PTA Ozempic weekly. Consider discontinuation as above.    Anemia, borderline microcytic, chronic  Hemoglobin 9.6 on admission, baseline 8-9.      Hypertension  - Continued PTA Amlodipine 10mg, Ibesartan 300mg    Reactive airway disease without complication  - Continued PTA Montelukast 10mg    KATHRINE (obstructive sleep apnea)  - Continued PTA CPAP if available    Adjustment disorder with depressed mood  - Continued PTA Hydroxyzine 25mg PRN, Sertraline 150mg            Diet: Combination Diet Moderate Consistent Carb (60 g CHO per Meal) Diet  DVT Prophylaxis: Enoxaparin (Lovenox) SQ  Nielson Catheter: Not present  Lines: None     Cardiac Monitoring: ACTIVE order. Indication: Electrolyte Imbalance (24 hours)- Magnesium <1.3 mg/ml; Potassium < =2.8 or > 5.5 mg/ml  Code Status: Full Code    Clinically Significant Risk Factors Present on Admission         # Hyponatremia: Lowest Na = 132 mmol/L in last 2 days, will monitor as appropriate  # Hypochloremia: Lowest Cl = 95 mmol/L in last 2 days, will monitor as appropriate   # Hypercalcemia: Highest Ca = 13.8 mg/dL in last 2 days, will monitor as appropriate  # Hypomagnesemia: Lowest Mg = 1.1 mg/dL in last 2 days, will replace as needed   # Hypoalbuminemia: Lowest albumin = 2.7 g/dL at 4/14/2025  2:43 PM, will monitor as appropriate    # Drug Induced Coagulation Defect: home medication list includes an anticoagulant medication    # Hypertension: Noted on problem list      # Anemia: based on hgb <11      # DMII: A1C = 6.8 % (Ref range: <5.7 %) within past 6 months    # Severe Obesity: Estimated body mass index is 40.68 kg/m  as calculated from the following:    Height as of this encounter: 1.626 m (5' 4\").    Weight as of this encounter: 107.5 kg (237 lb).         # Financial/Environmental Concerns:  "   # Asthma: noted on problem list        Disposition Plan     Medically Ready for Discharge: Anticipated in 2-4 Days         The patient's care was discussed with the Attending Physician, Dr. Meyers, Bedside Nurse, Care Coordinator/, Patient, and Patient's Family.    Yohan Adames PA-C  Hospitalist Service  [facility]  Securely message with Your.MD (more info)  Text page via [facility] Paging/Directory     ______________________________________________________________________    Chief Complaint   Weakness    History is obtained from the patient's     History of Present Illness   Brissa Mott is a 54 year old female who presented for falls and weakness.    Patient was admitted from 2/22 to 3/5 and discharged to a TCU.  She was discharged from the TCU on 4/7 and returned home with her  and daughter.  On the first day she returned she had a fall and did not injure herself and EMS helped her up.  She fell again yesterday and it was determined that she is unsafe at home.    She was recently admitted for a fall causing a right femur fracture that was found to be a pathological fracture from a new diagnosis of neuroendocrine carcinoma.  This has been identified as metastasized with stage IV involvement.  She has followed with oncology and the plan is for radiation and a biopsy of one of the lymph nodes.  Her biopsy was today and she missed it.    Besides weakness and frequent falls she has no other complaints.  She denies vomiting, diarrhea, cough, runny nose, sore throat, fevers, chills, night sweats, chest pain, abdominal pain.      Has had decreased p.o. intake and nausea.   is very concerned that all of the medications she is taking is causing her poor p.o. intake.  She is on Ozempic that was held during her admission and TCU stay and she restarted it on 4/7, the first day she got out of the TCU.  I discussed that this is likely contributing to her poor p.o.  "intake and should be discontinued.    Past Medical History    Past Medical History:   Diagnosis Date    Asthma     Cerebral infarction (H)     Diabetes (H)     Hypertension     Mixed hyperlipidemia     Morbid obesity (H)     KATHRINE (obstructive sleep apnea)     Osteoarthritis        Past Surgical History   Past Surgical History:   Procedure Laterality Date     SECTION      x2    INSERTION, INTRAMEDULLARY JAMES, RETROGRADE, FOR FEMORAL SHAFT FRACTURE Right 2025    Procedure: Open reduction internal fixation intramedullary rodding right hip fracture;  Surgeon: Lazarus Meredith MD;  Location:  OR       Prior to Admission Medications   Prior to Admission Medications   Prescriptions Last Dose Informant Patient Reported? Taking?   Continuous Glucose Sensor (FREESTYLE SANDRA 3 SENSOR) [facility] 2025 Self No Yes   Si each every 14 days Use 1 sensor every 14 days. Use to read blood sugars per 's instructions.   Lidocaine (LIDOCARE) 4 % Patch More than a month Self Yes Yes   Sig: Place 1 patch onto the skin every 24 hours. On for 12 hours, off for 12 hours.   Misc. Devices (ROLLATOR ULTRA-LIGHT) MISC Unknown Self Yes Yes   Sig: Extra side walker with front wheels for home use.  Purchase, lifetime need. Extra wide rolling walker (\"Walker 4\", item #:  [id number]) needed for safe transfers and ambulation.  Pt weight is 335 lbs.   Semaglutide, 2 MG/DOSE, (OZEMPIC, 2 MG/DOSE,) 8 MG/3ML pen 2025 Self No Yes   Sig: Inject 2 mg subcutaneously every 7 days   acetaminophen (TYLENOL) 325 MG tablet 2025 at  4:00 AM Self Yes Yes   Sig: Take 650 mg by mouth every 6 hours as needed for pain.   amLODIPine (NORVASC) 10 MG tablet 2025 Morning Self No Yes   Sig: Take 1 tablet (10 mg) by mouth daily   atorvastatin (LIPITOR) 40 MG tablet 2025 Bedtime Self No Yes   Sig: Take 1 tablet (40 mg) by mouth daily   enoxaparin ANTICOAGULANT (LOVENOX) 40 MG/0.4ML syringe 2025 Bedtime Self, " "Spouse/Significant Other No Yes   Sig: Inject 0.4 mLs (40 mg) subcutaneously every 12 hours for 28 days.   gabapentin (NEURONTIN) 300 MG capsule  Self, Spouse/Significant Other No Yes   Sig: Take 1 capsule (300 mg) by mouth 2 times daily.   hydrOXYzine HCl (ATARAX) 25 MG tablet Unknown Self No Yes   Sig: Take 1 tablet (25 mg) by mouth every 6 hours as needed for other (adjuvant pain).   ibuprofen (ADVIL/MOTRIN) 200 MG tablet 4/14/2025 Morning Self Yes Yes   Sig: Take 200-400 mg by mouth every 4 hours as needed for mild pain.   insulin NPH-Regular (HUMULIN 70/30;NOVOLIN 70/30) susp 4/14/2025 Morning Self, Spouse/Significant Other Yes Yes   Sig: Inject subcutaneously 2 times daily. Per sliding scale.   insulin pen needle (BD PEN NEEDLE TWYLA 2ND GEN) 32G X 4 MM miscellaneous 4/14/2025 Morning Self No Yes   Sig: USE TWICE DAILY OR AS DIRECTED   irbesartan (AVAPRO) 300 MG tablet 4/13/2025 Bedtime Self No Yes   Sig: TAKE 1 TABLET(300 MG) BY MOUTH AT BEDTIME   metFORMIN (GLUCOPHAGE) 1000 MG tablet 4/14/2025 Morning Self No Yes   Sig: TAKE 1 TABLET BY MOUTH TWICE DAILY WITH MEALS   montelukast (SINGULAIR) 10 MG tablet 4/13/2025 Bedtime Self No Yes   Sig: Take 1 tablet (10 mg) by mouth at bedtime   naloxone (NARCAN) 4 MG/0.1ML nasal spray  Self, Spouse/Significant Other No Yes   Sig: Spray 1 spray (4 mg) into one nostril alternating nostrils as needed for opioid reversal. every 2-3 minutes until assistance arrives   oxyCODONE (ROXICODONE) 5 MG tablet 4/14/2025 at  5:30 AM Self, Spouse/Significant Other No Yes   Sig: Take 1-2 tablets (5-10 mg) by mouth every 3 hours as needed for moderate pain. 1 tab po q 3 hrs prn pain scale \"3-6\"  2 tabs po q 3 hrs prn pain scale \"7-10\"   senna-docusate (SENOKOT-S/PERICOLACE) 8.6-50 MG tablet More than a month Self No Yes   Sig: Take 1 tablet by mouth 2 times daily as needed for constipation.   sertraline (ZOLOFT) 100 MG tablet 4/13/2025 Evening Self No Yes   Sig: Take 1.5 tablets (150 mg) " by mouth daily      Facility-Administered Medications: None      2023 UPDATE: these sections are not required for  billing, but can be added when medically relevant     Physical Exam   Vital Signs: Temp: 98.3  F (36.8  C)   BP: (!) 150/88 Pulse: 94   Resp: 20 SpO2: 95 % O2 Device: Nasal cannula Oxygen Delivery: 1 LPM  Weight: 237 lbs 0 oz    Constitutional: Somnolent, cooperative, in no acute distress, appears nontoxic.  HENT: Oropharynx is clear and moist. No evidence of cranial trauma. Normocephalic. Eyes anicteric.   Cardiovascular: Regular rate and rhythm, normal S1 and S2, and no murmur noted. No lower extremity edema.  Respiratory: Clear to auscultation bilaterally with no adventitious breath sounds.   GI: Soft, non-tender, normal bowel sounds.  Musculoskeletal: Normal muscle bulk and tone. FROM in all extremities   Skin: Warm and dry, no rashes on exposed skin.   Neurologic: Cranial nerves 2-12 are grossly intact.       Medical Decision Making       90 MINUTES SPENT BY ME on the date of service doing chart review, history, exam, documentation & further activities per the note.      Data     I have personally reviewed the following data over the past 24 hrs:    15.5 (H)  \   9.6 (L)   / 512 (H)     133 (L) 100 14.2 /  101 (H)   4.1 23 0.39 (L) \     ALT: 14 AST: 61 (H) AP: 192 (H) TBILI: 0.4   ALB: 2.7 (L) TOT PROTEIN: 6.5 LIPASE: 14     Trop: 11 BNP: N/A     TSH: N/A T4: N/A A1C: 6.8 (H)     Procal: N/A CRP: N/A Lactic Acid: 1.7         Imaging results reviewed over the past 24 hrs:   Recent Results (from the past 24 hours)   CT Chest (PE) Abdomen Pelvis w Contrast   Result Value    Radiologist flags New diagnosis of pulmonary embolism (AA)    Narrative    EXAM: CT CHEST PE ABDOMEN PELVIS W CONTRAST  LOCATION: [facility]  DATE: 4/14/2025    INDICATION: Generalized weakness, metastatic cancer, hypercalcemia occasionally hypoxic, intermittent abdominal pain, evaluate for  PE  COMPARISON: CT CAP 2/20/2025.  TECHNIQUE: CT chest pulmonary angiogram and routine CT abdomen pelvis with IV contrast. Arterial phase through the chest and venous phase through the abdomen and pelvis. Multiplanar reformats and MIP reconstructions were performed. Dose reduction   techniques were used.   CONTRAST: 116 mL Isovue 370    FINDINGS:  ANGIOGRAM CHEST: There is poor opacification of a few subsegmental pulmonary arteries (for example in the right upper lobe on series 4 image 105 and right middle lobe on series 4 image 132). Pulmonary trunk is dilated measuring up to 40 mm which can be   seen in setting of pulmonary hypertension. Thoracic aorta is negative for dissection within the limitations of cardiac motion artifact.    LUNGS AND PLEURA: Interval resolution of multiple previously seen pulmonary nodules. A right middle lobe nodule measures 5 mm compared to 7 mm (series 6, image 118). Right lower lobe nodule measures 8 mm compared to 6 mm (series 6, image 171). A 4 mm   subpleural nodule of the right lower lobe is new (series 6, image 168). No airspace consolidation. Trace left pleural fluid.    MEDIASTINUM/AXILLAE: Worsening left axillary adenopathy with largest node measuring 4.8 x 3.8 cm compared to 4.7 x 3.2 cm. Stable mild cardiomegaly with chronic enlargement of the right ventricle. Trace pericardial fluid.    CORONARY ARTERY CALCIFICATION: Severe.    HEPATOBILIARY: Numerous hypoenhancing liver metastases which are increased in conspicuity compared to prior. Multiple lesions were not previously seen such as a 0.9 cm metastasis of the hepatic dome (series 12, image 41). No radiopaque gallstone. No   biliary ductal dilatation.    PANCREAS: Normal.    SPLEEN: Normal.    ADRENAL GLANDS: Stable mild nodularity of the left adrenal gland. The right adrenal is unremarkable.    KIDNEYS/BLADDER: Normal.    BOWEL: No obstruction or inflammatory change. Normal appendix.    LYMPH NODES: Assuming retroperitoneal  and right iliac chain adenopathy. For example, a left para-aortic retroperitoneal node measures 1.1 cm in short axis compared to 0.6 cm (series 12, image 122). A metastatic right retroperitoneal nodule measures 1.6 x   1.7 cm compared to 1.4 x 1.7 cm (series 12, image 151).    VASCULATURE: Mild burden of vascular calcifications without abdominal aortic aneurysm.    PELVIC ORGANS: A mixed cystic solid right adnexal mass measures 8.0 x 8.4 x 9.4 cm compared to 8.4 x 10.2 x 10.2 cm when measured with similar technique. Calcified uterine fibroid.    MUSCULOSKELETAL: Diffuse soft tissue nodules of the ventral abdominal wall, likely sequela of medication injection. Enlarging destructive lytic metastasis involving the right sacrum and medial right iliac bone with soft tissue component now measuring up   to 8.2 x 5.2 cm compared to 5.2 x 3.2 cm. This lytic lesion involves multiple right sacral neural foramina. Additional sites of worsening osseous metastatic disease including the left posterior iliac bone, left posterior 11th rib, left anterior third   rib, and right humeral head. Enlarging destructive lesion of the manubrium with new nondisplaced pathologic fracture. Partially imaged surgical fixation hardware of the right femur. Degenerative changes of the spine.      Impression    IMPRESSION:  1.  There is poor opacification of a few subsegmental pulmonary arteries which is suspicious for small volume pulmonary emboli.  2.  Progression of metastatic disease with new hepatic metastases, enlarging osseous metastases, and enlarging lymph nodes above and below the diaphragm. This includes enlarging manubrial metastasis with pathologic fracture and enlarging destructive   lesion of the right hemisacrum and right medial iliac bone.  3.  Interval resolution of multiple previously seen pulmonary nodules. New right lower lobe nodule. A few additional pulmonary nodules are stable or mildly increased in size.  4.  A right adnexal  mass is mildly decreased in size.  5.  Multiple additional findings as above.      [Critical Result: New diagnosis of pulmonary embolism]    Finding was identified on 4/14/2025 1:36 PM CDT.     Dr. Cross was contacted by me on 4/14/2025 2:03 PM CDT and verbalized understanding of the critical result.

## 2025-04-15 ENCOUNTER — APPOINTMENT (OUTPATIENT)
Dept: OCCUPATIONAL THERAPY | Facility: CLINIC | Age: 55
End: 2025-04-15
Attending: STUDENT IN AN ORGANIZED HEALTH CARE EDUCATION/TRAINING PROGRAM
Payer: COMMERCIAL

## 2025-04-15 LAB
ALBUMIN SERPL BCG-MCNC: 2.9 G/DL (ref 3.5–5.2)
ALP SERPL-CCNC: 167 U/L (ref 40–150)
ALT SERPL W P-5'-P-CCNC: 10 U/L (ref 0–50)
ANION GAP SERPL CALCULATED.3IONS-SCNC: 12 MMOL/L (ref 7–15)
AST SERPL W P-5'-P-CCNC: 67 U/L (ref 0–45)
BACTERIA UR CULT: ABNORMAL
BASE EXCESS BLDV CALC-SCNC: 5 MMOL/L (ref -3–3)
BILIRUB SERPL-MCNC: 0.4 MG/DL
BUN SERPL-MCNC: 9.7 MG/DL (ref 6–20)
CA-I BLD-MCNC: 7.1 MG/DL (ref 4.4–5.2)
CALCIUM SERPL-MCNC: 12.6 MG/DL (ref 8.8–10.4)
CHLORIDE SERPL-SCNC: 102 MMOL/L (ref 98–107)
CREAT SERPL-MCNC: 0.36 MG/DL (ref 0.51–0.95)
EGFRCR SERPLBLD CKD-EPI 2021: >90 ML/MIN/1.73M2
ERYTHROCYTE [DISTWIDTH] IN BLOOD BY AUTOMATED COUNT: 17.5 % (ref 10–15)
GLUCOSE BLDC GLUCOMTR-MCNC: 106 MG/DL (ref 70–99)
GLUCOSE BLDC GLUCOMTR-MCNC: 109 MG/DL (ref 70–99)
GLUCOSE BLDC GLUCOMTR-MCNC: 127 MG/DL (ref 70–99)
GLUCOSE BLDC GLUCOMTR-MCNC: 145 MG/DL (ref 70–99)
GLUCOSE SERPL-MCNC: 98 MG/DL (ref 70–99)
HCO3 BLDV-SCNC: 30 MMOL/L (ref 21–28)
HCO3 SERPL-SCNC: 24 MMOL/L (ref 22–29)
HCT VFR BLD AUTO: 30.6 % (ref 35–47)
HGB BLD-MCNC: 8.8 G/DL (ref 11.7–15.7)
MAGNESIUM SERPL-MCNC: 1.2 MG/DL (ref 1.7–2.3)
MAGNESIUM SERPL-MCNC: 1.8 MG/DL (ref 1.7–2.3)
MCH RBC QN AUTO: 23.2 PG (ref 26.5–33)
MCHC RBC AUTO-ENTMCNC: 28.8 G/DL (ref 31.5–36.5)
MCV RBC AUTO: 81 FL (ref 78–100)
O2/TOTAL GAS SETTING VFR VENT: 24 %
OXYHGB MFR BLDV: 62 % (ref 70–75)
PCO2 BLDV: 50 MM HG (ref 40–50)
PH BLDV: 7.39 [PH] (ref 7.32–7.43)
PHOSPHATE SERPL-MCNC: 1.9 MG/DL (ref 2.5–4.5)
PHOSPHATE SERPL-MCNC: 2.1 MG/DL (ref 2.5–4.5)
PLATELET # BLD AUTO: 471 10E3/UL (ref 150–450)
PO2 BLDV: 34 MM HG (ref 25–47)
POTASSIUM SERPL-SCNC: 4.2 MMOL/L (ref 3.4–5.3)
PROT SERPL-MCNC: 5.9 G/DL (ref 6.4–8.3)
RBC # BLD AUTO: 3.79 10E6/UL (ref 3.8–5.2)
SAO2 % BLDV: 63.8 % (ref 70–75)
SODIUM SERPL-SCNC: 138 MMOL/L (ref 135–145)
WBC # BLD AUTO: 12.5 10E3/UL (ref 4–11)

## 2025-04-15 PROCEDURE — 82805 BLOOD GASES W/O2 SATURATION: CPT

## 2025-04-15 PROCEDURE — 85014 HEMATOCRIT: CPT

## 2025-04-15 PROCEDURE — 97165 OT EVAL LOW COMPLEX 30 MIN: CPT | Mod: GO

## 2025-04-15 PROCEDURE — 250N000011 HC RX IP 250 OP 636: Performed by: INTERNAL MEDICINE

## 2025-04-15 PROCEDURE — 84155 ASSAY OF PROTEIN SERUM: CPT

## 2025-04-15 PROCEDURE — 36415 COLL VENOUS BLD VENIPUNCTURE: CPT

## 2025-04-15 PROCEDURE — 250N000013 HC RX MED GY IP 250 OP 250 PS 637

## 2025-04-15 PROCEDURE — 250N000013 HC RX MED GY IP 250 OP 250 PS 637: Performed by: INTERNAL MEDICINE

## 2025-04-15 PROCEDURE — 83735 ASSAY OF MAGNESIUM: CPT

## 2025-04-15 PROCEDURE — 120N000001 HC R&B MED SURG/OB

## 2025-04-15 PROCEDURE — 250N000012 HC RX MED GY IP 250 OP 636 PS 637

## 2025-04-15 PROCEDURE — 250N000011 HC RX IP 250 OP 636: Mod: JZ | Performed by: HOSPITALIST

## 2025-04-15 PROCEDURE — 99233 SBSQ HOSP IP/OBS HIGH 50: CPT | Performed by: INTERNAL MEDICINE

## 2025-04-15 PROCEDURE — 258N000003 HC RX IP 258 OP 636

## 2025-04-15 PROCEDURE — 84100 ASSAY OF PHOSPHORUS: CPT

## 2025-04-15 PROCEDURE — 250N000011 HC RX IP 250 OP 636

## 2025-04-15 PROCEDURE — 82330 ASSAY OF CALCIUM: CPT

## 2025-04-15 PROCEDURE — 97535 SELF CARE MNGMENT TRAINING: CPT | Mod: GO

## 2025-04-15 RX ORDER — ZOLEDRONIC ACID 0.04 MG/ML
4 INJECTION, SOLUTION INTRAVENOUS ONCE
Status: COMPLETED | OUTPATIENT
Start: 2025-04-15 | End: 2025-04-15

## 2025-04-15 RX ORDER — MAGNESIUM SULFATE HEPTAHYDRATE 40 MG/ML
4 INJECTION, SOLUTION INTRAVENOUS ONCE
Status: COMPLETED | OUTPATIENT
Start: 2025-04-15 | End: 2025-04-15

## 2025-04-15 RX ADMIN — IRBESARTAN 300 MG: 75 TABLET ORAL at 22:36

## 2025-04-15 RX ADMIN — INSULIN ASPART 1 UNITS: 100 INJECTION, SOLUTION INTRAVENOUS; SUBCUTANEOUS at 17:39

## 2025-04-15 RX ADMIN — ZOLEDRONIC ACID 4 MG: 0.04 INJECTION, SOLUTION INTRAVENOUS at 08:44

## 2025-04-15 RX ADMIN — POTASSIUM & SODIUM PHOSPHATES POWDER PACK 280-160-250 MG 2 PACKET: 280-160-250 PACK at 15:49

## 2025-04-15 RX ADMIN — POTASSIUM & SODIUM PHOSPHATES POWDER PACK 280-160-250 MG 2 PACKET: 280-160-250 PACK at 08:37

## 2025-04-15 RX ADMIN — SODIUM CHLORIDE, SODIUM LACTATE, POTASSIUM CHLORIDE, AND CALCIUM CHLORIDE: .6; .31; .03; .02 INJECTION, SOLUTION INTRAVENOUS at 12:56

## 2025-04-15 RX ADMIN — ATORVASTATIN CALCIUM 40 MG: 20 TABLET, FILM COATED ORAL at 22:35

## 2025-04-15 RX ADMIN — ENOXAPARIN SODIUM 80 MG: 100 INJECTION SUBCUTANEOUS at 05:23

## 2025-04-15 RX ADMIN — GABAPENTIN 300 MG: 300 CAPSULE ORAL at 17:20

## 2025-04-15 RX ADMIN — SODIUM CHLORIDE, SODIUM LACTATE, POTASSIUM CHLORIDE, AND CALCIUM CHLORIDE: .6; .31; .03; .02 INJECTION, SOLUTION INTRAVENOUS at 07:07

## 2025-04-15 RX ADMIN — POTASSIUM & SODIUM PHOSPHATES POWDER PACK 280-160-250 MG 2 PACKET: 280-160-250 PACK at 11:20

## 2025-04-15 RX ADMIN — SODIUM CHLORIDE, SODIUM LACTATE, POTASSIUM CHLORIDE, AND CALCIUM CHLORIDE: .6; .31; .03; .02 INJECTION, SOLUTION INTRAVENOUS at 01:53

## 2025-04-15 RX ADMIN — INSULIN HUMAN 15 UNITS: 100 INJECTION, SUSPENSION SUBCUTANEOUS at 17:39

## 2025-04-15 RX ADMIN — SERTRALINE HYDROCHLORIDE 150 MG: 50 TABLET ORAL at 22:35

## 2025-04-15 RX ADMIN — CEFTRIAXONE 2 G: 2 INJECTION, POWDER, FOR SOLUTION INTRAMUSCULAR; INTRAVENOUS at 11:19

## 2025-04-15 RX ADMIN — OXYCODONE HYDROCHLORIDE 10 MG: 5 TABLET ORAL at 05:36

## 2025-04-15 RX ADMIN — INSULIN HUMAN 15 UNITS: 100 INJECTION, SUSPENSION SUBCUTANEOUS at 08:38

## 2025-04-15 RX ADMIN — MAGNESIUM SULFATE HEPTAHYDRATE 4 G: 40 INJECTION, SOLUTION INTRAVENOUS at 09:15

## 2025-04-15 RX ADMIN — HYDROXYZINE HYDROCHLORIDE 25 MG: 25 TABLET ORAL at 08:37

## 2025-04-15 RX ADMIN — ENOXAPARIN SODIUM 80 MG: 100 INJECTION SUBCUTANEOUS at 15:49

## 2025-04-15 RX ADMIN — AMLODIPINE BESYLATE 10 MG: 10 TABLET ORAL at 08:38

## 2025-04-15 RX ADMIN — IBUPROFEN 400 MG: 400 TABLET ORAL at 15:49

## 2025-04-15 RX ADMIN — MONTELUKAST 10 MG: 10 TABLET, FILM COATED ORAL at 22:35

## 2025-04-15 RX ADMIN — SODIUM CHLORIDE, SODIUM LACTATE, POTASSIUM CHLORIDE, AND CALCIUM CHLORIDE: .6; .31; .03; .02 INJECTION, SOLUTION INTRAVENOUS at 18:28

## 2025-04-15 RX ADMIN — HYDROXYZINE HYDROCHLORIDE 25 MG: 25 TABLET ORAL at 13:12

## 2025-04-15 RX ADMIN — OXYCODONE HYDROCHLORIDE 10 MG: 5 TABLET ORAL at 08:37

## 2025-04-15 RX ADMIN — SODIUM CHLORIDE, SODIUM LACTATE, POTASSIUM CHLORIDE, AND CALCIUM CHLORIDE: .6; .31; .03; .02 INJECTION, SOLUTION INTRAVENOUS at 23:16

## 2025-04-15 RX ADMIN — ACETAMINOPHEN 650 MG: 325 TABLET ORAL at 11:19

## 2025-04-15 RX ADMIN — OXYCODONE HYDROCHLORIDE 10 MG: 5 TABLET ORAL at 13:12

## 2025-04-15 RX ADMIN — GABAPENTIN 300 MG: 300 CAPSULE ORAL at 08:38

## 2025-04-15 ASSESSMENT — ACTIVITIES OF DAILY LIVING (ADL)
ADLS_ACUITY_SCORE: 65
ADLS_ACUITY_SCORE: 82
ADLS_ACUITY_SCORE: 77
ADLS_ACUITY_SCORE: 77
ADLS_ACUITY_SCORE: 65
ADLS_ACUITY_SCORE: 82
IADL_COMMENTS: FAMILY COMPLETES.
ADLS_ACUITY_SCORE: 82
ADLS_ACUITY_SCORE: 82
ADLS_ACUITY_SCORE: 77
ADLS_ACUITY_SCORE: 65
ADLS_ACUITY_SCORE: 82
ADLS_ACUITY_SCORE: 77
ADLS_ACUITY_SCORE: 65
ADLS_ACUITY_SCORE: 82
ADLS_ACUITY_SCORE: 82
ADLS_ACUITY_SCORE: 65
ADLS_ACUITY_SCORE: 77
ADLS_ACUITY_SCORE: 77
ADLS_ACUITY_SCORE: 82
ADLS_ACUITY_SCORE: 82

## 2025-04-15 NOTE — PROGRESS NOTES
[facility] Medicine Progress Note  Date of Service (when I saw the patient): 04/15/2025    REASON FOR ADMISSION / INTERVAL HISTORY:  Brissa Mott is a 54 year old female with past medical hx of recently diagnosed neuroendocrine carcinoma, T2DM, CVA with residual hemiparesis, hypercalcemia, HTN, HLD, KATHRINE admitted on 4/14/2025. She presented for generalized weakness after recent discharge from TCU.       Assessment/ Plan     Generalized weakness  Fall, initial encounter    Recently admitted 2/20-3/5 for pathological fracture (see below) and discharged to TCU. Left TCU on 4/7 and has had 3 falls in the interim. Falls without injuries, but family unable to help her up and requires EMS assistance. Unsafe to remain at home with  and daughter. Weakness multifactorial due to UTI, hypomagnesemia, PE, progression of cancer.   - PT/OT/SW consulted  - See below for etiologies     Acute UTI without hematuria  Severe sepsis without septic shock  UA with nitrites, trace LE, few bacteria, 9 WBC. Arrived septic based on leukocytosis (15.5), HR (110s). Lactic 3.0.  Ur cx 4/14 >-100k ecoli.   -continue  Ceftriaxone 2g IV daily. Follow final urine/ Blood cultures        Acute pulmonary embolism  Not hypoxic. CT with poor opacification of few subsegmental pulmonary arteries suspicious for small volume pulmonary emboli. After her ORIF on 2/25 (see below) she was given Lovenox 40mg BID for 6 weeks (ending 4/17) which she endorses compliance with.  - Lovenox 80mg IV BID. Avoiding DOACs for now due to plans for biopsy    Acute hypoxic resp failure  Likely 2ndry to above. O2 sats were 88% in ED. Stable now on 1LNC o2  -continue o2-wean as mohamud.      Hypercalcemia  Calcium 13.8 and ionized calcium 7.5 on admission. Seen on previous admission up to 14.7 and resolved with IVF and Zometa. Due to malignancy.  Was started on  LR 200ml/hr on admit. Gave 1 dose zometa this AM. Calcium 12.6  -continue  fluids-decrease rate  - Consider chronic bisphosphonate's at discharge to prevent recurrence     Hypomagnesemia  Hypophosphatemia  Mag 1.1 and phos 1.9 on admission.  - RN repletion protocol  - Tele     Neuroendocrine carcinoma, metastatic  Pathological fracture of right femur due to neoplastic disease s/p ORIF 2/25/25    Admitted 2/20-3/5 for a pathologic fracture of right femur, and pathology showed poorly differentiated carcinoma with neuroendocrine features. Underwent ORIF 2/25 and received Lovenox for 30 days post-op. CT c/a/p showed multiple pulmonary nodules, left axillary node, several hypodense areas in liver, bony lesion on right humeral head/right sacrum/pelvis, several subcutaneous nodules in anterior chest wall and right gluteal region.    Established with oncology 3/21. Current plan is for radiation therapy to the pathological fracture and biopsy of the left axillary lymph node, which was scheduled for 4/14 but she ended up in the ER. Has not started treatment yet.    CT on admission showed progression of metastatic disease with new hepatic mets, enlarging osseous mets, enlarging lymph nodes above and below diaphragm. This includes enlarging manubrial mets with pathologic fracture and enlarging destructive lesion of right hemisacrum and right medial iliac bone. Resolution of multiple previously seen pulmonary nodules but new RLL nodule. Other pulmonary nodules stable or mildly increased. Right adnexal mass mildly decreased.   - Continued PTA Lidocaine patches, PRN Tylenol, PRN Ibuprofen, PRN Oxycodone 5-10mg  -trying to contact radiation oncology today-if they can do radiation/ bx while inpatient     Anorexia   very concerned about poor PO intake, and believes it's due to polypharmacy. Besides physical decline and multiple comorbidities as above, review of med list only reveals Ozempic as possible etiology. She hadn't taken it for ~6 weeks while admitted and at TCU, then took a dose when she got  "home on 4/7.  states this correlates with onset of poor PO intake.   - Recommend discontinuing Ozempic     Hx of CVA  Hemiplegia and hemiparesis following cerebral infarction affecting left non-dominant side   HLD  CVA in 2018. Residual left sided weakness.  - Continued PTA Atorvastatin 40mg     Type 2 diabetes mellitus with neuropathy, with long-term current use of insulin   A1c 6.8% on admission.  BS stable <150  - continue  PTA Gabapentin 300mg BID, Humulin 70/30 BID 15U/ continue ISS  - Held PTA Metformin 1000mg BID for lactic acidosis   - Held PTA Ozempic weekly. Consider discontinuation as above.     Anemia, borderline microcytic, chronic  Hemoglobin 9.6 on admission, baseline 8-9.       Hypertension  - Continued PTA Amlodipine 10mg, Ibesartan 300mg     Reactive airway disease without complication  - Continued PTA Montelukast 10mg     KATHRINE (obstructive sleep apnea)  - Continued PTA CPAP if available     Adjustment disorder with depressed mood  - Continued PTA Hydroxyzine 25mg PRN, Sertraline 150mg       Diet: Combination Diet Moderate Consistent Carb (60 g CHO per Meal) Diet    DVT Prophylaxis: Enoxaparin (Lovenox) SQ  Nielson Catheter: Not present  Code Status: Full Code      Medically Ready for Discharge: Anticipated in 2-4 Days        GABRIEL CRUZ MD   Pg [phone]        ROS:  As described in A/P and Exam.  Otherwise ALL are  negative.    PHYSICAL EXAM:  All vitals have been reviewed    Blood pressure 122/70, pulse 95, temperature 97.9  F (36.6  C), temperature source Oral, resp. rate 18, height 1.626 m (5' 4\"), weight 107.5 kg (237 lb), SpO2 94%, not currently breastfeeding.    I/O this shift:  In: 2350 [P.O.:900; I.V.:1450]  Out: 250 [Urine:250]    GENERAL APPEARANCE: obese, alert and no distress  EYES: conjunctiva clear, eyes grossly normal  HENT: external ears and nose normal   RESP: lungs clear to auscultation - no rales, rhonchi or wheezes  CV: regular rate and rhythm, normal S1 S2, no S3 or S4 " and no murmur, click or rub   ABDOMEN: soft, nontender, no HSM or masses and bowel sounds normal  MS: no clubbing, cyanosis; no edema  SKIN: clear without significant rashes or lesions  NEURO: -non-focal moves all 4 extr    ROUTINE  LABS (Last four results)  CMP  Recent Labs   Lab 04/15/25  1127 04/15/25  0809 04/15/25  0523 04/15/25  0210 04/14/25  2133 04/14/25  1443 04/14/25  1210 04/14/25  0836   NA  --   --  138  --   --  133*  --  132*   POTASSIUM  --   --  4.2  --   --  4.1  --  4.4   CHLORIDE  --   --  102  --   --  100  --  95*   CO2  --   --  24  --   --  23  --  25   ANIONGAP  --   --  12  --   --  10  --  12   * 109* 98 106*   < > 101*  --  241*   BUN  --   --  9.7  --   --  14.2  --  19.1   CR  --   --  0.36*  --   --  0.39*  --  0.38*   GFRESTIMATED  --   --  >90  --   --  >90  --  >90   SHIRLEY  --   --  12.6*  --   --  12.1*  --  13.8*   MAG  --   --  1.2*  --   --  1.5*  --  1.1*   PHOS  --   --  1.9*  --   --  2.5 1.9*  --    PROTTOTAL  --   --  5.9*  --   --   --   --  6.5   ALBUMIN  --   --  2.9*  --   --  2.7*  --  3.2*   BILITOTAL  --   --  0.4  --   --   --   --  0.4   ALKPHOS  --   --  167*  --   --   --   --  192*   AST  --   --  67*  --   --   --   --  61*   ALT  --   --  10  --   --   --   --  14    < > = values in this interval not displayed.     CBC  Recent Labs   Lab 04/15/25  0523 04/14/25  0836   WBC 12.5* 15.5*   RBC 3.79* 4.12   HGB 8.8* 9.6*   HCT 30.6* 32.6*   MCV 81 79   MCH 23.2* 23.3*   MCHC 28.8* 29.4*   RDW 17.5* 17.2*   * 512*     INRNo lab results found in last 7 days.  Arterial Blood Gas  Recent Labs   Lab 04/15/25  0523 04/14/25  1443 04/14/25  0942   O2PER 24 24 21       Recent Results (from the past 24 hours)   CT Chest (PE) Abdomen Pelvis w Contrast   Result Value    Radiologist flags New diagnosis of pulmonary embolism (AA)    Narrative    EXAM: CT CHEST PE ABDOMEN PELVIS W CONTRAST  LOCATION: [facility]  DATE:  4/14/2025    INDICATION: Generalized weakness, metastatic cancer, hypercalcemia occasionally hypoxic, intermittent abdominal pain, evaluate for PE  COMPARISON: CT CAP 2/20/2025.  TECHNIQUE: CT chest pulmonary angiogram and routine CT abdomen pelvis with IV contrast. Arterial phase through the chest and venous phase through the abdomen and pelvis. Multiplanar reformats and MIP reconstructions were performed. Dose reduction   techniques were used.   CONTRAST: 116 mL Isovue 370    FINDINGS:  ANGIOGRAM CHEST: There is poor opacification of a few subsegmental pulmonary arteries (for example in the right upper lobe on series 4 image 105 and right middle lobe on series 4 image 132). Pulmonary trunk is dilated measuring up to 40 mm which can be   seen in setting of pulmonary hypertension. Thoracic aorta is negative for dissection within the limitations of cardiac motion artifact.    LUNGS AND PLEURA: Interval resolution of multiple previously seen pulmonary nodules. A right middle lobe nodule measures 5 mm compared to 7 mm (series 6, image 118). Right lower lobe nodule measures 8 mm compared to 6 mm (series 6, image 171). A 4 mm   subpleural nodule of the right lower lobe is new (series 6, image 168). No airspace consolidation. Trace left pleural fluid.    MEDIASTINUM/AXILLAE: Worsening left axillary adenopathy with largest node measuring 4.8 x 3.8 cm compared to 4.7 x 3.2 cm. Stable mild cardiomegaly with chronic enlargement of the right ventricle. Trace pericardial fluid.    CORONARY ARTERY CALCIFICATION: Severe.    HEPATOBILIARY: Numerous hypoenhancing liver metastases which are increased in conspicuity compared to prior. Multiple lesions were not previously seen such as a 0.9 cm metastasis of the hepatic dome (series 12, image 41). No radiopaque gallstone. No   biliary ductal dilatation.    PANCREAS: Normal.    SPLEEN: Normal.    ADRENAL GLANDS: Stable mild nodularity of the left adrenal gland. The right adrenal is  unremarkable.    KIDNEYS/BLADDER: Normal.    BOWEL: No obstruction or inflammatory change. Normal appendix.    LYMPH NODES: Assuming retroperitoneal and right iliac chain adenopathy. For example, a left para-aortic retroperitoneal node measures 1.1 cm in short axis compared to 0.6 cm (series 12, image 122). A metastatic right retroperitoneal nodule measures 1.6 x   1.7 cm compared to 1.4 x 1.7 cm (series 12, image 151).    VASCULATURE: Mild burden of vascular calcifications without abdominal aortic aneurysm.    PELVIC ORGANS: A mixed cystic solid right adnexal mass measures 8.0 x 8.4 x 9.4 cm compared to 8.4 x 10.2 x 10.2 cm when measured with similar technique. Calcified uterine fibroid.    MUSCULOSKELETAL: Diffuse soft tissue nodules of the ventral abdominal wall, likely sequela of medication injection. Enlarging destructive lytic metastasis involving the right sacrum and medial right iliac bone with soft tissue component now measuring up   to 8.2 x 5.2 cm compared to 5.2 x 3.2 cm. This lytic lesion involves multiple right sacral neural foramina. Additional sites of worsening osseous metastatic disease including the left posterior iliac bone, left posterior 11th rib, left anterior third   rib, and right humeral head. Enlarging destructive lesion of the manubrium with new nondisplaced pathologic fracture. Partially imaged surgical fixation hardware of the right femur. Degenerative changes of the spine.      Impression    IMPRESSION:  1.  There is poor opacification of a few subsegmental pulmonary arteries which is suspicious for small volume pulmonary emboli.  2.  Progression of metastatic disease with new hepatic metastases, enlarging osseous metastases, and enlarging lymph nodes above and below the diaphragm. This includes enlarging manubrial metastasis with pathologic fracture and enlarging destructive   lesion of the right hemisacrum and right medial iliac bone.  3.  Interval resolution of multiple previously  seen pulmonary nodules. New right lower lobe nodule. A few additional pulmonary nodules are stable or mildly increased in size.  4.  A right adnexal mass is mildly decreased in size.  5.  Multiple additional findings as above.      [Critical Result: New diagnosis of pulmonary embolism]    Finding was identified on 4/14/2025 1:36 PM CDT.     Dr. Cross was contacted by me on 4/14/2025 2:03 PM CDT and verbalized understanding of the critical result.

## 2025-04-15 NOTE — PLAN OF CARE
Problem: Adult Inpatient Plan of Care  Goal: Absence of Hospital-Acquired Illness or Injury  Intervention: Identify and Manage Fall Risk  Recent Flowsheet Documentation  Taken 4/15/2025 0000 by Valentina Bennett RN  Safety Promotion/Fall Prevention:   safety round/check completed   patient and family education   lighting adjusted   increase visualization of patient   increased rounding and observation  Intervention: Prevent Skin Injury  Recent Flowsheet Documentation  Taken 4/15/2025 0000 by Valentina Bennett RN  Body Position: supine, head elevated  Intervention: Prevent Infection  Recent Flowsheet Documentation  Taken 4/15/2025 0000 by Valentina Bennett RN  Infection Prevention: rest/sleep promoted  Goal: Optimal Comfort and Wellbeing  Intervention: Monitor Pain and Promote Comfort  Recent Flowsheet Documentation  Taken 4/14/2025 2105 by Valentina Bennett RN  Pain Management Interventions: medication (see MAR)     Problem: Pain Acute  Goal: Optimal Pain Control and Function  Outcome: Not Progressing  Intervention: Develop Pain Management Plan  Recent Flowsheet Documentation  Taken 4/14/2025 2105 by Valentina Bennett RN  Pain Management Interventions: medication (see MAR)  Intervention: Prevent or Manage Pain  Recent Flowsheet Documentation  Taken 4/15/2025 0000 by Valentina Bennett RN  Medication Review/Management: medications reviewed   Goal Outcome Evaluation:       A/O X4. BP elevated, but within order parameters. Other vitals stable on RA. PRN medications given for pain with mild relief. Pt very weak and unable to turn/reposition  independently due to pain and generalized weakness. Pt was initially refusing to be repositioned in bed due to pain, and care team was unable to use barbara lift since pt did not have a lift sheet already under her. Pt eventually agreeable to be turned/repositioned to place lift sheet underneath, and was able to use lift to reposition pt. Pur wick in overnight. IVF infusing. Will continue to monitor.

## 2025-04-15 NOTE — PLAN OF CARE
Goal Outcome Evaluation:      Plan of Care Reviewed With: patient    Overall Patient Progress: no changeOverall Patient Progress: no change    Outcome Evaluation: Continuing to encourage activity out of bed. Patient has increased pain to BLEs and required PRN oxy, atarax, tylenol given today. Fluids continue at 200 mL/hr.

## 2025-04-15 NOTE — PROGRESS NOTES
Called about hypercalcemia.    Hypercalcemia due to malignancy and treated 2/18 with zometa, with good results, so given another dose on 4/15 in the AM.  Needs to continue with oncology to discuss next treatment  Cardiac resuscitation with multiple bony mets may not be helpful for her.

## 2025-04-15 NOTE — PROGRESS NOTES
04/15/25 1000   Appointment Info   Signing Clinician's Name / Credentials (OT) Sasha Barrientos, OTR/L   Living Environment   People in Home spouse   Current Living Arrangements house   Home Accessibility wheelchair accessible   Living Environment Comments ramp to enter home.   Self-Care   Equipment Currently Used at Home walker, standard;cane, straight;commode chair;lift device;hospital bed   Fall history within last six months yes   Number of times patient has fallen within last six months 2   Activity/Exercise/Self-Care Comment at [facility] has assist with all ADLs. uses EZ stand for all transfers. States falls were from sling shifting. States she is not able to put any weight through Left LE. sleeps in hospital bed at home- does get to edge of bed with Ax1.   Instrumental Activities of Daily Living (IADL)   IADL Comments family completes.   General Information   Onset of Illness/Injury or Date of Surgery 04/14/25   Referring Physician Yohan Adames PA-C   Patient/Family Therapy Goal Statement (OT) none stated- does not want to attempt any OOB activity d/t pain and fearful of falling.   Additional Occupational Profile Info/Pertinent History of Current Problem Brissa Mott is a 54 year old female with past medical hx of recently diagnosed neuroendocrine carcinoma, T2DM, CVA with residual hemiparesis, hypercalcemia, HTN, HLD, KATHRINE admitted on 4/14/2025. She presented for generalized weakness after recent discharge from TCU.   Existing Precautions/Restrictions fall   Cognitive Status Examination   Cognitive Status Comments teaful throughout. appears anxious to participate in OOB activity. Attempted to encourage and educate pt on safety precautions taken in the hospital to decrease risk of falls.   Pain Assessment   Patient Currently in Pain Yes, see Vital Sign flowsheet  (left knee.)   Bed Mobility   Comment (Bed Mobility) Pt declines all OOB activity. Declines being lifted to chair via ceiling lift.   Transfers    Transfer Comments pt declines   Activities of Daily Living   BADL Assessment/Intervention upper body dressing;lower body dressing;toileting   Upper Body Dressing Assessment/Training   Comment, (Upper Body Dressing) would be set-up assist.   Lower Body Dressing Assessment/Training   [place] Level (Lower Body Dressing) dependent (less than 25% patient effort)   Toileting   Comment, (Toileting) uses EZ stand to commode at baseline. declines attempt.   Clinical Impression   Criteria for Skilled Therapeutic Interventions Met (OT) Yes, treatment indicated   OT Diagnosis assess ADLS as pt is failing at home.   Influenced by the following impairments weakness, pain   OT Problem List-Impairments impacting ADL problems related to;pain;strength   Assessment of Occupational Performance 1-3 Performance Deficits   Identified Performance Deficits EZ transfer, bed mobility   Planned Therapy Interventions (OT) ADL retraining;progressive activity/exercise   Clinical Decision Making Complexity (OT) problem focused assessment/low complexity   Risk & Benefits of therapy have been explained patient;participants included;current/potential barriers reviewed;participants voiced agreement with care plan;risks/benefits reviewed;care plan/treatment goals reviewed;evaluation/treatment results reviewed   OT Total Evaluation Time   OT Eval, Low Complexity Minutes (95927) 10   OT Goals   Therapy Frequency (OT) 3 times/week   OT Predicted Duration/Target Date for Goal Attainment 04/22/25   OT Goals OT Goal 1   OT: Goal 1 EOB grooming/hygiene with SBA   Interventions   Interventions Quick Adds Self-Care/Home Management   Self-Care/Home Management   Self-Care/Home Mgmt/ADL, Compensatory, Meal Prep Minutes (83752) 15   Symptoms Noted During/After Treatment (Meal Preparation/Planning Training) increased pain  (states left knee pain limiting ability to stand.)   Treatment Detail/Skilled Intervention significant time spent educating pt on IP OT  role, POC and importance of OOB activity. Educated pt on safety precautions taken to decrease risk for falls (starting with sitting EOB, 2 staff present, gait belt, use of EZ stand which is baseline) pt declining all OOB activity. Encouraged pt to have staff lift her to bedside chair via ceiling lift and goal of up in the chair for meals. Pt left supine with all needs wihtin reach and bed alarm on.   OT Discharge Planning   OT Plan POC Tues 1/3; supine to sit- sitting EOB   OT Discharge Recommendation (DC Rec) Long term care facility;home with assist   OT Rationale for DC Rec Pt;s family appear to have all recommended AE/DME. If family unable to continue to provide assist with then recommend LTC. Pt does not appear to be a good TCU candidate.   OT Brief overview of current status pt declines all OOB activity however anticipate ceiling lift d/t Left knee pain and poor baseline   OT Total Distance Amb During Session (feet) 0   Total Session Time   Timed Code Treatment Minutes 15   Total Session Time (sum of timed and untimed services) 25

## 2025-04-16 ENCOUNTER — APPOINTMENT (OUTPATIENT)
Dept: PHYSICAL THERAPY | Facility: CLINIC | Age: 55
End: 2025-04-16
Payer: COMMERCIAL

## 2025-04-16 LAB
ALBUMIN SERPL BCG-MCNC: 2.8 G/DL (ref 3.5–5.2)
ALP SERPL-CCNC: 159 U/L (ref 40–150)
ALT SERPL W P-5'-P-CCNC: 9 U/L (ref 0–50)
ANION GAP SERPL CALCULATED.3IONS-SCNC: 12 MMOL/L (ref 7–15)
AST SERPL W P-5'-P-CCNC: 54 U/L (ref 0–45)
ATRIAL RATE - MUSE: 103 BPM
BASOPHILS # BLD MANUAL: 0.3 10E3/UL (ref 0–0.2)
BASOPHILS NFR BLD MANUAL: 2 %
BILIRUB SERPL-MCNC: 0.4 MG/DL
BUN SERPL-MCNC: 6 MG/DL (ref 6–20)
CALCIUM SERPL-MCNC: 11.4 MG/DL (ref 8.8–10.4)
CHLORIDE SERPL-SCNC: 101 MMOL/L (ref 98–107)
CREAT SERPL-MCNC: 0.36 MG/DL (ref 0.51–0.95)
DIASTOLIC BLOOD PRESSURE - MUSE: NORMAL MMHG
EGFRCR SERPLBLD CKD-EPI 2021: >90 ML/MIN/1.73M2
EOSINOPHIL # BLD MANUAL: 0 10E3/UL (ref 0–0.7)
EOSINOPHIL NFR BLD MANUAL: 0 %
ERYTHROCYTE [DISTWIDTH] IN BLOOD BY AUTOMATED COUNT: 17.7 % (ref 10–15)
GLUCOSE BLDC GLUCOMTR-MCNC: 137 MG/DL (ref 70–99)
GLUCOSE BLDC GLUCOMTR-MCNC: 142 MG/DL (ref 70–99)
GLUCOSE BLDC GLUCOMTR-MCNC: 148 MG/DL (ref 70–99)
GLUCOSE BLDC GLUCOMTR-MCNC: 161 MG/DL (ref 70–99)
GLUCOSE BLDC GLUCOMTR-MCNC: 163 MG/DL (ref 70–99)
GLUCOSE BLDC GLUCOMTR-MCNC: 176 MG/DL (ref 70–99)
GLUCOSE SERPL-MCNC: 150 MG/DL (ref 70–99)
HCO3 SERPL-SCNC: 25 MMOL/L (ref 22–29)
HCT VFR BLD AUTO: 30.7 % (ref 35–47)
HGB BLD-MCNC: 8.7 G/DL (ref 11.7–15.7)
INTERPRETATION ECG - MUSE: NORMAL
LYMPHOCYTES # BLD MANUAL: 1.2 10E3/UL (ref 0.8–5.3)
LYMPHOCYTES NFR BLD MANUAL: 8 %
MAGNESIUM SERPL-MCNC: 1.3 MG/DL (ref 1.7–2.3)
MAGNESIUM SERPL-MCNC: 2 MG/DL (ref 1.7–2.3)
MCH RBC QN AUTO: 23.2 PG (ref 26.5–33)
MCHC RBC AUTO-ENTMCNC: 28.3 G/DL (ref 31.5–36.5)
MCV RBC AUTO: 82 FL (ref 78–100)
METAMYELOCYTES # BLD MANUAL: 0.4 10E3/UL
METAMYELOCYTES NFR BLD MANUAL: 3 %
MONOCYTES # BLD MANUAL: 1.4 10E3/UL (ref 0–1.3)
MONOCYTES NFR BLD MANUAL: 10 %
NEUTROPHILS # BLD MANUAL: 11.1 10E3/UL (ref 1.6–8.3)
NEUTROPHILS NFR BLD MANUAL: 77 %
P AXIS - MUSE: 73 DEGREES
PHOSPHATE SERPL-MCNC: 1.6 MG/DL (ref 2.5–4.5)
PHOSPHATE SERPL-MCNC: 2.2 MG/DL (ref 2.5–4.5)
PLAT MORPH BLD: NORMAL
PLATELET # BLD AUTO: 448 10E3/UL (ref 150–450)
POTASSIUM SERPL-SCNC: 4.6 MMOL/L (ref 3.4–5.3)
PR INTERVAL - MUSE: 146 MS
PROT SERPL-MCNC: 5.8 G/DL (ref 6.4–8.3)
QRS DURATION - MUSE: 156 MS
QT - MUSE: 356 MS
QTC - MUSE: 466 MS
R AXIS - MUSE: 32 DEGREES
RBC # BLD AUTO: 3.75 10E6/UL (ref 3.8–5.2)
RBC MORPH BLD: NORMAL
SODIUM SERPL-SCNC: 138 MMOL/L (ref 135–145)
SYSTOLIC BLOOD PRESSURE - MUSE: NORMAL MMHG
T AXIS - MUSE: -26 DEGREES
VENTRICULAR RATE- MUSE: 103 BPM
WBC # BLD AUTO: 14.4 10E3/UL (ref 4–11)

## 2025-04-16 PROCEDURE — 36415 COLL VENOUS BLD VENIPUNCTURE: CPT | Performed by: INTERNAL MEDICINE

## 2025-04-16 PROCEDURE — 36415 COLL VENOUS BLD VENIPUNCTURE: CPT | Performed by: STUDENT IN AN ORGANIZED HEALTH CARE EDUCATION/TRAINING PROGRAM

## 2025-04-16 PROCEDURE — 250N000011 HC RX IP 250 OP 636: Mod: JZ | Performed by: PHYSICIAN ASSISTANT

## 2025-04-16 PROCEDURE — 120N000001 HC R&B MED SURG/OB

## 2025-04-16 PROCEDURE — 99233 SBSQ HOSP IP/OBS HIGH 50: CPT | Performed by: STUDENT IN AN ORGANIZED HEALTH CARE EDUCATION/TRAINING PROGRAM

## 2025-04-16 PROCEDURE — 85007 BL SMEAR W/DIFF WBC COUNT: CPT | Performed by: INTERNAL MEDICINE

## 2025-04-16 PROCEDURE — 258N000003 HC RX IP 258 OP 636

## 2025-04-16 PROCEDURE — 250N000013 HC RX MED GY IP 250 OP 250 PS 637: Performed by: INTERNAL MEDICINE

## 2025-04-16 PROCEDURE — 83735 ASSAY OF MAGNESIUM: CPT

## 2025-04-16 PROCEDURE — 250N000011 HC RX IP 250 OP 636

## 2025-04-16 PROCEDURE — 97162 PT EVAL MOD COMPLEX 30 MIN: CPT | Mod: GP | Performed by: PHYSICAL THERAPIST

## 2025-04-16 PROCEDURE — 85027 COMPLETE CBC AUTOMATED: CPT | Performed by: INTERNAL MEDICINE

## 2025-04-16 PROCEDURE — 84100 ASSAY OF PHOSPHORUS: CPT | Performed by: STUDENT IN AN ORGANIZED HEALTH CARE EDUCATION/TRAINING PROGRAM

## 2025-04-16 PROCEDURE — 99223 1ST HOSP IP/OBS HIGH 75: CPT | Performed by: STUDENT IN AN ORGANIZED HEALTH CARE EDUCATION/TRAINING PROGRAM

## 2025-04-16 PROCEDURE — 84100 ASSAY OF PHOSPHORUS: CPT

## 2025-04-16 PROCEDURE — 258N000003 HC RX IP 258 OP 636: Performed by: STUDENT IN AN ORGANIZED HEALTH CARE EDUCATION/TRAINING PROGRAM

## 2025-04-16 PROCEDURE — 80051 ELECTROLYTE PANEL: CPT

## 2025-04-16 PROCEDURE — 250N000013 HC RX MED GY IP 250 OP 250 PS 637

## 2025-04-16 PROCEDURE — 250N000011 HC RX IP 250 OP 636: Performed by: STUDENT IN AN ORGANIZED HEALTH CARE EDUCATION/TRAINING PROGRAM

## 2025-04-16 PROCEDURE — 83735 ASSAY OF MAGNESIUM: CPT | Performed by: STUDENT IN AN ORGANIZED HEALTH CARE EDUCATION/TRAINING PROGRAM

## 2025-04-16 PROCEDURE — 250N000013 HC RX MED GY IP 250 OP 250 PS 637: Performed by: STUDENT IN AN ORGANIZED HEALTH CARE EDUCATION/TRAINING PROGRAM

## 2025-04-16 PROCEDURE — 97530 THERAPEUTIC ACTIVITIES: CPT | Mod: GP | Performed by: PHYSICAL THERAPIST

## 2025-04-16 RX ORDER — ACETAMINOPHEN 325 MG/1
975 TABLET ORAL EVERY 8 HOURS
Status: DISCONTINUED | OUTPATIENT
Start: 2025-04-16 | End: 2025-04-24 | Stop reason: HOSPADM

## 2025-04-16 RX ORDER — DOCUSATE SODIUM 100 MG/1
100 CAPSULE, LIQUID FILLED ORAL 2 TIMES DAILY
Status: DISCONTINUED | OUTPATIENT
Start: 2025-04-16 | End: 2025-04-24 | Stop reason: HOSPADM

## 2025-04-16 RX ORDER — ENOXAPARIN SODIUM 100 MG/ML
0.75 INJECTION SUBCUTANEOUS EVERY 12 HOURS
Status: COMPLETED | OUTPATIENT
Start: 2025-04-16 | End: 2025-04-16

## 2025-04-16 RX ORDER — POLYETHYLENE GLYCOL 3350 17 G/17G
17 POWDER, FOR SOLUTION ORAL DAILY
Status: DISCONTINUED | OUTPATIENT
Start: 2025-04-16 | End: 2025-04-24 | Stop reason: HOSPADM

## 2025-04-16 RX ORDER — CEFTRIAXONE 2 G/1
2 INJECTION, POWDER, FOR SOLUTION INTRAMUSCULAR; INTRAVENOUS EVERY 24 HOURS
Status: COMPLETED | OUTPATIENT
Start: 2025-04-17 | End: 2025-04-18

## 2025-04-16 RX ORDER — MAGNESIUM SULFATE HEPTAHYDRATE 40 MG/ML
4 INJECTION, SOLUTION INTRAVENOUS ONCE
Status: COMPLETED | OUTPATIENT
Start: 2025-04-16 | End: 2025-04-16

## 2025-04-16 RX ADMIN — SODIUM CHLORIDE, SODIUM LACTATE, POTASSIUM CHLORIDE, AND CALCIUM CHLORIDE: .6; .31; .03; .02 INJECTION, SOLUTION INTRAVENOUS at 03:42

## 2025-04-16 RX ADMIN — IRBESARTAN 300 MG: 75 TABLET ORAL at 21:56

## 2025-04-16 RX ADMIN — DICLOFENAC 4 G: 10 GEL TOPICAL at 21:59

## 2025-04-16 RX ADMIN — POTASSIUM & SODIUM PHOSPHATES POWDER PACK 280-160-250 MG 2 PACKET: 280-160-250 PACK at 12:17

## 2025-04-16 RX ADMIN — DOCUSATE SODIUM 100 MG: 100 CAPSULE, LIQUID FILLED ORAL at 03:40

## 2025-04-16 RX ADMIN — ACETAMINOPHEN 975 MG: 325 TABLET ORAL at 20:05

## 2025-04-16 RX ADMIN — ENOXAPARIN SODIUM 80 MG: 80 INJECTION SUBCUTANEOUS at 17:28

## 2025-04-16 RX ADMIN — OXYCODONE HYDROCHLORIDE 10 MG: 5 TABLET ORAL at 14:45

## 2025-04-16 RX ADMIN — INSULIN HUMAN 15 UNITS: 100 INJECTION, SUSPENSION SUBCUTANEOUS at 17:33

## 2025-04-16 RX ADMIN — POTASSIUM & SODIUM PHOSPHATES POWDER PACK 280-160-250 MG 2 PACKET: 280-160-250 PACK at 18:02

## 2025-04-16 RX ADMIN — MONTELUKAST 10 MG: 10 TABLET, FILM COATED ORAL at 21:54

## 2025-04-16 RX ADMIN — IBUPROFEN 400 MG: 400 TABLET ORAL at 12:16

## 2025-04-16 RX ADMIN — POTASSIUM & SODIUM PHOSPHATES POWDER PACK 280-160-250 MG 2 PACKET: 280-160-250 PACK at 08:32

## 2025-04-16 RX ADMIN — INSULIN ASPART 1 UNITS: 100 INJECTION, SOLUTION INTRAVENOUS; SUBCUTANEOUS at 17:28

## 2025-04-16 RX ADMIN — ATORVASTATIN CALCIUM 40 MG: 20 TABLET, FILM COATED ORAL at 21:54

## 2025-04-16 RX ADMIN — DOCUSATE SODIUM 100 MG: 100 CAPSULE, LIQUID FILLED ORAL at 20:05

## 2025-04-16 RX ADMIN — SODIUM CHLORIDE, SODIUM LACTATE, POTASSIUM CHLORIDE, AND CALCIUM CHLORIDE: .6; .31; .03; .02 INJECTION, SOLUTION INTRAVENOUS at 08:41

## 2025-04-16 RX ADMIN — GABAPENTIN 300 MG: 300 CAPSULE ORAL at 20:04

## 2025-04-16 RX ADMIN — SERTRALINE HYDROCHLORIDE 150 MG: 50 TABLET ORAL at 21:54

## 2025-04-16 RX ADMIN — AMLODIPINE BESYLATE 10 MG: 10 TABLET ORAL at 08:35

## 2025-04-16 RX ADMIN — ENOXAPARIN SODIUM 80 MG: 100 INJECTION SUBCUTANEOUS at 03:40

## 2025-04-16 RX ADMIN — POLYETHYLENE GLYCOL 3350 17 G: 17 POWDER, FOR SOLUTION ORAL at 08:33

## 2025-04-16 RX ADMIN — POTASSIUM & SODIUM PHOSPHATES POWDER PACK 280-160-250 MG 1 PACKET: 280-160-250 PACK at 23:37

## 2025-04-16 RX ADMIN — ACETAMINOPHEN 650 MG: 325 TABLET ORAL at 14:46

## 2025-04-16 RX ADMIN — SODIUM CHLORIDE, SODIUM LACTATE, POTASSIUM CHLORIDE, AND CALCIUM CHLORIDE: .6; .31; .03; .02 INJECTION, SOLUTION INTRAVENOUS at 19:05

## 2025-04-16 RX ADMIN — HYDROMORPHONE HYDROCHLORIDE 0.2 MG: 0.2 INJECTION, SOLUTION INTRAMUSCULAR; INTRAVENOUS; SUBCUTANEOUS at 12:16

## 2025-04-16 RX ADMIN — INSULIN ASPART 1 UNITS: 100 INJECTION, SOLUTION INTRAVENOUS; SUBCUTANEOUS at 12:37

## 2025-04-16 RX ADMIN — MAGNESIUM SULFATE HEPTAHYDRATE 4 G: 40 INJECTION, SOLUTION INTRAVENOUS at 08:28

## 2025-04-16 RX ADMIN — GABAPENTIN 300 MG: 300 CAPSULE ORAL at 08:35

## 2025-04-16 RX ADMIN — INSULIN HUMAN 15 UNITS: 100 INJECTION, SUSPENSION SUBCUTANEOUS at 08:30

## 2025-04-16 RX ADMIN — INSULIN ASPART 1 UNITS: 100 INJECTION, SOLUTION INTRAVENOUS; SUBCUTANEOUS at 08:30

## 2025-04-16 RX ADMIN — CEFTRIAXONE 2 G: 2 INJECTION, POWDER, FOR SOLUTION INTRAMUSCULAR; INTRAVENOUS at 10:26

## 2025-04-16 RX ADMIN — SENNOSIDES AND DOCUSATE SODIUM 1 TABLET: 50; 8.6 TABLET ORAL at 00:47

## 2025-04-16 RX ADMIN — OXYCODONE HYDROCHLORIDE 10 MG: 5 TABLET ORAL at 05:01

## 2025-04-16 RX ADMIN — DICLOFENAC 4 G: 10 GEL TOPICAL at 17:07

## 2025-04-16 ASSESSMENT — ACTIVITIES OF DAILY LIVING (ADL)
ADLS_ACUITY_SCORE: 80
ADLS_ACUITY_SCORE: 78
ADLS_ACUITY_SCORE: 76
ADLS_ACUITY_SCORE: 78
ADLS_ACUITY_SCORE: 78
ADLS_ACUITY_SCORE: 76
ADLS_ACUITY_SCORE: 80
ADLS_ACUITY_SCORE: 78
ADLS_ACUITY_SCORE: 76
ADLS_ACUITY_SCORE: 76
ADLS_ACUITY_SCORE: 82
ADLS_ACUITY_SCORE: 78
ADLS_ACUITY_SCORE: 82
ADLS_ACUITY_SCORE: 78
ADLS_ACUITY_SCORE: 82
ADLS_ACUITY_SCORE: 80
ADLS_ACUITY_SCORE: 78
ADLS_ACUITY_SCORE: 76
ADLS_ACUITY_SCORE: 76
ADLS_ACUITY_SCORE: 78
ADLS_ACUITY_SCORE: 82

## 2025-04-16 NOTE — PROGRESS NOTES
"SPIRITUAL HEALTH SERVICES Progress Note  [facility] Surg    Saw pt Brissa Mott per physician referral.    Patient/Family Understanding of Illness and Goals of Care - I spoke with Brissa and , Ian.  They welcomed the awareness of spiritual support.  I mentioned that I saw a cancer diagnosis.  Ian said it was stage 4 and that they were wanting to get Brissa in for her treatments but there have been multiple \"roadblocks\"    Distress and Loss - The diagnosis has been fairly recent and has been very difficult for them.    Strengths, Coping, and Resources - Brissa's support system is made up of  Ian, two children and many friends.  We didn't go deeply into any of the questions.  Brissa had just had therapies and was tired.    Meaning, Beliefs, and Spirituality - Ian mentioned that they were Congregational in background but weren't involved in a bala community at this time.  He said he was raised [place] [employer], Brissa was [place] [employer] and the [place] they tried in [place] was [facility] with a contemporary service, which they liked.  I asked if I could pray for them and they welcomed that support.    Plan of Care -  will remain available for support during Brissa's ongoing treatments and LOS    John Berg MA, [facility]  Staff  - Spiritual Health Services  Office: [phone]    "

## 2025-04-16 NOTE — PLAN OF CARE
Problem: Adult Inpatient Plan of Care  Goal: Absence of Hospital-Acquired Illness or Injury  Intervention: Identify and Manage Fall Risk  Recent Flowsheet Documentation  Taken 4/16/2025 0300 by Valentina Bennett RN  Safety Promotion/Fall Prevention:   safety round/check completed   patient and family education   lighting adjusted   increase visualization of patient   increased rounding and observation  Intervention: Prevent Skin Injury  Recent Flowsheet Documentation  Taken 4/16/2025 0300 by Valentina Bennett RN  Body Position: supine, head elevated  Intervention: Prevent Infection  Recent Flowsheet Documentation  Taken 4/16/2025 0300 by Valentina Bennett RN  Infection Prevention: rest/sleep promoted     Problem: Pain Acute  Goal: Optimal Pain Control and Function  Outcome: Not Progressing  Intervention: Prevent or Manage Pain  Recent Flowsheet Documentation  Taken 4/16/2025 0300 by Valentina Bennett RN  Bowel Elimination Promotion: adequate fluid intake promoted  Medication Review/Management: medications reviewed   Goal Outcome Evaluation:       A/O X4. BP Continues to be elevated, other vitals stable on 1L NC. Taking PRN Oxycodone for pain. Pt c/o of constipation and having pain when trying to have a BM. PRN Senna given, but pt still feeling constipated and having associated pain. Dr. Sofie Molina notified and placed order for PRN suppository, and scheduled Colace and Miralax. Pt declined Suppository. Colace given, then pt able to have a soft BM, now feeling less constipated. IVF infusing. Turning and repositioned pt using lift and assist X2. Will continue to monitor.

## 2025-04-16 NOTE — PLAN OF CARE
"Patient alert, oriented. Reported unable to \"pass gas\" this morning and contributing to pain. Hard BM reported on NOCs. Denies nausea, good appetite eating 75% meal. Bowel sounds positive, abdomen soft. Miralax given.     Reporting oxycodone dose not helpful, premedicated with Motrin and Dilaudid and encouraged out of bed. EZ Stand used to elevate pt off bed for approx 5 min, however pt refused to allow staff to place on BSC or move to chair stating she felt \"unsafe\" and worried she would fall. 3 staff, spouse and EZ stand all present at patient side providing reassurance.  Patient was medicated with     Patient had very large, soft brown BM while upright on EZ stand.    Patient education provided to patient and spouse regarding importance of bowel regime given reduced activity and use of narcotics for pain management.     Magnesium and Phosphorus replaced per protocol. IV fluids and Abx given as ordered.          Problem: Adult Inpatient Plan of Care  Goal: Plan of Care Review  Description: The Plan of Care Review/Shift note should be completed every shift.  The Outcome Evaluation is a brief statement about your assessment that the patient is improving, declining, or no change.  This information will be displayed automatically on your shiftnote.  Outcome: Progressing  Flowsheets (Taken 4/16/2025 1246)  Plan of Care Reviewed With:   patient   spouse              "

## 2025-04-16 NOTE — PROGRESS NOTES
[facility]  Patient is currently open to home care services with [facility]. The patient has   RN OT  [facility]  Branch   and team have been notified of patient admission. The end of this  episode is 6/07/25.   [facility] liaison will continue to follow patient during stay. If  appropriate provide orders to resume home care at time of discharge.

## 2025-04-16 NOTE — PROGRESS NOTES
[facility]    Medicine Progress Note - Hospitalist Service    Date of Admission:  4/14/2025    Assessment & Plan    Brissa Mott is a 54 year old female with past medical hx of recently diagnosed neuroendocrine carcinoma, T2DM, CVA with residual hemiparesis, hypercalcemia, HTN, HLD, KATHRINE admitted on 4/14/2025. She presented for generalized weakness after recent discharge from TCU.     Generalized weakness  Fall, initial encounter  Left knee pain, chronic    Recently admitted 2/20-3/5 for pathological fracture (see below) and discharged to TCU. Left TCU on 4/7 and has had 3 falls in the interim. Falls without injuries, but family unable to help her up and requires EMS assistance. Unsafe to remain at home with  and daughter. Weakness multifactorial due to UTI, hypomagnesemia, PE, and progression of cancer.  - PT/OT/SW consulted  - See below for etiologies  - Diclofenac 4 g 4 times daily, Tylenol 975 mg every 8 hours, and oxycodone 5-10 mg PRN  - Palliative care consulted     Hypercalcemia secondary to malignancy  Calcium 13.8 and ionized calcium 7.5 on admission. Seen on previous admission up to 14.7 and resolved with IVF and Zometa. Due to malignancy. PTH <6, PTHrP 12 (high), vitamin D <18 (low).   -  ml/hr   - Consider chronic bisphosphonate's at discharge to prevent recurrence    Neuroendocrine carcinoma, metastatic  Pathological fracture of right femur due to neoplastic disease s/p ORIF 2/25/25    Admitted 2/20-3/5 for a pathologic fracture of right femur, and pathology showed poorly differentiated carcinoma with neuroendocrine features. Underwent ORIF 2/25 and received Lovenox for 30 days post-op. CT c/a/p showed multiple pulmonary nodules, left axillary node, several hypodense areas in liver, bony lesion on right humeral head/right sacrum/pelvis, several subcutaneous nodules in anterior chest wall and right gluteal region.    Established with oncology 3/21. Current plan is  for radiation therapy to the pathological fracture and biopsy of the left axillary lymph node, which was scheduled for 4/14 but she ended up in the ER. Has not started treatment yet.    CT on admission showed progression of metastatic disease with new hepatic mets, enlarging osseous mets, enlarging lymph nodes above and below diaphragm. This includes enlarging manubrial mets with pathologic fracture and enlarging destructive lesion of right hemisacrum and right medial iliac bone. Resolution of multiple previously seen pulmonary nodules but new RLL nodule. Other pulmonary nodules stable or mildly increased. Right adnexal mass mildly decreased.   - Ultrasound-guided lymph core biopsy 4/17 hospitalized to expedite the pathology and gene sequencing, will reach out to radiology 4/17 a.m.  - Continued PTA Lidocaine patches, PRN Tylenol, PRN Ibuprofen, PRN Oxycodone 5-10 mg  - Hold a.m. Lovenox     Acute UTI without hematuria  Severe sepsis without septic shock  UA with nitrites, trace LE, few bacteria, 9 WBC. Arrived septic based on leukocytosis (15.5), HR (110s). Lactic 3.0. Urine culture with E. Coli.   - Ceftriaxone 2g IV daily (last dose 4/18)  - Blood cultures NGTD     Acute pulmonary embolism  Not hypoxic. CT with poor opacification of few subsegmental pulmonary arteries suspicious for small volume pulmonary emboli. After her ORIF on 2/25 (see below) she was given Lovenox 40mg BID for 6 weeks (ending 4/17) which she endorses compliance with.  - Holding a.m. Lovenox 80mg IV BID. Avoiding DOACs for now due to plans for biopsy     Hypomagnesemia  Hypophosphatemia  Mag 1.1 and phos 1.9 on admission.  - RN repletion protocol     Anorexia   very concerned about poor PO intake, and believes it's due to polypharmacy. Besides physical decline and multiple comorbidities as above, review of med list only reveals Ozempic as possible etiology. She hadn't taken it for ~6 weeks while admitted and at TCU, then took a dose  "when she got home on 4/7.  states this correlates with onset of poor PO intake.   - Recommend discontinuing Ozempic     Hx of CVA  Hemiplegia and hemiparesis following cerebral infarction affecting left non-dominant side   HLD  CVA in 2018. Residual left sided weakness.  - Continued PTA Atorvastatin 40mg     Type 2 diabetes mellitus with neuropathy, with long-term current use of insulin   A1c 6.8% on admission.  - sliding scale insulin  - Consistent carb diet  - Continued PTA Gabapentin 300mg BID, Humulin 70/30 BID 15U  - Held PTA Metformin 1000mg BID for lactic acidosis   - Held PTA Ozempic weekly. Consider discontinuation as above.     Anemia, borderline microcytic, chronic  Hemoglobin 9.6 on admission, baseline 8-9.       Hypertension  - Continued PTA Amlodipine 10mg, Ibesartan 300mg     Reactive airway disease without complication  - Continued PTA Montelukast 10mg     KATHRINE (obstructive sleep apnea)  - Continued PTA CPAP if available     Adjustment disorder with depressed mood  - Continued PTA Hydroxyzine 25mg PRN, Sertraline 150mg          Diet: Combination Diet Moderate Consistent Carb (60 g CHO per Meal) Diet    DVT Prophylaxis: Enoxaparin (Lovenox) SQ  Nielson Catheter: Not present  Lines: None     Cardiac Monitoring: None  Code Status: Full Code      Clinically Significant Risk Factors            # Hypercalcemia: Highest Ca = 12.6 mg/dL in last 2 days, will monitor as appropriate  # Hypomagnesemia: Lowest Mg = 1.2 mg/dL in last 2 days, will replace as needed   # Hypoalbuminemia: Lowest albumin = 2.7 g/dL at 4/14/2025  2:43 PM, will monitor as appropriate     # Hypertension: Noted on problem list           # DMII: A1C = 6.8 % (Ref range: <5.7 %) within past 6 months, PRESENT ON ADMISSION  # Severe Obesity: Estimated body mass index is 40.68 kg/m  as calculated from the following:    Height as of this encounter: 1.626 m (5' 4\").    Weight as of this encounter: 107.5 kg (237 lb)., PRESENT ON ADMISSION     # " Financial/Environmental Concerns:    # Asthma: noted on problem list        Social Drivers of Health    Depression: At risk (7/19/2024)    PHQ-2     PHQ-2 Score: 3   Tobacco Use: Medium Risk (2/25/2025)    Patient History     Smoking Tobacco Use: Former     Smokeless Tobacco Use: Never          Disposition Plan     Medically Ready for Discharge: Anticipated in 2-4 Days             Reynaldo Elder DO  Hospitalist Service  [facility]  Securely message with [employer] (more info)  Text page via [employer] Paging/Directory   ______________________________________________________________________    Interval History   No acute events overnight.  Long discussion with patient about path forward given current limitations.  Patient and spouse both agreeable to pursuing LTC and outpatient follow-ups.  Patient limited with mobility due to left knee pain and is requesting gel injection, which I discussed with her that this is likely not an inpatient procedure that would be covered by insurance.  Discussed that I would reach out to radiology to see if they could do a core biopsy of the left axillary lymph node for gene sequencing.  Discussed that ultimately she needs a knee replacement that would not be able to be done until cancer remission which is likely 6 to 12 months away.  Discussed various pain regimens for improvement with her mobility.    Physical Exam   Vital Signs: Temp: 98.1  F (36.7  C) Temp src: Oral BP: (!) 158/77 Pulse: 104   Resp: 20 SpO2: 92 % O2 Device: None (Room air) Oxygen Delivery: 1 LPM  Weight: 237 lbs 0 oz    Constitutional: awake, alert, cooperative, disheveled, no apparent distress, and appears stated age  Respiratory: No increased work of breathing, good air exchange, clear to auscultation bilaterally, no crackles or wheezing  Cardiovascular: Normal apical impulse, regular rate and rhythm, normal S1 and S2, no S3 or S4, and no murmur noted  GI: No scars, normal bowel sounds, soft,  non-distended, non-tender, no masses palpated, no hepatosplenomegally  Skin: normal skin color, texture, turgor  Musculoskeletal: There is no redness, warmth, or swelling of the joints.  Full range of motion noted.  Motor strength is 5 out of 5 all extremities bilaterally.  Tone is normal.    Medical Decision Making       55 MINUTES SPENT BY ME on the date of service doing chart review, history, exam, documentation & further activities per the note.      Data     I have personally reviewed the following data over the past 24 hrs:    14.4 (H)  \   8.7 (L)   / 448     138 101 6.0 /  176 (H)   4.6 25 0.36 (L) \     ALT: 9 AST: 54 (H) AP: 159 (H) TBILI: 0.4   ALB: 2.8 (L) TOT PROTEIN: 5.8 (L) LIPASE: N/A       Imaging results reviewed over the past 24 hrs:   No results found for this or any previous visit (from the past 24 hours).

## 2025-04-16 NOTE — PROGRESS NOTES
End Of Shift Note    Situation: Admit with urosepsis and Pulmonary embolus    Plan: Encourage activity out of bed or at least frequent repos, monitor respiratory status, strict I & O    Subjective/Objective:    Neuro: alert/ oriented, somewhat flat affect    Cardiac: SR with BBB on tele    Resp: 1L per NC, continue to wean to room air    GI/: Purewic in place, patient refused to use bedpan or commode today     Endo: sliding scale given once for BG of 145 at dinner    MSK: very poor mobility, lift used to get patient to recliner. Strength and mobility is limited by BLE pain    Skin: Large sacral pressure sore noted; mepilex in place    LDAs: PIV infusing LR at 200 mL/hr

## 2025-04-16 NOTE — CONSULTS
Palliative Care Consultation Note  [facility]      Patient: Brissa Mott  Date of Admission:  4/14/2025    Requesting Clinician / Team: hospitalist   Reason for consult: Symptom management, Goals of care, Patient and family support       Recommendations & Counseling     GOALS OF CARE:   Restorative without limits   Although patient is currently in a dependent state and very little improvement despite attempts at rehab via TCU leading up to hospitalization, patient hopes that someday she will be able to improve to some level of independence and living back at home with spouse Zoran.  Patient and spouse described having heard that patient has a stage IV form of cancer, yet are unclear around prognosis, timeline, or potential treatment options for the future.  Both have said that they would appreciate transparent information from medical team and cancer specialists around prognosis based on provider experience and averages to help with planning.      ADVANCE CARE PLANNING:  No health care directive on file.   Spouse Zoran Mott is next of kin.  There is no POLST form on file, defer to patient and/or next of kin for decisions   Code status: Full Code      MEDICAL MANAGEMENT:   We are not actively managing symptoms at this time.  *Patient has chronic non-cancer pain of joints  consistent with nociceptive musculoskeletal etiology.  Currently using PRN dosing of ibuprofen and oxycodone with tolerable relief, although question whether NSAIDs will be a safe option going forward given recent diagnosis of VTE and necessity for anticoagulation therapy.  Defer to primary team for chronic pain management, buprenorphine may be an equally efficacious yet safer alternative to traditional opioid.      PSYCHOSOCIAL/SPIRITUAL SUPPORT:  Family; 2 children and 4 grandchildren  Friends and bala community      Palliative Care will continue to follow. Thank you for the consult and allowing us to aid in the  care of Brissa Mott.    These recommendations have been discussed with team.      Cristian Velasquez, DO  Emergency Medicine / Palliative Medicine   Securely message with the [employer] Web Console (learn more here) or  Text page via [facility] Paging/Directory         Assessment      Brissa Mott is a 54 year old female with a past medical history of recently diagnosed widely metastatic neuroendocrine carcinoma, T2DM, CVA with residual left-sided hemiparesis, and malignant hypercalcemia admitted to hospital for generalized weakness after recent discharge from TCU.  Patient was hospitalized in January 2025 at which time CT scan demonstrated intramedullary lesion in the distal femur measuring 7.5 cm in size.  On 2/24/2025 the patient underwent open reduction and fixation with intramedullary nail insertion of the right femur.  Pathology demonstrated for demonstrate carcinoma with neuroendocrine features.  Staging workup including CT scan chest abdomen pelvis demonstrated concern for right adnexal mass, left axillar adenopathy, and pulmonary nodules and possible hepatic nodules concerning for metastatic disease.  MRI brain was negative.  She has not yet received palliative radiation therapy as planned, nor lymph node biopsy for further consideration of systemic treatment options.      Today, the patient was seen for:  Symptom assessment, facilitation of medical communication and support with goals of care    History of Present Illness   Met with patient and spouse Zoran to introduce our role as an extra layer of support and how we help patients and families dealing with serious, potentially life-limiting illnesses. I explained the composition of the palliative care team.  Palliative care helps patients and families navigate their care while focusing on the whole person; providing emotional, social and spiritual support  Palliative care often assists with symptom management, information sharing about what to expect from the  "illness, available treatment options and what effect those options may have on the disease course, and provide effective communication and caring support.    Brissa describes feeling overwhelmed since receiving unexpected news of widely metastatic cancer.  Furthermore, she suffers from left-sided hemiparesis after CVA, and with pathologic right-sided femur fracture she now struggles using her nondominant leg for ambulation.  She is suffering from bilateral knee pain, left greater than right, which she attributes to severe arthritis.  She believes that she will eventually require total knee arthroplasty after cancer treatment, in the meantime would appreciate steroid injection which has successfully helped with knee pain in the past.  She also describes right shoulder pain which she attributes to an injury while being Surinder lifted during recent TCU stay.  She denies abdominal pain or anything reminiscent of malignant pain.  For years she has taken acetaminophen and ibuprofen for her body aches and pains, unfortunately may no longer be able to safely take NSAIDs due to recent pulmonary embolism diagnosis and high likelihood for long-term anticoagulation therapy.  Thus far, as needed oxycodone 2-4 times per day has been sufficient for pain, and still somewhat able to offer relief during attempts at physical therapy.    Patient and spouse recall only meeting with medical oncology team 1 time since diagnosis.  They are not yet aware of the treatment plan, yet hope to be able to \"fight\" stage IV cancer for as long as possible.  Inquired about timelines and prognosis, Zoran said he would appreciate transparent information based on oncology and medical team experience to help with planning.  Brissa also would like prognostic information, realistic treatment options and potential outcomes.  At this time, they are not sure whether to prepare for weeks or years of living with stage IV cancer.  However, they are seemingly " "preparing for long-term care given Brissa difficulty with rehab at recent TCU, and high likelihood for care needs in the future.      Prognosis, Goals, & Planning:   Functional Status just prior to this current hospitalization:  Outpatient Palliative Performance Score (PPS) 50%  Extensive disease. Normal or reduced intake; normal LOC or confusion; little ambulation (mainly sit/lie), ADLs w/much assistance, unable to do any work.      Prognosis, Goals, and/or Advance Care Planning:  Discussed what continuing restorative/life-prolonging care entails, including continued (re)admissions to the hospital, continuing with preventative and primary care, seeing disease/organ specific specialty consultations for medical treatments in hopes to prolong life for as long as possible.      Code Status was addressed today:   No      Patient's decision making preferences: shared with support from loved ones        Patient has decision-making capacity today for complex decisions: Intact           Coping, Meaning, & Spirituality:   Mood, coping, and/or meaning in the context of serious illness were addressed today: Yes    Social:   Living situation: lives with family  Important relationships/caregivers: Spouse Zoran and daughter Lorenza  Areas of fulfillment/elias: Socializing with friends and family, bala    Medications:  Reviewed this patient's medication profile and medications from this hospitalization.     [place] Board of Pharmacy Data Base Reviewed: Yes:   reviewed - controlled substances reflected in medication list..    ROS:  Comprehensive ROS is reviewed and is negative except as here & per HPI:     Physical Exam   BP (!) 142/77 (BP Location: Right arm)   Pulse 96   Temp 98.2  F (36.8  C) (Oral)   Resp 16   Ht 1.626 m (5' 4\")   Wt 107.5 kg (237 lb)   LMP  (LMP Unknown)   SpO2 (!) 90%   BMI 40.68 kg/m    General:  Appears stated age.  NAD.   HENT:  Atraumatic.  Hearing intact.  Moist mucous membranes.    Eyes:  " Conjunctivae are clear.  Sclera is nonicteric.    Cardiovascular:  Without cyanosis or mottling.   Respiratory:  Breathing comfortably without increased work of breathing or signs of respiratory distress.  Able to speak in complete sentences.    Skin:  Dry, without diaphoresis.   Neuro:  Alert, attentive, speech clear and fluent.   Psych:  Appropriate mood and affect.  No sign of confusion or delusion.     Wt Readings from Last 8 Encounters:   04/14/25 107.5 kg (237 lb)   04/07/25 107.6 kg (237 lb 4.8 oz)   03/31/25 108 kg (238 lb)   03/27/25 108.4 kg (239 lb)   03/20/25 110.2 kg (243 lb)   03/13/25 113.4 kg (250 lb)   03/10/25 119.1 kg (262 lb 9.6 oz)   03/06/25 119.1 kg (262 lb 9.6 oz)         Data reviewed:  Results for orders placed or performed during the hospital encounter of 04/14/25 (from the past 24 hours)   Glucose by meter   Result Value Ref Range    GLUCOSE BY METER POCT 148 (H) 70 - 99 mg/dL   Glucose by meter   Result Value Ref Range    GLUCOSE BY METER POCT 137 (H) 70 - 99 mg/dL   CBC with Platelets & Differential    Narrative    The following orders were created for panel order CBC with Platelets & Differential.  Procedure                               Abnormality         Status                     ---------                               -----------         ------                     CBC with platelets and ...[[id number]]  Abnormal            Final result               RBC and Platelet Morpho...[[id number]]                      Final result               Manual Differential[[id number]]         Abnormal            Final result                 Please view results for these tests on the individual orders.   Comprehensive metabolic panel   Result Value Ref Range    Sodium 138 135 - 145 mmol/L    Potassium 4.6 3.4 - 5.3 mmol/L    Carbon Dioxide (CO2) 25 22 - 29 mmol/L    Anion Gap 12 7 - 15 mmol/L    Urea Nitrogen 6.0 6.0 - 20.0 mg/dL    Creatinine 0.36 (L) 0.51 - 0.95 mg/dL    GFR Estimate >90 >60  mL/min/1.73m2    Calcium 11.4 (H) 8.8 - 10.4 mg/dL    Chloride 101 98 - 107 mmol/L    Glucose 150 (H) 70 - 99 mg/dL    Alkaline Phosphatase 159 (H) 40 - 150 U/L    AST 54 (H) 0 - 45 U/L    ALT 9 0 - 50 U/L    Protein Total 5.8 (L) 6.4 - 8.3 g/dL    Albumin 2.8 (L) 3.5 - 5.2 g/dL    Bilirubin Total 0.4 <=1.2 mg/dL   Magnesium   Result Value Ref Range    Magnesium 1.3 (L) 1.7 - 2.3 mg/dL   Phosphorus   Result Value Ref Range    Phosphorus 1.6 (L) 2.5 - 4.5 mg/dL   CBC with platelets and differential   Result Value Ref Range    WBC Count 14.4 (H) 4.0 - 11.0 10e3/uL    RBC Count 3.75 (L) 3.80 - 5.20 10e6/uL    Hemoglobin 8.7 (L) 11.7 - 15.7 g/dL    Hematocrit 30.7 (L) 35.0 - 47.0 %    MCV 82 78 - 100 fL    MCH 23.2 (L) 26.5 - 33.0 pg    MCHC 28.3 (L) 31.5 - 36.5 g/dL    RDW 17.7 (H) 10.0 - 15.0 %    Platelet Count 448 150 - 450 10e3/uL   Manual Differential   Result Value Ref Range    % Neutrophils 77 %    % Lymphocytes 8 %    % Monocytes 10 %    % Eosinophils 0 %    % Basophils 2 %    % Metamyelocytes 3 %    Absolute Neutrophils 11.1 (H) 1.6 - 8.3 10e3/uL    Absolute Lymphocytes 1.2 0.8 - 5.3 10e3/uL    Absolute Monocytes 1.4 (H) 0.0 - 1.3 10e3/uL    Absolute Eosinophils 0.0 0.0 - 0.7 10e3/uL    Absolute Basophils 0.3 (H) 0.0 - 0.2 10e3/uL    Absolute Metamyelocytes 0.4 (H) <=0.0 10e3/uL   RBC and Platelet Morphology   Result Value Ref Range    RBC Morphology Confirmed RBC Indices     Platelet Assessment  Automated Count Confirmed. Platelet morphology is normal.     Automated Count Confirmed. Platelet morphology is normal.   Glucose by meter   Result Value Ref Range    GLUCOSE BY METER POCT 161 (H) 70 - 99 mg/dL   Glucose by meter   Result Value Ref Range    GLUCOSE BY METER POCT 176 (H) 70 - 99 mg/dL   Magnesium   Result Value Ref Range    Magnesium 2.0 1.7 - 2.3 mg/dL   Glucose by meter   Result Value Ref Range    GLUCOSE BY METER POCT 163 (H) 70 - 99 mg/dL   Phosphorus   Result Value Ref Range    Phosphorus 2.2 (L)  2.5 - 4.5 mg/dL       Medical Decision Making       Please see A&P for additional details of medical decision making.  MANAGEMENT DISCUSSED with the following over the past 24 hours: Bedside RN, hospitalist   NOTE(S)/MEDICAL RECORDS REVIEWED over the past 24 hours: Oncology, radiation oncology, hospitalist, emergency medicine  Tests personally interpreted in the past 24 hours:  - EKG tracing showing QTc of 466  Tests ORDERED & REVIEWED in the past 24 hours:  - See lab/imaging results included in the data section of the note  SUPPLEMENTAL HISTORY, in addition to the patient's history, over the past 24 hours obtained from:   - Spouse or significant other  Medical complexity over the past 24 hours:  -------------------------- HIGH RISK FOR MORBIDITY -------------------------------------------------------------  - Intensive monitoring for MEDICATION TOXICITY  - Decision to continue to pursue versus de-escalate care based on prognosis

## 2025-04-16 NOTE — PROGRESS NOTES
04/16/25 1200   Appointment Info   Signing Clinician's Name / Credentials (PT) Angel Brand, PT, DPT, OCS   Living Environment   People in Home spouse   Current Living Arrangements house   Home Accessibility wheelchair accessible   Living Environment Comments ramp to enter home.   Self-Care   Equipment Currently Used at Home walker, standard;cane, straight;commode chair;lift device;hospital bed   Fall history within last six months yes   Number of times patient has fallen within last six months 2   Activity/Exercise/Self-Care Comment at [facility]: has assist with all ADLs. uses EZ stand for all transfers. States falls were from sling shifting. States she is not able to put any weight through Left LE. sleeps in hospital bed at home- does get to edge of bed with Ax1.   General Information   Onset of Illness/Injury or Date of Surgery 04/14/25   Referring Physician Dr. Wong   Patient/Family Therapy Goals Statement (PT) Return home   Pertinent History of Current Problem (include personal factors and/or comorbidities that impact the POC) From H&P: Brissa Mott is a 54 year old female with past medical hx of recently diagnosed neuroendocrine carcinoma, T2DM, CVA with residual hemiparesis, hypercalcemia, HTN, HLD, KATHRINE admitted on 4/14/2025. She presented for generalized weakness after recent discharge from TCU.   Cognition   Affect/Mental Status (Cognition) WNL   Orientation Status (Cognition) oriented x 3   Follows Commands (Cognition) WNL   Integumentary/Edema   Integumentary/Edema no deficits were identifed   Posture    Posture Forward head position   Range of Motion (ROM)   Range of Motion ROM is WFL   Strength (Manual Muscle Testing)   Strength Comments Significant LE weakness observed with bed mobility and transfer   Bed Mobility   Bed Mobility supine-sit   Supine-Sit [place] (Bed Mobility) maximum assist (25% patient effort);2 person assist   Assistive Device (Bed Mobility) bed rails;draw sheet   Comment,  (Bed Mobility) Head of bed fully elevated, drawsheet rotated to position patient edge of bed, hand held assist to pull upright.   Transfers   Transfers sit-stand transfer   Sit-Stand Transfer   Sit-Stand [place] (Transfers) maximum assist (25% patient effort)   Assistive Device (Sit-Stand Transfers) lift device  (EZ stand)   Comment, (Sit-Stand Transfer) Assist of another with positioning straps.   Balance   Balance Comments Pt partially stood in EZ stand with supervision.   Sensory Examination   Sensory Perception patient reports no sensory changes   Coordination   Coordination no deficits were identified   Muscle Tone   Muscle Tone no deficits were identified   Clinical Impression   Criteria for Skilled Therapeutic Intervention Yes, treatment indicated   PT Diagnosis (PT) Generalized weakness   Influenced by the following impairments Weakness, pain.   Functional limitations due to impairments bed mobility, transfers.   Clinical Presentation (PT Evaluation Complexity) evolving   Clinical Presentation Rationale clinical judgement   Clinical Decision Making (Complexity) moderate complexity   Planned Therapy Interventions (PT) balance training;bed mobility training;gait training;neuromuscular re-education;patient/family education;strengthening;transfer training   Risk & Benefits of therapy have been explained evaluation/treatment results reviewed;care plan/treatment goals reviewed;risks/benefits reviewed;current/potential barriers reviewed;participants voiced agreement with care plan;participants included;patient;spouse/significant other   PT Total Evaluation Time   PT Eval, Moderate Complexity Minutes (38522) 15   Physical Therapy Goals   PT Frequency 3x/week   PT Predicted Duration/Target Date for Goal Attainment 04/23/25   PT Goals Transfers   PT: Transfers Bed to/from chair;Assistive device;Maximum assist   Interventions   Interventions Quick Adds Therapeutic Activity   Therapeutic Activity   Therapeutic  Activities: dynamic activities to improve functional performance Minutes (14597) 15   Symptoms Noted During/After Treatment Fatigue;Increased pain   Treatment Detail/Skilled Intervention Pt transferred supine to sit with max assist x 2 with PT pivoting sheet to assist with rotating legs to edge of bed.  Pt able to sit edge of bed with supervision while EZ stand was put into position.  Pt with no LOB in sitting. Pt transferred to partial standing in EZ stand with max assist x 1.  Pt stood for 5 minutes with no LOB with holding onto EZ stand while nurse performed pericares.  Pt returned to sitting with Max assist to lower patient down.  Pt with call light in reach and nurse present upon PT departure.   PT Discharge Planning   PT Plan Weds 1/3.  Consider bed to chair EZ stand transfer.  Monitor for willingness to participate in therapies.   PT Discharge Recommendation (DC Rec) home with assist;Long term care facility   PT Rationale for DC Rec Pt able to complete EZ stand sit to stand transfer today and maintain upright position for 5 minutes which would be baseline to go from bed to wheelchair at home.  Pt may benefit from long term care if family does not feel they can acommodate her.  Pt very resistant to participation with therapies and would likely not be appropriate for TCU placement.   PT Brief overview of current status max assist x 2 bed mobility. Max assist sit to stand with EZ stand.   PT Total Distance Amb During Session (feet) 0   Physical Therapy Time and Intention   Timed Code Treatment Minutes 15   Total Session Time (sum of timed and untimed services) 30

## 2025-04-16 NOTE — CONSULTS
"Care Management Note:    CM received referral for discharge planning, family /support & financial/insurance issues.    Per IDT rounds today, MD team states that pt IS NOT medically stable for discharge today.  MD anticipates pt to remain hospitalized another 1-3 days due to medical issues/     PT/OT recommends that pt can return to home or discharge to a LTC facility upon discharge.      CARRILLO did thorough chart review and discovered that pt admitted to the hospital on 1-30-24 and on 2/4/25, was discharged to [facility] for cares and did well while there.  Pt was discharged to home with spouse.    On 2/18/25 pt to [facility] ED and then transferred to [facility] & hospitalized until 3/5/25.  [facility].    Per Nimco VIZCAINO, LTC placement was recommended, LTC-MA application was completed and sent to [facility], (This writer has copy of application).  \"Patient driven discharge home so she could start cancer treatment ASAP.   had taken an LIANNA to care for her at home. He purchased an EZ stand and hospital bed.  We [Nimco VIZCAINO] submitted an MA application to [facility]. She will need CADI waiver but is also not on disability.  Pt discharged to home with [facility] (Phone: [phone]) RN & OT services.\"      CARRILLO confirmed with [facility] liaison, that pt is currently enrolled with services and can resume cares upon discharge.  MD will write new orders.    SW awaiting for Palliative Care to meet with patient to discuss plan and goals of care.  Full assessment to be completed on 4/17/25.    JENNIFER Parks      "

## 2025-04-17 ENCOUNTER — PATIENT OUTREACH (OUTPATIENT)
Dept: ONCOLOGY | Facility: CLINIC | Age: 55
End: 2025-04-17
Payer: COMMERCIAL

## 2025-04-17 ENCOUNTER — APPOINTMENT (OUTPATIENT)
Dept: ULTRASOUND IMAGING | Facility: CLINIC | Age: 55
End: 2025-04-17
Attending: STUDENT IN AN ORGANIZED HEALTH CARE EDUCATION/TRAINING PROGRAM
Payer: COMMERCIAL

## 2025-04-17 LAB
ANION GAP SERPL CALCULATED.3IONS-SCNC: 12 MMOL/L (ref 7–15)
BACTERIA BLD CULT: NORMAL
BUN SERPL-MCNC: 5.7 MG/DL (ref 6–20)
CALCIUM SERPL-MCNC: 10.7 MG/DL (ref 8.8–10.4)
CHLORIDE SERPL-SCNC: 102 MMOL/L (ref 98–107)
CREAT SERPL-MCNC: 0.35 MG/DL (ref 0.51–0.95)
EGFRCR SERPLBLD CKD-EPI 2021: >90 ML/MIN/1.73M2
GLUCOSE BLDC GLUCOMTR-MCNC: 126 MG/DL (ref 70–99)
GLUCOSE BLDC GLUCOMTR-MCNC: 141 MG/DL (ref 70–99)
GLUCOSE BLDC GLUCOMTR-MCNC: 187 MG/DL (ref 70–99)
GLUCOSE BLDC GLUCOMTR-MCNC: 190 MG/DL (ref 70–99)
GLUCOSE BLDC GLUCOMTR-MCNC: 208 MG/DL (ref 70–99)
GLUCOSE SERPL-MCNC: 145 MG/DL (ref 70–99)
HCO3 SERPL-SCNC: 24 MMOL/L (ref 22–29)
HOLD SPECIMEN: NORMAL
MAGNESIUM SERPL-MCNC: 1.6 MG/DL (ref 1.7–2.3)
PHOSPHATE SERPL-MCNC: 2.3 MG/DL (ref 2.5–4.5)
POTASSIUM SERPL-SCNC: 4.6 MMOL/L (ref 3.4–5.3)
SODIUM SERPL-SCNC: 138 MMOL/L (ref 135–145)

## 2025-04-17 PROCEDURE — 83735 ASSAY OF MAGNESIUM: CPT | Performed by: STUDENT IN AN ORGANIZED HEALTH CARE EDUCATION/TRAINING PROGRAM

## 2025-04-17 PROCEDURE — 88360 TUMOR IMMUNOHISTOCHEM/MANUAL: CPT | Mod: 26 | Performed by: PATHOLOGY

## 2025-04-17 PROCEDURE — 80048 BASIC METABOLIC PNL TOTAL CA: CPT | Performed by: STUDENT IN AN ORGANIZED HEALTH CARE EDUCATION/TRAINING PROGRAM

## 2025-04-17 PROCEDURE — 258N000003 HC RX IP 258 OP 636: Performed by: STUDENT IN AN ORGANIZED HEALTH CARE EDUCATION/TRAINING PROGRAM

## 2025-04-17 PROCEDURE — 250N000011 HC RX IP 250 OP 636: Performed by: STUDENT IN AN ORGANIZED HEALTH CARE EDUCATION/TRAINING PROGRAM

## 2025-04-17 PROCEDURE — 88341 IMHCHEM/IMCYTCHM EA ADD ANTB: CPT | Mod: 26 | Performed by: PATHOLOGY

## 2025-04-17 PROCEDURE — 120N000001 HC R&B MED SURG/OB

## 2025-04-17 PROCEDURE — 88341 IMHCHEM/IMCYTCHM EA ADD ANTB: CPT | Mod: TC | Performed by: STUDENT IN AN ORGANIZED HEALTH CARE EDUCATION/TRAINING PROGRAM

## 2025-04-17 PROCEDURE — 250N000013 HC RX MED GY IP 250 OP 250 PS 637: Performed by: INTERNAL MEDICINE

## 2025-04-17 PROCEDURE — 88305 TISSUE EXAM BY PATHOLOGIST: CPT | Mod: 26 | Performed by: PATHOLOGY

## 2025-04-17 PROCEDURE — 84100 ASSAY OF PHOSPHORUS: CPT | Performed by: STUDENT IN AN ORGANIZED HEALTH CARE EDUCATION/TRAINING PROGRAM

## 2025-04-17 PROCEDURE — 88342 IMHCHEM/IMCYTCHM 1ST ANTB: CPT | Mod: 26 | Performed by: PATHOLOGY

## 2025-04-17 PROCEDURE — 250N000013 HC RX MED GY IP 250 OP 250 PS 637

## 2025-04-17 PROCEDURE — 36415 COLL VENOUS BLD VENIPUNCTURE: CPT | Performed by: STUDENT IN AN ORGANIZED HEALTH CARE EDUCATION/TRAINING PROGRAM

## 2025-04-17 PROCEDURE — 99232 SBSQ HOSP IP/OBS MODERATE 35: CPT | Performed by: STUDENT IN AN ORGANIZED HEALTH CARE EDUCATION/TRAINING PROGRAM

## 2025-04-17 PROCEDURE — 07D63ZX EXTRACTION OF LEFT AXILLARY LYMPHATIC, PERCUTANEOUS APPROACH, DIAGNOSTIC: ICD-10-PCS | Performed by: RADIOLOGY

## 2025-04-17 PROCEDURE — 272N000431 US BIOPSY FINE NEEDLE ASPIRATION LYMPH NODE

## 2025-04-17 PROCEDURE — 250N000013 HC RX MED GY IP 250 OP 250 PS 637: Performed by: STUDENT IN AN ORGANIZED HEALTH CARE EDUCATION/TRAINING PROGRAM

## 2025-04-17 PROCEDURE — 250N000009 HC RX 250: Mod: JW | Performed by: RADIOLOGY

## 2025-04-17 PROCEDURE — 99233 SBSQ HOSP IP/OBS HIGH 50: CPT | Performed by: STUDENT IN AN ORGANIZED HEALTH CARE EDUCATION/TRAINING PROGRAM

## 2025-04-17 RX ORDER — FUROSEMIDE 10 MG/ML
40 INJECTION INTRAMUSCULAR; INTRAVENOUS ONCE
Status: COMPLETED | OUTPATIENT
Start: 2025-04-17 | End: 2025-04-17

## 2025-04-17 RX ORDER — ENOXAPARIN SODIUM 100 MG/ML
0.75 INJECTION SUBCUTANEOUS EVERY 12 HOURS
Status: DISCONTINUED | OUTPATIENT
Start: 2025-04-17 | End: 2025-04-17

## 2025-04-17 RX ORDER — NYSTATIN 100000 [USP'U]/ML
500000 SUSPENSION ORAL 4 TIMES DAILY
Status: DISCONTINUED | OUTPATIENT
Start: 2025-04-17 | End: 2025-04-20

## 2025-04-17 RX ADMIN — IRBESARTAN 300 MG: 75 TABLET ORAL at 20:53

## 2025-04-17 RX ADMIN — INSULIN HUMAN 15 UNITS: 100 INJECTION, SUSPENSION SUBCUTANEOUS at 17:57

## 2025-04-17 RX ADMIN — DICLOFENAC 4 G: 10 GEL TOPICAL at 12:13

## 2025-04-17 RX ADMIN — ACETAMINOPHEN 975 MG: 325 TABLET ORAL at 03:50

## 2025-04-17 RX ADMIN — CEFTRIAXONE 2 G: 2 INJECTION, POWDER, FOR SOLUTION INTRAMUSCULAR; INTRAVENOUS at 09:38

## 2025-04-17 RX ADMIN — POTASSIUM & SODIUM PHOSPHATES POWDER PACK 280-160-250 MG 1 PACKET: 280-160-250 PACK at 08:22

## 2025-04-17 RX ADMIN — OXYCODONE HYDROCHLORIDE 10 MG: 5 TABLET ORAL at 20:59

## 2025-04-17 RX ADMIN — OXYCODONE HYDROCHLORIDE 10 MG: 5 TABLET ORAL at 09:38

## 2025-04-17 RX ADMIN — INSULIN ASPART 2 UNITS: 100 INJECTION, SOLUTION INTRAVENOUS; SUBCUTANEOUS at 12:13

## 2025-04-17 RX ADMIN — DICLOFENAC 4 G: 10 GEL TOPICAL at 08:22

## 2025-04-17 RX ADMIN — DOCUSATE SODIUM 100 MG: 100 CAPSULE, LIQUID FILLED ORAL at 20:41

## 2025-04-17 RX ADMIN — DICLOFENAC 4 G: 10 GEL TOPICAL at 17:57

## 2025-04-17 RX ADMIN — SERTRALINE HYDROCHLORIDE 150 MG: 50 TABLET ORAL at 20:53

## 2025-04-17 RX ADMIN — MONTELUKAST 10 MG: 10 TABLET, FILM COATED ORAL at 20:41

## 2025-04-17 RX ADMIN — GABAPENTIN 300 MG: 300 CAPSULE ORAL at 20:41

## 2025-04-17 RX ADMIN — ATORVASTATIN CALCIUM 40 MG: 20 TABLET, FILM COATED ORAL at 20:40

## 2025-04-17 RX ADMIN — POTASSIUM & SODIUM PHOSPHATES POWDER PACK 280-160-250 MG 1 PACKET: 280-160-250 PACK at 03:51

## 2025-04-17 RX ADMIN — APIXABAN 10 MG: 5 TABLET, FILM COATED ORAL at 20:41

## 2025-04-17 RX ADMIN — OXYCODONE HYDROCHLORIDE 5 MG: 5 TABLET ORAL at 01:56

## 2025-04-17 RX ADMIN — DICLOFENAC 4 G: 10 GEL TOPICAL at 21:01

## 2025-04-17 RX ADMIN — AMLODIPINE BESYLATE 10 MG: 10 TABLET ORAL at 08:22

## 2025-04-17 RX ADMIN — ACETAMINOPHEN 975 MG: 325 TABLET ORAL at 20:41

## 2025-04-17 RX ADMIN — OXYCODONE HYDROCHLORIDE 10 MG: 5 TABLET ORAL at 06:35

## 2025-04-17 RX ADMIN — INSULIN HUMAN 15 UNITS: 100 INJECTION, SUSPENSION SUBCUTANEOUS at 08:22

## 2025-04-17 RX ADMIN — OXYCODONE HYDROCHLORIDE 10 MG: 5 TABLET ORAL at 15:53

## 2025-04-17 RX ADMIN — NYSTATIN 500000 UNITS: 100000 SUSPENSION ORAL at 20:42

## 2025-04-17 RX ADMIN — OXYCODONE HYDROCHLORIDE 10 MG: 5 TABLET ORAL at 12:15

## 2025-04-17 RX ADMIN — ACETAMINOPHEN 975 MG: 325 TABLET ORAL at 12:12

## 2025-04-17 RX ADMIN — FUROSEMIDE 40 MG: 10 INJECTION, SOLUTION INTRAVENOUS at 15:54

## 2025-04-17 RX ADMIN — DOCUSATE SODIUM 100 MG: 100 CAPSULE, LIQUID FILLED ORAL at 08:22

## 2025-04-17 RX ADMIN — NYSTATIN 500000 UNITS: 100000 SUSPENSION ORAL at 17:56

## 2025-04-17 RX ADMIN — GABAPENTIN 300 MG: 300 CAPSULE ORAL at 08:22

## 2025-04-17 RX ADMIN — SODIUM CHLORIDE, SODIUM LACTATE, POTASSIUM CHLORIDE, AND CALCIUM CHLORIDE: .6; .31; .03; .02 INJECTION, SOLUTION INTRAVENOUS at 03:00

## 2025-04-17 RX ADMIN — INSULIN ASPART 2 UNITS: 100 INJECTION, SOLUTION INTRAVENOUS; SUBCUTANEOUS at 17:57

## 2025-04-17 RX ADMIN — LIDOCAINE HYDROCHLORIDE 7 ML: 10 INJECTION, SOLUTION EPIDURAL; INFILTRATION; INTRACAUDAL; PERINEURAL at 14:39

## 2025-04-17 ASSESSMENT — ACTIVITIES OF DAILY LIVING (ADL)
ADLS_ACUITY_SCORE: 88
ADLS_ACUITY_SCORE: 78
ADLS_ACUITY_SCORE: 78
DEPENDENT_IADLS:: CLEANING;COOKING;LAUNDRY;SHOPPING;MEAL PREPARATION;MEDICATION MANAGEMENT;MONEY MANAGEMENT;TRANSPORTATION;INCONTINENCE
ADLS_ACUITY_SCORE: 78
ADLS_ACUITY_SCORE: 80
ADLS_ACUITY_SCORE: 88
ADLS_ACUITY_SCORE: 88
ADLS_ACUITY_SCORE: 80
ADLS_ACUITY_SCORE: 78
ADLS_ACUITY_SCORE: 78
ADLS_ACUITY_SCORE: 80
ADLS_ACUITY_SCORE: 88
ADLS_ACUITY_SCORE: 80
ADLS_ACUITY_SCORE: 78
ADLS_ACUITY_SCORE: 88
ADLS_ACUITY_SCORE: 78
ADLS_ACUITY_SCORE: 88
ADLS_ACUITY_SCORE: 80
ADLS_ACUITY_SCORE: 88
ADLS_ACUITY_SCORE: 80
ADLS_ACUITY_SCORE: 80
ADLS_ACUITY_SCORE: 78
ADLS_ACUITY_SCORE: 78

## 2025-04-17 NOTE — PLAN OF CARE
Goal Outcome Evaluation:      Plan of Care Reviewed With: patient, spouse          Outcome Evaluation: Long term care placement discussed wtih pt and spouse

## 2025-04-17 NOTE — PROGRESS NOTES
[facility]    Medicine Progress Note - Hospitalist Service    Date of Admission:  4/14/2025    Assessment & Plan    Brissa Mott is a 54 year old female with past medical hx of recently diagnosed neuroendocrine carcinoma, T2DM, CVA with residual hemiparesis, hypercalcemia, HTN, HLD, KATHRINE admitted on 4/14/2025. She presented for generalized weakness after recent discharge from TCU.     Generalized weakness  Fall, initial encounter  Left knee pain, chronic    Recently admitted 2/20-3/5 for pathological fracture (see below) and discharged to TCU. Left TCU on 4/7 and has had 3 falls in the interim. Falls without injuries, but family unable to help her up and requires EMS assistance. Unsafe to remain at home with  and daughter. Weakness multifactorial due to UTI, hypomagnesemia, PE, and progression of cancer. Working towards LTC placement with SW.   - PT/OT/SW consulted  - See below for etiologies  - Diclofenac 4 g 4 times daily, Tylenol 975 mg every 8 hours, and oxycodone 5-10 mg PRN  - Palliative care consulted     Hypercalcemia secondary to malignancy  Calcium 13.8 and ionized calcium 7.5 on admission. Seen on previous admission up to 14.7 and resolved with IVF and Zometa. Due to malignancy. PTH <6, PTHrP 12 (high), vitamin D <18 (low). Calcium improving (10.7) and will continue to trend downward with zometa.   - Discontinued IVF  - Lasix 40 mg IV once, patient borderline hypoxic and received large volume IVF for treatment since admission  - Consider chronic bisphosphonate's at discharge to prevent recurrence    Neuroendocrine carcinoma, metastatic  Pathological fracture of right femur due to neoplastic disease s/p ORIF 2/25/25    Admitted 2/20-3/5 for a pathologic fracture of right femur, and pathology showed poorly differentiated carcinoma with neuroendocrine features. Underwent ORIF 2/25 and received Lovenox for 30 days post-op. CT c/a/p showed multiple pulmonary nodules, left  axillary node, several hypodense areas in liver, bony lesion on right humeral head/right sacrum/pelvis, several subcutaneous nodules in anterior chest wall and right gluteal region.    Established with oncology 3/21. Current plan is for radiation therapy to the pathological fracture and biopsy of the left axillary lymph node, which was scheduled for 4/14 but she ended up in the ER. Has not started treatment yet.    CT on admission showed progression of metastatic disease with new hepatic mets, enlarging osseous mets, enlarging lymph nodes above and below diaphragm. This includes enlarging manubrial mets with pathologic fracture and enlarging destructive lesion of right hemisacrum and right medial iliac bone. Resolution of multiple previously seen pulmonary nodules but new RLL nodule. Other pulmonary nodules stable or mildly increased. Right adnexal mass mildly decreased. Ultrasound-guided lymph core biopsy 4/17 with pathology and gene sequencing.  - Pathology and NGS pending per Oncology   - Continued PTA Lidocaine patches, PRN Tylenol, PRN Ibuprofen, PRN Oxycodone 5-10 mg  - Switched Lovenox to apixaban 10 mg BID (4 days) followed by 5 mg BID       Acute UTI without hematuria  Severe sepsis without septic shock  UA with nitrites, trace LE, few bacteria, 9 WBC. Arrived septic based on leukocytosis (15.5), HR (110s). Lactic 3.0. Urine culture with E. Coli.   - Ceftriaxone 2g IV daily (last dose 4/18)  - Blood cultures NGTD     Acute pulmonary embolism  Not hypoxic. CT with poor opacification of few subsegmental pulmonary arteries suspicious for small volume pulmonary emboli. After her ORIF on 2/25 (see below) she was given Lovenox 40mg BID for 6 weeks (ending 4/17) which she endorses compliance with. Patient agreeable to starting DOAC tonight now that we've completed the biopsy.   - Switched Lovenox to apixaban 10 mg BID (4 days) followed by 5 mg BID       Hypomagnesemia  Hypophosphatemia  Mag 1.1 and phos 1.9 on  admission.  - RN repletion protocol     Anorexia   very concerned about poor PO intake, and believes it's due to polypharmacy. Besides physical decline and multiple comorbidities as above, review of med list only reveals Ozempic as possible etiology. She hadn't taken it for ~6 weeks while admitted and at TCU, then took a dose when she got home on 4/7.  states this correlates with onset of poor PO intake.   - Recommend discontinuing Ozempic     Hx of CVA  Hemiplegia and hemiparesis following cerebral infarction affecting left non-dominant side   HLD  CVA in 2018. Residual left sided weakness.  - Continued PTA Atorvastatin 40mg     Type 2 diabetes mellitus with neuropathy, with long-term current use of insulin   A1c 6.8% on admission.  - Sliding scale insulin  - Consistent carb diet  - Continued PTA Gabapentin 300mg BID, Humulin 70/30 BID 15U  - Held PTA Metformin 1000mg BID for lactic acidosis   - Held PTA Ozempic weekly. Consider discontinuation as above.     Anemia, borderline microcytic, chronic  Hemoglobin 9.6 on admission, baseline 8-9.    - Daily CBC      Hypertension  - Continued PTA Amlodipine 10mg, Ibesartan 300mg     Reactive airway disease without complication  - Continued PTA Montelukast 10mg     KATHRINE (obstructive sleep apnea)  - Continued PTA CPAP if available     Adjustment disorder with depressed mood  - Continued PTA Hydroxyzine 25mg PRN, Sertraline 150mg    Thrush?  Patient with white film with bumps on her tongue that came around the time of admission. Some pain and discomfort with eating and drinking. Discussed a trial of nystatin swish and spit to assess for improvement.   - Nystatin swish and spit QID         Diet: Combination Diet Moderate Consistent Carb (60 g CHO per Meal) Diet    DVT Prophylaxis: Enoxaparin (Lovenox) SQ  Nielson Catheter: Not present  Lines: None     Cardiac Monitoring: None  Code Status: Full Code      Clinically Significant Risk Factors            #  "Hypercalcemia: Highest Ca = 11.4 mg/dL in last 2 days, will monitor as appropriate  # Hypomagnesemia: Lowest Mg = 1.3 mg/dL in last 2 days, will replace as needed   # Hypoalbuminemia: Lowest albumin = 2.7 g/dL at 4/14/2025  2:43 PM, will monitor as appropriate     # Hypertension: Noted on problem list           # DMII: A1C = 6.8 % (Ref range: <5.7 %) within past 6 months, PRESENT ON ADMISSION  # Severe Obesity: Estimated body mass index is 40.68 kg/m  as calculated from the following:    Height as of this encounter: 1.626 m (5' 4\").    Weight as of this encounter: 107.5 kg (237 lb)., PRESENT ON ADMISSION       # Financial/Environmental Concerns:    # Asthma: noted on problem list        Social Drivers of Health    Depression: At risk (7/19/2024)    PHQ-2     PHQ-2 Score: 3   Tobacco Use: Medium Risk (2/25/2025)    Patient History     Smoking Tobacco Use: Former     Smokeless Tobacco Use: Never          Disposition Plan     Medically Ready for Discharge: Anticipated in 2-4 Days             Reynaldo Elder DO  Hospitalist Service  [facility]  Securely message with [employer] (more info)  Text page via [employer] Paging/Directory   ______________________________________________________________________    Interval History   No acute events overnight. Discussed vague prognosis with patient with the hesitation of pathology and NGS pending for potential specific mutations that could be targeted for immunotherapy. Switching from lovenox to Eliquis tonight. Patient     Physical Exam   Vital Signs: Temp: 98  F (36.7  C) Temp src: Oral BP: (!) 141/69 Pulse: 105   Resp: 16 SpO2: (!) 89 % O2 Device: None (Room air)    Weight: 237 lbs 0 oz    Constitutional: awake, alert, cooperative, disheveled, no apparent distress, and appears stated age  Respiratory: No increased work of breathing, good air exchange, clear to auscultation bilaterally, no crackles or wheezing  Cardiovascular: Normal apical impulse, regular rate " and rhythm, normal S1 and S2, no S3 or S4, and no murmur noted  GI: No scars, normal bowel sounds, soft, non-distended, non-tender, no masses palpated, no hepatosplenomegally  Skin: normal skin color, texture, turgor  Musculoskeletal: There is no redness, warmth, or swelling of the joints.  Full range of motion noted.  Motor strength is 5 out of 5 all extremities bilaterally.  Tone is normal.    Medical Decision Making       55 MINUTES SPENT BY ME on the date of service doing chart review, history, exam, documentation & further activities per the note.      Data     I have personally reviewed the following data over the past 24 hrs:    N/A  \   N/A   / N/A     138 102 5.7 (L) /  208 (H)   4.6 24 0.35 (L) \       Imaging results reviewed over the past 24 hrs:   No results found for this or any previous visit (from the past 24 hours).

## 2025-04-17 NOTE — PLAN OF CARE
Goal Outcome Evaluation:      Plan of Care Reviewed With: patient, spouse    Overall Patient Progress: no changeOverall Patient Progress: no change         Patient is alert and oriented.  Reporting bilateral knee pain and right shoulder pain utilizing voltaren gel and prn oxycodone.  Patient declining to get out of bed using ceiling lift or EZ stand due to pain and the fear of falling.  Purewick in place. Educated patient on equipment but patient is still declining.  IV saline locked.  Patient currently down at Ultrasound for biopsy.      Time cared for patient 0700 to 1530.

## 2025-04-17 NOTE — PROGRESS NOTES
Palliative Care Progress Note  [facility]      Patient: Brissa Mott  Date of Admission:  4/14/2025    Requesting Clinician / Team: hospitalist   Reason for consult: Symptom management, Goals of care, Patient and family support       Recommendations & Counseling     GOALS OF CARE:   Restorative without limits   Although patient is currently in a dependent state and very little improvement despite attempts at rehab via TCU leading up to hospitalization, patient hopes that someday she will be able to improve to some level of independence and living back at home with spouse Zoran.  Patient and spouse described having heard that patient has a stage IV form of cancer, yet are unclear around prognosis, timeline, or potential treatment options for the future.  Both have said that they would appreciate transparent information from medical team and cancer specialists around prognosis based on provider experience and averages to help with planning.      ADVANCE CARE PLANNING:  No health care directive on file.   Spouse Zoran Mott is next of kin.  There is no POLST form on file, defer to patient and/or next of kin for decisions   Code status: Full Code      MEDICAL MANAGEMENT:   We are not actively managing symptoms at this time.  *Patient has chronic non-cancer pain of joints  consistent with nociceptive musculoskeletal etiology.  Currently using PRN dosing of ibuprofen and oxycodone with tolerable relief, although question whether NSAIDs will be a safe option going forward given recent diagnosis of VTE and necessity for anticoagulation therapy.  Defer to primary team for chronic pain management, buprenorphine may be an equally efficacious yet safer alternative to traditional opioid.      PSYCHOSOCIAL/SPIRITUAL SUPPORT:  Family; 2 children and 4 grandchildren  Friends and bala community      Palliative Care will continue to follow. Thank you for the consult and allowing us to aid in the care  "of Brissa Mott.    These recommendations have been discussed with team.      Cristian Velasquez, DO  Emergency Medicine / Palliative Medicine   Securely message with the V[employer] Web Console (learn more here) or  Text page via [facility] Paging/Directory         Assessment      Brissa Mott is a 54 year old female with a past medical history of recently diagnosed widely metastatic neuroendocrine carcinoma, T2DM, CVA with residual left-sided hemiparesis, and malignant hypercalcemia admitted to hospital for generalized weakness after recent discharge from TCU.  Patient was hospitalized in January 2025 at which time CT scan demonstrated intramedullary lesion in the distal femur measuring 7.5 cm in size.  On 2/24/2025 the patient underwent open reduction and fixation with intramedullary nail insertion of the right femur.  Pathology demonstrated for demonstrate carcinoma with neuroendocrine features.  Staging workup including CT scan chest abdomen pelvis demonstrated concern for right adnexal mass, left axillar adenopathy, and pulmonary nodules and possible hepatic nodules concerning for metastatic disease.  MRI brain was negative.  She has not yet received palliative radiation therapy as planned, nor lymph node biopsy for further consideration of systemic treatment options.       Interval History:     Multidisciplinary collaboration:  Independently reviewed notes within the EMR.  Communicated directly to bedside RN, case management, and hospitalist regarding patient care.    Patient/family narrative  Met today with Brissa and spouse Zoran in the patient's room, says she is doing fair today and oxycodone still seems sufficient.  Zoran again asked about knee joint injection for relief, previous \"steroid injection\" did not help but gel shot was perceived to have been effective, provider deferred to primary team.  She still has no significant cancer pain or symptoms of malignancy, primary concern is musculoskeletal pain and " "arthritis.  We reviewed patient's debilitated state and need for long-term care, she says she would like to again return home to some form of independent living, and Zoran acknowledges that may be a difficult task.    Both individuals are looking forward to today's biopsy, feels like progress towards a goal of receiving cancer directed therapy.  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  Palliative will continue to follow along offering support, facilitation of medical communication, and anticipatory guidance as indicated.      Review of Systems:     Besides above, ROS was reviewed and is unremarkable       Physical Exam   /72 (BP Location: Right arm)   Pulse 104   Temp 98.1  F (36.7  C) (Oral)   Resp 16   Ht 1.626 m (5' 4\")   Wt 107.5 kg (237 lb)   LMP  (LMP Unknown)   SpO2 (!) 90%   BMI 40.68 kg/m    General:  Appears stated age.  NAD.   HENT:  Atraumatic.  Hearing intact.  Moist mucous membranes.    Eyes:  Conjunctivae are clear.  Sclera is nonicteric.    Cardiovascular:  Without cyanosis or mottling.   Respiratory:  Breathing comfortably without increased work of breathing or signs of respiratory distress.  Able to speak in complete sentences.    Skin:  Dry, without diaphoresis.   Neuro:  Alert, attentive, speech clear and fluent.   Psych:  Appropriate mood and affect.  No sign of confusion or delusion.     Wt Readings from Last 8 Encounters:   04/14/25 107.5 kg (237 lb)   04/07/25 107.6 kg (237 lb 4.8 oz)   03/31/25 108 kg (238 lb)   03/27/25 108.4 kg (239 lb)   03/20/25 110.2 kg (243 lb)   03/13/25 113.4 kg (250 lb)   03/10/25 119.1 kg (262 lb 9.6 oz)   03/06/25 119.1 kg (262 lb 9.6 oz)         Data reviewed:  Results for orders placed or performed during the hospital encounter of 04/14/25 (from the past 24 hours)   Glucose by meter   Result Value Ref Range    GLUCOSE BY METER POCT 176 (H) 70 - 99 mg/dL   Magnesium "   Result Value Ref Range    Magnesium 2.0 1.7 - 2.3 mg/dL   Glucose by meter   Result Value Ref Range    GLUCOSE BY METER POCT 163 (H) 70 - 99 mg/dL   Phosphorus   Result Value Ref Range    Phosphorus 2.2 (L) 2.5 - 4.5 mg/dL   Glucose by meter   Result Value Ref Range    GLUCOSE BY METER POCT 142 (H) 70 - 99 mg/dL   Glucose by meter   Result Value Ref Range    GLUCOSE BY METER POCT 126 (H) 70 - 99 mg/dL   Phosphorus   Result Value Ref Range    Phosphorus 2.3 (L) 2.5 - 4.5 mg/dL   Extra Purple Top EDTA (LAB USE ONLY)   Result Value Ref Range    Hold Specimen [facility]    Magnesium   Result Value Ref Range    Magnesium 1.6 (L) 1.7 - 2.3 mg/dL   Basic metabolic panel   Result Value Ref Range    Sodium 138 135 - 145 mmol/L    Potassium 4.6 3.4 - 5.3 mmol/L    Chloride 102 98 - 107 mmol/L    Carbon Dioxide (CO2) 24 22 - 29 mmol/L    Anion Gap 12 7 - 15 mmol/L    Urea Nitrogen 5.7 (L) 6.0 - 20.0 mg/dL    Creatinine 0.35 (L) 0.51 - 0.95 mg/dL    GFR Estimate >90 >60 mL/min/1.73m2    Calcium 10.7 (H) 8.8 - 10.4 mg/dL    Glucose 145 (H) 70 - 99 mg/dL   Glucose by meter   Result Value Ref Range    GLUCOSE BY METER POCT 141 (H) 70 - 99 mg/dL         Medical Decision Making       35 MINUTES SPENT BY ME on the date of service doing chart review, history, exam, documentation & further activities per the note.

## 2025-04-17 NOTE — PROGRESS NOTES
Care Management Follow Up    Length of Stay (days): 3    Expected Discharge Date: 04/18/2025     Concerns to be Addressed: discharge planning  Pt is LTC-MA pending  Patient plan of care discussed at interdisciplinary rounds: Yes    Anticipated Discharge Disposition: Long Term Care      Anticipated Discharge Services: Transportation Services  Anticipated Discharge DME: None    Patient/family educated on Medicare website which has current facility and service quality ratings: yes  Education Provided on the Discharge Plan: Yes  Patient/Family in Agreement with the Plan: yes    Referrals Placed by CM/SW: Internal Clinic Care Coordination, Post Acute Facilities  Private pay costs discussed: private room/amenity fees and transportation costs    Discussed  Partnership in Safe Discharge Planning  document with patient/family: Yes    Handoff Completed: Yes, [facility] PCP: Internal handoff referral completed    Additional Information:  Per IDT rounds today, MD team states that pt IS NOT medically stable for discharge today.  MD anticipates pt to remain hospitalized another day.     PT/OT recommends LTC vs home with assist upon discharge.  Pt & spouse both determine that pt cannot return home as it is an unsafe environment for her due to repeat falls and lack of 24/7 caregiver support.    CARRILLO has provided education regarding Medicare.gov; how pt's insurance will NOT cover services upon discharge, discussed the status of the LTC-MA application that was previously sent to [facility] human services and offered offered choice of agency for cares.  Per discussion with pt/family, we discussed that staying close to home/hospital is preferred for follow up oncology services, however, they were informed that LTC beds are sparse in this area and that referrals will be sent to:      Service Provider Request Status Address Phone Fax Patient Preferred   [facility] (SNF) Pending - Request Sent [address] [facility] JO[address] [place]GEL [facility]  [address] [phone] --   [facility] [place] () Pending - Request Sent [address] [facility] [place] [address] [phone] --   [facility] FACILITY REFERRAL (REF'L) Pending - Request Sent [address] [facility] [address] [phone] --         [facility]) Pending - Request Sent [address] , [address] [place] [address] [phone] --         [facility] ([facility]) Pending - Request Sent [address] [facility] [place] [place] [address] [phone] --         [facility] ([facility]) Pending - Request Sent [address] [facility] [place] [address] [phone] --         [facility] () Pending - Request Sent [address] [place] [address] [place] [address] [phone] --         [facility] ([facility]) Pending - [facility] [address] [facility] [address] [phone] --         [facility] [place] ([facility]) Pending - Request Sent [address] [place] [address] [phone] --   [facility] ([facility]) Pending - Request Sent [address] [address] [phone] --         [facility] ([facility]) Pending - Request Sent [address] [facility] [place] [address] [phone] --         [facility] ([facility]) Pending - Request Sent [address] [facility] [address] [phone] --         [facility] [place] (SNF) Pending - Request Sent [address] [facility] [address] [phone] --         [facility] (SNF) Pending - Request Sent [address], [place] [address] [phone] --   [facility] REFERRAL (REF'L) Pending - Request Sent -- [phone] --         [facility] [place]  [facility] REFERRAL Pending - Request Sent [address] [facility] [place] [address] [phone] --         [facility]ENTER () Pending - Request Sent [address], [place] [address] [phone] --         [facility] (SNF) Pending - Request Sent [address], [place] [address] [phone] --           Transportation briefly discussed with pt/family and due to pt's pain, [facility] stretcher will be used.  Pt and family was informed on cost of transportation services for out of pocket costs.  PCS form completed by CARRILLO.     12:45 PM:  CARRILLO spoke with Fern ARZATE [facility] Financial worker, [phone], email: [email or web address], and learned that the pt's LTC-MA application was received, but required several documents to be completed in order for Fern to continue processing it.  CARRILLO learned the pt needed to provide: AVS ([facility] form 7823), Authorized Representative form, and several other documents that will support the application:  bank statements, vehicle titles, medical bills, medical insurance cards, and proof of Ian's income.  CARRILLO met with pt & Ian and to complete and gather documents (except for vehicle titles) and submitted them via email to Fern at    received notification that Fern read the email with attached documents at 3:24 PM.    Care Management team to follow for discharge plans.      JENNIFER English

## 2025-04-17 NOTE — PLAN OF CARE
Problem: Pain Acute  Goal: Optimal Pain Control and Function  Outcome: Progressing     Problem: UTI (Urinary Tract Infection)  Goal: Improved Infection Symptoms  Outcome: Progressing     Goal Outcome Evaluation: Patient alert and oriented. Bilateral intermittent knee pain. Diabetic diet. LR at 125 mg/hr. Wound on coccyx, mepilex applied. Purewick in place. Patient afraid/anxious to get out of bed with ceiling lift or easy stand. Palliative care consult completed today.

## 2025-04-18 VITALS
TEMPERATURE: 98 F | WEIGHT: 237 LBS | SYSTOLIC BLOOD PRESSURE: 150 MMHG | RESPIRATION RATE: 18 BRPM | OXYGEN SATURATION: 95 % | BODY MASS INDEX: 40.46 KG/M2 | DIASTOLIC BLOOD PRESSURE: 86 MMHG | HEIGHT: 64 IN | HEART RATE: 105 BPM

## 2025-04-18 PROBLEM — C77.8: Status: ACTIVE | Noted: 2025-04-18

## 2025-04-18 PROBLEM — C80.1: Status: ACTIVE | Noted: 2025-04-18

## 2025-04-18 LAB
ANION GAP SERPL CALCULATED.3IONS-SCNC: 12 MMOL/L (ref 7–15)
BUN SERPL-MCNC: 6.9 MG/DL (ref 6–20)
CALCIUM SERPL-MCNC: 10.5 MG/DL (ref 8.8–10.4)
CHLORIDE SERPL-SCNC: 98 MMOL/L (ref 98–107)
CREAT SERPL-MCNC: 0.44 MG/DL (ref 0.51–0.95)
EGFRCR SERPLBLD CKD-EPI 2021: >90 ML/MIN/1.73M2
GLUCOSE BLDC GLUCOMTR-MCNC: 153 MG/DL (ref 70–99)
GLUCOSE BLDC GLUCOMTR-MCNC: 159 MG/DL (ref 70–99)
GLUCOSE BLDC GLUCOMTR-MCNC: 166 MG/DL (ref 70–99)
GLUCOSE BLDC GLUCOMTR-MCNC: 208 MG/DL (ref 70–99)
GLUCOSE BLDC GLUCOMTR-MCNC: 247 MG/DL (ref 70–99)
GLUCOSE SERPL-MCNC: 145 MG/DL (ref 70–99)
HCO3 SERPL-SCNC: 24 MMOL/L (ref 22–29)
HOLD SPECIMEN: NORMAL
MAGNESIUM SERPL-MCNC: 1.5 MG/DL (ref 1.7–2.3)
MAGNESIUM SERPL-MCNC: 2.2 MG/DL (ref 1.7–2.3)
PHOSPHATE SERPL-MCNC: 2.3 MG/DL (ref 2.5–4.5)
POTASSIUM SERPL-SCNC: 4.5 MMOL/L (ref 3.4–5.3)
SODIUM SERPL-SCNC: 134 MMOL/L (ref 135–145)

## 2025-04-18 PROCEDURE — 120N000001 HC R&B MED SURG/OB

## 2025-04-18 PROCEDURE — 250N000011 HC RX IP 250 OP 636: Performed by: STUDENT IN AN ORGANIZED HEALTH CARE EDUCATION/TRAINING PROGRAM

## 2025-04-18 PROCEDURE — 80048 BASIC METABOLIC PNL TOTAL CA: CPT | Performed by: STUDENT IN AN ORGANIZED HEALTH CARE EDUCATION/TRAINING PROGRAM

## 2025-04-18 PROCEDURE — 250N000013 HC RX MED GY IP 250 OP 250 PS 637

## 2025-04-18 PROCEDURE — 99232 SBSQ HOSP IP/OBS MODERATE 35: CPT

## 2025-04-18 PROCEDURE — 250N000013 HC RX MED GY IP 250 OP 250 PS 637: Performed by: INTERNAL MEDICINE

## 2025-04-18 PROCEDURE — 36415 COLL VENOUS BLD VENIPUNCTURE: CPT | Performed by: STUDENT IN AN ORGANIZED HEALTH CARE EDUCATION/TRAINING PROGRAM

## 2025-04-18 PROCEDURE — 83735 ASSAY OF MAGNESIUM: CPT | Performed by: STUDENT IN AN ORGANIZED HEALTH CARE EDUCATION/TRAINING PROGRAM

## 2025-04-18 PROCEDURE — G0463 HOSPITAL OUTPT CLINIC VISIT: HCPCS

## 2025-04-18 PROCEDURE — 84100 ASSAY OF PHOSPHORUS: CPT | Performed by: STUDENT IN AN ORGANIZED HEALTH CARE EDUCATION/TRAINING PROGRAM

## 2025-04-18 PROCEDURE — 250N000013 HC RX MED GY IP 250 OP 250 PS 637: Performed by: STUDENT IN AN ORGANIZED HEALTH CARE EDUCATION/TRAINING PROGRAM

## 2025-04-18 RX ORDER — MAGNESIUM SULFATE HEPTAHYDRATE 40 MG/ML
4 INJECTION, SOLUTION INTRAVENOUS ONCE
Status: COMPLETED | OUTPATIENT
Start: 2025-04-18 | End: 2025-04-18

## 2025-04-18 RX ADMIN — POLYETHYLENE GLYCOL 3350 17 G: 17 POWDER, FOR SOLUTION ORAL at 08:14

## 2025-04-18 RX ADMIN — OXYCODONE HYDROCHLORIDE 10 MG: 5 TABLET ORAL at 06:11

## 2025-04-18 RX ADMIN — OXYCODONE HYDROCHLORIDE 10 MG: 5 TABLET ORAL at 01:48

## 2025-04-18 RX ADMIN — POTASSIUM & SODIUM PHOSPHATES POWDER PACK 280-160-250 MG 1 PACKET: 280-160-250 PACK at 12:29

## 2025-04-18 RX ADMIN — NYSTATIN 500000 UNITS: 100000 SUSPENSION ORAL at 08:17

## 2025-04-18 RX ADMIN — NYSTATIN 500000 UNITS: 100000 SUSPENSION ORAL at 22:08

## 2025-04-18 RX ADMIN — DICLOFENAC 4 G: 10 GEL TOPICAL at 22:17

## 2025-04-18 RX ADMIN — ATORVASTATIN CALCIUM 40 MG: 20 TABLET, FILM COATED ORAL at 22:08

## 2025-04-18 RX ADMIN — DICLOFENAC 4 G: 10 GEL TOPICAL at 08:18

## 2025-04-18 RX ADMIN — OXYCODONE HYDROCHLORIDE 10 MG: 5 TABLET ORAL at 14:47

## 2025-04-18 RX ADMIN — OXYCODONE HYDROCHLORIDE 10 MG: 5 TABLET ORAL at 22:08

## 2025-04-18 RX ADMIN — INSULIN HUMAN 15 UNITS: 100 INJECTION, SUSPENSION SUBCUTANEOUS at 16:58

## 2025-04-18 RX ADMIN — POTASSIUM & SODIUM PHOSPHATES POWDER PACK 280-160-250 MG 1 PACKET: 280-160-250 PACK at 08:14

## 2025-04-18 RX ADMIN — MAGNESIUM SULFATE HEPTAHYDRATE 4 G: 40 INJECTION, SOLUTION INTRAVENOUS at 08:24

## 2025-04-18 RX ADMIN — INSULIN ASPART 3 UNITS: 100 INJECTION, SOLUTION INTRAVENOUS; SUBCUTANEOUS at 16:57

## 2025-04-18 RX ADMIN — SERTRALINE HYDROCHLORIDE 150 MG: 50 TABLET ORAL at 22:08

## 2025-04-18 RX ADMIN — GABAPENTIN 300 MG: 300 CAPSULE ORAL at 19:57

## 2025-04-18 RX ADMIN — OXYCODONE HYDROCHLORIDE 10 MG: 5 TABLET ORAL at 10:15

## 2025-04-18 RX ADMIN — GABAPENTIN 300 MG: 300 CAPSULE ORAL at 08:17

## 2025-04-18 RX ADMIN — HYDROXYZINE HYDROCHLORIDE 25 MG: 25 TABLET ORAL at 18:36

## 2025-04-18 RX ADMIN — POTASSIUM & SODIUM PHOSPHATES POWDER PACK 280-160-250 MG 1 PACKET: 280-160-250 PACK at 16:53

## 2025-04-18 RX ADMIN — INSULIN HUMAN 15 UNITS: 100 INJECTION, SUSPENSION SUBCUTANEOUS at 08:18

## 2025-04-18 RX ADMIN — INSULIN ASPART 2 UNITS: 100 INJECTION, SOLUTION INTRAVENOUS; SUBCUTANEOUS at 12:09

## 2025-04-18 RX ADMIN — DICLOFENAC 4 G: 10 GEL TOPICAL at 18:23

## 2025-04-18 RX ADMIN — IBUPROFEN 400 MG: 400 TABLET ORAL at 16:53

## 2025-04-18 RX ADMIN — APIXABAN 10 MG: 5 TABLET, FILM COATED ORAL at 08:17

## 2025-04-18 RX ADMIN — AMLODIPINE BESYLATE 10 MG: 10 TABLET ORAL at 08:17

## 2025-04-18 RX ADMIN — IRBESARTAN 300 MG: 75 TABLET ORAL at 22:08

## 2025-04-18 RX ADMIN — NYSTATIN 500000 UNITS: 100000 SUSPENSION ORAL at 18:20

## 2025-04-18 RX ADMIN — INSULIN ASPART 1 UNITS: 100 INJECTION, SOLUTION INTRAVENOUS; SUBCUTANEOUS at 08:18

## 2025-04-18 RX ADMIN — NYSTATIN 500000 UNITS: 100000 SUSPENSION ORAL at 12:13

## 2025-04-18 RX ADMIN — DICLOFENAC 4 G: 10 GEL TOPICAL at 12:14

## 2025-04-18 RX ADMIN — ACETAMINOPHEN 975 MG: 325 TABLET ORAL at 19:56

## 2025-04-18 RX ADMIN — HYDROXYZINE HYDROCHLORIDE 25 MG: 25 TABLET ORAL at 01:48

## 2025-04-18 RX ADMIN — APIXABAN 10 MG: 5 TABLET, FILM COATED ORAL at 19:56

## 2025-04-18 RX ADMIN — ACETAMINOPHEN 975 MG: 325 TABLET ORAL at 12:13

## 2025-04-18 RX ADMIN — IBUPROFEN 400 MG: 400 TABLET ORAL at 08:20

## 2025-04-18 RX ADMIN — MONTELUKAST 10 MG: 10 TABLET, FILM COATED ORAL at 22:08

## 2025-04-18 RX ADMIN — CEFTRIAXONE 2 G: 2 INJECTION, POWDER, FOR SOLUTION INTRAMUSCULAR; INTRAVENOUS at 12:14

## 2025-04-18 ASSESSMENT — ACTIVITIES OF DAILY LIVING (ADL)
ADLS_ACUITY_SCORE: 82
ADLS_ACUITY_SCORE: 84
ADLS_ACUITY_SCORE: 82
ADLS_ACUITY_SCORE: 80
ADLS_ACUITY_SCORE: 78
ADLS_ACUITY_SCORE: 80
ADLS_ACUITY_SCORE: 80
ADLS_ACUITY_SCORE: 78
ADLS_ACUITY_SCORE: 88
ADLS_ACUITY_SCORE: 80
ADLS_ACUITY_SCORE: 82
ADLS_ACUITY_SCORE: 88
ADLS_ACUITY_SCORE: 82
ADLS_ACUITY_SCORE: 88
ADLS_ACUITY_SCORE: 78
ADLS_ACUITY_SCORE: 84
ADLS_ACUITY_SCORE: 78
ADLS_ACUITY_SCORE: 88
ADLS_ACUITY_SCORE: 80
ADLS_ACUITY_SCORE: 82
ADLS_ACUITY_SCORE: 82

## 2025-04-18 NOTE — PROGRESS NOTES
[facility] Medicine Progress Note  Date of Service: 04/18/2025    Assessment & Plan   Brissa Mott is a 54 year old female with past medical hx of recently diagnosed neuroendocrine carcinoma, T2DM, CVA with residual hemiparesis, hypercalcemia, HTN, HLD, KATHRINE admitted on 4/14/2025. She presented for generalized weakness after recent discharge from TCU.     Generalized weakness  Fall, initial encounter  Left knee pain, chronic    Recently admitted 2/20-3/5 for pathological fracture (see below) and discharged to TCU. Left TCU on 4/7 and has had 3 falls in the interim. Falls without injuries, but family unable to help her up and requires EMS assistance. Unsafe to remain at home with  and daughter. Weakness multifactorial due to UTI, hypomagnesemia, PE, and progression of cancer. Working towards LTC placement with SW.   - PT/OT/SW consulted  - See below for etiologies  - Diclofenac 4 g 4 times daily, Tylenol 975 mg every 8 hours, and oxycodone 5-10 mg PRN  - Palliative care consulted      Hypercalcemia secondary to malignancy  Calcium 13.8 and ionized calcium 7.5 on admission. Seen on previous admission up to 14.7 and resolved with IVF and Zometa. Due to malignancy. PTH <6, PTHrP 12 (high), vitamin D <18 (low). Calcium improving (10.7) and will continue to trend downward with zometa.   - Discontinued IVF on 04/17  - Lasix 40 mg IV once on 04/17, patient borderline hypoxic and received large volume IVF for treatment since admission  - Consider chronic bisphosphonate's at discharge to prevent recurrence     Neuroendocrine carcinoma, metastatic  Pathological fracture of right femur due to neoplastic disease s/p ORIF 2/25/25    Admitted 2/20-3/5 for a pathologic fracture of right femur, and pathology showed poorly differentiated carcinoma with neuroendocrine features. Underwent ORIF 2/25 and received Lovenox for 30 days post-op. CT c/a/p showed multiple pulmonary nodules, left  axillary node, several hypodense areas in liver, bony lesion on right humeral head/right sacrum/pelvis, several subcutaneous nodules in anterior chest wall and right gluteal region.    Established with oncology 3/21. Current plan is for radiation therapy to the pathological fracture and biopsy of the left axillary lymph node, which was scheduled for 4/14 but she ended up in the ER. Has not started treatment yet.    CT on admission showed progression of metastatic disease with new hepatic mets, enlarging osseous mets, enlarging lymph nodes above and below diaphragm. This includes enlarging manubrial mets with pathologic fracture and enlarging destructive lesion of right hemisacrum and right medial iliac bone. Resolution of multiple previously seen pulmonary nodules but new RLL nodule. Other pulmonary nodules stable or mildly increased. Right adnexal mass mildly decreased. Ultrasound-guided lymph core biopsy 4/17 with pathology and gene sequencing.  - Pathology and NGS pending per Oncology   - Continued PTA Lidocaine patches, PRN Tylenol, PRN Ibuprofen, PRN Oxycodone 5-10 mg  - Switched Lovenox to apixaban 10 mg BID (4 days) followed by 5 mg BID       Acute UTI without hematuria  Severe sepsis without septic shock  UA with nitrites, trace LE, few bacteria, 9 WBC. Arrived septic based on leukocytosis (15.5), HR (110s). Lactic 3.0. Urine culture with E. Coli.   - Ceftriaxone 2g IV daily (last dose 4/18)  - Blood cultures NGTD     Acute pulmonary embolism  Not hypoxic. CT with poor opacification of few subsegmental pulmonary arteries suspicious for small volume pulmonary emboli. After her ORIF on 2/25 (see below) she was given Lovenox 40mg BID for 6 weeks (ending 4/17) which she endorses compliance with. Patient agreeable to starting DOAC tonight now that we've completed the biopsy.   - Switched Lovenox to apixaban 10 mg BID (4 days) followed by 5 mg BID       Hypomagnesemia  Hypophosphatemia  Mag 1.1 and phos 1.9 on  admission.  - RN managed repletion protocol     Anorexia   very concerned about poor PO intake, and believes it's due to polypharmacy. Besides physical decline and multiple comorbidities as above, review of med list only reveals Ozempic as possible etiology. She hadn't taken it for ~6 weeks while admitted and at TCU, then took a dose when she got home on 4/7.  states this correlates with onset of poor PO intake.   - Recommend discontinuing Ozempic     Hx of CVA  Hemiplegia and hemiparesis following cerebral infarction affecting left non-dominant side   HLD  CVA in 2018. Residual left sided weakness.  - Continued PTA Atorvastatin 40mg     Type 2 diabetes mellitus with neuropathy, with long-term current use of insulin   A1c 6.8% on admission.  - Sliding scale insulin  - Consistent carb diet  - Continued PTA Gabapentin 300mg BID, Humulin 70/30 BID 15U  - Held PTA Metformin 1000mg BID for lactic acidosis   - Held PTA Ozempic weekly. Consider discontinuation as above.     Anemia, borderline microcytic, chronic  Hemoglobin 9.6 on admission, baseline 8-9.    - Daily CBC      Hypertension  - Continued PTA Amlodipine 10mg, Ibesartan 300mg     Reactive airway disease without complication  - Continued PTA Montelukast 10mg     KATHRINE (obstructive sleep apnea)  - Continued PTA CPAP if available     Adjustment disorder with depressed mood  - Continued PTA Hydroxyzine 25mg PRN, Sertraline 150mg     Thrush  Patient with white film with bumps on her tongue that came around the time of admission. Some pain and discomfort with eating and drinking. Discussed a trial of nystatin swish and spit to assess for improvement.   She notes improvement with Nystatin swish and spit.   - Nystatin swish and spit QID       Diet: Combination Diet Moderate Consistent Carb (60 g CHO per Meal) Diet    DVT Prophylaxis: DOAC  Nielson Catheter: Not present  Lines: None     Code Status: Full Code       Discussion: Stable. Awaiting pathology results  and placement at LTC.     Medically Ready for Discharge: Anticipated in 2-4 Days      Attestation:  I have reviewed today's vital signs, notes, medications, labs and imaging.  Total time: 30 minutes    I have discussed, or will be discussing, the patient with hospitalist physician, Dr. Reynaldo Douglas PA-C       Interval History   No acute events overnight. Patient has no acute complaints nor new/worsening symptoms. Reports improvement in suspected thrush after starting Nystatin.     Physical Exam   Temp:  [98  F (36.7  C)-98.5  F (36.9  C)] 98.5  F (36.9  C)  Pulse:  [] 106  Resp:  [16-18] 18  BP: (138-150)/(72-93) 138/72  SpO2:  [90 %-95 %] 92 %    Weights:   Vitals:    04/14/25 0827   Weight: 107.5 kg (237 lb)    Body mass index is 40.68 kg/m .      Constitutional: Alert and oriented. Pleasant, cooperative, responding to questions appropriately. Appears comfortable, nontoxic, and in no acute distress.   Respiratory: Respirations unlabored on room air, clear to auscultation bilaterally, no crackles or wheezing  Cardiovascular: Regular rate and rhythm,and no murmur, rub, or gallops noted. Radial and dorsalis pedis pulses 2+ bilaterally. Pitting lower extremity edema bilaterally.   GI: Active bowel sounds throughout. Soft, non-distended, non-tender to palpation, without rebound or guarding. No palpable masses.   Skin: Warm and dry, no rashes on exposed skin.   Musculoskeletal: Moving all extremities spontaneously.  Normal muscle bulk and tone. No gross deformity   Neuro: Neck supple.  is symmetric. No notable tremor. Speech is clear and fluent.       Data   Recent Labs   Lab 04/18/25  1204 04/18/25  0753 04/18/25  0526 04/17/25  0749 04/17/25  0528 04/16/25  0805 04/16/25  0527 04/15/25  0809 04/15/25  0523 04/14/25  1443 04/14/25  0836   WBC  --   --   --   --   --   --  14.4*  --  12.5*  --  15.5*   HGB  --   --   --   --   --   --  8.7*  --  8.8*  --  9.6*   MCV  --   --   --   --   --    --  82  --  81  --  79   PLT  --   --   --   --   --   --  448  --  471*  --  512*   NA  --   --  134*  --  138  --  138  --  138   < > 132*   POTASSIUM  --   --  4.5  --  4.6  --  4.6  --  4.2   < > 4.4   CHLORIDE  --   --  98  --  102  --  101  --  102   < > 95*   CO2  --   --  24  --  24  --  25  --  24   < > 25   BUN  --   --  6.9  --  5.7*  --  6.0  --  9.7   < > 19.1   CR  --   --  0.44*  --  0.35*  --  0.36*  --  0.36*   < > 0.38*   ANIONGAP  --   --  12  --  12  --  12  --  12   < > 12   SHIRLEY  --   --  10.5*  --  10.7*  --  11.4*  --  12.6*   < > 13.8*   * 153* 145*   < > 145*   < > 150*   < > 98   < > 241*   ALBUMIN  --   --   --   --   --   --  2.8*  --  2.9*   < > 3.2*   PROTTOTAL  --   --   --   --   --   --  5.8*  --  5.9*  --  6.5   BILITOTAL  --   --   --   --   --   --  0.4  --  0.4  --  0.4   ALKPHOS  --   --   --   --   --   --  159*  --  167*  --  192*   ALT  --   --   --   --   --   --  9  --  10  --  14   AST  --   --   --   --   --   --  54*  --  67*  --  61*   LIPASE  --   --   --   --   --   --   --   --   --   --  14    < > = values in this interval not displayed.       Recent Labs   Lab 04/18/25  1204 04/18/25  0753 04/18/25  0526 04/18/25  0215 04/17/25  2315 04/17/25  1643   * 153* 145* 159* 187* 190*        Unresulted Labs Ordered in the Past 30 Days of this Admission       Date and Time Order Name Status Description    4/17/2025  2:41 PM Surgical Pathology Exam In process     4/14/2025 10:41 AM Blood Culture Peripheral Blood Preliminary              Imaging  Recent Results (from the past 24 hours)   US Biopsy FNA Lymph Node    Narrative    EXAM:  1. CORE BIOPSY OF A LEFT AXILLARY LYMPH NODE  2. ULTRASOUND GUIDANCE  LOCATION: [facility]  DATE: 4/17/2025    INDICATION: Gene sequencing for carcinoma    PROCEDURE: Informed consent obtained. Time out performed. The site was prepped and draped in sterile fashion. 10 mL of 1% lidocaine was infused into  the local soft tissues. Under direct ultrasound guidance, multiple 18-gauge core samples of the lymph   node were obtained.      Impression    IMPRESSION:  1.  Status post ultrasound-guided biopsy of the left axillary lymph node.    Reference CPT Codes: 07223        I reviewed all new labs and imaging results over the last 24 hours. I personally reviewed no images or EKG's today.    Medications   Current Facility-Administered Medications   Medication Dose Route Frequency Provider Last Rate Last Admin    Patient is already receiving anticoagulation with heparin, enoxaparin (LOVENOX), warfarin (COUMADIN)  or other anticoagulant medication   Does not apply Continuous PRN Yohan Adames PA-C         Current Facility-Administered Medications   Medication Dose Route Frequency Provider Last Rate Last Admin    acetaminophen (TYLENOL) tablet 975 mg  975 mg Oral Q8H Reynaldo Wong DO   975 mg at 04/18/25 1213    amLODIPine (NORVASC) tablet 10 mg  10 mg Oral Daily Yohan Adames PA-C   10 mg at 04/18/25 0817    apixaban ANTICOAGULANT (ELIQUIS) tablet 10 mg  10 mg Oral BID Reynaldo Wong DO   10 mg at 04/18/25 0817    Followed by    [START ON 4/22/2025] apixaban ANTICOAGULANT (ELIQUIS) tablet 5 mg  5 mg Oral BID Reynaldo Wong DO        atorvastatin (LIPITOR) tablet 40 mg  40 mg Oral At Bedtime Colby Meyers MD   40 mg at 04/17/25 2040    diclofenac (VOLTAREN) 1 % topical gel 4 g  4 g Topical 4x Daily Reynaldo Wong DO   4 g at 04/18/25 1214    docusate sodium (COLACE) capsule 100 mg  100 mg Oral BID Sofie Molina MD   100 mg at 04/17/25 2041    gabapentin (NEURONTIN) capsule 300 mg  300 mg Oral BID Yohan Adames PA-C   300 mg at 04/18/25 0817    insulin aspart (NovoLOG) injection (RAPID ACTING)  1-7 Units Subcutaneous TID AC Yohan Adames PA-C   2 Units at 04/18/25 1209    insulin aspart (NovoLOG) injection (RAPID ACTING)  1-5 Units Subcutaneous At Bedtime Yohan Adames PA-C        insulin NPH-Regular  (HUMULIN 70/30;NOVOLIN 70/30) injection 15 Units  15 Units Subcutaneous BID AC Yohan Adames PA-C   15 Units at 04/18/25 0818    irbesartan (AVAPRO) tablet 300 mg  300 mg Oral At Bedtime Yohan Adames PA-C   300 mg at 04/17/25 2053    [Held by provider] metFORMIN (GLUCOPHAGE) tablet 1,000 mg  1,000 mg Oral BID w/meals Yohan Adames PA-C        montelukast (SINGULAIR) tablet 10 mg  10 mg Oral At Bedtime Yohan Adames PA-C   10 mg at 04/17/25 2041    nystatin (MYCOSTATIN) suspension 500,000 Units  500,000 Units Swish & Spit 4x Daily Reynaldo Wong DO   500,000 Units at 04/18/25 1213    polyethylene glycol (MIRALAX) Packet 17 g  17 g Oral Daily Sofie Molina MD   17 g at 04/18/25 0814    potassium & sodium phosphates (NEUTRA-PHOS) Packet 1 packet  1 packet Oral or Feeding Tube Q4H Reynaldo Wong DO   1 packet at 04/18/25 1229    sertraline (ZOLOFT) tablet 150 mg  150 mg Oral At Bedtime Yohan Adames PA-C   150 mg at 04/17/25 2053    sodium chloride (PF) 0.9% PF flush 3 mL  3 mL Intracatheter Q8H [facility] Yohan Adames PA-C   3 mL at 04/18/25 0818       Shannon Douglas PA-C

## 2025-04-18 NOTE — DISCHARGE INSTRUCTIONS
"Gluteal cleft/coccyx wound(s): Every other day and PRN  1.) Clean wounds with Vashe/gauze soak for 5 minutes then wipe surrounding skin/wound with Vashe/gauze. Pat dry with gauze.   2.) Cut piece of Hydrofera Blue Ready Transfer and apply to wound. If this sticks at removal, moisten with saline to remove.  3.) Apply Mepilex Sacral Dressing.     If pt starts stooling and Mepilex needs to be changed >Q24 hours, switch to Triad treatment (as doing to right buttock)     Right buttock wounds: BID and prn  Clean area with Bert Cleanse and Protect and soft disposable wash cloths  Apply a generous layer of Triad Paste to cover entire area (making sure it isn't on skin where Mepilex will be used); BID and prn with stooling/cleansing  When cleansing, remove soiled paste only, no need to clean off all paste  Apply additional Triad to keep skin covered and protected      Pressure Injury Prevention (PIP) Plan:  If patient is declining pressure injury prevention interventions: Explore reason why and address patient's concerns, Educate on pressure injury risk and prevention intervention(s), If patient is still declining, document \"informed refusal\" , and Ensure Care team is aware ( provider, charge nurse, etc)  Mattress: Follow bed algorithm, add Low Air Loss (Air+) mattress pump if skin is very moist or constantly moist.   HOB: Maintain at or below 30 degrees, unless contraindicated  Repositioning in bed: Every 1-2 hours , Left/right positioning; avoid supine, and Raise foot of bed prior to raising head of bed, to reduce patient sliding down (shear)  Heels: Pillows under calves  Protective Dressing:  wound cares per orders  Positioning Equipment: as indicated  Chair positioning: Assist patient to reposition hourly, Do NOT use a donut for sitting (this increases pressure to smaller area and creates a higher potential for injury) , and chair cushion or SPS    If patient has a buttock pressure injury, or high risk for PI use chair " cushion or SPS.  Moisture Management: Perineal cleansing /protection: Follow Incontinence Protocol, Avoid brief in bed, Clean and dry skin folds with bathing , Consider InterDry (#[id number]) between folds, and Moisturize dry skin  Under Devices: Inspect skin under all medical devices during skin inspection , Ensure tubes are stabilized without tension, and Ensure patient is not lying on medical devices or equipment when repositioned  Ask provider to discontinue device when no longer needed.         Apixaban (By mouth)  Apixaban (a-PIX-a-ban)  Treats and prevents blood clots. This medicine is a blood thinner.  Brand Name(s):Eliquis,Eliquis 30 Day DVT/PE Starter Pack  There may be other brand names for this medicine.  When This Medicine Should Not Be Used:  This medicine is not right for everyone. Do not use it if you had an allergic reaction to apixaban or you have active bleeding.  How to Use This Medicine:  Tablet  Your doctor will tell you how much medicine to use. Do not use more than directed.   If you are not able to swallow the tablets whole, they may be crushed and mixed in water, 5% dextrose in water (D5W), apple juice, or applesauce. The crushed tablets may be mixed with 60 mL of water or D5W dose and given through a nasogastric tube (NGT).  This medicine should come with a Medication Guide. Ask your pharmacist for a copy if you do not have one.   Missed dose: Take a dose as soon as you remember. If it is almost time for your next dose, wait until then and take a regular dose. Do not take extra medicine to make up for a missed dose.   Store the medicine in a closed container at room temperature, away from heat, moisture, and direct light.   Drugs and Foods to Avoid:  Ask your doctor or pharmacist before using any other medicine, including over-the-counter medicines, vitamins, and herbal products.  Some medicines can affect how apixaban works. Tell your doctor if you are using any of the following:      Carbamazepine, itraconazole, ketoconazole, phenytoin, rifampin, ritonavir, Kaylah's wort  Blood thinner (including clopidogrel, heparin, prasugrel, warfarin)  Medicine to treat depression  NSAID pain or arthritis medicine (including aspirin, celecoxib, diclofenac, ibuprofen, naproxen)  Warnings While Using This Medicine:  Tell your doctor if you are pregnant or breastfeeding, or if you have kidney disease, liver disease, bleeding problems, antiphospholipid syndrome, or an artificial heart valve.  Do not stop using this medicine suddenly without asking your doctor. You might have a higher risk of stroke for a short time after you stop using this medicine.  This medicine increases your risk for bleeding that can become serious if not controlled. You may also bruise easily, and it may take longer than usual for bleeding to stop.  This medicine may increase your risk for a hematoma (blood clot) in your spine or back if you undergo an epidural or spinal puncture. This could lead to paralysis. Tell your doctor if you ever had spine problems or back surgery.  Tell any doctor or dentist who treats you that you are using this medicine. With your doctor's supervision, you may need to stop using this medicine several days before you have surgery or medical tests.  Your doctor will do lab tests at regular visits to check on the effects of this medicine. Keep all appointments.   Keep all medicine out of the reach of children. Never share your medicine with anyone.   Possible Side Effects While Using This Medicine:  Call your doctor right away if you notice any of these side effects:  Allergic reaction: Itching or hives, swelling in your face or hands, swelling or tingling in your mouth or throat, chest tightness, trouble breathing  Change in how much or how often you urinate, red or pink urine  Chest pain, trouble breathing  Coughing up blood, vomiting blood or material that looks like coffee grounds  Numbness, tingling, or  muscle weakness in your legs or feet  Red or black, tarry stools  Unusual bleeding, bruising, or weakness  If you notice other side effects that you think are caused by this medicine, tell your doctor.  Call your doctor for medical advice about side effects. You may report side effects to FDA at 0-069-GWV-0356      Call [phone] to schedule a follow up appointment with oncology.

## 2025-04-18 NOTE — PLAN OF CARE
Physical Therapy Discharge Summary    Reason for therapy discharge:    No further expectations of functional progress.    Progress towards therapy goal(s). See goals on Care Plan in [facility] electronic health record for goal details.  Goals not met.  Barriers to achieving goals:   limited tolerance for therapy.    Therapy recommendation(s):    No further therapy is recommended. Pt limited by pain, is EZ stand at baseline. Pt awaiting LTC placement. No further IP PT needs.

## 2025-04-18 NOTE — PROGRESS NOTES
Care Management Follow Up    Length of Stay (days): 4    Expected Discharge Date: 04/18/2025     Concerns to be Addressed: discharge planning  Pt is LTC-MA pending  Patient plan of care discussed at interdisciplinary rounds: Yes    Anticipated Discharge Disposition: Long Term Care      Anticipated Discharge Services: Transportation Services  Anticipated Discharge DME: None    Patient/family educated on Medicare website which has current facility and service quality ratings: yes  Education Provided on the Discharge Plan: Yes  Patient/Family in Agreement with the Plan: yes    Referrals Placed by CM/SW: Internal Clinic Care Coordination, Post Acute Facilities  Private pay costs discussed: private room/amenity fees, transportation fees    Discussed  Partnership in Safe Discharge Planning  document with patient/family: Yes     Handoff Completed: No, handoff not indicated or clinically appropriate    Additional Information:  Patient is medically ready for discharge per provider.  Patient has been medically ready since 4/18/25.    Background information:   Pt is a is a 54 year old female with past medical hx of recently diagnosed neuroendocrine carcinoma, T2DM, CVA with residual hemiparesis, hypercalcemia, HTN, HLD, KATHRINE admitted on 4/14/2025. She presented for generalized weakness after recent discharge from TCU.  Pt now requiring LTC placement.    Current status and recommendations: LTC placement    Current disposition goal: LTC    Barriers to discharge:    Lack of payor source for LTC.      SW able to secure LTC-MA and necessary documents to complete LTC-MA application and send to Fern ARZATE ([facility] Financial worker, [phone], email: [email or web address])     Pt is young for LTC    Pt with unknown oncology plan/follow up cares     Per discussion with 1 facility, if pt/spouse do not meeting MA eligibility and pt dies before the application process, it is a financial burden on their facility to accept  for cares-  to send to  Leadership for review for all [facility] facilities, despite already being declined by them.    Key contacts:   Patient  Ian Mott, spouse, [phone]  Fern Holt- [facility] Financial Worker, [phone], (email above)  [facility]T Team, [phone]/ F: [phone]    Upcoming important dates:   NA    Next steps:   LTC-MA application and all documents have been turned in to Fern at [facility].    Referrals remain pending at the following locations:    Service Provider Request Status Address Phone Fax Patient Preferred   THE [facility] REFERRAL (REF'L) Considering  Business office to review LTC- MA application [address] [place] [id number] [phone] --         [facility] () Considering- LTC-MA application faxed for business office review [address] [id number] [phone] --         THE [facility] REFERRAL Considering-only available bed is in [place], [place]- 90 miles away.  [address] [facility] [place] [address] [phone] --   [facility] ([facility]) Pending - Request Sent [address] [facility] [id number] [phone] --   [facility] ([facility]) Pending - Request Sent [address] [id number] [phone] --   [facility]) Pending - Request Sent [address] [facility] [address] [phone] --   [facility] ([facility]) Pending - Request Sent [address] [facility] [address] [phone] --         [facility] ([facility]) Pending - Request Sent [address] [place] [address] [phone] --      JESSI [facility] [place] ([facility]) Pending - Request Sent [address] [facility] [id number] [phone] --    [facility] ([facility]) Declined  Bed Not Available [address] [facility] [address] [facility] [address] [phone] --   Internal Comment last updated by Antionette Schultz 4/18/2025  8:41 AM    04/18/25 0831: I called admissions and I spoke to Ramya. Currently no beds available. But, may have a bed open later. Typically does not accepted MA pending. If a bed becomes available she will review for any red flags. SA GALLARDO [facility] () Declined  Bed Not Available [address] [facility] [address] [phone] --   [facility] ([facility]) Declined  Bed Not Available [address] ,  [address] [id number] [phone] --         [facility] ([facility]) Declined  Bed Not Available [address] [facility] [address] [phone] --   [facility] ([facility]) Declined  Bed Not Available [address] [facility] [address] [phone] --         [facility] ([facility]) Declined  Bed Not Available, No MA pending at this time. [address] [facility] [id number] [phone] --         [facility] ([facility]) Declined  Bed Not Available [address] [facility] [address] [phone] --   [facility] REFERRAL (REF'L) Declined  Bed Not Available, she would need a private room and none available -- [phone] --   [facility] () Declined  Payer/insurance denied or not accepted [address], [place] [address] [phone] --   [facility] (SNF) Declined  not accepting MA pending at this time [address] [place] [id number] [phone] --           Next Steps: As pt is not certified disabled and lacks an income, Fern began the SMRT process.  CARRILLO received phone calls throughout the day from [employer] SMRT team, requesting information on pt's  medical status, documents, etc.   There is 1 application & 1 YESSENIA that needs to be completed for the pt to become SMRTd and [employer] is willing to expedite the pt's case.  Once pt is SMRTd, she will be MA eligible, thus eliminating the payor source barrier for placement for LTC for the pt.  SW to print off necessary forms from the DHS website, present to pt/spouse and assist with completion, as able over the next 2 days.        JENNIFER English

## 2025-04-18 NOTE — CONSULTS
"[facility] Nurse Inpatient Assessment     Consulted for: pressure injury, sacrum/coccyx wounds    Summary: Pressure injuries noted on coccyx, right buttocks, present on admission. Pt screams out in pain with repositioning. Please continue to educate on importance of repositioning/offloading to help with wound healing. Also follow incontinence protocol.    [facility] nurse follow-up plan: weekly    Patient History (according to provider note(s):      Brissa Mott is a 54 year old female with past medical hx of recently diagnosed neuroendocrine carcinoma, T2DM, CVA with residual hemiparesis, hypercalcemia, HTN, HLD, KATHRINE admitted on 4/14/2025. She presented for generalized weakness after recent discharge from TCU.     Assessment:      Areas visualized during today's visit: Sacrum/coccyx    Pressure Injury Location: Gluteal cleft/coccyx and right buttock    Last photo: 4/18/25  Wound type: Pressure Injury     Pressure Injury Stage: 3, present on admission      This is a Medical Device Related Pressure Injury (MDRPI) due to  n/a  Wound history/plan of care:   Pt not good historian; Mepilex and barrier paste in use at [facility] assessment    Wound base: Gluteal cleft/coccyx - mix of clean red (80%) and yellow non-viable 20%; right buttock - clean red with linear blanchable redness distally     Palpation of the wound bed: normal      Drainage:  heavy     Description of drainage: serosanguinous     Measurements (length x width x depth, in cm)   Gluteal cleft/coccyx 4.2cm x 1.5cm x 0.2cm  Right buttock 3cm x 2.5cm x 0.1cm (includes open area, intact epithelium, and linear blanchable distally)     Tunneling N/A     Undermining N/A  Periwound skin: Intact      Color:  pale purple      Temperature: normal   Odor: none  Pain: moderate,  stated \"ow\" (originally denied pain when asked but with cleansing then pt voiced discomfort)  Pain intervention prior to dressing change: patient tolerated well, no " "significant pain present , slow and gentle cares , and distraction  Treatment goal: Infection control/prevention, Increase granulation, and Promote epidermal migration  STATUS: initial assessment  Supplies ordered: supplies stored on unit, discussed with RN, and discussed with patient     My PI Risk Assessment     Sensory Perception: 3 - Slightly Limited     Moisture: 3 - Occasionally moist      Activity: 2 - Chairfast     Mobility: 2 - Very limited     Nutrition: 3 - Adequate     Friction/Shear: 1 - Problem     TOTAL: 14      Treatment Plan:     Gluteal cleft/coccyx wound(s): Every other day and PRN  1.) Clean wounds with Vashe/gauze soak for 5 minutes then wipe surrounding skin/wound with Vashe/gauze. Pat dry with gauze.   2.) Cut piece of Hydrofera Blue Ready Transfer and apply to wound. If this sticks at removal, moisten with saline to remove.  3.) Apply Mepilex Sacral Dressing.    If pt starts stooling and Mepilex needs to be changed >Q24 hours, switch to Triad treatment (as doing to right buttock)    Right buttock wounds: BID and prn  Clean area with Bert Cleanse and Protect and soft disposable wash cloths  Apply a generous layer of Triad Paste to cover entire area (making sure it isn't on skin where Mepilex will be used); BID and prn with stooling/cleansing  When cleansing, remove soiled paste only, no need to clean off all paste  Apply additional Triad to keep skin covered and protected     Pressure Injury Prevention (PIP) Plan:  If patient is declining pressure injury prevention interventions: Explore reason why and address patient's concerns, Educate on pressure injury risk and prevention intervention(s), If patient is still declining, document \"informed refusal\" , and Ensure Care team is aware ( provider, charge nurse, etc)  Mattress: Follow bed algorithm, add Low Air Loss (Air+) mattress pump if skin is very moist or constantly moist.   HOB: Maintain at or below 30 degrees, unless " contraindicated  Repositioning in bed: Every 1-2 hours , Left/right positioning; avoid supine, and Raise foot of bed prior to raising head of bed, to reduce patient sliding down (shear)  Heels: Pillows under calves  Protective Dressing:  wound cares per orders  Positioning Equipment: as indicated  Chair positioning: Assist patient to reposition hourly, Do NOT use a donut for sitting (this increases pressure to smaller area and creates a higher potential for injury) , and chair cushion or SPS    If patient has a buttock pressure injury, or high risk for PI use chair cushion or SPS.  Moisture Management: Perineal cleansing /protection: Follow Incontinence Protocol, Avoid brief in bed, Clean and dry skin folds with bathing , Consider InterDry (#908514) between folds, and Moisturize dry skin  Under Devices: Inspect skin under all medical devices during skin inspection , Ensure tubes are stabilized without tension, and Ensure patient is not lying on medical devices or equipment when repositioned  Ask provider to discontinue device when no longer needed.   Orders: Written    RECOMMEND PRIMARY TEAM ORDER: None, at this time  Education provided: importance of repositioning, plan of care, wound progress, and Off-loading pressure  Discussed plan of care with: Patient and Nurse  Notify WOC if wound(s) deteriorate.  Nursing to notify the Provider(s) and re-consult the WOC Nurse if new skin concern.    DATA:     Current support surface: Standard  Standard gel mattress (Isoflex)  Containment of urine/stool: Incontinence Protocol, Incontinent pad in bed, and Suction based external urinary catheter   BMI: Body mass index is 40.68 kg/m .   Active diet order: Orders Placed This Encounter      Combination Diet Moderate Consistent Carb (60 g CHO per Meal) Diet     Output: I/O last 3 completed shifts:  In: -   Out: 3550 [Urine:3550]     Labs:   Recent Labs   Lab 04/16/25  0527 04/14/25  1443 04/14/25  0836   ALBUMIN 2.8*   < > 3.2*    HGB 8.7*   < > 9.6*   WBC 14.4*   < > 15.5*   A1C  --   --  6.8*    < > = values in this interval not displayed.     Pressure injury risk assessment:   Sensory Perception: 3-->slightly limited  Moisture: 3-->occasionally moist  Activity: 2-->chairfast (refusing to get up out of bed)  Mobility: 2-->very limited  Nutrition: 3-->adequate  Friction and Shear: 1-->problem  Dominic Score: 14    Lennie Lee RN, CWOCN    Vocera group: [facility] [place]

## 2025-04-18 NOTE — PROGRESS NOTES
Occupational Therapy Discharge Summary    Reason for therapy discharge:    No further expectations of functional progress.    Progress towards therapy goal(s). See goals on Care Plan in [facility] electronic health record for goal details.  Goals not met.  Barriers to achieving goals:   pain and declining out of bed activity .    Therapy recommendation(s):    No further therapy is recommended.

## 2025-04-18 NOTE — PLAN OF CARE
Problem: Pain Acute  Goal: Optimal Pain Control and Function  Outcome: Not Progressing  Intervention: Develop Pain Management Plan  Recent Flowsheet Documentation  Taken 4/18/2025 0148 by Frannie Lee RN  Pain Management Interventions: medication (see MAR)  Intervention: Prevent or Manage Pain  Recent Flowsheet Documentation  Taken 4/18/2025 0400 by Frannie Lee, RN  Medication Review/Management: medications reviewed     Problem: UTI (Urinary Tract Infection)  Goal: Improved Infection Symptoms  Outcome: Not Progressing   Goal Outcome Evaluation:      Plan of Care Reviewed With: patient    Overall Patient Progress: no changeOverall Patient Progress: no change    Outcome Evaluation: Pt alert, orieneted, bedrest. Prn oxycodone given for pain. On RA. IV saline locked. Denies nausea, SOB. Mepilex to sacrum. Purewick in place. Blood glucose monitoring.

## 2025-04-18 NOTE — PLAN OF CARE
Problem: Adult Inpatient Plan of Care  Goal: Readiness for Transition of Care  Outcome: Not Progressing     Problem: Pain Acute  Goal: Optimal Pain Control and Function  Outcome: Not Progressing  Intervention: Optimize Psychosocial Wellbeing  Recent Flowsheet Documentation  Taken 4/18/2025 0900 by Izabel Del Valle RN  Diversional Activities: television  Intervention: Develop Pain Management Plan  Recent Flowsheet Documentation  Taken 4/18/2025 1015 by Izabel Del Valle RN  Pain Management Interventions: medication (see MAR)  Taken 4/18/2025 0820 by Izabel Del Valle RN  Pain Management Interventions: medication (see MAR)  Intervention: Prevent or Manage Pain  Recent Flowsheet Documentation  Taken 4/18/2025 0900 by Izabel Del Valle RN  Medication Review/Management: medications reviewed        Patient reports pain in right shoulder, both knees with any movement. Can't tolerate having feet lifted just an inch to have them assessed and she screams in pain. Writer gave patient prn ibuprofen with some pain relief and then oxycodone 10 mg approximately two hours with some relief. Patient refuses to get up out of bed with lift due to pain, refusing to get washed up this morning. New IV placed. Appetite is fair.   Magnesium and phosphorous being replacement. Will update time to have Magnesium rechecked as IV infusion was hurting, new IV needed to be placed and she said she needed a break. Recheck magnesium scheduled for 1600 today.

## 2025-04-19 LAB
BACTERIA BLD CULT: NO GROWTH
GLUCOSE BLDC GLUCOMTR-MCNC: 141 MG/DL (ref 70–99)
GLUCOSE BLDC GLUCOMTR-MCNC: 158 MG/DL (ref 70–99)
GLUCOSE BLDC GLUCOMTR-MCNC: 216 MG/DL (ref 70–99)
GLUCOSE BLDC GLUCOMTR-MCNC: 218 MG/DL (ref 70–99)
GLUCOSE BLDC GLUCOMTR-MCNC: 275 MG/DL (ref 70–99)
HOLD SPECIMEN: NORMAL
MAGNESIUM SERPL-MCNC: 2 MG/DL (ref 1.7–2.3)
PHOSPHATE SERPL-MCNC: 2.3 MG/DL (ref 2.5–4.5)

## 2025-04-19 PROCEDURE — 250N000013 HC RX MED GY IP 250 OP 250 PS 637: Performed by: STUDENT IN AN ORGANIZED HEALTH CARE EDUCATION/TRAINING PROGRAM

## 2025-04-19 PROCEDURE — 250N000013 HC RX MED GY IP 250 OP 250 PS 637

## 2025-04-19 PROCEDURE — 36415 COLL VENOUS BLD VENIPUNCTURE: CPT | Performed by: STUDENT IN AN ORGANIZED HEALTH CARE EDUCATION/TRAINING PROGRAM

## 2025-04-19 PROCEDURE — 84100 ASSAY OF PHOSPHORUS: CPT | Performed by: STUDENT IN AN ORGANIZED HEALTH CARE EDUCATION/TRAINING PROGRAM

## 2025-04-19 PROCEDURE — 99232 SBSQ HOSP IP/OBS MODERATE 35: CPT | Performed by: STUDENT IN AN ORGANIZED HEALTH CARE EDUCATION/TRAINING PROGRAM

## 2025-04-19 PROCEDURE — 250N000013 HC RX MED GY IP 250 OP 250 PS 637: Performed by: INTERNAL MEDICINE

## 2025-04-19 PROCEDURE — 120N000001 HC R&B MED SURG/OB

## 2025-04-19 PROCEDURE — 250N000011 HC RX IP 250 OP 636: Mod: JZ | Performed by: PHYSICIAN ASSISTANT

## 2025-04-19 PROCEDURE — 83735 ASSAY OF MAGNESIUM: CPT | Performed by: STUDENT IN AN ORGANIZED HEALTH CARE EDUCATION/TRAINING PROGRAM

## 2025-04-19 RX ORDER — IBUPROFEN 400 MG/1
400 TABLET, FILM COATED ORAL EVERY 6 HOURS PRN
Status: DISCONTINUED | OUTPATIENT
Start: 2025-04-19 | End: 2025-04-24 | Stop reason: HOSPADM

## 2025-04-19 RX ADMIN — INSULIN HUMAN 15 UNITS: 100 INJECTION, SUSPENSION SUBCUTANEOUS at 16:46

## 2025-04-19 RX ADMIN — NYSTATIN 500000 UNITS: 100000 SUSPENSION ORAL at 08:42

## 2025-04-19 RX ADMIN — NYSTATIN 500000 UNITS: 100000 SUSPENSION ORAL at 22:28

## 2025-04-19 RX ADMIN — ATORVASTATIN CALCIUM 40 MG: 20 TABLET, FILM COATED ORAL at 22:28

## 2025-04-19 RX ADMIN — DICLOFENAC 4 G: 10 GEL TOPICAL at 21:00

## 2025-04-19 RX ADMIN — SERTRALINE HYDROCHLORIDE 150 MG: 50 TABLET ORAL at 22:27

## 2025-04-19 RX ADMIN — ACETAMINOPHEN 975 MG: 325 TABLET ORAL at 20:08

## 2025-04-19 RX ADMIN — MONTELUKAST 10 MG: 10 TABLET, FILM COATED ORAL at 22:26

## 2025-04-19 RX ADMIN — GABAPENTIN 300 MG: 300 CAPSULE ORAL at 20:08

## 2025-04-19 RX ADMIN — POTASSIUM & SODIUM PHOSPHATES POWDER PACK 280-160-250 MG 1 PACKET: 280-160-250 PACK at 09:45

## 2025-04-19 RX ADMIN — APIXABAN 10 MG: 5 TABLET, FILM COATED ORAL at 08:41

## 2025-04-19 RX ADMIN — POTASSIUM & SODIUM PHOSPHATES POWDER PACK 280-160-250 MG 1 PACKET: 280-160-250 PACK at 12:39

## 2025-04-19 RX ADMIN — POTASSIUM & SODIUM PHOSPHATES POWDER PACK 280-160-250 MG 1 PACKET: 280-160-250 PACK at 16:46

## 2025-04-19 RX ADMIN — GABAPENTIN 300 MG: 300 CAPSULE ORAL at 08:42

## 2025-04-19 RX ADMIN — IRBESARTAN 300 MG: 75 TABLET ORAL at 22:27

## 2025-04-19 RX ADMIN — OXYCODONE HYDROCHLORIDE 5 MG: 5 TABLET ORAL at 20:08

## 2025-04-19 RX ADMIN — HYDROMORPHONE HYDROCHLORIDE 0.2 MG: 0.2 INJECTION, SOLUTION INTRAMUSCULAR; INTRAVENOUS; SUBCUTANEOUS at 09:34

## 2025-04-19 RX ADMIN — OXYCODONE HYDROCHLORIDE 10 MG: 5 TABLET ORAL at 15:35

## 2025-04-19 RX ADMIN — INSULIN ASPART 1 UNITS: 100 INJECTION, SOLUTION INTRAVENOUS; SUBCUTANEOUS at 08:43

## 2025-04-19 RX ADMIN — INSULIN HUMAN 15 UNITS: 100 INJECTION, SUSPENSION SUBCUTANEOUS at 08:43

## 2025-04-19 RX ADMIN — INSULIN ASPART 2 UNITS: 100 INJECTION, SOLUTION INTRAVENOUS; SUBCUTANEOUS at 12:40

## 2025-04-19 RX ADMIN — APIXABAN 10 MG: 5 TABLET, FILM COATED ORAL at 20:08

## 2025-04-19 RX ADMIN — DICLOFENAC 4 G: 10 GEL TOPICAL at 12:40

## 2025-04-19 RX ADMIN — Medication 5 MG: at 22:28

## 2025-04-19 RX ADMIN — HYDROXYZINE HYDROCHLORIDE 25 MG: 25 TABLET ORAL at 02:08

## 2025-04-19 RX ADMIN — OXYCODONE HYDROCHLORIDE 10 MG: 5 TABLET ORAL at 08:41

## 2025-04-19 RX ADMIN — NYSTATIN 500000 UNITS: 100000 SUSPENSION ORAL at 12:39

## 2025-04-19 RX ADMIN — HYDROXYZINE HYDROCHLORIDE 25 MG: 25 TABLET ORAL at 15:11

## 2025-04-19 RX ADMIN — AMLODIPINE BESYLATE 10 MG: 10 TABLET ORAL at 08:41

## 2025-04-19 RX ADMIN — IBUPROFEN 400 MG: 400 TABLET ORAL at 02:08

## 2025-04-19 RX ADMIN — INSULIN ASPART 2 UNITS: 100 INJECTION, SOLUTION INTRAVENOUS; SUBCUTANEOUS at 16:47

## 2025-04-19 RX ADMIN — ACETAMINOPHEN 975 MG: 325 TABLET ORAL at 06:16

## 2025-04-19 RX ADMIN — NYSTATIN 500000 UNITS: 100000 SUSPENSION ORAL at 16:46

## 2025-04-19 RX ADMIN — DICLOFENAC 4 G: 10 GEL TOPICAL at 07:56

## 2025-04-19 RX ADMIN — ACETAMINOPHEN 975 MG: 325 TABLET ORAL at 12:39

## 2025-04-19 RX ADMIN — OXYCODONE HYDROCHLORIDE 10 MG: 5 TABLET ORAL at 12:39

## 2025-04-19 ASSESSMENT — ACTIVITIES OF DAILY LIVING (ADL)
ADLS_ACUITY_SCORE: 80
ADLS_ACUITY_SCORE: 76
ADLS_ACUITY_SCORE: 80
ADLS_ACUITY_SCORE: 78
ADLS_ACUITY_SCORE: 80
ADLS_ACUITY_SCORE: 78
ADLS_ACUITY_SCORE: 78
ADLS_ACUITY_SCORE: 80
ADLS_ACUITY_SCORE: 80
ADLS_ACUITY_SCORE: 73
ADLS_ACUITY_SCORE: 78
ADLS_ACUITY_SCORE: 78
ADLS_ACUITY_SCORE: 80
ADLS_ACUITY_SCORE: 80
ADLS_ACUITY_SCORE: 78
ADLS_ACUITY_SCORE: 80
ADLS_ACUITY_SCORE: 80
ADLS_ACUITY_SCORE: 78
ADLS_ACUITY_SCORE: 80

## 2025-04-19 NOTE — PLAN OF CARE
"Pt A&Ox4, pt was pleasant with writer, but refused to let writer reposition her at 1600 and 1800. Writer explained the purpose of repositioning to prevent skin breakdown and she stated that she is comfortable and the she will reposition tonight. Writer did adjust the HOB a couple times and slightly move pillows. Pt educated to use call light immediately if her pain starts to even slightly increase, so we can manage it appropriately, and she verbalized understanding.     Problem: Adult Inpatient Plan of Care  Goal: Plan of Care Review  Description: The Plan of Care Review/Shift note should be completed every shift.  The Outcome Evaluation is a brief statement about your assessment that the patient is improving, declining, or no change.  This information will be displayed automatically on your shiftnote.  Outcome: Progressing  Goal: Patient-Specific Goal (Individualized)  Description: You can add care plan individualizations to a care plan. Examples of Individualization might be:  \"Parent requests to be called daily at 9am for status\", \"I have a hard time hearing out of my right ear\", or \"Do not touch me to wake me up as it startlesme\".  Outcome: Progressing  Goal: Absence of Hospital-Acquired Illness or Injury  Outcome: Progressing  Intervention: Identify and Manage Fall Risk  Recent Flowsheet Documentation  Taken 4/19/2025 1600 by Nicole Hitchcock RN  Safety Promotion/Fall Prevention:   activity supervised   clutter free environment maintained   patient and family education  Intervention: Prevent Skin Injury  Recent Flowsheet Documentation  Taken 4/19/2025 1617 by Nicole Hitchcock RN  Body Position: (refused repositioning, encouraged, but stated no I am comfortable) --  Taken 4/19/2025 1600 by Nicole Hitchcock RN  Skin Protection: incontinence pads utilized  Intervention: Prevent and Manage VTE (Venous Thromboembolism) Risk  Recent Flowsheet Documentation  Taken 4/19/2025 1600 by Coretta" Nicole WALL RN  VTE Prevention/Management: patient refused intervention  Intervention: Prevent Infection  Recent Flowsheet Documentation  Taken 4/19/2025 1600 by Nicole Hitchcock RN  Infection Prevention: rest/sleep promoted  Goal: Optimal Comfort and Wellbeing  Outcome: Progressing  Intervention: Monitor Pain and Promote Comfort  Recent Flowsheet Documentation  Taken 4/19/2025 1617 by Nicole Hitchcock RN  Pain Management Interventions:   care clustered   emotional support   quiet environment facilitated   rest  Taken 4/19/2025 1557 by Nicole Hitchcock RN  Pain Management Interventions:   care clustered   emotional support   pillow support provided   quiet environment facilitated   rest  Goal: Readiness for Transition of Care  Outcome: Progressing     Problem: Pain Acute  Goal: Optimal Pain Control and Function  Outcome: Progressing  Intervention: Develop Pain Management Plan  Recent Flowsheet Documentation  Taken 4/19/2025 1617 by Nicole Hitchcock RN  Pain Management Interventions:   care clustered   emotional support   quiet environment facilitated   rest  Taken 4/19/2025 1557 by Nicole Hitchcock RN  Pain Management Interventions:   care clustered   emotional support   pillow support provided   quiet environment facilitated   rest  Intervention: Prevent or Manage Pain  Recent Flowsheet Documentation  Taken 4/19/2025 1600 by Nicole Hitchcock RN  Medication Review/Management: medications reviewed     Problem: UTI (Urinary Tract Infection)  Goal: Improved Infection Symptoms  Outcome: Progressing     Problem: Self-Care Deficit  Goal: Improved Ability to Complete Activities of Daily Living  Outcome: Progressing  Intervention: Promote Activity and Functional [place]  Recent Flowsheet Documentation  Taken 4/19/2025 1600 by Nicole Hitchcock RN  Activity Assistance Provided:   assistance, 2 people   lift team assistance   Goal Outcome Evaluation:

## 2025-04-19 NOTE — PLAN OF CARE
"  Problem: Adult Inpatient Plan of Care  Goal: Plan of Care Review  Description: The Plan of Care Review/Shift note should be completed every shift.  The Outcome Evaluation is a brief statement about your assessment that the patient is improving, declining, or no change.  This information will be displayed automatically on your shiftnote.  Outcome: Progressing  Goal: Patient-Specific Goal (Individualized)  Description: You can add care plan individualizations to a care plan. Examples of Individualization might be:  \"Parent requests to be called daily at 9am for status\", \"I have a hard time hearing out of my right ear\", or \"Do not touch me to wake me up as it startlesme\".  Outcome: Progressing  Goal: Absence of Hospital-Acquired Illness or Injury  Outcome: Progressing  Intervention: Identify and Manage Fall Risk  Recent Flowsheet Documentation  Taken 4/19/2025 1600 by Nicole Hitchcock RN  Safety Promotion/Fall Prevention:   activity supervised   clutter free environment maintained   patient and family education  Intervention: Prevent Skin Injury  Recent Flowsheet Documentation  Taken 4/19/2025 1617 by Nicole Hitchcock RN  Body Position: (refused repositioning, encouraged, but stated no I am comfortable) --  Taken 4/19/2025 1600 by Nicole Hitchcock RN  Skin Protection: incontinence pads utilized  Intervention: Prevent and Manage VTE (Venous Thromboembolism) Risk  Recent Flowsheet Documentation  Taken 4/19/2025 1600 by Nicole Hitchcock RN  VTE Prevention/Management: patient refused intervention  Intervention: Prevent Infection  Recent Flowsheet Documentation  Taken 4/19/2025 1600 by Nicole Hitchcock RN  Infection Prevention: rest/sleep promoted  Goal: Optimal Comfort and Wellbeing  Outcome: Progressing  Intervention: Monitor Pain and Promote Comfort  Recent Flowsheet Documentation  Taken 4/19/2025 1617 by Nicole Hitchcock RN  Pain Management Interventions:   care clustered   " emotional support   quiet environment facilitated   rest  Taken 4/19/2025 1557 by Nicole Hitchcock RN  Pain Management Interventions:   care clustered   emotional support   pillow support provided   quiet environment facilitated   rest  Goal: Readiness for Transition of Care  Outcome: Progressing     Problem: Pain Acute  Goal: Optimal Pain Control and Function  Outcome: Progressing  Intervention: Develop Pain Management Plan  Recent Flowsheet Documentation  Taken 4/19/2025 1617 by Nicole Hitchcock RN  Pain Management Interventions:   care clustered   emotional support   quiet environment facilitated   rest  Taken 4/19/2025 1557 by Nicole Hitchcock RN  Pain Management Interventions:   care clustered   emotional support   pillow support provided   quiet environment facilitated   rest  Intervention: Prevent or Manage Pain  Recent Flowsheet Documentation  Taken 4/19/2025 1600 by Nicole Hitchcock RN  Medication Review/Management: medications reviewed     Problem: UTI (Urinary Tract Infection)  Goal: Improved Infection Symptoms  Outcome: Progressing     Problem: Self-Care Deficit  Goal: Improved Ability to Complete Activities of Daily Living  Outcome: Progressing  Intervention: Promote Activity and Functional [place]  Recent Flowsheet Documentation  Taken 4/19/2025 1600 by Nicole Hitchcock RN  Activity Assistance Provided:   assistance, 2 people   lift team assistance   Goal Outcome Evaluation:

## 2025-04-19 NOTE — PLAN OF CARE
Goal Outcome Evaluation:      Problem: Self-Care Deficit  Goal: Improved Ability to Complete Activities of Daily Living  Outcome: Not Progressing  Intervention: Promote Activity and Functional [place]  Recent Flowsheet Documentation  Taken 4/18/2025 1830 by Gabriel Luna RN  Activity Assistance Provided: assistance, 2 people     Patient alert and oriented x4. Cooperative and anxious this shift. Did not want to get up and remained in bed this evening due to pain. PRN oxycodone 10mg given for 7/10 pain. Voltaren gel applied to right shoulder and bilateral knees.

## 2025-04-19 NOTE — PLAN OF CARE
Problem: Pain Acute  Goal: Optimal Pain Control and Function  Outcome: Not Progressing  Intervention: Optimize Psychosocial Wellbeing  Recent Flowsheet Documentation  Taken 4/19/2025 1000 by Izabel Del Valle RN  Diversional Activities: television  Intervention: Develop Pain Management Plan  Recent Flowsheet Documentation  Taken 4/19/2025 0934 by Izabel Del Valle RN  Pain Management Interventions: medication (see MAR)  Intervention: Prevent or Manage Pain  Recent Flowsheet Documentation  Taken 4/19/2025 1000 by Izabel Del Valle RN  Medication Review/Management: medications reviewed    Problem: Self-Care Deficit  Goal: Improved Ability to Complete Activities of Daily Living  Outcome: Not Progressing     Patient has excruciating pain with any turns or movement 10/10, she will scream out in pain. At rest she is calmer, but still reports pain. Dependent on staff for most of her ADL's, bathing, incontinent cares and she will determine and allow staff to help her with bathing, brushing her teeth when she is feels she is up to it.   Patient incontinent of stool and skin had to be assessed and patient had received oxycodone 10mg at 0840 and was yelling out in pain during cares so prn IV dilaudid given with minimal relief.   Patient does not want to get out of bed.

## 2025-04-19 NOTE — PROGRESS NOTES
[facility]    Medicine Progress Note - Hospitalist Service    Date of Admission:  4/14/2025    Assessment & Plan   Brissa Mott is a 54 year old female with past medical hx of recently diagnosed neuroendocrine carcinoma, T2DM, CVA with residual hemiparesis, hypercalcemia, HTN, HLD, KATHRINE admitted on 4/14/2025. She presented for generalized weakness after recent discharge from TCU.     Generalized weakness  Fall, initial encounter  Left knee pain, chronic    Recently admitted 2/20-3/5 for pathological fracture (see below) and discharged to TCU. Left TCU on 4/7 and has had 3 falls in the interim. Falls without injuries, but family unable to help her up and requires EMS assistance. Unsafe to remain at home with  and daughter. Weakness multifactorial due to UTI, hypomagnesemia, PE, and progression of cancer. Working towards LTC placement with SW.   - PT/OT/SW consulted  - See below for etiologies  - Diclofenac 4 g 4 times daily, Tylenol 975 mg every 8 hours, and oxycodone 5-10 mg PRN  - Palliative care consulted     Hypercalcemia secondary to malignancy  Calcium 13.8 and ionized calcium 7.5 on admission. Seen on previous admission up to 14.7 and resolved with IVF and Zometa. Due to malignancy. PTH <6, PTHrP 12 (high), vitamin D <18 (low). Calcium improving (10.7) and will continue to trend downward with zometa.   - Discontinued IVF  - Lasix 40 mg IV once, patient borderline hypoxic and received large volume IVF for treatment since admission  - Consider chronic bisphosphonate's at discharge to prevent recurrence    Neuroendocrine carcinoma, metastatic  Pathological fracture of right femur due to neoplastic disease s/p ORIF 2/25/25    Admitted 2/20-3/5 for a pathologic fracture of right femur, and pathology showed poorly differentiated carcinoma with neuroendocrine features. Underwent ORIF 2/25 and received Lovenox for 30 days post-op. CT c/a/p showed multiple pulmonary nodules, left  axillary node, several hypodense areas in liver, bony lesion on right humeral head/right sacrum/pelvis, several subcutaneous nodules in anterior chest wall and right gluteal region.    Established with oncology 3/21. Current plan is for radiation therapy to the pathological fracture and biopsy of the left axillary lymph node, which was scheduled for 4/14 but she ended up in the ER. Has not started treatment yet.    CT on admission showed progression of metastatic disease with new hepatic mets, enlarging osseous mets, enlarging lymph nodes above and below diaphragm. This includes enlarging manubrial mets with pathologic fracture and enlarging destructive lesion of right hemisacrum and right medial iliac bone. Resolution of multiple previously seen pulmonary nodules but new RLL nodule. Other pulmonary nodules stable or mildly increased. Right adnexal mass mildly decreased. Ultrasound-guided lymph core biopsy 4/17 with pathology and gene sequencing.  Spoke with radiation oncology about doing radiation mapping and/or treatment on 4/21.  Patient was tearful with the anticipation of having to transfer to wheelchair and to have to use EZ stand for positioning.  Counseled patient that we have to focus on what aligns with her goals and if she really does not want to move forward with radiation then we can defer with the expectation that things are not going to improve with time but likely will worsen.  - Pathology and NGS pending per Oncology   - Continued PTA Lidocaine patches, PRN Tylenol, PRN Ibuprofen, PRN Oxycodone 5-10 mg  - Continue apixaban 10 mg BID (4 days) followed by 5 mg BID       Acute UTI without hematuria  Severe sepsis without septic shock  UA with nitrites, trace LE, few bacteria, 9 WBC. Arrived septic based on leukocytosis (15.5), HR (110s). Lactic 3.0. Urine culture with E. Coli.   - Ceftriaxone 2g IV daily (last dose 4/18)  - Blood cultures NGTD     Acute pulmonary embolism  Not hypoxic. CT with poor  opacification of few subsegmental pulmonary arteries suspicious for small volume pulmonary emboli. After her ORIF on 2/25 (see below) she was given Lovenox 40mg BID for 6 weeks (ending 4/17) which she endorses compliance with. Patient agreeable to starting DOAC tonight now that we've completed the biopsy.   - Apixaban 10 mg BID followed by 5 mg BID       Hypomagnesemia  Hypophosphatemia  Mag 1.1 and phos 1.9 on admission.  - RN repletion protocol     Anorexia   very concerned about poor PO intake, and believes it's due to polypharmacy. Besides physical decline and multiple comorbidities as above, review of med list only reveals Ozempic as possible etiology. She hadn't taken it for ~6 weeks while admitted and at TCU, then took a dose when she got home on 4/7.  states this correlates with onset of poor PO intake.   - Recommend discontinuing Ozempic     Hx of CVA  Hemiplegia and hemiparesis following cerebral infarction affecting left non-dominant side   HLD  CVA in 2018. Residual left sided weakness.  - Continued PTA Atorvastatin 40mg     Type 2 diabetes mellitus with neuropathy, with long-term current use of insulin   A1c 6.8% on admission.  - Sliding scale insulin  - Consistent carb diet  - Continued PTA Gabapentin 300mg BID, Humulin 70/30 BID 15U  - Held PTA Metformin 1000mg BID for lactic acidosis   - Plan to discontinue PTA Ozempic      Anemia, borderline microcytic, chronic  Hemoglobin 9.6 on admission, baseline 8-9.    - Daily CBC      Hypertension  - Continued PTA Amlodipine 10mg, Ibesartan 300mg     Reactive airway disease without complication  - Continued PTA Montelukast 10mg     KATHRINE (obstructive sleep apnea)  - Continued PTA CPAP if available     Adjustment disorder with depressed mood  - Continued PTA Hydroxyzine 25mg PRN, Sertraline 150mg    Thrush  Patient with white film with bumps on her tongue that came around the time of admission. Some pain and discomfort with eating and drinking.   "Improved with trial of nystatin swish and spit .  - Nystatin swish and spit QID           Diet: Combination Diet Moderate Consistent Carb (60 g CHO per Meal) Diet    DVT Prophylaxis: DOAC  Nielson Catheter: Not present  Lines: None     Cardiac Monitoring: None  Code Status: Full Code      Clinically Significant Risk Factors         # Hyponatremia: Lowest Na = 134 mmol/L in last 2 days, will monitor as appropriate     # Hypomagnesemia: Lowest Mg = 1.5 mg/dL in last 2 days, will replace as needed   # Hypoalbuminemia: Lowest albumin = 2.7 g/dL at 4/14/2025  2:43 PM, will monitor as appropriate     # Hypertension: Noted on problem list           # DMII: A1C = 6.8 % (Ref range: <5.7 %) within past 6 months   # Severe Obesity: Estimated body mass index is 40.68 kg/m  as calculated from the following:    Height as of this encounter: 1.626 m (5' 4\").    Weight as of this encounter: 107.5 kg (237 lb).      # Financial/Environmental Concerns:    # Asthma: noted on problem list        Social Drivers of Health    Depression: At risk (7/19/2024)    PHQ-2     PHQ-2 Score: 3   Tobacco Use: Medium Risk (2/25/2025)    Patient History     Smoking Tobacco Use: Former     Smokeless Tobacco Use: Never          Disposition Plan     Medically Ready for Discharge: Anticipated in 2-4 Days             Reynaldo Elder DO  Hospitalist Service  [facility]  Securely message with [employer] (more info)  Text page via [employer] Paging/Directory   ______________________________________________________________________    Interval History   No acute events overnight.  Discussed with patient plan to potentially pursue radiation on Monday.  Patient tearful thinking about potentially having a painful transfer and/or difficulty.     Physical Exam   Vital Signs: Temp: 98.4  F (36.9  C) Temp src: Oral BP: (!) 155/88 Pulse: 101   Resp: 16 SpO2: 95 % O2 Device: None (Room air)    Weight: 237 lbs 0 oz    Constitutional: awake, alert, " cooperative, no apparent distress, and appears stated age  Respiratory: No increased work of breathing, good air exchange, clear to auscultation bilaterally, no crackles or wheezing  Cardiovascular: Normal apical impulse, regular rate and rhythm, normal S1 and S2, no S3 or S4, and no murmur noted  GI: No scars, normal bowel sounds, soft, non-distended, non-tender, no masses palpated, no hepatosplenomegally  Skin: normal skin color, texture, turgor  Musculoskeletal: There is no redness, warmth, or swelling of the joints.  Full range of motion noted.  Motor strength is 5 out of 5 all extremities bilaterally.  Tone is normal.    Medical Decision Making       40 MINUTES SPENT BY ME on the date of service doing chart review, history, exam, documentation & further activities per the note.      Data         Imaging results reviewed over the past 24 hrs:   No results found for this or any previous visit (from the past 24 hours).

## 2025-04-20 LAB
ANION GAP SERPL CALCULATED.3IONS-SCNC: 12 MMOL/L (ref 7–15)
BUN SERPL-MCNC: 8.9 MG/DL (ref 6–20)
CALCIUM SERPL-MCNC: 10.4 MG/DL (ref 8.8–10.4)
CHLORIDE SERPL-SCNC: 99 MMOL/L (ref 98–107)
CREAT SERPL-MCNC: 0.46 MG/DL (ref 0.51–0.95)
EGFRCR SERPLBLD CKD-EPI 2021: >90 ML/MIN/1.73M2
GLUCOSE BLDC GLUCOMTR-MCNC: 162 MG/DL (ref 70–99)
GLUCOSE BLDC GLUCOMTR-MCNC: 174 MG/DL (ref 70–99)
GLUCOSE BLDC GLUCOMTR-MCNC: 244 MG/DL (ref 70–99)
GLUCOSE BLDC GLUCOMTR-MCNC: 249 MG/DL (ref 70–99)
GLUCOSE BLDC GLUCOMTR-MCNC: 256 MG/DL (ref 70–99)
GLUCOSE SERPL-MCNC: 173 MG/DL (ref 70–99)
HCO3 SERPL-SCNC: 23 MMOL/L (ref 22–29)
HOLD SPECIMEN: NORMAL
MAGNESIUM SERPL-MCNC: 1.8 MG/DL (ref 1.7–2.3)
PHOSPHATE SERPL-MCNC: 2.3 MG/DL (ref 2.5–4.5)
POTASSIUM SERPL-SCNC: 5 MMOL/L (ref 3.4–5.3)
SODIUM SERPL-SCNC: 134 MMOL/L (ref 135–145)

## 2025-04-20 PROCEDURE — 80048 BASIC METABOLIC PNL TOTAL CA: CPT | Performed by: STUDENT IN AN ORGANIZED HEALTH CARE EDUCATION/TRAINING PROGRAM

## 2025-04-20 PROCEDURE — 83735 ASSAY OF MAGNESIUM: CPT | Performed by: STUDENT IN AN ORGANIZED HEALTH CARE EDUCATION/TRAINING PROGRAM

## 2025-04-20 PROCEDURE — 250N000013 HC RX MED GY IP 250 OP 250 PS 637

## 2025-04-20 PROCEDURE — 250N000013 HC RX MED GY IP 250 OP 250 PS 637: Performed by: INTERNAL MEDICINE

## 2025-04-20 PROCEDURE — 120N000001 HC R&B MED SURG/OB

## 2025-04-20 PROCEDURE — 250N000013 HC RX MED GY IP 250 OP 250 PS 637: Performed by: STUDENT IN AN ORGANIZED HEALTH CARE EDUCATION/TRAINING PROGRAM

## 2025-04-20 PROCEDURE — 99232 SBSQ HOSP IP/OBS MODERATE 35: CPT | Performed by: STUDENT IN AN ORGANIZED HEALTH CARE EDUCATION/TRAINING PROGRAM

## 2025-04-20 PROCEDURE — 36415 COLL VENOUS BLD VENIPUNCTURE: CPT | Performed by: STUDENT IN AN ORGANIZED HEALTH CARE EDUCATION/TRAINING PROGRAM

## 2025-04-20 PROCEDURE — 84100 ASSAY OF PHOSPHORUS: CPT | Performed by: STUDENT IN AN ORGANIZED HEALTH CARE EDUCATION/TRAINING PROGRAM

## 2025-04-20 RX ADMIN — GABAPENTIN 300 MG: 300 CAPSULE ORAL at 08:15

## 2025-04-20 RX ADMIN — INSULIN HUMAN 15 UNITS: 100 INJECTION, SUSPENSION SUBCUTANEOUS at 08:17

## 2025-04-20 RX ADMIN — OXYCODONE HYDROCHLORIDE 10 MG: 5 TABLET ORAL at 14:25

## 2025-04-20 RX ADMIN — DOCUSATE SODIUM 100 MG: 100 CAPSULE, LIQUID FILLED ORAL at 21:08

## 2025-04-20 RX ADMIN — OXYCODONE HYDROCHLORIDE 10 MG: 5 TABLET ORAL at 18:14

## 2025-04-20 RX ADMIN — OXYCODONE HYDROCHLORIDE 10 MG: 5 TABLET ORAL at 08:15

## 2025-04-20 RX ADMIN — DOCUSATE SODIUM 100 MG: 100 CAPSULE, LIQUID FILLED ORAL at 08:15

## 2025-04-20 RX ADMIN — INSULIN ASPART 3 UNITS: 100 INJECTION, SOLUTION INTRAVENOUS; SUBCUTANEOUS at 12:20

## 2025-04-20 RX ADMIN — ATORVASTATIN CALCIUM 40 MG: 20 TABLET, FILM COATED ORAL at 21:07

## 2025-04-20 RX ADMIN — AMLODIPINE BESYLATE 10 MG: 10 TABLET ORAL at 08:16

## 2025-04-20 RX ADMIN — MICONAZOLE NITRATE: 20 POWDER TOPICAL at 18:01

## 2025-04-20 RX ADMIN — IRBESARTAN 300 MG: 75 TABLET ORAL at 21:12

## 2025-04-20 RX ADMIN — APIXABAN 10 MG: 5 TABLET, FILM COATED ORAL at 21:07

## 2025-04-20 RX ADMIN — INSULIN ASPART 1 UNITS: 100 INJECTION, SOLUTION INTRAVENOUS; SUBCUTANEOUS at 08:16

## 2025-04-20 RX ADMIN — DICLOFENAC 4 G: 10 GEL TOPICAL at 18:00

## 2025-04-20 RX ADMIN — METFORMIN HYDROCHLORIDE 1000 MG: 500 TABLET ORAL at 18:13

## 2025-04-20 RX ADMIN — MICONAZOLE NITRATE: 20 POWDER TOPICAL at 21:09

## 2025-04-20 RX ADMIN — INSULIN ASPART 3 UNITS: 100 INJECTION, SOLUTION INTRAVENOUS; SUBCUTANEOUS at 16:37

## 2025-04-20 RX ADMIN — DICLOFENAC 4 G: 10 GEL TOPICAL at 21:09

## 2025-04-20 RX ADMIN — MONTELUKAST 10 MG: 10 TABLET, FILM COATED ORAL at 21:08

## 2025-04-20 RX ADMIN — SERTRALINE HYDROCHLORIDE 150 MG: 50 TABLET ORAL at 21:07

## 2025-04-20 RX ADMIN — POTASSIUM & SODIUM PHOSPHATES POWDER PACK 280-160-250 MG 1 PACKET: 280-160-250 PACK at 15:33

## 2025-04-20 RX ADMIN — INSULIN HUMAN 15 UNITS: 100 INJECTION, SUSPENSION SUBCUTANEOUS at 16:38

## 2025-04-20 RX ADMIN — POTASSIUM & SODIUM PHOSPHATES POWDER PACK 280-160-250 MG 1 PACKET: 280-160-250 PACK at 12:19

## 2025-04-20 RX ADMIN — IBUPROFEN 400 MG: 400 TABLET ORAL at 14:25

## 2025-04-20 RX ADMIN — APIXABAN 10 MG: 5 TABLET, FILM COATED ORAL at 08:15

## 2025-04-20 RX ADMIN — LIDOCAINE 4% 2 PATCH: 40 PATCH TOPICAL at 04:46

## 2025-04-20 RX ADMIN — ACETAMINOPHEN 975 MG: 325 TABLET ORAL at 21:07

## 2025-04-20 RX ADMIN — ACETAMINOPHEN 975 MG: 325 TABLET ORAL at 04:38

## 2025-04-20 RX ADMIN — GABAPENTIN 300 MG: 300 CAPSULE ORAL at 21:08

## 2025-04-20 RX ADMIN — POTASSIUM & SODIUM PHOSPHATES POWDER PACK 280-160-250 MG 1 PACKET: 280-160-250 PACK at 08:16

## 2025-04-20 RX ADMIN — ACETAMINOPHEN 975 MG: 325 TABLET ORAL at 12:19

## 2025-04-20 RX ADMIN — IBUPROFEN 400 MG: 400 TABLET ORAL at 07:31

## 2025-04-20 ASSESSMENT — ACTIVITIES OF DAILY LIVING (ADL)
ADLS_ACUITY_SCORE: 80
ADLS_ACUITY_SCORE: 76
ADLS_ACUITY_SCORE: 76
ADLS_ACUITY_SCORE: 80
ADLS_ACUITY_SCORE: 76
ADLS_ACUITY_SCORE: 80
ADLS_ACUITY_SCORE: 80
ADLS_ACUITY_SCORE: 76
ADLS_ACUITY_SCORE: 80
ADLS_ACUITY_SCORE: 76
ADLS_ACUITY_SCORE: 76

## 2025-04-20 NOTE — PLAN OF CARE
Problem: Adult Inpatient Plan of Care  Goal: Absence of Hospital-Acquired Illness or Injury  Intervention: Prevent Skin Injury  Recent Flowsheet Documentation  Taken 4/20/2025 0638 by Kimberly Meadows RN  Body Position:   turned   right  Taken 4/20/2025 0438 by Kimberly Meadows RN  Body Position: (repositioning refused) --  Taken 4/20/2025 0216 by Kimberly Meadows RN  Skin Protection:   adhesive use limited   incontinence pads utilized  Taken 4/20/2025 0100 by Kimberly Meadows RN  Body Position: (declined repositioning) --  Taken 4/19/2025 2223 by Kimberly Meadows RN  Body Position: (declined repositioning) --  Taken 4/19/2025 2003 by Kimberly Meadows RN  Body Position:   turned   right   legs elevated     Problem: Adult Inpatient Plan of Care  Goal: Optimal Comfort and Wellbeing  Intervention: Monitor Pain and Promote Comfort  Recent Flowsheet Documentation  Taken 4/20/2025 0657 by Kimberly Meadows RN  Pain Management Interventions: medication (see MAR)  Taken 4/20/2025 0438 by Kimberly Meadows RN  Pain Management Interventions: medication (see MAR)  Taken 4/19/2025 2223 by Kimberly Meadows RN  Pain Management Interventions: rest  Taken 4/19/2025 2003 by Kimberly Meadows RN  Pain Management Interventions: medication (see MAR)   Goal Outcome Evaluation:      Plan of Care Reviewed With: patient          Outcome Evaluation: Patient is alert and oriented x4, flat in responses to questions, cooperative, mood improved with rest and pain relief.  Patient declined repositioning most of this shift, despite explanations of maintaining skin integrity.  Patient c/o pain in knees, generalized, PRN oxycodone administered with relief-patient sleeping at reassessment time, scheduled acetaminophen and Voltaren gel and PRN lidocaine patches placed above knees and PRN iboprofen this morning.  Patient slept intermittently throughout night.

## 2025-04-20 NOTE — PROGRESS NOTES
Patient reports her pain currently is 0/10. Oxycodone given this morning at 0815 with relief. Patient reported goal is to get up into chair sometime today. Currently resting. She informed writer she no longer needs nystatin swish and spit, writer assessed mouth and tongue and no white spots noted or increased redness. Web page to Dr Wong regarding patient no longer wanting nystatin swish and spit.   Plan for wound care, up with assist of two and pain management today. Phosphorous being replaced orally.

## 2025-04-20 NOTE — PROGRESS NOTES
Quick Care Management Note:    CM team following for LTC placement.  Referrals pending in community.    CARRILLO working with pt/spouse, [facility] Kirk and Flex at [facility]'s SMRT team to get pt's SMRT application/process expedited.    Late Friday afternoon, SW received phone message from state [employer] SMRT team and requested 2 forms to be completed by pt/sposue and returned to her office.  SW printed DHS form 6124 and DHS form 6125, present to pt 4/20/25 and requested pt/family complete form 6125 when spouse is present today.  Pt signed form 6124.    Additional Information:  Patient is medically ready for discharge per provider.  Patient has been medically ready since 4/18/25.     Background information:   Pt is a is a 54 year old female with past medical hx of recently diagnosed neuroendocrine carcinoma, T2DM, CVA with residual hemiparesis, hypercalcemia, HTN, HLD, KATHRINE admitted on 4/14/2025. She presented for generalized weakness after recent discharge from TCU.  Pt now requiring LTC placement.     Current status and recommendations: LTC placement     Current disposition goal: LTC     Barriers to discharge:    Lack of payor source for LTC.       SW able to secure LTC-MA and necessary documents to complete LTC-MA application and send to Fern ARZATE ([facility] Financial worker, [phone], email: [email or web address])      Pt is young for LTC     Pt with unknown oncology plan/follow up cares      Per discussion with 1 facility, if pt/spouse do not meeting MA eligibility and pt dies before the application process, it is a financial burden on their facility to accept for cares- CARRILLO to send to [employer] for review for all [facility] facilities, despite already being declined by them.     Key contacts:   Patient  Ian Mott, spouse, [phone]  Fern Holt- [facility] Financial Worker, [phone], (email above)  [employer] SMRT Team, [phone]/ F: [phone]     Upcoming important dates:   NA     Next  steps:   4/21/25:  SW to gather DHS form 6125 (SMRT application) from pt's room and put with form 6105 and fax to [employer] SMRT Team, [phone]/ F: [phone]    PC to see pt on 4/21, as pt hesitant to even get into wheelchair to go down to Oncology office for radiation mapping.  If she declines, then she will most likely not be amenable to getting in/out of wheelchair van to get to/from appts for radiation.  GOC need to be discussed and explained clearly to pt/Ian.     Referrals remain pending at the following locations- both should be made aware that pt is being SMRTd and that her case is being expedited by the SMRT team.     Service Provider Request Status Address Phone Fax Patient Preferred   [facility]  Considering  Business office to review LTC- MA application [address] [place] [id number] [phone] --          [facility]  Considering- LTC-MA application faxed for business office review [address] [address] [phone] --       Care Management team to follow for discharge plans.    JENNIFER Parks

## 2025-04-20 NOTE — PROGRESS NOTES
[facility]    Medicine Progress Note - Hospitalist Service    Date of Admission:  4/14/2025    Assessment & Plan   Brissa Mott is a 54 year old female with past medical hx of recently diagnosed neuroendocrine carcinoma, T2DM, CVA with residual hemiparesis, hypercalcemia, HTN, HLD, KATHRINE admitted on 4/14/2025. She presented for generalized weakness after recent discharge from TCU.     Generalized weakness  Fall, initial encounter  Left knee pain, chronic    Recently admitted 2/20-3/5 for pathological fracture (see below) and discharged to TCU. Left TCU on 4/7 and has had 3 falls in the interim. Falls without injuries, but family unable to help her up and requires EMS assistance. Unsafe to remain at home with  and daughter. Weakness multifactorial due to UTI, hypomagnesemia, PE, and progression of cancer. Working towards LTC placement with SW.   - PT/OT/SW consulted  - See below for etiologies  - Diclofenac 4 g 4 times daily, Tylenol 975 mg every 8 hours, and oxycodone 5-10 mg PRN  - Palliative care consulted     Hypercalcemia secondary to malignancy - resolved   Calcium 13.8 and ionized calcium 7.5 on admission. Seen on previous admission up to 14.7 and resolved with IVF and Zometa. Due to malignancy. PTH <6, PTHrP 12 (high), vitamin D <18 (low). Calcium improving (10.7) and will continue to trend downward with zometa.   - Consider chronic bisphosphonate's at discharge to prevent recurrence every 4-6 weeks    Neuroendocrine carcinoma, metastatic  Pathological fracture of right femur due to neoplastic disease s/p ORIF 2/25/25    Admitted 2/20-3/5 for a pathologic fracture of right femur, and pathology showed poorly differentiated carcinoma with neuroendocrine features. Underwent ORIF 2/25 and received Lovenox for 30 days post-op. CT c/a/p showed multiple pulmonary nodules, left axillary node, several hypodense areas in liver, bony lesion on right humeral head/right sacrum/pelvis,  several subcutaneous nodules in anterior chest wall and right gluteal region.    Established with oncology 3/21. Current plan is for radiation therapy to the pathological fracture and biopsy of the left axillary lymph node, which was scheduled for 4/14 but she ended up in the ER. Has not started treatment yet.    CT on admission showed progression of metastatic disease with new hepatic mets, enlarging osseous mets, enlarging lymph nodes above and below diaphragm. This includes enlarging manubrial mets with pathologic fracture and enlarging destructive lesion of right hemisacrum and right medial iliac bone. Resolution of multiple previously seen pulmonary nodules but new RLL nodule. Other pulmonary nodules stable or mildly increased. Right adnexal mass mildly decreased. Ultrasound-guided lymph core biopsy 4/17 with pathology and gene sequencing.  Spoke with radiation oncology about doing radiation mapping and/or treatment on 4/21.  Patient was tearful with the anticipation of having to transfer to wheelchair and to have to use EZ stand for positioning.  Counseled patient that we have to focus on what aligns with her goals and if she really does not want to move forward with radiation then we can defer with the expectation that things are not going to improve with time but likely will worsen.  - Plan to reach out to radiation oncology 4/21 to arrange for inpatient radiation initiation   - Pathology and NGS pending per Oncology   - Continued PTA Lidocaine patches, PRN Tylenol, PRN Ibuprofen, PRN Oxycodone 5-10 mg  - Continue apixaban 10 mg BID (4 days) followed by 5 mg BID       Acute UTI without hematuria  Severe sepsis without septic shock  UA with nitrites, trace LE, few bacteria, 9 WBC. Arrived septic based on leukocytosis (15.5), HR (110s). Lactic 3.0. Urine culture with E. Coli.   - Ceftriaxone 2g IV daily (last dose 4/18)  - Blood cultures NGTD     Acute pulmonary embolism  Not hypoxic. CT with poor  opacification of few subsegmental pulmonary arteries suspicious for small volume pulmonary emboli. After her ORIF on 2/25 (see below) she was given Lovenox 40mg BID for 6 weeks (ending 4/17) which she endorses compliance with. Patient agreeable to starting DOAC tonight now that we've completed the biopsy.   - Apixaban 10 mg BID followed by 5 mg BID       Hypomagnesemia  Hypophosphatemia  Mag 1.1 and phos 1.9 on admission.  - RN repletion protocol     Anorexia   very concerned about poor PO intake, and believes it's due to polypharmacy. Besides physical decline and multiple comorbidities as above, review of med list only reveals Ozempic as possible etiology. She hadn't taken it for ~6 weeks while admitted and at TCU, then took a dose when she got home on 4/7.  states this correlates with onset of poor PO intake.   - Recommend discontinuing Ozempic     Hx of CVA  Hemiplegia and hemiparesis following cerebral infarction affecting left non-dominant side   HLD  CVA in 2018. Residual left sided weakness.  - Continued PTA Atorvastatin 40mg     Type 2 diabetes mellitus with neuropathy, with long-term current use of insulin   A1c 6.8% on admission.  - Sliding scale insulin  - Consistent carb diet  - Continued PTA Gabapentin 300mg BID, Humulin 70/30 BID 15U  - Resume PTA Metformin 1000mg BID  - Plan to discontinue PTA Ozempic      Anemia, borderline microcytic, chronic  Hemoglobin 9.6 on admission, baseline 8-9.    - Stable, no longer trending hemoglobin     Hypertension  - Continued PTA Amlodipine 10mg, Ibesartan 300mg     Reactive airway disease without complication  - Continued PTA Montelukast 10mg     KATHRINE (obstructive sleep apnea)  - Continued PTA CPAP if available     Adjustment disorder with depressed mood  - Continued PTA Hydroxyzine 25mg PRN, Sertraline 150mg    Thrush - resolved   Patient with white film with bumps on her tongue that came around the time of admission. Some pain and discomfort with  "eating and drinking.  Improved with trial of nystatin swish and spit .  - Nystatin swish and spit QID           Diet: Combination Diet Moderate Consistent Carb (60 g CHO per Meal) Diet    DVT Prophylaxis: DOAC  Nielson Catheter: Not present  Lines: None     Cardiac Monitoring: None  Code Status: Full Code      Clinically Significant Risk Factors         # Hyponatremia: Lowest Na = 134 mmol/L in last 2 days, will monitor as appropriate       # Hypoalbuminemia: Lowest albumin = 2.7 g/dL at 4/14/2025  2:43 PM, will monitor as appropriate     # Hypertension: Noted on problem list           # DMII: A1C = 6.8 % (Ref range: <5.7 %) within past 6 months   # Severe Obesity: Estimated body mass index is 40.68 kg/m  as calculated from the following:    Height as of this encounter: 1.626 m (5' 4\").    Weight as of this encounter: 107.5 kg (237 lb).        # Financial/Environmental Concerns:    # Asthma: noted on problem list        Social Drivers of Health    Depression: At risk (7/19/2024)    PHQ-2     PHQ-2 Score: 3   Tobacco Use: Medium Risk (2/25/2025)    Patient History     Smoking Tobacco Use: Former     Smokeless Tobacco Use: Never          Disposition Plan     Medically Ready for Discharge: Anticipated in 2-4 Days             Reynaldo Elder DO  Hospitalist Service  [facility]  Securely message with [employer] (more info)  Text page via [facility] Paging/Directory   ______________________________________________________________________    Interval History   No acute events overnight.  Patient doing well and more motivated to move today. Still having intermittent pain with movement in bilateral knees and right shoulder. Optimistic for radiation therapy on Monday.     Physical Exam   Vital Signs: Temp: 97.6  F (36.4  C) Temp src: Oral BP: (!) 176/98 Pulse: 107   Resp: 18 SpO2: 94 % O2 Device: None (Room air)    Weight: 237 lbs 0 oz    Constitutional: awake, alert, cooperative, no apparent distress, and " appears stated age  Respiratory: No increased work of breathing, good air exchange, clear to auscultation bilaterally, no crackles or wheezing  Cardiovascular: Normal apical impulse, regular rate and rhythm, normal S1 and S2, no S3 or S4, and no murmur noted  GI: No scars, normal bowel sounds, soft, non-distended, non-tender, no masses palpated, no hepatosplenomegally  Skin: normal skin color, texture, turgor  Musculoskeletal: There is no redness, warmth, or swelling of the joints.  Full range of motion noted.  Motor strength is 5 out of 5 all extremities bilaterally.  Tone is normal.    Medical Decision Making       35 MINUTES SPENT BY ME on the date of service doing chart review, history, exam, documentation & further activities per the note.      Data     I have personally reviewed the following data over the past 24 hrs:    N/A  \   N/A   / N/A     134 (L) 99 8.9 /  256 (H)   5.0 23 0.46 (L) \       Imaging results reviewed over the past 24 hrs:   No results found for this or any previous visit (from the past 24 hours).

## 2025-04-21 LAB
GLUCOSE BLDC GLUCOMTR-MCNC: 152 MG/DL (ref 70–99)
GLUCOSE BLDC GLUCOMTR-MCNC: 172 MG/DL (ref 70–99)
GLUCOSE BLDC GLUCOMTR-MCNC: 219 MG/DL (ref 70–99)
GLUCOSE BLDC GLUCOMTR-MCNC: 259 MG/DL (ref 70–99)
GLUCOSE BLDC GLUCOMTR-MCNC: 282 MG/DL (ref 70–99)
HOLD SPECIMEN: NORMAL
MAGNESIUM SERPL-MCNC: 1.6 MG/DL (ref 1.7–2.3)
PHOSPHATE SERPL-MCNC: 2.1 MG/DL (ref 2.5–4.5)

## 2025-04-21 PROCEDURE — 84100 ASSAY OF PHOSPHORUS: CPT | Performed by: STUDENT IN AN ORGANIZED HEALTH CARE EDUCATION/TRAINING PROGRAM

## 2025-04-21 PROCEDURE — 36415 COLL VENOUS BLD VENIPUNCTURE: CPT | Performed by: STUDENT IN AN ORGANIZED HEALTH CARE EDUCATION/TRAINING PROGRAM

## 2025-04-21 PROCEDURE — 120N000001 HC R&B MED SURG/OB

## 2025-04-21 PROCEDURE — 250N000013 HC RX MED GY IP 250 OP 250 PS 637: Performed by: STUDENT IN AN ORGANIZED HEALTH CARE EDUCATION/TRAINING PROGRAM

## 2025-04-21 PROCEDURE — 250N000013 HC RX MED GY IP 250 OP 250 PS 637: Performed by: INTERNAL MEDICINE

## 2025-04-21 PROCEDURE — 99232 SBSQ HOSP IP/OBS MODERATE 35: CPT | Performed by: STUDENT IN AN ORGANIZED HEALTH CARE EDUCATION/TRAINING PROGRAM

## 2025-04-21 PROCEDURE — 83735 ASSAY OF MAGNESIUM: CPT | Performed by: STUDENT IN AN ORGANIZED HEALTH CARE EDUCATION/TRAINING PROGRAM

## 2025-04-21 PROCEDURE — 250N000013 HC RX MED GY IP 250 OP 250 PS 637

## 2025-04-21 RX ADMIN — INSULIN HUMAN 15 UNITS: 100 INJECTION, SUSPENSION SUBCUTANEOUS at 09:05

## 2025-04-21 RX ADMIN — METFORMIN HYDROCHLORIDE 1000 MG: 500 TABLET ORAL at 09:06

## 2025-04-21 RX ADMIN — MICONAZOLE NITRATE: 20 POWDER TOPICAL at 09:06

## 2025-04-21 RX ADMIN — INSULIN ASPART 1 UNITS: 100 INJECTION, SOLUTION INTRAVENOUS; SUBCUTANEOUS at 09:05

## 2025-04-21 RX ADMIN — OXYCODONE HYDROCHLORIDE 10 MG: 5 TABLET ORAL at 20:52

## 2025-04-21 RX ADMIN — ATORVASTATIN CALCIUM 40 MG: 20 TABLET, FILM COATED ORAL at 20:55

## 2025-04-21 RX ADMIN — GABAPENTIN 300 MG: 300 CAPSULE ORAL at 20:53

## 2025-04-21 RX ADMIN — POLYETHYLENE GLYCOL 3350 17 G: 17 POWDER, FOR SOLUTION ORAL at 09:05

## 2025-04-21 RX ADMIN — ACETAMINOPHEN 975 MG: 325 TABLET ORAL at 05:04

## 2025-04-21 RX ADMIN — DOCUSATE SODIUM 100 MG: 100 CAPSULE, LIQUID FILLED ORAL at 20:52

## 2025-04-21 RX ADMIN — ACETAMINOPHEN 975 MG: 325 TABLET ORAL at 20:53

## 2025-04-21 RX ADMIN — SERTRALINE HYDROCHLORIDE 150 MG: 50 TABLET ORAL at 20:56

## 2025-04-21 RX ADMIN — INSULIN ASPART 3 UNITS: 100 INJECTION, SOLUTION INTRAVENOUS; SUBCUTANEOUS at 17:37

## 2025-04-21 RX ADMIN — POTASSIUM & SODIUM PHOSPHATES POWDER PACK 280-160-250 MG 1 PACKET: 280-160-250 PACK at 09:05

## 2025-04-21 RX ADMIN — DICLOFENAC 4 G: 10 GEL TOPICAL at 22:31

## 2025-04-21 RX ADMIN — HYDROXYZINE HYDROCHLORIDE 25 MG: 25 TABLET ORAL at 05:32

## 2025-04-21 RX ADMIN — DICLOFENAC 4 G: 10 GEL TOPICAL at 12:11

## 2025-04-21 RX ADMIN — DICLOFENAC 4 G: 10 GEL TOPICAL at 17:31

## 2025-04-21 RX ADMIN — OXYCODONE HYDROCHLORIDE 10 MG: 5 TABLET ORAL at 05:04

## 2025-04-21 RX ADMIN — IRBESARTAN 300 MG: 75 TABLET ORAL at 22:30

## 2025-04-21 RX ADMIN — OXYCODONE HYDROCHLORIDE 10 MG: 5 TABLET ORAL at 17:34

## 2025-04-21 RX ADMIN — APIXABAN 10 MG: 5 TABLET, FILM COATED ORAL at 20:52

## 2025-04-21 RX ADMIN — INSULIN HUMAN 15 UNITS: 100 INJECTION, SUSPENSION SUBCUTANEOUS at 17:36

## 2025-04-21 RX ADMIN — POTASSIUM & SODIUM PHOSPHATES POWDER PACK 280-160-250 MG 1 PACKET: 280-160-250 PACK at 12:05

## 2025-04-21 RX ADMIN — METFORMIN HYDROCHLORIDE 1000 MG: 500 TABLET ORAL at 17:34

## 2025-04-21 RX ADMIN — DICLOFENAC 4 G: 10 GEL TOPICAL at 09:06

## 2025-04-21 RX ADMIN — INSULIN ASPART 3 UNITS: 100 INJECTION, SOLUTION INTRAVENOUS; SUBCUTANEOUS at 12:10

## 2025-04-21 RX ADMIN — OXYCODONE HYDROCHLORIDE 10 MG: 5 TABLET ORAL at 09:13

## 2025-04-21 RX ADMIN — AMLODIPINE BESYLATE 10 MG: 10 TABLET ORAL at 09:06

## 2025-04-21 RX ADMIN — MONTELUKAST 10 MG: 10 TABLET, FILM COATED ORAL at 20:54

## 2025-04-21 RX ADMIN — APIXABAN 10 MG: 5 TABLET, FILM COATED ORAL at 09:06

## 2025-04-21 RX ADMIN — MICONAZOLE NITRATE: 20 POWDER TOPICAL at 20:53

## 2025-04-21 RX ADMIN — POTASSIUM & SODIUM PHOSPHATES POWDER PACK 280-160-250 MG 1 PACKET: 280-160-250 PACK at 17:32

## 2025-04-21 RX ADMIN — ACETAMINOPHEN 975 MG: 325 TABLET ORAL at 12:05

## 2025-04-21 RX ADMIN — GABAPENTIN 300 MG: 300 CAPSULE ORAL at 09:06

## 2025-04-21 RX ADMIN — HYDROXYZINE HYDROCHLORIDE 25 MG: 25 TABLET ORAL at 17:34

## 2025-04-21 RX ADMIN — OXYCODONE HYDROCHLORIDE 10 MG: 5 TABLET ORAL at 12:05

## 2025-04-21 ASSESSMENT — ACTIVITIES OF DAILY LIVING (ADL)
ADLS_ACUITY_SCORE: 80

## 2025-04-21 NOTE — PROGRESS NOTES
"RADIATION ONCOLOGY BRIEF NOTE    Met with patient today, she was scheduled for CT simulation 2 weeks ago for adjuvant radiation to right femur status post ORIF secondary to pathologic fracture from malignancy.  She is deconditioned and has been bedbound for the duration of her admission.  She has met with physical therapy once who has asked her to stand up, and she has refused that request.  She has no other meetings with physical therapy during her admission.  She has tolerated head of bed raised, but is not tolerated or attempted feet over edge of bed.  She has not tolerated use of ceiling lift due to strain on her shoulder.  She reports that she has significant pain in her left knee due to \"bone-on-bone pain \".  Her right leg has no significant pain, and she clarifies this is the site of her previous surgery conducted in February.  I stressed that the patient is severely deconditioned, and she should focus on improving her mental health, and reconditioning.  It would be advisable to proceed with recommendations from medical oncology, and it would make sense to not delay systemic chemotherapy if so advised by her medical oncology team.  Radiation oncology to the right femur for cancer prophylaxis to the orthopedic instrumentation is not urgent and can be done electively in the next weeks to months.  Patient is understanding.  I relayed this information to the patient's primary team and Dr. Reynaldo Wong.    Maldonado Adrian MD  PGY-4 Radiation Oncology  Department of Radiation Oncology  [facility]  Phone: [phone]    "

## 2025-04-21 NOTE — PLAN OF CARE
Problem: Self-Care Deficit  Goal: Improved Ability to Complete Activities of Daily Living  Outcome: Not Progressing     Problem: Pain Acute  Goal: Optimal Pain Control and Function  Outcome: Not Progressing     Patient alert and oriented x4.  This evening patient was agreeable to attempt getting out of bed (with charge nurse and this writer) as trial in anticipation of transferring for radiation therapy tomorrow, but then became quite anxious and reported she has not been able to stand or walk since before surgery.  Patient informed once more resources available tomorrow, we can have a more definitive plan.

## 2025-04-21 NOTE — PROGRESS NOTES
[facility]    Medicine Progress Note - Hospitalist Service    Date of Admission:  4/14/2025    Assessment & Plan   Brissa Mott is a 54 year old female with past medical hx of recently diagnosed neuroendocrine carcinoma, T2DM, CVA with residual hemiparesis, hypercalcemia, HTN, HLD, KATHRINE admitted on 4/14/2025. She presented for generalized weakness after recent discharge from TCU.    Interval update: Awaiting LTC placement. Unable to complete radiation therapy per rad-onc given deconditioning.      Generalized weakness  Fall, initial encounter  Left knee pain, chronic    Recently admitted 2/20-3/5 for pathological fracture (see below) and discharged to TCU. Left TCU on 4/7 and has had 3 falls in the interim. Falls without injuries, but family unable to help her up and requires EMS assistance. Unsafe to remain at home with  and daughter. Weakness multifactorial due to UTI, hypomagnesemia, PE, and progression of cancer. Working towards LTC placement with SW.   - PT/OT/SW consulted  - See below for etiologies  - Diclofenac 4 g 4 times daily, Tylenol 975 mg every 8 hours, and oxycodone 5-10 mg PRN  - Palliative care consulted     Hypercalcemia secondary to malignancy - resolved   Calcium 13.8 and ionized calcium 7.5 on admission. Seen on previous admission up to 14.7 and resolved with IVF and Zometa. Due to malignancy. PTH <6, PTHrP 12 (high), vitamin D <18 (low). Calcium improving (10.7) and will continue to trend downward with zometa.   - Consider chronic bisphosphonate's at discharge to prevent recurrence every 4-6 weeks    Neuroendocrine carcinoma, metastatic  Pathological fracture of right femur due to neoplastic disease s/p ORIF 2/25/25    Admitted 2/20-3/5 for a pathologic fracture of right femur, and pathology showed poorly differentiated carcinoma with neuroendocrine features. Underwent ORIF 2/25 and received Lovenox for 30 days post-op. CT c/a/p showed multiple pulmonary  nodules, left axillary node, several hypodense areas in liver, bony lesion on right humeral head/right sacrum/pelvis, several subcutaneous nodules in anterior chest wall and right gluteal region.    Established with oncology 3/21. Current plan is for radiation therapy to the pathological fracture and biopsy of the left axillary lymph node, which was scheduled for 4/14 but she ended up in the ER. Has not started treatment yet.    CT on admission showed progression of metastatic disease with new hepatic mets, enlarging osseous mets, enlarging lymph nodes above and below diaphragm. This includes enlarging manubrial mets with pathologic fracture and enlarging destructive lesion of right hemisacrum and right medial iliac bone. Resolution of multiple previously seen pulmonary nodules but new RLL nodule. Other pulmonary nodules stable or mildly increased. Right adnexal mass mildly decreased. Ultrasound-guided lymph core biopsy 4/17 with pathology and gene sequencing.  Spoke with radiation oncology about doing radiation mapping and/or treatment on 4/21.  Patient was tearful with the anticipation of having to transfer to wheelchair and to have to use EZ stand for positioning.  Counseled patient that we have to focus on what aligns with her goals and if she really does not want to move forward with radiation then we can defer with the expectation that things are not going to improve with time but likely will worsen.  - Plan to reach out to radiation oncology 4/21 to arrange for inpatient radiation initiation   - Pathology and NGS pending per Oncology    - Continued PTA Lidocaine patches, PRN Tylenol, PRN Ibuprofen, PRN Oxycodone 5-10 mg  - Continue apixaban 10 mg BID (4 days) followed by 5 mg BID (starting 4/22)     Acute UTI without hematuria  Severe sepsis without septic shock  UA with nitrites, trace LE, few bacteria, 9 WBC. Arrived septic based on leukocytosis (15.5), HR (110s). Lactic 3.0. Urine culture with E. Coli.   -  Ceftriaxone 2g IV daily (last dose 4/18)  - Blood cultures NGTD     Acute pulmonary embolism  Not hypoxic. CT with poor opacification of few subsegmental pulmonary arteries suspicious for small volume pulmonary emboli. After her ORIF on 2/25 (see below) she was given Lovenox 40mg BID for 6 weeks (ending 4/17) which she endorses compliance with. Patient agreeable to starting DOAC tonight now that we've completed the biopsy.   - Apixaban 10 mg BID followed by 5 mg BID       Hypomagnesemia  Hypophosphatemia  Mag 1.1 and phos 1.9 on admission.  - RN repletion protocol     Anorexia   very concerned about poor PO intake, and believes it's due to polypharmacy. Besides physical decline and multiple comorbidities as above, review of med list only reveals Ozempic as possible etiology. She hadn't taken it for ~6 weeks while admitted and at TCU, then took a dose when she got home on 4/7.  states this correlates with onset of poor PO intake.   - Recommend discontinuing Ozempic     Hx of CVA  Hemiplegia and hemiparesis following cerebral infarction affecting left non-dominant side   HLD  CVA in 2018. Residual left sided weakness.  - Continued PTA Atorvastatin 40mg     Type 2 diabetes mellitus with neuropathy, with long-term current use of insulin   A1c 6.8% on admission.  - Sliding scale insulin  - Consistent carb diet  - Continued PTA Gabapentin 300mg BID, Humulin 70/30 BID 15U  - Resume PTA Metformin 1000mg BID  - Plan to discontinue PTA Ozempic      Anemia, borderline microcytic, chronic  Hemoglobin 9.6 on admission, baseline 8-9.    - Stable, no longer trending hemoglobin     Hypertension  - Continued PTA Amlodipine 10mg, Ibesartan 300mg     Reactive airway disease without complication  - Continued PTA Montelukast 10mg     KATHRINE (obstructive sleep apnea)  - Continued PTA CPAP if available     Adjustment disorder with depressed mood  - Continued PTA Hydroxyzine 25mg PRN, Sertraline 150mg    Thrush - resolved  "  Patient with white film with bumps on her tongue that came around the time of admission. Some pain and discomfort with eating and drinking.  Improved with trial of nystatin swish and spit .  - Nystatin swish and spit QID           Diet: Combination Diet Moderate Consistent Carb (60 g CHO per Meal) Diet    DVT Prophylaxis: DOAC  Nielson Catheter: Not present  Lines: None     Cardiac Monitoring: None  Code Status: Full Code      Clinically Significant Risk Factors         # Hyponatremia: Lowest Na = 134 mmol/L in last 2 days, will monitor as appropriate     # Hypomagnesemia: Lowest Mg = 1.6 mg/dL in last 2 days, will replace as needed   # Hypoalbuminemia: Lowest albumin = 2.7 g/dL at 4/14/2025  2:43 PM, will monitor as appropriate     # Hypertension: Noted on problem list           # DMII: A1C = 6.8 % (Ref range: <5.7 %) within past 6 months   # Severe Obesity: Estimated body mass index is 40.68 kg/m  as calculated from the following:    Height as of this encounter: 1.626 m (5' 4\").    Weight as of this encounter: 107.5 kg (237 lb).        # Financial/Environmental Concerns:    # Asthma: noted on problem list        Social Drivers of Health    Depression: At risk (7/19/2024)    PHQ-2     PHQ-2 Score: 3   Tobacco Use: Medium Risk (2/25/2025)    Patient History     Smoking Tobacco Use: Former     Smokeless Tobacco Use: Never          Disposition Plan     Medically Ready for Discharge: Anticipated in 2-4 Days             Reynaldo Elder DO  Hospitalist Service  [facility]  Securely message with [employer] (more info)  Text page via [employer] Paging/Directory   ______________________________________________________________________    Interval History   No acute events overnight. Unable to get complete any radiation therapy due to deconditioning. Working towards LTC. Continue palliative care discussions with patient given limited prognosis. Otherwise no acute concerns or complaints.     Physical Exam "   Vital Signs: Temp: 98.2  F (36.8  C) Temp src: Oral BP: 134/68 Pulse: 104   Resp: 20 SpO2: 95 % O2 Device: None (Room air)    Weight: 237 lbs 0 oz    Constitutional: awake, alert, cooperative, no apparent distress, and appears stated age  Respiratory: No increased work of breathing, good air exchange, clear to auscultation bilaterally, no crackles or wheezing  Cardiovascular: Normal apical impulse, regular rate and rhythm, normal S1 and S2, no S3 or S4, and no murmur noted  GI: No scars, normal bowel sounds, soft, non-distended, non-tender, no masses palpated, no hepatosplenomegaly    Medical Decision Making       35 MINUTES SPENT BY ME on the date of service doing chart review, history, exam, documentation & further activities per the note.      Data         Imaging results reviewed over the past 24 hrs:   No results found for this or any previous visit (from the past 24 hours).

## 2025-04-21 NOTE — PROGRESS NOTES
Care Management Follow Up    Length of Stay (days): 7    Expected Discharge Date: 04/22/2025     Concerns to be Addressed: discharge planning  Pt is LTC-MA pending  Patient plan of care discussed at interdisciplinary rounds: Yes    Anticipated Discharge Disposition: Long Term Care  Anticipated Discharge Services: Transportation Services  Anticipated Discharge DME: None    Patient/family educated on Medicare website which has current facility and service quality ratings: yes  Education Provided on the Discharge Plan: Yes  Patient/Family in Agreement with the Plan: yes    Referrals Placed by CM/SW: Internal Clinic Care Coordination, Post Acute Facilities    Additional Information:  Patient is medically ready for discharge per provider.  Patient has been medically ready since 4/18/25.     Background information:   Pt is a is a 54 year old female with past medical hx of recently diagnosed neuroendocrine carcinoma, T2DM, CVA with residual hemiparesis, hypercalcemia, HTN, HLD, KATHRINE admitted on 4/14/2025. She presented for generalized weakness after recent discharge from TCU.  Pt now requiring LTC placement.     Current status and recommendations: LTC placement     Current disposition goal: LTC     Barriers to discharge:    Lack of payor source for LTC.       SW able to secure LTC-MA and necessary documents to complete LTC-MA application and send to Fern ARZATE ([facility] Financial worker, [phone], email: [email or web address].)      Pt is young for LTC     Pt with unknown oncology plan/follow up cares      Per discussion with 1 facility, if pt/spouse do not meeting MA eligibility and pt dies before the application process, it is a financial burden on their facility to accept for cares- CARRILLO to send to  Leadership for review for all [facility] facilities, despite already being declined by them.     Key contacts:   Patient  Ian Mott, spouse, [phone]  [facility] Worker,  [phone], (email above)  [employer] SMRT Team, [phone]/ F: [phone]     Upcoming important dates:   NA     Next steps:   Today 4/21 CM gathered DHS form 6125 (SMRT application) from pt's room and put with form 6124 and faxed to [employer] SMRT Team, [phone]/ F: [phone]. FAX DID NOT GO THROUGH, called and spoke with [employer], updated fax # of [phone] given and faxed there. Confirmation received that fax was successful. Copies of forms made and returned to  at the bedside.    Awaiting next steps from SMRT team-CM to follow up 4/22 if no response today.     Referrals remain pending at the following locations     Service Provider Request Status Address Phone Fax Patient Preferred   [facility]  Considering  Business office to review LT- MA application [address] [place] [phone] [phone] --          [facility]  Considering- LTC-MA application faxed for business office review [address] [phone] [phone] --         Care Management team to follow for discharge plans.    CAROLINA Baltazar  Care Transitions Registered Nurse  Tele: [phone]

## 2025-04-21 NOTE — PROGRESS NOTES
"Clinical Nutrition Services- Brief Note    Reviewed nutrition risk factors due to LOS. Pt is tolerating a Regular diet, eating well per nursing documentation and patient report. Patient skin noted to be within parameters for nutrition assessment. No nutrition issues identified at this time. RD will continue to follow per nutrition protocol.    Elida Espitia RDN, LD  Clinical Dietitian  Office: [phone]  Hours: M-F 8-3pm   Weekend/Holiday CARLY Vocera - \"Weekend Clinical Dietitian\" (No longer using pager)    "

## 2025-04-21 NOTE — PLAN OF CARE
Problem: Adult Inpatient Plan of Care  Goal: Plan of Care Review  Description: The Plan of Care Review/Shift note should be completed every shift.  The Outcome Evaluation is a brief statement about your assessment that the patient is improving, declining, or no change.  This information will be displayed automatically on your shiftnote.  Flowsheets (Taken 4/21/2025 8762)  Outcome Evaluation: Patient alert and oriented, able to make needs known.  Mood is low, pain is high especially with movement and repositioning in bed.  Repositioned attempted to shift weight off on coccyx., pt declined, Complaints of generalized pain, bilateral legs and significant pain in right shoulder.  Repositioned with pillow under shoulder and patient expressed relief of some discomfort.  PRN oxycodone and hydroxazine given for anxiety and pain.  PIV saline locked.  Continue with plan of care and hopefully up to stand at bedside today.  Plan of Care Reviewed With: patient  Overall Patient Progress: no change  Goal: Absence of Hospital-Acquired Illness or Injury  Intervention: Identify and Manage Fall Risk  Recent Flowsheet Documentation  Taken 4/21/2025 1734 by Rosalie Merritt RN  Safety Promotion/Fall Prevention:   activity supervised   clutter free environment maintained   patient and family education   safety round/check completed   room organization consistent   assistive device/personal items within reach  Taken 4/21/2025 0913 by Rosalie Merritt RN  Safety Promotion/Fall Prevention:   activity supervised   clutter free environment maintained   patient and family education   safety round/check completed   room organization consistent   assistive device/personal items within reach  Intervention: Prevent Skin Injury  Recent Flowsheet Documentation  Taken 4/21/2025 1734 by Rosalie Merritt RN  Body Position: (refused)   other (see comments)   weight shifting  Taken 4/21/2025 0913 by Rosalie Merritt RN  Body Position: (refused)   other  (see comments)   weight shifting  Intervention: Prevent and Manage VTE (Venous Thromboembolism) Risk  Recent Flowsheet Documentation  Taken 4/21/2025 1734 by Rosalie Merritt RN  VTE Prevention/Management: patient refused intervention  Taken 4/21/2025 0913 by Rosalie Merritt RN  VTE Prevention/Management: patient refused intervention  Intervention: Prevent Infection  Recent Flowsheet Documentation  Taken 4/21/2025 1734 by Rosalie Merritt RN  Infection Prevention:   rest/sleep promoted   hand hygiene promoted  Taken 4/21/2025 0913 by Rosalie Merritt RN  Infection Prevention:   rest/sleep promoted   hand hygiene promoted  Goal: Optimal Comfort and Wellbeing  Intervention: Monitor Pain and Promote Comfort  Recent Flowsheet Documentation  Taken 4/21/2025 1734 by Rosalie Merritt RN  Pain Management Interventions: medication (see MAR)     Problem: Pain Acute  Goal: Optimal Pain Control and Function  Intervention: Optimize Psychosocial Wellbeing  Recent Flowsheet Documentation  Taken 4/21/2025 1734 by Rosalie Merritt RN  Diversional Activities: television  Taken 4/21/2025 0913 by Rosalie Merritt RN  Diversional Activities: television  Intervention: Develop Pain Management Plan  Recent Flowsheet Documentation  Taken 4/21/2025 1734 by Rosalie Merritt RN  Pain Management Interventions: medication (see MAR)  Intervention: Prevent or Manage Pain  Recent Flowsheet Documentation  Taken 4/21/2025 1734 by Rosalie Merritt RN  Medication Review/Management: medications reviewed  Taken 4/21/2025 0913 by Rosalie Merritt RN  Medication Review/Management: medications reviewed   Goal Outcome Evaluation:      Plan of Care Reviewed With: patient    Overall Patient Progress: no changeOverall Patient Progress: no change    Outcome Evaluation: Patient alert and oriented, able to make needs known.  Mood is low, pain is high especially with movement and repositioning in bed.  Repositioned attempted to shift weight off on coccyx., pt  "declined, Complaints of generalized pain, bilateral legs and significant pain in right shoulder.  Repositioned with pillow under shoulder and patient expressed relief of some discomfort.  PRN oxycodone and hydroxazine given for anxiety and pain.  PIV saline locked.  Continue with plan of care and hopefully up to stand at bedside today.  /62 (BP Location: Right arm)   Pulse 96   Temp 98.3  F (36.8  C) (Oral)   Resp 20   Ht 1.626 m (5' 4\")   Wt 107.5 kg (237 lb)   LMP  (LMP Unknown)   SpO2 93%   BMI 40.68 kg/m    April DUKE Merritt RN on 4/21/2025 at 6:54 PM      "

## 2025-04-21 NOTE — PLAN OF CARE
Problem: Adult Inpatient Plan of Care  Goal: Plan of Care Review  Description: The Plan of Care Review/Shift note should be completed every shift.  The Outcome Evaluation is a brief statement about your assessment that the patient is improving, declining, or no change.  This information will be displayed automatically on your shiftnote.  Flowsheets (Taken 4/21/2025 0550)  Outcome Evaluation: Patient alert and oriented, able to make needs known.  Mood is low, pain is high especially with movement and repositioning in bed.  Repositioned to shift weight off on coccyx.  Complaints of generalized pain, bilateral legs and significant pain in right shoulder.  Repositioned with pillow under shoulder and patient expressed relief of some discomfort.  PRN oxycodone and hydroxazine given for anxiety and pain.  PIV saline locked.  Continue with plan of care and hopefully up to stand at bedside today.  Plan of Care Reviewed With: patient  Overall Patient Progress: no change  Goal: Absence of Hospital-Acquired Illness or Injury  Intervention: Identify and Manage Fall Risk  Recent Flowsheet Documentation  Taken 4/21/2025 0215 by Luh Perez RN  Safety Promotion/Fall Prevention:   activity supervised   clutter free environment maintained   patient and family education  Intervention: Prevent and Manage VTE (Venous Thromboembolism) Risk  Recent Flowsheet Documentation  Taken 4/21/2025 0215 by Luh Perez RN  VTE Prevention/Management: patient refused intervention  Intervention: Prevent Infection  Recent Flowsheet Documentation  Taken 4/21/2025 0215 by Luh Perez RN  Infection Prevention:   rest/sleep promoted   hand hygiene promoted     Problem: Pain Acute  Goal: Optimal Pain Control and Function  Intervention: Prevent or Manage Pain  Recent Flowsheet Documentation  Taken 4/21/2025 0215 by Luh Perez RN  Medication Review/Management: medications reviewed   Goal Outcome Evaluation:      Plan of Care  Reviewed With: patient    Overall Patient Progress: no changeOverall Patient Progress: no change    Outcome Evaluation: Patient alert and oriented, able to make needs known.  Mood is low, pain is high especially with movement and repositioning in bed.  Repositioned to shift weight off on coccyx.  Complaints of generalized pain, bilateral legs and significant pain in right shoulder.  Repositioned with pillow under shoulder and patient expressed relief of some discomfort.  PRN oxycodone and hydroxazine given for anxiety and pain.  PIV saline locked.  Continue with plan of care and hopefully up to stand at bedside today.

## 2025-04-22 LAB
GLUCOSE BLDC GLUCOMTR-MCNC: 151 MG/DL (ref 70–99)
GLUCOSE BLDC GLUCOMTR-MCNC: 155 MG/DL (ref 70–99)
GLUCOSE BLDC GLUCOMTR-MCNC: 174 MG/DL (ref 70–99)
GLUCOSE BLDC GLUCOMTR-MCNC: 186 MG/DL (ref 70–99)
GLUCOSE BLDC GLUCOMTR-MCNC: 196 MG/DL (ref 70–99)
HOLD SPECIMEN: NORMAL
INTERPRETATION SERPL IEP-IMP: NORMAL
LAB DIRECTOR COMMENTS: NORMAL
LAB DIRECTOR DISCLAIMER: NORMAL
LAB DIRECTOR INTERPRETATION: NORMAL
LAB DIRECTOR METHODOLOGY: NORMAL
LAB DIRECTOR RESULTS: NORMAL
LAB TEST RESULTS REPORTED IN RPTPERIOD: NORMAL
LOCATION OF TASK: NORMAL
MAGNESIUM SERPL-MCNC: 1.6 MG/DL (ref 1.7–2.3)
PATH REPORT.COMMENTS IMP SPEC: NORMAL
PATH REPORT.FINAL DX SPEC: NORMAL
PATH REPORT.GROSS SPEC: NORMAL
PATH REPORT.MICROSCOPIC SPEC OTHER STN: NORMAL
PHOSPHATE SERPL-MCNC: 2.4 MG/DL (ref 2.5–4.5)
PHOTO IMAGE: NORMAL
SEQUENCING METHOD PNL BLD/T: NORMAL
SPECIMEN TYPE: NORMAL
SPECIMEN TYPE: NORMAL

## 2025-04-22 PROCEDURE — 250N000013 HC RX MED GY IP 250 OP 250 PS 637: Performed by: INTERNAL MEDICINE

## 2025-04-22 PROCEDURE — 83735 ASSAY OF MAGNESIUM: CPT | Performed by: STUDENT IN AN ORGANIZED HEALTH CARE EDUCATION/TRAINING PROGRAM

## 2025-04-22 PROCEDURE — 84100 ASSAY OF PHOSPHORUS: CPT | Performed by: STUDENT IN AN ORGANIZED HEALTH CARE EDUCATION/TRAINING PROGRAM

## 2025-04-22 PROCEDURE — 99231 SBSQ HOSP IP/OBS SF/LOW 25: CPT | Performed by: INTERNAL MEDICINE

## 2025-04-22 PROCEDURE — 250N000013 HC RX MED GY IP 250 OP 250 PS 637: Performed by: NURSE PRACTITIONER

## 2025-04-22 PROCEDURE — 250N000013 HC RX MED GY IP 250 OP 250 PS 637

## 2025-04-22 PROCEDURE — 250N000013 HC RX MED GY IP 250 OP 250 PS 637: Performed by: STUDENT IN AN ORGANIZED HEALTH CARE EDUCATION/TRAINING PROGRAM

## 2025-04-22 PROCEDURE — 99232 SBSQ HOSP IP/OBS MODERATE 35: CPT | Performed by: NURSE PRACTITIONER

## 2025-04-22 PROCEDURE — 36415 COLL VENOUS BLD VENIPUNCTURE: CPT | Performed by: STUDENT IN AN ORGANIZED HEALTH CARE EDUCATION/TRAINING PROGRAM

## 2025-04-22 PROCEDURE — 120N000001 HC R&B MED SURG/OB

## 2025-04-22 RX ORDER — LORAZEPAM 0.5 MG/1
0.5 TABLET ORAL 2 TIMES DAILY
Status: DISCONTINUED | OUTPATIENT
Start: 2025-04-22 | End: 2025-04-24 | Stop reason: HOSPADM

## 2025-04-22 RX ORDER — LORAZEPAM 0.5 MG/1
0.5 TABLET ORAL ONCE
Status: COMPLETED | OUTPATIENT
Start: 2025-04-22 | End: 2025-04-22

## 2025-04-22 RX ADMIN — DICLOFENAC 4 G: 10 GEL TOPICAL at 09:54

## 2025-04-22 RX ADMIN — OXYCODONE HYDROCHLORIDE 10 MG: 5 TABLET ORAL at 02:47

## 2025-04-22 RX ADMIN — LORAZEPAM 0.5 MG: 0.5 TABLET ORAL at 09:55

## 2025-04-22 RX ADMIN — IRBESARTAN 300 MG: 75 TABLET ORAL at 22:58

## 2025-04-22 RX ADMIN — HYDROXYZINE HYDROCHLORIDE 25 MG: 25 TABLET ORAL at 02:47

## 2025-04-22 RX ADMIN — ATORVASTATIN CALCIUM 40 MG: 20 TABLET, FILM COATED ORAL at 22:57

## 2025-04-22 RX ADMIN — OXYCODONE HYDROCHLORIDE 10 MG: 5 TABLET ORAL at 20:38

## 2025-04-22 RX ADMIN — METFORMIN HYDROCHLORIDE 1000 MG: 500 TABLET ORAL at 17:23

## 2025-04-22 RX ADMIN — DICLOFENAC 4 G: 10 GEL TOPICAL at 22:58

## 2025-04-22 RX ADMIN — GABAPENTIN 300 MG: 300 CAPSULE ORAL at 20:38

## 2025-04-22 RX ADMIN — INSULIN ASPART 2 UNITS: 100 INJECTION, SOLUTION INTRAVENOUS; SUBCUTANEOUS at 12:11

## 2025-04-22 RX ADMIN — METFORMIN HYDROCHLORIDE 1000 MG: 500 TABLET ORAL at 09:53

## 2025-04-22 RX ADMIN — DICLOFENAC 4 G: 10 GEL TOPICAL at 12:09

## 2025-04-22 RX ADMIN — LORAZEPAM 0.5 MG: 0.5 TABLET ORAL at 20:38

## 2025-04-22 RX ADMIN — DOCUSATE SODIUM 100 MG: 100 CAPSULE, LIQUID FILLED ORAL at 20:38

## 2025-04-22 RX ADMIN — MONTELUKAST 10 MG: 10 TABLET, FILM COATED ORAL at 22:58

## 2025-04-22 RX ADMIN — OXYCODONE HYDROCHLORIDE 10 MG: 5 TABLET ORAL at 17:23

## 2025-04-22 RX ADMIN — ACETAMINOPHEN 975 MG: 325 TABLET ORAL at 12:09

## 2025-04-22 RX ADMIN — INSULIN HUMAN 15 UNITS: 100 INJECTION, SUSPENSION SUBCUTANEOUS at 09:59

## 2025-04-22 RX ADMIN — Medication 1 PACKET: at 09:53

## 2025-04-22 RX ADMIN — AMLODIPINE BESYLATE 10 MG: 10 TABLET ORAL at 09:53

## 2025-04-22 RX ADMIN — APIXABAN 5 MG: 5 TABLET, FILM COATED ORAL at 20:38

## 2025-04-22 RX ADMIN — SERTRALINE HYDROCHLORIDE 150 MG: 50 TABLET ORAL at 22:58

## 2025-04-22 RX ADMIN — INSULIN ASPART 1 UNITS: 100 INJECTION, SOLUTION INTRAVENOUS; SUBCUTANEOUS at 09:58

## 2025-04-22 RX ADMIN — MICONAZOLE NITRATE: 20 POWDER TOPICAL at 09:56

## 2025-04-22 RX ADMIN — GABAPENTIN 300 MG: 300 CAPSULE ORAL at 09:53

## 2025-04-22 RX ADMIN — APIXABAN 5 MG: 5 TABLET, FILM COATED ORAL at 09:53

## 2025-04-22 RX ADMIN — INSULIN HUMAN 15 UNITS: 100 INJECTION, SUSPENSION SUBCUTANEOUS at 17:25

## 2025-04-22 RX ADMIN — INSULIN ASPART 1 UNITS: 100 INJECTION, SOLUTION INTRAVENOUS; SUBCUTANEOUS at 17:24

## 2025-04-22 RX ADMIN — OXYCODONE HYDROCHLORIDE 10 MG: 5 TABLET ORAL at 09:55

## 2025-04-22 RX ADMIN — ACETAMINOPHEN 975 MG: 325 TABLET ORAL at 05:23

## 2025-04-22 RX ADMIN — ACETAMINOPHEN 975 MG: 325 TABLET ORAL at 20:37

## 2025-04-22 RX ADMIN — DICLOFENAC 4 G: 10 GEL TOPICAL at 17:18

## 2025-04-22 ASSESSMENT — ACTIVITIES OF DAILY LIVING (ADL)
ADLS_ACUITY_SCORE: 80

## 2025-04-22 NOTE — PLAN OF CARE
Problem: Adult Inpatient Plan of Care  Goal: Plan of Care Review  Outcome: Not Progressing  Flowsheets (Taken 4/22/2025 0539)  Plan of Care Reviewed With: patient  Overall Patient Progress: no change  Goal: Absence of Hospital-Acquired Illness or Injury  Outcome: Not Progressing  Intervention: Identify and Manage Fall Risk  Recent Flowsheet Documentation  Taken 4/21/2025 2300 by Daysi Redd RN  Safety Promotion/Fall Prevention:   activity supervised   clutter free environment maintained   patient and family education   safety round/check completed   room organization consistent   assistive device/personal items within reach  Intervention: Prevent Skin Injury  Recent Flowsheet Documentation  Taken 4/22/2025 0523 by Daysi Redd RN  Body Position: (refused)   other (see comments)   weight shifting  Taken 4/22/2025 0330 by Daysi Redd RN  Body Position: (refused)   other (see comments)   weight shifting  Taken 4/22/2025 0247 by Daysi Redd RN  Body Position:   turned   heels elevated  Taken 4/21/2025 2300 by Daysi Redd RN  Body Position: (refused)   other (see comments)   weight shifting  Taken 4/21/2025 2140 by Daysi Redd RN  Body Position: (refused)   other (see comments)   weight shifting  Taken 4/21/2025 2052 by Daysi Redd RN  Body Position: (refused)   other (see comments)   weight shifting  Intervention: Prevent and Manage VTE (Venous Thromboembolism) Risk  Recent Flowsheet Documentation  Taken 4/21/2025 2300 by Daysi Redd RN  VTE Prevention/Management: patient refused intervention  Intervention: Prevent Infection  Recent Flowsheet Documentation  Taken 4/21/2025 2300 by Daysi Redd RN  Infection Prevention:   rest/sleep promoted   hand hygiene promoted  Goal: Optimal Comfort and Wellbeing  Outcome: Not Progressing  Intervention: Monitor Pain and Promote Comfort  Recent Flowsheet Documentation  Taken 4/22/2025 0523 by Daysi Redd RN  Pain Management  Interventions: medication (see MAR)  Taken 4/22/2025 0330 by Daysi Redd RN  Pain Management Interventions: medication (see MAR)  Taken 4/22/2025 0247 by Daysi Redd RN  Pain Management Interventions: medication (see MAR)  Taken 4/21/2025 2140 by Daysi Redd RN  Pain Management Interventions: medication (see MAR)  Taken 4/21/2025 2052 by Daysi Redd RN  Pain Management Interventions: medication (see MAR)  Goal: Readiness for Transition of Care  Outcome: Not Progressing     Problem: Pain Acute  Goal: Optimal Pain Control and Function  Outcome: Not Progressing  Intervention: Optimize Psychosocial Wellbeing  Recent Flowsheet Documentation  Taken 4/21/2025 2300 by Daysi Redd RN  Diversional Activities: television  Intervention: Develop Pain Management Plan  Recent Flowsheet Documentation  Taken 4/22/2025 0523 by Daysi Redd RN  Pain Management Interventions: medication (see MAR)  Taken 4/22/2025 0330 by Daysi Redd RN  Pain Management Interventions: medication (see MAR)  Taken 4/22/2025 0247 by Daysi Redd RN  Pain Management Interventions: medication (see MAR)  Taken 4/21/2025 2140 by Daysi Rded RN  Pain Management Interventions: medication (see MAR)  Taken 4/21/2025 2052 by Daysi Redd RN  Pain Management Interventions: medication (see MAR)  Intervention: Prevent or Manage Pain  Recent Flowsheet Documentation  Taken 4/21/2025 2300 by Daysi Redd RN  Medication Review/Management: medications reviewed     Problem: UTI (Urinary Tract Infection)  Goal: Improved Infection Symptoms  Outcome: Not Progressing     Problem: Self-Care Deficit  Goal: Improved Ability to Complete Activities of Daily Living  Outcome: Not Progressing     Problem: Comorbidity Management  Goal: Blood Glucose Levels Within Targeted Range  Outcome: Not Progressing  Intervention: Monitor and Manage Glycemia  Recent Flowsheet Documentation  Taken 4/21/2025 2300 by Daysi Redd  RN  Medication Review/Management: medications reviewed   Goal Outcome Evaluation:      Plan of Care Reviewed With: patient    Overall Patient Progress: no change    Patient is alert, able to make needs known. Patient not moving well due to pain. Using multiple medications to manage coe with some success. Able to rest. Frequent refusal of position changes. Turned and applied TRIAD paste to bottom. External catheter changed and cleaned of incontinent BM. Pain increased at that time but patient was able to sleep after pain medication administered. Blood glucose stable. Continue to assess pain and encourage activity or repositioning.

## 2025-04-22 NOTE — PLAN OF CARE
Problem: Adult Inpatient Plan of Care  Goal: Plan of Care Review  Description: The Plan of Care Review/Shift note should be completed every shift.  The Outcome Evaluation is a brief statement about your assessment that the patient is improving, declining, or no change.  This information will be displayed automatically on your shiftnote.  Flowsheets (Taken 4/22/2025 1603)  Outcome Evaluation: Patient alert and oriented, able to make needs known.  Mood is low, pain is high especially with movement and repositioning in bed.  Repositioned attempted to shift weight off on coccyx., pt declined, Complaints of generalized pain, bilateral legs and significant pain in right shoulder.  Repositioned with pillow under shoulder and patient expressed relief of some discomfort.  PRN pain medication given, PIV saline locked.  Continue with plan of care, and will monitor pt throughout shift.  Plan of Care Reviewed With: patient  Overall Patient Progress: no change  Goal: Absence of Hospital-Acquired Illness or Injury  Intervention: Identify and Manage Fall Risk  Recent Flowsheet Documentation  Taken 4/22/2025 0951 by Rosalie Merritt RN  Safety Promotion/Fall Prevention:   activity supervised   clutter free environment maintained   patient and family education   safety round/check completed   room organization consistent   assistive device/personal items within reach  Intervention: Prevent Skin Injury  Recent Flowsheet Documentation  Taken 4/22/2025 0951 by Rosalie Merirtt RN  Body Position: (refused)   other (see comments)   weight shifting  Intervention: Prevent and Manage VTE (Venous Thromboembolism) Risk  Recent Flowsheet Documentation  Taken 4/22/2025 0951 by Rosalie Merritt RN  VTE Prevention/Management: patient refused intervention  Intervention: Prevent Infection  Recent Flowsheet Documentation  Taken 4/22/2025 0951 by Rosalie Merritt RN  Infection Prevention:   rest/sleep promoted   hand hygiene promoted  Goal: Optimal  Comfort and Wellbeing  Intervention: Monitor Pain and Promote Comfort  Recent Flowsheet Documentation  Taken 4/22/2025 1556 by Rosalie Merritt RN  Pain Management Interventions:   care clustered   emotional support  Taken 4/22/2025 0951 by Rosalie Merritt RN  Pain Management Interventions:   medication (see MAR)   care clustered   emotional support     Problem: Pain Acute  Goal: Optimal Pain Control and Function  Intervention: Optimize Psychosocial Wellbeing  Recent Flowsheet Documentation  Taken 4/22/2025 0951 by Rosalie Merritt RN  Diversional Activities: television  Intervention: Develop Pain Management Plan  Recent Flowsheet Documentation  Taken 4/22/2025 1556 by Rosalie Merritt RN  Pain Management Interventions:   care clustered   emotional support  Taken 4/22/2025 0951 by Rosalie Merritt RN  Pain Management Interventions:   medication (see MAR)   care clustered   emotional support  Intervention: Prevent or Manage Pain  Recent Flowsheet Documentation  Taken 4/22/2025 0951 by Rosalie Merritt RN  Medication Review/Management: medications reviewed     Problem: Comorbidity Management  Goal: Blood Glucose Levels Within Targeted Range  Intervention: Monitor and Manage Glycemia  Recent Flowsheet Documentation  Taken 4/22/2025 0951 by Rosalie Merritt RN  Medication Review/Management: medications reviewed   Goal Outcome Evaluation:      Plan of Care Reviewed With: patient    Overall Patient Progress: no changeOverall Patient Progress: no change    Outcome Evaluation: Patient alert and oriented, able to make needs known.  Mood is low, pain is high especially with movement and repositioning in bed.  Repositioned attempted to shift weight off on coccyx., pt declined, Complaints of generalized pain, bilateral legs and significant pain in right shoulder.  Repositioned with pillow under shoulder and patient expressed relief of some discomfort.  PRN pain medication given, PIV saline locked.  Continue with plan of care,  "and will monitor pt throughout shift.  BP (!) 159/78 (BP Location: Right arm)   Pulse 96   Temp 98  F (36.7  C) (Oral)   Resp 20   Ht 1.626 m (5' 4\")   Wt 106.3 kg (234 lb 5.6 oz)   LMP  (LMP Unknown)   SpO2 (!) 91%   BMI 40.23 kg/m    April DUKE Merritt RN on 4/22/2025 at 4:05 PM      "

## 2025-04-22 NOTE — PROGRESS NOTES
Palliative Care Consultation Note  [facility]      Patient: Brissa Mott  Date of Admission:  4/14/2025    Requesting Clinician / Team: hospitalist   Reason for consult: Symptom management, Goals of care, Patient and family support       Recommendations & Counseling     GOALS OF CARE:   Restorative without limits   Although patient is currently in a dependent state and very little improvement despite attempts at rehab via TCU leading up to hospitalization, patient hopes that someday she will be able to improve to some level of independence and living back at home with spouse Zoran.  Patient and spouse described having heard that patient has a stage IV form of cancer, yet are unclear around prognosis, timeline, or potential treatment options for the future.  Both have said that they would appreciate transparent information from medical team and cancer specialists around prognosis based on provider experience and averages to help with planning.      ADVANCE CARE PLANNING:  No health care directive on file.   Spouse Zoran Mott is next of kin.  There is no POLST form on file, defer to patient and/or next of kin for decisions   Code status: Full Code      MEDICAL MANAGEMENT:   Pain:  Patient has chronic non-cancer pain of joints  consistent with nociceptive musculoskeletal etiology.  Currently using PRN dosing of ibuprofen and oxycodone with tolerable relief, although question whether NSAIDs will be a safe option going forward given recent diagnosis of VTE and necessity for anticoagulation therapy.  Defer to primary team for chronic pain management, buprenorphine may be an equally efficacious yet safer alternative to traditional opioid.  Apparent anxiety, seems quite emotionally friable:  Try Ativan 0.5 p.o. twice daily  Currently on Zoloft 150 mg daily  Consideration for medication changes pending response to Ativan      PSYCHOSOCIAL/SPIRITUAL SUPPORT:  Family; 2 children and 4  grandchildren  Friends and [place] community      Palliative Care will continue to follow. Thank you for the consult and allowing us to aid in the care of Brissa Mott.    These recommendations have been discussed with team.      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  Patient was hospitalized in January 2025 at which time CT scan demonstrated intramedullary lesion in the distal femur measuring 7.5 cm in size.  On 2/24/2025 the patient underwent open reduction and fixation with intramedullary nail insertion of the right femur.  Pathology demonstrated for demonstrate carcinoma with neuroendocrine features.  Staging workup including CT scan chest abdomen pelvis demonstrated concern for right adnexal mass, left axillar adenopathy, and pulmonary nodules and possible hepatic nodules concerning for metastatic disease.  MRI brain was negative.  She has not yet received palliative radiation therapy as planned, nor lymph node biopsy for further consideration of systemic treatment options.      Today, the patient was seen for:  Symptom assessment, facilitation of medical communication and support with goals of care    History of Present Illness   I tried to gently talk with Brissa today about her condition and symptoms.  She appears very anxious.  She said twice that she feels overwhelmed when trying to talk about her situation.      Prognosis, Goals, & Planning:   Functional Status just prior to this current hospitalization:  Outpatient Palliative Performance Score (PPS) 50%  Extensive disease.  "Normal or reduced intake; normal LOC or confusion; little ambulation (mainly sit/lie), ADLs w/much assistance, unable to do any work.      Prognosis, Goals, and/or Advance Care Planning:  Discussed what continuing restorative/life-prolonging care entails, including continued (re)admissions to the hospital, continuing with preventative and primary care, seeing disease/organ specific specialty consultations for medical treatments in hopes to prolong life for as long as possible.      Code Status was addressed today:   No      Patient's decision making preferences: shared with support from loved ones        Patient has decision-making capacity today for complex decisions: Intact           Coping, Meaning, & Spirituality:   Mood, coping, and/or meaning in the context of serious illness were addressed today: Yes    Social:   Living situation: lives with family  Important relationships/caregivers: Spouse Zoran and daughter Lorenza  Areas of fulfillment/elias: Socializing with friends and family, bala    Medications:  Reviewed this patient's medication profile and medications from this hospitalization.     [place] Board of Pharmacy Data Base Reviewed: Yes:   reviewed - controlled substances reflected in medication list..    ROS:  Comprehensive ROS is reviewed and is negative except as here & per HPI:     Physical Exam   /75 (BP Location: Right arm)   Pulse 94   Temp 97.4  F (36.3  C) (Oral)   Resp 18   Ht 1.626 m (5' 4\")   Wt 106.3 kg (234 lb 5.6 oz)   LMP  (LMP Unknown)   SpO2 92%   BMI 40.23 kg/m    General:  Appears stated age.  NAD.   HENT:  Atraumatic.  Hearing intact.  Moist mucous membranes.    Eyes:  Conjunctivae are clear.  Sclera is nonicteric.    Cardiovascular:  Without cyanosis or mottling.   Respiratory:  Breathing comfortably without increased work of breathing or signs of respiratory distress.  Able to speak in complete sentences.    Skin:  Dry, without diaphoresis.   Neuro:  Alert, " attentive, speech clear and fluent.   Psych:  Appropriate mood and affect.  No sign of confusion or delusion.     Wt Readings from Last 8 Encounters:   04/22/25 106.3 kg (234 lb 5.6 oz)   04/07/25 107.6 kg (237 lb 4.8 oz)   03/31/25 108 kg (238 lb)   03/27/25 108.4 kg (239 lb)   03/20/25 110.2 kg (243 lb)   03/13/25 113.4 kg (250 lb)   03/10/25 119.1 kg (262 lb 9.6 oz)   03/06/25 119.1 kg (262 lb 9.6 oz)         Data reviewed:  Results for orders placed or performed during the hospital encounter of 04/14/25 (from the past 24 hours)   Glucose by meter   Result Value Ref Range    GLUCOSE BY METER POCT 259 (H) 70 - 99 mg/dL   Glucose by meter   Result Value Ref Range    GLUCOSE BY METER POCT 282 (H) 70 - 99 mg/dL   Glucose by meter   Result Value Ref Range    GLUCOSE BY METER POCT 219 (H) 70 - 99 mg/dL   Glucose by meter   Result Value Ref Range    GLUCOSE BY METER POCT 155 (H) 70 - 99 mg/dL   Magnesium   Result Value Ref Range    Magnesium 1.6 (L) 1.7 - 2.3 mg/dL   Phosphorus   Result Value Ref Range    Phosphorus 2.4 (L) 2.5 - 4.5 mg/dL   Extra Purple Top EDTA (LAB USE ONLY)   Result Value Ref Range    Hold Specimen     Glucose by meter   Result Value Ref Range    GLUCOSE BY METER POCT 174 (H) 70 - 99 mg/dL       Medical Decision Making       Please see A&P for additional details of medical decision making.  MANAGEMENT DISCUSSED with the following over the past 24 hours: Bedside RN, hospitalist   NOTE(S)/MEDICAL RECORDS REVIEWED over the past 24 hours: Oncology, radiation oncology, hospitalist, emergency medicine  Tests personally interpreted in the past 24 hours:  - EKG tracing showing QTc of 466  Tests ORDERED & REVIEWED in the past 24 hours:  - See lab/imaging results included in the data section of the note  SUPPLEMENTAL HISTORY, in addition to the patient's history, over the past 24 hours obtained from:   - Spouse or significant other  Medical complexity over the past 24 hours:  -------------------------- HIGH  RISK FOR MORBIDITY -------------------------------------------------------------  - Intensive monitoring for MEDICATION TOXICITY  - Decision to continue to pursue versus de-escalate care based on prognosis

## 2025-04-22 NOTE — PROGRESS NOTES
[facility]    Hospitalist Progress Note    Assessment & Plan   Brissa Mott is a 54 year old female with past medical hx of recently diagnosed neuroendocrine carcinoma, T2DM, CVA with residual hemiparesis, hypercalcemia, HTN, HLD, KATHRINE admitted on 4/14/2025. She presented for generalized weakness after recent discharge from TCU.     Interval update: Awaiting LTC placement. Unable to complete radiation therapy per rad-onc given deconditioning.      Generalized weakness  Fall, initial encounter  Left knee pain, chronic    Recently admitted 2/20-3/5 for pathological fracture (see below) and discharged to TCU. Left TCU on 4/7 and has had 3 falls in the interim. Falls without injuries, but family unable to help her up and requires EMS assistance. Unsafe to remain at home with  and daughter. Weakness multifactorial due to UTI, hypomagnesemia, PE, and progression of cancer. Working towards LTC placement with SW.   - PT/OT/SW consulted  - See below for etiologies  - Diclofenac 4 g 4 times daily, Tylenol 975 mg every 8 hours, and oxycodone 5-10 mg PRN  - Palliative care consulted      Hypercalcemia secondary to malignancy - resolved   Calcium 13.8 and ionized calcium 7.5 on admission. Seen on previous admission up to 14.7 and resolved with IVF and Zometa. Due to malignancy. PTH <6, PTHrP 12 (high), vitamin D <18 (low). Calcium improving (10.7) and will continue to trend downward with zometa.   - Consider chronic bisphosphonate's at discharge to prevent recurrence every 4-6 weeks     Neuroendocrine carcinoma, metastatic  Pathological fracture of right femur due to neoplastic disease s/p ORIF 2/25/25    Admitted 2/20-3/5 for a pathologic fracture of right femur, and pathology showed poorly differentiated carcinoma with neuroendocrine features. Underwent ORIF 2/25 and received Lovenox for 30 days post-op. CT c/a/p showed multiple pulmonary nodules, left axillary node, several hypodense areas in  liver, bony lesion on right humeral head/right sacrum/pelvis, several subcutaneous nodules in anterior chest wall and right gluteal region.    Established with oncology 3/21. Current plan is for radiation therapy to the pathological fracture and biopsy of the left axillary lymph node, which was scheduled for 4/14 but she ended up in the ER. Has not started treatment yet.    CT on admission showed progression of metastatic disease with new hepatic mets, enlarging osseous mets, enlarging lymph nodes above and below diaphragm. This includes enlarging manubrial mets with pathologic fracture and enlarging destructive lesion of right hemisacrum and right medial iliac bone. Resolution of multiple previously seen pulmonary nodules but new RLL nodule. Other pulmonary nodules stable or mildly increased. Right adnexal mass mildly decreased. Ultrasound-guided lymph core biopsy 4/17 with pathology and gene sequencing.  Spoke with radiation oncology about doing radiation mapping and/or treatment on 4/21.  Patient was tearful with the anticipation of having to transfer to wheelchair and to have to use EZ stand for positioning.  Counseled patient that we have to focus on what aligns with her goals and if she really does not want to move forward with radiation then we can defer with the expectation that things are not going to improve with time but likely will worsen.  - Plan to reach out to radiation oncology 4/21 to arrange for inpatient radiation initiation   - Pathology and NGS pending per Oncology    - Continued PTA Lidocaine patches, PRN Tylenol, PRN Ibuprofen, PRN Oxycodone 5-10 mg  - Continue apixaban 10 mg BID (4 days) followed by 5 mg BID (starting 4/22)     Acute UTI without hematuria  Severe sepsis without septic shock  UA with nitrites, trace LE, few bacteria, 9 WBC. Arrived septic based on leukocytosis (15.5), HR (110s). Lactic 3.0. Urine culture with E. Coli.   - Ceftriaxone 2g IV daily (last dose 4/18)  - Blood  cultures NGTD     Acute pulmonary embolism  Not hypoxic. CT with poor opacification of few subsegmental pulmonary arteries suspicious for small volume pulmonary emboli. After her ORIF on 2/25 (see below) she was given Lovenox 40mg BID for 6 weeks (ending 4/17) which she endorses compliance with. Patient agreeable to starting DOAC tonight now that we've completed the biopsy.   - Apixaban 10 mg BID followed by 5 mg BID       Hypomagnesemia  Hypophosphatemia  Mag 1.1 and phos 1.9 on admission.  - RN repletion protocol     Anorexia   very concerned about poor PO intake, and believes it's due to polypharmacy. Besides physical decline and multiple comorbidities as above, review of med list only reveals Ozempic as possible etiology. She hadn't taken it for ~6 weeks while admitted and at TCU, then took a dose when she got home on 4/7.  states this correlates with onset of poor PO intake.   - Recommend discontinuing Ozempic     Hx of CVA  Hemiplegia and hemiparesis following cerebral infarction affecting left non-dominant side   HLD  CVA in 2018. Residual left sided weakness.  - Continued PTA Atorvastatin 40mg     Type 2 diabetes mellitus with neuropathy, with long-term current use of insulin   A1c 6.8% on admission.  - Sliding scale insulin  - Consistent carb diet  - Continued PTA Gabapentin 300mg BID, Humulin 70/30 BID 15U  - Resume PTA Metformin 1000mg BID  - Plan to discontinue PTA Ozempic      Anemia, borderline microcytic, chronic  Hemoglobin 9.6 on admission, baseline 8-9.    - Stable, no longer trending hemoglobin     Hypertension  - Continued PTA Amlodipine 10mg, Ibesartan 300mg     Reactive airway disease without complication  - Continued PTA Montelukast 10mg     KATHRINE (obstructive sleep apnea)  - Continued PTA CPAP if available     Adjustment disorder with depressed mood  - Continued PTA Hydroxyzine 25mg PRN, Sertraline 150mg     Thrush - resolved   Patient with white film with bumps on her tongue that  "came around the time of admission. Some pain and discomfort with eating and drinking.  Improved with trial of nystatin swish and spit .  - Nystatin swish and spit QID        Clinically Significant Risk Factors          # Hypomagnesemia: Lowest Mg = 1.6 mg/dL in last 2 days, will replace as needed   # Hypoalbuminemia: Lowest albumin = 2.7 g/dL at 4/14/2025  2:43 PM, will monitor as appropriate     # Hypertension: Noted on problem list           # DMII: A1C = 6.8 % (Ref range: <5.7 %) within past 6 months   # Severe Obesity: Estimated body mass index is 40.23 kg/m  as calculated from the following:    Height as of this encounter: 1.626 m (5' 4\").    Weight as of this encounter: 106.3 kg (234 lb 5.6 oz).      # Financial/Environmental Concerns:    # Asthma: noted on problem list      Date of Service (when I saw the patient): 04/22/2025       Diet: Combination Diet Moderate Consistent Carb (60 g CHO per Meal) Diet    DVT Prophylaxis: DOAC  Nielson Catheter: Not present  Code Status: Full Code      Disposition: Medically Ready for Discharge: Anticipated Tomorrow      John Maldonado Belcher MD    Interval History   Patient complains of pain in left knee.  No pain in right femur, despite having pathologic fracture with frankie placed in February.  Nurses report patient is refusing cares due to pain and requires total cares.    -Data reviewed today: I reviewed all new labs and imaging results over the last 24 hours. I personally reviewed no ECG or imaging today.    No results found for this or any previous visit (from the past 24 hours).  Significant Results and Procedures     Physical Exam   Temp: 97.8  F (36.6  C) Temp src: Oral BP: (!) 148/78 Pulse: 93   Resp: 18 SpO2: 95 % O2 Device: None (Room air)    Vitals:    04/14/25 0827 04/22/25 0619   Weight: 107.5 kg (237 lb) 106.3 kg (234 lb 5.6 oz)     Vital Signs with Ranges  Temp:  [97.4  F (36.3  C)-98.3  F (36.8  C)] 97.8  F (36.6  C)  Pulse:  [93-99] 93  Resp:  [18-20] 18  BP: " (125-148)/(62-78) 148/78  SpO2:  [92 %-95 %] 95 %  I/O last 3 completed shifts:  In: 300 [P.O.:300]  Out: 3250 [Urine:3250]    Gen: Obese debilitated female, alert and oriented x 3, appears uncomfortable  HEENT: Atraumatic, normocephalic; sclera non-injected, anicterric; oral mucosa moist, no lesion, no exudate  Lungs: Clear to ausculation, no wheezes, no rhonchi, no rales  Heart: Regular rate, regular rhythm, no gallops, no rubs, no murmurs  GI: Bowel sound normal, no hepatosplenomegaly, no masses, non-tender, non-distended, no guarding, no rebound tenderness  Extremities: No rashes, no edema  Musculoskeletal: Normal muscle mass, normal muscle tone, no arthritis    Medications   Current Facility-Administered Medications   Medication Dose Route Frequency Provider Last Rate Last Admin    Patient is already receiving anticoagulation with heparin, enoxaparin (LOVENOX), warfarin (COUMADIN)  or other anticoagulant medication   Does not apply Continuous PRN Yohan Adames PA-C         Current Facility-Administered Medications   Medication Dose Route Frequency Provider Last Rate Last Admin    acetaminophen (TYLENOL) tablet 975 mg  975 mg Oral Q8H Reynaldo Wong DO   975 mg at 04/22/25 1209    amLODIPine (NORVASC) tablet 10 mg  10 mg Oral Daily Yohan Adames PA-C   10 mg at 04/22/25 0953    apixaban ANTICOAGULANT (ELIQUIS) tablet 5 mg  5 mg Oral BID Reynaldo Wong DO   5 mg at 04/22/25 0953    atorvastatin (LIPITOR) tablet 40 mg  40 mg Oral At Bedtime Colby Meyers MD   40 mg at 04/21/25 2055    diclofenac (VOLTAREN) 1 % topical gel 4 g  4 g Topical 4x Daily Reynaldo Wong DO   4 g at 04/22/25 1209    docusate sodium (COLACE) capsule 100 mg  100 mg Oral BID Sofie Molina MD   100 mg at 04/21/25 2052    gabapentin (NEURONTIN) capsule 300 mg  300 mg Oral BID Yohan Adames PA-C   300 mg at 04/22/25 0953    insulin aspart (NovoLOG) injection (RAPID ACTING)  1-7 Units Subcutaneous TID AC Yohan Adames PA-C   2  Units at 04/22/25 1211    insulin aspart (NovoLOG) injection (RAPID ACTING)  1-5 Units Subcutaneous At Bedtime Yohan Adames PA-C   1 Units at 04/21/25 2230    insulin NPH-Regular (HUMULIN 70/30;NOVOLIN 70/30) injection 15 Units  15 Units Subcutaneous BID AC Yohan Adames PA-C   15 Units at 04/22/25 0959    irbesartan (AVAPRO) tablet 300 mg  300 mg Oral At Bedtime Yohan Adames PA-C   300 mg at 04/21/25 2230    metFORMIN (GLUCOPHAGE) tablet 1,000 mg  1,000 mg Oral BID w/meals Reynaldo Wong DO   1,000 mg at 04/22/25 0953    miconazole (MICATIN) 2 % powder   Topical BID Reynaldo Wong DO   Given at 04/22/25 0956    montelukast (SINGULAIR) tablet 10 mg  10 mg Oral At Bedtime Yohan Adames PA-C   10 mg at 04/21/25 2054    polyethylene glycol (MIRALAX) Packet 17 g  17 g Oral Daily Sofie Molina MD   17 g at 04/21/25 0905    potassium & sodium phosphates (NEUTRA-PHOS) Packet 1 packet  1 packet Oral or Feeding Tube Q4H Reynaldo Wong DO   1 packet at 04/22/25 0953    sertraline (ZOLOFT) tablet 150 mg  150 mg Oral At Bedtime Yohan Adames PA-C   150 mg at 04/21/25 2056    sodium chloride (PF) 0.9% PF flush 3 mL  3 mL Intracatheter Q8H [facility] Yohan Adames PA-C   3 mL at 04/22/25 0523       Data   Recent Labs   Lab 04/22/25  1144 04/22/25  0813 04/22/25  0227 04/20/25  0757 04/20/25  0607 04/18/25  0753 04/18/25  0526 04/17/25  0749 04/17/25  0528 04/16/25  0805 04/16/25  0527   WBC  --   --   --   --   --   --   --   --   --   --  14.4*   HGB  --   --   --   --   --   --   --   --   --   --  8.7*   MCV  --   --   --   --   --   --   --   --   --   --  82   PLT  --   --   --   --   --   --   --   --   --   --  448   NA  --   --   --   --  134*  --  134*  --  138  --  138   POTASSIUM  --   --   --   --  5.0  --  4.5  --  4.6  --  4.6   CHLORIDE  --   --   --   --  99  --  98  --  102  --  101   CO2  --   --   --   --  23  --  24  --  24  --  25   BUN  --   --   --   --  8.9  --  6.9  --  5.7*  --   6.0   CR  --   --   --   --  0.46*  --  0.44*  --  0.35*  --  0.36*   ANIONGAP  --   --   --   --  12  --  12  --  12  --  12   SHIRLEY  --   --   --   --  10.4  --  10.5*  --  10.7*  --  11.4*   * 174* 155*   < > 173*   < > 145*   < > 145*   < > 150*   ALBUMIN  --   --   --   --   --   --   --   --   --   --  2.8*   PROTTOTAL  --   --   --   --   --   --   --   --   --   --  5.8*   BILITOTAL  --   --   --   --   --   --   --   --   --   --  0.4   ALKPHOS  --   --   --   --   --   --   --   --   --   --  159*   ALT  --   --   --   --   --   --   --   --   --   --  9   AST  --   --   --   --   --   --   --   --   --   --  54*    < > = values in this interval not displayed.

## 2025-04-23 LAB
GLUCOSE BLDC GLUCOMTR-MCNC: 132 MG/DL (ref 70–99)
GLUCOSE BLDC GLUCOMTR-MCNC: 146 MG/DL (ref 70–99)
GLUCOSE BLDC GLUCOMTR-MCNC: 174 MG/DL (ref 70–99)
GLUCOSE BLDC GLUCOMTR-MCNC: 175 MG/DL (ref 70–99)
GLUCOSE BLDC GLUCOMTR-MCNC: 175 MG/DL (ref 70–99)
HOLD SPECIMEN: NORMAL
MAGNESIUM SERPL-MCNC: 1.6 MG/DL (ref 1.7–2.3)
PHOSPHATE SERPL-MCNC: 2.6 MG/DL (ref 2.5–4.5)

## 2025-04-23 PROCEDURE — 250N000013 HC RX MED GY IP 250 OP 250 PS 637: Performed by: NURSE PRACTITIONER

## 2025-04-23 PROCEDURE — 36415 COLL VENOUS BLD VENIPUNCTURE: CPT | Performed by: STUDENT IN AN ORGANIZED HEALTH CARE EDUCATION/TRAINING PROGRAM

## 2025-04-23 PROCEDURE — 250N000013 HC RX MED GY IP 250 OP 250 PS 637: Performed by: STUDENT IN AN ORGANIZED HEALTH CARE EDUCATION/TRAINING PROGRAM

## 2025-04-23 PROCEDURE — 99232 SBSQ HOSP IP/OBS MODERATE 35: CPT | Performed by: INTERNAL MEDICINE

## 2025-04-23 PROCEDURE — 99232 SBSQ HOSP IP/OBS MODERATE 35: CPT | Performed by: NURSE PRACTITIONER

## 2025-04-23 PROCEDURE — 120N000001 HC R&B MED SURG/OB

## 2025-04-23 PROCEDURE — G0452 MOLECULAR PATHOLOGY INTERPR: HCPCS | Mod: 26 | Performed by: STUDENT IN AN ORGANIZED HEALTH CARE EDUCATION/TRAINING PROGRAM

## 2025-04-23 PROCEDURE — 250N000013 HC RX MED GY IP 250 OP 250 PS 637: Performed by: INTERNAL MEDICINE

## 2025-04-23 PROCEDURE — 81455 SO/HL 51/>GSAP DNA/DNA&RNA: CPT | Performed by: STUDENT IN AN ORGANIZED HEALTH CARE EDUCATION/TRAINING PROGRAM

## 2025-04-23 PROCEDURE — 250N000013 HC RX MED GY IP 250 OP 250 PS 637

## 2025-04-23 PROCEDURE — 83735 ASSAY OF MAGNESIUM: CPT | Performed by: STUDENT IN AN ORGANIZED HEALTH CARE EDUCATION/TRAINING PROGRAM

## 2025-04-23 PROCEDURE — 84100 ASSAY OF PHOSPHORUS: CPT | Performed by: STUDENT IN AN ORGANIZED HEALTH CARE EDUCATION/TRAINING PROGRAM

## 2025-04-23 RX ADMIN — ATORVASTATIN CALCIUM 40 MG: 20 TABLET, FILM COATED ORAL at 21:09

## 2025-04-23 RX ADMIN — LORAZEPAM 0.5 MG: 0.5 TABLET ORAL at 21:08

## 2025-04-23 RX ADMIN — DOCUSATE SODIUM 100 MG: 100 CAPSULE, LIQUID FILLED ORAL at 21:10

## 2025-04-23 RX ADMIN — DICLOFENAC 4 G: 10 GEL TOPICAL at 17:32

## 2025-04-23 RX ADMIN — INSULIN ASPART 1 UNITS: 100 INJECTION, SOLUTION INTRAVENOUS; SUBCUTANEOUS at 08:39

## 2025-04-23 RX ADMIN — INSULIN ASPART 1 UNITS: 100 INJECTION, SOLUTION INTRAVENOUS; SUBCUTANEOUS at 17:32

## 2025-04-23 RX ADMIN — ACETAMINOPHEN 975 MG: 325 TABLET ORAL at 03:58

## 2025-04-23 RX ADMIN — INSULIN HUMAN 15 UNITS: 100 INJECTION, SUSPENSION SUBCUTANEOUS at 08:39

## 2025-04-23 RX ADMIN — MICONAZOLE NITRATE: 20 POWDER TOPICAL at 08:38

## 2025-04-23 RX ADMIN — MONTELUKAST 10 MG: 10 TABLET, FILM COATED ORAL at 21:10

## 2025-04-23 RX ADMIN — ACETAMINOPHEN 975 MG: 325 TABLET ORAL at 21:08

## 2025-04-23 RX ADMIN — APIXABAN 5 MG: 5 TABLET, FILM COATED ORAL at 08:40

## 2025-04-23 RX ADMIN — DICLOFENAC 4 G: 10 GEL TOPICAL at 12:16

## 2025-04-23 RX ADMIN — APIXABAN 5 MG: 5 TABLET, FILM COATED ORAL at 21:07

## 2025-04-23 RX ADMIN — GABAPENTIN 300 MG: 300 CAPSULE ORAL at 08:40

## 2025-04-23 RX ADMIN — INSULIN HUMAN 15 UNITS: 100 INJECTION, SUSPENSION SUBCUTANEOUS at 17:32

## 2025-04-23 RX ADMIN — METFORMIN HYDROCHLORIDE 1000 MG: 500 TABLET ORAL at 08:40

## 2025-04-23 RX ADMIN — AMLODIPINE BESYLATE 10 MG: 10 TABLET ORAL at 08:40

## 2025-04-23 RX ADMIN — OXYCODONE HYDROCHLORIDE 10 MG: 5 TABLET ORAL at 03:57

## 2025-04-23 RX ADMIN — ACETAMINOPHEN 975 MG: 325 TABLET ORAL at 12:23

## 2025-04-23 RX ADMIN — GABAPENTIN 300 MG: 300 CAPSULE ORAL at 21:08

## 2025-04-23 RX ADMIN — DICLOFENAC 4 G: 10 GEL TOPICAL at 08:37

## 2025-04-23 RX ADMIN — SERTRALINE HYDROCHLORIDE 150 MG: 50 TABLET ORAL at 21:09

## 2025-04-23 RX ADMIN — HYDROXYZINE HYDROCHLORIDE 25 MG: 25 TABLET ORAL at 15:28

## 2025-04-23 RX ADMIN — LORAZEPAM 0.5 MG: 0.5 TABLET ORAL at 08:40

## 2025-04-23 RX ADMIN — DICLOFENAC 4 G: 10 GEL TOPICAL at 21:12

## 2025-04-23 RX ADMIN — INSULIN ASPART 1 UNITS: 100 INJECTION, SOLUTION INTRAVENOUS; SUBCUTANEOUS at 12:15

## 2025-04-23 RX ADMIN — OXYCODONE HYDROCHLORIDE 10 MG: 5 TABLET ORAL at 15:29

## 2025-04-23 RX ADMIN — IRBESARTAN 300 MG: 75 TABLET ORAL at 21:10

## 2025-04-23 RX ADMIN — METFORMIN HYDROCHLORIDE 1000 MG: 500 TABLET ORAL at 17:32

## 2025-04-23 ASSESSMENT — ACTIVITIES OF DAILY LIVING (ADL)
ADLS_ACUITY_SCORE: 80
ADLS_ACUITY_SCORE: 72
ADLS_ACUITY_SCORE: 80

## 2025-04-23 NOTE — PROGRESS NOTES
[facility]    Hospitalist Progress Note    Assessment & Plan   Brissa Mott is a 54 year old female with past medical hx of recently diagnosed neuroendocrine carcinoma, T2DM, CVA with residual hemiparesis, hypercalcemia, HTN, HLD, KATHRINE admitted on 4/14/2025. She presented for generalized weakness after recent discharge from TCU.     Interval update: Awaiting LTC placement. Unable to complete radiation therapy per rad-onc given deconditioning.      Generalized weakness  Fall, initial encounter  Left knee pain, chronic  Gluteal cleft/coccyx and right buttock Pressure Ulcer, Stage 3 (POA)    Recently admitted 2/20-3/5 for pathological fracture (see below) and discharged to TCU. Left TCU on 4/7 and has had 3 falls in the interim. Falls without injuries, but family unable to help her up and requires EMS assistance. Unsafe to remain at home with  and daughter. Weakness multifactorial due to UTI, hypomagnesemia, PE, and progression of cancer. Working towards LTC placement with SW.   - PT/OT/SW consulted, he is not a candidate for TCU  - See below for etiologies  - Diclofenac 4 g 4 times daily, Tylenol 975 mg every 8 hours, and oxycodone 5-10 mg PRN  - Palliative care consulted   - Left knee pain is due to chronic osteoarthritis  - Wound care with Triad paste.  Patient has been refusing repositioning due to pain.  - Patient is less anxious on Ativan, is willing to work with nursing to get out of bed.  Per therapies, patient would require Surinder lift.  Will talk with orthopedic surgery regarding right lower extremity weightbearing.     Hypercalcemia secondary to malignancy - resolved   Calcium 13.8 and ionized calcium 7.5 on admission. Seen on previous admission up to 14.7 and resolved with IVF and Zometa. Due to malignancy. PTH <6, PTHrP 12 (high), vitamin D <18 (low). Calcium improving (10.7) and will continue to trend downward with zometa.   - Consider chronic bisphosphonate's at discharge  to prevent recurrence every 4-6 weeks  - Repeat iCa in AM     Neuroendocrine carcinoma, metastatic  Pathological fracture of right femur due to neoplastic disease s/p ORIF 2/25/25    Admitted 2/20-3/5 for a pathologic fracture of right femur, and pathology showed poorly differentiated carcinoma with neuroendocrine features. Underwent ORIF 2/25 and received Lovenox for 30 days post-op. CT c/a/p showed multiple pulmonary nodules, left axillary node, several hypodense areas in liver, bony lesion on right humeral head/right sacrum/pelvis, several subcutaneous nodules in anterior chest wall and right gluteal region.    Established with oncology 3/21. Current plan is for radiation therapy to the pathological fracture and biopsy of the left axillary lymph node, which was scheduled for 4/14 but she ended up in the ER. Has not started treatment yet.    CT on admission showed progression of metastatic disease with new hepatic mets, enlarging osseous mets, enlarging lymph nodes above and below diaphragm. This includes enlarging manubrial mets with pathologic fracture and enlarging destructive lesion of right hemisacrum and right medial iliac bone. Resolution of multiple previously seen pulmonary nodules but new RLL nodule. Other pulmonary nodules stable or mildly increased. Right adnexal mass mildly decreased. Ultrasound-guided lymph core biopsy 4/17 with pathology and gene sequencing.  Spoke with radiation oncology about doing radiation mapping and/or treatment on 4/21.  Patient was tearful with the anticipation of having to transfer to wheelchair and to have to use EZ stand for positioning.  Counseled patient that we have to focus on what aligns with her goals and if she really does not want to move forward with radiation then we can defer with the expectation that things are not going to improve with time but likely will worsen.  - Pathology of the left axillary lymph node demonstrated poorly differentiated carcinoma with  neuroendocrine features.  Next-generation sequencing studies pending.  - Continued PTA Lidocaine patches, PRN Tylenol, PRN Ibuprofen, PRN Oxycodone 5-10 mg  - Continue apixaban 10 mg BID (4 days) followed by 5 mg BID (starting 4/22)  - Discussed weightbearing recommendations with orthopedic surgery, they recommend 50% weightbearing on right lower extremity     Acute UTI without hematuria  Severe sepsis without septic shock  UA with nitrites, trace LE, few bacteria, 9 WBC. Arrived septic based on leukocytosis (15.5), HR (110s). Lactic 3.0. Urine culture with E. Coli.   - Ceftriaxone 2g IV daily (last dose 4/18)  - Blood cultures NGTD     Acute pulmonary embolism  Not hypoxic. CT with poor opacification of few subsegmental pulmonary arteries suspicious for small volume pulmonary emboli. After her ORIF on 2/25 (see below) she was given Lovenox 40mg BID for 6 weeks (ending 4/17) which she endorses compliance with. Patient agreeable to starting DOAC tonight now that we've completed the biopsy.   - Apixaban 10 mg BID followed by 5 mg BID       Hypomagnesemia  Hypophosphatemia  Mag 1.1 and phos 1.9 on admission.  - RN repletion protocol     Anorexia   very concerned about poor PO intake, and believes it's due to polypharmacy. Besides physical decline and multiple comorbidities as above, review of med list only reveals Ozempic as possible etiology. She hadn't taken it for ~6 weeks while admitted and at TCU, then took a dose when she got home on 4/7.  states this correlates with onset of poor PO intake.   - Recommend discontinuing Ozempic     Hx of CVA  Hemiplegia and hemiparesis following cerebral infarction affecting left non-dominant side   HLD  CVA in 2018. Residual left sided weakness.  - Continued PTA Atorvastatin 40mg     Type 2 diabetes mellitus with neuropathy, with long-term current use of insulin   A1c 6.8% on admission.  - Sliding scale insulin  - Consistent carb diet  - Continued PTA Gabapentin  "300mg BID, Humulin 70/30 BID 15U  - Resume PTA Metformin 1000mg BID  - Plan to discontinue PTA Ozempic      Anemia, borderline microcytic, chronic  Hemoglobin 9.6 on admission, baseline 8-9.    - Stable, no longer trending hemoglobin     Hypertension  - Continued PTA Amlodipine 10mg, Ibesartan 300mg     Reactive airway disease without complication  - Continued PTA Montelukast 10mg     KATHRINE (obstructive sleep apnea)  - Continued PTA CPAP if available     Adjustment disorder with depressed mood  - Continued PTA Hydroxyzine 25mg PRN, Sertraline 150mg     Thrush - resolved   Patient with white film with bumps on her tongue that came around the time of admission. Some pain and discomfort with eating and drinking.  Improved with trial of nystatin swish and spit .  - Nystatin swish and spit QID        Clinically Significant Risk Factors          # Hypomagnesemia: Lowest Mg = 1.6 mg/dL in last 2 days, will replace as needed   # Hypoalbuminemia: Lowest albumin = 2.7 g/dL at 4/14/2025  2:43 PM, will monitor as appropriate     # Hypertension: Noted on problem list           # DMII: A1C = 6.8 % (Ref range: <5.7 %) within past 6 months   # Severe Obesity: Estimated body mass index is 40.23 kg/m  as calculated from the following:    Height as of this encounter: 1.626 m (5' 4\").    Weight as of this encounter: 106.3 kg (234 lb 5.6 oz).        # Financial/Environmental Concerns:    # Asthma: noted on problem list      Date of Service (when I saw the patient): 04/23/2025       Diet: Combination Diet Moderate Consistent Carb (60 g CHO per Meal) Diet    DVT Prophylaxis: DOAC  Nielson Catheter: Not present  Code Status: Full Code      Disposition: Medically Ready for Discharge: Ready Now      John Belcher MD    Interval History   Patient continues to complain of left knee pain.  States that her anxiety feels much better after starting Ativan.  She states she is interested in starting to  hopes of eventually returning " home.    -Data reviewed today: I reviewed all new labs and imaging results over the last 24 hours. I personally reviewed no ECG or imaging today.    No results found for this or any previous visit (from the past 24 hours).  Significant Results and Procedures     Physical Exam   Temp: 98  F (36.7  C) Temp src: Oral BP: (!) 150/80 Pulse: 101   Resp: 18 SpO2: 92 % O2 Device: None (Room air)    Vitals:    04/14/25 0827 04/22/25 0619   Weight: 107.5 kg (237 lb) 106.3 kg (234 lb 5.6 oz)     Vital Signs with Ranges  Temp:  [97.8  F (36.6  C)-98.7  F (37.1  C)] 98  F (36.7  C)  Pulse:  [] 101  Resp:  [16-20] 18  BP: (115-159)/(64-81) 150/80  SpO2:  [90 %-95 %] 92 %  I/O last 3 completed shifts:  In: 1080 [P.O.:1080]  Out: 2950 [Urine:2950]    Gen: Obese debilitated female, alert and oriented x 3, no acute distress.  HEENT: Atraumatic, normocephalic; sclera non-injected, anicterric; oral mucosa moist, no lesion, no exudate  Lungs: Clear to ausculation, no wheezes, no rhonchi, no rales  Heart: Regular rate, regular rhythm, no gallops, no rubs, no murmurs  GI: Bowel sound normal, no hepatosplenomegaly, no masses, non-tender, non-distended, no guarding, no rebound tenderness  Extremities: No rashes, no edema  Musculoskeletal: Normal muscle mass, normal muscle tone,     Medications   Current Facility-Administered Medications   Medication Dose Route Frequency Provider Last Rate Last Admin    Patient is already receiving anticoagulation with heparin, enoxaparin (LOVENOX), warfarin (COUMADIN)  or other anticoagulant medication   Does not apply Continuous PRN Yohan Adames PA-C         Current Facility-Administered Medications   Medication Dose Route Frequency Provider Last Rate Last Admin    acetaminophen (TYLENOL) tablet 975 mg  975 mg Oral Q8H Reynaldo Wong DO   975 mg at 04/23/25 0358    amLODIPine (NORVASC) tablet 10 mg  10 mg Oral Daily Yohan Adames PA-C   10 mg at 04/23/25 0807    apixaban ANTICOAGULANT  (ELIQUIS) tablet 5 mg  5 mg Oral BID Reynaldo Wong DO   5 mg at 04/23/25 0840    atorvastatin (LIPITOR) tablet 40 mg  40 mg Oral At Bedtime Colby Meyers MD   40 mg at 04/22/25 2257    diclofenac (VOLTAREN) 1 % topical gel 4 g  4 g Topical 4x Daily Reynaldo Wong DO   4 g at 04/23/25 0837    docusate sodium (COLACE) capsule 100 mg  100 mg Oral BID Sofie Molina MD   100 mg at 04/22/25 2038    gabapentin (NEURONTIN) capsule 300 mg  300 mg Oral BID Yohan Adames PA-C   300 mg at 04/23/25 0840    insulin aspart (NovoLOG) injection (RAPID ACTING)  1-7 Units Subcutaneous TID  Yohan Adames PA-C   1 Units at 04/23/25 0839    insulin aspart (NovoLOG) injection (RAPID ACTING)  1-5 Units Subcutaneous At Bedtime Yohan Adames PA-C   1 Units at 04/21/25 2230    insulin NPH-Regular (HUMULIN 70/30;NOVOLIN 70/30) injection 15 Units  15 Units Subcutaneous BID AC Yohan Adames PA-C   15 Units at 04/23/25 0839    irbesartan (AVAPRO) tablet 300 mg  300 mg Oral At Bedtime Yohan Adames PA-C   300 mg at 04/22/25 2258    LORazepam (ATIVAN) tablet 0.5 mg  0.5 mg Oral BID Lyndon Khan APRN CNP   0.5 mg at 04/23/25 0840    metFORMIN (GLUCOPHAGE) tablet 1,000 mg  1,000 mg Oral BID w/meals Reynaldo Wong DO   1,000 mg at 04/23/25 0840    miconazole (MICATIN) 2 % powder   Topical BID Reynaldo Wong DO   Given at 04/23/25 0838    montelukast (SINGULAIR) tablet 10 mg  10 mg Oral At Bedtime Yohan Adames PA-C   10 mg at 04/22/25 2258    polyethylene glycol (MIRALAX) Packet 17 g  17 g Oral Daily Sofie Molina MD   17 g at 04/21/25 0905    sertraline (ZOLOFT) tablet 150 mg  150 mg Oral At Bedtime Yohan Adames PA-C   150 mg at 04/22/25 3854    sodium chloride (PF) 0.9% PF flush 3 mL  3 mL Intracatheter Q8H [facility] Yohan Adames PA-C   3 mL at 04/23/25 0838       Data   Recent Labs   Lab 04/23/25  0809 04/23/25  0140 04/22/25  2153 04/20/25  0757 04/20/25  0607 04/18/25  0753 04/18/25  0526 04/17/25  0749  04/17/25  0528   NA  --   --   --   --  134*  --  134*  --  138   POTASSIUM  --   --   --   --  5.0  --  4.5  --  4.6   CHLORIDE  --   --   --   --  99  --  98  --  102   CO2  --   --   --   --  23  --  24  --  24   BUN  --   --   --   --  8.9  --  6.9  --  5.7*   CR  --   --   --   --  0.46*  --  0.44*  --  0.35*   ANIONGAP  --   --   --   --  12  --  12  --  12   SHIRLEY  --   --   --   --  10.4  --  10.5*  --  10.7*   * 132* 151*   < > 173*   < > 145*   < > 145*    < > = values in this interval not displayed.

## 2025-04-23 NOTE — PLAN OF CARE
Problem: Adult Inpatient Plan of Care  Goal: Absence of Hospital-Acquired Illness or Injury  Intervention: Prevent Skin Injury  Recent Flowsheet Documentation  Taken 4/23/2025 0010 by Myrtle Holden, RN  Body Position:   position maintained   refuses positioning   supine   supine, head elevated   weight shifting  Taken 4/22/2025 2046 by Myrtle Holden, RN  Body Position:   refuses positioning   weight shifting

## 2025-04-23 NOTE — PROGRESS NOTES
Care Management Follow Up    Length of Stay (days): 9    Expected Discharge Date: 04/24/2025     Concerns to be Addressed: discharge planning  Pt is LTC-MA pending  Patient plan of care discussed at interdisciplinary rounds: Yes    Anticipated Discharge Disposition: Long Term Care     Anticipated Discharge Services: Transportation Services  Anticipated Discharge DME: None    Patient/family educated on Medicare website which has current facility and service quality ratings: yes  Education Provided on the Discharge Plan: Yes  Patient/Family in Agreement with the Plan: yes    Referrals Placed by CM/SW: Internal Clinic Care Coordination, Post Acute Facilities  Private pay costs discussed: transportation costs    Discussed  Partnership in Safe Discharge Planning  document with patient/family: Yes     Handoff Completed: No, handoff not indicated or clinically appropriate    Additional Information:    Per IDT rounds, patient continues to be medically ready for discharge.     Patient has been accepted at [facility] Phone (Main Phone:[phone] Admissions Phone:[phone] Fax: [phone]) for tomorrow. Met with patient at bedside, she is in agreement with plan.     PLAN: [facility] on 4/24  Transport: [facility] stretch between 1820-1158    SIRENA DIXON RN

## 2025-04-23 NOTE — PROGRESS NOTES
VSS. Pt asking for Oxycodone for pain, 10 mg po given. Pt turned right and left to clean a small amount of stool and urine overflow from the Primofit device.Cares given and a new Primofit was placed. Pt appeared to tolerate this with moderate amount of pain. Pt was thankful afterwards for the care and cleaning.

## 2025-04-24 ENCOUNTER — DOCUMENTATION ONLY (OUTPATIENT)
Dept: GERIATRICS | Facility: CLINIC | Age: 55
End: 2025-04-24
Payer: COMMERCIAL

## 2025-04-24 VITALS
HEART RATE: 101 BPM | TEMPERATURE: 98.1 F | OXYGEN SATURATION: 92 % | BODY MASS INDEX: 40.01 KG/M2 | SYSTOLIC BLOOD PRESSURE: 149 MMHG | DIASTOLIC BLOOD PRESSURE: 75 MMHG | HEIGHT: 64 IN | WEIGHT: 234.35 LBS | RESPIRATION RATE: 18 BRPM

## 2025-04-24 PROBLEM — L89.159 PRESSURE INJURY OF SKIN OF SACRAL REGION: Status: ACTIVE | Noted: 2025-03-05

## 2025-04-24 PROBLEM — C7A.8: Status: ACTIVE | Noted: 2025-03-05

## 2025-04-24 PROBLEM — M84.553A: Status: ACTIVE | Noted: 2025-03-05

## 2025-04-24 PROBLEM — I10 ESSENTIAL HYPERTENSION: Status: RESOLVED | Noted: 2017-07-17 | Resolved: 2025-04-24

## 2025-04-24 PROBLEM — D62 ACUTE POSTHEMORRHAGIC ANEMIA: Status: ACTIVE | Noted: 2025-03-05

## 2025-04-24 LAB
CA-I BLD-MCNC: 6.4 MG/DL (ref 4.4–5.2)
GLUCOSE BLDC GLUCOMTR-MCNC: 143 MG/DL (ref 70–99)
GLUCOSE BLDC GLUCOMTR-MCNC: 153 MG/DL (ref 70–99)
GLUCOSE BLDC GLUCOMTR-MCNC: 189 MG/DL (ref 70–99)
HOLD SPECIMEN: NORMAL
MAGNESIUM SERPL-MCNC: 1.7 MG/DL (ref 1.7–2.3)
PHOSPHATE SERPL-MCNC: 2.2 MG/DL (ref 2.5–4.5)

## 2025-04-24 PROCEDURE — 250N000013 HC RX MED GY IP 250 OP 250 PS 637: Performed by: NURSE PRACTITIONER

## 2025-04-24 PROCEDURE — 250N000013 HC RX MED GY IP 250 OP 250 PS 637: Performed by: INTERNAL MEDICINE

## 2025-04-24 PROCEDURE — 250N000012 HC RX MED GY IP 250 OP 636 PS 637

## 2025-04-24 PROCEDURE — 99239 HOSP IP/OBS DSCHRG MGMT >30: CPT | Performed by: INTERNAL MEDICINE

## 2025-04-24 PROCEDURE — 99232 SBSQ HOSP IP/OBS MODERATE 35: CPT | Performed by: NURSE PRACTITIONER

## 2025-04-24 PROCEDURE — 250N000013 HC RX MED GY IP 250 OP 250 PS 637: Performed by: STUDENT IN AN ORGANIZED HEALTH CARE EDUCATION/TRAINING PROGRAM

## 2025-04-24 PROCEDURE — 83735 ASSAY OF MAGNESIUM: CPT | Performed by: INTERNAL MEDICINE

## 2025-04-24 PROCEDURE — 82330 ASSAY OF CALCIUM: CPT | Performed by: INTERNAL MEDICINE

## 2025-04-24 PROCEDURE — 250N000013 HC RX MED GY IP 250 OP 250 PS 637

## 2025-04-24 PROCEDURE — 84100 ASSAY OF PHOSPHORUS: CPT | Performed by: INTERNAL MEDICINE

## 2025-04-24 PROCEDURE — 36415 COLL VENOUS BLD VENIPUNCTURE: CPT | Performed by: INTERNAL MEDICINE

## 2025-04-24 RX ORDER — OXYCODONE HYDROCHLORIDE 10 MG/1
10 TABLET ORAL
Qty: 30 TABLET | Refills: 0 | Status: ON HOLD | OUTPATIENT
Start: 2025-04-24 | End: 2025-05-06

## 2025-04-24 RX ORDER — POLYETHYLENE GLYCOL 3350 17 G/17G
17 POWDER, FOR SOLUTION ORAL DAILY
DISCHARGE
Start: 2025-04-25

## 2025-04-24 RX ORDER — IBUPROFEN 400 MG/1
400 TABLET, FILM COATED ORAL EVERY 6 HOURS PRN
DISCHARGE
Start: 2025-04-24

## 2025-04-24 RX ORDER — LORAZEPAM 0.5 MG/1
0.5 TABLET ORAL 2 TIMES DAILY
Qty: 10 TABLET | Refills: 0 | Status: SHIPPED | OUTPATIENT
Start: 2025-04-24 | End: 2025-04-25

## 2025-04-24 RX ORDER — DOCUSATE SODIUM 100 MG/1
100 CAPSULE, LIQUID FILLED ORAL 2 TIMES DAILY
DISCHARGE
Start: 2025-04-24

## 2025-04-24 RX ORDER — ACETAMINOPHEN 325 MG/1
975 TABLET ORAL EVERY 8 HOURS
DISCHARGE
Start: 2025-04-24

## 2025-04-24 RX ORDER — SEMAGLUTIDE 2.68 MG/ML
2 INJECTION, SOLUTION SUBCUTANEOUS
DISCHARGE
Start: 2025-04-24 | End: 2025-04-24

## 2025-04-24 RX ORDER — ATORVASTATIN CALCIUM 40 MG/1
40 TABLET, FILM COATED ORAL AT BEDTIME
DISCHARGE
Start: 2025-04-24

## 2025-04-24 RX ORDER — LIDOCAINE 4 G/G
2 PATCH TOPICAL EVERY 24 HOURS
DISCHARGE
Start: 2025-04-24 | End: 2025-05-08

## 2025-04-24 RX ORDER — GABAPENTIN 300 MG/1
300 CAPSULE ORAL 2 TIMES DAILY
Qty: 10 CAPSULE | Refills: 0 | Status: SHIPPED | OUTPATIENT
Start: 2025-04-24

## 2025-04-24 RX ADMIN — GABAPENTIN 300 MG: 300 CAPSULE ORAL at 08:57

## 2025-04-24 RX ADMIN — AMLODIPINE BESYLATE 10 MG: 10 TABLET ORAL at 08:57

## 2025-04-24 RX ADMIN — POTASSIUM & SODIUM PHOSPHATES POWDER PACK 280-160-250 MG 1 PACKET: 280-160-250 PACK at 06:45

## 2025-04-24 RX ADMIN — ACETAMINOPHEN 975 MG: 325 TABLET ORAL at 12:26

## 2025-04-24 RX ADMIN — POTASSIUM & SODIUM PHOSPHATES POWDER PACK 280-160-250 MG 1 PACKET: 280-160-250 PACK at 11:25

## 2025-04-24 RX ADMIN — DICLOFENAC 4 G: 10 GEL TOPICAL at 08:59

## 2025-04-24 RX ADMIN — ACETAMINOPHEN 975 MG: 325 TABLET ORAL at 04:12

## 2025-04-24 RX ADMIN — APIXABAN 5 MG: 5 TABLET, FILM COATED ORAL at 08:57

## 2025-04-24 RX ADMIN — INSULIN ASPART 1 UNITS: 100 INJECTION, SOLUTION INTRAVENOUS; SUBCUTANEOUS at 08:59

## 2025-04-24 RX ADMIN — INSULIN HUMAN 3 UNITS: 100 INJECTION, SUSPENSION SUBCUTANEOUS at 09:00

## 2025-04-24 RX ADMIN — DICLOFENAC 4 G: 10 GEL TOPICAL at 12:13

## 2025-04-24 RX ADMIN — OXYCODONE HYDROCHLORIDE 10 MG: 5 TABLET ORAL at 13:26

## 2025-04-24 RX ADMIN — OXYCODONE HYDROCHLORIDE 10 MG: 5 TABLET ORAL at 08:57

## 2025-04-24 RX ADMIN — LORAZEPAM 0.5 MG: 0.5 TABLET ORAL at 08:57

## 2025-04-24 RX ADMIN — INSULIN HUMAN 12 UNITS: 100 INJECTION, SUSPENSION SUBCUTANEOUS at 11:25

## 2025-04-24 RX ADMIN — METFORMIN HYDROCHLORIDE 1000 MG: 500 TABLET ORAL at 08:57

## 2025-04-24 RX ADMIN — MICONAZOLE NITRATE: 20 POWDER TOPICAL at 08:59

## 2025-04-24 RX ADMIN — INSULIN ASPART 1 UNITS: 100 INJECTION, SOLUTION INTRAVENOUS; SUBCUTANEOUS at 12:12

## 2025-04-24 RX ADMIN — HYDROXYZINE HYDROCHLORIDE 25 MG: 25 TABLET ORAL at 13:26

## 2025-04-24 RX ADMIN — DOCUSATE SODIUM 100 MG: 100 CAPSULE, LIQUID FILLED ORAL at 08:58

## 2025-04-24 ASSESSMENT — ACTIVITIES OF DAILY LIVING (ADL)
ADLS_ACUITY_SCORE: 80
ADLS_ACUITY_SCORE: 87
ADLS_ACUITY_SCORE: 87
ADLS_ACUITY_SCORE: 80
ADLS_ACUITY_SCORE: 80
ADLS_ACUITY_SCORE: 87
ADLS_ACUITY_SCORE: 87
ADLS_ACUITY_SCORE: 80

## 2025-04-24 NOTE — PROGRESS NOTES
[facility] DISCHARGE NOTE    Patient discharged to long term care facility at 1:58 PM via stretcher. Accompanied by EMS staff. Discharge instructions reviewed with patient and report was called to RN at [facility] , opportunity offered to ask questions. Prescriptions sent with patient to fill . All belongings sent with patient.    Shonda Vaca RN

## 2025-04-24 NOTE — PROGRESS NOTES
[facility] GERIATRICS  Primary Care Provider & Clinic: Arti Montero NP, [address] [id number]  Chief Complaint   Patient presents with    LTC Admission    Hospital F/U     [facility] 2025 - 2025     [place] Medical Record Number: [id number]  Place of Service Where Encounter Took Place: [facility] () [[id number]]    Brissa Mott is a 54 year old (1970), admitted to the above facility from  [facility]. Hospital stay 25 through 25.    HPI:    ***    CODE STATUS/ADVANCE DIRECTIVES DISCUSSION: Full Code - {CODE STATUS:088168}  Patient's living condition: lives in a skilled nursing facility  ALLERGIES:   Allergies   Allergen Reactions    Sulfa Antibiotics Shortness Of Breath, Hives and Rash    Bydureon [Exenatide] Diarrhea    Penicillins Rash and Hives      PAST MEDICAL HISTORY:   Past Medical History:   Diagnosis Date    Asthma     Cerebral infarction (H)     Diabetes (H)     Hypertension     Mixed hyperlipidemia     Morbid obesity (H)     KATHRINE (obstructive sleep apnea)     Osteoarthritis       PAST SURGICAL HISTORY:  has a past surgical history that includes  section and Insertion, Intramedullary Humphrey, Retrograde, For Femoral Shaft Fracture (Right, 2025).  FAMILY HISTORY: family history is not on file.  SOCIAL HISTORY:  reports that she has quit smoking. She has never used smokeless tobacco. She reports that she does not currently use alcohol. She reports that she does not use drugs.    Post Discharge Medication Reconciliation Status:  MED REC REQUIRED{TIP  Click the link below to document or use med rec list, use list to pull in response :[id number]}  Post Medication Reconciliation Status: {MED REC LIST:795854}    Current Outpatient Medications   Medication Sig Dispense Refill    acetaminophen (TYLENOL) 325 MG tablet Take 3 tablets (975 mg) by mouth every 8 hours.      amLODIPine (NORVASC) 10 MG tablet Take 1 tablet  (10 mg) by mouth daily 90 tablet 3    apixaban ANTICOAGULANT (ELIQUIS) 5 MG tablet Take 1 tablet (5 mg) by mouth 2 times daily.      atorvastatin (LIPITOR) 40 MG tablet Take 1 tablet (40 mg) by mouth at bedtime.      Continuous Glucose Sensor (FREESTYLE SANDRA 3 SENSOR) [facility] 1 each every 14 days Use 1 sensor every 14 days. Use to read blood sugars per 's instructions. 6 each 5    diclofenac (VOLTAREN) 1 % topical gel Apply 4 g topically 4 times daily.      docusate sodium (COLACE) 100 MG capsule Take 1 capsule (100 mg) by mouth 2 times daily.      gabapentin (NEURONTIN) 300 MG capsule Take 1 capsule (300 mg) by mouth 2 times daily. 10 capsule 0    hydrOXYzine HCl (ATARAX) 25 MG tablet Take 1 tablet (25 mg) by mouth every 6 hours as needed for other (adjuvant pain). 90 tablet 0    ibuprofen (ADVIL/MOTRIN) 400 MG tablet Take 1 tablet (400 mg) by mouth every 6 hours as needed for inflammatory pain.      insulin aspart (NOVOLOG PEN) 100 UNIT/ML pen Inject 1 Units subcutaneously 3 times daily (with meals).      insulin NPH-Regular (HUMULIN 70/30;NOVOLIN 70/30) susp Inject 15 Units subcutaneously 2 times daily (before meals).      insulin pen needle (BD PEN NEEDLE TWYLA 2ND GEN) 32G X 4 MM miscellaneous USE TWICE DAILY OR AS DIRECTED 200 each 2    irbesartan (AVAPRO) 300 MG tablet TAKE 1 TABLET(300 MG) BY MOUTH AT BEDTIME 90 tablet 2    Lidocaine (LIDOCARE) 4 % Patch Place 2 patches over 12 hours onto the skin every 24 hours. To prevent lidocaine toxicity, patient should be patch free for 12 hrs daily.      LORazepam (ATIVAN) 0.5 MG tablet Take 1 tablet (0.5 mg) by mouth 2 times daily. 10 tablet 0    metFORMIN (GLUCOPHAGE) 1000 MG tablet TAKE 1 TABLET BY MOUTH TWICE DAILY WITH MEALS 180 tablet 1    miconazole (MICATIN) 2 % external powder Apply topically 2 times daily. To perineum      Misc. Devices (ROLLATOR ULTRA-LIGHT) MISC Extra side walker with front wheels for home use.  Purchase, lifetime need. Extra wide  "rolling walker (\"Walker 4\", item #:  [id number]) needed for safe transfers and ambulation.  Pt weight is 335 lbs.      montelukast (SINGULAIR) 10 MG tablet Take 1 tablet (10 mg) by mouth at bedtime 90 tablet 3    oxyCODONE (ROXICODONE) 10 MG tablet Take 1 tablet (10 mg) by mouth every 3 hours as needed for severe pain. 30 tablet 0    [START ON 4/25/2025] polyethylene glycol (MIRALAX) 17 g packet Take 17 g by mouth daily.      senna-docusate (SENOKOT-S/PERICOLACE) 8.6-50 MG tablet Take 1 tablet by mouth 2 times daily as needed for constipation.      sertraline (ZOLOFT) 100 MG tablet Take 1.5 tablets (150 mg) by mouth daily 135 tablet 3     No current facility-administered medications for this visit.     Facility-Administered Medications Ordered in Other Visits   Medication Dose Route Frequency Provider Last Rate Last Admin    acetaminophen (TYLENOL) tablet 975 mg  975 mg Oral Q8H Reynaldo Wong DO   975 mg at 04/24/25 1226    alum & mag hydroxide-simethicone (MAALOX) suspension 30 mL  30 mL Oral Q4H PRN Yohan Adames PA-C        amLODIPine (NORVASC) tablet 10 mg  10 mg Oral Daily Yohan Adames PA-C   10 mg at 04/24/25 0857    apixaban ANTICOAGULANT (ELIQUIS) tablet 5 mg  5 mg Oral BID Reynaldo Wong DO   5 mg at 04/24/25 0857    atorvastatin (LIPITOR) tablet 40 mg  40 mg Oral At Bedtime Colby Meyers MD   40 mg at 04/23/25 2109    benzocaine-menthol (CHLORASEPTIC) 6-10 MG lozenge 1 lozenge  1 lozenge Buccal Q1H PRN Yohan Adames PA-C        glucose gel 15-30 g  15-30 g Oral Q15 Min PRN Yohan Adames PA-C        Or    dextrose 50 % injection 25-50 mL  25-50 mL Intravenous Q15 Min PRN Yohan Adames PA-C        Or    glucagon injection 1 mg  1 mg Subcutaneous Q15 Min PRN Yohan Adames PA-C        diclofenac (VOLTAREN) 1 % topical gel 4 g  4 g Topical 4x Daily Reynaldo Wong DO   4 g at 04/24/25 1213    docusate sodium (COLACE) capsule 100 mg  100 mg Oral BID Sofie Molina MD   100 mg at 04/24/25 " 0858    gabapentin (NEURONTIN) capsule 300 mg  300 mg Oral BID Yohan Adames PA-C   300 mg at 04/24/25 0857    glycerin (ADULT) Suppository 1 suppository  1 suppository Rectal Daily PRN Sofie Molina MD        HYDROmorphone (DILAUDID) injection 0.2 mg  0.2 mg Intravenous Q3H PRN Elsie Ponce PA-C   0.2 mg at 04/19/25 0934    hydrOXYzine HCl (ATARAX) tablet 25 mg  25 mg Oral Q6H PRN Yohan Adames PA-C   25 mg at 04/24/25 1326    ibuprofen (ADVIL/MOTRIN) tablet 400 mg  400 mg Oral Q6H PRN Reynaldo Wong DO   400 mg at 04/20/25 1425    insulin aspart (NovoLOG) injection (RAPID ACTING)  1-7 Units Subcutaneous TID AC Yohan Adames PA-C   1 Units at 04/24/25 1212    insulin aspart (NovoLOG) injection (RAPID ACTING)  1-5 Units Subcutaneous At Bedtime Yohan Adames PA-C   1 Units at 04/21/25 2230    insulin NPH-Regular (HUMULIN 70/30;NOVOLIN 70/30) injection 15 Units  15 Units Subcutaneous BID AC Yohan Adames PA-C   12 Units at 04/24/25 1125    irbesartan (AVAPRO) tablet 300 mg  300 mg Oral At Bedtime Yohan Adames PA-C   300 mg at 04/23/25 2110    Lidocaine (LIDOCARE) 4 % Patch 2 patch  2 patch Transdermal Q24H PRN Colby Meyers MD   2 patch at 04/20/25 0446    lidocaine (LMX4) kit   Topical Q1H PRN Yohan Adames PA-C        lidocaine 1 % 0.1-1 mL  0.1-1 mL Other Q1H PRN Yohan Adames PA-C        LORazepam (ATIVAN) tablet 0.5 mg  0.5 mg Oral BID Lyndon Khan APRN CNP   0.5 mg at 04/24/25 0857    melatonin tablet 5 mg  5 mg Oral At Bedtime PRN Yohan Adames PA-C   5 mg at 04/19/25 2228    metFORMIN (GLUCOPHAGE) tablet 1,000 mg  1,000 mg Oral BID w/meals Reynaldo Wong DO   1,000 mg at 04/24/25 0857    miconazole (MICATIN) 2 % powder   Topical BID Reynaldo Wong DO   Given at 04/24/25 0859    montelukast (SINGULAIR) tablet 10 mg  10 mg Oral At Bedtime Yohan Adames PA-C   10 mg at 04/23/25 2110    naloxone (NARCAN) injection 0.2 mg  0.2 mg Intravenous Q2 Min PRN [place],  Bruce Dobson MD        Or    naloxone (NARCAN) injection 0.4 mg  0.4 mg Intravenous Q2 Min PRN Bruce Cross MD        Or    naloxone (NARCAN) injection 0.2 mg  0.2 mg Intramuscular Q2 Min PRN Bruce Cross MD        Or    naloxone (NARCAN) injection 0.4 mg  0.4 mg Intramuscular Q2 Min PRN Bruce Cross MD        ondansetron (ZOFRAN ODT) ODT tab 4 mg  4 mg Oral Q6H PRN Bruce Cross MD        Or    ondansetron (ZOFRAN) injection 4 mg  4 mg Intravenous Q6H PRN Bruce Cross MD        oxyCODONE (ROXICODONE) tablet 10 mg  10 mg Oral Q3H PRN Yohan Adames PA-C   10 mg at 04/24/25 1326    oxyCODONE (ROXICODONE) tablet 5 mg  5 mg Oral Q3H PRN Yohan Adames PA-C   5 mg at 04/19/25 2008    Patient is already receiving anticoagulation with heparin, enoxaparin (LOVENOX), warfarin (COUMADIN)  or other anticoagulant medication   Does not apply Continuous PRN Yohan Adames PA-C        polyethylene glycol (MIRALAX) Packet 17 g  17 g Oral Daily Sofie Molina MD   17 g at 04/21/25 0905    polyethylene glycol (MIRALAX) Packet 17 g  17 g Oral BID PRN Yohan Adames PA-C        potassium & sodium phosphates (NEUTRA-PHOS) Packet 1 packet  1 packet Oral or Feeding Tube Q4H John Belcher MD   1 packet at 04/24/25 1125    prochlorperazine (COMPAZINE) injection 10 mg  10 mg Intravenous Q6H PRN Yohan Adames PA-C        Or    prochlorperazine (COMPAZINE) tablet 10 mg  10 mg Oral Q6H PRN Yohan Adames PA-C        senna-docusate (SENOKOT-S/PERICOLACE) 8.6-50 MG per tablet 1 tablet  1 tablet Oral BID PRN Yohan Adames PA-C   1 tablet at 04/16/25 0047    sertraline (ZOLOFT) tablet 150 mg  150 mg Oral At Bedtime Yohan Adames PA-C   150 mg at 04/23/25 2109    sodium chloride (PF) 0.9% PF flush 3 mL  3 mL Intracatheter Q8H [facility] Yohan Adames PA-C   3 mL at 04/24/25 1125    sodium chloride (PF) 0.9% PF flush 3 mL  3 mL Intracatheter q1 min prn Rosangela  EFREN Almanzar   3 mL at 04/19/25 2007     ROS:  {ROS FGS:686113}    Vitals:  LMP  (LMP Unknown)   Exam:  {Nursing home physical exam :721045}    Lab/Diagnostic Data:  {fgslab:291367}    ASSESSMENT/PLAN:  {FGS DX INITIAL:950618}    Orders:  {fgsorders:997609}    Total time spent with patient visit at the skilled nursing facility was {1/2/3/4/5:968259} including {1/2/3/4/5:869284}. Greater than 50% of total time spent with counseling and coordinating care due to ***.     Electronically signed by: Aishwarya Hidalgo***

## 2025-04-24 NOTE — PROGRESS NOTES
Palliative Care Consultation Note  [facility]      Patient: Brissa Mott  Date of Admission:  4/14/2025    Requesting Clinician / Team: hospitalist   Reason for consult: Symptom management, Goals of care, Patient and family support       Recommendations & Counseling     GOALS OF CARE:   Restorative without limits   Although patient is currently in a dependent state and very little improvement despite attempts at rehab via TCU leading up to hospitalization, patient hopes that someday she will be able to improve to some level of independence and living back at home with spouse Zoran.  Patient and spouse described having heard that patient has a stage IV form of cancer, yet are unclear around prognosis, timeline, or potential treatment options for the future.  Both have said that they would appreciate transparent information from medical team and cancer specialists around prognosis based on provider experience and averages to help with planning.  4/24: Patient absolutely wants to seek evaluation by oncology for possibility of treatment.  She expressed understanding that she has an aggressive cancer and without treatment end of life would be anticipated within 6 months approximately.  She is being discharged to LTC center today.  Care coordinated with oncology, quick follow up appointment scheduled on 4/28/2025.      ADVANCE CARE PLANNING:  No health care directive on file.   Spouse Zoran Mott is next of kin.  There is no POLST form on file, defer to patient and/or next of kin for decisions   Code status: Full Code      MEDICAL MANAGEMENT:   Pain:  Patient has chronic non-cancer pain of joints  consistent with nociceptive musculoskeletal etiology.  Currently using PRN dosing of ibuprofen and oxycodone with tolerable relief, although question whether NSAIDs will be a safe option going forward given recent diagnosis of VTE and necessity for anticoagulation therapy.  Defer to primary team  for chronic pain management, buprenorphine may be an equally efficacious yet safer alternative to traditional opioid.  Apparent anxiety, seems quite emotionally friable:  Continue Ativan 0.5 p.o. twice daily----reports being and appears calmer and better able to engage in conversation.  Currently on Zoloft 150 mg daily      PSYCHOSOCIAL/SPIRITUAL SUPPORT:  Family; 2 children and 4 grandchildren  Friends and bala community      Palliative Care will continue to follow. Thank you for the consult and allowing us to aid in the care of Brissa Mott.    These recommendations have been discussed with team.      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  Patient was hospitalized in January 2025 at which time CT scan demonstrated intramedullary lesion in the distal femur measuring 7.5 cm in size.  On 2/24/2025 the patient underwent open reduction and fixation with intramedullary nail insertion of the right femur.  Pathology demonstrated for demonstrate carcinoma with neuroendocrine features.  Staging workup including CT scan chest abdomen pelvis demonstrated concern for right adnexal mass, left axillar adenopathy, and pulmonary nodules and possible hepatic nodules concerning for metastatic disease.  MRI brain was negative.  She has not yet received palliative radiation therapy as planned, nor lymph node biopsy for further consideration of systemic treatment options.      Today, the patient was seen for:  Symptom assessment, facilitation of medical communication and support with goals of  "care    History of Present Illness   I tried to gently talk with Brissa today about her condition and symptoms.  She appears very anxious.  She said twice that she feels overwhelmed when trying to talk about her situation.      Prognosis, Goals, & Planning:   Functional Status just prior to this current hospitalization:  Outpatient Palliative Performance Score (PPS) 50%  Extensive disease. Normal or reduced intake; normal LOC or confusion; little ambulation (mainly sit/lie), ADLs w/much assistance, unable to do any work.      Prognosis, Goals, and/or Advance Care Planning:  Discussed what continuing restorative/life-prolonging care entails, including continued (re)admissions to the hospital, continuing with preventative and primary care, seeing disease/organ specific specialty consultations for medical treatments in hopes to prolong life for as long as possible.      Code Status was addressed today:   No      Patient's decision making preferences: shared with support from loved ones        Patient has decision-making capacity today for complex decisions: Intact           Coping, Meaning, & Spirituality:   Mood, coping, and/or meaning in the context of serious illness were addressed today: Yes    Social:   Living situation: lives with family  Important relationships/caregivers: Spouse Zoran and daughter Lorenza  Areas of fulfillment/elias: Socializing with friends and family, bala    Medications:  Reviewed this patient's medication profile and medications from this hospitalization.     [place] Board of Pharmacy Data Base Reviewed: Yes:   reviewed - controlled substances reflected in medication list..    ROS:  Comprehensive ROS is reviewed and is negative except as here & per HPI:     Physical Exam   BP (!) 149/75 (BP Location: Right arm)   Pulse 101   Temp 98.1  F (36.7  C) (Oral)   Resp 18   Ht 1.626 m (5' 4\")   Wt 106.3 kg (234 lb 5.6 oz)   LMP  (LMP Unknown)   SpO2 92%   BMI 40.23 kg/m    General:  " Appears stated age.  NAD.   HENT:  Atraumatic.  Hearing intact.  Moist mucous membranes.    Eyes:  Conjunctivae are clear.  Sclera is nonicteric.    Cardiovascular:  Without cyanosis or mottling.   Respiratory:  Breathing comfortably without increased work of breathing or signs of respiratory distress.  Able to speak in complete sentences.    Skin:  Dry, without diaphoresis.   Neuro:  Alert, attentive, speech clear and fluent.   Psych:  Appropriate mood and affect.  No sign of confusion or delusion.     Wt Readings from Last 8 Encounters:   04/22/25 106.3 kg (234 lb 5.6 oz)   04/07/25 107.6 kg (237 lb 4.8 oz)   03/31/25 108 kg (238 lb)   03/27/25 108.4 kg (239 lb)   03/20/25 110.2 kg (243 lb)   03/13/25 113.4 kg (250 lb)   03/10/25 119.1 kg (262 lb 9.6 oz)   03/06/25 119.1 kg (262 lb 9.6 oz)         Data reviewed:  Results for orders placed or performed during the hospital encounter of 04/14/25 (from the past 24 hours)   Glucose by meter   Result Value Ref Range    GLUCOSE BY METER POCT 174 (H) 70 - 99 mg/dL   Glucose by meter   Result Value Ref Range    GLUCOSE BY METER POCT 175 (H) 70 - 99 mg/dL   Glucose by meter   Result Value Ref Range    GLUCOSE BY METER POCT 143 (H) 70 - 99 mg/dL   Magnesium   Result Value Ref Range    Magnesium 1.7 1.7 - 2.3 mg/dL   Phosphorus   Result Value Ref Range    Phosphorus 2.2 (L) 2.5 - 4.5 mg/dL   Ionized Calcium   Result Value Ref Range    Calcium Ionized Whole Blood 6.4 (H) 4.4 - 5.2 mg/dL   Extra Purple Top EDTA (LAB USE ONLY)   Result Value Ref Range    Hold Specimen JIC    Glucose by meter   Result Value Ref Range    GLUCOSE BY METER POCT 153 (H) 70 - 99 mg/dL   Glucose by meter   Result Value Ref Range    GLUCOSE BY METER POCT 189 (H) 70 - 99 mg/dL       Medical Decision Making       Please see A&P for additional details of medical decision making.  MANAGEMENT DISCUSSED with the following over the past 24 hours: Bedside RN, hospitalist   NOTE(S)/MEDICAL RECORDS REVIEWED over  the past 24 hours: Oncology, radiation oncology, hospitalist, emergency medicine  Tests personally interpreted in the past 24 hours:  - EKG tracing showing QTc of 466  Tests ORDERED & REVIEWED in the past 24 hours:  - See lab/imaging results included in the data section of the note  SUPPLEMENTAL HISTORY, in addition to the patient's history, over the past 24 hours obtained from:   - Spouse or significant other  Medical complexity over the past 24 hours:  -------------------------- HIGH RISK FOR MORBIDITY -------------------------------------------------------------  - Intensive monitoring for MEDICATION TOXICITY  - Decision to continue to pursue versus de-escalate care based on prognosis

## 2025-04-24 NOTE — PLAN OF CARE
"Goal Outcome Evaluation:      Plan of Care Reviewed With: patient    Overall Patient Progress: no changeOverall Patient Progress: no change    Outcome Evaluation: Patient alert and oriented, able to make needs known.  Mood is low, pain is high especially with movement and repositioning in bed.  Repositioned attempted to shift weight off on coccyx., pt declined, Complaints of generalized pain, bilateral legs and significant pain in right shoulder.  Repositioned with pillow under shoulder and patient expressed relief of some discomfort.  PRN pain medication given, Bed bath given, sacral mepilex changed,  PIV saline locked.  Continue with plan of care, and will monitor pt throughout shift.  BP (!) 146/67 (BP Location: Right arm)   Pulse 109   Temp 98.2  F (36.8  C) (Oral)   Resp 18   Ht 1.626 m (5' 4\")   Wt 106.3 kg (234 lb 5.6 oz)   LMP  (LMP Unknown)   SpO2 94%   BMI 40.23 kg/m    April DUKE Merritt RN on 4/23/2025 at 7:11 PM      "

## 2025-04-24 NOTE — PROGRESS NOTES
Palliative Care Consultation Note  [facility]      Patient: Brissa Mott  Date of Admission:  4/14/2025    Requesting Clinician / Team: hospitalist   Reason for consult: Symptom management, Goals of care, Patient and family support       Recommendations & Counseling     GOALS OF CARE:   Restorative without limits   Although patient is currently in a dependent state and very little improvement despite attempts at rehab via TCU leading up to hospitalization, patient hopes that someday she will be able to improve to some level of independence and living back at home with spouse Zoran.  Patient and spouse described having heard that patient has a stage IV form of cancer, yet are unclear around prognosis, timeline, or potential treatment options for the future.  Both have said that they would appreciate transparent information from medical team and cancer specialists around prognosis based on provider experience and averages to help with planning.      ADVANCE CARE PLANNING:  No health care directive on file.   Spouse Zoran Mott is next of kin.  There is no POLST form on file, defer to patient and/or next of kin for decisions   Code status: Full Code      MEDICAL MANAGEMENT:   Pain:  Patient has chronic non-cancer pain of joints  consistent with nociceptive musculoskeletal etiology.  Currently using PRN dosing of ibuprofen and oxycodone with tolerable relief, although question whether NSAIDs will be a safe option going forward given recent diagnosis of VTE and necessity for anticoagulation therapy.  Defer to primary team for chronic pain management, buprenorphine may be an equally efficacious yet safer alternative to traditional opioid.  Apparent anxiety, seems quite emotionally friable:  Continue Ativan 0.5 p.o. twice daily----reports being and appears calmer and better able to engage in conversation  Currently on Zoloft 150 mg daily      PSYCHOSOCIAL/SPIRITUAL SUPPORT:  Family; 2  children and 4 grandchildren  Friends and [place] community      Palliative Care will continue to follow. Thank you for the consult and allowing us to aid in the care of Brissa Mott.    These recommendations have been discussed with team.      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  Patient was hospitalized in January 2025 at which time CT scan demonstrated intramedullary lesion in the distal femur measuring 7.5 cm in size.  On 2/24/2025 the patient underwent open reduction and fixation with intramedullary nail insertion of the right femur.  Pathology demonstrated for demonstrate carcinoma with neuroendocrine features.  Staging workup including CT scan chest abdomen pelvis demonstrated concern for right adnexal mass, left axillar adenopathy, and pulmonary nodules and possible hepatic nodules concerning for metastatic disease.  MRI brain was negative.  She has not yet received palliative radiation therapy as planned, nor lymph node biopsy for further consideration of systemic treatment options.      Today, the patient was seen for:  Symptom assessment, facilitation of medical communication and support with goals of care    History of Present Illness   I tried to gently talk with Brissa today about her condition and symptoms.  She appears very anxious.  She said twice that she feels overwhelmed when trying to talk about her situation.      Prognosis, Goals, & Planning:   Functional Status just prior to this current hospitalization:  Outpatient Palliative Performance Score (PPS) 50%   "Extensive disease. Normal or reduced intake; normal LOC or confusion; little ambulation (mainly sit/lie), ADLs w/much assistance, unable to do any work.      Prognosis, Goals, and/or Advance Care Planning:  Discussed what continuing restorative/life-prolonging care entails, including continued (re)admissions to the hospital, continuing with preventative and primary care, seeing disease/organ specific specialty consultations for medical treatments in hopes to prolong life for as long as possible.      Code Status was addressed today:   No      Patient's decision making preferences: shared with support from loved ones        Patient has decision-making capacity today for complex decisions: Intact           Coping, Meaning, & Spirituality:   Mood, coping, and/or meaning in the context of serious illness were addressed today: Yes    Social:   Living situation: lives with family  Important relationships/caregivers: Spouse Zoran and daughter Lorenza  Areas of fulfillment/elias: Socializing with friends and family, bala    Medications:  Reviewed this patient's medication profile and medications from this hospitalization.     [place] Board of Pharmacy Data Base Reviewed: Yes:   reviewed - controlled substances reflected in medication list..    ROS:  Comprehensive ROS is reviewed and is negative except as here & per HPI:     Physical Exam   BP (!) 149/75 (BP Location: Right arm)   Pulse 101   Temp 98.1  F (36.7  C) (Oral)   Resp 18   Ht 1.626 m (5' 4\")   Wt 106.3 kg (234 lb 5.6 oz)   LMP  (LMP Unknown)   SpO2 92%   BMI 40.23 kg/m    General:  Appears stated age.  NAD.   HENT:  Atraumatic.  Hearing intact.  Moist mucous membranes.    Eyes:  Conjunctivae are clear.  Sclera is nonicteric.    Cardiovascular:  Without cyanosis or mottling.   Respiratory:  Breathing comfortably without increased work of breathing or signs of respiratory distress.  Able to speak in complete sentences.    Skin:  Dry, without " diaphoresis.   Neuro:  Alert, attentive, speech clear and fluent.   Psych:  Appropriate mood and affect.  No sign of confusion or delusion.     Wt Readings from Last 8 Encounters:   04/22/25 106.3 kg (234 lb 5.6 oz)   04/07/25 107.6 kg (237 lb 4.8 oz)   03/31/25 108 kg (238 lb)   03/27/25 108.4 kg (239 lb)   03/20/25 110.2 kg (243 lb)   03/13/25 113.4 kg (250 lb)   03/10/25 119.1 kg (262 lb 9.6 oz)   03/06/25 119.1 kg (262 lb 9.6 oz)         Data reviewed:  Results for orders placed or performed during the hospital encounter of 04/14/25 (from the past 24 hours)   Glucose by meter   Result Value Ref Range    GLUCOSE BY METER POCT 174 (H) 70 - 99 mg/dL   Glucose by meter   Result Value Ref Range    GLUCOSE BY METER POCT 175 (H) 70 - 99 mg/dL   Glucose by meter   Result Value Ref Range    GLUCOSE BY METER POCT 143 (H) 70 - 99 mg/dL   Magnesium   Result Value Ref Range    Magnesium 1.7 1.7 - 2.3 mg/dL   Phosphorus   Result Value Ref Range    Phosphorus 2.2 (L) 2.5 - 4.5 mg/dL   Ionized Calcium   Result Value Ref Range    Calcium Ionized Whole Blood 6.4 (H) 4.4 - 5.2 mg/dL   Extra Purple Top EDTA (LAB USE ONLY)   Result Value Ref Range    Hold Specimen JIC    Glucose by meter   Result Value Ref Range    GLUCOSE BY METER POCT 153 (H) 70 - 99 mg/dL   Glucose by meter   Result Value Ref Range    GLUCOSE BY METER POCT 189 (H) 70 - 99 mg/dL       Medical Decision Making       Please see A&P for additional details of medical decision making.  MANAGEMENT DISCUSSED with the following over the past 24 hours: Bedside RN, hospitalist   NOTE(S)/MEDICAL RECORDS REVIEWED over the past 24 hours: Oncology, radiation oncology, hospitalist, emergency medicine  Tests personally interpreted in the past 24 hours:  - EKG tracing showing QTc of 466  Tests ORDERED & REVIEWED in the past 24 hours:  - See lab/imaging results included in the data section of the note  SUPPLEMENTAL HISTORY, in addition to the patient's history, over the past 24  hours obtained from:   - Spouse or significant other  Medical complexity over the past 24 hours:  -------------------------- HIGH RISK FOR MORBIDITY -------------------------------------------------------------  - Intensive monitoring for MEDICATION TOXICITY  - Decision to continue to pursue versus de-escalate care based on prognosis

## 2025-04-24 NOTE — PROGRESS NOTES
Care Management Discharge Note    Discharge Date: 04/24/2025       Discharge Disposition: Long Term Care    Discharge Services: Transportation Services    Discharge DME: None    Discharge Transportation: agency    Private pay costs discussed: transportation costs; LTC costs-- pt has been SMRTd and LTC-[place] application process completed during this hospitalization via SMRT team and [facility] financial team.    Does the patient's insurance plan have a 3 day qualifying hospital stay waiver?  Yes     Which insurance plan 3 day waiver is available? Alternative insurance waiver    Will the waiver be used for post-acute placement? No    PAS Confirmation Code:  [id number]  Patient/family educated on Medicare website which has current facility and service quality ratings: yes    Education Provided on the Discharge Plan: Yes  Persons Notified of Discharge Plans: pt, spouse & [facility] staff  Patient/Family in Agreement with the Plan: yes    Handoff Referral Completed: No, handoff not indicated or clinically appropriate    Additional Information:  Per IDT rounds, MD states that pt is medically stable for discharge today.    Pt is accepted for cares and will discharge to [facility] (Main: [phone]/ F: [phone]/ Admissions: [phone]) into a shared LTC unit room.    Transportation discussed and [facility] stretcher to transport.  Pt & family are aware of the costs associated with [facility] transportation.  PCS form completed.      JENNIFER English

## 2025-04-24 NOTE — DISCHARGE SUMMARY
[facility]  Hospitalist Discharge Summary       Date of Admission:  4/14/2025  Date of Discharge:  April 24, 2025  Discharging Provider: John Belcher MD      Discharge Diagnoses     Generalized weakness  Fall, initial encounter  Left knee pain, chronic  Gluteal cleft/coccyx and right buttock Pressure Ulcer, Stage 3 (POA)  Hypercalcemia secondary to malignancy - resolved   Neuroendocrine carcinoma, metastatic  Pathological fracture of right femur due to neoplastic disease s/p ORIF 2/25/25  Acute UTI without hematuria  Severe sepsis without septic shock  Acute pulmonary embolism  Hypomagnesemia  Hypophosphatemia  Anorexia  Hx of CVA  Hemiplegia and hemiparesis following cerebral infarction affecting left non-dominant side   HLD  Type 2 diabetes mellitus with neuropathy, with long-term current use of insulin   Anemia, borderline microcytic, chronic  Hypertension  Reactive airway disease without complication  KATHRINE (obstructive sleep apnea)  Adjustment disorder with depressed mood  Thrush - resolved   Hypercalcemia  Hypomagnesemia  Hypoalbuminemia  Hypertension  DMII  Severe Obesity  Asthma    Follow-ups Needed After Discharge   Follow-up Appointments       Follow Up and recommended labs and tests      Follow up with detention physician.  The following labs/tests are recommended: CMP and CBC with differential.    Follow up with Dr Friedell from [facility] within 7 days.              Discharge Disposition   Discharged to long-term care facility    Hospital Course     Brissa Mott is a 54 year old female with past medical hx of recently diagnosed neuroendocrine carcinoma, T2DM, CVA with residual hemiparesis, hypercalcemia, HTN, HLD, KATHRINE admitted on 4/14/2025. She presented for generalized weakness after recent discharge from TCU.     Generalized weakness  Fall, initial encounter  Left knee pain, chronic  Gluteal cleft/coccyx and right buttock Pressure Ulcer, Stage 3 (POA)     Recently admitted 2/20-3/5 for pathological fracture (see below) and discharged to TCU. Left TCU on 4/7 and has had 3 falls in the interim. Falls without injuries, but family unable to help her up and requires EMS assistance. Unsafe to remain at home with  and daughter. Weakness multifactorial due to UTI, hypomagnesemia, PE, and progression of cancer. Working towards LTC placement with SW.   - PT/OT/SW consulted, he is not a candidate for TCU  - See below for etiologies  - Diclofenac 4 g 4 times daily, Tylenol 975 mg every 8 hours, and oxycodone 5-10 mg PRN  - Palliative care assistance appreciated  - Left knee pain is due to chronic osteoarthritis  - Wound care with Tri[employer] paste.   - Patient continues to refuse repositioning due to pain     Hypercalcemia secondary to malignancy - resolved   Calcium 13.8 and ionized calcium 7.5 on admission. Seen on previous admission up to 14.7 and resolved with IVF and Zometa. Due to malignancy. PTH <6, PTHrP 12 (high), vitamin D <18 (low). Calcium improving (10.7) and will continue to trend downward with zometa.   - Consider chronic bisphosphonate's at discharge to prevent recurrence every 4-6 weeks  - iCa 6.4 at discharge     Neuroendocrine carcinoma, metastatic  Pathological fracture of right femur due to neoplastic disease s/p ORIF 2/25/25    Admitted 2/20-3/5 for a pathologic fracture of right femur, and pathology showed poorly differentiated carcinoma with neuroendocrine features. Underwent ORIF 2/25 and received Lovenox for 30 days post-op. CT c/a/p showed multiple pulmonary nodules, left axillary node, several hypodense areas in liver, bony lesion on right humeral head/right sacrum/pelvis, several subcutaneous nodules in anterior chest wall and right gluteal region.    Established with oncology 3/21. Current plan is for radiation therapy to the pathological fracture and biopsy of the left axillary lymph node, which was scheduled for 4/14 but she ended up in the ER.  Has not started treatment yet.    CT on admission showed progression of metastatic disease with new hepatic mets, enlarging osseous mets, enlarging lymph nodes above and below diaphragm. This includes enlarging manubrial mets with pathologic fracture and enlarging destructive lesion of right hemisacrum and right medial iliac bone. Resolution of multiple previously seen pulmonary nodules but new RLL nodule. Other pulmonary nodules stable or mildly increased. Right adnexal mass mildly decreased. Ultrasound-guided lymph core biopsy 4/17 with pathology and gene sequencing.  Spoke with radiation oncology about doing radiation mapping and/or treatment on 4/21.  Patient was tearful with the anticipation of having to transfer to wheelchair and to have to use EZ stand for positioning.  Counseled patient that we have to focus on what aligns with her goals and if she really does not want to move forward with radiation then we can defer with the expectation that things are not going to improve with time but likely will worsen.  - Pathology of the left axillary lymph node demonstrated poorly differentiated carcinoma with neuroendocrine features.  Next-generation sequencing studies pending.  - Continued PTA Lidocaine patches, PRN Tylenol, PRN Ibuprofen, PRN Oxycodone 5-10 mg  - Continue apixaban 5 mg BID (starting 4/22)  - Discussed weightbearing recommendations with orthopedic surgery, they recommend 50% weightbearing on right lower extremity  - Follow up with Dr Friedell from Oncology as outpatient     Acute UTI without hematuria  Severe sepsis without septic shock  UA with nitrites, trace LE, few bacteria, 9 WBC. Arrived septic based on leukocytosis (15.5), HR (110s). Lactic 3.0. Urine culture with E. Coli.   - Ceftriaxone 2g IV daily (last dose 4/18)  - Blood cultures NGTD     Acute pulmonary embolism  Not hypoxic. CT with poor opacification of few subsegmental pulmonary arteries suspicious for small volume pulmonary emboli.  After her ORIF on 2/25 (see below) she was given Lovenox 40mg BID for 6 weeks (ending 4/17) which she endorses compliance with. Patient agreeable to starting DOAC tonight now that we've completed the biopsy.   - Apixaban 5 mg BID       Hypomagnesemia  Hypophosphatemia  Mag 1.1 and phos 1.9 on admission.  - Mg 1.7 and Phos 2.2 at discharge     Anorexia   very concerned about poor PO intake, and believes it's due to polypharmacy. Besides physical decline and multiple comorbidities as above, review of med list only reveals Ozempic as possible etiology. She hadn't taken it for ~6 weeks while admitted and at TCU, then took a dose when she got home on 4/7.  states this correlates with onset of poor PO intake.   - Discontinue Ozempic     Hx of CVA  Hemiplegia and hemiparesis following cerebral infarction affecting left non-dominant side   HLD  CVA in 2018. Residual left sided weakness.  - Continued PTA Atorvastatin 40mg     Type 2 diabetes mellitus with neuropathy, with long-term current use of insulin   A1c 6.8% on admission.  - Novolog 1 unit with meals  - Consistent carb diet  - Continued PTA Gabapentin 300mg BID, Humulin 70/30 BID 15U  - Resume PTA Metformin 1000mg BID  - Plan to discontinue PTA Ozempic while at LTC     Anemia, borderline microcytic, chronic  Hemoglobin 9.6 on admission, baseline 8-9.    - Stable, no longer trending hemoglobin     Hypertension  - Continued PTA Amlodipine 10mg, Ibesartan 300mg     Reactive airway disease without complication  - Continued PTA Montelukast 10mg     KATHRINE (obstructive sleep apnea)  - Continued PTA CPAP if available     Adjustment disorder with depressed mood  - Continued PTA Hydroxyzine 25mg PRN, Sertraline 150mg     Thrush - resolved   Patient with white film with bumps on her tongue that came around the time of admission. Some pain and discomfort with eating and drinking.  Improved with trial of nystatin swish and spit .  - Resolved    Clinically Significant  "Risk Factors         # Hypercalcemia: Highest iCa = 6.4 mg/dL in last 2 days, will monitor as appropriate  # Hypomagnesemia: Lowest Mg = 1.6 mg/dL in last 2 days, will replace as needed   # Hypoalbuminemia: Lowest albumin = 2.7 g/dL at 4/14/2025  2:43 PM, will monitor as appropriate     # Hypertension: Noted on problem list           # DMII: A1C = 6.8 % (Ref range: <5.7 %) within past 6 months   # Severe Obesity: Estimated body mass index is 40.23 kg/m  as calculated from the following:    Height as of this encounter: 1.626 m (5' 4\").    Weight as of this encounter: 106.3 kg (234 lb 5.6 oz).      # Financial/Environmental Concerns:    # Asthma: noted on problem list      Consultations This Hospital Stay Code Status Full Code    Physical Exam   Vital Signs: Temp: 98  F (36.7  C) Temp src: Oral BP: (!) 148/79 Pulse: 101   Resp: 18 SpO2: 93 % O2 Device: None (Room air)    Weight: 234 lbs 5.58 oz    Gen: Obese debilitated female, alert and oriented x 3, no acute distress, flat affect.  HEENT: Atraumatic, normocephalic; sclera non-injected, anicterric; oral mucosa moist, no lesion, no exudate  Lungs: Clear to ausculation, no wheezes, no rhonchi, no rales  Heart: Regular rate, regular rhythm, no gallops, no rubs, no murmurs  GI: Bowel sound normal, no hepatosplenomegaly, no masses, non-tender, non-distended, no guarding, no rebound tenderness  Extremities: No rashes, no edema  Musculoskeletal: Normal muscle mass, normal muscle tone,     45 MINUTES SPENT BY ME on the date of service doing chart review, history, exam, documentation & further activities per the note.     John Belcher MD  [facility]  ______________________________________________________________________    Primary Care Physician   Juli Ramsey    Discharge Orders      General info for SNF    Length of Stay Estimate: Long Term Care  Condition at Discharge: Stable  Level of care:skilled   Rehabilitation Potential: " Poor  Admission H&P remains valid and up-to-date: No (If no, please update H&P)  Recent Chemotherapy: N/A  Use Nursing Home Standing Orders: Yes     Mantoux instructions    Give two-step Mantoux (PPD) Per Facility Policy Yes     Follow Up and recommended labs and tests    Follow up with half-way physician.  The following labs/tests are recommended: CMP and CBC with differential.    Follow up with Dr Friedell from [facility] within 7 days.     Tubes and Drains    Current Tubes and Drains:     Drain  Duration           Urinary Drain 04/15/25 External Catheter 8 days              Reason for your hospital stay    This is a 54 year old female with metastatic neuroendocrine carcinoma, admitted with generalized weakness.     Glucose monitor nursing POCT    Before meals and at bedtime     Activity - Up with assistive device    50% weight bearing on RLE.     Weight bearing status    50% weight bearing on RLE     Physical Therapy Adult Consult    Evaluate and treat as clinically indicated.    Reason:  Generalized weakness     Occupational Therapy Adult Consult    Evaluate and treat as clinically indicated.    Reason:  Generalized weakness     Fall precautions     Diet    Follow this diet upon discharge: Orders Placed This Encounter      Combination Diet Moderate Consistent Carb (60 g CHO per Meal) Diet         Significant Results and Procedures     Discharge Medications   Current Discharge Medication List        START taking these medications    Details   apixaban ANTICOAGULANT (ELIQUIS) 5 MG tablet Take 1 tablet (5 mg) by mouth 2 times daily.    Associated Diagnoses: Other acute pulmonary embolism, unspecified whether acute cor pulmonale present (H)      diclofenac (VOLTAREN) 1 % topical gel Apply 4 g topically 4 times daily.    Associated Diagnoses: Pathological fracture of right femur due to neoplastic disease, initial encounter (H)      docusate sodium (COLACE) 100 MG capsule Take 1 capsule (100 mg) by mouth 2  times daily.    Associated Diagnoses: Pathological fracture of right femur due to neoplastic disease, initial encounter (H)      insulin aspart (NOVOLOG PEN) 100 UNIT/ML pen Inject 1 Units subcutaneously 3 times daily (with meals).    Associated Diagnoses: Type 2 diabetes mellitus with complication, with long-term current use of insulin (H)      LORazepam (ATIVAN) 0.5 MG tablet Take 1 tablet (0.5 mg) by mouth 2 times daily.  Qty: 10 tablet, Refills: 0    Associated Diagnoses: Generalized anxiety disorder      miconazole (MICATIN) 2 % external powder Apply topically 2 times daily. To perineum    Associated Diagnoses: Intertrigo      polyethylene glycol (MIRALAX) 17 g packet Take 17 g by mouth daily.    Associated Diagnoses: Pathological fracture of right femur due to neoplastic disease, initial encounter (H)           CONTINUE these medications which have CHANGED    Details   acetaminophen (TYLENOL) 325 MG tablet Take 3 tablets (975 mg) by mouth every 8 hours.    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    Associated Diagnoses: Hyperlipidemia, unspecified hyperlipidemia type      gabapentin (NEURONTIN) 300 MG capsule Take 1 capsule (300 mg) by mouth 2 times daily.  Qty: 10 capsule, Refills: 0    Associated Diagnoses: Pathological fracture of right femur due to neoplastic disease, initial encounter (H)      ibuprofen (ADVIL/MOTRIN) 400 MG tablet Take 1 tablet (400 mg) by mouth every 6 hours as needed for inflammatory pain.    Associated Diagnoses: Pathological fracture of right femur due to neoplastic disease, initial encounter (H)      insulin NPH-Regular (HUMULIN 70/30;NOVOLIN 70/30) susp Inject 15 Units subcutaneously 2 times daily (before meals).    Associated Diagnoses: Type 2  diabetes mellitus with complication, with long-term current use of insulin (H)      Lidocaine (LIDOCARE) 4 % Patch Place 2 patches over 12 hours onto the skin every 24 hours. To prevent lidocaine toxicity, patient should be patch free for 12 hrs daily.    Associated Diagnoses: Pathological fracture of right femur due to neoplastic disease, initial encounter (H)      oxyCODONE (ROXICODONE) 10 MG tablet Take 1 tablet (10 mg) by mouth every 3 hours as needed for severe pain.  Qty: 30 tablet, Refills: 0    Associated Diagnoses: Pathological fracture of right femur due to neoplastic disease, initial encounter (H)           CONTINUE these medications which have NOT CHANGED    Details   amLODIPine (NORVASC) 10 MG tablet Take 1 tablet (10 mg) by mouth daily  Qty: 90 tablet, Refills: 3    Comments: Profile  Associated Diagnoses: Benign essential hypertension      Continuous Glucose Sensor (FREESTYLE SANDRA 3 SENSOR) MISC 1 each every 14 days Use 1 sensor every 14 days. Use to read blood sugars per 's instructions.  Qty: 6 each, Refills: 5    Associated Diagnoses: Type 2 diabetes mellitus with hyperglycemia, with long-term current use of insulin (H)      hydrOXYzine HCl (ATARAX) 25 MG tablet Take 1 tablet (25 mg) by mouth every 6 hours as needed for other (adjuvant pain).  Qty: 90 tablet, Refills: 0      insulin pen needle (BD PEN NEEDLE TWYLA 2ND GEN) 32G X 4 MM miscellaneous USE TWICE DAILY OR AS DIRECTED  Qty: 200 each, Refills: 2    Associated Diagnoses: Type 2 diabetes mellitus without complication, with long-term current use of insulin (H)      irbesartan (AVAPRO) 300 MG tablet TAKE 1 TABLET(300 MG) BY MOUTH AT BEDTIME  Qty: 90 tablet, Refills: 2    Associated Diagnoses: Secondary hypertension      metFORMIN (GLUCOPHAGE) 1000 MG tablet TAKE 1 TABLET BY MOUTH TWICE DAILY WITH MEALS  Qty: 180 tablet, Refills: 1    Associated Diagnoses: Type 2 diabetes mellitus without complication, with long-term current use of  "insulin (H)      Misc. Devices (ROLLATOR ULTRA-LIGHT) MISC Extra side walker with front wheels for home use.  Purchase, lifetime need. Extra wide rolling walker (\"Walker 4\", item #:  [id number]) needed for safe transfers and ambulation.  Pt weight is 335 lbs.      montelukast (SINGULAIR) 10 MG tablet Take 1 tablet (10 mg) by mouth at bedtime  Qty: 90 tablet, Refills: 3    Comments: Profile  Associated Diagnoses: Moderate persistent asthma without complication      senna-docusate (SENOKOT-S/PERICOLACE) 8.6-50 MG tablet Take 1 tablet by mouth 2 times daily as needed for constipation.    Associated Diagnoses: Pathological fracture of right femur due to neoplastic disease, initial encounter (H)      sertraline (ZOLOFT) 100 MG tablet Take 1.5 tablets (150 mg) by mouth daily  Qty: 135 tablet, Refills: 3    Comments: Profile  Associated Diagnoses: Adjustment disorder with anxious mood           STOP taking these medications       enoxaparin ANTICOAGULANT (LOVENOX) 40 MG/0.4ML syringe Comments:   Reason for Stopping:         naloxone (NARCAN) 4 MG/0.1ML nasal spray Comments:   Reason for Stopping:         Semaglutide, 2 MG/DOSE, (OZEMPIC, 2 MG/DOSE,) 8 MG/3ML pen Comments:   Reason for Stopping:             Allergies   Allergies   Allergen Reactions    Sulfa Antibiotics Shortness Of Breath, Hives and Rash    Bydureon [Exenatide] Diarrhea    Penicillins Rash and Hives     "

## 2025-04-24 NOTE — PROGRESS NOTES
SPIRITUAL HEALTH SERVICES  [facility] - Med Surg    Referral Source: Follow up from previous visit    - Brissa was in bed.  , Ian, was not present like first visit.  Brissa's affect was rather flat.  We talked about her transitioning to another place in [place].  She wasn't sure where it was but her voice was tired.      - She talked about her two children, a son 37 who has 4 children.  A daughter who is 30 who just got engaged.  Along with her  Ian she said they were the most important people in her life.    - She reflected on taking life day by day, sometimes hour by hour.  I provided supportive listening and offered prayer.  She welcomed the support of prayer and expressed appreciation for the visit.     Plan:  will remain available for any ongoing support needs during LOS.    John Berg M.A., [facility]  Staff Chaplain [facility]  Office: [phone]

## 2025-04-24 NOTE — PLAN OF CARE
Problem: Adult Inpatient Plan of Care  Goal: Absence of Hospital-Acquired Illness or Injury  Intervention: Prevent Skin Injury  Recent Flowsheet Documentation  Taken 4/24/2025 0245 by Chip Brice, RN  Body Position: (pt declined repostioning) other (see comments)  Taken 4/24/2025 0227 by Chip Brice, RN  Body Position:   weight shifting   turned   left  Taken 4/23/2025 2345 by Chip Brice, RN  Body Position: weight shifting  Taken 4/23/2025 2120 by Chip Brice, RN  Body Position: (pt declined repostioning) other (see comments)  Goal: Optimal Comfort and Wellbeing  Outcome: Not Progressing   Goal Outcome Evaluation:  Pt is alert and orientated, able to make needs known. Has had a very flat affect during shift and declined re postioning at certain points. Will let staff know if she is uncomfortable, was able to shift some weight off of coccyx and pt stated it felt better. Has not requested any prn's during shift. Resting most of the night. Plan to discharge to [facility] later today.

## 2025-04-25 DIAGNOSIS — F41.1 GENERALIZED ANXIETY DISORDER: ICD-10-CM

## 2025-04-28 ENCOUNTER — TELEPHONE (OUTPATIENT)
Dept: GERIATRICS | Facility: CLINIC | Age: 55
End: 2025-04-28

## 2025-04-28 ENCOUNTER — NURSING HOME VISIT (OUTPATIENT)
Dept: GERIATRICS | Facility: CLINIC | Age: 55
End: 2025-04-28
Payer: COMMERCIAL

## 2025-04-28 VITALS
HEIGHT: 64 IN | HEART RATE: 107 BPM | DIASTOLIC BLOOD PRESSURE: 87 MMHG | OXYGEN SATURATION: 90 % | BODY MASS INDEX: 40.23 KG/M2 | TEMPERATURE: 97 F | RESPIRATION RATE: 18 BRPM | SYSTOLIC BLOOD PRESSURE: 158 MMHG

## 2025-04-28 DIAGNOSIS — C7B.8 NEUROENDOCRINE CARCINOMA METASTATIC TO BONE (H): Primary | ICD-10-CM

## 2025-04-28 DIAGNOSIS — Z86.73 HISTORY OF CVA (CEREBROVASCULAR ACCIDENT): ICD-10-CM

## 2025-04-28 DIAGNOSIS — C7A.8 NEUROENDOCRINE CARCINOMA METASTATIC TO BONE (H): Primary | ICD-10-CM

## 2025-04-28 DIAGNOSIS — E11.65 TYPE 2 DIABETES MELLITUS WITH HYPERGLYCEMIA, WITH LONG-TERM CURRENT USE OF INSULIN (H): ICD-10-CM

## 2025-04-28 DIAGNOSIS — R29.6 MULTIPLE FALLS: Primary | ICD-10-CM

## 2025-04-28 DIAGNOSIS — A49.9 UTI (URINARY TRACT INFECTION), BACTERIAL: ICD-10-CM

## 2025-04-28 DIAGNOSIS — K59.00 CONSTIPATION, UNSPECIFIED CONSTIPATION TYPE: ICD-10-CM

## 2025-04-28 DIAGNOSIS — N39.0 UTI (URINARY TRACT INFECTION), BACTERIAL: ICD-10-CM

## 2025-04-28 DIAGNOSIS — C7B.8 NEUROENDOCRINE CARCINOMA METASTATIC TO BONE (H): ICD-10-CM

## 2025-04-28 DIAGNOSIS — Z79.4 TYPE 2 DIABETES MELLITUS WITH HYPERGLYCEMIA, WITH LONG-TERM CURRENT USE OF INSULIN (H): ICD-10-CM

## 2025-04-28 DIAGNOSIS — M62.81 GENERALIZED MUSCLE WEAKNESS: ICD-10-CM

## 2025-04-28 DIAGNOSIS — M84.551D PATHOLOGICAL FRACTURE OF RIGHT FEMUR DUE TO NEOPLASTIC DISEASE WITH ROUTINE HEALING, SUBSEQUENT ENCOUNTER: ICD-10-CM

## 2025-04-28 DIAGNOSIS — I26.99 OTHER ACUTE PULMONARY EMBOLISM, UNSPECIFIED WHETHER ACUTE COR PULMONALE PRESENT (H): ICD-10-CM

## 2025-04-28 DIAGNOSIS — R23.9 IMPAIRED SKIN INTEGRITY: ICD-10-CM

## 2025-04-28 DIAGNOSIS — C7A.8 NEUROENDOCRINE CARCINOMA METASTATIC TO BONE (H): ICD-10-CM

## 2025-04-28 PROCEDURE — 99309 SBSQ NF CARE MODERATE MDM 30: CPT | Performed by: NURSE PRACTITIONER

## 2025-04-28 RX ORDER — OXYCODONE HYDROCHLORIDE 5 MG/1
5 TABLET ORAL 3 TIMES DAILY
Qty: 10 TABLET | Refills: 0 | Status: SHIPPED | OUTPATIENT
Start: 2025-04-28 | End: 2025-04-29

## 2025-04-28 RX ORDER — LORAZEPAM 0.5 MG/1
0.5 TABLET ORAL 2 TIMES DAILY
Qty: 60 TABLET | Refills: 0 | Status: SHIPPED | OUTPATIENT
Start: 2025-04-28

## 2025-04-28 NOTE — LETTER
2025      Brissa Mott  [address]  [facility] [address]        [facility] GERIATRICS  Primary Care Provider & Clinic: Arti Montero NP, [address] [facility] [address]  Chief Complaint   Patient presents with    LTC Admission    Hospital F/U     [facility] 2025 - 2025     [place] Medical Record Number: [id number]  Place of Service Where Encounter Took Place: [facility] () [[id number]]    Brissa Mott is a 54 year old (1970), admitted to the above facility from  [facility]. Hospital stay 25 through 25.    HPI:    ***    CODE STATUS/ADVANCE DIRECTIVES DISCUSSION: Full Code - {CODE STATUS:120969}  Patient's living condition: lives in a skilled nursing facility  ALLERGIES:   Allergies   Allergen Reactions    Sulfa Antibiotics Shortness Of Breath, Hives and Rash    Bydureon [Exenatide] Diarrhea    Penicillins Rash and Hives      PAST MEDICAL HISTORY:   Past Medical History:   Diagnosis Date    Asthma     Cerebral infarction (H)     Diabetes (H)     Hypertension     Mixed hyperlipidemia     Morbid obesity (H)     KATHRINE (obstructive sleep apnea)     Osteoarthritis       PAST SURGICAL HISTORY:  has a past surgical history that includes  section and Insertion, Intramedullary Humphrey, Retrograde, For Femoral Shaft Fracture (Right, 2025).  FAMILY HISTORY: family history is not on file.  SOCIAL HISTORY:  reports that she has quit smoking. She has never used smokeless tobacco. She reports that she does not currently use alcohol. She reports that she does not use drugs.    Post Discharge Medication Reconciliation Status:  MED REC REQUIRED{TIP  Click the link below to document or use med rec list, use list to pull in response :[id number]}  Post Medication Reconciliation Status: {MED REC LIST:067048}    Current Outpatient Medications   Medication Sig Dispense Refill    acetaminophen (TYLENOL) 325 MG tablet Take 3 tablets (975 mg)  by mouth every 8 hours.      amLODIPine (NORVASC) 10 MG tablet Take 1 tablet (10 mg) by mouth daily 90 tablet 3    apixaban ANTICOAGULANT (ELIQUIS) 5 MG tablet Take 1 tablet (5 mg) by mouth 2 times daily.      atorvastatin (LIPITOR) 40 MG tablet Take 1 tablet (40 mg) by mouth at bedtime.      Continuous Glucose Sensor (FREESTYLE SANDRA 3 SENSOR) [facility] 1 each every 14 days Use 1 sensor every 14 days. Use to read blood sugars per 's instructions. 6 each 5    diclofenac (VOLTAREN) 1 % topical gel Apply 4 g topically 4 times daily.      docusate sodium (COLACE) 100 MG capsule Take 1 capsule (100 mg) by mouth 2 times daily.      gabapentin (NEURONTIN) 300 MG capsule Take 1 capsule (300 mg) by mouth 2 times daily. 10 capsule 0    hydrOXYzine HCl (ATARAX) 25 MG tablet Take 1 tablet (25 mg) by mouth every 6 hours as needed for other (adjuvant pain). 90 tablet 0    ibuprofen (ADVIL/MOTRIN) 400 MG tablet Take 1 tablet (400 mg) by mouth every 6 hours as needed for inflammatory pain.      insulin aspart (NOVOLOG PEN) 100 UNIT/ML pen Inject 1 Units subcutaneously 3 times daily (with meals).      insulin NPH-Regular (HUMULIN 70/30;NOVOLIN 70/30) susp Inject 15 Units subcutaneously 2 times daily (before meals).      insulin pen needle (BD PEN NEEDLE TWYLA 2ND GEN) 32G X 4 MM miscellaneous USE TWICE DAILY OR AS DIRECTED 200 each 2    irbesartan (AVAPRO) 300 MG tablet TAKE 1 TABLET(300 MG) BY MOUTH AT BEDTIME 90 tablet 2    Lidocaine (LIDOCARE) 4 % Patch Place 2 patches over 12 hours onto the skin every 24 hours. To prevent lidocaine toxicity, patient should be patch free for 12 hrs daily.      LORazepam (ATIVAN) 0.5 MG tablet Take 1 tablet (0.5 mg) by mouth 2 times daily. 10 tablet 0    metFORMIN (GLUCOPHAGE) 1000 MG tablet TAKE 1 TABLET BY MOUTH TWICE DAILY WITH MEALS 180 tablet 1    miconazole (MICATIN) 2 % external powder Apply topically 2 times daily. To perineum      Misc. Devices (ROLLATOR ULTRA-LIGHT) MISC Extra  "side walker with front wheels for home use.  Purchase, lifetime need. Extra wide rolling walker (\"Walker 4\", item #:  [id number]) needed for safe transfers and ambulation.  Pt weight is 335 lbs.      montelukast (SINGULAIR) 10 MG tablet Take 1 tablet (10 mg) by mouth at bedtime 90 tablet 3    oxyCODONE (ROXICODONE) 10 MG tablet Take 1 tablet (10 mg) by mouth every 3 hours as needed for severe pain. 30 tablet 0    [START ON 4/25/2025] polyethylene glycol (MIRALAX) 17 g packet Take 17 g by mouth daily.      senna-docusate (SENOKOT-S/PERICOLACE) 8.6-50 MG tablet Take 1 tablet by mouth 2 times daily as needed for constipation.      sertraline (ZOLOFT) 100 MG tablet Take 1.5 tablets (150 mg) by mouth daily 135 tablet 3     No current facility-administered medications for this visit.     Facility-Administered Medications Ordered in Other Visits   Medication Dose Route Frequency Provider Last Rate Last Admin    acetaminophen (TYLENOL) tablet 975 mg  975 mg Oral Q8H Reynaldo Wong DO   975 mg at 04/24/25 1226    alum & mag hydroxide-simethicone (MAALOX) suspension 30 mL  30 mL Oral Q4H PRN Yohan Adames PA-C        amLODIPine (NORVASC) tablet 10 mg  10 mg Oral Daily Yohan Adames PA-C   10 mg at 04/24/25 0857    apixaban ANTICOAGULANT (ELIQUIS) tablet 5 mg  5 mg Oral BID Reynaldo Wong DO   5 mg at 04/24/25 0857    atorvastatin (LIPITOR) tablet 40 mg  40 mg Oral At Bedtime Colby Meyers MD   40 mg at 04/23/25 2109    benzocaine-menthol (CHLORASEPTIC) 6-10 MG lozenge 1 lozenge  1 lozenge Buccal Q1H PRN Yohan Adames PA-C        glucose gel 15-30 g  15-30 g Oral Q15 Min PRN Yohan Adames PA-C        Or    dextrose 50 % injection 25-50 mL  25-50 mL Intravenous Q15 Min PRN Yohan Adames PA-C        Or    glucagon injection 1 mg  1 mg Subcutaneous Q15 Min PRN Yohan Adames PA-C        diclofenac (VOLTAREN) 1 % topical gel 4 g  4 g Topical 4x Daily Reynaldo Wong DO   4 g at 04/24/25 1213    docusate sodium " (COLACE) capsule 100 mg  100 mg Oral BID Sofie Molina MD   100 mg at 04/24/25 0858    gabapentin (NEURONTIN) capsule 300 mg  300 mg Oral BID Yohan Adames PA-C   300 mg at 04/24/25 0857    glycerin (ADULT) Suppository 1 suppository  1 suppository Rectal Daily PRN Sofie Molina MD        HYDROmorphone (DILAUDID) injection 0.2 mg  0.2 mg Intravenous Q3H PRN Elsie Ponce PA-C   0.2 mg at 04/19/25 0934    hydrOXYzine HCl (ATARAX) tablet 25 mg  25 mg Oral Q6H PRN Yohan Adames PA-C   25 mg at 04/24/25 1326    ibuprofen (ADVIL/MOTRIN) tablet 400 mg  400 mg Oral Q6H PRN Reynaldo Wong DO   400 mg at 04/20/25 1425    insulin aspart (NovoLOG) injection (RAPID ACTING)  1-7 Units Subcutaneous TID AC Yohan Adames PA-C   1 Units at 04/24/25 1212    insulin aspart (NovoLOG) injection (RAPID ACTING)  1-5 Units Subcutaneous At Bedtime Yohan Adames PA-C   1 Units at 04/21/25 2230    insulin NPH-Regular (HUMULIN 70/30;NOVOLIN 70/30) injection 15 Units  15 Units Subcutaneous BID AC Yohan Adames PA-C   12 Units at 04/24/25 1125    irbesartan (AVAPRO) tablet 300 mg  300 mg Oral At Bedtime Yohan Adames PA-C   300 mg at 04/23/25 2110    Lidocaine (LIDOCARE) 4 % Patch 2 patch  2 patch Transdermal Q24H PRN Colby Meyers MD   2 patch at 04/20/25 0446    lidocaine (LMX4) kit   Topical Q1H PRN Yohan Adames PA-C        lidocaine 1 % 0.1-1 mL  0.1-1 mL Other Q1H PRN Yohan Adames PA-C        LORazepam (ATIVAN) tablet 0.5 mg  0.5 mg Oral BID Lyndon Khan APRN CNP   0.5 mg at 04/24/25 0857    melatonin tablet 5 mg  5 mg Oral At Bedtime PRN Yohan Adames PA-C   5 mg at 04/19/25 2228    metFORMIN (GLUCOPHAGE) tablet 1,000 mg  1,000 mg Oral BID w/meals Reynaldo Wong DO   1,000 mg at 04/24/25 0857    miconazole (MICATIN) 2 % powder   Topical BID Reynaldo Wong DO   Given at 04/24/25 0859    montelukast (SINGULAIR) tablet 10 mg  10 mg Oral At Bedtime Yohan Adames PA-C   10 mg at  04/23/25 2110    naloxone (NARCAN) injection 0.2 mg  0.2 mg Intravenous Q2 Min PRN Bruce Cross MD        Or    naloxone (NARCAN) injection 0.4 mg  0.4 mg Intravenous Q2 Min PRN Bruce Cross MD        Or    naloxone (NARCAN) injection 0.2 mg  0.2 mg Intramuscular Q2 Min PRN Bruce Cross MD        Or    naloxone (NARCAN) injection 0.4 mg  0.4 mg Intramuscular Q2 Min PRN Bruce Cross MD        ondansetron (ZOFRAN ODT) ODT tab 4 mg  4 mg Oral Q6H PRN Bruec Cross MD        Or    ondansetron (ZOFRAN) injection 4 mg  4 mg Intravenous Q6H PRN Bruce Cross MD        oxyCODONE (ROXICODONE) tablet 10 mg  10 mg Oral Q3H PRN Yohan Adames PA-C   10 mg at 04/24/25 1326    oxyCODONE (ROXICODONE) tablet 5 mg  5 mg Oral Q3H PRN Yohan Adames PA-C   5 mg at 04/19/25 2008    Patient is already receiving anticoagulation with heparin, enoxaparin (LOVENOX), warfarin (COUMADIN)  or other anticoagulant medication   Does not apply Continuous PRN Yohan Adames PA-C        polyethylene glycol (MIRALAX) Packet 17 g  17 g Oral Daily Sofie Molina MD   17 g at 04/21/25 0905    polyethylene glycol (MIRALAX) Packet 17 g  17 g Oral BID PRN Yohan Adames PA-C        potassium & sodium phosphates (NEUTRA-PHOS) Packet 1 packet  1 packet Oral or Feeding Tube Q4H John Belcher MD   1 packet at 04/24/25 1125    prochlorperazine (COMPAZINE) injection 10 mg  10 mg Intravenous Q6H PRN Yohan Adames PA-C        Or    prochlorperazine (COMPAZINE) tablet 10 mg  10 mg Oral Q6H PRN Yohan Adames PA-C        senna-docusate (SENOKOT-S/PERICOLACE) 8.6-50 MG per tablet 1 tablet  1 tablet Oral BID PRN Yohan Adames PA-C   1 tablet at 04/16/25 0047    sertraline (ZOLOFT) tablet 150 mg  150 mg Oral At Bedtime Yohan Adames PA-C   150 mg at 04/23/25 2109    sodium chloride (PF) 0.9% PF flush 3 mL  3 mL Intracatheter Q8H [facility] Yohan Adames PA-C   3 mL at  04/24/25 1125    sodium chloride (PF) 0.9% PF flush 3 mL  3 mL Intracatheter q1 min prn Yohan Adames PA-C   3 mL at 04/19/25 2007     ROS:  {ROS FGS:738928}    Vitals:  LMP  (LMP Unknown)   Exam:  {Nursing home physical exam :323541}    Lab/Diagnostic Data:  {fgslab:048193}    ASSESSMENT/PLAN:  {FGS DX INITIAL:047365}    Orders:  {fgsorders:902851}    Total time spent with patient visit at the skilled nursing facility was {1/2/3/4/5:039362} including {1/2/3/4/5:877578}. Greater than 50% of total time spent with counseling and coordinating care due to ***.     Electronically signed by: Aishwarya Hidalgo***      Sincerely,        Arti Montero NP    Electronically signed

## 2025-04-28 NOTE — PROGRESS NOTES
[facility] GERIATRICS  Primary Care Provider & Clinic: Arti Montero NP, [address] [facility] [address]       Chief Complaint   Patient presents with    LTC Admission    Hospital F/U       [facility] 4/14/2025 - 4/24/2025      [place] Medical Record Number: [id number]  Place of Service Where Encounter Took Place: [facility] () [[id number]]     Brissa Mott is a 54 year old (1970), admitted to the above facility from  [facility]. Hospital stay 4/14/25 through 4/24/25.    HPI:    Brief Summary of Hospital Course: Brissa is a 53 y/o female who was admitted to [facility] after multiple falls in at home. Recent dx of neuroendocrine carcinoma + pathologic fracture to the R femur s/p ORIF 2/25/25. Admission also notable for PE (started on DOAC) and UTI w/ staples placed. Completed IV ceftriaxone      MEDICATION CHANGES: Start Eliquis  RECOMMENDED FOLLOW UP: CBC, BMP, Onc follow up within 7 days  Updates on Status Since Skilled nursing Admission: Pt remains stable. No nursing concerns noted.     Today: Pt visited at bedside. Reports that her primary concern is her new cancer diagnosis and is worried about her family. She is having pain to her knees and R shoulder. Additionally she reports that she received senna  last night and Miralax this AM but still feeling constipated.     External notes reviewed: Facility EHR including progress notes, vital signs. Hospital discharge summary reviewed. Hospital discharge orders reviewed.    CODE STATUS/ADVANCE DIRECTIVES DISCUSSION: Full Code    ALLERGIES:   Allergies   Allergen Reactions    Sulfa Antibiotics Shortness Of Breath, Hives and Rash    Bydureon [Exenatide] Diarrhea    Penicillins Rash and Hives      PAST MEDICAL HISTORY:   Past Medical History:   Diagnosis Date    Asthma     Cerebral infarction (H)     Diabetes (H)     Hypertension     Mixed hyperlipidemia     Morbid obesity (H)      KATHRINE (obstructive sleep apnea)     Osteoarthritis       PAST SURGICAL HISTORY:   has a past surgical history that includes  section and Insertion, Intramedullary Humphrey, Retrograde, For Femoral Shaft Fracture (Right, 2025).  FAMILY HISTORY: family history is not on file.  SOCIAL HISTORY:   reports that she has quit smoking. She has never used smokeless tobacco. She reports that she does not currently use alcohol. She reports that she does not use drugs.    Post Discharge Medication Reconciliation Status: discharge medications reconciled and changed, per note/orders  Current Outpatient Medications   Medication Sig Dispense Refill    oxyCODONE (ROXICODONE) 5 MG tablet Take 1 tablet (5 mg) by mouth 3 times daily. May also take 2 tablets (10 mg) every 3 hours as needed for pain. 120 tablet 0    acetaminophen (TYLENOL) 325 MG tablet Take 3 tablets (975 mg) by mouth every 8 hours.      amLODIPine (NORVASC) 10 MG tablet Take 1 tablet (10 mg) by mouth daily 90 tablet 3    apixaban ANTICOAGULANT (ELIQUIS) 5 MG tablet Take 1 tablet (5 mg) by mouth 2 times daily.      atorvastatin (LIPITOR) 40 MG tablet Take 1 tablet (40 mg) by mouth at bedtime.      Continuous Glucose Sensor (FREESTYLE SANDRA 3 SENSOR) MISC 1 each every 14 days Use 1 sensor every 14 days. Use to read blood sugars per 's instructions. 6 each 5    diclofenac (VOLTAREN) 1 % topical gel Apply 4 g topically 4 times daily.      docusate sodium (COLACE) 100 MG capsule Take 1 capsule (100 mg) by mouth 2 times daily.      gabapentin (NEURONTIN) 300 MG capsule Take 1 capsule (300 mg) by mouth 2 times daily. 10 capsule 0    hydrOXYzine HCl (ATARAX) 25 MG tablet Take 1 tablet (25 mg) by mouth every 6 hours as needed for other (adjuvant pain). 90 tablet 0    ibuprofen (ADVIL/MOTRIN) 400 MG tablet Take 1 tablet (400 mg) by mouth every 6 hours as needed for inflammatory pain.      insulin aspart (NOVOLOG PEN) 100 UNIT/ML pen Inject 1 Units subcutaneously  "3 times daily (with meals).      insulin NPH-Regular (HUMULIN 70/30;NOVOLIN 70/30) susp Inject 15 Units subcutaneously 2 times daily (before meals).      insulin pen needle (BD PEN NEEDLE TWYLA 2ND GEN) 32G X 4 MM miscellaneous USE TWICE DAILY OR AS DIRECTED 200 each 2    irbesartan (AVAPRO) 300 MG tablet TAKE 1 TABLET(300 MG) BY MOUTH AT BEDTIME 90 tablet 2    Lidocaine (LIDOCARE) 4 % Patch Place 2 patches over 12 hours onto the skin every 24 hours. To prevent lidocaine toxicity, patient should be patch free for 12 hrs daily.      LORazepam (ATIVAN) 0.5 MG tablet Take 1 tablet (0.5 mg) by mouth 2 times daily. 60 tablet 0    metFORMIN (GLUCOPHAGE) 1000 MG tablet TAKE 1 TABLET BY MOUTH TWICE DAILY WITH MEALS 180 tablet 1    miconazole (MICATIN) 2 % external powder Apply topically 2 times daily. To perineum      Misc. Devices (ROLLATOR ULTRA-LIGHT) MISC Extra side walker with front wheels for home use.  Purchase, lifetime need. Extra wide rolling walker (\"Walker 4\", item #:  [id number]) needed for safe transfers and ambulation.  Pt weight is 335 lbs.      montelukast (SINGULAIR) 10 MG tablet Take 1 tablet (10 mg) by mouth at bedtime 90 tablet 3    oxyCODONE (ROXICODONE) 10 MG tablet Take 1 tablet (10 mg) by mouth every 3 hours as needed for severe pain. 30 tablet 0    polyethylene glycol (MIRALAX) 17 g packet Take 17 g by mouth daily.      senna-docusate (SENOKOT-S/PERICOLACE) 8.6-50 MG tablet Take 1 tablet by mouth 2 times daily as needed for constipation.      sertraline (ZOLOFT) 100 MG tablet Take 1.5 tablets (150 mg) by mouth daily 135 tablet 3     No current facility-administered medications for this visit.       ROS:  10 point ROS of systems including Constitutional, Eyes, Respiratory, Cardiovascular, Gastroenterology, Genitourinary, Integumentary, Musculoskeletal, Psychiatric were all negative except for pertinent positives noted in my HPI.    Vitals:  BP (!) 158/87   Pulse 107   Temp 97  F (36.1  C)   Resp 18  " " Ht 1.626 m (5' 4\")   LMP  (LMP Unknown)   SpO2 (!) 90%   BMI 40.23 kg/m    Exam:  GENERAL APPEARANCE:  Alert, in no distress  RESP:  respiratory effort and palpation of chest normal, no respiratory distress, lungs sounds clear  CV:  Palpation and auscultation of heart done , regular rate and rhythm, no murmur, rub, or gallop, 2 + edema to BLE  ABDOMEN:  normal bowel sounds, soft, nontender, no hepatosplenomegaly or other masses  M/S:  Gait and station appropriate, no tenderness to knees and R shoulder   SKIN:  Inspection and palpation of skin and subcutaneous tissue at baseline  PSYCH:  insight and judgement, memory baseline, affect and mood normal    Lab/Diagnostic data: Pertinent hospital labs:    Latest Reference Range & Units 04/20/25 06:07   Sodium 135 - 145 mmol/L 134 (L)   Potassium 3.4 - 5.3 mmol/L 5.0   Chloride 98 - 107 mmol/L 99   Carbon Dioxide (CO2) 22 - 29 mmol/L 23   Urea Nitrogen 6.0 - 20.0 mg/dL 8.9   Creatinine 0.51 - 0.95 mg/dL 0.46 (L)   GFR Estimate >60 mL/min/1.73m2 >90   Calcium 8.8 - 10.4 mg/dL 10.4   Anion Gap 7 - 15 mmol/L 12   Magnesium 1.7 - 2.3 mg/dL 1.8   Phosphorus 2.5 - 4.5 mg/dL 2.3 (L)   Glucose 70 - 99 mg/dL 173 (H)   (L): Data is abnormally low  (H): Data is abnormally high    ASSESSMENT/PLAN:  (R29.6) Multiple falls  (primary encounter diagnosis)  (M62.81) Generalized muscle weakness  (Z86.73) History of CVA (cerebrovascular accident)  Comment: Generalized weakness, multiple falls r/t multiple chronic disease + new diagnosis cancer w/ bone mets  Plan: PT/OT assessment today. 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 Monitor pain and continue w/ PRN analgesia.    (R23.9) Impaired skin integrity  Comment: PU to sacrum, IAD  Plan: " Monitor skin for further breakdown. Continue w/ thorough bonita care.     (E11.65,  Z79.4) Type 2 diabetes mellitus with hyperglycemia, with long-term current use of insulin (H)  Comment: BG elevated. Most recent 198 mg/dL  Plan: Continue w/ Ozempic and insulin. May consider adding sliding scale for meals if BG continues to be elevated >200 mg/dL    (N39.0,  A49.9) UTI (urinary tract infection), bacterial  Comment: Antiboitic course completed.  Plan: Monitor for s/s of recurrance including hematuria, fevers, or dysuria.     (I26.99) Other acute pulmonary embolism, unspecified whether acute cor pulmonale present (H)  Comment: Denies dyspnea or chest pain  Plan: Continue w/ scheduled anticoagulation. Monitor for s/s of worsening embolism including dyspnea, chest pain, or hypotension.     (K59.00) Constipation  Comment: Pt reporting mild bowel cramping. Has received PRN senna and Miralax in the last 24 hours.  Plan: Schedule senna. PRN suppository for additional support    Orders:  - CBC, BMP on 5/5  - Schedule Senna 2 tabs PO BID for constipation  - PRN Bisacodyl suppository X 1   - add oxycodone 5 mg po TID    Electronically signed by: Catie Travis RN    I (the provider) was present with professional student who participated in this service and in the documentation of this service. I have verified the HPI/ROS, history, and personally performed the physical exam and the healthcare decision making. I agree with all elements as documented above.      Electronically signed by:  Arti Montero NP

## 2025-04-29 RX ORDER — OXYCODONE HYDROCHLORIDE 5 MG/1
TABLET ORAL
Qty: 120 TABLET | Refills: 0 | Status: SHIPPED | OUTPATIENT
Start: 2025-04-29

## 2025-04-29 NOTE — TELEPHONE ENCOUNTER
Received call from facility. Per their records, to continue Oxycodone 10 mg every 3 hours PRN pain. However, they have written order from provider today to also schedule 5 mg TID starting tonight but didn't receive a valid script. I do note patient has some charts to merge but don't see record of that in other charts. Reviewed PDMP database and do see the Oxycodone 10 mg fill from last week but not the 5 mg. I sent in quantity 10 of the 5 mg strength and will ask provider to clarify tomorrow please.    Sara Liao, DO  [facility]

## 2025-05-01 ENCOUNTER — LAB REQUISITION (OUTPATIENT)
Dept: LAB | Facility: CLINIC | Age: 55
End: 2025-05-01

## 2025-05-01 DIAGNOSIS — C7A.00 MALIGNANT CARCINOID TUMOR OF UNSPECIFIED SITE (H): ICD-10-CM

## 2025-05-02 ENCOUNTER — HOSPITAL ENCOUNTER (INPATIENT)
Facility: CLINIC | Age: 55
LOS: 4 days | Discharge: NURSING, REHAB OR LONG-TERM CARE | DRG: 640 | End: 2025-05-06
Attending: FAMILY MEDICINE | Admitting: FAMILY MEDICINE
Payer: COMMERCIAL

## 2025-05-02 ENCOUNTER — APPOINTMENT (OUTPATIENT)
Dept: LAB | Facility: CLINIC | Age: 55
End: 2025-05-02
Payer: COMMERCIAL

## 2025-05-02 DIAGNOSIS — E83.52 HYPERCALCEMIA: ICD-10-CM

## 2025-05-02 DIAGNOSIS — F41.1 GENERALIZED ANXIETY DISORDER: ICD-10-CM

## 2025-05-02 DIAGNOSIS — C7B.8 NEUROENDOCRINE CARCINOMA METASTATIC TO BONE (H): ICD-10-CM

## 2025-05-02 DIAGNOSIS — L89.153 PRESSURE INJURY OF SACRAL REGION, STAGE 3 (H): ICD-10-CM

## 2025-05-02 DIAGNOSIS — C7A.8 NEUROENDOCRINE CARCINOMA METASTATIC TO BONE (H): ICD-10-CM

## 2025-05-02 DIAGNOSIS — J45.909 UNCOMPLICATED ASTHMA, UNSPECIFIED ASTHMA SEVERITY, UNSPECIFIED WHETHER PERSISTENT: Chronic | ICD-10-CM

## 2025-05-02 DIAGNOSIS — M84.551A PATHOLOGICAL FRACTURE OF RIGHT FEMUR DUE TO NEOPLASTIC DISEASE, INITIAL ENCOUNTER (H): Primary | ICD-10-CM

## 2025-05-02 LAB
ALBUMIN SERPL BCG-MCNC: 3.2 G/DL (ref 3.5–5.2)
ALBUMIN SERPL BCG-MCNC: 3.4 G/DL (ref 3.5–5.2)
ALP SERPL-CCNC: 191 U/L (ref 40–150)
ALP SERPL-CCNC: 212 U/L (ref 40–150)
ALT SERPL W P-5'-P-CCNC: 12 U/L (ref 0–50)
ALT SERPL W P-5'-P-CCNC: 14 U/L (ref 0–50)
ANION GAP SERPL CALCULATED.3IONS-SCNC: 10 MMOL/L (ref 7–15)
ANION GAP SERPL CALCULATED.3IONS-SCNC: 14 MMOL/L (ref 7–15)
AST SERPL W P-5'-P-CCNC: 94 U/L (ref 0–45)
AST SERPL W P-5'-P-CCNC: 96 U/L (ref 0–45)
BASOPHILS # BLD AUTO: 0.1 10E3/UL (ref 0–0.2)
BASOPHILS NFR BLD AUTO: 1 %
BILIRUB SERPL-MCNC: 0.3 MG/DL
BILIRUB SERPL-MCNC: 0.3 MG/DL
BUN SERPL-MCNC: 20.6 MG/DL (ref 6–20)
BUN SERPL-MCNC: 21.2 MG/DL (ref 6–20)
CA-I BLD-MCNC: 7.3 MG/DL (ref 4.4–5.2)
CALCIUM SERPL-MCNC: 14.1 MG/DL (ref 8.8–10.4)
CALCIUM SERPL-MCNC: 14.8 MG/DL (ref 8.8–10.4)
CHLORIDE SERPL-SCNC: 95 MMOL/L (ref 98–107)
CHLORIDE SERPL-SCNC: 97 MMOL/L (ref 98–107)
CREAT SERPL-MCNC: 0.51 MG/DL (ref 0.51–0.95)
CREAT SERPL-MCNC: 0.54 MG/DL (ref 0.51–0.95)
EGFRCR SERPLBLD CKD-EPI 2021: >90 ML/MIN/1.73M2
EGFRCR SERPLBLD CKD-EPI 2021: >90 ML/MIN/1.73M2
EOSINOPHIL # BLD AUTO: 0.2 10E3/UL (ref 0–0.7)
EOSINOPHIL NFR BLD AUTO: 2 %
ERYTHROCYTE [DISTWIDTH] IN BLOOD BY AUTOMATED COUNT: 18.6 % (ref 10–15)
GLUCOSE BLDC GLUCOMTR-MCNC: 138 MG/DL (ref 70–99)
GLUCOSE SERPL-MCNC: 144 MG/DL (ref 70–99)
GLUCOSE SERPL-MCNC: 167 MG/DL (ref 70–99)
HCO3 SERPL-SCNC: 23 MMOL/L (ref 22–29)
HCO3 SERPL-SCNC: 24 MMOL/L (ref 22–29)
HCT VFR BLD AUTO: 32.7 % (ref 35–47)
HGB BLD-MCNC: 9.6 G/DL (ref 11.7–15.7)
HOLD SPECIMEN: NORMAL
HOLD SPECIMEN: NORMAL
IMM GRANULOCYTES # BLD: 0.3 10E3/UL
IMM GRANULOCYTES NFR BLD: 3 %
LYMPHOCYTES # BLD AUTO: 1.7 10E3/UL (ref 0.8–5.3)
LYMPHOCYTES NFR BLD AUTO: 14 %
MAGNESIUM SERPL-MCNC: 1.9 MG/DL (ref 1.7–2.3)
MAGNESIUM SERPL-MCNC: 2.1 MG/DL (ref 1.7–2.3)
MCH RBC QN AUTO: 23.4 PG (ref 26.5–33)
MCHC RBC AUTO-ENTMCNC: 29.4 G/DL (ref 31.5–36.5)
MCV RBC AUTO: 80 FL (ref 78–100)
MONOCYTES # BLD AUTO: 1.1 10E3/UL (ref 0–1.3)
MONOCYTES NFR BLD AUTO: 9 %
NEUTROPHILS # BLD AUTO: 8.7 10E3/UL (ref 1.6–8.3)
NEUTROPHILS NFR BLD AUTO: 72 %
NRBC # BLD AUTO: 0 10E3/UL
NRBC BLD AUTO-RTO: 0 /100
PLATELET # BLD AUTO: 467 10E3/UL (ref 150–450)
POTASSIUM SERPL-SCNC: 4.9 MMOL/L (ref 3.4–5.3)
POTASSIUM SERPL-SCNC: 5.4 MMOL/L (ref 3.4–5.3)
PROT SERPL-MCNC: 6.4 G/DL (ref 6.4–8.3)
PROT SERPL-MCNC: 7 G/DL (ref 6.4–8.3)
RBC # BLD AUTO: 4.11 10E6/UL (ref 3.8–5.2)
SODIUM SERPL-SCNC: 131 MMOL/L (ref 135–145)
SODIUM SERPL-SCNC: 132 MMOL/L (ref 135–145)
WBC # BLD AUTO: 12.1 10E3/UL (ref 4–11)

## 2025-05-02 PROCEDURE — 250N000011 HC RX IP 250 OP 636: Mod: JZ | Performed by: FAMILY MEDICINE

## 2025-05-02 PROCEDURE — 99222 1ST HOSP IP/OBS MODERATE 55: CPT

## 2025-05-02 PROCEDURE — 84450 TRANSFERASE (AST) (SGOT): CPT

## 2025-05-02 PROCEDURE — 85025 COMPLETE CBC W/AUTO DIFF WBC: CPT | Performed by: FAMILY MEDICINE

## 2025-05-02 PROCEDURE — 120N000001 HC R&B MED SURG/OB

## 2025-05-02 PROCEDURE — 82330 ASSAY OF CALCIUM: CPT | Performed by: FAMILY MEDICINE

## 2025-05-02 PROCEDURE — 99285 EMERGENCY DEPT VISIT HI MDM: CPT | Mod: 25

## 2025-05-02 PROCEDURE — 82310 ASSAY OF CALCIUM: CPT | Performed by: INTERNAL MEDICINE

## 2025-05-02 PROCEDURE — 85004 AUTOMATED DIFF WBC COUNT: CPT | Performed by: INTERNAL MEDICINE

## 2025-05-02 PROCEDURE — 96360 HYDRATION IV INFUSION INIT: CPT

## 2025-05-02 PROCEDURE — 250N000013 HC RX MED GY IP 250 OP 250 PS 637

## 2025-05-02 PROCEDURE — 83735 ASSAY OF MAGNESIUM: CPT

## 2025-05-02 PROCEDURE — 36415 COLL VENOUS BLD VENIPUNCTURE: CPT

## 2025-05-02 PROCEDURE — 36415 COLL VENOUS BLD VENIPUNCTURE: CPT | Performed by: FAMILY MEDICINE

## 2025-05-02 PROCEDURE — 84450 TRANSFERASE (AST) (SGOT): CPT | Performed by: FAMILY MEDICINE

## 2025-05-02 PROCEDURE — 258N000003 HC RX IP 258 OP 636: Performed by: FAMILY MEDICINE

## 2025-05-02 PROCEDURE — 99285 EMERGENCY DEPT VISIT HI MDM: CPT | Mod: 25 | Performed by: FAMILY MEDICINE

## 2025-05-02 RX ORDER — IRBESARTAN 75 MG/1
300 TABLET ORAL AT BEDTIME
Status: DISCONTINUED | OUTPATIENT
Start: 2025-05-02 | End: 2025-05-06 | Stop reason: HOSPADM

## 2025-05-02 RX ORDER — ACETAMINOPHEN 325 MG/1
975 TABLET ORAL EVERY 8 HOURS
Status: DISCONTINUED | OUTPATIENT
Start: 2025-05-02 | End: 2025-05-06 | Stop reason: HOSPADM

## 2025-05-02 RX ORDER — NICOTINE POLACRILEX 4 MG
15-30 LOZENGE BUCCAL
Status: DISCONTINUED | OUTPATIENT
Start: 2025-05-02 | End: 2025-05-06 | Stop reason: HOSPADM

## 2025-05-02 RX ORDER — INSULIN LISPRO 100 [IU]/ML
1 INJECTION, SOLUTION INTRAVENOUS; SUBCUTANEOUS 3 TIMES DAILY
COMMUNITY

## 2025-05-02 RX ORDER — MONTELUKAST SODIUM 10 MG/1
10 TABLET ORAL AT BEDTIME
Status: DISCONTINUED | OUTPATIENT
Start: 2025-05-02 | End: 2025-05-06 | Stop reason: HOSPADM

## 2025-05-02 RX ORDER — NALOXONE HYDROCHLORIDE 0.4 MG/ML
0.4 INJECTION, SOLUTION INTRAMUSCULAR; INTRAVENOUS; SUBCUTANEOUS
Status: DISCONTINUED | OUTPATIENT
Start: 2025-05-02 | End: 2025-05-06 | Stop reason: HOSPADM

## 2025-05-02 RX ORDER — SODIUM CHLORIDE 9 MG/ML
1000 INJECTION, SOLUTION INTRAVENOUS CONTINUOUS
Status: DISCONTINUED | OUTPATIENT
Start: 2025-05-02 | End: 2025-05-05

## 2025-05-02 RX ORDER — HYDROXYZINE HYDROCHLORIDE 25 MG/1
25 TABLET, FILM COATED ORAL EVERY 6 HOURS PRN
Status: DISCONTINUED | OUTPATIENT
Start: 2025-05-02 | End: 2025-05-05

## 2025-05-02 RX ORDER — POLYETHYLENE GLYCOL 3350 17 G/17G
17 POWDER, FOR SOLUTION ORAL DAILY PRN
Status: DISCONTINUED | OUTPATIENT
Start: 2025-05-02 | End: 2025-05-05

## 2025-05-02 RX ORDER — SENNOSIDES 8.6 MG
2 TABLET ORAL 2 TIMES DAILY
COMMUNITY

## 2025-05-02 RX ORDER — NALOXONE HYDROCHLORIDE 0.4 MG/ML
0.2 INJECTION, SOLUTION INTRAMUSCULAR; INTRAVENOUS; SUBCUTANEOUS
Status: DISCONTINUED | OUTPATIENT
Start: 2025-05-02 | End: 2025-05-06 | Stop reason: HOSPADM

## 2025-05-02 RX ORDER — OXYCODONE HYDROCHLORIDE 5 MG/1
10 TABLET ORAL
Status: DISCONTINUED | OUTPATIENT
Start: 2025-05-02 | End: 2025-05-03

## 2025-05-02 RX ORDER — ONDANSETRON 2 MG/ML
4 INJECTION INTRAMUSCULAR; INTRAVENOUS EVERY 6 HOURS PRN
Status: DISCONTINUED | OUTPATIENT
Start: 2025-05-02 | End: 2025-05-06 | Stop reason: HOSPADM

## 2025-05-02 RX ORDER — ONDANSETRON 4 MG/1
4 TABLET, ORALLY DISINTEGRATING ORAL EVERY 6 HOURS PRN
Status: DISCONTINUED | OUTPATIENT
Start: 2025-05-02 | End: 2025-05-06 | Stop reason: HOSPADM

## 2025-05-02 RX ORDER — SODIUM CHLORIDE 9 MG/ML
1000 INJECTION, SOLUTION INTRAVENOUS CONTINUOUS
Status: DISCONTINUED | OUTPATIENT
Start: 2025-05-02 | End: 2025-05-02

## 2025-05-02 RX ORDER — LIDOCAINE 4 G/G
2 PATCH TOPICAL EVERY 24 HOURS
Status: DISCONTINUED | OUTPATIENT
Start: 2025-05-03 | End: 2025-05-06 | Stop reason: HOSPADM

## 2025-05-02 RX ORDER — LORAZEPAM 0.5 MG/1
0.5 TABLET ORAL 2 TIMES DAILY
Status: DISCONTINUED | OUTPATIENT
Start: 2025-05-02 | End: 2025-05-05

## 2025-05-02 RX ORDER — GABAPENTIN 300 MG/1
300 CAPSULE ORAL 2 TIMES DAILY
Status: DISCONTINUED | OUTPATIENT
Start: 2025-05-02 | End: 2025-05-06 | Stop reason: HOSPADM

## 2025-05-02 RX ORDER — INSULIN LISPRO 100 [IU]/ML
1 INJECTION, SOLUTION INTRAVENOUS; SUBCUTANEOUS 3 TIMES DAILY
Status: DISCONTINUED | OUTPATIENT
Start: 2025-05-02 | End: 2025-05-02

## 2025-05-02 RX ORDER — AMLODIPINE BESYLATE 10 MG/1
10 TABLET ORAL DAILY
Status: DISCONTINUED | OUTPATIENT
Start: 2025-05-03 | End: 2025-05-06 | Stop reason: HOSPADM

## 2025-05-02 RX ORDER — CALCIUM CARBONATE 500 MG/1
1000 TABLET, CHEWABLE ORAL 4 TIMES DAILY PRN
Status: DISCONTINUED | OUTPATIENT
Start: 2025-05-02 | End: 2025-05-02

## 2025-05-02 RX ORDER — DEXTROSE MONOHYDRATE 25 G/50ML
25-50 INJECTION, SOLUTION INTRAVENOUS
Status: DISCONTINUED | OUTPATIENT
Start: 2025-05-02 | End: 2025-05-06 | Stop reason: HOSPADM

## 2025-05-02 RX ORDER — ZOLEDRONIC ACID 0.04 MG/ML
4 INJECTION, SOLUTION INTRAVENOUS ONCE
Status: COMPLETED | OUTPATIENT
Start: 2025-05-02 | End: 2025-05-02

## 2025-05-02 RX ORDER — OXYCODONE HYDROCHLORIDE 5 MG/1
5 TABLET ORAL 3 TIMES DAILY
Status: DISCONTINUED | OUTPATIENT
Start: 2025-05-02 | End: 2025-05-05

## 2025-05-02 RX ORDER — ATORVASTATIN CALCIUM 20 MG/1
40 TABLET, FILM COATED ORAL AT BEDTIME
Status: DISCONTINUED | OUTPATIENT
Start: 2025-05-02 | End: 2025-05-06 | Stop reason: HOSPADM

## 2025-05-02 RX ORDER — PROCHLORPERAZINE MALEATE 5 MG/1
10 TABLET ORAL EVERY 6 HOURS PRN
Status: DISCONTINUED | OUTPATIENT
Start: 2025-05-02 | End: 2025-05-06 | Stop reason: HOSPADM

## 2025-05-02 RX ORDER — OXYCODONE HYDROCHLORIDE 5 MG/1
10 TABLET ORAL
Status: DISCONTINUED | OUTPATIENT
Start: 2025-05-02 | End: 2025-05-05

## 2025-05-02 RX ADMIN — DICLOFENAC SODIUM 4 G: 10 GEL TOPICAL at 22:05

## 2025-05-02 RX ADMIN — LORAZEPAM 0.5 MG: 0.5 TABLET ORAL at 20:20

## 2025-05-02 RX ADMIN — GABAPENTIN 300 MG: 300 CAPSULE ORAL at 20:20

## 2025-05-02 RX ADMIN — SODIUM CHLORIDE 1000 ML: 0.9 INJECTION, SOLUTION INTRAVENOUS at 17:42

## 2025-05-02 RX ADMIN — MONTELUKAST 10 MG: 10 TABLET, FILM COATED ORAL at 22:03

## 2025-05-02 RX ADMIN — SODIUM CHLORIDE 1000 ML: 0.9 INJECTION, SOLUTION INTRAVENOUS at 22:17

## 2025-05-02 RX ADMIN — OXYCODONE 5 MG: 5 TABLET ORAL at 20:20

## 2025-05-02 RX ADMIN — ACETAMINOPHEN 975 MG: 325 TABLET, FILM COATED ORAL at 20:20

## 2025-05-02 RX ADMIN — ZOLEDRONIC ACID 4 MG: 0.04 INJECTION, SOLUTION INTRAVENOUS at 17:58

## 2025-05-02 RX ADMIN — APIXABAN 5 MG: 5 TABLET, FILM COATED ORAL at 20:20

## 2025-05-02 RX ADMIN — SODIUM CHLORIDE 500 ML: 0.9 INJECTION, SOLUTION INTRAVENOUS at 16:40

## 2025-05-02 RX ADMIN — IRBESARTAN 300 MG: 75 TABLET ORAL at 22:03

## 2025-05-02 ASSESSMENT — ACTIVITIES OF DAILY LIVING (ADL)
ADLS_ACUITY_SCORE: 62
ADLS_ACUITY_SCORE: 53
ADLS_ACUITY_SCORE: 53
ADLS_ACUITY_SCORE: 62
ADLS_ACUITY_SCORE: 53
ADLS_ACUITY_SCORE: 62

## 2025-05-02 NOTE — MEDICATION SCRIBE - ADMISSION MEDICATION HISTORY
Medication Scribe Admission Medication History    Admission medication history is complete. The information provided in this note is only as accurate as the sources available at the time of the update.    Information Source(s): Facility ([facility]/) medication list/MAR via in-person    Pertinent Information: MAR sent from [facility] with patient-patient received all AM meds at facility    Changes made to PTA medication list:  Added: Humalog Vial 1 unit TID, Ensure 1 can BID, Senna 8.6 mg 2 tabs BID  Deleted: Novolog Pen 100 unit/mL  Changed: Metformin 100 mg added missing SIG    Allergies reviewed with patient and updates made in EHR: yes    Medication History Completed By: Judy Saleem 5/2/2025 6:03 PM    PTA Med List   Medication Sig Last Dose/Taking    acetaminophen (TYLENOL) 325 MG tablet Take 3 tablets (975 mg) by mouth every 8 hours. 5/2/2025 at  8:20 AM    amLODIPine (NORVASC) 10 MG tablet Take 1 tablet (10 mg) by mouth daily 5/2/2025 at  8:20 AM    apixaban ANTICOAGULANT (ELIQUIS) 5 MG tablet Take 1 tablet (5 mg) by mouth 2 times daily. 5/2/2025 at  8:20 AM    atorvastatin (LIPITOR) 40 MG tablet Take 1 tablet (40 mg) by mouth at bedtime. 5/1/2025 at  7:30 PM    Continuous Glucose Sensor (FREESTYLE SANDRA 3 SENSOR) MISC 1 each every 14 days Use 1 sensor every 14 days. Use to read blood sugars per 's instructions. Unknown    diclofenac (VOLTAREN) 1 % topical gel Apply 4 g topically 4 times daily. 5/2/2025 at  1:00 PM    docusate sodium (COLACE) 100 MG capsule Take 1 capsule (100 mg) by mouth 2 times daily. 5/2/2025 at  8:20 AM    gabapentin (NEURONTIN) 300 MG capsule Take 1 capsule (300 mg) by mouth 2 times daily. 5/2/2025 at  8:20 AM    hydrOXYzine HCl (ATARAX) 25 MG tablet Take 1 tablet (25 mg) by mouth every 6 hours as needed for other (adjuvant pain). Past Week    ibuprofen (ADVIL/MOTRIN) 400 MG tablet Take 1 tablet (400 mg) by mouth every 6 hours as needed for inflammatory pain. Past Week     "insulin lispro (HUMALOG VIAL) 100 UNIT/ML vial Inject 1 Units subcutaneously 3 times daily. 5/2/2025 at  1:00 PM    insulin NPH-Regular (HUMULIN 70/30;NOVOLIN 70/30) susp Inject 15 Units subcutaneously 2 times daily (before meals). 5/2/2025 at  9:05 AM    irbesartan (AVAPRO) 300 MG tablet TAKE 1 TABLET(300 MG) BY MOUTH AT BEDTIME 5/1/2025 at  7:30 PM    Lidocaine (LIDOCARE) 4 % Patch Place 2 patches over 12 hours onto the skin every 24 hours. To prevent lidocaine toxicity, patient should be patch free for 12 hrs daily. 5/2/2025 at  8:20 AM    LORazepam (ATIVAN) 0.5 MG tablet Take 1 tablet (0.5 mg) by mouth 2 times daily. 5/2/2025 at  8:20 AM    metFORMIN (GLUCOPHAGE) 1000 MG tablet TAKE 1 TABLET BY MOUTH TWICE DAILY WITH MEALS (Patient taking differently: Take 1,000 mg by mouth 2 times daily.) 5/2/2025 at  8:20 AM    miconazole (MICATIN) 2 % external powder Apply topically 2 times daily. To perineum 5/2/2025 at  8:10 AM    Misc. Devices (ROLLATOR ULTRA-LIGHT) MISC Extra side walker with front wheels for home use.  Purchase, lifetime need. Extra wide rolling walker (\"Walker 4\", item #:  [id number]) needed for safe transfers and ambulation.  Pt weight is 335 lbs. Unknown    montelukast (SINGULAIR) 10 MG tablet Take 1 tablet (10 mg) by mouth at bedtime 5/1/2025 at  7:30 PM    Nutritional Supplements (ENSURE PO) Take 1 Can by mouth 2 times daily. 5/1/2025 Evening    oxyCODONE (ROXICODONE) 10 MG tablet Take 1 tablet (10 mg) by mouth every 3 hours as needed for severe pain. 5/1/2025 at  5:30 AM    oxyCODONE (ROXICODONE) 5 MG tablet Take 1 tablet (5 mg) by mouth 3 times daily. May also take 2 tablets (10 mg) every 3 hours as needed for pain. 5/2/2025 at  1:04 PM    polyethylene glycol (MIRALAX) 17 g packet Take 17 g by mouth daily. 5/2/2025 at  8:20 AM    senna-docusate (SENOKOT-S/PERICOLACE) 8.6-50 MG tablet Take 1 tablet by mouth 2 times daily as needed for constipation. Past Week    sennosides (SENOKOT) 8.6 MG tablet Take " 2 tablets by mouth 2 times daily. 5/2/2025 at  8:20 AM    sertraline (ZOLOFT) 100 MG tablet Take 1.5 tablets (150 mg) by mouth daily 5/2/2025 at  8:20 AM

## 2025-05-02 NOTE — ED TRIAGE NOTES
Pt presents from [facility] where she is currently being treated for bone cancer. She was seen today by ortho and had some labs drawn. They called and said she needed to come to the ER d/t high calcium. Reports that she has had increasing weakness and bilateral knee pain recently. Was given 15 mg of oxycodone prior to arrival. Pt is alert and oriented, but drowsy.      Triage Assessment (Adult)       Row Name 05/02/25 1554          Triage Assessment    Airway WDL WDL        Respiratory WDL    Respiratory WDL WDL        Skin Circulation/Temperature WDL    Skin Circulation/Temperature WDL WDL        Cardiac WDL    Cardiac WDL WDL        Peripheral/Neurovascular WDL    Peripheral Neurovascular WDL WDL        Cognitive/Neuro/Behavioral WDL    Cognitive/Neuro/Behavioral WDL X;orientation  drowsy     Level of Consciousness alert     Arousal Level opens eyes spontaneously     Orientation disoriented to;time  thought it was Oct, 2025     Speech --  slow     Mood/Behavior flat affect;withdrawn        [place] Coma Scale    Best Eye Response 4-->(E4) spontaneous     Best Motor Response 6-->(M6) obeys commands     Best Verbal Response 5-->(V5) oriented     [place] Coma Scale Score 15

## 2025-05-02 NOTE — ED NOTES
"[facility]   Admission Handoff    The patient is Brissa Mott, 54 year old who arrived in the ED by AMBULANCE from [place] with a complaint of Abnormal Labs  . The patient's current symptoms are an exacerbation of a chronic illness and during this time the symptoms have remained the same. In the ED, patient was diagnosed with   Final diagnoses:   Hypercalcemia         Needed?: No    Allergies:    Allergies   Allergen Reactions    Sulfa Antibiotics Shortness Of Breath, Hives and Rash    Bydureon [Exenatide] Diarrhea    Penicillins Rash and Hives       Past Medical Hx:   Past Medical History:   Diagnosis Date    Asthma     Cerebral infarction (H)     Diabetes (H)     Hypertension     Mixed hyperlipidemia     Morbid obesity (H)     KATHRINE (obstructive sleep apnea)     Osteoarthritis        Initial vitals were: BP: 103/71  Pulse: 92  Resp: 18  Height: 163.8 cm (5' 4.5\")  Weight: 107.5 kg (237 lb)  SpO2: 92 %   Recent vital Signs: /65   Pulse 89   Resp 20   Ht 1.638 m (5' 4.5\")   Wt 107.5 kg (237 lb)   LMP  (LMP Unknown)   SpO2 96%   BMI 40.05 kg/m      Elimination Status: Continent: wears briefs     Activity Level: 2 assist    Fall Status: Reason for falls risk:  Mobility, Reason for falls risk: Elimination, and Reason for falls risk: High Risk Medications  nonskid shoes/slippers when out of bed, arm band in place, patient and family education, and assistive device/personal items within reach    Baseline Mental status: WDL  Current Mental Status changes: at basesline    Infection present or suspected this encounter: no  Sepsis suspected: No    Isolation type: none    Bariatric equipment needed?: No    In the ED these meds were given:   Medications   sodium chloride 0.9 % infusion (1,000 mLs Intravenous $New Bag 5/2/25 5580)   zoledronic acid (ZOMETA) intermittent infusion 4 mg (has no administration in time range)   sodium chloride 0.9% BOLUS 500 mL (0 mLs Intravenous " Stopped 5/2/25 9132)       Drips running?  Yes    Home pump  No    Current LDAs: Peripheral IV: Site L forearm; Gauge 18  normal saline     Results:   Labs/Imaging  Ordered and Resulted from Time of ED Arrival Up to the Time of Departure from the ED  Results for orders placed or performed during the hospital encounter of 05/02/25 (from the past 24 hours)   EKG 12 lead   Result Value Ref Range    Systolic Blood Pressure  mmHg    Diastolic Blood Pressure  mmHg    Ventricular Rate 92 BPM    Atrial Rate 92 BPM    MN Interval 160 ms    QRS Duration 154 ms     ms    QTc 445 ms    P Axis 30 degrees    R AXIS 25 degrees    T Axis -24 degrees    Interpretation ECG       Sinus rhythm  Right bundle branch block  Inferior infarct (cited on or before 14-Apr-2025)  Anterior infarct (cited on or before 14-Apr-2025)  T wave abnormality, consider lateral ischemia  Abnormal ECG  When compared with ECG of 14-Apr-2025 09:25,  Serial changes of Anterior infarct Present     [place] Draw    Narrative    The following orders were created for panel order [place] Draw.  Procedure                               Abnormality         Status                     ---------                               -----------         ------                     Extra Blue Top Tube[[id number]]                             Final result               Extra Red Top Tube[[id number]]                              Final result                 Please view results for these tests on the individual orders.   CBC with platelets, differential    Narrative    The following orders were created for panel order CBC with platelets, differential.  Procedure                               Abnormality         Status                     ---------                               -----------         ------                     CBC with platelets and ...[[id number]]  Abnormal            Final result                 Please view results for these tests on the individual orders.    Comprehensive metabolic panel   Result Value Ref Range    Sodium 132 (L) 135 - 145 mmol/L    Potassium 5.4 (H) 3.4 - 5.3 mmol/L    Carbon Dioxide (CO2) 23 22 - 29 mmol/L    Anion Gap 14 7 - 15 mmol/L    Urea Nitrogen 21.2 (H) 6.0 - 20.0 mg/dL    Creatinine 0.54 0.51 - 0.95 mg/dL    GFR Estimate >90 >60 mL/min/1.73m2    Calcium 14.8 (HH) 8.8 - 10.4 mg/dL    Chloride 95 (L) 98 - 107 mmol/L    Glucose 167 (H) 70 - 99 mg/dL    Alkaline Phosphatase 212 (H) 40 - 150 U/L    AST 96 (H) 0 - 45 U/L    ALT 14 0 - 50 U/L    Protein Total 7.0 6.4 - 8.3 g/dL    Albumin 3.4 (L) 3.5 - 5.2 g/dL    Bilirubin Total 0.3 <=1.2 mg/dL   Extra Blue Top Tube   Result Value Ref Range    Hold Specimen JIC    Extra Red Top Tube   Result Value Ref Range    Hold Specimen JIC    CBC with platelets and differential   Result Value Ref Range    WBC Count 12.1 (H) 4.0 - 11.0 10e3/uL    RBC Count 4.11 3.80 - 5.20 10e6/uL    Hemoglobin 9.6 (L) 11.7 - 15.7 g/dL    Hematocrit 32.7 (L) 35.0 - 47.0 %    MCV 80 78 - 100 fL    MCH 23.4 (L) 26.5 - 33.0 pg    MCHC 29.4 (L) 31.5 - 36.5 g/dL    RDW 18.6 (H) 10.0 - 15.0 %    Platelet Count 467 (H) 150 - 450 10e3/uL    % Neutrophils 72 %    % Lymphocytes 14 %    % Monocytes 9 %    % Eosinophils 2 %    % Basophils 1 %    % Immature Granulocytes 3 %    NRBCs per 100 WBC 0 <1 /100    Absolute Neutrophils 8.7 (H) 1.6 - 8.3 10e3/uL    Absolute Lymphocytes 1.7 0.8 - 5.3 10e3/uL    Absolute Monocytes 1.1 0.0 - 1.3 10e3/uL    Absolute Eosinophils 0.2 0.0 - 0.7 10e3/uL    Absolute Basophils 0.1 0.0 - 0.2 10e3/uL    Absolute Immature Granulocytes 0.3 <=0.4 10e3/uL    Absolute NRBCs 0.0 10e3/uL       For the majority of the shift this patient's behavior was Green     Cardiac Rhythm: Normal Sinus  Pt needs tele? Yes  Skin/wound Issues: None    Code Status: Full Code    Pain control: good    Nausea control: pt had none    Abnormal labs/tests/findings requiring intervention: calcium    Patient tested for COVID 19 prior to  admission: NO     OBS brochure/video discussed/provided to patient/family: N/A     Family present during ED course? Yes     Family Comments/Social Situation comments: none    Tasks needing completion: None    Adriane Garcia RN

## 2025-05-02 NOTE — H&P
[facility]    History and Physical  Hospital Medicine       Date of Admission:  5/2/2025  Date of Service: 5/2/2025     Assessment & Plan   Brissa Mott is a 54 year old female with past medical history of T2DM, neuroendocrine tumor with metastasis, pathologic femur fracture, hypercalcemia of malignancy, hyperlipidemia, CVA with hemiplegia, asthma, adjustment disorder with depressed mood now presents on 5/2/2025 with recurrent hypercalcemia. She is being admitted for management of acute hypercalcemia of malignancy.     Hypercalcemia of malignancy    History of hypercalcemia 2/2 malignancy; previously treated with zometa & IVF. During previous admission PTH <6, PTHrP 12 (high), vitamin D <18 (low).     Seen outpatient oncology 5/2 at which time labs show recurrence of severe hypercalcemia, likely 2/2 malignancy to bone. Renal function WNL. Symptomatic with increased somnolence, mild encephalopathy, weakness.     Calcium 14.9 ?  14.8. When corrected for albumin Ca 15.8.   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  Following outpatient with Dr. Friedell, review oncology.  Not a candidate for chemo due to baseline performance status.  Genetic testing pending to evaluate for immunotherapy candidacy.  - Continue outpatient follow up with oncology    Hyperkalemia  Mild, K 5.4 in ER. Renal function WNL.    - Recheck K 2000, then again in AM    General weakness  Chronic left knee pain  Admitted end of Feb 2025 for pathologic fracture, discharged to TCU, then to home 4/7 and having frequent falls at home 2/2 gen weakness. Recently admitted 4/14 - 4/24/2025 for generalized weakness. Now living in long-term care facility  - PT, OT for ongoing rehab & mobility assistance  - Fall precautions    Hyponatremia   ?  132.   - IVF as per hypercalcemia, above    Elevated LFTs  215, AST 96.  ALT and total bilirubin WNL.  Likely secondary to liver metastasis.  - CMP in AM    Leukocytosis  Mild, WBC 11.9.  Technically meets SIRS criteria with heart rate 96, respiratory rate 23/min.  Suspect secondary to inflammation related to ongoing worsening metastatic disease.  No signs of obvious acute infection.  - CBC in AM  - Abx deferred on admission given no clear infectious source. 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 Now living in long term care facility due to poor mobility & gen weakness.   - Continued PTA Lidocaine patches, scheduled Tylenol, PRN Oxycodone 5-10 mg   - Ortho surgery recommends 50% weightbearing on right lower extremity     Gluteal cleft / coccyx & right buttock pressure ulcer, stage 3 (POA)  2/2 poor mobility from generalized weakness. Previously refused repositioning for offloading due to pain with movement.   - Wound care with triad paste   - Frequent repositioning as able    Acute pulmonary embolism  Noted on CT scan during previous admission April 2025. Initiated on apixaban 5mg BID.   - Continue PTA apixaban 5mg BID   - Continue Oxygen via NC to maintain SPO2 >90%    Hx of CVA in 2018  Hemiplegia and hemiparesis following cerebral infarction  Hyperlipidemia  Residual left sided weakness.  - HOLD PTA Atorvastatin 40mg while LFT's are elevated    Type 2 diabetes mellitus, with  "long-term current use of insulin   Diabetic neuropathy  A1c 6.8% 4/14/2025. Ozempic recently discontinued due to correlation with patient's onset of poor intake per .   - continue PTA Novolog 1 unit with meals   - Continue PTA Humulin 70/30 BID 15U  - Continued PTA Gabapentin 300mg BID   - HOLD PTA Metformin 1000mg BID while not tolerating good diet    Anemia, borderline microcytic, chronic  Hemoglobin 9.6 on admission, baseline 8-9.    - Stable, no longer trending hemoglobin     Hypertension  - Continued PTA Amlodipine 10mg daily   - Continued PTA  Ibesartan 300mg daily      Reactive airway disease without complication  Feels mildly dyspneic, denies chest pain. On 0.5L NC at time of admission. No wheezing on exam.   - Continued PTA Montelukast 10mg      Adjustment disorder with depressed mood  - Continued PTA Hydroxyzine 25mg PRN   - Continued PTA Sertraline 150mg daily   - Continue PTA lorazepam 0.5mg BID    KATHRINE (obstructive sleep apnea)  - Continue PTA CPAP if available    Clinically Significant Risk Factors Present on Admission        # Hyperkalemia: Highest K = 5.4 mmol/L in last 2 days, will monitor as appropriate  # Hyponatremia: Lowest Na = 132 mmol/L in last 2 days, will monitor as appropriate  # Hypochloremia: Lowest Cl = 95 mmol/L in last 2 days, will monitor as appropriate   # Hypercalcemia: Highest Ca = 14.9 mg/dL in last 2 days, will monitor as appropriate    # Hypoalbuminemia: Lowest albumin = 3.4 g/dL at 5/2/2025  4:04 PM, will monitor as appropriate  # Drug Induced Coagulation Defect: home medication list includes an anticoagulant medication    # Hypertension: Noted on problem list      # Anemia: based on hgb <11      # DMII: A1C = 6.8 % (Ref range: <5.7 %) within past 6 months    # Morbid Obesity: Estimated body mass index is 40.05 kg/m  as calculated from the following:    Height as of this encounter: 1.638 m (5' 4.5\").    Weight as of this encounter: 107.5 kg (237 lb).       # " Financial/Environmental Concerns:    # Asthma: noted on problem list         Diet:  ADAT to regular  DVT Prophylaxis: DOAC  Nielson Catheter: Not present  Code Status:  full code  Lines: PIV    Disposition Plan   Medically Ready for Discharge: Anticipated in 2-4 Days     The patient's care was discussed with the Attending Physician, Dr. Bruce Esteban, bedside RN, and the patient .    Prerna Moreno PA-C        Primary Care Physician   Arti Montero [phone]    History is obtained from the patient, who is a poor historian, patient's  who is at the bedside and provides helpful supplemental information, handoff from ER provider, and review of old records via the EMR.    History of Present Illness   Brissa Mott is a 54 year old female with past medical history of T2DM, neuroendocrine tumor with metastasis, pathologic femur fracture, hypercalcemia of malignancy, hyperlipidemia, CVA with hemiplegia, asthma, adjustment disorder with depressed mood now presents on 5/2/2025 with recurrent hypercalcemia.     Patient with history of neuroendocrine cancer with metastasis to bone and liver.  Previous hypercalcemia treated with Zometa and IV fluids.  Most recently admitted a few weeks PTA for generalized weakness.  Now living in long-term care facility.  Has been following with oncology for ongoing workup of neuroendocrine cancer.  She is not a candidate for chemotherapy due to poor functional status at baseline.  Her genetic testing is currently pending for evaluation for immunotherapy.  Patient was in the clinic 5/2 when labs were significant for severe hypercalcemia.  Patient was directed to ER.    Patient is generally weak at baseline, living in long-term care, but  feels that she might have been weaker than usual over the past few days PTA.  Her weakness just continues to decline and he is very discouraged about this.  Patient also has seemed more somnolent and confused than usual over past 1-2 days.   "Patient says she is continuing to urinate like normal, denies dysuria, frequency, hematuria.  Denies flank pain.  Has left knee pain which is chronic.    Upon arrival to ER patient received lab and imaging workup.  She was initiated on IV fluids and zoledronic acid.    Review of Systems    ROS: 10 point ROS neg other than the symptoms noted above in the HPI.    Past Medical History    Past Medical History:   Diagnosis Date    Asthma     Cerebral infarction (H)     Diabetes (H)     Hypertension     Mixed hyperlipidemia     Morbid obesity (H)     KATHRINE (obstructive sleep apnea)     Osteoarthritis      Past Surgical History   Past Surgical History:   Procedure Laterality Date     SECTION      x2    INSERTION, INTRAMEDULLARY JAMES, RETROGRADE, FOR FEMORAL SHAFT FRACTURE Right 2025    Procedure: Open reduction internal fixation intramedullary rodding right hip fracture;  Surgeon: Lazarus Meredith MD;  Location:  OR      Prior to Admission Medications   Prior to Admission Medications   Prescriptions Last Dose Informant Patient Reported? Taking?   Continuous Glucose Sensor (FREESTYLE SANDRA 3 SENSOR) MISC  Self No No   Si each every 14 days Use 1 sensor every 14 days. Use to read blood sugars per 's instructions.   LORazepam (ATIVAN) 0.5 MG tablet   No No   Sig: Take 1 tablet (0.5 mg) by mouth 2 times daily.   Lidocaine (LIDOCARE) 4 % Patch   No No   Sig: Place 2 patches over 12 hours onto the skin every 24 hours. To prevent lidocaine toxicity, patient should be patch free for 12 hrs daily.   Misc. Devices (ROLLATOR ULTRA-LIGHT) MISC  Self Yes No   Sig: Extra side walker with front wheels for home use.  Purchase, lifetime need. Extra wide rolling walker (\"Walker 4\", item #:  [id number]) needed for safe transfers and ambulation.  Pt weight is 335 lbs.   acetaminophen (TYLENOL) 325 MG tablet   No No   Sig: Take 3 tablets (975 mg) by mouth every 8 hours.   amLODIPine (NORVASC) 10 MG tablet  Self " No No   Sig: Take 1 tablet (10 mg) by mouth daily   apixaban ANTICOAGULANT (ELIQUIS) 5 MG tablet   No No   Sig: Take 1 tablet (5 mg) by mouth 2 times daily.   atorvastatin (LIPITOR) 40 MG tablet   No No   Sig: Take 1 tablet (40 mg) by mouth at bedtime.   diclofenac (VOLTAREN) 1 % topical gel   No No   Sig: Apply 4 g topically 4 times daily.   docusate sodium (COLACE) 100 MG capsule   No No   Sig: Take 1 capsule (100 mg) by mouth 2 times daily.   gabapentin (NEURONTIN) 300 MG capsule   No No   Sig: Take 1 capsule (300 mg) by mouth 2 times daily.   hydrOXYzine HCl (ATARAX) 25 MG tablet  Self No No   Sig: Take 1 tablet (25 mg) by mouth every 6 hours as needed for other (adjuvant pain).   ibuprofen (ADVIL/MOTRIN) 400 MG tablet   No No   Sig: Take 1 tablet (400 mg) by mouth every 6 hours as needed for inflammatory pain.   insulin NPH-Regular (HUMULIN 70/30;NOVOLIN 70/30) susp   No No   Sig: Inject 15 Units subcutaneously 2 times daily (before meals).   insulin aspart (NOVOLOG PEN) 100 UNIT/ML pen   No No   Sig: Inject 1 Units subcutaneously 3 times daily (with meals).   insulin pen needle (BD PEN NEEDLE TWYLA 2ND GEN) 32G X 4 MM miscellaneous  Self No No   Sig: USE TWICE DAILY OR AS DIRECTED   irbesartan (AVAPRO) 300 MG tablet  Self No No   Sig: TAKE 1 TABLET(300 MG) BY MOUTH AT BEDTIME   metFORMIN (GLUCOPHAGE) 1000 MG tablet  Self No No   Sig: TAKE 1 TABLET BY MOUTH TWICE DAILY WITH MEALS   miconazole (MICATIN) 2 % external powder   No No   Sig: Apply topically 2 times daily. To perineum   montelukast (SINGULAIR) 10 MG tablet  Self No No   Sig: Take 1 tablet (10 mg) by mouth at bedtime   oxyCODONE (ROXICODONE) 10 MG tablet   No No   Sig: Take 1 tablet (10 mg) by mouth every 3 hours as needed for severe pain.   oxyCODONE (ROXICODONE) 5 MG tablet   No No   Sig: Take 1 tablet (5 mg) by mouth 3 times daily. May also take 2 tablets (10 mg) every 3 hours as needed for pain.   polyethylene glycol (MIRALAX) 17 g packet   No No  "  Sig: Take 17 g by mouth daily.   senna-docusate (SENOKOT-S/PERICOLACE) 8.6-50 MG tablet  Self No No   Sig: Take 1 tablet by mouth 2 times daily as needed for constipation.   sertraline (ZOLOFT) 100 MG tablet  Self No No   Sig: Take 1.5 tablets (150 mg) by mouth daily      Facility-Administered Medications: None     Allergies   Allergies   Allergen Reactions    Sulfa Antibiotics Shortness Of Breath, Hives and Rash    Bydureon [Exenatide] Diarrhea    Penicillins Rash and Hives     Family History    No family history on file.    Social History   Social History     Socioeconomic History    Marital status:      Physical Exam   /71   Pulse 92   Resp 18   Ht 1.638 m (5' 4.5\")   Wt 107.5 kg (237 lb)   LMP  (LMP Unknown)   SpO2 92%   BMI 40.05 kg/m       Weight: 237 lbs 0 oz Body mass index is 40.05 kg/m .     Constitutional: somnolent, alerts briefly to voice, appears comfortable, nontoxic  Eyes: Eyes are clear, pupils are equal, round. EOMs intact without nystagmus.   HENT: Oropharynx is clear, tongue midline & very dry. No evidence of cranial trauma.  Cardiovascular: Regular rate and rhythm, normal S1 and S2. Systolic murmur 3/6 LUSB & LLSB. Radial pulse 2+. No pitting lower extremity edema.  Respiratory: Clear to auscultation bilaterally. Unlabored on 0.5L NC.   GI: Soft, non-tender, normal bowel sounds, non-distended.   Genitourinary: Deferred  Musculoskeletal: Normal muscle bulk, no obvious joint deformities.   Skin: Warm and dry, no rashes.   Neurologic: Neck supple.  is symmetric. Somnolent. Only answers intermittent yes / no questions. Some gargled speech. No facial droop. No tremor.     Data   Data reviewed today:     I have personally reviewed the following data over the past 24 hrs:    12.1 (H)  \   9.6 (L)   / 467 (H)     132 (L) 95 (L) 21.2 (H) /  167 (H)   5.4 (H) 23 0.54 \     ALT: 14 AST: 96 (H) AP: 212 (H) TBILI: 0.3   ALB: 3.4 (L) TOT PROTEIN: 7.0 LIPASE: N/A       "

## 2025-05-02 NOTE — ED PROVIDER NOTES
HPI   Patient is a 54-year-old female presenting by private car from the clinic with concern for hypercalcemia.  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  She has hypercalcemia of malignancy.  She was hospitalized from 4/14 until 4/24.  She was treated with fluid and zoledronic acid, last on 4/15.  In February, 2025 she fell with femur fracture prompting workup for pathologic source, ultimately resulting in the diagnoses of the neuroendocrine neoplasm.  She is being seen by oncology.  She is not a candidate for chemotherapy.  She has genetic studies pending looking for a possible biologic agent.  On 4/14 she presented with a calcium value of 13.8, ionized 7.5.    The patient describes persistent generalized discomfort compared to her baseline.  She took oxycodone about an hour prior to my seeing her.  She has incontinence at baseline.  She denies dysuria.  No abdominal or pelvic pain that is new or different compared to baseline.  No fever.  No cough or congestion.  No chest pain or trouble with breathing.      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) 04/18/2025     Priority: Medium    Hypomagnesemia 04/14/2025     Priority: Medium    General weakness 04/14/2025     Priority: Medium    Hypercalcemia of malignancy 04/14/2025     Priority: Medium    UTI (urinary tract infection), bacterial 04/14/2025     Priority: Medium    Sepsis, due to unspecified organism, unspecified whether acute organ dysfunction present (H) 04/14/2025     Priority: Medium    Other acute pulmonary embolism, unspecified whether acute cor pulmonale present (H) 04/14/2025     Priority: Medium     Neuroendocrine carcinoma (H) 03/21/2025     Priority: Medium    Pathological fracture of right femur due to neoplastic disease, initial encounter (H) 03/21/2025     Priority: Medium    Acute posthemorrhagic anemia 03/05/2025     Priority: Medium    Pathological fracture of femur due to neoplastic disease (H) 03/05/2025     Priority: Medium    Pressure injury of skin of sacral region 03/05/2025     Priority: Medium    Malignant neuroendocrine neoplasm (H) 03/05/2025     Priority: Medium    Hypercalcemia 02/20/2025     Priority: Medium    Knee pain 02/20/2025     Priority: Medium    Acute ischemic stroke (H) 02/18/2025     Priority: Medium    Adjustment disorder with depressed mood 02/18/2025     Priority: Medium    Fall 01/31/2025     Priority: Medium    Acute pain of right knee 01/31/2025     Priority: Medium    Hemiplegia and hemiparesis following cerebral infarction affecting left non-dominant side (H) 06/07/2022     Priority: Medium    Left knee pain 11/01/2019     Priority: Medium    Dysphagia 10/31/2019     Priority: Medium    Secondary hypertension 08/22/2019     Priority: Medium    Type 2 diabetes mellitus with hyperglycemia, with long-term current use of insulin (H) 08/12/2019     Priority: Medium    KATHRINE (obstructive sleep apnea) 08/12/2019     Priority: Medium     Has bipap but does not use      Morbid obesity (H) 06/05/2019     Priority: Medium    Hyperlipidemia 07/17/2017     Priority: Medium    Pneumonia, community acquired 04/13/2017     Priority: Medium    Obesity hypoventilation syndrome (H) 01/01/2017     Priority: Medium    Type 2 diabetes mellitus with complication, with long-term current use of insulin (H) 12/21/2016     Priority: Medium    Complication of diabetes mellitus (H) 12/21/2016     Priority: Medium    Morbid obesity with BMI of 60.0-69.9, adult (H) 05/08/2016     Priority: Medium    Reactive airway disease without complication 11/09/2015     Priority: Medium    Allergic rhinitis  10/01/2009     Priority: Medium    Asthma 2004     Priority: Medium     Asthma  NOS        Past Medical History:    Past Medical History:   Diagnosis Date    Asthma     Cerebral infarction (H)     Diabetes (H)     Hypertension     Mixed hyperlipidemia     Morbid obesity (H)     KATHRINE (obstructive sleep apnea)     Osteoarthritis      Past Surgical History:    Past Surgical History:   Procedure Laterality Date     SECTION      x2    INSERTION, INTRAMEDULLARY JAMES, RETROGRADE, FOR FEMORAL SHAFT FRACTURE Right 2025    Procedure: Open reduction internal fixation intramedullary rodding right hip fracture;  Surgeon: Lazarus Meredith MD;  Location: [facility] OR     Family History:    No family history on file.  Social History:  Marital Status:   [2]  Social History     Tobacco Use    Smoking status: Former    Smokeless tobacco: Never   Vaping Use    Vaping status: Never Used   Substance Use Topics    Alcohol use: Not Currently    Drug use: Never      Medications:    acetaminophen (TYLENOL) 325 MG tablet  amLODIPine (NORVASC) 10 MG tablet  apixaban ANTICOAGULANT (ELIQUIS) 5 MG tablet  atorvastatin (LIPITOR) 40 MG tablet  Continuous Glucose Sensor (FREESTYLE SANDRA 3 SENSOR) MISC  diclofenac (VOLTAREN) 1 % topical gel  docusate sodium (COLACE) 100 MG capsule  gabapentin (NEURONTIN) 300 MG capsule  hydrOXYzine HCl (ATARAX) 25 MG tablet  ibuprofen (ADVIL/MOTRIN) 400 MG tablet  insulin aspart (NOVOLOG PEN) 100 UNIT/ML pen  insulin NPH-Regular (HUMULIN 70/30;NOVOLIN 70/30) susp  insulin pen needle (BD PEN NEEDLE TWYLA 2ND GEN) 32G X 4 MM miscellaneous  irbesartan (AVAPRO) 300 MG tablet  Lidocaine (LIDOCARE) 4 % Patch  LORazepam (ATIVAN) 0.5 MG tablet  metFORMIN (GLUCOPHAGE) 1000 MG tablet  miconazole (MICATIN) 2 % external powder  Misc. Devices (ROLLATOR ULTRA-LIGHT) MISC  montelukast (SINGULAIR) 10 MG tablet  oxyCODONE (ROXICODONE) 10 MG tablet  oxyCODONE (ROXICODONE) 5 MG tablet  polyethylene glycol  "(MIRALAX) 17 g packet  senna-docusate (SENOKOT-S/PERICOLACE) 8.6-50 MG tablet  sertraline (ZOLOFT) 100 MG tablet      Review of Systems   All other systems reviewed and are negative.      PE   BP: 103/71  Pulse: 92  Resp: 18  Height: 163.8 cm (5' 4.5\")  Weight: 107.5 kg (237 lb)  SpO2: 92 %  Physical Exam  Vitals reviewed.   Constitutional:       Appearance: She is well-developed.      Comments: Patient is sleepy but arouses to voice.  She will answer questions appropriately.   HENT:      Head: Normocephalic and atraumatic.      Right Ear: External ear normal.      Left Ear: External ear normal.      Nose: Nose normal.      Mouth/Throat:      Mouth: Mucous membranes are moist.      Pharynx: Oropharynx is clear.   Eyes:      Extraocular Movements: Extraocular movements intact.      Conjunctiva/sclera: Conjunctivae normal.      Pupils: Pupils are equal, round, and reactive to light.   Cardiovascular:      Rate and Rhythm: Normal rate and regular rhythm.   Pulmonary:      Effort: Pulmonary effort is normal.   Abdominal:      Palpations: Abdomen is soft.      Tenderness: There is no abdominal tenderness.   Musculoskeletal:         General: Normal range of motion.      Cervical back: Normal range of motion.   Skin:     General: Skin is warm and dry.   Neurological:      Mental Status: She is alert and oriented to person, place, and time.   Psychiatric:         Behavior: Behavior normal.         ED COURSE and [facility]   1652.  Patient has hypercalcemia.  Patient needing hospitalization.  Small fluid bolus and infusion ordered.  Kidney function within normal range.  So alendronate will be started if hospitalist agrees.    1744.  Additional calcium lab ordered.  Corrected calcium performed, still high.  I reviewed the case with the hospital service and they agree with admission.  I did discuss the use of alendronate today and they are in agreement in starting it here in the ED.  Creatinine within normal range, " hydrated.    Electronic medical chart reviewed, including medical problems, medications, medical allergies, social history.  Recent hospitalizations and surgical procedures reviewed.  Recent clinic visits and consultations reviewed.  Recent labs and test results reviewed.  Nursing notes reviewed.    The patient, their parent if applicable, and/or their medical decision maker(s) and I have reviewed all of the available historical information, applicable PMH, physical exam findings, and objective diagnostic data gathered during this ED visit.  We then discussed all work-up options and then together agreed upon the course taken during this visit.  The ultimate disposition and plan was a cooperative decision made between myself and the patient, their parent if applicable, and/or their legal decision maker(s).  The risks and benefits of all decisions made during this visit were discussed to the best of my abilities given the circumstances, and all parties are understanding of the pertinent ramifications of these decisions.      LABS  Labs Ordered and Resulted from Time of ED Arrival to Time of ED Departure   COMPREHENSIVE METABOLIC PANEL - Abnormal       Result Value    Sodium 132 (*)     Potassium 5.4 (*)     Carbon Dioxide (CO2) 23      Anion Gap 14      Urea Nitrogen 21.2 (*)     Creatinine 0.54      GFR Estimate >90      Calcium 14.8 (*)     Chloride 95 (*)     Glucose 167 (*)     Alkaline Phosphatase 212 (*)     AST 96 (*)     ALT 14      Protein Total 7.0      Albumin 3.4 (*)     Bilirubin Total 0.3     CBC WITH PLATELETS AND DIFFERENTIAL - Abnormal    WBC Count 12.1 (*)     RBC Count 4.11      Hemoglobin 9.6 (*)     Hematocrit 32.7 (*)     MCV 80      MCH 23.4 (*)     MCHC 29.4 (*)     RDW 18.6 (*)     Platelet Count 467 (*)     % Neutrophils 72      % Lymphocytes 14      % Monocytes 9      % Eosinophils 2      % Basophils 1      % Immature Granulocytes 3      NRBCs per 100 WBC 0      Absolute Neutrophils 8.7 (*)      Absolute Lymphocytes 1.7      Absolute Monocytes 1.1      Absolute Eosinophils 0.2      Absolute Basophils 0.1      Absolute Immature Granulocytes 0.3      Absolute NRBCs 0.0     IONIZED CALCIUM       IMAGING  Images reviewed by me.  Radiology report also reviewed.  No orders to display       Procedures    Medications   sodium chloride 0.9 % infusion (1,000 mLs Intravenous $New Bag 5/2/25 1742)   zoledronic acid (ZOMETA) intermittent infusion 4 mg (has no administration in time range)   sodium chloride 0.9% BOLUS 500 mL (0 mLs Intravenous Stopped 5/2/25 1742)         IMPRESSION       ICD-10-CM    1. Hypercalcemia  E83.52                Medication List      There are no discharge medications for this visit.                             Lyndon Kim MD  05/02/25 [id number]

## 2025-05-03 ENCOUNTER — APPOINTMENT (OUTPATIENT)
Dept: GENERAL RADIOLOGY | Facility: CLINIC | Age: 55
DRG: 640 | End: 2025-05-03
Attending: FAMILY MEDICINE
Payer: COMMERCIAL

## 2025-05-03 LAB
ALBUMIN SERPL BCG-MCNC: 3.2 G/DL (ref 3.5–5.2)
ALP SERPL-CCNC: 196 U/L (ref 40–150)
ALT SERPL W P-5'-P-CCNC: 11 U/L (ref 0–50)
ANION GAP SERPL CALCULATED.3IONS-SCNC: 5 MMOL/L (ref 7–15)
AST SERPL W P-5'-P-CCNC: 94 U/L (ref 0–45)
ATRIAL RATE - MUSE: 92 BPM
BILIRUB SERPL-MCNC: 0.4 MG/DL
BUN SERPL-MCNC: 16 MG/DL (ref 6–20)
CALCIUM SERPL-MCNC: 13.4 MG/DL (ref 8.8–10.4)
CHLORIDE SERPL-SCNC: 99 MMOL/L (ref 98–107)
CREAT SERPL-MCNC: 0.39 MG/DL (ref 0.51–0.95)
DIASTOLIC BLOOD PRESSURE - MUSE: NORMAL MMHG
EGFRCR SERPLBLD CKD-EPI 2021: >90 ML/MIN/1.73M2
ERYTHROCYTE [DISTWIDTH] IN BLOOD BY AUTOMATED COUNT: 18.5 % (ref 10–15)
GLUCOSE BLDC GLUCOMTR-MCNC: 117 MG/DL (ref 70–99)
GLUCOSE BLDC GLUCOMTR-MCNC: 121 MG/DL (ref 70–99)
GLUCOSE BLDC GLUCOMTR-MCNC: 122 MG/DL (ref 70–99)
GLUCOSE BLDC GLUCOMTR-MCNC: 82 MG/DL (ref 70–99)
GLUCOSE BLDC GLUCOMTR-MCNC: 95 MG/DL (ref 70–99)
GLUCOSE SERPL-MCNC: 105 MG/DL (ref 70–99)
HCO3 SERPL-SCNC: 26 MMOL/L (ref 22–29)
HCT VFR BLD AUTO: 31.5 % (ref 35–47)
HGB BLD-MCNC: 9 G/DL (ref 11.7–15.7)
INTERPRETATION ECG - MUSE: NORMAL
MCH RBC QN AUTO: 22.6 PG (ref 26.5–33)
MCHC RBC AUTO-ENTMCNC: 28.6 G/DL (ref 31.5–36.5)
MCV RBC AUTO: 79 FL (ref 78–100)
P AXIS - MUSE: 30 DEGREES
PLATELET # BLD AUTO: 437 10E3/UL (ref 150–450)
POTASSIUM SERPL-SCNC: 4.4 MMOL/L (ref 3.4–5.3)
PR INTERVAL - MUSE: 160 MS
PROT SERPL-MCNC: 6.2 G/DL (ref 6.4–8.3)
QRS DURATION - MUSE: 154 MS
QT - MUSE: 360 MS
QTC - MUSE: 445 MS
R AXIS - MUSE: 25 DEGREES
RBC # BLD AUTO: 3.98 10E6/UL (ref 3.8–5.2)
SODIUM SERPL-SCNC: 130 MMOL/L (ref 135–145)
SYSTOLIC BLOOD PRESSURE - MUSE: NORMAL MMHG
T AXIS - MUSE: -24 DEGREES
VENTRICULAR RATE- MUSE: 92 BPM
WBC # BLD AUTO: 10.2 10E3/UL (ref 4–11)

## 2025-05-03 PROCEDURE — 99418 PROLNG IP/OBS E/M EA 15 MIN: CPT | Performed by: FAMILY MEDICINE

## 2025-05-03 PROCEDURE — 250N000013 HC RX MED GY IP 250 OP 250 PS 637

## 2025-05-03 PROCEDURE — 73030 X-RAY EXAM OF SHOULDER: CPT | Mod: RT

## 2025-05-03 PROCEDURE — 250N000013 HC RX MED GY IP 250 OP 250 PS 637: Performed by: FAMILY MEDICINE

## 2025-05-03 PROCEDURE — 85041 AUTOMATED RBC COUNT: CPT

## 2025-05-03 PROCEDURE — 120N000001 HC R&B MED SURG/OB

## 2025-05-03 PROCEDURE — 250N000012 HC RX MED GY IP 250 OP 636 PS 637

## 2025-05-03 PROCEDURE — 258N000003 HC RX IP 258 OP 636

## 2025-05-03 PROCEDURE — 99233 SBSQ HOSP IP/OBS HIGH 50: CPT | Performed by: FAMILY MEDICINE

## 2025-05-03 PROCEDURE — 36415 COLL VENOUS BLD VENIPUNCTURE: CPT

## 2025-05-03 PROCEDURE — 80053 COMPREHEN METABOLIC PANEL: CPT

## 2025-05-03 RX ORDER — HYDROMORPHONE HYDROCHLORIDE 1 MG/ML
0.5 INJECTION, SOLUTION INTRAMUSCULAR; INTRAVENOUS; SUBCUTANEOUS EVERY 6 HOURS PRN
Status: DISCONTINUED | OUTPATIENT
Start: 2025-05-03 | End: 2025-05-06 | Stop reason: HOSPADM

## 2025-05-03 RX ADMIN — DICLOFENAC SODIUM 4 G: 10 GEL TOPICAL at 20:18

## 2025-05-03 RX ADMIN — HYDROXYZINE HYDROCHLORIDE 25 MG: 25 TABLET, FILM COATED ORAL at 10:41

## 2025-05-03 RX ADMIN — OXYCODONE 10 MG: 5 TABLET ORAL at 10:41

## 2025-05-03 RX ADMIN — GABAPENTIN 300 MG: 300 CAPSULE ORAL at 09:16

## 2025-05-03 RX ADMIN — GABAPENTIN 300 MG: 300 CAPSULE ORAL at 20:18

## 2025-05-03 RX ADMIN — ACETAMINOPHEN 975 MG: 325 TABLET, FILM COATED ORAL at 04:25

## 2025-05-03 RX ADMIN — LORAZEPAM 0.5 MG: 0.5 TABLET ORAL at 09:16

## 2025-05-03 RX ADMIN — SODIUM CHLORIDE 1000 ML: 0.9 INJECTION, SOLUTION INTRAVENOUS at 11:35

## 2025-05-03 RX ADMIN — OXYCODONE 5 MG: 5 TABLET ORAL at 09:16

## 2025-05-03 RX ADMIN — ATORVASTATIN CALCIUM 40 MG: 20 TABLET, FILM COATED ORAL at 20:11

## 2025-05-03 RX ADMIN — ACETAMINOPHEN 975 MG: 325 TABLET, FILM COATED ORAL at 20:10

## 2025-05-03 RX ADMIN — DICLOFENAC SODIUM 4 G: 10 GEL TOPICAL at 09:18

## 2025-05-03 RX ADMIN — SODIUM CHLORIDE 1000 ML: 0.9 INJECTION, SOLUTION INTRAVENOUS at 19:22

## 2025-05-03 RX ADMIN — APIXABAN 5 MG: 5 TABLET, FILM COATED ORAL at 20:11

## 2025-05-03 RX ADMIN — INSULIN HUMAN 15 UNITS: 100 INJECTION, SUSPENSION SUBCUTANEOUS at 17:24

## 2025-05-03 RX ADMIN — SODIUM CHLORIDE 1000 ML: 0.9 INJECTION, SOLUTION INTRAVENOUS at 04:29

## 2025-05-03 RX ADMIN — APIXABAN 5 MG: 5 TABLET, FILM COATED ORAL at 09:17

## 2025-05-03 RX ADMIN — IRBESARTAN 300 MG: 75 TABLET ORAL at 22:23

## 2025-05-03 RX ADMIN — OXYCODONE 5 MG: 5 TABLET ORAL at 22:23

## 2025-05-03 RX ADMIN — MICONAZOLE NITRATE: 20 POWDER TOPICAL at 09:17

## 2025-05-03 RX ADMIN — LIDOCAINE 4% 2 PATCH: 40 PATCH TOPICAL at 09:17

## 2025-05-03 RX ADMIN — ACETAMINOPHEN 975 MG: 325 TABLET, FILM COATED ORAL at 10:41

## 2025-05-03 RX ADMIN — MICONAZOLE NITRATE: 20 POWDER TOPICAL at 20:12

## 2025-05-03 RX ADMIN — AMLODIPINE BESYLATE 10 MG: 10 TABLET ORAL at 09:16

## 2025-05-03 RX ADMIN — OXYCODONE 10 MG: 5 TABLET ORAL at 17:24

## 2025-05-03 RX ADMIN — LORAZEPAM 0.5 MG: 0.5 TABLET ORAL at 20:11

## 2025-05-03 RX ADMIN — SERTRALINE HYDROCHLORIDE 150 MG: 50 TABLET ORAL at 09:16

## 2025-05-03 RX ADMIN — OXYCODONE 5 MG: 5 TABLET ORAL at 16:08

## 2025-05-03 RX ADMIN — INSULIN HUMAN 15 UNITS: 100 INJECTION, SUSPENSION SUBCUTANEOUS at 06:11

## 2025-05-03 RX ADMIN — DICLOFENAC SODIUM 4 G: 10 GEL TOPICAL at 16:12

## 2025-05-03 RX ADMIN — MONTELUKAST 10 MG: 10 TABLET, FILM COATED ORAL at 20:11

## 2025-05-03 ASSESSMENT — ACTIVITIES OF DAILY LIVING (ADL)
ADLS_ACUITY_SCORE: 66
ADLS_ACUITY_SCORE: 55
ADLS_ACUITY_SCORE: 66
ADLS_ACUITY_SCORE: 55
ADLS_ACUITY_SCORE: 66
ADLS_ACUITY_SCORE: 55
ADLS_ACUITY_SCORE: 66
ADLS_ACUITY_SCORE: 66
ADLS_ACUITY_SCORE: 55
ADLS_ACUITY_SCORE: 66
ADLS_ACUITY_SCORE: 55
ADLS_ACUITY_SCORE: 55

## 2025-05-03 NOTE — PROGRESS NOTES
"[facility] ADMISSION NOTE    Patient admitted to room 2405 at approximately 1840 via cart from emergency room. Patient was accompanied by spouse and transport tech.     Verbal SBAR report received from Adriane LONDONO prior to patient arrival.     Patient trasferred to bed via air zohra. Patient alert and oriented X 3. Pain is not well controlled.  Medication(s) being used: narcotic analgesics including Oxycodone.  . Admission vital signs: Blood pressure 125/66, pulse 96, temperature 97.9  F (36.6  C), temperature source Oral, resp. rate 18, height 1.638 m (5' 4.5\"), weight 104.9 kg (231 lb 4.2 oz), SpO2 95%, not currently breastfeeding. Patient was oriented to plan of care, call light, bed controls, tv, telephone, bathroom, and visiting hours.     Risk Assessment    The following safety risks were identified during admission: fall and skin. Yellow risk band applied: YES.     Skin Initial Assessment    This writer admitted this patient and completed a full skin assessment and Dominic score in the Adult PCS flowsheet.   Photo documentation of skin problem and/or wound competed via [employer] application (located under Media):  No    Appropriate interventions initiated as needed.     Due to patients intense pain related to movement, the patient refused a full skin check. Patient did agree for the writer to check her sacral wound which was covered with a  mepilex. When peeled back it is open and wet and has a small amount of drainage. Patient was crying and screaming with turns in the bed complaining of knee and shoulder pain.         Education    Patient has a [place] to Observation order: No  Observation education completed and documented: N/A      Dwain Jonas RN      "

## 2025-05-03 NOTE — PLAN OF CARE
Problem: Adult Inpatient Plan of Care  Goal: Readiness for Transition of Care  Intervention: Mutually Develop Transition Plan  Recent Flowsheet Documentation  Taken 5/2/2025 2000 by Chip Brice RN    Problem: Pain Acute  Goal: Optimal Pain Control and Function  Outcome: Progressing   Goal Outcome Evaluation:  Pt is alert and orientated, able to make needs known. She declines repositioning, stated she is comfortable the way she is. Is quiet and withdrawn but pleasant. Continuous fluids were increased to 150ml per hour after lab results. Will have follow up labs later in the AM.

## 2025-05-03 NOTE — PROGRESS NOTES
Ionized Calcium level was 7.3 and normal calcium level was 14.1, Marlena JACOBS was notified of both values.

## 2025-05-03 NOTE — PROGRESS NOTES
[facility]           Assessment & Plan        Brissa Mott is a 54 year old female with past medical history of T2DM, neuroendocrine tumor with metastasis, pathologic femur fracture, hypercalcemia of malignancy, hyperlipidemia, CVA with hemiplegia, asthma, adjustment disorder with depressed mood now presents on 5/2/2025 with recurrent hypercalcemia. She is being admitted for management of acute hypercalcemia of malignancy.      Hypercalcemia of malignancy    History of hypercalcemia 2/2 malignancy; previously treated with zometa & IVF. During previous admission PTH <6, PTHrP 12 (high), vitamin D <18 (low).     Seen outpatient oncology 5/2 at which time labs show recurrence of severe hypercalcemia, likely 2/2 malignancy to bone. Renal function WNL. Symptomatic with increased somnolence, mild encephalopathy, weakness.     Initial Calcium 14.9. When corrected for albumin Ca 15.8.   - Received zometa 4mg IV in ER evening 5/2  - IVF with normal saline 150/hr continuous   - Ionized calcium 7.3 on admission   - Calcium 14.9 ?  14.8 ? 13.4  - hard to gauge how much of her symptoms are baseline vs acute due to hypercalcemia, but at least does not appear to have severe symptoms due to hypocalcemia and does not meet system criteria for calcitonin at present.    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  Following outpatient with Dr. Friedell, review oncology.  Not a candidate for chemo due to baseline performance status.  NGS sequencing is proceeding. She may be a candidate for immunotherapy.  - continued home oxycodone for pain as below   - palliative care consulted to follow and assist with symptom management on Monday  - Continue outpatient follow up with oncology       Hyperkalemia  Mild, K 5.4 in ER. Renal function WNL.   Normalized with IVF       General weakness  Chronic left knee pain  Admitted end of Feb 2025 for pathologic fracture, discharged to TCU, then to home 4/7 and having frequent falls at home 2/2 gen weakness. Recently admitted 4/14 - 4/24/2025 for generalized weakness. Now living in long-term care facility  - PT, OT for ongoing rehab & mobility assistance  - Fall precautions       Hyponatremia   ?  132 ? 130   - IVF as per hypercalcemia, above  - mild, following for now       Elevated LFTs  215, AST 96.  ALT and total bilirubin WNL.  Likely secondary to liver metastasis.  - stable        Leukocytosis  Mild, WBC 11.9.  Technically meets SIRS criteria with heart rate 96, respiratory rate 23/min.  Suspect secondary to inflammation related to ongoing worsening metastatic disease.  No signs of obvious acute infection.  - Abx deferred on admission given no clear infectious source. Low threshold to initiate if patient declares self infectious with follow up labs or symptoms  - normalized, no apparent infection         Pathological fracture of femur due to neoplastic disease  Chronic pain due to this and malignancy   S/p ORIF 2/25. Now living in long term care facility due to poor mobility & gen weakness.   - Continued PTA Lidocaine patches, scheduled Tylenol, PRN Oxycodone 5-10 mg   - Ortho surgery recommends 50% weightbearing on right lower extremity   - added prn dilaudid IV q6 for breakthrough pain 5/3, reassess 5/4       Gluteal cleft  / coccyx & right buttock pressure ulcer, stage 3 (POA)  2/2 poor mobility from generalized weakness. Previously refused repositioning for offloading due to pain with movement.   - Wound care with triad paste   - Frequent repositioning as able  - WO consult requested for Monday       Acute pulmonary embolism  Noted on CT scan during previous admission April 2025. Initiated on apixaban 5mg BID.   - Continue PTA apixaban 5mg BID   - Continue Oxygen via NC to maintain SPO2 >90%  - on 2LNC but has never been hypoxic.  No respiratory symptoms - wean oxygen as able, anticipate will be hypoxic with sleep if not wearing CPAP given known KATHRINE       Hx of CVA in 2018  Hemiplegia and hemiparesis following cerebral infarction  Hyperlipidemia  Residual left sided weakness.  - HELD PTA Atorvastatin 40mg due to elevated LFTs, but does not appear due to this - resumed 5/3       Type 2 diabetes mellitus, with long-term current use of insulin   Diabetic neuropathy  A1c 6.8% 4/14/2025. Ozempic recently discontinued due to correlation with patient's onset of poor intake per .   - continue PTA Novolog 1 unit with meals   - Continue PTA Humulin 70/30 BID 15U  - Continued PTA Gabapentin 300mg BID   - HELD PTA Metformin 1000mg BID while not tolerating good diet - reassess 5/4         Anemia, borderline microcytic, chronic  Hemoglobin 9.6 on admission, baseline 8-9.    - Stable, following       Hypertension  - Continued PTA Amlodipine 10mg daily   - Continued PTA  Ibesartan 300mg daily        Reactive airway disease without complication  Feels mildly dyspneic, denies chest pain. On 0.5L NC at time of admission. No wheezing on exam.   - Continued PTA Montelukast 10mg        Adjustment disorder with depressed mood  - Continued PTA Hydroxyzine 25mg PRN   - Continued PTA Sertraline 150mg daily   - Continue PTA lorazepam 0.5mg BID       KATHRINE (obstructive sleep apnea)  - Continue PTA CPAP if available        Clinically Significant Risk  "Factors Present on Admission         # Hyperkalemia: Highest K = 5.4 mmol/L in last 2 days, will monitor as appropriate  # Hyponatremia: Lowest Na = 132 mmol/L in last 2 days, will monitor as appropriate  # Hypochloremia: Lowest Cl = 95 mmol/L in last 2 days, will monitor as appropriate   # Hypercalcemia: Highest Ca = 14.9 mg/dL in last 2 days, will monitor as appropriate    # Hypoalbuminemia: Lowest albumin = 3.4 g/dL at 5/2/2025  4:04 PM, will monitor as appropriate  # Drug Induced Coagulation Defect: home medication list includes an anticoagulant medication    # Hypertension: Noted on problem list      # Anemia: based on hgb <11        # DMII: A1C = 6.8 % (Ref range: <5.7 %) within past 6 months    # Morbid Obesity: Estimated body mass index is 40.05 kg/m  as calculated from the following:    Height as of this encounter: 1.638 m (5' 4.5\").    Weight as of this encounter: 107.5 kg (237 lb).       # Financial/Environmental Concerns:    # Asthma: noted on problem list         Diet:  ADAT to regular  DVT Prophylaxis: DOAC  Nielson Catheter: Not present  Code Status:  full code  Lines: PIV              Diet  Orders Placed This Encounter      Regular Diet Adult                          Disposition Plan        Medically Ready for Discharge: Anticipated in 2-4 Days                  Bruce Esteban MD  Hospitalist Service  [facility]  Securely message with the Vocera Web Console (learn more here)  Text page via [employer] Paging/Directory             Interval History:   Patient is a pleasant but challenging historian.  Highly non-specific and hard to pin down on answers.  Reports still feeling generally weak and achy all over, with pain mostly in right shoulder and less so in bilateral knees.  Sounds like this is long-standing, but sounds like the shoulder pain is maybe worse.  No known injury.  Shoulder pain is the worst pain she has at present.  Pain with movement.  Overall feels about the same as " "yesterday.  Most of her symptoms seem long-standing, but it's hard to gauge for sure.                  Review of Systems:    ROS: 10 point ROS neg other than the symptoms noted above in the HPI.           Medications:   Current active medications and PTA medications reviewed, see medication list for details.            Physical Exam:   Vitals were reviewed  Patient Vitals for the past 24 hrs:   BP Temp Temp src Pulse Resp SpO2 Height Weight   25 1030 -- -- -- -- 18 -- -- --   25 0915 -- -- -- -- 16 -- -- --   25 0738 (!) 141/79 97.7  F (36.5  C) Oral 95 16 96 % -- --   25 0420 (!) 140/76 98.8  F (37.1  C) Oral 95 18 96 % -- --   25 2345 138/79 98  F (36.7  C) Oral 84 18 96 % -- --   25 2205 135/78 -- -- -- -- -- -- --   25 1900 125/66 97.9  F (36.6  C) Oral 96 18 95 % -- 104.9 kg (231 lb 4.2 oz)   25 1730 111/65 -- -- 89 20 96 % -- --   25 1700 116/73 -- -- 90 21 96 % -- --   25 1630 111/82 -- -- 90 23 96 % -- --   25 1553 103/71 -- -- 92 18 92 % 1.638 m (5' 4.5\") 107.5 kg (237 lb)       Temperatures:  Current - Temp: 97.7  F (36.5  C); Max - Temp  Av.3  F (36.8  C)  Min: 97.7  F (36.5  C)  Max: 99.2  F (37.3  C)  Respiration range: Resp  Av.5  Min: 16  Max: 23  Pulse range: Pulse  Av.7  Min: 84  Max: 103  Blood pressure range: Systolic (24hrs), Av , Min:103 , Max:141   ; Diastolic (24hrs), Av, Min:65, Max:82    Pulse oximetry range: SpO2  Av.2 %  Min: 92 %  Max: 96 %  I/O last 3 completed shifts:  In: -   Out: 700 [Urine:700]    Intake/Output Summary (Last 24 hours) at 5/3/2025 1036  Last data filed at 5/3/2025 0913  Gross per 24 hour   Intake --   Output 1700 ml   Net -1700 ml     EXAM:  General: awake and alert, NAD, oriented x 3  Head: normocephalic  Neck: unremarkable, no lymphadenopathy   HEENT: oropharynx pink and moist    Heart: Regular rate and rhythm, no murmurs, rubs, or gallops  Lungs: clear to auscultation " bilaterally with good air movement throughout  Abdomen: soft, non-tender, no masses or organomegaly  Extremities: no edema in lower extremities   Right shoulder shows tenderness with palpation and pain with any movement, but hard to gauge as all of this is somewhat inconsistent.  She does clearly appear to have pain however.  No apparent swelling, erythema, warmth or deformity.    Skin unremarkable as visualized.               Data:     Reviewed data:  Results for orders placed or performed during the hospital encounter of 05/02/25 (from the past 24 hours)   EKG 12 lead   Result Value Ref Range    Systolic Blood Pressure  mmHg    Diastolic Blood Pressure  mmHg    Ventricular Rate 92 BPM    Atrial Rate 92 BPM    IL Interval 160 ms    QRS Duration 154 ms     ms    QTc 445 ms    P Axis 30 degrees    R AXIS 25 degrees    T Axis -24 degrees    Interpretation ECG       Sinus rhythm  Right bundle branch block  Inferior infarct (cited on or before 14-Apr-2025)  Anterior infarct (cited on or before 14-Apr-2025)  T wave abnormality, consider lateral ischemia  Abnormal ECG  When compared with ECG of 14-Apr-2025 09:25,  Serial changes of Anterior infarct Present     [place] Draw    Narrative    The following orders were created for panel order [place] [facility].  Procedure                               Abnormality         Status                     ---------                               -----------         ------                     Extra Blue Top Tube[[id number]]                             Final result               Extra Red Top Tube[[id number]]                              Final result                 Please view results for these tests on the individual orders.   CBC with platelets, differential    Narrative    The following orders were created for panel order CBC with platelets, differential.  Procedure                               Abnormality         Status                     ---------                                -----------         ------                     CBC with platelets and ...[[id number]]  Abnormal            Final result                 Please view results for these tests on the individual orders.   Comprehensive metabolic panel   Result Value Ref Range    Sodium 132 (L) 135 - 145 mmol/L    Potassium 5.4 (H) 3.4 - 5.3 mmol/L    Carbon Dioxide (CO2) 23 22 - 29 mmol/L    Anion Gap 14 7 - 15 mmol/L    Urea Nitrogen 21.2 (H) 6.0 - 20.0 mg/dL    Creatinine 0.54 0.51 - 0.95 mg/dL    GFR Estimate >90 >60 mL/min/1.73m2    Calcium 14.8 (HH) 8.8 - 10.4 mg/dL    Chloride 95 (L) 98 - 107 mmol/L    Glucose 167 (H) 70 - 99 mg/dL    Alkaline Phosphatase 212 (H) 40 - 150 U/L    AST 96 (H) 0 - 45 U/L    ALT 14 0 - 50 U/L    Protein Total 7.0 6.4 - 8.3 g/dL    Albumin 3.4 (L) 3.5 - 5.2 g/dL    Bilirubin Total 0.3 <=1.2 mg/dL   Extra Blue Top Tube   Result Value Ref Range    Hold Specimen JIC    Extra Red Top Tube   Result Value Ref Range    Hold Specimen JIC    CBC with platelets and differential   Result Value Ref Range    WBC Count 12.1 (H) 4.0 - 11.0 10e3/uL    RBC Count 4.11 3.80 - 5.20 10e6/uL    Hemoglobin 9.6 (L) 11.7 - 15.7 g/dL    Hematocrit 32.7 (L) 35.0 - 47.0 %    MCV 80 78 - 100 fL    MCH 23.4 (L) 26.5 - 33.0 pg    MCHC 29.4 (L) 31.5 - 36.5 g/dL    RDW 18.6 (H) 10.0 - 15.0 %    Platelet Count 467 (H) 150 - 450 10e3/uL    % Neutrophils 72 %    % Lymphocytes 14 %    % Monocytes 9 %    % Eosinophils 2 %    % Basophils 1 %    % Immature Granulocytes 3 %    NRBCs per 100 WBC 0 <1 /100    Absolute Neutrophils 8.7 (H) 1.6 - 8.3 10e3/uL    Absolute Lymphocytes 1.7 0.8 - 5.3 10e3/uL    Absolute Monocytes 1.1 0.0 - 1.3 10e3/uL    Absolute Eosinophils 0.2 0.0 - 0.7 10e3/uL    Absolute Basophils 0.1 0.0 - 0.2 10e3/uL    Absolute Immature Granulocytes 0.3 <=0.4 10e3/uL    Absolute NRBCs 0.0 10e3/uL   Magnesium   Result Value Ref Range    Magnesium 2.1 1.7 - 2.3 mg/dL   Ionized Calcium   Result Value Ref Range    Calcium Ionized  Whole Blood 7.3 (HH) 4.4 - 5.2 mg/dL   Comprehensive metabolic panel   Result Value Ref Range    Sodium 131 (L) 135 - 145 mmol/L    Potassium 4.9 3.4 - 5.3 mmol/L    Carbon Dioxide (CO2) 24 22 - 29 mmol/L    Anion Gap 10 7 - 15 mmol/L    Urea Nitrogen 20.6 (H) 6.0 - 20.0 mg/dL    Creatinine 0.51 0.51 - 0.95 mg/dL    GFR Estimate >90 >60 mL/min/1.73m2    Calcium 14.1 (HH) 8.8 - 10.4 mg/dL    Chloride 97 (L) 98 - 107 mmol/L    Glucose 144 (H) 70 - 99 mg/dL    Alkaline Phosphatase 191 (H) 40 - 150 U/L    AST 94 (H) 0 - 45 U/L    ALT 12 0 - 50 U/L    Protein Total 6.4 6.4 - 8.3 g/dL    Albumin 3.2 (L) 3.5 - 5.2 g/dL    Bilirubin Total 0.3 <=1.2 mg/dL   Magnesium   Result Value Ref Range    Magnesium 1.9 1.7 - 2.3 mg/dL   Glucose by meter   Result Value Ref Range    GLUCOSE BY METER POCT 138 (H) 70 - 99 mg/dL   Glucose by meter   Result Value Ref Range    GLUCOSE BY METER POCT 121 (H) 70 - 99 mg/dL   Comprehensive metabolic panel   Result Value Ref Range    Sodium 130 (L) 135 - 145 mmol/L    Potassium 4.4 3.4 - 5.3 mmol/L    Carbon Dioxide (CO2) 26 22 - 29 mmol/L    Anion Gap 5 (L) 7 - 15 mmol/L    Urea Nitrogen 16.0 6.0 - 20.0 mg/dL    Creatinine 0.39 (L) 0.51 - 0.95 mg/dL    GFR Estimate >90 >60 mL/min/1.73m2    Calcium 13.4 (H) 8.8 - 10.4 mg/dL    Chloride 99 98 - 107 mmol/L    Glucose 105 (H) 70 - 99 mg/dL    Alkaline Phosphatase 196 (H) 40 - 150 U/L    AST 94 (H) 0 - 45 U/L    ALT 11 0 - 50 U/L    Protein Total 6.2 (L) 6.4 - 8.3 g/dL    Albumin 3.2 (L) 3.5 - 5.2 g/dL    Bilirubin Total 0.4 <=1.2 mg/dL   CBC with platelets   Result Value Ref Range    WBC Count 10.2 4.0 - 11.0 10e3/uL    RBC Count 3.98 3.80 - 5.20 10e6/uL    Hemoglobin 9.0 (L) 11.7 - 15.7 g/dL    Hematocrit 31.5 (L) 35.0 - 47.0 %    MCV 79 78 - 100 fL    MCH 22.6 (L) 26.5 - 33.0 pg    MCHC 28.6 (L) 31.5 - 36.5 g/dL    RDW 18.5 (H) 10.0 - 15.0 %    Platelet Count 437 150 - 450 10e3/uL   Glucose by meter   Result Value Ref Range    GLUCOSE BY METER POCT  95 70 - 99 mg/dL           Attestation:  I have reviewed today's vital signs, notes, medications, labs and imaging.  Amount of time spent managing this patient today:  70 minutes.

## 2025-05-04 LAB
ALBUMIN SERPL BCG-MCNC: 3 G/DL (ref 3.5–5.2)
ALP SERPL-CCNC: 178 U/L (ref 40–150)
ALT SERPL W P-5'-P-CCNC: 10 U/L (ref 0–50)
ANION GAP SERPL CALCULATED.3IONS-SCNC: 9 MMOL/L (ref 7–15)
AST SERPL W P-5'-P-CCNC: 90 U/L (ref 0–45)
BILIRUB SERPL-MCNC: 0.3 MG/DL
BUN SERPL-MCNC: 10.4 MG/DL (ref 6–20)
CA-I BLD-MCNC: 6.5 MG/DL (ref 4.4–5.2)
CALCIUM SERPL-MCNC: 12.2 MG/DL (ref 8.8–10.4)
CHLORIDE SERPL-SCNC: 104 MMOL/L (ref 98–107)
CREAT SERPL-MCNC: 0.37 MG/DL (ref 0.51–0.95)
EGFRCR SERPLBLD CKD-EPI 2021: >90 ML/MIN/1.73M2
ERYTHROCYTE [DISTWIDTH] IN BLOOD BY AUTOMATED COUNT: 18.4 % (ref 10–15)
GLUCOSE BLDC GLUCOMTR-MCNC: 136 MG/DL (ref 70–99)
GLUCOSE BLDC GLUCOMTR-MCNC: 137 MG/DL (ref 70–99)
GLUCOSE BLDC GLUCOMTR-MCNC: 139 MG/DL (ref 70–99)
GLUCOSE BLDC GLUCOMTR-MCNC: 89 MG/DL (ref 70–99)
GLUCOSE BLDC GLUCOMTR-MCNC: 92 MG/DL (ref 70–99)
GLUCOSE SERPL-MCNC: 84 MG/DL (ref 70–99)
HCO3 SERPL-SCNC: 22 MMOL/L (ref 22–29)
HCT VFR BLD AUTO: 30.6 % (ref 35–47)
HGB BLD-MCNC: 8.6 G/DL (ref 11.7–15.7)
MAGNESIUM SERPL-MCNC: 1.9 MG/DL (ref 1.7–2.3)
MCH RBC QN AUTO: 22.7 PG (ref 26.5–33)
MCHC RBC AUTO-ENTMCNC: 28.1 G/DL (ref 31.5–36.5)
MCV RBC AUTO: 81 FL (ref 78–100)
PHOSPHATE SERPL-MCNC: 1.8 MG/DL (ref 2.5–4.5)
PLATELET # BLD AUTO: 401 10E3/UL (ref 150–450)
POTASSIUM SERPL-SCNC: 4.3 MMOL/L (ref 3.4–5.3)
PROT SERPL-MCNC: 6 G/DL (ref 6.4–8.3)
RBC # BLD AUTO: 3.79 10E6/UL (ref 3.8–5.2)
SODIUM SERPL-SCNC: 135 MMOL/L (ref 135–145)
WBC # BLD AUTO: 9.2 10E3/UL (ref 4–11)

## 2025-05-04 PROCEDURE — 250N000013 HC RX MED GY IP 250 OP 250 PS 637: Performed by: FAMILY MEDICINE

## 2025-05-04 PROCEDURE — 250N000011 HC RX IP 250 OP 636: Mod: JZ | Performed by: FAMILY MEDICINE

## 2025-05-04 PROCEDURE — 120N000001 HC R&B MED SURG/OB

## 2025-05-04 PROCEDURE — 82330 ASSAY OF CALCIUM: CPT | Performed by: FAMILY MEDICINE

## 2025-05-04 PROCEDURE — 250N000013 HC RX MED GY IP 250 OP 250 PS 637

## 2025-05-04 PROCEDURE — 83735 ASSAY OF MAGNESIUM: CPT | Performed by: FAMILY MEDICINE

## 2025-05-04 PROCEDURE — 85027 COMPLETE CBC AUTOMATED: CPT | Performed by: FAMILY MEDICINE

## 2025-05-04 PROCEDURE — 258N000003 HC RX IP 258 OP 636

## 2025-05-04 PROCEDURE — 99232 SBSQ HOSP IP/OBS MODERATE 35: CPT | Performed by: FAMILY MEDICINE

## 2025-05-04 PROCEDURE — 36415 COLL VENOUS BLD VENIPUNCTURE: CPT | Performed by: FAMILY MEDICINE

## 2025-05-04 PROCEDURE — 84100 ASSAY OF PHOSPHORUS: CPT | Performed by: FAMILY MEDICINE

## 2025-05-04 PROCEDURE — 82374 ASSAY BLOOD CARBON DIOXIDE: CPT | Performed by: FAMILY MEDICINE

## 2025-05-04 RX ADMIN — MONTELUKAST 10 MG: 10 TABLET, FILM COATED ORAL at 22:22

## 2025-05-04 RX ADMIN — DICLOFENAC SODIUM 4 G: 10 GEL TOPICAL at 08:20

## 2025-05-04 RX ADMIN — ACETAMINOPHEN 975 MG: 325 TABLET, FILM COATED ORAL at 11:26

## 2025-05-04 RX ADMIN — APIXABAN 5 MG: 5 TABLET, FILM COATED ORAL at 20:22

## 2025-05-04 RX ADMIN — INSULIN HUMAN 15 UNITS: 100 INJECTION, SUSPENSION SUBCUTANEOUS at 17:35

## 2025-05-04 RX ADMIN — HYDROXYZINE HYDROCHLORIDE 25 MG: 25 TABLET, FILM COATED ORAL at 08:19

## 2025-05-04 RX ADMIN — INSULIN HUMAN 15 UNITS: 100 INJECTION, SUSPENSION SUBCUTANEOUS at 06:16

## 2025-05-04 RX ADMIN — MICONAZOLE NITRATE: 20 POWDER TOPICAL at 08:20

## 2025-05-04 RX ADMIN — OXYCODONE 5 MG: 5 TABLET ORAL at 08:19

## 2025-05-04 RX ADMIN — ATORVASTATIN CALCIUM 40 MG: 20 TABLET, FILM COATED ORAL at 22:22

## 2025-05-04 RX ADMIN — LORAZEPAM 0.5 MG: 0.5 TABLET ORAL at 20:22

## 2025-05-04 RX ADMIN — GABAPENTIN 300 MG: 300 CAPSULE ORAL at 20:22

## 2025-05-04 RX ADMIN — HYDROMORPHONE HYDROCHLORIDE 0.5 MG: 1 INJECTION, SOLUTION INTRAMUSCULAR; INTRAVENOUS; SUBCUTANEOUS at 06:15

## 2025-05-04 RX ADMIN — LORAZEPAM 0.5 MG: 0.5 TABLET ORAL at 08:19

## 2025-05-04 RX ADMIN — OXYCODONE 10 MG: 5 TABLET ORAL at 15:45

## 2025-05-04 RX ADMIN — DICLOFENAC SODIUM 4 G: 10 GEL TOPICAL at 17:35

## 2025-05-04 RX ADMIN — SODIUM CHLORIDE 1000 ML: 0.9 INJECTION, SOLUTION INTRAVENOUS at 08:20

## 2025-05-04 RX ADMIN — SODIUM CHLORIDE 1000 ML: 0.9 INJECTION, SOLUTION INTRAVENOUS at 01:54

## 2025-05-04 RX ADMIN — OXYCODONE 5 MG: 5 TABLET ORAL at 20:22

## 2025-05-04 RX ADMIN — APIXABAN 5 MG: 5 TABLET, FILM COATED ORAL at 08:19

## 2025-05-04 RX ADMIN — GABAPENTIN 300 MG: 300 CAPSULE ORAL at 08:19

## 2025-05-04 RX ADMIN — SODIUM CHLORIDE 1000 ML: 0.9 INJECTION, SOLUTION INTRAVENOUS at 15:45

## 2025-05-04 RX ADMIN — DICLOFENAC SODIUM 4 G: 10 GEL TOPICAL at 22:22

## 2025-05-04 RX ADMIN — HYDROXYZINE HYDROCHLORIDE 25 MG: 25 TABLET, FILM COATED ORAL at 17:54

## 2025-05-04 RX ADMIN — LIDOCAINE 4% 2 PATCH: 40 PATCH TOPICAL at 08:20

## 2025-05-04 RX ADMIN — OXYCODONE 10 MG: 5 TABLET ORAL at 02:39

## 2025-05-04 RX ADMIN — HYDROXYZINE HYDROCHLORIDE 25 MG: 25 TABLET, FILM COATED ORAL at 02:39

## 2025-05-04 RX ADMIN — OXYCODONE 5 MG: 5 TABLET ORAL at 13:56

## 2025-05-04 RX ADMIN — DICLOFENAC SODIUM 4 G: 10 GEL TOPICAL at 12:28

## 2025-05-04 RX ADMIN — HYDROMORPHONE HYDROCHLORIDE 0.5 MG: 1 INJECTION, SOLUTION INTRAMUSCULAR; INTRAVENOUS; SUBCUTANEOUS at 17:54

## 2025-05-04 RX ADMIN — SODIUM CHLORIDE 1000 ML: 0.9 INJECTION, SOLUTION INTRAVENOUS at 22:22

## 2025-05-04 RX ADMIN — OXYCODONE 10 MG: 5 TABLET ORAL at 11:26

## 2025-05-04 RX ADMIN — SERTRALINE HYDROCHLORIDE 150 MG: 50 TABLET ORAL at 08:20

## 2025-05-04 RX ADMIN — ACETAMINOPHEN 975 MG: 325 TABLET, FILM COATED ORAL at 20:22

## 2025-05-04 RX ADMIN — AMLODIPINE BESYLATE 10 MG: 10 TABLET ORAL at 08:19

## 2025-05-04 RX ADMIN — ACETAMINOPHEN 975 MG: 325 TABLET, FILM COATED ORAL at 03:35

## 2025-05-04 ASSESSMENT — ACTIVITIES OF DAILY LIVING (ADL)
ADLS_ACUITY_SCORE: 66
ADLS_ACUITY_SCORE: 66
ADLS_ACUITY_SCORE: 69
ADLS_ACUITY_SCORE: 66
ADLS_ACUITY_SCORE: 69
ADLS_ACUITY_SCORE: 66
ADLS_ACUITY_SCORE: 68
ADLS_ACUITY_SCORE: 69
DEPENDENT_IADLS:: CLEANING;COOKING;LAUNDRY;SHOPPING;MEAL PREPARATION;MEDICATION MANAGEMENT;MONEY MANAGEMENT;TRANSPORTATION
ADLS_ACUITY_SCORE: 70
ADLS_ACUITY_SCORE: 66
ADLS_ACUITY_SCORE: 66
ADLS_ACUITY_SCORE: 70
ADLS_ACUITY_SCORE: 68
ADLS_ACUITY_SCORE: 69
ADLS_ACUITY_SCORE: 70
ADLS_ACUITY_SCORE: 66
ADLS_ACUITY_SCORE: 66
ADLS_ACUITY_SCORE: 70
ADLS_ACUITY_SCORE: 66
ADLS_ACUITY_SCORE: 69
ADLS_ACUITY_SCORE: 69

## 2025-05-04 NOTE — PLAN OF CARE
"  Problem: Adult Inpatient Plan of Care  Goal: Plan of Care Review  Description: The Plan of Care Review/Shift note should be completed every shift.  The Outcome Evaluation is a brief statement about your assessment that the patient is improving, declining, or no change.  This information will be displayed automatically on your shiftnote.  Outcome: Not Progressing  Flowsheets (Taken 5/4/2025 0411)  Outcome Evaluation: Pt sleeping most of the shift, bedrest. NS at 150 ml/hr. Prn oxycodone and atarax given for pain. On 1 L O2 via nc.. Purewick in place. Pt refusing repositioning. Denies nausea.  Plan of Care Reviewed With: patient  Overall Patient Progress: no change  Goal: Patient-Specific Goal (Individualized)  Description: You can add care plan individualizations to a care plan. Examples of Individualization might be:  \"Parent requests to be called daily at 9am for status\", \"I have a hard time hearing out of my right ear\", or \"Do not touch me to wake me up as it startlesme\".  Outcome: Not Progressing  Goal: Absence of Hospital-Acquired Illness or Injury  Outcome: Not Progressing  Intervention: Identify and Manage Fall Risk  Recent Flowsheet Documentation  Taken 5/4/2025 0000 by Frannie Lee RN  Safety Promotion/Fall Prevention: activity supervised  Intervention: Prevent Infection  Recent Flowsheet Documentation  Taken 5/4/2025 0000 by Frannie Lee, RN  Infection Prevention: rest/sleep promoted  Goal: Optimal Comfort and Wellbeing  Outcome: Not Progressing  Goal: Readiness for Transition of Care  Outcome: Not Progressing   Goal Outcome Evaluation:      Plan of Care Reviewed With: patient    Overall Patient Progress: no changeOverall Patient Progress: no change    Outcome Evaluation: Pt sleeping most of the shift, bedrest. NS at 150 ml/hr. Prn oxycodone and atarax given for pain. On 1 L O2 via nc.. Purewick in place. Pt refusing repositioning. Denies nausea.      "

## 2025-05-04 NOTE — CONSULTS
Care Management Initial Consult    General Information  Assessment completed with: VM-chart review,    Type of CM/SW Visit: Initial Assessment    Primary Care Provider verified and updated as needed: Yes   Readmission within the last 30 days:        Reason for Consult: discharge planning  Advance Care Planning:          Communication Assessment  Patient's communication style: spoken language (English or Bilingual)    Hearing Difficulty or Deaf: no   Wear Glasses or Blind: yes    Cognitive  Cognitive/Neuro/Behavioral: WDL  Level of Consciousness: lethargic  Arousal Level: opens eyes spontaneously  Orientation: disoriented to, time (thought it was Oct, 2025)  Mood/Behavior: flat affect, withdrawn     Speech:  (slow)    Living Environment:   People in home: facility resident     Current living Arrangements: extended care facility  Name of Facility: [facility]   Able to return to prior arrangements: yes     Family/Social Support:  Care provided by: other (see comments) (facility staff)  Provides care for: no one, unable/limited ability to care for self  Marital Status:   Support system: Children, , Facility resident(s)/Staff          Description of Support System: Supportive, Involved    Support Assessment: Adequate family and caregiver support, Adequate social supports    Current Resources:   Patient receiving home care services: No  Community Resources: Skilled Nursing Facility  Equipment currently used at home: wheelchair, manual, shower chair, grab bar, toilet, grab bar, tub/shower  Supplies currently used at home:      Employment/Financial:  Employment Status: disabled        Does the patient's insurance plan have a 3 day qualifying hospital stay waiver?  Yes   Which insurance plan 3 day waiver is available? Alternative insurance waiver  Will the waiver be used for post-acute placement? No    Lifestyle & Psychosocial Needs:  Social Drivers of Health     Food Insecurity: Low Risk  (5/2/2025)    Food  Insecurity     Within the past 12 months, did you worry that your food would run out before you got money to buy more?: No     Within the past 12 months, did the food you bought just not last and you didn t have money to get more?: No   Depression: At risk (7/19/2024)    PHQ-2     PHQ-2 Score: 3   Housing Stability: Low Risk  (5/2/2025)    Housing Stability     Do you have housing? : Yes     Are you worried about losing your housing?: No   Tobacco Use: Medium Risk (2/25/2025)    Patient History     Smoking Tobacco Use: Former     Smokeless Tobacco Use: Never     Passive Exposure: Not on file   Financial Resource Strain: Low Risk  (5/2/2025)    Financial Resource Strain     Within the past 12 months, have you or your family members you live with been unable to get utilities (heat, electricity) when it was really needed?: No   Alcohol Use: Not on file   Transportation Needs: Low Risk  (5/2/2025)    Transportation Needs     Within the past 12 months, has lack of transportation kept you from medical appointments, getting your medicines, non-medical meetings or appointments, work, or from getting things that you need?: No   Physical Activity: Not on file   Interpersonal Safety: Low Risk  (5/2/2025)    Interpersonal Safety     Do you feel physically and emotionally safe where you currently live?: Yes     Within the past 12 months, have you been hit, slapped, kicked or otherwise physically hurt by someone?: No     Within the past 12 months, have you been humiliated or emotionally abused in other ways by your partner or ex-partner?: No   Stress: Not on file   Social Connections: Socially Integrated (1/31/2025)    Received from [facility]    Social Connections     Do you often feel lonely or isolated from those around you?: 0   Health Literacy: Not on file     Functional Status:  Prior to admission patient needed assistance:   Dependent ADLs:: Bathing, Dressing, Transfers,  Toileting  Dependent IADLs:: Cleaning, Cooking, Laundry, Shopping, Meal Preparation, Medication Management, Money Management, Transportation     Discussed  Partnership in Safe Discharge Planning  document with patient/family: No    Additional Information:    Referral received for discharge planning and services.      Patient resides at [facility]  Main Phone:[phone]   Admissions # [phone]   Fax: [phone]     JAMILAH spoke with Emmy in admissions and confirmed residency.   Emmy spoke with family regarding bed hold-  Patient does have a bed hold in place.       CM will continue to follow as Pt's condition evolves to assist with transfer back to the [facility] facility      PLAN: Return to [facility]    SIRENA DIXON RN

## 2025-05-04 NOTE — PROGRESS NOTES
[facility] Hospitalist Progress Note           Assessment & Plan          Brissa Mott is a 54 year old female with past medical history of T2DM, neuroendocrine tumor with metastasis, pathologic femur fracture, hypercalcemia of malignancy, hyperlipidemia, CVA with hemiplegia, asthma, adjustment disorder with depressed mood now presents on 5/2/2025 with recurrent hypercalcemia. She is being admitted for management of acute hypercalcemia of malignancy.      Hypercalcemia of malignancy    History of hypercalcemia 2/2 malignancy; previously treated with zometa & IVF. During previous admission PTH <6, PTHrP 12 (high), vitamin D <18 (low).     Seen outpatient oncology 5/2 at which time labs show recurrence of severe hypercalcemia, likely 2/2 malignancy to bone. Renal function WNL. Symptomatic with increased somnolence, mild encephalopathy, weakness.     Initial Calcium 14.9. When corrected for albumin Ca 15.8.   - Received zometa 4mg IV in ER evening 5/2, did not qualify for subcutaneous calcitonin   - IVF with normal saline 150/hr continuous   - Ionized calcium 7.3 on admission   - Calcium 14.9 ?  14.8 ? 13.4 ? 12.2   - improving, hard to gauge how much of her symptoms are baseline vs acute due to hypercalcemia but appears to have been at least mildly symptomatic.    Following          Chronic pain due to this and malignancy   Pathological fracture of femur due to neoplastic disease  S/p ORIF 2/25. Now living in long term care facility due to poor mobility & gen weakness.   - Continued PTA Lidocaine patches, scheduled Tylenol, PRN Oxycodone 5-10 mg   - Ortho surgery recommends 50% weightbearing on right lower extremity   - added prn dilaudid IV q6 for breakthrough pain 5/3, improved 5/4 - palliative care consulted to assist with symptom management 5/5       Right shoulder pain   Hard to gauge how acute this is, but it's pretty severe and we know she is at risk for pathologic fractures so x-ray shoulder 5/3 was  negative for fracture or other acute change, did look like arthritis.     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  Following outpatient with Dr. Friedell, review oncology.  Not a candidate for chemo due to baseline performance status.  NGS sequencing is proceeding. She may be a candidate for immunotherapy.  - continued home oxycodone for pain with prn dilaudid as above   - palliative care consulted as above   - Continue outpatient follow up with oncology        Hyperkalemia  Mild, K 5.4 in ER. Renal function WNL.   Normalized with IVF        General weakness  Chronic left knee pain  Admitted end of Feb 2025 for pathologic fracture, discharged to TCU, then to home 4/7 and having frequent falls at home 2/2 gen weakness. Recently admitted 4/14 - 4/24/2025 for generalized weakness. Now living in long-term care facility  - PT, OT for ongoing rehab & mobility assistance  - Fall precautions        Hyponatremia   ?  132 ? 130 ? 135   - IVF as per hypercalcemia, above  Resolved 5/4         Elevated LFTs  Alk phos 215, AST 96.  ALT and total bilirubin WNL.  Likely secondary to liver metastasis.  - stable         Slight Leukocytosis without infection   Mild, WBC 11.9.  Technically meets SIRS criteria with heart rate 96, respiratory rate 23/min.  Suspect secondary to inflammation related to ongoing worsening metastatic disease.  No signs of obvious acute infection.  - Abx deferred on admission given no clear infectious source. Low threshold to  initiate if patient declares self infectious with follow up labs or symptoms  - normalized without intervention, no apparent infection          Gluteal cleft / coccyx & right buttock pressure ulcer, stage 3 (POA)  2/2 poor mobility from generalized weakness. Previously refused repositioning for offloading due to pain with movement.   - Wound care with triad paste   - Frequent repositioning as able  - WOC consult requested for Monday        Recent acute pulmonary embolism  Mild intermittent asthma without clear exacerbation   Suspected obesity hypoventilation syndrome (per pulmonology note) vs KATHRINE  Noted on CT scan during previous admission April 2025. Initiated on apixaban 5mg BID.   - has history of reactive airway disease, but not on any medications besides montelukast.  No wheezing on exam, does not appear to be having an asthma exacerbation   - Continue PTA apixaban 5mg BID   - Continued PTA Montelukast 10mg  - Continue Oxygen via NC to maintain SPO2 >90%  - has been on and off low amounts of oxygen - mostly needing this when sleeping, resting or tired which is most of the time     No respiratory symptoms - wean oxygen as able, anticipate will be hypoxic with sleep if not wearing CPAP given known KATHRINE  -  Patient did not want chest x-ray 5/4 due to discomfort this this - overall suspect this is baseline, but if not able to wean off oxygen or if need increasing would again discuss diong a chest x-ray         Hx of CVA in 2018  Hemiplegia and hemiparesis following cerebral infarction  Hyperlipidemia  Residual left sided weakness.  - HELD PTA Atorvastatin 40mg due to elevated LFTs, but does not appear due to this - resumed 5/3        Type 2 diabetes mellitus, with long-term current use of insulin   Diabetic neuropathy  A1c 6.8% 4/14/2025. Ozempic recently discontinued due to correlation with patient's onset of poor intake per .   - continue PTA Novolog 1 unit with meals   - Continue PTA Humulin 70/30 BID  "15U  - Continued PTA Gabapentin 300mg BID   - HELD PTA Metformin 1000mg twice daily, anticipate resuming on discharge         Anemia, borderline microcytic, chronic  Hemoglobin 9.6 on admission, baseline 8-9.    - Stable, following        Hypertension  - Continued PTA Amlodipine 10mg daily   - Continued PTA  Ibesartan 300mg daily         Adjustment disorder with depressed mood  - Continued PTA Hydroxyzine 25mg PRN   - Continued PTA Sertraline 150mg daily   - Continue PTA lorazepam 0.5mg BID        KATHRINE (obstructive sleep apnea)  - Continue PTA CPAP if available          Clinically Significant Risk Factors Present on Admission         # Hyperkalemia: Highest K = 5.4 mmol/L in last 2 days, will monitor as appropriate  # Hyponatremia: Lowest Na = 132 mmol/L in last 2 days, will monitor as appropriate  # Hypochloremia: Lowest Cl = 95 mmol/L in last 2 days, will monitor as appropriate   # Hypercalcemia: Highest Ca = 14.9 mg/dL in last 2 days, will monitor as appropriate    # Hypoalbuminemia: Lowest albumin = 3.4 g/dL at 5/2/2025  4:04 PM, will monitor as appropriate  # Drug Induced Coagulation Defect: home medication list includes an anticoagulant medication    # Hypertension: Noted on problem list      # Anemia: based on hgb <11        # DMII: A1C = 6.8 % (Ref range: <5.7 %) within past 6 months    # Morbid Obesity: Estimated body mass index is 40.05 kg/m  as calculated from the following:    Height as of this encounter: 1.638 m (5' 4.5\").    Weight as of this encounter: 107.5 kg (237 lb).       # Financial/Environmental Concerns:    # Asthma: noted on problem list         Diet:  ADAT to regular  DVT Prophylaxis: DOAC  Nielson Catheter: Not present  Code Status:  full code  Lines: PIV               Diet  Orders Placed This Encounter      Regular Diet Adult                        Disposition Plan           Medically Ready for Discharge: Anticipated in 2-4 Days                  Bruce Esteban MD  Hospitalist Service  M " "[facility]  Securely message with the Vocera Web Console (learn more here)  Text page via Faves Paging/Directory             Interval History:   Appears at least a bit better today.  Sleepy with pain medications but awakens easily.  Comfortable at rest with current pain regimen.  Pain 2-4 at rest which is \"good\", but still has significant diffuse pain with movement.  No other changes.  Overall appears at least better today.                  Review of Systems:    ROS: 10 point ROS neg other than the symptoms noted above in the HPI.           Medications:   Current active medications and PTA medications reviewed, see medication list for details.            Physical Exam:   Vitals were reviewed  Patient Vitals for the past 24 hrs:   BP Temp Temp src Pulse Resp SpO2   25 1235 -- -- -- -- -- 93 %   25 1230 -- -- -- -- -- (!) 88 %   25 1225 -- -- -- -- 16 --   25 1150 106/62 97.8  F (36.6  C) Oral 92 18 93 %   25 1125 -- -- -- -- 18 --   25 0905 -- -- -- -- 16 --   25 0818 -- -- -- -- 18 --   25 0756 122/63 97.6  F (36.4  C) Oral 90 18 96 %   25 0603 120/67 98  F (36.7  C) Oral 94 18 94 %   25 0012 125/67 97.8  F (36.6  C) Oral 91 16 95 %   25 1949 121/63 97.2  F (36.2  C) Oral 93 16 95 %   25 1719 -- -- -- -- 16 --   25 1706 123/67 97.7  F (36.5  C) Oral 97 20 --   25 1415 130/71 -- -- -- -- --       Temperatures:  Current - Temp: 97.8  F (36.6  C); Max - Temp  Av.7  F (36.5  C)  Min: 97.2  F (36.2  C)  Max: 98  F (36.7  C)  Respiration range: Resp  Av.3  Min: 16  Max: 20  Pulse range: Pulse  Av.8  Min: 90  Max: 97  Blood pressure range: Systolic (24hrs), Av , Min:106 , Max:130   ; Diastolic (24hrs), Av, Min:62, Max:71    Pulse oximetry range: SpO2  Av.4 %  Min: 88 %  Max: 96 %  I/O last 3 completed shifts:  In: -   Out: 3300 [Urine:3300]    Intake/Output Summary (Last 24 hours) at " 5/4/2025 1355  Last data filed at 5/4/2025 1125  Gross per 24 hour   Intake --   Output 3100 ml   Net -3100 ml     EXAM:  General: sleeping on arrival but awakens easily.  Oriented. Pleasant but a poor historian  Head: normocephalic  Neck: unremarkable, no lymphadenopathy   HEENT: oropharynx pink and moist    Heart: Regular rate and rhythm, no murmurs, rubs, or gallops  Lungs: clear to auscultation bilaterally with good air movement throughout  Abdomen: soft, non-tender, no masses or organomegaly  Extremities: no edema in lower extremities   Skin unremarkable as visualized.               Data:     Reviewed data:  Results for orders placed or performed during the hospital encounter of 05/02/25 (from the past 24 hours)   Glucose by meter   Result Value Ref Range    GLUCOSE BY METER POCT 122 (H) 70 - 99 mg/dL   Glucose by meter   Result Value Ref Range    GLUCOSE BY METER POCT 82 70 - 99 mg/dL   Glucose by meter   Result Value Ref Range    GLUCOSE BY METER POCT 92 70 - 99 mg/dL   Comprehensive metabolic panel   Result Value Ref Range    Sodium 135 135 - 145 mmol/L    Potassium 4.3 3.4 - 5.3 mmol/L    Carbon Dioxide (CO2) 22 22 - 29 mmol/L    Anion Gap 9 7 - 15 mmol/L    Urea Nitrogen 10.4 6.0 - 20.0 mg/dL    Creatinine 0.37 (L) 0.51 - 0.95 mg/dL    GFR Estimate >90 >60 mL/min/1.73m2    Calcium 12.2 (H) 8.8 - 10.4 mg/dL    Chloride 104 98 - 107 mmol/L    Glucose 84 70 - 99 mg/dL    Alkaline Phosphatase 178 (H) 40 - 150 U/L    AST 90 (H) 0 - 45 U/L    ALT 10 0 - 50 U/L    Protein Total 6.0 (L) 6.4 - 8.3 g/dL    Albumin 3.0 (L) 3.5 - 5.2 g/dL    Bilirubin Total 0.3 <=1.2 mg/dL   Magnesium   Result Value Ref Range    Magnesium 1.9 1.7 - 2.3 mg/dL   Phosphorus   Result Value Ref Range    Phosphorus 1.8 (L) 2.5 - 4.5 mg/dL   CBC with platelets   Result Value Ref Range    WBC Count 9.2 4.0 - 11.0 10e3/uL    RBC Count 3.79 (L) 3.80 - 5.20 10e6/uL    Hemoglobin 8.6 (L) 11.7 - 15.7 g/dL    Hematocrit 30.6 (L) 35.0 - 47.0 %     MCV 81 78 - 100 fL    MCH 22.7 (L) 26.5 - 33.0 pg    MCHC 28.1 (L) 31.5 - 36.5 g/dL    RDW 18.4 (H) 10.0 - 15.0 %    Platelet Count 401 150 - 450 10e3/uL   Ionized Calcium   Result Value Ref Range    Calcium Ionized Whole Blood 6.5 (H) 4.4 - 5.2 mg/dL   Glucose by meter   Result Value Ref Range    GLUCOSE BY METER POCT 89 70 - 99 mg/dL   Glucose by meter   Result Value Ref Range    GLUCOSE BY METER POCT 137 (H) 70 - 99 mg/dL           Attestation:  I have reviewed today's vital signs, notes, medications, labs and imaging.  Amount of time spent managing this patient today:  35 minutes.

## 2025-05-04 NOTE — PLAN OF CARE
"Patient is oriented, but has been drowsy much of the day. Patient had a hard time controlling her pain earlier in the day, but was finally able to get pain under control with pain meds getting her down to 4/10 without movement. Patient reports pain going to 10/10 with turns or even raising/lowing the head of the bed. Patient has mostly refused repositioning, but writer was able to get her to agree to turns a few times through the shift.     /67 (BP Location: Right arm)   Pulse 97   Temp 97.7  F (36.5  C) (Oral)   Resp 16   Ht 1.638 m (5' 4.5\")   Wt 104.9 kg (231 lb 4.2 oz)   LMP  (LMP Unknown)   SpO2 95%   BMI 39.08 kg/m     Problem: Adult Inpatient Plan of Care  Goal: Plan of Care Review  Description: The Plan of Care Review/Shift note should be completed every shift.  The Outcome Evaluation is a brief statement about your assessment that the patient is improving, declining, or no change.  This information will be displayed automatically on your shiftnote.  5/3/2025 1901 by Dwain Jonas, RN  Outcome: Not Progressing  5/3/2025 1901 by Dwain Jonas, RN  Outcome: Progressing  Goal: Patient-Specific Goal (Individualized)  Description: You can add care plan individualizations to a care plan. Examples of Individualization might be:  \"Parent requests to be called daily at 9am for status\", \"I have a hard time hearing out of my right ear\", or \"Do not touch me to wake me up as it startlesme\".  5/3/2025 1901 by Dwain Jonas, RN  Outcome: Not Progressing  5/3/2025 1901 by Dwain Jonas, RN  Outcome: Progressing  Goal: Absence of Hospital-Acquired Illness or Injury  5/3/2025 1901 by Dwain Jonas, RN  Outcome: Not Progressing  5/3/2025 1901 by Dwain Jonas, RN  Outcome: Progressing  Intervention: Identify and Manage Fall Risk  Recent Flowsheet Documentation  Taken 5/3/2025 1612 by wDain Jonas, RN  Safety Promotion/Fall Prevention: activity supervised  Taken 5/3/2025 7058 by Sumi, " Dwain RICE RN  Safety Promotion/Fall Prevention: activity supervised  Intervention: Prevent Skin Injury  Recent Flowsheet Documentation  Taken 5/3/2025 1730 by Dwain Jonas RN  Body Position: (Pain has increased since last check. Pain medication given, will reattempt.)   refuses positioning   other (see comments)  Taken 5/3/2025 1619 by Dwain Jonas RN  Body Position: (Writer gave pain medication and the plan is to attempt a roll when the pain medication begins to work.) other (see comments)  Taken 5/3/2025 1043 by Dwain Jonas RN  Body Position:   position maintained   refuses positioning  Taken 5/3/2025 0936 by Dwain Jonas RN  Body Position: weight shifting  Intervention: Prevent Infection  Recent Flowsheet Documentation  Taken 5/3/2025 1619 by Dwain Jonas RN  Infection Prevention:   rest/sleep promoted   hand hygiene promoted  Taken 5/3/2025 0936 by Dwain Jonas RN  Infection Prevention:   rest/sleep promoted   hand hygiene promoted  Goal: Optimal Comfort and Wellbeing  5/3/2025 1901 by Dwain Jonas RN  Outcome: Not Progressing  5/3/2025 1901 by Dwain Jonas RN  Outcome: Progressing  Intervention: Monitor Pain and Promote Comfort  Recent Flowsheet Documentation  Taken 5/3/2025 1719 by Dwain Jonas RN  Pain Management Interventions: medication (see MAR)  Taken 5/3/2025 1136 by Dwain Jonas RN  Pain Management Interventions: rest  Taken 5/3/2025 1030 by Dwain Jonas RN  Pain Management Interventions:   medication (see MAR)   repositioned  Taken 5/3/2025 0915 by Dwain Jonas RN  Pain Management Interventions:   medication (see MAR)   repositioned  Goal: Readiness for Transition of Care  5/3/2025 1901 by Dwain Jonas RN  Outcome: Not Progressing  5/3/2025 1901 by Dwain Jonas RN  Outcome: Progressing     Problem: Pain Acute  Goal: Optimal Pain Control and Function  5/3/2025 1901 by Dwain Jonas RN  Outcome: Not Progressing  5/3/2025 1901 by  Dwain Jonas, RN  Outcome: Progressing  Intervention: Develop Pain Management Plan  Recent Flowsheet Documentation  Taken 5/3/2025 1719 by Dwain Jonas, RN  Pain Management Interventions: medication (see MAR)  Taken 5/3/2025 1136 by Dwain Jonas, RN  Pain Management Interventions: rest  Taken 5/3/2025 1030 by Dwain Jonas, RN  Pain Management Interventions:   medication (see MAR)   repositioned  Taken 5/3/2025 0915 by Dwain Jonas, RN  Pain Management Interventions:   medication (see MAR)   repositioned     Problem: Gas Exchange Impaired  Goal: Optimal Gas Exchange  Outcome: Not Progressing   Goal Outcome Evaluation:

## 2025-05-04 NOTE — PLAN OF CARE
"Patient continues to struggle with her pain, movement causing pain to spike to 10/10. She has been better about allowing staff to help her reposition today trying to coordinate pain medications to perform this. Writer was able to change the dressing on her sacral wound. Patient had a large BM and writer noted some bright red blood leaking out of her rectum, MD notified. Patient seems to be more drowsy today. PRN oxycodone given for pain along with atarax and one dose of IV dilaudid to manage her pain. Patient has been in a dream like state much of the evening, making odd comments like \"the stable is on fire, there are two people that need to be rescued\" and \"they came to make a mold of my legs to become free and clear of cancer\". Patient is able to state that she is in the hospital, but was confused about the month.     /62 (BP Location: Right arm)   Pulse 92   Temp 97.8  F (36.6  C) (Oral)   Resp 18   Ht 1.638 m (5' 4.5\")   Wt 104.9 kg (231 lb 4.2 oz)   LMP  (LMP Unknown)   SpO2 93%   BMI 39.08 kg/m     Problem: Adult Inpatient Plan of Care  Goal: Plan of Care Review  Description: The Plan of Care Review/Shift note should be completed every shift.  The Outcome Evaluation is a brief statement about your assessment that the patient is improving, declining, or no change.  This information will be displayed automatically on your shiftnote.  Outcome: Not Progressing  Goal: Patient-Specific Goal (Individualized)  Description: You can add care plan individualizations to a care plan. Examples of Individualization might be:  \"Parent requests to be called daily at 9am for status\", \"I have a hard time hearing out of my right ear\", or \"Do not touch me to wake me up as it startlesme\".  Outcome: Not Progressing  Goal: Absence of Hospital-Acquired Illness or Injury  Outcome: Not Progressing  Intervention: Identify and Manage Fall Risk  Recent Flowsheet Documentation  Taken 5/4/2025 1120 by Dwain Jonas, " RN  Safety Promotion/Fall Prevention: activity supervised  Intervention: Prevent Infection  Recent Flowsheet Documentation  Taken 5/4/2025 1120 by Dwain Jonas RN  Infection Prevention:   rest/sleep promoted   hand hygiene promoted  Goal: Optimal Comfort and Wellbeing  Outcome: Not Progressing  Intervention: Monitor Pain and Promote Comfort  Recent Flowsheet Documentation  Taken 5/4/2025 1355 by Dwain Jonas RN  Pain Management Interventions: medication (see MAR)  Taken 5/4/2025 1225 by Dwain Jonas RN  Pain Management Interventions: rest  Taken 5/4/2025 1125 by Dwain Jonas RN  Pain Management Interventions: medication (see MAR)  Taken 5/4/2025 0905 by Dwain Jonas RN  Pain Management Interventions: rest  Taken 5/4/2025 0818 by Dwain Jonas RN  Pain Management Interventions: medication (see MAR)  Goal: Readiness for Transition of Care  Outcome: Not Progressing     Problem: Pain Acute  Goal: Optimal Pain Control and Function  Outcome: Not Progressing  Intervention: Develop Pain Management Plan  Recent Flowsheet Documentation  Taken 5/4/2025 1355 by Dwain Jonas RN  Pain Management Interventions: medication (see MAR)  Taken 5/4/2025 1225 by Dwain Jonas RN  Pain Management Interventions: rest  Taken 5/4/2025 1125 by Dwain Jonas RN  Pain Management Interventions: medication (see MAR)  Taken 5/4/2025 0905 by Dwain Jonas RN  Pain Management Interventions: rest  Taken 5/4/2025 0818 by Dwain Jonas RN  Pain Management Interventions: medication (see MAR)     Problem: Gas Exchange Impaired  Goal: Optimal Gas Exchange  Outcome: Not Progressing  Intervention: Optimize Oxygenation and Ventilation  Recent Flowsheet Documentation  Taken 5/4/2025 1120 by Dwain Jonas RN  Head of Bed (HOB) Positioning: HOB at 20-30 degrees   Goal Outcome Evaluation:

## 2025-05-05 ENCOUNTER — DOCUMENTATION ONLY (OUTPATIENT)
Dept: OTHER | Facility: CLINIC | Age: 55
End: 2025-05-05

## 2025-05-05 ENCOUNTER — TELEPHONE (OUTPATIENT)
Facility: CLINIC | Age: 55
End: 2025-05-05

## 2025-05-05 LAB
ALBUMIN SERPL BCG-MCNC: 2.9 G/DL (ref 3.5–5.2)
ALP SERPL-CCNC: 176 U/L (ref 40–150)
ALT SERPL W P-5'-P-CCNC: 9 U/L (ref 0–50)
ANION GAP SERPL CALCULATED.3IONS-SCNC: 11 MMOL/L (ref 7–15)
AST SERPL W P-5'-P-CCNC: 151 U/L (ref 0–45)
BILIRUB SERPL-MCNC: 0.3 MG/DL
BUN SERPL-MCNC: 9.3 MG/DL (ref 6–20)
CALCIUM SERPL-MCNC: 11.5 MG/DL (ref 8.8–10.4)
CHLORIDE SERPL-SCNC: 106 MMOL/L (ref 98–107)
CREAT SERPL-MCNC: 0.35 MG/DL (ref 0.51–0.95)
EGFRCR SERPLBLD CKD-EPI 2021: >90 ML/MIN/1.73M2
ERYTHROCYTE [DISTWIDTH] IN BLOOD BY AUTOMATED COUNT: 18.6 % (ref 10–15)
GLUCOSE BLDC GLUCOMTR-MCNC: 103 MG/DL (ref 70–99)
GLUCOSE BLDC GLUCOMTR-MCNC: 108 MG/DL (ref 70–99)
GLUCOSE BLDC GLUCOMTR-MCNC: 118 MG/DL (ref 70–99)
GLUCOSE BLDC GLUCOMTR-MCNC: 125 MG/DL (ref 70–99)
GLUCOSE BLDC GLUCOMTR-MCNC: 168 MG/DL (ref 70–99)
GLUCOSE SERPL-MCNC: 113 MG/DL (ref 70–99)
HCO3 SERPL-SCNC: 19 MMOL/L (ref 22–29)
HCT VFR BLD AUTO: 28.2 % (ref 35–47)
HGB BLD-MCNC: 7.9 G/DL (ref 11.7–15.7)
MCH RBC QN AUTO: 22.5 PG (ref 26.5–33)
MCHC RBC AUTO-ENTMCNC: 28 G/DL (ref 31.5–36.5)
MCV RBC AUTO: 80 FL (ref 78–100)
PLATELET # BLD AUTO: 390 10E3/UL (ref 150–450)
POTASSIUM SERPL-SCNC: 4.2 MMOL/L (ref 3.4–5.3)
PROT SERPL-MCNC: 5.8 G/DL (ref 6.4–8.3)
RBC # BLD AUTO: 3.51 10E6/UL (ref 3.8–5.2)
SODIUM SERPL-SCNC: 136 MMOL/L (ref 135–145)
WBC # BLD AUTO: 10 10E3/UL (ref 4–11)

## 2025-05-05 PROCEDURE — 250N000012 HC RX MED GY IP 250 OP 636 PS 637

## 2025-05-05 PROCEDURE — 99223 1ST HOSP IP/OBS HIGH 75: CPT | Performed by: NURSE PRACTITIONER

## 2025-05-05 PROCEDURE — 250N000013 HC RX MED GY IP 250 OP 250 PS 637: Performed by: NURSE PRACTITIONER

## 2025-05-05 PROCEDURE — 36415 COLL VENOUS BLD VENIPUNCTURE: CPT | Performed by: FAMILY MEDICINE

## 2025-05-05 PROCEDURE — 250N000013 HC RX MED GY IP 250 OP 250 PS 637

## 2025-05-05 PROCEDURE — 258N000003 HC RX IP 258 OP 636

## 2025-05-05 PROCEDURE — 85041 AUTOMATED RBC COUNT: CPT | Performed by: FAMILY MEDICINE

## 2025-05-05 PROCEDURE — 120N000001 HC R&B MED SURG/OB

## 2025-05-05 PROCEDURE — 250N000011 HC RX IP 250 OP 636: Mod: JZ | Performed by: FAMILY MEDICINE

## 2025-05-05 PROCEDURE — 258N000003 HC RX IP 258 OP 636: Performed by: INTERNAL MEDICINE

## 2025-05-05 PROCEDURE — 99232 SBSQ HOSP IP/OBS MODERATE 35: CPT | Performed by: INTERNAL MEDICINE

## 2025-05-05 PROCEDURE — 84450 TRANSFERASE (AST) (SGOT): CPT | Performed by: FAMILY MEDICINE

## 2025-05-05 PROCEDURE — 250N000013 HC RX MED GY IP 250 OP 250 PS 637: Performed by: FAMILY MEDICINE

## 2025-05-05 RX ORDER — LORAZEPAM 0.5 MG/1
0.5 TABLET ORAL EVERY 8 HOURS
Status: DISCONTINUED | OUTPATIENT
Start: 2025-05-05 | End: 2025-05-06 | Stop reason: HOSPADM

## 2025-05-05 RX ORDER — MORPHINE SULFATE 15 MG/1
15 TABLET, FILM COATED, EXTENDED RELEASE ORAL EVERY 12 HOURS SCHEDULED
Status: DISCONTINUED | OUTPATIENT
Start: 2025-05-05 | End: 2025-05-06 | Stop reason: HOSPADM

## 2025-05-05 RX ORDER — POLYETHYLENE GLYCOL 3350 17 G/17G
17 POWDER, FOR SOLUTION ORAL DAILY
Status: DISCONTINUED | OUTPATIENT
Start: 2025-05-05 | End: 2025-05-06 | Stop reason: HOSPADM

## 2025-05-05 RX ORDER — SENNOSIDES 8.6 MG
1 TABLET ORAL DAILY
Status: DISCONTINUED | OUTPATIENT
Start: 2025-05-05 | End: 2025-05-06 | Stop reason: HOSPADM

## 2025-05-05 RX ORDER — SENNOSIDES 8.6 MG
8.6 TABLET ORAL 2 TIMES DAILY PRN
Status: DISCONTINUED | OUTPATIENT
Start: 2025-05-05 | End: 2025-05-06 | Stop reason: HOSPADM

## 2025-05-05 RX ORDER — OXYCODONE HYDROCHLORIDE 5 MG/1
10 TABLET ORAL EVERY 4 HOURS PRN
Status: DISCONTINUED | OUTPATIENT
Start: 2025-05-05 | End: 2025-05-06 | Stop reason: HOSPADM

## 2025-05-05 RX ORDER — OXYCODONE HYDROCHLORIDE 5 MG/1
5 TABLET ORAL EVERY 4 HOURS PRN
Status: DISCONTINUED | OUTPATIENT
Start: 2025-05-05 | End: 2025-05-06 | Stop reason: HOSPADM

## 2025-05-05 RX ORDER — SODIUM CHLORIDE 9 MG/ML
1000 INJECTION, SOLUTION INTRAVENOUS CONTINUOUS
Status: DISCONTINUED | OUTPATIENT
Start: 2025-05-05 | End: 2025-05-06 | Stop reason: HOSPADM

## 2025-05-05 RX ADMIN — ATORVASTATIN CALCIUM 40 MG: 20 TABLET, FILM COATED ORAL at 22:46

## 2025-05-05 RX ADMIN — SODIUM CHLORIDE 1000 ML: 0.9 INJECTION, SOLUTION INTRAVENOUS at 14:44

## 2025-05-05 RX ADMIN — DICLOFENAC SODIUM 4 G: 10 GEL TOPICAL at 22:49

## 2025-05-05 RX ADMIN — OXYCODONE 5 MG: 5 TABLET ORAL at 08:05

## 2025-05-05 RX ADMIN — MICONAZOLE NITRATE: 20 POWDER TOPICAL at 08:08

## 2025-05-05 RX ADMIN — APIXABAN 5 MG: 5 TABLET, FILM COATED ORAL at 08:04

## 2025-05-05 RX ADMIN — HYDROMORPHONE HYDROCHLORIDE 0.5 MG: 1 INJECTION, SOLUTION INTRAMUSCULAR; INTRAVENOUS; SUBCUTANEOUS at 08:24

## 2025-05-05 RX ADMIN — ACETAMINOPHEN 975 MG: 325 TABLET, FILM COATED ORAL at 11:24

## 2025-05-05 RX ADMIN — SENNOSIDES 1 TABLET: 8.6 TABLET, FILM COATED ORAL at 10:48

## 2025-05-05 RX ADMIN — ACETAMINOPHEN 975 MG: 325 TABLET, FILM COATED ORAL at 03:14

## 2025-05-05 RX ADMIN — DICLOFENAC SODIUM 4 G: 10 GEL TOPICAL at 07:59

## 2025-05-05 RX ADMIN — SERTRALINE HYDROCHLORIDE 150 MG: 50 TABLET ORAL at 08:05

## 2025-05-05 RX ADMIN — INSULIN HUMAN 15 UNITS: 100 INJECTION, SUSPENSION SUBCUTANEOUS at 05:52

## 2025-05-05 RX ADMIN — LORAZEPAM 0.5 MG: 0.5 TABLET ORAL at 08:05

## 2025-05-05 RX ADMIN — MONTELUKAST 10 MG: 10 TABLET, FILM COATED ORAL at 22:46

## 2025-05-05 RX ADMIN — HYDROXYZINE HYDROCHLORIDE 25 MG: 25 TABLET, FILM COATED ORAL at 08:04

## 2025-05-05 RX ADMIN — INSULIN ASPART 1 UNITS: 100 INJECTION, SOLUTION INTRAVENOUS; SUBCUTANEOUS at 11:42

## 2025-05-05 RX ADMIN — ACETAMINOPHEN 975 MG: 325 TABLET, FILM COATED ORAL at 19:04

## 2025-05-05 RX ADMIN — MORPHINE SULFATE 15 MG: 15 TABLET, FILM COATED, EXTENDED RELEASE ORAL at 20:15

## 2025-05-05 RX ADMIN — MORPHINE SULFATE 15 MG: 15 TABLET, FILM COATED, EXTENDED RELEASE ORAL at 10:47

## 2025-05-05 RX ADMIN — AMLODIPINE BESYLATE 10 MG: 10 TABLET ORAL at 08:07

## 2025-05-05 RX ADMIN — GABAPENTIN 300 MG: 300 CAPSULE ORAL at 20:15

## 2025-05-05 RX ADMIN — DICLOFENAC SODIUM 4 G: 10 GEL TOPICAL at 19:04

## 2025-05-05 RX ADMIN — DICLOFENAC SODIUM 4 G: 10 GEL TOPICAL at 11:16

## 2025-05-05 RX ADMIN — MICONAZOLE NITRATE: 20 POWDER TOPICAL at 20:15

## 2025-05-05 RX ADMIN — SODIUM CHLORIDE 1000 ML: 0.9 INJECTION, SOLUTION INTRAVENOUS at 05:06

## 2025-05-05 RX ADMIN — GABAPENTIN 300 MG: 300 CAPSULE ORAL at 08:04

## 2025-05-05 RX ADMIN — LORAZEPAM 0.5 MG: 0.5 TABLET ORAL at 16:46

## 2025-05-05 RX ADMIN — APIXABAN 5 MG: 5 TABLET, FILM COATED ORAL at 20:15

## 2025-05-05 RX ADMIN — INSULIN HUMAN 15 UNITS: 100 INJECTION, SUSPENSION SUBCUTANEOUS at 17:22

## 2025-05-05 RX ADMIN — LIDOCAINE 4% 2 PATCH: 40 PATCH TOPICAL at 08:09

## 2025-05-05 ASSESSMENT — ACTIVITIES OF DAILY LIVING (ADL)
ADLS_ACUITY_SCORE: 69
ADLS_ACUITY_SCORE: 69
ADLS_ACUITY_SCORE: 73
ADLS_ACUITY_SCORE: 69
ADLS_ACUITY_SCORE: 69
ADLS_ACUITY_SCORE: 73
ADLS_ACUITY_SCORE: 69
ADLS_ACUITY_SCORE: 73
ADLS_ACUITY_SCORE: 69
ADLS_ACUITY_SCORE: 73
ADLS_ACUITY_SCORE: 73
ADLS_ACUITY_SCORE: 69
ADLS_ACUITY_SCORE: 73
ADLS_ACUITY_SCORE: 69

## 2025-05-05 NOTE — TELEPHONE ENCOUNTER
Prior Authorization Approval    Medication: MORPHINE SULFATE ER 15 MG PO TB  Authorization Effective Date: 4/5/2025  Authorization Expiration Date: 5/5/2026  Approved Dose/Quantity: 60 for 30  Reference #: [id number]   Insurance Company: [employer] - Phone [phone] Fax [phone]  Expected CoPay: $    CoPay Card Available:      Financial Assistance Needed:   Which Pharmacy is filling the prescription:    Pharmacy Notified:   Patient Notified:

## 2025-05-05 NOTE — CONSULTS
[facility]  WO Nurse Inpatient Assessment     Consulted for: buttock pressure injury    Summary: Coccyx/gluteal region pressure injury, present on admission, mix of stage II, III and deep tissue injury. Possible deep tissue injuries left ischial area and left thigh. Pt is in extreme pain with movement so would recommend using TAPs positioning system to allow for microturning    WOC nurse follow-up plan: weekly    Patient History (according to provider note(s):      Brissa Mott is a 54 year old female with past medical history of T2DM, neuroendocrine tumor with metastasis, pathologic femur fracture, hypercalcemia of malignancy, hyperlipidemia, CVA with hemiplegia, asthma, adjustment disorder with depressed mood now presents on 5/2/2025 with recurrent hypercalcemia. She is being admitted for management of acute hypercalcemia of malignancy.     Gluteal cleft / coccyx & right buttock pressure ulcer, stage 3 (POA)  2/2 poor mobility from generalized weakness. Previously refused repositioning for offloading due to pain with movement.   - Wound care with triad paste   - Frequent repositioning as able  - WO consult requested for Monday     Pt seen last admission by WO nurse for same issue on 4/18/25: Coccyx/gluteal cleft wound with HB transfer and mepilex sacral, right buttock BID Bert.     Assessment:      Areas visualized during today's visit: Focused:    Wound location: coccyx/gluteal region    Last photo: 5/5/25  Wound due to: Pressure Injury  Wound history/plan of care: present on admission and also noted prior admission, sacral mepilex in place  Wound base: mixture of pink partial thickness, deep tissue injury and stage III injury     Palpation of the wound bed: normal      Drainage: moderate     Description of drainage: serosanguinous     Measurements (length x width x depth, in cm): 5.5  x 6  x  0.1 cm      Tunneling: no     Undermining: no  Periwound skin: Denuded and Erythema-  "blanchable      Color: pink      Temperature: normal   Odor: none  Pain:  pt has severe pain with movement so calling out with movement  Pain interventions prior to dressing change: oral oxycodone, MS contin and IV dilaudid  Treatment goal: Heal , Drainage control, Infection control/prevention, and Protection  STATUS: initial assessment  Supplies ordered: supplies stored on unit      Also noting purple areas on left ischial area  ( 1.5x2.5cmm) and left thigh (0.5x1cm), possible deep tissue injuries- (device related given linear distrubution? though not found with any device over area at time of assessment).        Treatment Plan:     Buttock wound(s): Every 3 days and PRN Cleanse with microklenz, cover with inverted mepilex sacral dressing, apply skin barrier wipe over edges to improve seal    Pressure Injury Prevention (PIP) Plan:  If patient is declining pressure injury prevention interventions: Explore reason why and address patient's concerns, Educate on pressure injury risk and prevention intervention(s), If patient is still declining, document \"informed refusal\" , and Ensure Care team is aware ( provider, charge nurse, etc)  Mattress: Follow bed algorithm, add Low Air Loss (Air+) mattress pump if skin is very moist or constantly moist.   HOB: Maintain at or below 30 degrees, unless contraindicated  Repositioning in bed: Every 1-2 hours , Left/right positioning; avoid supine, Raise foot of bed prior to raising head of bed, to reduce patient sliding down (shear), and Frequent microturns using positioning wedges, as patient tolerates  Heels: Keep elevated off mattress  Positioning Equipment:Positioning wedges (#[id number]) to help maintain 30 degree side lying position   Chair positioning: Chair cushion (#[id number])  and Assist patient to reposition hourly   If patient has a buttock pressure injury, or high risk for PI use chair cushion or SPS.  Moisture Management: Perineal cleansing /protection: Follow Incontinence " Protocol and Avoid brief in bed  Under Devices: Inspect skin under all medical devices during skin inspection , Ensure tubes are stabilized without tension, and Ensure patient is not lying on medical devices or equipment when repositioned       Orders: Written    RECOMMEND PRIMARY TEAM ORDER: None, at this time  Education provided: importance of repositioning, plan of care, and Off-loading pressure  Discussed plan of care with: Patient and Nurse  Notify [facility] if wound(s) deteriorate.  Nursing to notify the Provider(s) and re-consult the [facility] Nurse if new skin concern.    DATA:     Current support surface: Standard  Standard gel mattress (Isoflex)  Containment of urine/stool: Incontinence Protocol and Suction based external urinary catheter   BMI: Body mass index is 39.08 kg/m .   Active diet order: Orders Placed This Encounter      Regular Diet Adult     Output: I/O last 3 completed shifts:  In: -   Out: 1450 [Urine:1450]     Labs:   Recent Labs   Lab 05/05/25  0546   ALBUMIN 2.9*   HGB 7.9*   WBC 10.0     Pressure injury risk assessment:   Sensory Perception: 3-->slightly limited  Moisture: 3-->occasionally moist  Activity: 2-->chairfast  Mobility: 2-->very limited  Nutrition: 2-->probably inadequate  Friction and Shear: 1-->problem  Dominic Score: 13    Sue Brand RN, CWOCN  Pager no longer is use, please contact through [facility]: [facility] Nurse   Dept. Office Number: [phone]

## 2025-05-05 NOTE — PLAN OF CARE
"Goal Outcome Evaluation:      Plan of Care Reviewed With: patient    Overall Patient Progress: no changeOverall Patient Progress: no change    Outcome Evaluation: Pt A&O. Does not use call light but able to make needs known. C/O BL knee, R shoulder, BL feet pain treated per MAR with some success stated. Pt premedicated prior to turns and repositioning.  WOC consult completed for preexisting pressure injury. Pt compliant with turns and cares.    Visit Vitals  /65 (BP Location: Right arm) Comment (BP Location): wrist   Pulse 97   Temp 99.3  F (37.4  C) (Oral)   Resp 16         Problem: Adult Inpatient Plan of Care  Goal: Plan of Care Review  Description: The Plan of Care Review/Shift note should be completed every shift.  The Outcome Evaluation is a brief statement about your assessment that the patient is improving, declining, or no change.  This information will be displayed automatically on your shiftnote.  Outcome: Not Progressing  Flowsheets (Taken 5/5/2025 0548)  Outcome Evaluation: Pt A&O. Does not use call light but able to make needs known. C/O BL knee, R shoulder, BL feet pain treated per MAR with some success stated. Pt premedicated prior to turns and repositioning.  WOC consult completed for preexisting pressure injury. Pt compliant with turns and cares.  Plan of Care Reviewed With: patient  Overall Patient Progress: no change  Goal: Patient-Specific Goal (Individualized)  Description: You can add care plan individualizations to a care plan. Examples of Individualization might be:  \"Parent requests to be called daily at 9am for status\", \"I have a hard time hearing out of my right ear\", or \"Do not touch me to wake me up as it startlesme\".  Outcome: Not Progressing  Goal: Absence of Hospital-Acquired Illness or Injury  Outcome: Not Progressing  Intervention: Identify and Manage Fall Risk  Recent Flowsheet Documentation  Taken 5/5/2025 0805 by Deepa Anglin RN  Safety Promotion/Fall " Prevention: activity supervised  Intervention: Prevent and Manage VTE (Venous Thromboembolism) Risk  Recent Flowsheet Documentation  Taken 5/5/2025 0805 by Deepa Anglin RN  VTE Prevention/Management: SCDs off (sequential compression devices)  Intervention: Prevent Infection  Recent Flowsheet Documentation  Taken 5/5/2025 0805 by Deepa Anglin RN  Infection Prevention:   rest/sleep promoted   hand hygiene promoted  Goal: Optimal Comfort and Wellbeing  Outcome: Not Progressing  Intervention: Monitor Pain and Promote Comfort  Recent Flowsheet Documentation  Taken 5/5/2025 0805 by Deepa Anglin RN  Pain Management Interventions:   medication (see MAR)   rest   pillow support provided  Goal: Readiness for Transition of Care  Outcome: Not Progressing

## 2025-05-05 NOTE — PROGRESS NOTES
[facility]    PRIMARY CARE PROVIDER AND CLINIC:  Arti Montero NP, [address] / [facility] [address]***  No chief complaint on file.     [facility] Medical Record Number:  [id number]  Place of Service where encounter took place:  No question data found.    Brissa Mott  is a 54 year old  (1970), admitted to the above facility from  [facility]. Hospital stay 25 through 25..   HPI:    25 through 3/5/25 :  Hypercalcemia, started on Zoledronic acid  pathological Rt femur fx s/p ORIF (25)  CT: complex Rt adnexal cystis mass and multiple pulmonary nodules. Bx showed neuroendocrine carcinoma with mets to bones (/rt femur, Rt humerus, pelvic bone and sacrum) lymph nodes, liver and lungs.   Unstageable sacral PI.     3/5/25-25: TCU [facility] [address]:     25 through 25:  -hypercalcemia  -PE started on OAC, UTI (abx), urinary retention ([facility] places)      25:   Oncology visit, not a candidate for  for systemic ctx based on PPS. Might be a candidate for immunotherapy. ECOG performance 4  Sent to ED for severe hypercalcemia, somnolence.            ==============================================================================  CODE STATUS/ADVANCE DIRECTIVES DISCUSSION:  Full Code  CPR/Full code   ALLERGIES:   Allergies   Allergen Reactions    Sulfa Antibiotics Shortness Of Breath, Hives and Rash    Bydureon [Exenatide] Diarrhea    Penicillins Rash and Hives      PAST MEDICAL HISTORY:   Past Medical History:   Diagnosis Date    Asthma     Cerebral infarction (H)     Diabetes (H)     Hypertension     Mixed hyperlipidemia     Morbid obesity (H)     KATHRINE (obstructive sleep apnea)     Osteoarthritis       PAST SURGICAL HISTORY:   has a past surgical history that includes  section and Insertion, Intramedullary Humphrey, Retrograde, For Femoral Shaft Fracture (Right, 2025).  FAMILY HISTORY: family history is not on file.  SOCIAL HISTORY:    reports that she has quit smoking. She has never used smokeless tobacco. She reports that she does not currently use alcohol. She reports that she does not use drugs.  Patient's living condition: lives with spouse    Post Discharge Medication Reconciliation Status:   MED REC REQUIRED{TIP  Click the link below to document or use med rec list, use list to pull in response :232548}  Post Medication Reconciliation Status: {MED REC LIST:400152}       No current facility-administered medications for this visit.     No current outpatient medications on file.     Facility-Administered Medications Ordered in Other Visits   Medication Dose Route Frequency Provider Last Rate Last Admin    acetaminophen (TYLENOL) tablet 975 mg  975 mg Oral Q8H Prerna Moreno PA-C   975 mg at 05/05/25 1124    amLODIPine (NORVASC) tablet 10 mg  10 mg Oral Daily Prerna Moreno PA-C   10 mg at 05/05/25 0807    apixaban ANTICOAGULANT (ELIQUIS) tablet 5 mg  5 mg Oral BID Prerna Moreno PA-C   5 mg at 05/05/25 0804    atorvastatin (LIPITOR) tablet 40 mg  40 mg Oral At Bedtime Bruce Esteban MD   40 mg at 05/04/25 2222    glucose gel 15-30 g  15-30 g Oral Q15 Min PRN Prerna Moreno PA-C        Or    dextrose 50 % injection 25-50 mL  25-50 mL Intravenous Q15 Min PRN Prerna Moreno PA-C        Or    glucagon injection 1 mg  1 mg Subcutaneous Q15 Min PRN Prerna Moreno PA-C        diclofenac (VOLTAREN) 1 % topical gel 4 g  4 g Topical 4x Daily Prerna Moreno PA-C   4 g at 05/05/25 1116    gabapentin (NEURONTIN) capsule 300 mg  300 mg Oral BID Prerna Moreno PA-C   300 mg at 05/05/25 0804    HYDROmorphone (PF) (DILAUDID) injection 0.5 mg  0.5 mg Intravenous Q6H PRN Bruce Esteban MD   0.5 mg at 05/05/25 0824    insulin aspart (NovoLOG) injection (RAPID ACTING)  1-7 Units Subcutaneous TID AC Prerna Moreno PA-C   1 Units at 05/05/25 1142    insulin aspart (NovoLOG) injection (RAPID ACTING)  1-5  Units Subcutaneous At Bedtime Prerna Moreno PA-C        insulin aspart (NovoLOG) injection (RAPID ACTING)  1 Units Subcutaneous TID w/meals Bruce Esteban MD   1 Units at 05/05/25 1143    insulin NPH-Regular (HUMULIN 70/30;NOVOLIN 70/30) injection 15 Units  15 Units Subcutaneous BID AC Prerna Moreno PA-C   15 Units at 05/05/25 0552    irbesartan (AVAPRO) tablet 300 mg  300 mg Oral At Bedtime Prerna Moreno PA-C   300 mg at 05/03/25 2223    Lidocaine (LIDOCARE) 4 % Patch 2 patch  2 patch Transdermal Q24H Prerna Moreno PA-C   2 patch at 05/05/25 0809    LORazepam (ATIVAN) tablet 0.5 mg  0.5 mg Oral Q8H Lyndon Khan APRN CNP        [Held by provider] metFORMIN (GLUCOPHAGE) tablet 1,000 mg  1,000 mg Oral BID w/meals Prerna Moreno PA-C        miconazole (MICATIN) 2 % powder   Topical BID Prerna Moreno PA-C   Given at 05/05/25 0808    montelukast (SINGULAIR) tablet 10 mg  10 mg Oral At Bedtime Prerna Moreno PA-C   10 mg at 05/04/25 2222    morphine (MS CONTIN) 12 hr tablet 15 mg  15 mg Oral Q12H [facility] (08/20) Lyndon Khan APRN CNP   15 mg at 05/05/25 1047    naloxone (NARCAN) injection 0.2 mg  0.2 mg Intravenous Q2 Min PRN Bruce Esteban MD        Or    naloxone (NARCAN) injection 0.4 mg  0.4 mg Intravenous Q2 Min PRN Bruce Esteban MD        Or    naloxone (NARCAN) injection 0.2 mg  0.2 mg Intramuscular Q2 Min PRN Bruce Esteban MD        Or    naloxone (NARCAN) injection 0.4 mg  0.4 mg Intramuscular Q2 Min PRN Bruce Esteban MD        ondansetron (ZOFRAN ODT) ODT tab 4 mg  4 mg Oral Q6H PRN Prerna Moreno PA-C        Or    ondansetron (ZOFRAN) injection 4 mg  4 mg Intravenous Q6H PRN Prerna Moreno PA-C        oxyCODONE (ROXICODONE) tablet 5 mg  5 mg Oral Q4H PRN Lyndon Khna APRN CNP        Or    oxyCODONE (ROXICODONE) tablet 10 mg  10 mg Oral Q4H PRN Lyndon Khan APRN CNP        Patient is already receiving anticoagulation with heparin, enoxaparin  (LOVENOX), warfarin (COUMADIN)  or other anticoagulant medication   Does not apply Continuous PRN Prerna Moreno PA-C        polyethylene glycol (MIRALAX) Packet 17 g  17 g Oral Daily Lyndon Khan APRN CNP        prochlorperazine (COMPAZINE) injection 10 mg  10 mg Intravenous Q6H PRN Prerna Moreno PA-C        Or    prochlorperazine (COMPAZINE) tablet 10 mg  10 mg Oral Q6H PRN Prerna Moreno PA-C        sennosides (SENOKOT) tablet 1 tablet  1 tablet Oral Daily Lyndon Khan APRN CNP   1 tablet at 05/05/25 1048    sennosides (SENOKOT) tablet 8.6 mg  8.6 mg Oral BID PRN Lyndon Khan APRN CNP        sertraline (ZOLOFT) tablet 150 mg  150 mg Oral Daily Prerna Moreno PA-C   150 mg at 05/05/25 0805    sodium chloride 0.9 % infusion  1,000 mL Intravenous Continuous Colby Meyers MD 75 mL/hr at 05/05/25 1444 1,000 mL at 05/05/25 1444       ROS:  {ROS FGS:376474}    Vitals:  LMP  (LMP Unknown)   Exam:  {Nursing home physical exam :446701}    Lab/Diagnostic data:  {fgslab:065936}    ASSESSMENT/PLAN:    {FGS DX2:807704}    Pathological fracture of right femur due to neoplastic disease with routine healing, subsequent encounter  S/P ORIF (open reduction internal fixation) fracture  Chronic pain of left knee        Neuroendocrine carcinoma metastatic to bone (H)  Hypercalcemia, recurrent  Malignant neoplasm metastatic to bones (H)  Mlaignant neoplasm metasatic to liver  Malignant neoplasm metastatic to both lung,  (H)  Complex cyst of uterine adnexa        Type 2 diabetes mellitus with hyperglycemia, with long-term current use of insulin (H)    A1C 6.8 07/19/2024     A1C 6.7 08/03/2023     A1C 7.1 06/07/2022   - controlled. Clinically stable. Continue to monitor and adjust meds prn        Uncomplicated asthma, unspecified asthma severity, unspecified whether persistent  KATHRINE (obstructive sleep apnea)  -pulmonary wise appears stable. The current medical regimen is effective;  continue present plan and  medications.        Secondary hypertension  - No concern today. Continue current plan of care.       Heimplagia and hemiparesis following cerebral infarction affecting left non-dominant side (H)  Orders:  {fgsorders:642117}  ***    Electronically signed by:  Dereje Burk MD ***

## 2025-05-05 NOTE — CONSULTS
Palliative Care Consultation      Patient ID/Chief Complaint: Brissa Mott 54 year old female being seen for palliative care consultation at the request of hospitalist secondary to assistance with symptom management/goals of care.   The patient's primary care provider is:  Arti Montero.     Impression & Recommendations:  54 year old female with past medical history of T2DM, aggressive neuroendocrine tumor with metastasis, pathologic right femur fracture status post recent repair, chronic hypercalcemia of malignancy, hyperlipidemia, CVA with hemiplegia, asthma, adjustment disorder with depressed and anxious mood who presented on 5/2/2025 with recurrent hypercalcemia. She was admitted for management of acute hypercalcemia of malignancy.     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  Following outpatient with Dr. Friedell, review oncology.  Not a candidate for chemo due to baseline performance status.  NGS sequencing is proceeding. She may be a candidate for immunotherapy.    Gluteal cleft / coccyx & right buttock pressure ulcer, stage 3 (POA)     Recent acute pulmonary embolism  Mild intermittent asthma without clear exacerbation   Suspected obesity hypoventilation syndrome (per pulmonology note) vs KATHRINE    She has essentially been in bed or chair since hip fracture repair due to significant long-term left knee pain that previously required her to put the majority of her weight on her right leg, now with limited weightbearing on right leg and fever of falls and possible other fractures.  Ortho surgery recommends 50% weightbearing on right lower extremity.      Symptoms/recommendations/discussion:  Advance care planning:  Has routinely requested that her  make decisions for her if needed  Full code.  Began discussion on 5/5 about expectation for poor outcome to CPR or intubation given advanced cancer, multiple other medical problems, and debilitated state following hip fracture repair  She has consistently requested to follow-up with oncology in the hopes of seeking treatment, as above, she is not a candidate for chemotherapy, pending workup for possible immunotherapy  Cancer associated pain and chronic pain related to degenerative joint disease.  Pain also complicated by adjustment disorder with significant anxiety and depression:  Start MS Contin 15 mg twice daily  Discontinue scheduled oxycodone IR 5 mg 3 times daily  Continue oxycodone IR 5 to 10 mg every 4 hours as needed  Continue Tylenol 975 mg every 8 hours, Voltaren topical gel, gabapentin 300 mg twice daily, lidocaine patches  Adjustment disorder with significant anxiety and depression:  Started Ativan 0.5 mg every 12 hours during previous hospitalization with obvious improvement in anxiety (previously she was telling people she could not have conversations with them because she was too overwhelmed but this did improve after Ativan).  Increase Ativan to every 8 hours  Continue sertraline 150 mg daily  Discontinue hydroxyzine as she is on scheduled Ativan  Arrange outpatient palliative care follow-up        Thank you for the opportunity to participate in the care of this patient and family. Our team: will continue to follow.   RONNELL Marcelino CNP  Securely message with [employer] (more info)  Text page via [facility] Paging/Directory     History:  History gathered today from: patient, medical chart, medical team members, unit team members    ROS:  10 point ROS is negative unless exceptions noted below    PE: /65 (BP Location: Right  "arm)   Pulse 97   Temp 99.3  F (37.4  C) (Oral)   Resp 16   Ht 1.638 m (5' 4.5\")   Wt 104.9 kg (231 lb 4.2 oz)   LMP  (LMP Unknown)   SpO2 95%   BMI 39.08 kg/m     Wt Readings from Last 3 Encounters:   05/02/25 104.9 kg (231 lb 4.2 oz)   05/05/25 106.2 kg (234 lb 3.2 oz)   04/22/25 106.3 kg (234 lb 5.6 oz)     Gen alert, somewhat anxious appearing  Head NCAT.  Eyes anicteric without injection  Face symmetric, eyes conjugate  Heart regular and rhythmic  Lungs unlabored, no cough, speaking full sentences  Skin no rashes or lesions evident on face/neck  Neuro Face symmetric, eyes conjugate; speech fluent.  Neuropsych somewhat anxious appearing though less so than during previous/recent admission        Data reviewed:  I reviewed electrolytes, BUN/creatinine, liver profile, hemoglobin and hematocrit, platelet count, and most recent imaging  Outpatient oncology note        75 minutes spent on the date of the encounter doing chart review, history and exam, patient education & counseling, documentation and other activities as noted above.        Thank you for involving us in the patient's care.   RONNELL Marcelino, [facility] Palliative Care Service             "

## 2025-05-05 NOTE — PROGRESS NOTES
[facility] Medicine Progress Note  Date of Service (when I saw the patient): 05/05/2025    REASON FOR ADMISSION / INTERVAL HISTORY:  Brissa Mott is a 54 year old female with past medical history of T2DM, neuroendocrine tumor with metastasis, pathologic femur fracture, hypercalcemia of malignancy, hyperlipidemia, CVA with hemiplegia, asthma, adjustment disorder with depressed mood now presents on 5/2/2025 with recurrent hypercalcemia.       Assessment/ Plan     Hypercalcemia of malignancy    History of hypercalcemia 2/2 malignancy; previously treated with zometa & IVF. During previous admission PTH <6, PTHrP 12 (high), vitamin D <18 (low).     Seen outpatient oncology 5/2 at which time labs show recurrence of severe hypercalcemia, likely 2/2 malignancy to bone. Renal function WNL. Symptomatic with increased somnolence, mild encephalopathy, weakness.     Initial Calcium 14.9. When corrected for albumin Ca 15.8.   Received zometa 4mg IV in ER evening 5/2, did not qualify for subcutaneous calcitonin -was continued on  IVF with normal saline 150/hr continuous . Ionized calcium 7.3 on admission   Calcium 11.5 now   -decrease fluids.      Chronic pain due to this and malignancy   Pathological fracture of femur due to neoplastic disease  S/p ORIF 2/25. Now living in long term care facility due to poor mobility & gen weakness.   Continued PTA Lidocaine patches, scheduled Tylenol, PRN Oxycodone 5-10 mg /Ortho surgery recommends 50% weightbearing on right lower extremity /added prn dilaudid IV q6 for breakthrough pain 5/3, improved 5/4  Palliative care consulted to assist with symptom management   Per palliative care   Cancer associated pain and chronic pain related to degenerative joint disease.  Pain also complicated by adjustment disorder with significant anxiety and depression:  Start MS Contin 15 mg twice daily  Discontinue scheduled oxycodone IR 5 mg 3 times daily  Continue  oxycodone IR 5 to 10 mg every 4 hours as needed  Continue Tylenol 975 mg every 8 hours, Voltaren topical gel, gabapentin 300 mg twice daily, lidocaine patches  Adjustment disorder with significant anxiety and depression:  Started Ativan 0.5 mg every 12 hours during previous hospitalization with obvious improvement in anxiety (previously she was telling people she could not have conversations with them because she was too overwhelmed but this did improve after Ativan).  Increase Ativan to every 8 hours  Continue sertraline 150 mg daily  Discontinue hydroxyzine as she is on scheduled Ativan  Arrange outpatient palliative care follow-up        Right shoulder pain   Hard to gauge how acute this is, but it's pretty severe and we know she is at risk for pathologic fractures so x-ray shoulder 5/3 was negative for fracture or other acute change, did look like arthritis.     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  Following outpatient with Dr. Friedell, review oncology.  Not a candidate for chemo due to baseline performance status.  NGS sequencing is proceeding. She may be a candidate for immunotherapy.  Pain meds as above  - Continue outpatient follow up with oncology        Hyperkalemia  Mild, K 5.4 in ER. Renal function WNL.   Normalized with IVF        General weakness  Chronic left knee pain  Admitted end of Feb 2025 for pathologic fracture, discharged to TCU, then to home 4/7 and having frequent falls at home 2/2 gen weakness. Recently  admitted 4/14 - 4/24/2025 for generalized weakness. Now living in long-term care facility  - PT, OT for ongoing rehab & mobility assistance  - Fall precautions        Hyponatremia   ?  132 ? 130 ? 135   - IVF as per hypercalcemia, above  Resolved 5/4         Elevated LFTs  Alk phos 215, AST 96.  ALT and total bilirubin WNL.  Likely secondary to liver metastasis.  - stable         Slight Leukocytosis without infection   Mild, WBC 11.9.  Technically meets SIRS criteria with heart rate 96, respiratory rate 23/min.  Suspect secondary to inflammation related to ongoing worsening metastatic disease.  No signs of obvious acute infection.  - Abx deferred on admission given no clear infectious source. Low threshold to initiate if patient declares self infectious with follow up labs or symptoms  - normalized without intervention, no apparent infection          Gluteal cleft / coccyx & right buttock pressure ulcer, stage 3 (POA)  2/2 poor mobility from generalized weakness. Previously refused repositioning for offloading due to pain with movement.   - Wound care with triad paste   - Frequent repositioning as able  - WOC consult requested for Monday        Recent acute pulmonary embolism  Mild intermittent asthma without clear exacerbation   Suspected obesity hypoventilation syndrome (per pulmonology note) vs KATHRINE  Noted on CT scan during previous admission April 2025. Initiated on apixaban 5mg BID.   - has history of reactive airway disease, but not on any medications besides montelukast.  No wheezing on exam, does not appear to be having an asthma exacerbation   - Continue PTA apixaban 5mg BID   - Continued PTA Montelukast 10mg  - Continue Oxygen via NC to maintain SPO2 >90%  Has been on and off low amounts of oxygen - mostly needing this when sleeping, resting or tired which is most of the time     No respiratory symptoms - wean oxygen as able, anticipate will be hypoxic with sleep if not wearing CPAP given known KATHRINE  -  " Patient did not want chest x-ray 5/4 due to discomfort this this - overall suspect this is baseline, but if not able to wean off oxygen or if need increasing would again discuss diong a chest x-ray         Hx of CVA in 2018  Hemiplegia and hemiparesis following cerebral infarction  Hyperlipidemia  Residual left sided weakness.  - HELD PTA Atorvastatin 40mg due to elevated LFTs, but does not appear due to this - resumed 5/3        Type 2 diabetes mellitus, with long-term current use of insulin   Diabetic neuropathy  A1c 6.8% 4/14/2025. Ozempic recently discontinued due to correlation with patient's onset of poor intake per .   - continue PTA Novolog 1 unit with meals   - Continue PTA Humulin 70/30 BID 15U  - Continued PTA Gabapentin 300mg BID   - HELD PTA Metformin 1000mg twice daily, anticipate resuming on discharge         Anemia, borderline microcytic, chronic  Hemoglobin 9.6 on admission, baseline 8-9.    - Stable      Hypertension  - Continued PTA Amlodipine 10mg daily   - Continued PTA  Ibesartan 300mg daily         Adjustment disorder with depressed mood  - Continued PTA Hydroxyzine 25mg PRN   - Continued PTA Sertraline 150mg daily   - Continue PTA lorazepam 0.5mg BID        KATHRINE (obstructive sleep apnea)  - Continue PTA CPAP if available      Diet: Regular Diet Adult    DVT Prophylaxis: DOAC  Nielson Catheter: Not present  Code Status: Full Code      Medically Ready for Discharge: Anticipated Tomorrow        GABRIEL CRUZ MD   Pg [phone]        ROS:  As described in A/P and Exam.  Otherwise ALL are  negative.    PHYSICAL EXAM:  All vitals have been reviewed    Blood pressure 120/65, pulse 97, temperature 99.3  F (37.4  C), temperature source Oral, resp. rate 16, height 1.638 m (5' 4.5\"), weight 104.9 kg (231 lb 4.2 oz), SpO2 95%, not currently breastfeeding.    I/O this shift:  In: 9450 [I.V.:9450]  Out: 800 [Urine:800]    GENERAL APPEARANCE: healthy, alert and no distress  EYES: conjunctiva clear, " eyes grossly normal  HENT: external ears and nose normal   RESP: lungs clear to auscultation - no rales, rhonchi or wheezes  CV: regular rate and rhythm, normal S1 S2, no S3 or S4 and no murmur, click or rub   ABDOMEN: soft, nontender, no HSM or masses and bowel sounds normal  MS: no clubbing, cyanosis; no edema  SKIN: clear without significant rashes or lesions  NEURO: -non-focal moves all 4 extr    ROUTINE  LABS (Last four results)  CMP  Recent Labs   Lab 05/05/25  1141 05/05/25  0729 05/05/25  0546 05/05/25  0243 05/04/25  0753 05/04/25  0600 05/03/25  0737 05/03/25  0542 05/02/25  2145 05/02/25 2009 05/02/25  1604   NA  --   --  136  --   --  135  --  130*  --  131* 132*   POTASSIUM  --   --  4.2  --   --  4.3  --  4.4  --  4.9 5.4*   CHLORIDE  --   --  106  --   --  104  --  99  --  97* 95*   CO2  --   --  19*  --   --  22  --  26  --  24 23   ANIONGAP  --   --  11  --   --  9  --  5*  --  10 14   * 103* 113* 118*   < > 84   < > 105*   < > 144* 167*   BUN  --   --  9.3  --   --  10.4  --  16.0  --  20.6* 21.2*   CR  --   --  0.35*  --   --  0.37*  --  0.39*  --  0.51 0.54   GFRESTIMATED  --   --  >90  --   --  >90  --  >90  --  >90 >90   SHIRLEY  --   --  11.5*  --   --  12.2*  --  13.4*  --  14.1* 14.8*   MAG  --   --   --   --   --  1.9  --   --   --  1.9 2.1   PHOS  --   --   --   --   --  1.8*  --   --   --   --   --    PROTTOTAL  --   --  5.8*  --   --  6.0*  --  6.2*  --  6.4 7.0   ALBUMIN  --   --  2.9*  --   --  3.0*  --  3.2*  --  3.2* 3.4*   BILITOTAL  --   --  0.3  --   --  0.3  --  0.4  --  0.3 0.3   ALKPHOS  --   --  176*  --   --  178*  --  196*  --  191* 212*   AST  --   --  151*  --   --  90*  --  94*  --  94* 96*   ALT  --   --  9  --   --  10  --  11  --  12 14    < > = values in this interval not displayed.     CBC  Recent Labs   Lab 05/05/25  0546 05/04/25  0600 05/03/25  0542 05/02/25  1604   WBC 10.0 9.2 10.2 12.1*   RBC 3.51* 3.79* 3.98 4.11   HGB 7.9* 8.6* 9.0* 9.6*   HCT 28.2* 30.6*  31.5* 32.7*   MCV 80 81 79 80   MCH 22.5* 22.7* 22.6* 23.4*   MCHC 28.0* 28.1* 28.6* 29.4*   RDW 18.6* 18.4* 18.5* 18.6*    401 437 467*     INRNo lab results found in last 7 days.  Arterial Blood GasNo lab results found in last 7 days.    No results found for this or any previous visit (from the past 24 hours).

## 2025-05-06 VITALS
DIASTOLIC BLOOD PRESSURE: 62 MMHG | RESPIRATION RATE: 22 BRPM | HEIGHT: 65 IN | HEART RATE: 101 BPM | WEIGHT: 231.26 LBS | OXYGEN SATURATION: 93 % | BODY MASS INDEX: 38.53 KG/M2 | TEMPERATURE: 97.8 F | SYSTOLIC BLOOD PRESSURE: 116 MMHG

## 2025-05-06 LAB
GLUCOSE BLDC GLUCOMTR-MCNC: 111 MG/DL (ref 70–99)
GLUCOSE BLDC GLUCOMTR-MCNC: 112 MG/DL (ref 70–99)
GLUCOSE BLDC GLUCOMTR-MCNC: 154 MG/DL (ref 70–99)

## 2025-05-06 PROCEDURE — 250N000013 HC RX MED GY IP 250 OP 250 PS 637: Performed by: NURSE PRACTITIONER

## 2025-05-06 PROCEDURE — 258N000003 HC RX IP 258 OP 636: Performed by: INTERNAL MEDICINE

## 2025-05-06 PROCEDURE — 250N000013 HC RX MED GY IP 250 OP 250 PS 637

## 2025-05-06 PROCEDURE — 99232 SBSQ HOSP IP/OBS MODERATE 35: CPT | Performed by: NURSE PRACTITIONER

## 2025-05-06 PROCEDURE — 99239 HOSP IP/OBS DSCHRG MGMT >30: CPT

## 2025-05-06 RX ORDER — OXYCODONE HYDROCHLORIDE 5 MG/1
5 TABLET ORAL EVERY 4 HOURS PRN
Qty: 30 TABLET | Refills: 0 | Status: SHIPPED | OUTPATIENT
Start: 2025-05-06

## 2025-05-06 RX ORDER — LORAZEPAM 0.5 MG/1
0.5 TABLET ORAL EVERY 8 HOURS
Qty: 60 TABLET | Refills: 0 | Status: SHIPPED | OUTPATIENT
Start: 2025-05-06

## 2025-05-06 RX ORDER — LIDOCAINE HYDROCHLORIDE 20 MG/ML
JELLY TOPICAL
Status: DISCONTINUED | OUTPATIENT
Start: 2025-05-06 | End: 2025-05-06 | Stop reason: HOSPADM

## 2025-05-06 RX ORDER — OXYCODONE HYDROCHLORIDE 10 MG/1
10 TABLET ORAL EVERY 4 HOURS PRN
Qty: 30 TABLET | Refills: 0 | Status: SHIPPED | OUTPATIENT
Start: 2025-05-06

## 2025-05-06 RX ORDER — MORPHINE SULFATE 15 MG/1
15 TABLET, FILM COATED, EXTENDED RELEASE ORAL EVERY 12 HOURS
Qty: 30 TABLET | Refills: 0 | Status: SHIPPED | OUTPATIENT
Start: 2025-05-06

## 2025-05-06 RX ADMIN — SODIUM CHLORIDE 1000 ML: 0.9 INJECTION, SOLUTION INTRAVENOUS at 03:55

## 2025-05-06 RX ADMIN — SENNOSIDES 1 TABLET: 8.6 TABLET, FILM COATED ORAL at 08:50

## 2025-05-06 RX ADMIN — POLYETHYLENE GLYCOL 3350 17 G: 17 POWDER, FOR SOLUTION ORAL at 08:53

## 2025-05-06 RX ADMIN — DICLOFENAC SODIUM 4 G: 10 GEL TOPICAL at 08:51

## 2025-05-06 RX ADMIN — GABAPENTIN 300 MG: 300 CAPSULE ORAL at 08:49

## 2025-05-06 RX ADMIN — LORAZEPAM 0.5 MG: 0.5 TABLET ORAL at 00:21

## 2025-05-06 RX ADMIN — ACETAMINOPHEN 975 MG: 325 TABLET, FILM COATED ORAL at 12:31

## 2025-05-06 RX ADMIN — AMLODIPINE BESYLATE 10 MG: 10 TABLET ORAL at 08:50

## 2025-05-06 RX ADMIN — SERTRALINE HYDROCHLORIDE 150 MG: 50 TABLET ORAL at 08:50

## 2025-05-06 RX ADMIN — MORPHINE SULFATE 15 MG: 15 TABLET, FILM COATED, EXTENDED RELEASE ORAL at 08:49

## 2025-05-06 RX ADMIN — ACETAMINOPHEN 975 MG: 325 TABLET, FILM COATED ORAL at 03:59

## 2025-05-06 RX ADMIN — INSULIN HUMAN 15 UNITS: 100 INJECTION, SUSPENSION SUBCUTANEOUS at 08:48

## 2025-05-06 RX ADMIN — MICONAZOLE NITRATE: 20 POWDER TOPICAL at 08:52

## 2025-05-06 RX ADMIN — LIDOCAINE 4% 2 PATCH: 40 PATCH TOPICAL at 08:50

## 2025-05-06 RX ADMIN — APIXABAN 5 MG: 5 TABLET, FILM COATED ORAL at 08:50

## 2025-05-06 ASSESSMENT — ACTIVITIES OF DAILY LIVING (ADL)
ADLS_ACUITY_SCORE: 73
ADLS_ACUITY_SCORE: 73
ADLS_ACUITY_SCORE: 74
ADLS_ACUITY_SCORE: 73
ADLS_ACUITY_SCORE: 74
ADLS_ACUITY_SCORE: 73
DEPENDENT_IADLS:: CLEANING;COOKING;LAUNDRY;SHOPPING;MEAL PREPARATION;MEDICATION MANAGEMENT;MONEY MANAGEMENT;TRANSPORTATION

## 2025-05-06 NOTE — DISCHARGE SUMMARY
[facility]    Discharge Summary  Hospital Medicine    Date of Admission:  5/2/2025  Date of Discharge:  5/6/2025   Discharging Provider: Shannon Douglas PA-C  Date of Service: 5/6/2025      Primary Care     Arti Montero  [address] [facility] [address]      Identification and Chief Compaint: Brissa Mott is a 54 year old female who presented on 5/2/2025 with complaint of abnormal labs.    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  No follow up labs or test are needed.     Tubes and Drains    Current Tubes and Drains:    Indwelling staples cathter: leave in place at discharge, trial of void after wound of coccyx healing and improvement in skin breakdown over labial folds     Reason for your hospital stay    Hypercalcemia  Chronic pain due to malignancy   Acute on chronic pain due to pathological femur fracture     Weight bearing status     50% weightbearing on right lower extremity     CPAP - Continue Home CPAP    Continue Home CPAP per home equipment settings.     Activity - Up with nursing assistance     Full Code     Fall precautions     Diet    Follow this diet upon discharge: Current Diet:Orders Placed This Encounter      Regular Diet Adult        Discharge Medications   Current Discharge Medication List        START taking these medications    Details   morphine (MS CONTIN) 15 MG CR tablet Take 1 tablet (15 mg) by mouth every 12 hours.  Qty: 30 tablet, Refills: 0    Associated Diagnoses: Pathological fracture of right femur due to neoplastic disease, initial encounter (H); Pressure injury of sacral region, stage 3 (H)           CONTINUE these medications which have CHANGED    Details   LORazepam (ATIVAN) 0.5 MG tablet Take 1 tablet (0.5 mg) by mouth every 8 hours.  Qty: 60 tablet, Refills: 0    Associated Diagnoses: Generalized anxiety disorder      !! oxyCODONE (ROXICODONE) 10 MG tablet Take 1 tablet (10 mg) by mouth every 4 hours as needed for severe pain.  Qty: 30 tablet, Refills: 0    Associated Diagnoses: Pathological fracture of right femur due to neoplastic disease, initial encounter (H); Pressure injury of sacral region, stage 3 (H)      !! oxyCODONE (ROXICODONE) 5 MG tablet Take 1 tablet (5 mg) by mouth every 4 hours as needed for moderate pain.  Qty: 30 tablet, Refills: 0    Associated Diagnoses: Pathological fracture of right femur due to neoplastic disease, initial encounter (H); Pressure injury of sacral region, stage 3 (H)       !! - Potential duplicate medications found. Please discuss with provider.        CONTINUE these medications which have NOT CHANGED    Details   acetaminophen (TYLENOL) 325 MG tablet Take 3 tablets (975 mg) by mouth every 8 hours.    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    Associated Diagnoses: Other acute pulmonary embolism, unspecified whether acute cor pulmonale present (H)      atorvastatin (LIPITOR) 40 MG tablet Take 1 tablet (40 mg) by mouth at bedtime.    Associated Diagnoses: Hyperlipidemia, unspecified hyperlipidemia type      Continuous Glucose Sensor (FREESTYLE SANDRA 3 SENSOR) MISC 1 each every 14 days Use 1 sensor every 14 days. Use to read blood sugars per 's instructions.  Qty: 6 each, Refills: 5    Associated Diagnoses: Type 2 diabetes mellitus with hyperglycemia, with long-term current use of insulin (H)      diclofenac (VOLTAREN) 1 % topical gel Apply 4 g topically 4 times daily.    Associated Diagnoses: Pathological fracture of right femur due to neoplastic disease, initial encounter (H)      docusate sodium (COLACE) 100 MG capsule Take 1 capsule (100 mg) by mouth 2 times daily.    Associated Diagnoses: Pathological fracture of right femur due to neoplastic disease, initial encounter (H)      gabapentin (NEURONTIN) 300 MG capsule Take 1 capsule (300 mg) by mouth 2 times daily.  Qty: 10 capsule, Refills: 0    Associated Diagnoses: Pathological fracture of right femur due to neoplastic disease, initial encounter (H)      ibuprofen (ADVIL/MOTRIN) 400 MG tablet Take 1 tablet (400 mg) by mouth every 6 hours as needed for inflammatory pain.    Associated Diagnoses: Pathological fracture of right femur due to neoplastic disease, initial encounter (H)      insulin lispro (HUMALOG VIAL) 100 UNIT/ML vial Inject 1 Units subcutaneously 3 times daily.      insulin NPH-Regular (HUMULIN 70/30;NOVOLIN 70/30) susp Inject 15 Units subcutaneously 2 times daily (before meals).     "Associated Diagnoses: Type 2 diabetes mellitus with complication, with long-term current use of insulin (H)      irbesartan (AVAPRO) 300 MG tablet TAKE 1 TABLET(300 MG) BY MOUTH AT BEDTIME  Qty: 90 tablet, Refills: 2    Associated Diagnoses: Secondary hypertension      Lidocaine (LIDOCARE) 4 % Patch Place 2 patches over 12 hours onto the skin every 24 hours. To prevent lidocaine toxicity, patient should be patch free for 12 hrs daily.    Associated Diagnoses: Pathological fracture of right femur due to neoplastic disease, initial encounter (H)      metFORMIN (GLUCOPHAGE) 1000 MG tablet TAKE 1 TABLET BY MOUTH TWICE DAILY WITH MEALS  Qty: 180 tablet, Refills: 1    Associated Diagnoses: Type 2 diabetes mellitus without complication, with long-term current use of insulin (H)      miconazole (MICATIN) 2 % external powder Apply topically 2 times daily. To perineum    Associated Diagnoses: Intertrigo      Misc. Devices (ROLLATOR ULTRA-LIGHT) MISC Extra side walker with front wheels for home use.  Purchase, lifetime need. Extra wide rolling walker (\"Walker 4\", item #:  [id number]) needed for safe transfers and ambulation.  Pt weight is 335 lbs.      montelukast (SINGULAIR) 10 MG tablet Take 1 tablet (10 mg) by mouth at bedtime  Qty: 90 tablet, Refills: 3    Comments: Profile  Associated Diagnoses: Moderate persistent asthma without complication      Nutritional Supplements (ENSURE PO) Take 1 Can by mouth 2 times daily.      polyethylene glycol (MIRALAX) 17 g packet Take 17 g by mouth daily.    Associated Diagnoses: Pathological fracture of right femur due to neoplastic disease, initial encounter (H)      senna-docusate (SENOKOT-S/PERICOLACE) 8.6-50 MG tablet Take 1 tablet by mouth 2 times daily as needed for constipation.    Associated Diagnoses: Pathological fracture of right femur due to neoplastic disease, initial encounter (H)      sennosides (SENOKOT) 8.6 MG tablet Take 2 tablets by mouth 2 times daily.      sertraline " (ZOLOFT) 100 MG tablet Take 1.5 tablets (150 mg) by mouth daily  Qty: 135 tablet, Refills: 3    Comments: Profile  Associated Diagnoses: Adjustment disorder with anxious mood      insulin pen needle (BD PEN NEEDLE TWYLA 2ND GEN) 32G X 4 MM miscellaneous USE TWICE DAILY OR AS DIRECTED  Qty: 200 each, Refills: 2    Associated Diagnoses: Type 2 diabetes mellitus without complication, with long-term current use of insulin (H)           STOP taking these medications       hydrOXYzine HCl (ATARAX) 25 MG tablet Comments:   Reason for Stopping:             Allergies   Allergies   Allergen Reactions    Sulfa Antibiotics Shortness Of Breath, Hives and Rash    Bydureon [Exenatide] Diarrhea    Penicillins Rash and Hives       Consultations This Hospital Stay  PHYSICAL THERAPY ADULT IP CONSULT  OCCUPATIONAL THERAPY ADULT IP CONSULT  CARE MANAGEMENT / SOCIAL WORK IP CONSULT  CARE MANAGEMENT / SOCIAL WORK IP CONSULT  WOUND OSTOMY CONTINENCE NURSE  IP CONSULT  PALLIATIVE CARE ADULT IP CONSULT    Significant Results and Procedures   Procedures  None    Data   Results for orders placed or performed during the hospital encounter of 05/02/25   XR Shoulder Right Port G/E 2 Views    Narrative    EXAM: XR SHOULDER RIGHT PORT G/E 2 VIEWS  LOCATION: [facility]  DATE: 5/3/2025    INDICATION: right shoulder pain, known metastatic cancer, rule out pathologic fracture  COMPARISON: None.      Impression    IMPRESSION: No discrete osseous lesion. No acute fracture. Moderate glenohumeral and mild acromioclavicular joint degenerative arthritis.       History of Present Illness   Brissa Mott is a 54 year old female with past medical history of T2DM, neuroendocrine tumor with metastasis, pathologic femur fracture, hypercalcemia of malignancy, hyperlipidemia, CVA with hemiplegia, asthma, adjustment disorder with depressed mood now presents on 5/2/2025 with recurrent hypercalcemia.   Patient with history of neuroendocrine  cancer with metastasis to bone and liver.  Previous hypercalcemia treated with Zometa and IV fluids.  Most recently admitted a few weeks PTA for generalized weakness.  Now living in long-term care facility.  Has been following with oncology for ongoing workup of neuroendocrine cancer.  She is not a candidate for chemotherapy due to poor functional status at baseline.  Her genetic testing is currently pending for evaluation for immunotherapy.  Patient was in the clinic 5/2 when labs were significant for severe hypercalcemia.  Patient was directed to ER.  Patient is generally weak at baseline, living in long-term care, but  feels that she might have been weaker than usual over the past few days PTA.  Her weakness just continues to decline and he is very discouraged about this.  Patient also has seemed more somnolent and confused than usual over past 1-2 days.  Patient says she is continuing to urinate like normal, denies dysuria, frequency, hematuria.  Denies flank pain.  Has left knee pain which is chronic.  Upon arrival to ER patient received lab and imaging workup.  She was initiated on IV fluids and zoledronic acid.    Hospital Course   Brissa Mott is a 54 year old female with past medical history of T2DM, neuroendocrine tumor with metastasis, pathologic femur fracture, hypercalcemia of malignancy, hyperlipidemia, CVA with hemiplegia, asthma, adjustment disorder with depressed mood now presents on 5/2/2025 with recurrent hypercalcemia.     Hypercalcemia of malignancy  History of hypercalcemia 2/2 malignancy; previously treated with zometa & IVF. During previous admission PTH <6, PTHrP 12 (high), vitamin D <18 (low).   Seen outpatient oncology 5/2 at which time labs show recurrence of severe hypercalcemia, likely 2/2 malignancy to bone. Renal function WNL. Symptomatic with increased somnolence, mild encephalopathy, weakness.   Initial Calcium 14.9. When corrected for albumin Ca 15.8.   Received zometa 4mg  IV in ER evening 5/2, did not qualify for subcutaneous calcitonin -was continued on IVF with normal saline 150/hr continuous . Ionized calcium 7.3 on admission   Calcium 11.5 on 5/5/25.   - Outpatient follow up and monitoring per PCP and oncology       Chronic pain due to this and malignancy   Pathological fracture of femur due to neoplastic disease  S/p ORIF 2/25. Now living in long term care facility due to poor mobility & gen weakness.   Continued PTA Lidocaine patches, scheduled Tylenol, PRN Oxycodone 5-10 mg /Ortho surgery recommends 50% weightbearing on right lower extremity /added prn dilaudid IV q6 for breakthrough pain 5/3, improved 5/4  Palliative care consulted to assist with symptom management   Per palliative care:  Cancer associated pain and chronic pain related to degenerative joint disease.  Pain also complicated by adjustment disorder with significant anxiety and depression:  Start MS Contin 15 mg twice daily  Discontinue scheduled oxycodone IR 5 mg 3 times daily  Continue oxycodone IR 5 to 10 mg every 4 hours as needed  Continue Tylenol 975 mg every 8 hours, Voltaren topical gel, gabapentin 300 mg twice daily, lidocaine patches  Adjustment disorder with significant anxiety and depression:  Started Ativan 0.5 mg every 12 hours during previous hospitalization with obvious improvement in anxiety (previously she was telling people she could not have conversations with them because she was too overwhelmed but this did improve after Ativan).  Increase Ativan to every 8 hours  Continue sertraline 150 mg daily  Discontinue hydroxyzine as she is on scheduled Ativan  Arrange outpatient palliative care follow-up     Right shoulder pain   Hard to gauge how acute this is, but it's pretty severe and we know she is at risk for pathologic fractures so x-ray shoulder 5/3 was negative for fracture or other acute change, did look like arthritis.     - pain control as above      Neuroendocrine carcinoma  Carcinoma  metastatic to lymph nodes of multiple sites with unknown primary site  Presented in Feb 2025 for pathologic fracture (below) workup revealing poorly differentiated carcinoma with neuroendocrine features; multiple pulm nodules, left axillary node, several hypodense areas in liver consistent with metastatic disease, bony lesions and head of right humerus, right sacrum, pelvis, several subcutaneous nodules in anterior chest wall and right gluteal region.  Following outpatient with Dr. Friedell, review oncology.  Not a candidate for chemo due to baseline performance status.  NGS sequencing is proceeding. She may be a candidate for immunotherapy.  Pain meds as above  - Continue outpatient follow up with oncology     Hyperkalemia, resolved   Mild, K 5.4 in ER. Renal function WNL.   Normalized with IVF     General weakness  Chronic left knee pain  Admitted end of Feb 2025 for pathologic fracture, discharged to TCU, then to home 4/7 and having frequent falls at home 2/2 gen weakness. Recently admitted 4/14 - 4/24/2025 for generalized weakness. Now living in long-term care facility  - PT, OT for ongoing rehab & mobility assistance  - Fall precautions     Hyponatremia, resolved    ?  132 ? 130 ? 135   - IVF as per hypercalcemia, above  Resolved 5/4      Elevated LFTs  Alk phos 215, AST 96.  ALT and total bilirubin WNL.  Likely secondary to liver metastasis.  - stable      Slight Leukocytosis without infection   Mild, WBC 11.9.  Technically meets SIRS criteria with heart rate 96, respiratory rate 23/min.  Suspect secondary to inflammation related to ongoing worsening metastatic disease.  No signs of obvious acute infection.  Abx deferred on admission given no clear infectious source. Low threshold to initiate if patient declares self infectious with follow up labs or symptoms.   - normalized without intervention, no apparent infection       Gluteal cleft / coccyx & right buttock pressure ulcer, stage 3 (POA)  Skin  breakdown due to urinary incontinence   2/2 poor mobility from generalized weakness. Previously refused repositioning for offloading due to pain with movement. RN noted skin breakdown and erythema at labia due to urinary incontinence  - Place staples cathter; trial of void after would appears healed.   - Wound care with triad paste   - Frequent repositioning as able  - WOC consulted     Recent acute pulmonary embolism  Mild intermittent asthma without clear exacerbation   Suspected obesity hypoventilation syndrome (per pulmonology note) vs KATHRINE  Noted on CT scan during previous admission April 2025. Initiated on apixaban 5mg BID.   - has history of reactive airway disease, but not on any medications besides montelukast.  No wheezing on exam, does not appear to be having an asthma exacerbation   - Continue PTA apixaban 5mg BID   - Continued PTA Montelukast 10mg  - Continue Oxygen via NC to maintain SPO2 >90%  Has been on and off low amounts of oxygen - mostly needing this when sleeping, resting or tired which is most of the time     No respiratory symptoms - wean oxygen as able, anticipate will be hypoxic with sleep if not wearing CPAP given known KATHRINE  -  Patient did not want chest x-ray 5/4 due to discomfort this this - overall suspect this is baseline, but if not able to wean off oxygen or if need increasing would again discuss diong a chest x-ray      Hx of CVA in 2018  Hemiplegia and hemiparesis following cerebral infarction  Hyperlipidemia  Residual left sided weakness.  - HELD PTA Atorvastatin 40mg due to elevated LFTs, but does not appear due to this - resumed 5/3     Type 2 diabetes mellitus, with long-term current use of insulin   Diabetic neuropathy  A1c 6.8% 4/14/2025. Ozempic recently discontinued due to correlation with patient's onset of poor intake per .   - continue PTA Novolog 1 unit with meals   - Continue PTA Humulin 70/30 BID 15U  - Continued PTA Gabapentin 300mg BID   - HELD PTA Metformin  1000mg twice daily, resuming on discharge      Anemia, borderline microcytic, chronic  Hemoglobin 9.6 on admission, baseline 8-9.    - Stable      Hypertension  - Continued PTA Amlodipine 10mg daily   - Continued PTA  Ibesartan 300mg daily      Adjustment disorder with depressed mood  - Continued PTA Hydroxyzine 25mg PRN   - Continued PTA Sertraline 150mg daily   - Continue PTA lorazepam 0.5mg BID     KATHRINE (obstructive sleep apnea)  - Continue PTA CPAP if available      Pending Results   Unresulted Labs Ordered in the Past 30 Days of this Admission       No orders found from 4/2/2025 to 5/3/2025.            Physical Exam   Temp:  [97.9  F (36.6  C)-98.4  F (36.9  C)] 98.3  F (36.8  C)  Pulse:  [] 102  Resp:  [16-20] 20  BP: (103-135)/(59-72) 132/72  SpO2:  [93 %-95 %] 93 %  Vitals:    05/02/25 1553 05/02/25 1900   Weight: 107.5 kg (237 lb) 104.9 kg (231 lb 4.2 oz)       Constitutional: Alert, oriented, appears nontoxic. Pleasant, responding to questions appropriately.   HENT: Oropharynx is clear and moist. No evidence of cranial trauma.   Cardiovascular: Regular rate and rhythm and no murmur, rub, or gallops noted. No pitting lower extremity edema.  Respiratory: Clear to auscultation bilaterally, respirations unlabored.   GI: Active bowel sounds throughout. Soft, not distended. Tenderness to minimal palpation throughout.   Musculoskeletal: Normal muscle bulk and tone. Moving all extremities spontaneously.   Skin: Warm and dry, no rashes on exposed skin.   Neurologic: Neck supple. No notable tremor. Speech is clear and fluent.       The discharge plan was discussed with the patient and Attending physician Dr. Colby Meyers     Total time on this discharge was 45 minutes .    Shannon Douglas PA-C

## 2025-05-06 NOTE — PLAN OF CARE
Problem: Adult Inpatient Plan of Care  Goal: Plan of Care Review  Outcome: Not Progressing  Flowsheets (Taken 5/6/2025 0142)  Overall Patient Progress: no change     Problem: Pain Acute  Goal: Optimal Pain Control and Function  Outcome: Not Progressing  Intervention: Prevent or Manage Pain  Recent Flowsheet Documentation  Taken 5/6/2025 0120 by Janell Keenan RN  Sleep/Rest Enhancement: awakenings minimized   Goal Outcome Evaluation:           Overall Patient Progress: no changeOverall Patient Progress: no change     Patient is A&O.  She is pain free but is in severe pain with any movement,  pt has refused repositioning.  Pt is currently using a TAP device and weight is off her bottom.  Pt has anxiety with the idea of repositioning, ativan given with relief.  Pt has had some weight distribution with adjusting the position of the bed, pt will cry out in pain with any movement.  Pt has been somnolent but wakes to voice. Pt is taking in water without difficulty, voiding in good amount.  Call light is in reach.

## 2025-05-06 NOTE — PLAN OF CARE
Goal Outcome Evaluation:      Plan of Care Reviewed With: patient    Overall Patient Progress: improvingOverall Patient Progress: improving    Outcome Evaluation: Patient denies pain at rest but cries out in pain with repositioning/movement in shoulder and bilateral knees.  Declines PRN pain medication since she is not in pain at rest.  Indwelling urinary catheter placed today because of skin breakdown between labia 2/2 Purewick, and pre-existing sacral pressure ulcer requiring healing.  Has had soft incontinent stools x2 today, repositioning regularly using TAP system.  Discharging back to [facility]- awaiting transport (EMS).  Report called to Josy at [place].

## 2025-05-06 NOTE — PROGRESS NOTES
Palliative Care Progress Note    Impression & Recommendations:  54 year old female with past medical history of T2DM, aggressive neuroendocrine tumor with metastasis, pathologic right femur fracture status post recent repair, chronic hypercalcemia of malignancy, hyperlipidemia, CVA with hemiplegia, asthma, adjustment disorder with depressed and anxious mood who presented on 5/2/2025 with recurrent hypercalcemia. She was admitted for management of acute hypercalcemia of malignancy.     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  Following outpatient with Dr. Friedell, review oncology.  Not a candidate for chemo due to baseline performance status.  NGS sequencing is proceeding. She may be a candidate for immunotherapy.    Gluteal cleft / coccyx & right buttock pressure ulcer, stage 3 (POA)     Recent acute pulmonary embolism  Mild intermittent asthma without clear exacerbation   Suspected obesity hypoventilation syndrome (per pulmonology note) vs KATHRINE    She has essentially been in bed or chair since hip fracture repair due to significant long-term left knee pain that previously required her to put the majority of her weight on her right leg, now with limited weightbearing on right leg and fever of falls and possible other fractures. Ortho surgery recommends 50% weightbearing on right lower extremity.      Symptoms/recommendations/discussion:  Advance care planning:  Has routinely requested that her  make decisions for her if needed  Full code.  Began discussion on 5/5 about  "expectation for poor outcome to CPR or intubation given advanced cancer, multiple other medical problems, and debilitated state following hip fracture repair  She has consistently requested to follow-up with oncology in the hopes of seeking treatment, as above, she is not a candidate for chemotherapy, pending workup for possible immunotherapy  Cancer associated pain and chronic pain related to degenerative joint disease.  Pain also complicated by adjustment disorder with significant anxiety and depression:  Started MS Contin 15 mg twice daily on 5/5  Discontinued scheduled oxycodone IR 5 mg 3 times daily 5/5  Continue oxycodone IR 5 to 10 mg every 4 hours as needed  Continue Tylenol 975 mg every 8 hours, Voltaren topical gel, gabapentin 300 mg twice daily, lidocaine patches  Adjustment disorder with significant anxiety and depression:  Started Ativan 0.5 mg every 12 hours during previous hospitalization with obvious improvement in anxiety (previously she was telling people she could not have conversations with them because she was too overwhelmed but this did improve after Ativan).  Increased Ativan to every 8 hours on 5/5  Continue sertraline 150 mg daily  Discontinue hydroxyzine as she is on scheduled Ativan  Arrange outpatient palliative care follow-up        Thank you for the opportunity to participate in the care of this patient and family. Our team: will continue to follow.   RONNELL Marcelino CNP  Securely message with [employer] (more info)  Text page via [employer] Paging/Directory     History:  History gathered today from: patient, medical chart, medical team members, unit team members    ROS:  10 point ROS is negative unless exceptions noted below    PE: /72 (BP Location: Right arm)   Pulse 102   Temp 98.3  F (36.8  C) (Oral)   Resp 20   Ht 1.638 m (5' 4.5\")   Wt 104.9 kg (231 lb 4.2 oz)   LMP  (LMP Unknown)   SpO2 93%   BMI 39.08 kg/m     Wt Readings from Last 3 Encounters:   05/02/25 104.9 kg (231 " lb 4.2 oz)   05/05/25 106.2 kg (234 lb 3.2 oz)   04/22/25 106.3 kg (234 lb 5.6 oz)     Gen alert, calm, sleeping but wakes easily  Head NCAT.  Eyes anicteric without injection  Face symmetric, eyes conjugate  Heart regular and rhythmic  Lungs unlabored, no cough, speaking full sentences  Skin no rashes or lesions evident on face/neck  Neuro Face symmetric, eyes conjugate; speech fluent.  Neuropsych somewhat anxious appearing though less so than during previous/recent admission        Data reviewed:  I reviewed electrolytes, BUN/creatinine, liver profile, hemoglobin and hematocrit, platelet count, and most recent imaging  Outpatient oncology note        36 minutes spent on the date of the encounter doing chart review, history and exam, patient education & counseling, documentation and other activities as noted above.        Thank you for involving us in the patient's care.   RONNELL Marcelino, [facility] Palliative Care Service

## 2025-05-06 NOTE — PROGRESS NOTES
Care Management Discharge Note    Discharge Date: 05/06/2025       Discharge Disposition: Long Term Care    Discharge Services: Transportation Services    Discharge DME: None    Discharge Transportation: agency    Private pay costs discussed: transportation costs    Does the patient's insurance plan have a 3 day qualifying hospital stay waiver?  Yes     Which insurance plan 3 day waiver is available? Alternative insurance waiver    Will the waiver be used for post-acute placement? No    PAS Confirmation Code: [id number]  Patient/family educated on Medicare website which has current facility and service quality ratings: no    Education Provided on the Discharge Plan: Yes  Persons Notified of Discharge Plans: Billie George Nancy, [facility] ([facility] Admissions.  Patient/Family in Agreement with the Plan: yes    Handoff Referral Completed: No, handoff not indicated or clinically appropriate    Additional Information:    Plan:  Return to [facility].  Transportation provided by EMS stretcher.      Briseida Valdez RN      anxiety

## 2025-05-06 NOTE — DISCHARGE INSTRUCTIONS
Buttock wound(s): Every 3 days and PRN Cleanse with microklenz or alternate wound cleanser, cover with inverted mepilex (or alternate brands as available) sacral dressing, apply skin barrier wipe over edges to improve seal     Mattress: Recommend a Low Air Loss mattress    HOB: Maintain at or below 30 degrees, unless contraindicated   Repositioning in bed: Every 1-2 hours , Left/right positioning; avoid supine, Raise foot of bed prior to raising head of bed, to reduce patient sliding down (shear), and Frequent microturns using positioning wedges, as patient tolerates   Heels: Keep elevated off mattress   Positioning Equipment: A TAPS positioning system was used in the hospital and sent with on discharge, may continue to use this to facilitate turning. For more information on this system including videos on use, can visit 's website: [email or web address]/en/[email or web address]/products/[email or web address]-tap-[email or web address].[email or web address]    Chair positioning: Use a pressure reduction chair cushion and assist patient to reposition hourly as able, avoid slouching

## 2025-05-06 NOTE — PLAN OF CARE
"  Problem: Adult Inpatient Plan of Care  Goal: Plan of Care Review  Description: The Plan of Care Review/Shift note should be completed every shift.  The Outcome Evaluation is a brief statement about your assessment that the patient is improving, declining, or no change.  This information will be displayed automatically on your shiftnote.  Outcome: Not Progressing  Flowsheets (Taken 5/5/2025 0781)  Plan of Care Reviewed With: patient  Overall Patient Progress: no change  Goal: Patient-Specific Goal (Individualized)  Description: You can add care plan individualizations to a care plan. Examples of Individualization might be:  \"Parent requests to be called daily at 9am for status\", \"I have a hard time hearing out of my right ear\", or \"Do not touch me to wake me up as it startlesme\".  Outcome: Not Progressing  Goal: Absence of Hospital-Acquired Illness or Injury  Outcome: Not Progressing  Intervention: Identify and Manage Fall Risk  Recent Flowsheet Documentation  Taken 5/5/2025 1647 by Shannon Parkinson, RN  Safety Promotion/Fall Prevention:   activity supervised   clutter free environment maintained   room near nurse's station   room organization consistent   safety round/check completed   supervised activity  Intervention: Prevent and Manage VTE (Venous Thromboembolism) Risk  Recent Flowsheet Documentation  Taken 5/5/2025 1647 by Shannon Parkinson, RN  VTE Prevention/Management: SCDs off (sequential compression devices)  Intervention: Prevent Infection  Recent Flowsheet Documentation  Taken 5/5/2025 1647 by Shannon Parkinson, RN  Infection Prevention:   rest/sleep promoted   hand hygiene promoted  Goal: Optimal Comfort and Wellbeing  Outcome: Not Progressing  Goal: Readiness for Transition of Care  Outcome: Not Progressing     Problem: Pain Acute  Goal: Optimal Pain Control and Function  Outcome: Not Progressing  Intervention: Prevent or Manage Pain  Recent Flowsheet Documentation  Taken 5/5/2025 1647 by Iggy" Shannon RICE, RN  Medication Review/Management: medications reviewed     Problem: Gas Exchange Impaired  Goal: Optimal Gas Exchange  Outcome: Not Progressing   Goal Outcome Evaluation:      Plan of Care Reviewed With: patient    Overall Patient Progress: no changeOverall Patient Progress: no change     Alert and oriented to self, place and sitaution. Upon start of shift pt was very relaxed and comfortable, initially declined ativan but after slight repositioning for dinner she requested it. Pain increases with movement. WOC dressed pressure sore on coccyx/gluteal. Remains clean dry and intact. WOC RN dropped off TAPs postioning system which was placed underneath patient. Pt stated they felt relief off their bottom. LR infusing at 75mL/hr.

## 2025-05-06 NOTE — PROGRESS NOTES
JENNIFER VIGILG DISCHARGE NOTE    Patient discharged to long term care facility at 2:35 PM via EMS stretcher. Accompanied by other:EMS staff. Discharge instructions reviewed with  report given to Josy @ [facility] , opportunity offered to ask questions. Prescriptions sent to patients preferred pharmacy. All belongings sent with patient.    Cheryl Ann RN

## 2025-05-07 NOTE — PROGRESS NOTES
[facility] GERIATRICS  Primary Care Provider & Clinic: Arti Montero NP, [address] [facility] [address]  Chief Complaint   Patient presents with    Hospital F/U     [facility] 2025 - 2025     [place] Medical Record Number: [id number]  Place of Service Where Encounter Took Place: [facility] () [[id number]]    Brissa Mott is a 54 year old (1970), returned to the above facility from  [facility]. Hospital stay 25 - 25.     HPI:    Patient was seen at oncology clinic and sent to the ER for hypercalcemia. Today her biggest complaint is of right shoulder pain. She states it was pulled out of the socket. She did have an xray done at that hospital and that was reviewed. She is currently on MS 15 mg ER twice daily and oxycodone PRN. Nurse is requesting a diagnosis for the staples catheter.  Nurse reports she has an order for CPAP and patient stated she has never used a cpap. Oxycgen was started to keep sats > 88%.     Facility EHR reviewed including bm record, progress notes, vital signs and MAR. Reviewed disharge summary, lab results, shoulder xray results.     CODE STATUS/ADVANCE DIRECTIVES DISCUSSION: Full Code - CPR/Full code   Patient's living condition: lives in a skilled nursing facility  ALLERGIES:   Allergies   Allergen Reactions    Sulfa Antibiotics Shortness Of Breath, Hives and Rash    Bydureon [Exenatide] Diarrhea    Penicillins Rash and Hives      PAST MEDICAL HISTORY:   Past Medical History:   Diagnosis Date    Asthma     Cerebral infarction (H)     Diabetes (H)     Hypertension     Mixed hyperlipidemia     Morbid obesity (H)     KATHRINE (obstructive sleep apnea)     Osteoarthritis       PAST SURGICAL HISTORY:  has a past surgical history that includes  section and Insertion, Intramedullary Humphrey, Retrograde, For Femoral Shaft Fracture (Right, 2025).  FAMILY HISTORY: family history is not on file.  SOCIAL HISTORY:  reports  that she has quit smoking. She has never used smokeless tobacco. She reports that she does not currently use alcohol. She reports that she does not use drugs.    Post Discharge Medication Reconciliation Status:  MED REC REQUIRED{TIP  Click the link below to document or use med rec list, use list to pull in response :[id number]}  Post Medication Reconciliation Status: discharge medications reconciled, continue medications without change    Current Outpatient Medications   Medication Sig Dispense Refill    acetaminophen (TYLENOL) 325 MG tablet Take 3 tablets (975 mg) by mouth every 8 hours.      amLODIPine (NORVASC) 10 MG tablet Take 1 tablet (10 mg) by mouth daily 90 tablet 3    apixaban ANTICOAGULANT (ELIQUIS) 5 MG tablet Take 1 tablet (5 mg) by mouth 2 times daily.      atorvastatin (LIPITOR) 40 MG tablet Take 1 tablet (40 mg) by mouth at bedtime.      Continuous Glucose Sensor (FREESTYLE SANDRA 3 SENSOR) MISC 1 each every 14 days Use 1 sensor every 14 days. Use to read blood sugars per 's instructions. 6 each 5    docusate sodium (COLACE) 100 MG capsule Take 1 capsule (100 mg) by mouth 2 times daily.      gabapentin (NEURONTIN) 300 MG capsule Take 1 capsule (300 mg) by mouth 2 times daily. 10 capsule 0    ibuprofen (ADVIL/MOTRIN) 400 MG tablet Take 1 tablet (400 mg) by mouth every 6 hours as needed for inflammatory pain.      insulin lispro (HUMALOG VIAL) 100 UNIT/ML vial Inject 1 Units subcutaneously 3 times daily.      insulin NPH-Regular (HUMULIN 70/30;NOVOLIN 70/30) susp Inject 15 Units subcutaneously 2 times daily (before meals).      insulin pen needle (BD PEN NEEDLE TWYLA 2ND GEN) 32G X 4 MM miscellaneous USE TWICE DAILY OR AS DIRECTED 200 each 2    irbesartan (AVAPRO) 300 MG tablet TAKE 1 TABLET(300 MG) BY MOUTH AT BEDTIME 90 tablet 2    LORazepam (ATIVAN) 0.5 MG tablet Take 1 tablet (0.5 mg) by mouth every 8 hours. 60 tablet 0    metFORMIN (GLUCOPHAGE) 1000 MG tablet TAKE 1 TABLET BY MOUTH TWICE  "DAILY WITH MEALS 180 tablet 1    miconazole (MICATIN) 2 % external powder Apply topically 2 times daily. To perineum      Misc. Devices (ROLLATOR ULTRA-LIGHT) MISC Extra side walker with front wheels for home use.  Purchase, lifetime need. Extra wide rolling walker (\"Walker 4\", item #:  [id number]) needed for safe transfers and ambulation.  Pt weight is 335 lbs.      montelukast (SINGULAIR) 10 MG tablet Take 1 tablet (10 mg) by mouth at bedtime 90 tablet 3    morphine (MS CONTIN) 15 MG CR tablet Take 1 tablet (15 mg) by mouth every 12 hours. 30 tablet 0    Nutritional Supplements (ENSURE PO) Take 1 Can by mouth 2 times daily.      oxyCODONE (ROXICODONE) 10 MG tablet Take 1 tablet (10 mg) by mouth every 4 hours as needed for severe pain. 30 tablet 0    oxyCODONE (ROXICODONE) 5 MG tablet Take 1 tablet (5 mg) by mouth every 4 hours as needed for moderate pain. 30 tablet 0    polyethylene glycol (MIRALAX) 17 g packet Take 17 g by mouth daily.      senna-docusate (SENOKOT-S/PERICOLACE) 8.6-50 MG tablet Take 1 tablet by mouth 2 times daily as needed for constipation.      sennosides (SENOKOT) 8.6 MG tablet Take 2 tablets by mouth 2 times daily.      sertraline (ZOLOFT) 100 MG tablet Take 1.5 tablets (150 mg) by mouth daily 135 tablet 3     No current facility-administered medications for this visit.     ROS:  4 point ROS including Respiratory, CV, GI and , other than that noted in the HPI,  is negative    Vitals:  /63   Pulse 99   Temp 98.1  F (36.7  C)   Resp 18   Ht 1.638 m (5' 4.5\")   Wt 106.8 kg (235 lb 6.4 oz)   LMP  (LMP Unknown)   SpO2 (!) 90%   BMI 39.78 kg/m    Exam:  GENERAL APPEARANCE:  Alert, in no distress  RESP:  respiratory effort and palpation of chest normal, no respiratory distress, lungs sounds clear  CV:  Palpation and auscultation of heart done , regular rate and rhythm,   ABDOMEN:  normal bowel sounds, soft, nontender,   M/S:  Gait and station lying flat in bed  SKIN:  Inspection and " palpation of skin and subcutaneous tissue at baseline  PSYCH:  insight and judgement, memory seems impaired, affect and mood normal    ASSESSMENT/PLAN:  Right shoulder pain  Reports pain with any movement.  - continue oral pain regimen.   - encouraged her to request ice and oxycodone  - diclofenac TID  - lidocaine patch at HS.     Type 2 diabetes mellitus with complication, with long-term current use of insulin (H)   Continue NPH 15 units BID    Caner related pain  OA pain.   Evaluated by palliative care. Continue plan of care with MS contin, oxycodone PRN 5-10 mg every 4 hours PRN. Continue scheduled tylenol volteren, gabapentin, lidocaine patches.   Ibuprfen PRN.     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 Friedell  not a canidate for chemo and may be a candidate for immunotherapy.   Recheck labs per oncology.     Adjustment disorder with significant anxiety and depression   - continue sertraline and lorazepam.     Gluteal cleft / coccyx & right buttock pressure ulcer   Nielson catheter in place  Followed by wound care NP at facility. Continue catheter until skin issue resolved due to incontinence..     Orders:  - voltaern gel to right shoulder TID.   - lidocaine patch 4% apply at bedtime and remove in the morning.     45 MINUTES SPENT BY ME on the date of service doing chart review, history, exam, documentation & further activities per the note.   ***    Electronically signed by: Arti Montero NP

## 2025-05-08 ENCOUNTER — NURSING HOME VISIT (OUTPATIENT)
Dept: GERIATRICS | Facility: CLINIC | Age: 55
End: 2025-05-08
Payer: COMMERCIAL

## 2025-05-08 VITALS
HEIGHT: 65 IN | HEART RATE: 99 BPM | RESPIRATION RATE: 18 BRPM | DIASTOLIC BLOOD PRESSURE: 63 MMHG | SYSTOLIC BLOOD PRESSURE: 111 MMHG | TEMPERATURE: 98.1 F | BODY MASS INDEX: 39.22 KG/M2 | WEIGHT: 235.4 LBS | OXYGEN SATURATION: 90 %

## 2025-05-08 DIAGNOSIS — E11.65 TYPE 2 DIABETES MELLITUS WITH HYPERGLYCEMIA, WITH LONG-TERM CURRENT USE OF INSULIN (H): ICD-10-CM

## 2025-05-08 DIAGNOSIS — Z97.8 FOLEY CATHETER IN PLACE: ICD-10-CM

## 2025-05-08 DIAGNOSIS — C7A.8 NEUROENDOCRINE CARCINOMA METASTATIC TO BONE (H): ICD-10-CM

## 2025-05-08 DIAGNOSIS — F43.23 ADJUSTMENT DISORDER WITH MIXED ANXIETY AND DEPRESSED MOOD: ICD-10-CM

## 2025-05-08 DIAGNOSIS — Z79.4 TYPE 2 DIABETES MELLITUS WITH HYPERGLYCEMIA, WITH LONG-TERM CURRENT USE OF INSULIN (H): ICD-10-CM

## 2025-05-08 DIAGNOSIS — G89.3 CANCER RELATED PAIN: ICD-10-CM

## 2025-05-08 DIAGNOSIS — M25.511 ACUTE PAIN OF RIGHT SHOULDER: Primary | ICD-10-CM

## 2025-05-08 DIAGNOSIS — C7B.8 NEUROENDOCRINE CARCINOMA METASTATIC TO BONE (H): ICD-10-CM

## 2025-05-08 PROCEDURE — 99310 SBSQ NF CARE HIGH MDM 45: CPT | Performed by: NURSE PRACTITIONER

## 2025-05-08 RX ORDER — LIDOCAINE 4 G/G
1 PATCH TOPICAL EVERY 24 HOURS
COMMUNITY

## 2025-05-08 NOTE — LETTER
2025      Brissa Mott  [address]  [facility] [address]        [facility] GERIATRICS  Primary Care Provider & Clinic: Arti Montero NP, [address] [facility] [address]  Chief Complaint   Patient presents with    Hospital F/U     [facility] 2025 - 2025     [place] Medical Record Number: [id number]  Place of Service Where Encounter Took Place: [facility] () [[id number]]    Brissa Mott is a 54 year old (1970), returned to the above facility from  [facility]. Hospital stay 25 - 25.     HPI:    ***    CODE STATUS/ADVANCE DIRECTIVES DISCUSSION: Full Code - {CODE STATUS:887717}  Patient's living condition: lives in a skilled nursing facility  ALLERGIES:   Allergies   Allergen Reactions    Sulfa Antibiotics Shortness Of Breath, Hives and Rash    Bydureon [Exenatide] Diarrhea    Penicillins Rash and Hives      PAST MEDICAL HISTORY:   Past Medical History:   Diagnosis Date    Asthma     Cerebral infarction (H)     Diabetes (H)     Hypertension     Mixed hyperlipidemia     Morbid obesity (H)     KATHRINE (obstructive sleep apnea)     Osteoarthritis       PAST SURGICAL HISTORY:  has a past surgical history that includes  section and Insertion, Intramedullary Humphrey, Retrograde, For Femoral Shaft Fracture (Right, 2025).  FAMILY HISTORY: family history is not on file.  SOCIAL HISTORY:  reports that she has quit smoking. She has never used smokeless tobacco. She reports that she does not currently use alcohol. She reports that she does not use drugs.    Post Discharge Medication Reconciliation Status:  MED REC REQUIRED{TIP  Click the link below to document or use med rec list, use list to pull in response :830060}  Post Medication Reconciliation Status: {MED REC LIST:570432}    Current Outpatient Medications   Medication Sig Dispense Refill    acetaminophen (TYLENOL) 325 MG tablet Take 3 tablets (975 mg) by mouth every 8 hours.    "   amLODIPine (NORVASC) 10 MG tablet Take 1 tablet (10 mg) by mouth daily 90 tablet 3    apixaban ANTICOAGULANT (ELIQUIS) 5 MG tablet Take 1 tablet (5 mg) by mouth 2 times daily.      atorvastatin (LIPITOR) 40 MG tablet Take 1 tablet (40 mg) by mouth at bedtime.      Continuous Glucose Sensor (FREESTYLE SANDRA 3 SENSOR) MISC 1 each every 14 days Use 1 sensor every 14 days. Use to read blood sugars per 's instructions. 6 each 5    diclofenac (VOLTAREN) 1 % topical gel Apply 4 g topically 4 times daily.      docusate sodium (COLACE) 100 MG capsule Take 1 capsule (100 mg) by mouth 2 times daily.      gabapentin (NEURONTIN) 300 MG capsule Take 1 capsule (300 mg) by mouth 2 times daily. 10 capsule 0    ibuprofen (ADVIL/MOTRIN) 400 MG tablet Take 1 tablet (400 mg) by mouth every 6 hours as needed for inflammatory pain.      insulin lispro (HUMALOG VIAL) 100 UNIT/ML vial Inject 1 Units subcutaneously 3 times daily.      insulin NPH-Regular (HUMULIN 70/30;NOVOLIN 70/30) susp Inject 15 Units subcutaneously 2 times daily (before meals).      insulin pen needle (BD PEN NEEDLE TWYLA 2ND GEN) 32G X 4 MM miscellaneous USE TWICE DAILY OR AS DIRECTED 200 each 2    irbesartan (AVAPRO) 300 MG tablet TAKE 1 TABLET(300 MG) BY MOUTH AT BEDTIME 90 tablet 2    Lidocaine (LIDOCARE) 4 % Patch Place 2 patches over 12 hours onto the skin every 24 hours. To prevent lidocaine toxicity, patient should be patch free for 12 hrs daily.      LORazepam (ATIVAN) 0.5 MG tablet Take 1 tablet (0.5 mg) by mouth every 8 hours. 60 tablet 0    metFORMIN (GLUCOPHAGE) 1000 MG tablet TAKE 1 TABLET BY MOUTH TWICE DAILY WITH MEALS 180 tablet 1    miconazole (MICATIN) 2 % external powder Apply topically 2 times daily. To perineum      Misc. Devices (ROLLATOR ULTRA-LIGHT) MISC Extra side walker with front wheels for home use.  Purchase, lifetime need. Extra wide rolling walker (\"Walker 4\", item #:  [id number]) needed for safe transfers and ambulation.  Pt " weight is 335 lbs.      montelukast (SINGULAIR) 10 MG tablet Take 1 tablet (10 mg) by mouth at bedtime 90 tablet 3    morphine (MS CONTIN) 15 MG CR tablet Take 1 tablet (15 mg) by mouth every 12 hours. 30 tablet 0    Nutritional Supplements (ENSURE PO) Take 1 Can by mouth 2 times daily.      oxyCODONE (ROXICODONE) 10 MG tablet Take 1 tablet (10 mg) by mouth every 4 hours as needed for severe pain. 30 tablet 0    oxyCODONE (ROXICODONE) 5 MG tablet Take 1 tablet (5 mg) by mouth every 4 hours as needed for moderate pain. 30 tablet 0    polyethylene glycol (MIRALAX) 17 g packet Take 17 g by mouth daily.      senna-docusate (SENOKOT-S/PERICOLACE) 8.6-50 MG tablet Take 1 tablet by mouth 2 times daily as needed for constipation.      sennosides (SENOKOT) 8.6 MG tablet Take 2 tablets by mouth 2 times daily.      sertraline (ZOLOFT) 100 MG tablet Take 1.5 tablets (150 mg) by mouth daily 135 tablet 3     No current facility-administered medications for this visit.     ROS:  {ROS FGS:146235}    Vitals:  LMP  (LMP Unknown)   Exam:  {Nursing home physical exam :869016}    Lab/Diagnostic Data:  {fgslab:256403}    ASSESSMENT/PLAN:  {FGS DX INITIAL:466181}    Orders:  {fgsorders:721357}    Total time spent with patient visit at the skilled nursing facility was {1/2/3/4/5:797311} including {1/2/3/4/5:252874}. Greater than 50% of total time spent with counseling and coordinating care due to ***.     Electronically signed by: Aishwarya Hidalgo***      Sincerely,        Arti Montero NP    Electronically signed   Inspection and palpation of skin and subcutaneous tissue at baseline  PSYCH:  insight and judgement, memory seems impaired, affect and mood normal    ASSESSMENT/PLAN:  Right shoulder pain  Reports pain with any movement.  - continue oral pain regimen.   - encouraged her to request ice and oxycodone  - diclofenac TID  - lidocaine patch at HS.     Type 2 diabetes mellitus with complication, with long-term current use of insulin (H)   Continue NPH 15 units BID    Caner related pain  OA pain.   Evaluated by palliative care. Continue plan of care with MS contin, oxycodone PRN 5-10 mg every 4 hours PRN. Continue scheduled tylenol volteren, gabapentin, lidocaine patches.   Ibuprfen PRN.     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 Friedell  not a canidate for chemo and may be a candidate for immunotherapy.   Recheck labs per oncology.     Adjustment disorder with significant anxiety and depression   - continue sertraline and lorazepam.     Gluteal cleft / coccyx & right buttock pressure ulcer   Nielson catheter in place  Followed by wound care NP at facility. Continue catheter until skin issue resolved due to incontinence..     Orders:  - voltaern gel to right shoulder TID.   - lidocaine patch 4% apply at bedtime and remove in the morning.     45 MINUTES SPENT BY ME on the date of service doing chart review, history, exam, documentation & further activities per the note.     Electronically signed by: Arti Montero NP      Sincerely,        Arti Montero NP    Electronically signed

## 2025-05-14 DIAGNOSIS — L89.153 PRESSURE INJURY OF SACRAL REGION, STAGE 3 (H): ICD-10-CM

## 2025-05-14 DIAGNOSIS — M84.551A PATHOLOGICAL FRACTURE OF RIGHT FEMUR DUE TO NEOPLASTIC DISEASE, INITIAL ENCOUNTER (H): ICD-10-CM

## 2025-05-14 RX ORDER — MORPHINE SULFATE 15 MG/1
15 TABLET, FILM COATED, EXTENDED RELEASE ORAL EVERY 12 HOURS
Qty: 60 TABLET | Refills: 0 | Status: SHIPPED | OUTPATIENT
Start: 2025-05-14

## 2025-05-16 ENCOUNTER — APPOINTMENT (OUTPATIENT)
Dept: LAB | Facility: CLINIC | Age: 55
End: 2025-05-16
Payer: COMMERCIAL

## 2025-05-16 PROCEDURE — 82565 ASSAY OF CREATININE: CPT | Performed by: INTERNAL MEDICINE

## 2025-05-16 PROCEDURE — 82310 ASSAY OF CALCIUM: CPT | Performed by: INTERNAL MEDICINE

## 2025-05-16 PROCEDURE — 82040 ASSAY OF SERUM ALBUMIN: CPT | Performed by: INTERNAL MEDICINE

## 2025-05-18 ENCOUNTER — TELEPHONE (OUTPATIENT)
Dept: GERIATRICS | Facility: CLINIC | Age: 55
End: 2025-05-18
Payer: COMMERCIAL

## 2025-05-18 DIAGNOSIS — F41.1 GENERALIZED ANXIETY DISORDER: ICD-10-CM

## 2025-05-18 DIAGNOSIS — C7A.8 MALIGNANT NEUROENDOCRINE NEOPLASM (H): ICD-10-CM

## 2025-05-18 DIAGNOSIS — Z51.5 HOSPICE CARE PATIENT: Primary | ICD-10-CM

## 2025-05-18 PROBLEM — C78.01 MALIGNANT NEOPLASM METASTATIC TO BOTH LUNGS (H): Status: ACTIVE | Noted: 2025-01-01

## 2025-05-18 PROBLEM — C79.51 MALIGNANT NEOPLASM METASTATIC TO BONE (H): Status: ACTIVE | Noted: 2025-01-01

## 2025-05-18 PROBLEM — C78.7 MALIGNANT NEOPLASM METASTATIC TO LIVER (H): Status: ACTIVE | Noted: 2025-01-01

## 2025-05-18 PROBLEM — C7B.8 NEUROENDOCRINE CARCINOMA METASTATIC TO BONE (H): Status: ACTIVE | Noted: 2025-01-01

## 2025-05-18 PROBLEM — C78.02 MALIGNANT NEOPLASM METASTATIC TO BOTH LUNGS (H): Status: ACTIVE | Noted: 2025-01-01

## 2025-05-18 PROBLEM — N83.8 COMPLEX CYST OF UTERINE ADNEXA: Status: ACTIVE | Noted: 2025-01-01

## 2025-05-18 RX ORDER — LORAZEPAM 0.5 MG/1
TABLET ORAL
Qty: 10 TABLET | Refills: 0 | Status: SHIPPED | OUTPATIENT
Start: 2025-05-18

## 2025-05-18 RX ORDER — OXYCODONE HYDROCHLORIDE 5 MG/1
TABLET ORAL
Qty: 15 TABLET | Refills: 0 | Status: SHIPPED | OUTPATIENT
Start: 2025-05-18 | End: 2025-05-20

## 2025-05-18 NOTE — TELEPHONE ENCOUNTER
[facility]IAT[facility] TRIAGE ENCOUNTER    Chief Complaint   Patient presents with    end of life       Brissa Mott is a 54 year old  (1970), Nurse called today to report: Patient now DNR DNI, going on hospice tomorrow. BP this am 89/60, per staff she was given a life expectancy of a week from oncolgy on Friday. Appears to be actively dying per nursing. She was able to swallow pills this AM but now is minimally responsive and unable to take pills. Nursing had discussion with family and would like her code status changed to DNR DNI and would like comfort cares and hospice.    ASSESSMENT/PLAN  Hold all oral pills today, will reassess tomorrow  Hold insulins if not eating  Will need to switch long acting morphine to oral short acting. Facility is unable to use liquid.  Oxycodone 2.5 mg every 4 hours starting at 8 pm tonight to cover the morphine long acting.   Oxycodone 5-10 mg every 2 hours PRN  Lorazepam 0.5 mg every 8 hours and every 4 hours PRN  Hospice referral  Call if symptoms not managed    Electronically signed by:   Elida Lomas, RANDEE

## 2025-05-19 ENCOUNTER — NURSING HOME VISIT (OUTPATIENT)
Dept: GERIATRICS | Facility: CLINIC | Age: 55
End: 2025-05-19
Payer: COMMERCIAL

## 2025-05-19 VITALS
WEIGHT: 235.4 LBS | SYSTOLIC BLOOD PRESSURE: 90 MMHG | RESPIRATION RATE: 20 BRPM | OXYGEN SATURATION: 91 % | DIASTOLIC BLOOD PRESSURE: 60 MMHG | HEIGHT: 65 IN | TEMPERATURE: 98.6 F | HEART RATE: 97 BPM | BODY MASS INDEX: 39.22 KG/M2

## 2025-05-19 DIAGNOSIS — C79.51 MALIGNANT NEOPLASM METASTATIC TO BONE (H): ICD-10-CM

## 2025-05-19 DIAGNOSIS — C7A.8 NEUROENDOCRINE CARCINOMA METASTATIC TO BONE (H): ICD-10-CM

## 2025-05-19 DIAGNOSIS — C78.7 MALIGNANT NEOPLASM METASTATIC TO LIVER (H): ICD-10-CM

## 2025-05-19 DIAGNOSIS — M84.551D PATHOLOGICAL FRACTURE OF RIGHT FEMUR DUE TO NEOPLASTIC DISEASE WITH ROUTINE HEALING, SUBSEQUENT ENCOUNTER: ICD-10-CM

## 2025-05-19 DIAGNOSIS — E11.65 TYPE 2 DIABETES MELLITUS WITH HYPERGLYCEMIA, WITH LONG-TERM CURRENT USE OF INSULIN (H): Primary | ICD-10-CM

## 2025-05-19 DIAGNOSIS — C7B.8 NEUROENDOCRINE CARCINOMA METASTATIC TO BONE (H): ICD-10-CM

## 2025-05-19 DIAGNOSIS — N83.8 COMPLEX CYST OF UTERINE ADNEXA: ICD-10-CM

## 2025-05-19 DIAGNOSIS — M25.562 CHRONIC PAIN OF LEFT KNEE: ICD-10-CM

## 2025-05-19 DIAGNOSIS — C78.02 MALIGNANT NEOPLASM METASTATIC TO BOTH LUNGS (H): ICD-10-CM

## 2025-05-19 DIAGNOSIS — G89.29 CHRONIC PAIN OF LEFT KNEE: ICD-10-CM

## 2025-05-19 DIAGNOSIS — C7A.8 MALIGNANT NEUROENDOCRINE NEOPLASM (H): ICD-10-CM

## 2025-05-19 DIAGNOSIS — C78.01 MALIGNANT NEOPLASM METASTATIC TO BOTH LUNGS (H): ICD-10-CM

## 2025-05-19 DIAGNOSIS — Z51.5 HOSPICE CARE PATIENT: ICD-10-CM

## 2025-05-19 DIAGNOSIS — Z79.4 TYPE 2 DIABETES MELLITUS WITH HYPERGLYCEMIA, WITH LONG-TERM CURRENT USE OF INSULIN (H): Primary | ICD-10-CM

## 2025-05-19 DIAGNOSIS — I69.354 HEMIPLEGIA AND HEMIPARESIS FOLLOWING CEREBRAL INFARCTION AFFECTING LEFT NON-DOMINANT SIDE (H): ICD-10-CM

## 2025-05-19 PROCEDURE — 99306 1ST NF CARE HIGH MDM 50: CPT | Performed by: FAMILY MEDICINE

## 2025-05-19 NOTE — LETTER
5/19/2025      Brissa Mott  [address]  [facility] [address]        [facility] GERIATRICS    PRIMARY CARE PROVIDER AND CLINIC:  Arti Montero NP, [address] [facility] [address]  Chief Complaint   Patient presents with     [facility] Medical Record Number:  [id number]  Place of Service where encounter took place:  [facility] () [[id number]]    Brissa Mott  is a 54 year old  (1970), admitted to the above facility from  [facility]. Hospital stay 4/14/25 through 4/24/25..   HPI:    2/20/25 through 3/5/25 :  Hypercalcemia, started on Zoledronic acid  pathological Rt femur fx s/p ORIF (2/5/25)  CT: complex Rt adnexal cystis mass and multiple pulmonary nodules. Bx showed neuroendocrine carcinoma with mets to bones (Rt femur, Rt humerus, pelvic bone and sacrum) lymph nodes, liver and lungs.   Unstageable sacral PI.     3/5/25-4/8/25: [facility] [address]:     4/14/25 through 4/24/25:  -hypercalcemia  -PE:  started on OAC, UTI (abx), urinary retention (IUC places)      5/2/25-5/6/25  Oncology visit, not a candidate for systemic ctx based on PPS. Might be a candidate for immunotherapy. ECOG performance 4  Sent to ED for severe hypercalcemia, somnolence.admitted       TODAY:  -Neuroendocrine carcinoma with mets:  5/16: saw oncology was given one week to live  5/18: change in condition, hypotensive, decided to enrol on hospice on 5/19. Comfort packs started.   Patient was seen in the presence of the multiple family member including her . Reported no po intake in the last two days. Non verbal and no interaction, appears comfortable. Family asked how long before she passes away.   ==============================================================================  CODE STATUS/ADVANCE DIRECTIVES DISCUSSION:  No CPR- Do NOT Intubate  CPR/Full code   ALLERGIES:   Allergies   Allergen Reactions     Sulfa Antibiotics Shortness Of Breath, Hives  and Rash     Bydureon [Exenatide] Diarrhea     Penicillins Rash and Hives      PAST MEDICAL HISTORY:   Past Medical History:   Diagnosis Date     Asthma      Cerebral infarction (H)      Diabetes (H)      Hypertension      Mixed hyperlipidemia      Morbid obesity (H)      KATHRINE (obstructive sleep apnea)      Osteoarthritis       PAST SURGICAL HISTORY:   has a past surgical history that includes  section and Insertion, Intramedullary Humphrey, Retrograde, For Femoral Shaft Fracture (Right, 2025).  FAMILY HISTORY: family history is not on file.  SOCIAL HISTORY:   reports that she has quit smoking. She has never used smokeless tobacco. She reports that she does not currently use alcohol. She reports that she does not use drugs.  Patient's living condition: lives with spouse    Post Discharge Medication Reconciliation Status:   MED REC REQUIREDPost Medication Reconciliation Status: medication reconcilation previously completed during another office visit       Current Outpatient Medications   Medication Sig Dispense Refill     acetaminophen (TYLENOL) 325 MG tablet Take 3 tablets (975 mg) by mouth every 8 hours.       amLODIPine (NORVASC) 10 MG tablet Take 1 tablet (10 mg) by mouth daily 90 tablet 3     apixaban ANTICOAGULANT (ELIQUIS) 5 MG tablet Take 1 tablet (5 mg) by mouth 2 times daily.       atorvastatin (LIPITOR) 40 MG tablet Take 1 tablet (40 mg) by mouth at bedtime.       Continuous Glucose Sensor (FREESTYLE SANDRA 3 SENSOR) MISC 1 each every 14 days Use 1 sensor every 14 days. Use to read blood sugars per 's instructions. 6 each 5     diclofenac (VOLTAREN) 1 % topical gel Apply 2 g topically 3 times daily. Right shoulder       docusate sodium (COLACE) 100 MG capsule Take 1 capsule (100 mg) by mouth 2 times daily.       gabapentin (NEURONTIN) 300 MG capsule Take 1 capsule (300 mg) by mouth 2 times daily. 10 capsule 0     ibuprofen (ADVIL/MOTRIN) 400 MG tablet Take 1 tablet (400 mg) by mouth  "every 6 hours as needed for inflammatory pain.       insulin lispro (HUMALOG VIAL) 100 UNIT/ML vial Inject 1 Units subcutaneously 3 times daily.       insulin NPH-Regular (HUMULIN 70/30;NOVOLIN 70/30) susp Inject 15 Units subcutaneously 2 times daily (before meals).       insulin pen needle (BD PEN NEEDLE TWYLA 2ND GEN) 32G X 4 MM miscellaneous USE TWICE DAILY OR AS DIRECTED 200 each 2     irbesartan (AVAPRO) 300 MG tablet TAKE 1 TABLET(300 MG) BY MOUTH AT BEDTIME 90 tablet 2     Lidocaine (LIDOCARE) 4 % Patch Place 1 patch onto the skin every 24 hours. To prevent lidocaine toxicity, patient should be patch free for 12 hrs daily. To right shoulder at bedtime.       LORazepam (ATIVAN) 0.5 MG tablet Take 1 tablet (0.5 mg) by mouth every 8 hours. May also take 1 tablet (0.5 mg) every 4 hours as needed for anxiety. 10 tablet 0     metFORMIN (GLUCOPHAGE) 1000 MG tablet TAKE 1 TABLET BY MOUTH TWICE DAILY WITH MEALS 180 tablet 1     miconazole (MICATIN) 2 % external powder Apply topically 2 times daily. To perineum       Misc. Devices (ROLLATOR ULTRA-LIGHT) MISC Extra side walker with front wheels for home use.  Purchase, lifetime need. Extra wide rolling walker (\"Walker 4\", item #:  [id number]) needed for safe transfers and ambulation.  Pt weight is 335 lbs.       montelukast (SINGULAIR) 10 MG tablet Take 1 tablet (10 mg) by mouth at bedtime 90 tablet 3     Nutritional Supplements (ENSURE PO) Take 1 Can by mouth 2 times daily.       oxyCODONE (ROXICODONE) 5 MG tablet Take 0.5 tablets (2.5 mg) by mouth every 4 hours. May also take 1-2 tablets (5-10 mg) every 2 hours as needed for pain. 15 tablet 0     polyethylene glycol (MIRALAX) 17 g packet Take 17 g by mouth daily.       senna-docusate (SENOKOT-S/PERICOLACE) 8.6-50 MG tablet Take 1 tablet by mouth 2 times daily as needed for constipation.       sennosides (SENOKOT) 8.6 MG tablet Take 2 tablets by mouth 2 times daily.       sertraline (ZOLOFT) 100 MG tablet Take 1.5 tablets " "(150 mg) by mouth daily 135 tablet 3     No current facility-administered medications for this visit.       ROS: none due to current medical status.     Vitals:  BP 90/60   Pulse 97   Temp 98.6  F (37  C)   Resp 20   Ht 1.638 m (5' 4.5\")   Wt 106.8 kg (235 lb 6.4 oz)   LMP  (LMP Unknown)   SpO2 (!) 91%   BMI 39.78 kg/m    Exam:  GENERAL APPEARANCE: patient sleeping in the bed, no interaction, some shallow sporadic breathing, appears comfortable.     Lab/Diagnostic data: Patient is on hospice/palliative care and labs are not recommended    ASSESSMENT/PLAN:  -------------------------------  Type 2 diabetes mellitus with hyperglycemia, with long-term current use of insulin (H)  Uncomplicated asthma, unspecified asthma severity, unspecified whether persistent  KATHRINE (obstructive sleep apnea)  Secondary hypertension  Hemiplegia and hemiparesis following cerebral infarction affecting left non-dominant side (H)  Pathological fracture of right femur due to neoplastic disease with routine healing, subsequent encounter  S/P ORIF (open reduction internal fixation) fracture  Chronic pain of left knee  Neuroendocrine carcinoma metastatic to bone (H)  Hypercalcemia, recurrent  Malignant neoplasm metastatic to bones (H)  Malignant neoplasm metastatic to liver  Malignant neoplasm metastatic to both lung,  (H)  Complex cyst of uterine adnexa  Hospice care  - it seems the patient has entered the active phase of death. Discussed with the family the current status, the hospice philosophy. Estimated death ranged from a few death to one-2 weeks.   - today will be evaluated by [facility] hospice.   - It is our clinical judgement that the life expectancy is six month or less if the terminal illness runs its normal trajectory.   - focus is comfort care. -         Electronically signed by:  Dereje Burk MD                     Sincerely,        Dereje Burk MD    Electronically signed  "

## 2025-05-20 DIAGNOSIS — C7A.8 MALIGNANT NEUROENDOCRINE NEOPLASM (H): ICD-10-CM

## 2025-05-20 DIAGNOSIS — Z51.5 HOSPICE CARE PATIENT: ICD-10-CM

## 2025-05-20 RX ORDER — OXYCODONE HYDROCHLORIDE 5 MG/1
TABLET ORAL
Qty: 90 TABLET | Refills: 0 | Status: SHIPPED | OUTPATIENT
Start: 2025-05-20

## 2025-06-11 ENCOUNTER — LAB (OUTPATIENT)
Dept: LAB | Facility: CLINIC | Age: 55
End: 2025-06-11
Payer: COMMERCIAL

## 2025-06-11 PROCEDURE — 86833 HLA CLASS II HIGH DEFIN QUAL: CPT
